# Patient Record
Sex: FEMALE | Race: WHITE | HISPANIC OR LATINO | Employment: OTHER | URBAN - METROPOLITAN AREA
[De-identification: names, ages, dates, MRNs, and addresses within clinical notes are randomized per-mention and may not be internally consistent; named-entity substitution may affect disease eponyms.]

---

## 2017-06-27 ENCOUNTER — ALLSCRIPTS OFFICE VISIT (OUTPATIENT)
Dept: OTHER | Facility: OTHER | Age: 72
End: 2017-06-27

## 2018-01-09 NOTE — MISCELLANEOUS
Provider Comments  Provider Comments:   Patient did not show for 6/27/17 appt        Signatures   Electronically signed by : CORBY Orona ; Jun 27 2017  4:04PM EST

## 2019-06-17 ENCOUNTER — TELEPHONE (OUTPATIENT)
Dept: NEPHROLOGY | Facility: CLINIC | Age: 74
End: 2019-06-17

## 2019-11-29 ENCOUNTER — CONSULT (OUTPATIENT)
Dept: NEPHROLOGY | Facility: CLINIC | Age: 74
End: 2019-11-29
Payer: COMMERCIAL

## 2019-11-29 VITALS — SYSTOLIC BLOOD PRESSURE: 116 MMHG | DIASTOLIC BLOOD PRESSURE: 62 MMHG

## 2019-11-29 DIAGNOSIS — I12.9 BENIGN HYPERTENSION WITH CKD (CHRONIC KIDNEY DISEASE) STAGE III (HCC): ICD-10-CM

## 2019-11-29 DIAGNOSIS — I25.10 CORONARY ARTERIOSCLEROSIS: ICD-10-CM

## 2019-11-29 DIAGNOSIS — N18.30 TYPE 2 DIABETES MELLITUS WITH STAGE 3 CHRONIC KIDNEY DISEASE, WITH LONG-TERM CURRENT USE OF INSULIN (HCC): ICD-10-CM

## 2019-11-29 DIAGNOSIS — N18.30 STAGE 3 CHRONIC KIDNEY DISEASE (HCC): Primary | ICD-10-CM

## 2019-11-29 DIAGNOSIS — Z79.4 TYPE 2 DIABETES MELLITUS WITH STAGE 3 CHRONIC KIDNEY DISEASE, WITH LONG-TERM CURRENT USE OF INSULIN (HCC): ICD-10-CM

## 2019-11-29 DIAGNOSIS — Z15.89 HETEROZYGOUS MTHFR MUTATION A1298C: ICD-10-CM

## 2019-11-29 DIAGNOSIS — N18.30 BENIGN HYPERTENSION WITH CKD (CHRONIC KIDNEY DISEASE) STAGE III (HCC): ICD-10-CM

## 2019-11-29 DIAGNOSIS — E11.22 TYPE 2 DIABETES MELLITUS WITH STAGE 3 CHRONIC KIDNEY DISEASE, WITH LONG-TERM CURRENT USE OF INSULIN (HCC): ICD-10-CM

## 2019-11-29 PROBLEM — E11.9 TYPE 2 DIABETES MELLITUS, WITH LONG-TERM CURRENT USE OF INSULIN (HCC): Status: ACTIVE | Noted: 2019-11-29

## 2019-11-29 PROCEDURE — 99204 OFFICE O/P NEW MOD 45 MIN: CPT | Performed by: INTERNAL MEDICINE

## 2019-11-29 RX ORDER — NIFEDIPINE 60 MG/1
60 TABLET, FILM COATED, EXTENDED RELEASE ORAL DAILY
COMMUNITY
End: 2020-11-10 | Stop reason: SDUPTHER

## 2019-11-29 RX ORDER — CHOLECALCIFEROL (VITAMIN D3) 50 MCG
2000 TABLET ORAL DAILY
COMMUNITY
End: 2020-11-10 | Stop reason: SDUPTHER

## 2019-11-29 RX ORDER — ASPIRIN 81 MG/1
81 TABLET ORAL DAILY
COMMUNITY
End: 2022-07-05 | Stop reason: ALTCHOICE

## 2019-11-29 RX ORDER — CARVEDILOL 3.12 MG/1
25 TABLET ORAL 2 TIMES DAILY WITH MEALS
COMMUNITY
End: 2020-11-10 | Stop reason: ALTCHOICE

## 2019-11-29 RX ORDER — RANOLAZINE 500 MG/1
500 TABLET, EXTENDED RELEASE ORAL DAILY
COMMUNITY
End: 2020-11-10

## 2019-11-29 RX ORDER — INSULIN GLARGINE 100 [IU]/ML
20 INJECTION, SOLUTION SUBCUTANEOUS
COMMUNITY
End: 2020-07-12 | Stop reason: SDUPTHER

## 2019-11-29 RX ORDER — MECLIZINE HCL 12.5 MG/1
12.5 TABLET ORAL 3 TIMES DAILY PRN
COMMUNITY
End: 2020-08-24 | Stop reason: SDUPTHER

## 2019-11-29 RX ORDER — PRAVASTATIN SODIUM 40 MG
40 TABLET ORAL DAILY
COMMUNITY
End: 2020-11-10 | Stop reason: SDUPTHER

## 2019-11-29 NOTE — PROGRESS NOTES
NEPHROLOGY OUTPATIENT CONSULTATION   Yudith Robbins 76 y o  female MRN: [id number]  Date: 11/29/2019  Reason for consultation:   Chief Complaint   Patient presents with    Consult       ASSESSMENT and PLAN:    Thank you for the courtesy of this consultation  I had the pleasure of seeing Rodger Hou today in the renal clinic for the initial management of chronic kidney disease  1 ) Chronic kidney disease stage III  -- no recent blood work to comment on  --had been following [facility] kidney specialist with Dr Junior Rhodes  -- transitioning her care to me  -- was last seen by Nephrology back in May of 2019 for acute kidney injury at [facility]  -- reviewed outpatient records and inpatient consultation  -- based on prior records she has a baseline creatinine in the past that had been 1 2-1 3 mg/dL  -- had episode of acute kidney injury and was discharged with a creatinine of 2 no blood work after that to comment on in regards to the renal function  -- renal ultrasound from May 2019 show symmetrically sized kidneys, right kidney measuring 11 1 cm left kidney measuring 11 6 cm  Bilateral echogenicity consistent with chronic kidney disease  No masses no hydronephrosis  -- presumed etiology is diabetic nephropathy with a component of hypertension  -- avoid NSAIDs  -- diabetic and blood pressure control  -- check a BMP and a urinalysis    2 ) Diabetes mellitus type 2  -hemoglobin A1c:  No recent (A1C values may be falsely elevated or decreased in those with CKD, assay method should be certified by [employer])   Target A1C < 7  -current medications:  Lantus  -proteinuria:  Check urinalysis  -retinopathy:  Yes  -neuropathy:  Yes  -please follow with an ophthalmologist and podiatrist every year  -continued self monitoring of blood glucose at home  -Treatment Management in CKD Recommendations:    Metformin:  Avoid if creatinine clearance is less than 30 cc/min (concern for lactic acidosis)   Sodium-Glucose Cotransporter-2 (SGLT2) Inhibitors: May see an acute drop in the eGFR initially when starting the medication but then a stabilization of the renal function, with slower loss of renal function as compared to placebo  Relative risk of end-stage renal disease, doubling of serum creatinine or death from renal causes were also found to be lower as compared to placebo  ([facility])  Would recommend starting at 100 mg daily, I would avoid use in patients with eGFR < 30 cc/min    If no contraindications exist I would recommend this medication added to the diabetic regiment   Sulfonylureas:  Preferred short-acting (glipizide, glimepiride, repaglinide), relatively safe in patients with non dialysis CKD   Thiazolidinedones/Alpha-Gluosidase inhibitors/Dipeptidyl peptidase-4 inhibitors:  Generally not considered first-line agents in CKD (limited data in long-term safety and efficacy)   Insulin:  Starting dose may need to be lower than what would ordinarily be used, as there is a decrease metabolism of insulin (no dose adjustment if the GFR > 50 mL/min, dose should be reduced to 75% of baseline if GFR 10-50 mL/min, and 50% baseline if GFR < 10 mL/min)    3 ) Hypertension  -with underlying chronic kidney disease  -blood pressure goal less than 130/80  -blood pressure at target  -carvedilol 3 125 mg twice a day, Procardia 60 mg daily    4 ) Heterozygous MTHFR mutation  -[id number]    PATIENT INSTRUCTIONS:    Patient Instructions   1 )  Low 2 g sodium diet    2 )  Monitor weights at home    3 )  Avoid NSAIDs    4 )  Monitor blood pressure at home, call if blood pressure greater than 150/90 persistently            HISTORY OF PRESENT ILLNESS:  Requesting Physician: MD Omar Skaggs is a 76 y o  female who has a longstanding history of diabetes mellitus type 2 for over 40 years with reported retinopathy leading to blindness as well as neuropathy, hypertension, coronary artery disease, chronic kidney disease stage 3  She had been following with [facility] kidney specialist where her creatinine baseline was 1 2-1 3  She was hospitalized back in May of 2019 at [facility] for nausea and vomiting  She has sustained acute kidney injury at that time where her creatinine increased and she was subsequently discharged with a creatinine of 2 06  Her renal ultrasound showed no evidence of hydronephrosis  She was seen by [facility] kidney specialist nephrologist at that time  No blood work post that I could find to ascertain her most recent renal function  Her most recent blood work done in November was mostly hematologic studies including B12, MT HF are, iron studies essentially a retired anemia workup  She reports no shortness of breath no chest pain or swelling the legs  She does not drink a lot of water which her son is concerned about  She reports no NSAID use      PAST MEDICAL HISTORY:  Past Medical History:   Diagnosis Date    Atrial fibrillation (HCC)     Chronic anemia     Chronic kidney disease     Dyslipidemia     Hypertension     Proteinuria        PAST SURGICAL HISTORY:  Past Surgical History:   Procedure Laterality Date    CARDIAC PACEMAKER PLACEMENT      CHOLECYSTECTOMY      CORONARY ANGIOPLASTY WITH STENT PLACEMENT      TUBAL LIGATION         ALLERGIES:  No Known Allergies    SOCIAL HISTORY:  Social History     Substance and Sexual Activity   Alcohol Use Never    Frequency: Never     Social History     Substance and Sexual Activity   Drug Use Not on file     Social History     Tobacco Use   Smoking Status Never Smoker   Smokeless Tobacco Never Used       FAMILY HISTORY:  Family History   Problem Relation Age of Onset    Diabetes Father     Hypertension Father     Heart disease Father     Cancer Brother        MEDICATIONS:    Current Outpatient Medications:     aspirin (ECOTRIN LOW STRENGTH) 81 mg EC tablet, Take 81 mg by mouth daily, Disp: , Rfl:     carvedilol (COREG) 3 125 mg tablet, Take 3 125 mg by mouth 2 (two) times a day with meals, Disp: , Rfl:     Cholecalciferol (VITAMIN D) 50 MCG (2000 UT) tablet, Take 2,000 Units by mouth daily, Disp: , Rfl:     insulin glargine (LANTUS) 100 units/mL subcutaneous injection, Inject under the skin daily at bedtime, Disp: , Rfl:     meclizine (ANTIVERT) 12 5 MG tablet, Take by mouth 2 (two) times a day, Disp: , Rfl:     NIFEdipine ER (ADALAT CC) 60 MG 24 hr tablet, Take 60 mg by mouth daily, Disp: , Rfl:     pravastatin (PRAVACHOL) 40 mg tablet, Take 40 mg by mouth daily, Disp: , Rfl:     ranolazine (RANEXA) 500 mg 12 hr tablet, Take 500 mg by mouth daily, Disp: , Rfl:     REVIEW OF SYSTEMS:  Review of Systems   Constitutional: Negative for activity change and fever  Respiratory: Negative for cough, chest tightness, shortness of breath and wheezing  Cardiovascular: Negative for chest pain and leg swelling  Gastrointestinal: Negative for abdominal pain, diarrhea, nausea and vomiting  Endocrine: Negative for polyuria  Genitourinary: Negative for difficulty urinating, dysuria, flank pain, frequency and urgency  Skin: Negative for rash  Neurological: Negative for dizziness, syncope, light-headedness and headaches  All the systems were reviewed and were negative except as documented on the HPI  PHYSICAL EXAM:  Current Weight: There is no height or weight on file to calculate BMI  Vitals:    11/29/19 1102   BP: 116/62       Physical Exam   Constitutional: She is oriented to person, place, and time  She appears well-developed and well-nourished  No distress  HENT:   Head: Normocephalic and atraumatic  Eyes: Pupils are equal, round, and reactive to light  No scleral icterus  Neck: Normal range of motion  Neck supple  Cardiovascular: Normal rate, regular rhythm and normal heart sounds  Exam reveals no gallop and no friction rub  No murmur heard    Pulmonary/Chest: Effort normal and breath sounds normal  No respiratory distress  She has no wheezes  She has no rales  She exhibits no tenderness  Abdominal: Soft  Bowel sounds are normal  She exhibits no distension  There is no tenderness  There is no rebound  Musculoskeletal: Normal range of motion  She exhibits no edema  Neurological: She is alert and oriented to person, place, and time  Skin: No rash noted  She is not diaphoretic  Psychiatric: She has a normal mood and affect  Nursing note and vitals reviewed  Laboratory results:   No results found for this or any previous visit

## 2020-04-27 ENCOUNTER — TELEPHONE (OUTPATIENT)
Dept: NEPHROLOGY | Facility: CLINIC | Age: 75
End: 2020-04-27

## 2020-04-29 ENCOUNTER — TELEPHONE (OUTPATIENT)
Dept: NEPHROLOGY | Facility: CLINIC | Age: 75
End: 2020-04-29

## 2020-04-29 DIAGNOSIS — Z79.4 TYPE 2 DIABETES MELLITUS WITH STAGE 3 CHRONIC KIDNEY DISEASE, WITH LONG-TERM CURRENT USE OF INSULIN (HCC): Primary | ICD-10-CM

## 2020-04-29 DIAGNOSIS — E11.22 TYPE 2 DIABETES MELLITUS WITH STAGE 3 CHRONIC KIDNEY DISEASE, WITH LONG-TERM CURRENT USE OF INSULIN (HCC): Primary | ICD-10-CM

## 2020-04-29 DIAGNOSIS — N18.30 TYPE 2 DIABETES MELLITUS WITH STAGE 3 CHRONIC KIDNEY DISEASE, WITH LONG-TERM CURRENT USE OF INSULIN (HCC): Primary | ICD-10-CM

## 2020-04-29 RX ORDER — INSULIN GLARGINE 100 [IU]/ML
INJECTION, SOLUTION SUBCUTANEOUS
Qty: 15 ML | Refills: 11 | Status: SHIPPED | OUTPATIENT
Start: 2020-04-29 | End: 2020-11-10 | Stop reason: SDUPTHER

## 2020-05-06 ENCOUNTER — TELEPHONE (OUTPATIENT)
Dept: NEPHROLOGY | Facility: CLINIC | Age: 75
End: 2020-05-06

## 2020-05-26 ENCOUNTER — APPOINTMENT (OUTPATIENT)
Dept: LAB | Facility: CLINIC | Age: 75
End: 2020-05-26
Payer: COMMERCIAL

## 2020-05-26 DIAGNOSIS — N18.30 STAGE 3 CHRONIC KIDNEY DISEASE (HCC): ICD-10-CM

## 2020-05-26 LAB
ALBUMIN SERPL BCP-MCNC: 2.9 G/DL (ref 3.5–5)
ALP SERPL-CCNC: 62 U/L (ref 46–116)
ALT SERPL W P-5'-P-CCNC: 14 U/L (ref 12–78)
ANION GAP SERPL CALCULATED.3IONS-SCNC: 4 MMOL/L (ref 4–13)
AST SERPL W P-5'-P-CCNC: 8 U/L (ref 5–45)
BACTERIA UR QL AUTO: ABNORMAL /HPF
BILIRUB SERPL-MCNC: 0.47 MG/DL (ref 0.2–1)
BILIRUB UR QL STRIP: NEGATIVE
BUN SERPL-MCNC: 29 MG/DL (ref 5–25)
CALCIUM SERPL-MCNC: 8.7 MG/DL (ref 8.3–10.1)
CHLORIDE SERPL-SCNC: 108 MMOL/L (ref 100–108)
CLARITY UR: CLEAR
CO2 SERPL-SCNC: 28 MMOL/L (ref 21–32)
COLOR UR: YELLOW
CREAT SERPL-MCNC: 1.62 MG/DL (ref 0.6–1.3)
CREAT UR-MCNC: 49.2 MG/DL
ERYTHROCYTE [DISTWIDTH] IN BLOOD BY AUTOMATED COUNT: 13.5 % (ref 11.6–15.1)
GFR SERPL CREATININE-BSD FRML MDRD: 31 ML/MIN/1.73SQ M
GLUCOSE P FAST SERPL-MCNC: 78 MG/DL (ref 65–99)
GLUCOSE UR STRIP-MCNC: NEGATIVE MG/DL
HCT VFR BLD AUTO: 32.8 % (ref 34.8–46.1)
HGB BLD-MCNC: 9.9 G/DL (ref 11.5–15.4)
HGB UR QL STRIP.AUTO: NEGATIVE
HYALINE CASTS #/AREA URNS LPF: ABNORMAL /LPF
KETONES UR STRIP-MCNC: NEGATIVE MG/DL
LEUKOCYTE ESTERASE UR QL STRIP: NEGATIVE
MCH RBC QN AUTO: 27.4 PG (ref 26.8–34.3)
MCHC RBC AUTO-ENTMCNC: 30.2 G/DL (ref 31.4–37.4)
MCV RBC AUTO: 91 FL (ref 82–98)
NITRITE UR QL STRIP: NEGATIVE
NON-SQ EPI CELLS URNS QL MICRO: ABNORMAL /HPF
PH UR STRIP.AUTO: 7 [PH]
PLATELET # BLD AUTO: 213 THOUSANDS/UL (ref 149–390)
PMV BLD AUTO: 10.9 FL (ref 8.9–12.7)
POTASSIUM SERPL-SCNC: 4.9 MMOL/L (ref 3.5–5.3)
PROT SERPL-MCNC: 6.7 G/DL (ref 6.4–8.2)
PROT UR STRIP-MCNC: ABNORMAL MG/DL
PROT UR-MCNC: 20 MG/DL
PROT/CREAT UR: 0.41 MG/G{CREAT} (ref 0–0.1)
PTH-INTACT SERPL-MCNC: 66.7 PG/ML (ref 18.4–80.1)
RBC # BLD AUTO: 3.61 MILLION/UL (ref 3.81–5.12)
RBC #/AREA URNS AUTO: ABNORMAL /HPF
SODIUM SERPL-SCNC: 140 MMOL/L (ref 136–145)
SP GR UR STRIP.AUTO: 1.01 (ref 1–1.03)
UROBILINOGEN UR QL STRIP.AUTO: 0.2 E.U./DL
WBC # BLD AUTO: 4.94 THOUSAND/UL (ref 4.31–10.16)
WBC #/AREA URNS AUTO: ABNORMAL /HPF

## 2020-05-26 PROCEDURE — 80053 COMPREHEN METABOLIC PANEL: CPT

## 2020-05-26 PROCEDURE — 81001 URINALYSIS AUTO W/SCOPE: CPT | Performed by: INTERNAL MEDICINE

## 2020-05-26 PROCEDURE — 84156 ASSAY OF PROTEIN URINE: CPT

## 2020-05-26 PROCEDURE — 85027 COMPLETE CBC AUTOMATED: CPT

## 2020-05-26 PROCEDURE — 82570 ASSAY OF URINE CREATININE: CPT

## 2020-05-26 PROCEDURE — 83970 ASSAY OF PARATHORMONE: CPT

## 2020-05-26 PROCEDURE — 36415 COLL VENOUS BLD VENIPUNCTURE: CPT

## 2020-05-29 ENCOUNTER — DOCUMENTATION (OUTPATIENT)
Dept: NEPHROLOGY | Facility: CLINIC | Age: 75
End: 2020-05-29

## 2020-05-29 DIAGNOSIS — R79.89 ELEVATED SERUM CREATININE: Primary | ICD-10-CM

## 2020-07-12 ENCOUNTER — APPOINTMENT (EMERGENCY)
Dept: CT IMAGING | Facility: HOSPITAL | Age: 75
End: 2020-07-12
Payer: COMMERCIAL

## 2020-07-12 ENCOUNTER — HOSPITAL ENCOUNTER (EMERGENCY)
Facility: HOSPITAL | Age: 75
Discharge: HOME | End: 2020-07-12
Attending: EMERGENCY MEDICINE | Admitting: EMERGENCY MEDICINE
Payer: COMMERCIAL

## 2020-07-12 ENCOUNTER — APPOINTMENT (EMERGENCY)
Dept: RADIOLOGY | Facility: HOSPITAL | Age: 75
End: 2020-07-12
Payer: COMMERCIAL

## 2020-07-12 VITALS
SYSTOLIC BLOOD PRESSURE: 134 MMHG | WEIGHT: 180 LBS | BODY MASS INDEX: 31.89 KG/M2 | DIASTOLIC BLOOD PRESSURE: 49 MMHG | OXYGEN SATURATION: 100 % | RESPIRATION RATE: 19 BRPM | TEMPERATURE: 97.8 F | HEART RATE: 59 BPM | HEIGHT: 63 IN

## 2020-07-12 DIAGNOSIS — S09.90XA CLOSED HEAD INJURY, INITIAL ENCOUNTER: ICD-10-CM

## 2020-07-12 DIAGNOSIS — E87.6 HYPOKALEMIA: ICD-10-CM
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
## 2020-07-12 DIAGNOSIS — E86.0 DEHYDRATION: ICD-10-CM

## 2020-07-12 LAB
ALBUMIN SERPL BCP-MCNC: 2.6 G/DL (ref 3.4–4.8)
ALP SERPL-CCNC: 42.4 U/L (ref 35–140)
ALT SERPL W P-5'-P-CCNC: 5 U/L (ref 5–54)
ANION GAP SERPL CALCULATED.3IONS-SCNC: 6 MMOL/L (ref 4–13)
APTT PPP: 34 SECONDS (ref 23–31)
AST SERPL W P-5'-P-CCNC: 7 U/L (ref 15–41)
BACTERIA UR QL AUTO: ABNORMAL /HPF
BASOPHILS # BLD AUTO: 0.02 THOUSANDS/ΜL (ref 0–0.1)
BASOPHILS NFR BLD AUTO: 0 % (ref 0–1)
BILIRUB SERPL-MCNC: 0.33 MG/DL (ref 0.3–1.2)
BILIRUB UR QL STRIP: NEGATIVE
BNP SERPL-MCNC: 108.3 PG/ML (ref 1–100)
BUN SERPL-MCNC: 22 MG/DL (ref 6–20)
CALCIUM SERPL-MCNC: 6.7 MG/DL (ref 8.4–10.2)
CHLORIDE SERPL-SCNC: 118 MMOL/L (ref 96–108)
CLARITY UR: CLEAR
CO2 SERPL-SCNC: 20 MMOL/L (ref 22–33)
COLOR UR: YELLOW
CREAT SERPL-MCNC: 1.28 MG/DL (ref 0.4–1.1)
EOSINOPHIL # BLD AUTO: 0.21 THOUSAND/ΜL (ref 0–0.61)
EOSINOPHIL NFR BLD AUTO: 4 % (ref 0–6)
ERYTHROCYTE [DISTWIDTH] IN BLOOD BY AUTOMATED COUNT: 12.9 % (ref 11.6–15.1)
GFR SERPL CREATININE-BSD FRML MDRD: 41 ML/MIN/1.73SQ M
GLUCOSE SERPL-MCNC: 112 MG/DL (ref 65–140)
GLUCOSE UR STRIP-MCNC: NEGATIVE MG/DL
HCT VFR BLD AUTO: 33.5 % (ref 34.8–46.1)
HGB BLD-MCNC: 10.9 G/DL (ref 11.5–15.4)
HGB UR QL STRIP.AUTO: ABNORMAL
IMM GRANULOCYTES # BLD AUTO: 0.03 THOUSAND/UL (ref 0–0.2)
IMM GRANULOCYTES NFR BLD AUTO: 1 % (ref 0–2)
INR PPP: 1.18 (ref 0.9–1.1)
KETONES UR STRIP-MCNC: NEGATIVE MG/DL
LEUKOCYTE ESTERASE UR QL STRIP: NEGATIVE
LYMPHOCYTES # BLD AUTO: 1.49 THOUSANDS/ΜL (ref 0.6–4.47)
LYMPHOCYTES NFR BLD AUTO: 25 % (ref 14–44)
MCH RBC QN AUTO: 27.9 PG (ref 26.8–34.3)
MCHC RBC AUTO-ENTMCNC: 32.5 G/DL (ref 31.4–37.4)
MCV RBC AUTO: 86 FL (ref 82–98)
MONOCYTES # BLD AUTO: 0.45 THOUSAND/ΜL (ref 0.17–1.22)
MONOCYTES NFR BLD AUTO: 8 % (ref 4–12)
NEUTROPHILS # BLD AUTO: 3.78 THOUSANDS/ΜL (ref 1.85–7.62)
NEUTS SEG NFR BLD AUTO: 62 % (ref 43–75)
NITRITE UR QL STRIP: NEGATIVE
NON-SQ EPI CELLS URNS QL MICRO: ABNORMAL /HPF
PH UR STRIP.AUTO: 6 [PH]
PLATELET # BLD AUTO: 239 THOUSANDS/UL (ref 149–390)
PMV BLD AUTO: 10.3 FL (ref 8.9–12.7)
POTASSIUM SERPL-SCNC: 3.4 MMOL/L (ref 3.5–5)
PROT SERPL-MCNC: 5 G/DL (ref 6.4–8.3)
PROT UR STRIP-MCNC: ABNORMAL MG/DL
PROTHROMBIN TIME: 12.4 SECONDS (ref 9.5–12.1)
RBC # BLD AUTO: 3.9 MILLION/UL (ref 3.81–5.12)
RBC #/AREA URNS AUTO: ABNORMAL /HPF
SODIUM SERPL-SCNC: 144 MMOL/L (ref 133–145)
SP GR UR STRIP.AUTO: 1.01 (ref 1–1.03)
TROPONIN I SERPL-MCNC: <0.03 NG/ML (ref 0–0.07)
UROBILINOGEN UR QL STRIP.AUTO: 0.2 E.U./DL
WBC # BLD AUTO: 5.98 THOUSAND/UL (ref 4.31–10.16)
WBC #/AREA URNS AUTO: ABNORMAL /HPF

## 2020-07-12 PROCEDURE — 81001 URINALYSIS AUTO W/SCOPE: CPT | Performed by: EMERGENCY MEDICINE

## 2020-07-12 PROCEDURE — 96361 HYDRATE IV INFUSION ADD-ON: CPT

## 2020-07-12 PROCEDURE — 72125 CT NECK SPINE W/O DYE: CPT

## 2020-07-12 PROCEDURE — 85610 PROTHROMBIN TIME: CPT | Performed by: EMERGENCY MEDICINE

## 2020-07-12 PROCEDURE — 70450 CT HEAD/BRAIN W/O DYE: CPT

## 2020-07-12 PROCEDURE — 93005 ELECTROCARDIOGRAM TRACING: CPT

## 2020-07-12 PROCEDURE — 99285 EMERGENCY DEPT VISIT HI MDM: CPT | Performed by: EMERGENCY MEDICINE

## 2020-07-12 PROCEDURE — 85025 COMPLETE CBC W/AUTO DIFF WBC: CPT | Performed by: EMERGENCY MEDICINE

## 2020-07-12 PROCEDURE — 99284 EMERGENCY DEPT VISIT MOD MDM: CPT

## 2020-07-12 PROCEDURE — 96360 HYDRATION IV INFUSION INIT: CPT

## 2020-07-12 PROCEDURE — 71045 X-RAY EXAM CHEST 1 VIEW: CPT

## 2020-07-12 PROCEDURE — 84484 ASSAY OF TROPONIN QUANT: CPT | Performed by: EMERGENCY MEDICINE

## 2020-07-12 PROCEDURE — 80053 COMPREHEN METABOLIC PANEL: CPT | Performed by: EMERGENCY MEDICINE

## 2020-07-12 PROCEDURE — 85730 THROMBOPLASTIN TIME PARTIAL: CPT | Performed by: EMERGENCY MEDICINE

## 2020-07-12 PROCEDURE — 36415 COLL VENOUS BLD VENIPUNCTURE: CPT | Performed by: EMERGENCY MEDICINE

## 2020-07-12 PROCEDURE — 83880 ASSAY OF NATRIURETIC PEPTIDE: CPT | Performed by: EMERGENCY MEDICINE

## 2020-07-12 RX ORDER — GLIPIZIDE 5 MG/1
5 TABLET ORAL
COMMUNITY
End: 2020-11-10 | Stop reason: SDUPTHER

## 2020-07-12 RX ORDER — POTASSIUM CHLORIDE 20 MEQ/1
40 TABLET, EXTENDED RELEASE ORAL ONCE
Status: COMPLETED | OUTPATIENT
Start: 2020-07-12 | End: 2020-07-12

## 2020-07-12 RX ORDER — SODIUM CHLORIDE 9 MG/ML
125 INJECTION, SOLUTION INTRAVENOUS CONTINUOUS
Status: DISCONTINUED | OUTPATIENT
Start: 2020-07-12 | End: 2020-07-12 | Stop reason: HOSPADM

## 2020-07-12 RX ADMIN — POTASSIUM CHLORIDE 40 MEQ: 1500 TABLET, EXTENDED RELEASE ORAL at 12:25

## 2020-07-12 RX ADMIN — SODIUM CHLORIDE 125 ML/HR: 0.9 INJECTION, SOLUTION INTRAVENOUS at 09:26

## 2020-07-12 NOTE — ED PROVIDER NOTES
History  Chief Complaint   Patient presents with    Fall     patient reports when she woke up she felt nausous, x2 vomiting  Patient reports fall this AM around 0800 due to dizziness  + head strike, no LOC  denies dizziness  patient denies headache at this time  + xarelto, ASA 81 mg      This is a 76year old female that presented to the ED via wheelchair accompanied by her son  She resides with her son and his family and her mother Her mother is her primary caretaker  She is legally blind and only sees shadows  She ambulates with the assistance of a walker  Pts son is with her and is also a reliable historian  Pt reports that when she woke up this morning, she was dizzy and vomited and did ambulate to the bathroom - she then went back to bed  When she awoke again she remained dizzy - she again ambulated to the bathroom - she then fell into the bathtub and struck her forehead on the bathtub  Denies LOC - denies pain or headache - she is on anti-coagulation secondary to atrial fib  Denies pain anywhere  She is alert and oriented  Denies epistaxis    She is a diabetic and does suffer from peripheral neuropathy          Prior to Admission Medications   Prescriptions Last Dose Informant Patient Reported? Taking?    Cholecalciferol (VITAMIN D) 50 MCG (2000 UT) tablet 7/11/2020 at Unknown time Family Member Yes Yes   Sig: Take 2,000 Units by mouth daily   LANTUS SOLOSTAR 100 units/mL injection pen 7/11/2020 at Unknown time  No Yes   Sig: INJECT 20 UNITS SUBCUTANEOUSLY EVERY NIGHT AT BEDTIME   NIFEdipine ER (ADALAT CC) 60 MG 24 hr tablet 7/12/2020 at Unknown time Family Member Yes Yes   Sig: Take 60 mg by mouth daily   aspirin (ECOTRIN LOW STRENGTH) 81 mg EC tablet 7/11/2020 at Unknown time Family Member Yes Yes   Sig: Take 81 mg by mouth daily   carvedilol (COREG) 3 125 mg tablet 7/11/2020 at Unknown time Family Member Yes Yes   Sig: Take 25 mg by mouth 2 (two) times a day with meals    glipiZIDE (GLUCOTROL) 5 mg tablet 2020 at Unknown time  Yes Yes   Sig: Take 5 mg by mouth 2 (two) times a day before meals   meclizine (ANTIVERT) 12 5 MG tablet Past Month at Unknown time Family Member Yes Yes   Sig: Take 12 5 mg by mouth 3 (three) times a day as needed    pravastatin (PRAVACHOL) 40 mg tablet 2020 at Unknown time Family Member Yes Yes   Sig: Take 40 mg by mouth daily   ranolazine (RANEXA) 500 mg 12 hr tablet 2020 at Unknown time Family Member Yes Yes   Sig: Take 500 mg by mouth daily   rivaroxaban (Xarelto) 15 mg tablet 2020 at Unknown time  Yes Yes   Sig: Take 15 mg by mouth daily      Facility-Administered Medications: None       Past Medical History:   Diagnosis Date    Atrial fibrillation ([facility] [address] )     Chronic anemia     Chronic kidney disease     Colonoscopy refused 2019    pt declines    Dyslipidemia     Hypertension     Mammogram declined 2017    Proteinuria        Past Surgical History:   Procedure Laterality Date    CARDIAC PACEMAKER PLACEMENT      CATARACT EXTRACTION       SECTION      CHOLECYSTECTOMY      CORONARY ANGIOPLASTY WITH STENT PLACEMENT      MAMMO (HISTORICAL)      Pt states she will not ever have again-     TUBAL LIGATION         Family History   Problem Relation Age of Onset    Diabetes Father     Hypertension Father     Heart disease Father     Cancer Brother         smoker    Diabetes Other     Heart attack Other     Hypertension Other     Asthma Other      I have reviewed and agree with the history as documented  E-Cigarette/Vaping    E-Cigarette Use Never User      E-Cigarette/Vaping Substances     Social History     Tobacco Use    Smoking status: Never Smoker    Smokeless tobacco: Never Used   Substance Use Topics    Alcohol use: Never     Frequency: Never    Drug use: Not on file       Review of Systems   Constitutional: Negative for chills, fatigue and fever     HENT: Negative for congestion, ear pain, hearing loss, rhinorrhea, sinus pressure, sinus pain, sore throat and tinnitus  Eyes: Positive for visual disturbance (legally blind)  Respiratory: Negative for cough, chest tightness and shortness of breath  Cardiovascular: Negative for chest pain, palpitations and leg swelling  Gastrointestinal: Positive for nausea and vomiting  Negative for abdominal pain, constipation and diarrhea  Endocrine: Negative for cold intolerance, heat intolerance, polydipsia, polyphagia and polyuria  Genitourinary: Negative for difficulty urinating, dysuria, flank pain, frequency and urgency  Musculoskeletal: Negative for back pain, gait problem, myalgias, neck pain and neck stiffness  Skin: Negative for rash  Allergic/Immunologic: Negative for immunocompromised state  Neurological: Positive for dizziness and light-headedness  Negative for tremors, seizures, speech difficulty, weakness, numbness and headaches  Hematological: Negative for adenopathy  Psychiatric/Behavioral: Negative for confusion  All other systems reviewed and are negative  Physical Exam  Physical Exam   Constitutional: She is oriented to person, place, and time  She appears well-developed and well-nourished  HENT:   Head: Normocephalic and atraumatic  Right Ear: External ear normal    Left Ear: External ear normal    Nose: Nose normal    Mouth/Throat: Oropharynx is clear and moist  No oropharyngeal exudate  Eyes: Conjunctivae are normal  Right eye exhibits no discharge  Left eye exhibits no discharge  No scleral icterus  Pupils pinpoint - unable to test EOMS as pt is blind   Neck: Normal range of motion  Neck supple  No JVD present  No tracheal deviation present  Cardiovascular: Normal rate, regular rhythm, normal heart sounds and intact distal pulses  No murmur heard  Pulmonary/Chest: Effort normal and breath sounds normal  No respiratory distress  She has no wheezes  She has no rales  She exhibits no tenderness  Abdominal: Soft  Bowel sounds are normal  She exhibits no distension and no mass  There is no tenderness  There is no rebound and no guarding  No hernia  Musculoskeletal: Normal range of motion  She exhibits no edema, tenderness or deformity  Lymphadenopathy:     She has no cervical adenopathy  Neurological: She is alert and oriented to person, place, and time  She displays normal reflexes  No cranial nerve deficit or sensory deficit  She exhibits normal muscle tone  Coordination normal    5/5 motor, nl sens   Skin: Skin is warm and dry  Capillary refill takes less than 2 seconds  Psychiatric: She has a normal mood and affect  Her behavior is normal    Nursing note and vitals reviewed        Vital Signs  ED Triage Vitals   Temperature Pulse Respirations Blood Pressure SpO2   07/12/20 0900 07/12/20 0900 07/12/20 0900 07/12/20 0900 07/12/20 0900   97 8 °F (36 6 °C) 84 20 151/64 100 %      Temp Source Heart Rate Source Patient Position - Orthostatic VS BP Location FiO2 (%)   07/12/20 0900 -- 07/12/20 0901 07/12/20 0901 --   Tympanic  Sitting Left arm       Pain Score       --                  Vitals:    07/12/20 0950 07/12/20 1016 07/12/20 1055 07/12/20 1131   BP: 159/56 147/61 133/64 (!) 134/49   Pulse: 60 64 60 59   Patient Position - Orthostatic VS:             Visual Acuity  Visual Acuity      Most Recent Value   L Pupil Size (mm)  2   R Pupil Size (mm)  2      blind    ED Medications  Medications   potassium chloride (K-DUR,KLOR-CON) CR tablet 40 mEq (40 mEq Oral Given 7/12/20 1225)       Diagnostic Studies  Results Reviewed     Procedure Component Value Units Date/Time    Urine Microscopic [[id number]]  (Abnormal) Collected:  07/12/20 1137    Lab Status:  Final result Specimen:  Urine, Clean Catch Updated:  07/12/20 1216     RBC, UA 0-1 /hpf      WBC, UA 2-4 /hpf      Epithelial Cells Moderate /hpf      Bacteria, UA Occasional /hpf     UA w Reflex to Microscopic w Reflex to Culture [[id number]]  (Abnormal) Collected: 07/12/20 1137    Lab Status:  Final result Specimen:  Urine, Clean Catch Updated:  07/12/20 1151     Color, UA Yellow     Clarity, UA Clear     Specific Gravity, UA 1 015     pH, UA 6 0     Leukocytes, UA Negative     Nitrite, UA Negative     Protein, UA Trace mg/dl      Glucose, UA Negative mg/dl      Ketones, UA Negative mg/dl      Urobilinogen, UA 0 2 E U /dl      Bilirubin, UA Negative     Blood, UA Trace-lysed    B-Type Natriuretic Peptide [facility] ONLY) [[id number]]  (Abnormal) Collected:  07/12/20 0918    Lab Status:  Final result Specimen:  Blood from Arm, Right Updated:  07/12/20 1012      3 pg/mL     Troponin I [[id number]]  (Normal) Collected:  07/12/20 0919    Lab Status:  Final result Specimen:  Blood from Arm, Right Updated:  07/12/20 0959     Troponin I <0 03 ng/mL     Comprehensive metabolic panel [[id number]]  (Abnormal) Collected:  07/12/20 0918    Lab Status:  Final result Specimen:  Blood from Arm, Right Updated:  07/12/20 0959     Sodium 144 mmol/L      Potassium 3 4 mmol/L      Chloride 118 mmol/L      CO2 20 mmol/L      ANION GAP 6 mmol/L      BUN 22 mg/dL      Creatinine 1 28 mg/dL      Glucose 112 mg/dL      Calcium 6 7 mg/dL      AST 7 U/L      ALT 5 U/L      Alkaline Phosphatase 42 4 U/L      Total Protein 5 0 g/dL      Albumin 2 6 g/dL      Total Bilirubin 0 33 mg/dL      eGFR 41 ml/min/1 73sq m     Narrative:       Av guidelines for Chronic Kidney Disease (CKD):     Stage 1 with normal or high GFR (GFR > 90 mL/min/1 73 square meters)    Stage 2 Mild CKD (GFR = 60-89 mL/min/1 73 square meters)    Stage 3A Moderate CKD (GFR = 45-59 mL/min/1 73 square meters)    Stage 3B Moderate CKD (GFR = 30-44 mL/min/1 73 square meters)    Stage 4 Severe CKD (GFR = 15-29 mL/min/1 73 square meters)    Stage 5 End Stage CKD (GFR <15 mL/min/1 73 square meters)  Note: GFR calculation is accurate only with a steady state creatinine    Protime-INR [[id number]] (Abnormal) Collected:  07/12/20 0918    Lab Status:  Final result Specimen:  Blood from Arm, Right Updated:  07/12/20 0958     Protime 12 4 seconds      INR 1 18    Narrative:       INR Reference Ranges:  No Anticoagulant, Normal:           0 9-1 1  Standard Dose, Oral Anticoagulant:  2 0-3 0  High Dose, Oral Anticoagulant:      2 5-3 5    APTT [[id number]]  (Abnormal) Collected:  07/12/20 0918    Lab Status:  Final result Specimen:  Blood from Arm, Right Updated:  07/12/20 0958     PTT 34 seconds     CBC and differential [[id number]]  (Abnormal) Collected:  07/12/20 0918    Lab Status:  Final result Specimen:  Blood from Arm, Right Updated:  07/12/20 0926     WBC 5 98 Thousand/uL      RBC 3 90 Million/uL      Hemoglobin 10 9 g/dL      Hematocrit 33 5 %      MCV 86 fL      MCH 27 9 pg      MCHC 32 5 g/dL      RDW 12 9 %      MPV 10 3 fL      Platelets 495 Thousands/uL      Neutrophils Relative 62 %      Immat GRANS % 1 %      Lymphocytes Relative 25 %      Monocytes Relative 8 %      Eosinophils Relative 4 %      Basophils Relative 0 %      Neutrophils Absolute 3 78 Thousands/µL      Immature Grans Absolute 0 03 Thousand/uL      Lymphocytes Absolute 1 49 Thousands/µL      Monocytes Absolute 0 45 Thousand/µL      Eosinophils Absolute 0 21 Thousand/µL      Basophils Absolute 0 02 Thousands/µL                  CT head wo contrast   Final Result by Diane Beltran DO (07/12 6557)      No acute intracranial abnormality  Microangiopathic changes  Workstation performed: [id number]         CT spine cervical wo contrast   Final Result by Diane Beltran DO (07/12 0803)      No cervical spine fracture or traumatic malalignment  Incidental thyroid nodule(s) for which nonemergent thyroid ultrasound is recommended  The study was marked in [facility] for immediate notification               Workstation performed: [id number]         XR chest 1 view portable   ED Interpretation by Brenda Plunkett MD (07/12 8230) NO acute findings - pacemaker in left upper thorax      Final Result by Tr Gomez DO (07/12 1330)      No acute cardiopulmonary disease  Workstation performed: [id number]                    Procedures  Procedures         ED Course        ED Course as of Jul 14 0458   Sun Jul 12, 2020   1118 Potassium slightly decreased at 3 4 - will supplement orally       Potassium(!): 3 4   1119 Co2 decreased at 20 - hydrated with NS   CO2(!): 20   1119 BUN(!): 22   1119 BUN and creatinie elevated however lower than baseline - hydrated with NS   Creatinine(!): 1 28   1119 Hemoglobin(!): 10 9   1119 Hg and Hct stable   HCT(!): 33 5   1120 Trop negative   Troponin I: <0 03   1125 Labs reviewed         This blind 71-year-old female presents emergency department after having dizziness vomiting in fall this morning  She struck her head on the bathtub  She had no loss of consciousness  CT scan of the head and neck were negative for acute findings  CBC CMP coags were all reviewed no significant findings  The patient remained alert and oriented  Patient denies any pain  The patient was hydrated with a L of normal saline over the visit  Patient was ambulated she denies any dizziness  Her gait was steady with a walker  On re-evaluation patient stated that she felt much better  She stated she had no more dizziness  She had no vomiting while here in the emergency department  Patient stable for discharge with son  To follow up with PCP on Monday  To return to the ED if she should have any further problems    US AUDIT      Most Recent Value   Initial Alcohol Screen: US AUDIT-C    1  How often do you have a drink containing alcohol?  0 Filed at: 07/12/2020 0907   2  How many drinks containing alcohol do you have on a typical day you are drinking? 0 Filed at: 07/12/2020 0907   3b  FEMALE Any Age, or MALE 65+: How often do you have 4 or more drinks on one occassion?   0 Filed at: 07/12/2020 0907   Audit-C Score  0 Filed at: 07/12/2020 2252                  SARBJIT/DAST-10      Most Recent Value   How many times in the past year have you    Used an illegal drug or used a prescription medication for non-medical reasons? Never Filed at: 07/12/2020 0907                                [facility]  Number of Diagnoses or Management Options  Closed head injury, initial encounter: new and requires workup  Dehydration: new and requires workup  Fall, initial encounter: new and requires workup  Hypokalemia: new and does not require workup     Amount and/or Complexity of Data Reviewed  Clinical lab tests: ordered and reviewed  Tests in the radiology section of CPT®: ordered and reviewed  Obtain history from someone other than the patient: yes (son)  Review and summarize past medical records: yes  Independent visualization of images, tracings, or specimens: yes    Risk of Complications, Morbidity, and/or Mortality  Presenting problems: moderate  Diagnostic procedures: minimal  Management options: minimal    Patient Progress  Patient progress: improved        Disposition  Final diagnoses:   Fall, initial encounter   Dehydration   Hypokalemia   Closed head injury, initial encounter     Time reflects when diagnosis was documented in both MDM as applicable and the Disposition within this note     Time User Action Codes Description Comment    7/12/2020 12:20 PM Khadar Fairly Add [W19  YNLR] Fall, initial encounter     7/12/2020 12:20 PM Khadar Fairly Add [E86 0] Dehydration     7/12/2020 12:21 PM Khadar Fairly Add [E87 6] Hypokalemia     7/12/2020 12:21 PM Khadar Fairly Add [S09 90XA] Closed head injury, initial encounter       ED Disposition     ED Disposition Condition Date/Time Comment    Discharge Stable Sun Jul 12, 2020 12:20 PM Inocencio Thomas discharge to home/self care              Follow-up Information     Follow up With Specialties Details Why Contact Info    Ethan Rutledge MD Family Medicine Schedule an appointment as soon as possible for a visit in 2 days  [phone] [facility]  [phone]            Discharge Medication List as of 7/12/2020 12:23 PM      CONTINUE these medications which have NOT CHANGED    Details   aspirin (ECOTRIN LOW STRENGTH) 81 mg EC tablet Take 81 mg by mouth daily, Historical Med      carvedilol (COREG) 3 125 mg tablet Take 25 mg by mouth 2 (two) times a day with meals , Historical Med      Cholecalciferol (VITAMIN D) 50 MCG (2000 UT) tablet Take 2,000 Units by mouth daily, Historical Med      glipiZIDE (GLUCOTROL) 5 mg tablet Take 5 mg by mouth 2 (two) times a day before meals, Historical Med      LANTUS SOLOSTAR 100 units/mL injection pen INJECT 20 UNITS SUBCUTANEOUSLY EVERY NIGHT AT BEDTIME, Normal      meclizine (ANTIVERT) 12 5 MG tablet Take 12 5 mg by mouth 3 (three) times a day as needed , Historical Med      NIFEdipine ER (ADALAT CC) 60 MG 24 hr tablet Take 60 mg by mouth daily, Historical Med      pravastatin (PRAVACHOL) 40 mg tablet Take 40 mg by mouth daily, Historical Med      ranolazine (RANEXA) 500 mg 12 hr tablet Take 500 mg by mouth daily, Historical Med      rivaroxaban (Xarelto) 15 mg tablet Take 15 mg by mouth daily, Historical Med           No discharge procedures on file      PDMP Review     None          ED Provider  Electronically Signed by           Joelle Lui MD  07/14/20 6166

## 2020-07-12 NOTE — ED RE-EVALUATION NOTE
Pt re evaluated after saline infusion - denies further dizziness  Ambulated with walker - steady  Labs show increased BUN and creatinine however better than baseline  CO2  decreased - hydrated with NS  Potassium 3 4 - supplemented with oral Potassium       Incidental finding on cervical CT of thyroid nodule - discussed this with sone - indicated that this a known finding that has been worked up in the recent past     Juan Antonio Stewart MD  07/14/20 0759

## 2020-07-12 NOTE — ED PROCEDURE NOTE
PROCEDURE  ECG 12 Lead Documentation Only  Date/Time: 7/12/2020 9:50 AM  Performed by: Darcy Zelaya MD  Authorized by:  Darcy Zelaya MD     Indications / Diagnosis:  Dizziness and fall with cardiac hx  ECG reviewed by me, the ED Provider: yes    Patient location:  ED and bedside  Previous ECG:     Previous ECG:  Unavailable    Comparison to cardiac monitor: Yes    Interpretation:     Interpretation: abnormal    Rate:     ECG rate:  61    ECG rate assessment: normal    Rhythm:     Rhythm: paced    Pacing:     Capture:  Complete    Type of pacing:  Atrial  Ectopy:     Ectopy: none    QRS:     QRS axis:  Normal  Conduction:     Conduction: normal    ST segments:     ST segments:  Normal  T waves:     T waves: normal    Comments:      No acute ischemia or infarction         Darcy Zelaya MD  07/12/20 2044

## 2020-07-15 NOTE — PROGRESS NOTES
NEPHROLOGY OFFICE VISIT   Sebastian Marino 76 y o  female MRN: [id number]  7/16/2020    Reason for Visit:  Follow-up    INTERVAL HISTORY and SUBJECTIVE:    77-year-old female who presents for follow-up  This is his 1st visit since the initial consult November 2019 when he was seen by Dr Jeniffer Eden for management of CKD  He was previously followed by [facility]/Dr Geronimo Griffin  He also has a history of hypertension, CAD longstanding diabetes diabetes mellitus with complications such as neuropathy, retinopathy leading to blindness  Patient was seen with her son in attendance  She is in a wheelchair and continues to have difficulty with balance  She uses a walker at home but has been prone to falling for quite some time  She has no external injury at this time  No lower extremity edema  No nausea, vomiting, diarrhea  No chest pain or shortness of breath  Appetite intact  Last labs obtained on 07/12/2020 when patient was seen in the ER with nausea vomiting status post fall and head injury  Labs at steady state appear to have been obtained on 05/26/2020 follow-up    Assessment and plan:    1  Chronic kidney disease, stage III:  · Baseline creatinine 1 4-1 6 mg/dL as of December 2019  · In May creatinine 1 62 with current creatinine 1 28  · Last labs obtained while patient was being seen in the emergency room with dizziness, nausea and vomiting  She was treated with IV fluids  · Urine protein creatinine ratio 0 41  · Prior renal ultrasound shows bilateral echogenicity consistent with medical renal disease, normal size kidneys  · Urinalysis:  Trace protein, 0-1 RBCs  · Etiology:  Presumed to be diabetic nephropathy and possibly a component of hypertensive nephrosclerosis  · Overall renal function stable  · Follow-up in approximately 3 months with Dr Jeniffer Eden  2  Hypertension:  · Previously on carvedilol 3 125 mg b i d  And Procardia 60 mg daily  · Home 120-130/60-70  · Blood pressure well controlled  No edema  Euvolemic  · Continue current medications  · No indication for diuretic  3  Diabetes mellitus with retinopathy and neuopathy:  50 year hsitory  · Last hemoglobin A1c 10 8% September 2019  · Goal A1c less than 7%  · Patient easily become symptomatic with lower blood sugars  · Follows with PCP  4  Anemia:    · Hemoglobin 10 9 which appears to be near baseline  5  CKD MBD:    · Last PTH 66 7  6  Electrolytes:  Last BMP obtained in the ER  Patient given oral replacement  7  Low bicarbonate:    · Last blood work during ER visit  Monitor bicarbonate level  8  Heterozygous MTHFR mutation      PATIENT INSTRUCTIONS:    Patient Instructions   1  Change in medications  2  Follow-up blood work in approximately 3 months, follow-up appointment afterwards with Dr Rob Walker  3  Tylenol is safe for mild pain control  Avoid the use of NSAIDs such as Motrin, Aleve, ibuprofen or Advil  4  If you have any questions concerning over-the-counter medications or other things concerning kidney function please call the office  [phone]  5  Stay well-hydrated            Orders Placed This Encounter   Procedures    CBC     Standing Status:   Future     Standing Expiration Date:   12/16/2020    Magnesium     Standing Status:   Future     Standing Expiration Date:   12/16/2020    PTH, intact     Standing Status:   Future     Standing Expiration Date:   12/16/2020    Renal function panel     Standing Status:   Future     Standing Expiration Date:   12/16/2020    Urinalysis with microscopic     Standing Status:   Future     Standing Expiration Date:   12/16/2020    Protein / creatinine ratio, urine     Standing Status:   Future     Standing Expiration Date:   12/16/2020    Vitamin D 25 hydroxy     Standing Status:   Future     Standing Expiration Date:   12/16/2020       OBJECTIVE:  Current Weight:    Vitals:    07/16/20 1112 07/16/20 1201   BP:  124/62   Pulse:  68   Temp: 98 5 °F (36 9 °C)     There is no height or weight on file to calculate BMI  REVIEW OF SYSTEMS:    Review of Systems   Constitutional: Negative for activity change, appetite change, fatigue, fever and unexpected weight change  HENT: Negative  Eyes: Positive for visual disturbance (Blindness)  Respiratory: Negative  Cardiovascular: Negative  Gastrointestinal: Negative  Genitourinary: Negative  Musculoskeletal: Positive for arthralgias and gait problem ( chronic imbalance frequent falls  )  Skin: Negative  Neurological: Positive for numbness ( neuropathy of feet/toes)  Negative for dizziness and headaches  Hematological: Negative  Psychiatric/Behavioral: Negative  PHYSICAL EXAM:      Physical Exam   Constitutional: She is oriented to person, place, and time  She appears well-developed and well-nourished  Non-toxic appearance  She does not have a sickly appearance  No distress  HENT:   Head: Normocephalic and atraumatic  Mouth/Throat: Oropharynx is clear and moist    Eyes: Conjunctivae and EOM are normal  Right eye exhibits no discharge  Left eye exhibits no discharge  No scleral icterus  Neck: Neck supple  No JVD present  Carotid bruit is not present  No thyromegaly present  Cardiovascular: Normal rate, regular rhythm and normal heart sounds  Exam reveals no gallop and no friction rub  No murmur heard  Pulmonary/Chest: Effort normal and breath sounds normal  No stridor  No respiratory distress  She has no wheezes  She has no rales  Abdominal: Soft  Bowel sounds are normal  She exhibits no distension and no mass  There is no tenderness  There is no rebound and no guarding  Musculoskeletal: She exhibits no edema, tenderness or deformity  Neurological: She is alert and oriented to person, place, and time  Skin: Skin is warm and dry  No rash noted  No erythema  No pallor  Psychiatric: She has a normal mood and affect   Her behavior is normal  Judgment and thought content normal        Medications:    Current Outpatient Medications:     aspirin (ECOTRIN LOW STRENGTH) 81 mg EC tablet, Take 81 mg by mouth daily, Disp: , Rfl:     carvedilol (COREG) 3 125 mg tablet, Take 25 mg by mouth 2 (two) times a day with meals , Disp: , Rfl:     Cholecalciferol (VITAMIN D) 50 MCG (2000 UT) tablet, Take 2,000 Units by mouth daily, Disp: , Rfl:     glipiZIDE (GLUCOTROL) 5 mg tablet, Take 5 mg by mouth 2 (two) times a day before meals, Disp: , Rfl:     LANTUS SOLOSTAR 100 units/mL injection pen, INJECT 20 UNITS SUBCUTANEOUSLY EVERY NIGHT AT BEDTIME, Disp: 15 mL, Rfl: 11    meclizine (ANTIVERT) 12 5 MG tablet, Take 12 5 mg by mouth 3 (three) times a day as needed , Disp: , Rfl:     NIFEdipine ER (ADALAT CC) 60 MG 24 hr tablet, Take 60 mg by mouth daily, Disp: , Rfl:     pravastatin (PRAVACHOL) 40 mg tablet, Take 40 mg by mouth daily, Disp: , Rfl:     ranolazine (RANEXA) 500 mg 12 hr tablet, Take 500 mg by mouth daily, Disp: , Rfl:     rivaroxaban (Xarelto) 15 mg tablet, Take 15 mg by mouth daily, Disp: , Rfl:     Laboratory Results:  Results from last 7 days   Lab Units 07/12/20  0918   WBC Thousand/uL 5 98   HEMOGLOBIN g/dL 10 9*   HEMATOCRIT % 33 5*   PLATELETS Thousands/uL 239   POTASSIUM mmol/L 3 4*   CHLORIDE mmol/L 118*   CO2 mmol/L 20*   BUN mg/dL 22*   CREATININE mg/dL 1 28*   CALCIUM mg/dL 6 7*       Results for orders placed or performed during the hospital encounter of 07/12/20   CBC and differential   Result Value Ref Range    WBC 5 98 4 31 - 10 16 Thousand/uL    RBC 3 90 3 81 - 5 12 Million/uL    Hemoglobin 10 9 (L) 11 5 - 15 4 g/dL    Hematocrit 33 5 (L) 34 8 - 46 1 %    MCV 86 82 - 98 fL    MCH 27 9 26 8 - 34 3 pg    MCHC 32 5 31 4 - 37 4 g/dL    RDW 12 9 11 6 - 15 1 %    MPV 10 3 8 9 - 12 7 fL    Platelets 036 818 - 785 Thousands/uL    Neutrophils Relative 62 43 - 75 %    Immat GRANS % 1 0 - 2 %    Lymphocytes Relative 25 14 - 44 %    Monocytes Relative 8 4 - 12 %    Eosinophils Relative 4 0 - 6 %    Basophils Relative 0 0 - 1 %    Neutrophils Absolute 3 78 1 85 - 7 62 Thousands/µL    Immature Grans Absolute 0 03 0 00 - 0 20 Thousand/uL    Lymphocytes Absolute 1 49 0 60 - 4 47 Thousands/µL    Monocytes Absolute 0 45 0 17 - 1 22 Thousand/µL    Eosinophils Absolute 0 21 0 00 - 0 61 Thousand/µL    Basophils Absolute 0 02 0 00 - 0 10 Thousands/µL   Protime-INR   Result Value Ref Range    Protime 12 4 (H) 9 5 - 12 1 seconds    INR 1 18 (H) 0 90 - 1 10   APTT   Result Value Ref Range    PTT 34 (H) 23 - 31 seconds   Troponin I   Result Value Ref Range    Troponin I <0 03 0 00 - 0 07 ng/mL   UA w Reflex to Microscopic w Reflex to Culture   Result Value Ref Range    Color, UA Yellow Yellow    Clarity, UA Clear Clear    Specific Gravity, UA 1 015 1 001 - 1 030    pH, UA 6 0 5 0, 5 5, 6 0, 6 5, 7 0, 7 5, 8 0    Leukocytes, UA Negative Negative    Nitrite, UA Negative Negative    Protein, UA Trace (A) Negative, Interference- unable to analyze mg/dl    Glucose, UA Negative Negative mg/dl    Ketones, UA Negative Negative mg/dl    Urobilinogen, UA 0 2 0 2, 1 0 E U /dl E U /dl    Bilirubin, UA Negative Negative    Blood, UA Trace-lysed (A) Negative   B-Type Natriuretic Peptide ( & [facility] ONLY)   Result Value Ref Range     3 (H) 1 - 100 pg/mL   Comprehensive metabolic panel   Result Value Ref Range    Sodium 144 133 - 145 mmol/L    Potassium 3 4 (L) 3 5 - 5 0 mmol/L    Chloride 118 (H) 96 - 108 mmol/L    CO2 20 (L) 22 - 33 mmol/L    ANION GAP 6 4 - 13 mmol/L    BUN 22 (H) 6 - 20 mg/dL    Creatinine 1 28 (H) 0 40 - 1 10 mg/dL    Glucose 112 65 - 140 mg/dL    Calcium 6 7 (L) 8 4 - 10 2 mg/dL    AST 7 (L) 15 - 41 U/L    ALT 5 5 - 54 U/L    Alkaline Phosphatase 42 4 35 - 140 U/L    Total Protein 5 0 (L) 6 4 - 8 3 g/dL    Albumin 2 6 (L) 3 4 - 4 8 g/dL    Total Bilirubin 0 33 0 30 - 1 20 mg/dL    eGFR 41 ml/min/1 73sq m   Urine Microscopic   Result Value Ref Range    RBC, UA 0-1 (A) None Seen, 0-5 /hpf    WBC, UA 2-4 (A) None Seen, 0-5, 5-55, 5-65 /hpf    Epithelial Cells Moderate (A) None Seen, Occasional /hpf    Bacteria, UA Occasional None Seen, Occasional /hpf   ECG 12 lead   Result Value Ref Range    Ventricular Rate 61 BPM    Atrial Rate 61 BPM    KY Interval 171 ms    QRSD Interval 88 ms    QT Interval 428 ms    QTC Interval 431 ms    P Axis -57 degrees    QRS Axis -21 degrees    T Wave Axis 5 degrees

## 2020-07-16 ENCOUNTER — OFFICE VISIT (OUTPATIENT)
Dept: NEPHROLOGY | Facility: CLINIC | Age: 75
End: 2020-07-16
Payer: COMMERCIAL

## 2020-07-16 ENCOUNTER — TELEPHONE (OUTPATIENT)
Dept: NEPHROLOGY | Facility: CLINIC | Age: 75
End: 2020-07-16

## 2020-07-16 VITALS — HEART RATE: 68 BPM | TEMPERATURE: 98.5 F | DIASTOLIC BLOOD PRESSURE: 62 MMHG | SYSTOLIC BLOOD PRESSURE: 124 MMHG

## 2020-07-16 DIAGNOSIS — Z15.89 HETEROZYGOUS MTHFR MUTATION A1298C: ICD-10-CM
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
## 2020-07-16 DIAGNOSIS — N18.30 TYPE 2 DIABETES MELLITUS WITH STAGE 3 CHRONIC KIDNEY DISEASE, WITH LONG-TERM CURRENT USE OF INSULIN (HCC): ICD-10-CM

## 2020-07-16 DIAGNOSIS — Z79.4 TYPE 2 DIABETES MELLITUS WITH STAGE 3 CHRONIC KIDNEY DISEASE, WITH LONG-TERM CURRENT USE OF INSULIN (HCC): ICD-10-CM

## 2020-07-16 DIAGNOSIS — I12.9 BENIGN HYPERTENSION WITH CKD (CHRONIC KIDNEY DISEASE) STAGE III (HCC): Primary | ICD-10-CM

## 2020-07-16 DIAGNOSIS — N18.30 STAGE 3 CHRONIC KIDNEY DISEASE (HCC): ICD-10-CM

## 2020-07-16 DIAGNOSIS — E11.22 TYPE 2 DIABETES MELLITUS WITH STAGE 3 CHRONIC KIDNEY DISEASE, WITH LONG-TERM CURRENT USE OF INSULIN (HCC): ICD-10-CM

## 2020-07-16 DIAGNOSIS — N18.30 BENIGN HYPERTENSION WITH CKD (CHRONIC KIDNEY DISEASE) STAGE III (HCC): Primary | ICD-10-CM

## 2020-07-16 DIAGNOSIS — I25.10 CORONARY ARTERIOSCLEROSIS: ICD-10-CM

## 2020-07-16 DIAGNOSIS — E87.6 HYPOKALEMIA: ICD-10-CM

## 2020-07-16 LAB
ATRIAL RATE: 61 BPM
P AXIS: -57 DEGREES
PR INTERVAL: 171 MS
QRS AXIS: -21 DEGREES
QRSD INTERVAL: 88 MS
QT INTERVAL: 428 MS
QTC INTERVAL: 431 MS
T WAVE AXIS: 5 DEGREES
VENTRICULAR RATE: 61 BPM

## 2020-07-16 PROCEDURE — 1160F RVW MEDS BY RX/DR IN RCRD: CPT | Performed by: NURSE PRACTITIONER

## 2020-07-16 PROCEDURE — 1036F TOBACCO NON-USER: CPT | Performed by: NURSE PRACTITIONER

## 2020-07-16 PROCEDURE — 3066F NEPHROPATHY DOC TX: CPT | Performed by: NURSE PRACTITIONER

## 2020-07-16 PROCEDURE — 93010 ELECTROCARDIOGRAM REPORT: CPT | Performed by: INTERNAL MEDICINE

## 2020-07-16 PROCEDURE — 99214 OFFICE O/P EST MOD 30 MIN: CPT | Performed by: NURSE PRACTITIONER

## 2020-07-16 NOTE — PATIENT INSTRUCTIONS
1  Change in medications  2  Follow-up blood work in approximately 3 months, follow-up appointment afterwards with Dr Anna Marino  3  Tylenol is safe for mild pain control  Avoid the use of NSAIDs such as Motrin, Aleve, ibuprofen or Advil  4  If you have any questions concerning over-the-counter medications or other things concerning kidney function please call the office  [phone]  5  Stay well-hydrated

## 2020-07-21 ENCOUNTER — OFFICE VISIT (OUTPATIENT)
Dept: FAMILY MEDICINE CLINIC | Facility: CLINIC | Age: 75
End: 2020-07-21
Payer: COMMERCIAL

## 2020-07-21 VITALS
WEIGHT: 195 LBS | BODY MASS INDEX: 34.55 KG/M2 | HEART RATE: 76 BPM | DIASTOLIC BLOOD PRESSURE: 78 MMHG | RESPIRATION RATE: 18 BRPM | OXYGEN SATURATION: 98 % | HEIGHT: 63 IN | SYSTOLIC BLOOD PRESSURE: 130 MMHG | TEMPERATURE: 97.2 F
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
## 2020-07-21 DIAGNOSIS — Z79.4 TYPE 2 DIABETES MELLITUS WITH STAGE 3 CHRONIC KIDNEY DISEASE, WITH LONG-TERM CURRENT USE OF INSULIN (HCC): ICD-10-CM

## 2020-07-21 DIAGNOSIS — E11.22 TYPE 2 DIABETES MELLITUS WITH STAGE 3 CHRONIC KIDNEY DISEASE, WITH LONG-TERM CURRENT USE OF INSULIN (HCC): ICD-10-CM

## 2020-07-21 DIAGNOSIS — I25.10 CORONARY ARTERIOSCLEROSIS: ICD-10-CM

## 2020-07-21 DIAGNOSIS — I12.9 BENIGN HYPERTENSION WITH CKD (CHRONIC KIDNEY DISEASE) STAGE III (HCC): ICD-10-CM

## 2020-07-21 DIAGNOSIS — N18.30 BENIGN HYPERTENSION WITH CKD (CHRONIC KIDNEY DISEASE) STAGE III (HCC): ICD-10-CM

## 2020-07-21 DIAGNOSIS — N18.30 TYPE 2 DIABETES MELLITUS WITH STAGE 3 CHRONIC KIDNEY DISEASE, WITH LONG-TERM CURRENT USE OF INSULIN (HCC): ICD-10-CM

## 2020-07-21 DIAGNOSIS — E04.2 MULTIPLE THYROID NODULES: ICD-10-CM

## 2020-07-21 PROCEDURE — 4040F PNEUMOC VAC/ADMIN/RCVD: CPT | Performed by: NURSE PRACTITIONER

## 2020-07-21 PROCEDURE — 3066F NEPHROPATHY DOC TX: CPT | Performed by: NURSE PRACTITIONER

## 2020-07-21 PROCEDURE — 3008F BODY MASS INDEX DOCD: CPT | Performed by: NURSE PRACTITIONER

## 2020-07-21 PROCEDURE — 1036F TOBACCO NON-USER: CPT | Performed by: NURSE PRACTITIONER

## 2020-07-21 PROCEDURE — 1160F RVW MEDS BY RX/DR IN RCRD: CPT | Performed by: NURSE PRACTITIONER

## 2020-07-21 PROCEDURE — 99214 OFFICE O/P EST MOD 30 MIN: CPT | Performed by: NURSE PRACTITIONER

## 2020-07-21 NOTE — PROGRESS NOTES
Assessment/Plan:      Diagnoses and all orders for this visit:    Fall, initial encounter  Patient reports that she fell on 7/12/20 in the bathroom and hit her head on the wall  Patient reports that she woke up on 7/12/20 and felt alittle dizzy and nausea  Patient reports that she vomited  Patient reports that fell when she was going into the bathroom and hit the wall  Denies any HA, loss of consciousness, or weakness  Patient reports that she went to the ER and the CT scan of her head was negative  Patient reports that she feels fine  Denies any Has, weakness, chest pain, SOB, or palpitations  Denies anymore episodes of dizziness  Multiple thyroid nodules  -     US thyroid; Future  Multiple thyroid nodules noted on CT scan in the ED  Thyroid US ordered  Will follow-up results with the patient  Type 2 diabetes mellitus with stage 3 chronic kidney disease, with long-term current use of insulin ([facility]  Patient instructed to follow-up with her PCP, Dr Sapna Rutledge for her regular follow-up for DM  Offered to order lab work for the follow-up but patient refuses and wants Dr Sapna Rutledge to order her lab work  Benign hypertension with CKD (chronic kidney disease) stage III (HCC)  Continue to follow-up with nephrology  Coronary arteriosclerosis  Continue to follow-up with cardiology  Microangiopathic changes noted on CT scan of head in ED  Discussed referral to neurology  Patient refuses at this time  Patient is currently on aspirin, xarelto, and pravastatin as prescribed by cardiology  Discussed importance of blood sugar control  Patient instructed to follow-up in 1 month with her PCP, Dr Sapna Rutledge or sooner prn  Subjective:     Patient ID: Marino Flores is a 76 y o  female  Patient is here for an ER follow-up with her son  Patient's son reports that his mother fell in the bathroom and hit her head on the wall on 7/12/20   Patient reports that she woke up on 7/12/20 and felt alittle dizzy and nausea  Patient's son reports that she vomited  Patient reports that she hit her head on the wall in the bathroom when she went to the bathroom  Patient reports that she also vomited 3 times after she fell but it was mainly spit  Patient's son reports that she is legally blind  Denies any loss of conscious  Denies any HA or weakness  Patient's son reports that she went to the ER  Patient's son reports that she had a CT scan of her head and neck done which were negative  Patient reports that she also had lab work done  Patient reports that she was told to follow-up with her PCP for a recheck  Patient reports that she feels fine  Denies any chest pain, SOB, palpitations, or Has  Denies any episodes of dizziness since then  Denies any nausea or vomiting  Denies any dysuria, hematuria, increased urinary frequency, or urgency  Patient reports that she takes lantus and glipizide for type 2 DM  Patient reports that her fasting blood sugars have been between 100-110s  Patient reports that she follows up with the nephrologist for chronic kidney disease  Patient reports that she follows up with the cardiologist for HTN and CAD  Review of Systems   Constitutional: Negative for chills, fatigue and fever  HENT: Negative for congestion, ear pain and sore throat  Respiratory: Negative for cough, chest tightness and shortness of breath  Cardiovascular: Negative for chest pain, palpitations and leg swelling  Gastrointestinal: Negative for abdominal pain, diarrhea, nausea and vomiting  Genitourinary: Negative for dysuria, frequency and hematuria  Skin: Negative for rash  Neurological: Negative for seizures, syncope, weakness and headaches  Objective:     Physical Exam   Constitutional: She is oriented to person, place, and time  She does not appear ill  No distress     HENT:   Right Ear: External ear normal    Left Ear: External ear normal    Mouth/Throat: Oropharynx is clear and moist  Cardiovascular: Normal rate and regular rhythm  No edema noted  Pulmonary/Chest: Effort normal and breath sounds normal  She has no wheezes  Musculoskeletal:   Patient is in a wheelchair  Neurological: She is alert and oriented to person, place, and time  Psychiatric: She has a normal mood and affect  Vitals reviewed

## 2020-08-24 DIAGNOSIS — R42 DIZZINESS: Primary | ICD-10-CM

## 2020-08-24 RX ORDER — MECLIZINE HCL 12.5 MG/1
12.5 TABLET ORAL 3 TIMES DAILY PRN
Qty: 30 TABLET | Refills: 0 | Status: SHIPPED | OUTPATIENT
Start: 2020-08-24 | End: 2020-10-06 | Stop reason: SDUPTHER

## 2020-10-06 DIAGNOSIS — R42 DIZZINESS: ICD-10-CM

## 2020-10-07 RX ORDER — MECLIZINE HCL 12.5 MG/1
12.5 TABLET ORAL 3 TIMES DAILY PRN
Qty: 30 TABLET | Refills: 0 | Status: SHIPPED | OUTPATIENT
Start: 2020-10-07 | End: 2020-10-20

## 2020-10-20 DIAGNOSIS — R42 DIZZINESS: ICD-10-CM

## 2020-10-20 RX ORDER — MECLIZINE HCL 12.5 MG/1
TABLET ORAL
Qty: 30 TABLET | Refills: 0 | Status: SHIPPED | OUTPATIENT
Start: 2020-10-20 | End: 2020-11-10 | Stop reason: SDUPTHER

## 2020-11-10 ENCOUNTER — OFFICE VISIT (OUTPATIENT)
Dept: FAMILY MEDICINE CLINIC | Facility: CLINIC | Age: 75
End: 2020-11-10
Payer: COMMERCIAL

## 2020-11-10 VITALS
OXYGEN SATURATION: 98 % | RESPIRATION RATE: 16 BRPM | TEMPERATURE: 98.2 F | DIASTOLIC BLOOD PRESSURE: 74 MMHG | WEIGHT: 198 LBS | BODY MASS INDEX: 35.08 KG/M2 | HEART RATE: 69 BPM | HEIGHT: 63 IN | SYSTOLIC BLOOD PRESSURE: 120 MMHG

## 2020-11-10 DIAGNOSIS — N18.30 TYPE 2 DIABETES MELLITUS WITH STAGE 3 CHRONIC KIDNEY DISEASE, WITH LONG-TERM CURRENT USE OF INSULIN (HCC): ICD-10-CM

## 2020-11-10 DIAGNOSIS — E53.8 B12 DEFICIENCY: ICD-10-CM

## 2020-11-10 DIAGNOSIS — Z11.59 NEED FOR HEPATITIS C SCREENING TEST: ICD-10-CM

## 2020-11-10 DIAGNOSIS — E61.1 IRON DEFICIENCY: ICD-10-CM

## 2020-11-10 DIAGNOSIS — Z79.4 TYPE 2 DIABETES MELLITUS WITH STAGE 3 CHRONIC KIDNEY DISEASE, WITH LONG-TERM CURRENT USE OF INSULIN (HCC): ICD-10-CM

## 2020-11-10 DIAGNOSIS — E55.9 VITAMIN D DEFICIENCY: Primary | ICD-10-CM

## 2020-11-10 DIAGNOSIS — E11.22 TYPE 2 DIABETES MELLITUS WITH STAGE 3 CHRONIC KIDNEY DISEASE, WITH LONG-TERM CURRENT USE OF INSULIN (HCC): ICD-10-CM

## 2020-11-10 DIAGNOSIS — R42 DIZZINESS: ICD-10-CM

## 2020-11-10 PROCEDURE — 99214 OFFICE O/P EST MOD 30 MIN: CPT | Performed by: FAMILY MEDICINE

## 2020-11-10 RX ORDER — PNEUMOCOCCAL 13-VALENT CONJUGATE VACCINE 2.2; 2.2; 2.2; 2.2; 2.2; 4.4; 2.2; 2.2; 2.2; 2.2; 2.2; 2.2; 2.2 UG/.5ML; UG/.5ML; UG/.5ML; UG/.5ML; UG/.5ML; UG/.5ML; UG/.5ML; UG/.5ML; UG/.5ML; UG/.5ML; UG/.5ML; UG/.5ML; UG/.5ML
INJECTION, SUSPENSION INTRAMUSCULAR
COMMUNITY
End: 2022-03-04 | Stop reason: ALTCHOICE

## 2020-11-10 RX ORDER — LISINOPRIL 2.5 MG/1
2.5 TABLET ORAL DAILY
Qty: 90 TABLET | Refills: 1 | Status: SHIPPED | OUTPATIENT
Start: 2020-11-10 | End: 2021-10-14 | Stop reason: SDUPTHER

## 2020-11-10 RX ORDER — GLIPIZIDE 5 MG/1
5 TABLET ORAL
Qty: 180 TABLET | Refills: 1 | Status: SHIPPED | OUTPATIENT
Start: 2020-11-10 | End: 2022-01-26 | Stop reason: SDUPTHER

## 2020-11-10 RX ORDER — INSULIN GLARGINE 100 [IU]/ML
20 INJECTION, SOLUTION SUBCUTANEOUS
Qty: 15 ML | Refills: 11 | Status: SHIPPED | OUTPATIENT
Start: 2020-11-10 | End: 2021-08-27 | Stop reason: SDUPTHER

## 2020-11-10 RX ORDER — A/SINGAPORE/GP1908/2015 IVR-180 (H1N1) (AN A/MICHIGAN/45/2015-LIKE VIRUS), A/SINGAPORE/GP2050/2015 (H3N2) (AN A/HONG KONG/4801/2014 - LIKE VIRUS), B/UTAH/9/2014 (A B/PHUKET/3073/2013-LIKE VIRUS), B/HONG KONG/259/2010 (A B/BRISBANE/60/08-LIKE VIRUS) 15; 15; 15; 15 UG/.5ML; UG/.5ML; UG/.5ML; UG/.5ML
INJECTION, SUSPENSION INTRAMUSCULAR
COMMUNITY
End: 2022-03-04 | Stop reason: ALTCHOICE

## 2020-11-10 RX ORDER — PRAVASTATIN SODIUM 40 MG
40 TABLET ORAL DAILY
Qty: 90 TABLET | Refills: 1 | Status: SHIPPED | OUTPATIENT
Start: 2020-11-10 | End: 2021-08-27 | Stop reason: SDUPTHER

## 2020-11-10 RX ORDER — PEN NEEDLE, DIABETIC 31 GX5/16"
NEEDLE, DISPOSABLE MISCELLANEOUS
COMMUNITY
Start: 2020-10-05 | End: 2020-12-28 | Stop reason: SDUPTHER

## 2020-11-10 RX ORDER — CHOLECALCIFEROL (VITAMIN D3) 50 MCG
2000 TABLET ORAL DAILY
Qty: 90 TABLET | Refills: 1 | Status: SHIPPED | OUTPATIENT
Start: 2020-11-10

## 2020-11-10 RX ORDER — NIFEDIPINE 60 MG/1
TABLET, EXTENDED RELEASE ORAL
COMMUNITY
Start: 2020-08-16 | End: 2022-07-05 | Stop reason: ALTCHOICE

## 2020-11-10 RX ORDER — A/SINGAPORE/GP1908/2015 IVR-180 (AN A/MICHIGAN/45/2015 (H1N1)PDM09-LIKE VIRUS, A/HONG KONG/4801/2014, NYMC X-263B (H3N2) (AN A/HONG KONG/4801/2014-LIKE VIRUS), AND B/BRISBANE/60/2008, WILD TYPE (A B/BRISBANE/60/2008-LIKE VIRUS) 15; 15; 15 UG/.5ML; UG/.5ML; UG/.5ML
INJECTION, SUSPENSION INTRAMUSCULAR
COMMUNITY
Start: 2020-10-02 | End: 2022-03-04 | Stop reason: ALTCHOICE

## 2020-11-10 RX ORDER — MECLIZINE HCL 12.5 MG/1
12.5 TABLET ORAL EVERY 12 HOURS PRN
Qty: 180 TABLET | Refills: 1 | Status: SHIPPED | OUTPATIENT
Start: 2020-11-10 | End: 2021-08-27 | Stop reason: SDUPTHER

## 2020-11-10 RX ORDER — LISINOPRIL 2.5 MG/1
TABLET ORAL
COMMUNITY
Start: 2020-09-13 | End: 2020-11-10 | Stop reason: SDUPTHER

## 2020-11-10 RX ORDER — CARVEDILOL 25 MG/1
25 TABLET ORAL EVERY 12 HOURS SCHEDULED
COMMUNITY
Start: 2020-08-07

## 2020-12-28 DIAGNOSIS — Z79.4 TYPE 2 DIABETES MELLITUS WITH STAGE 3A CHRONIC KIDNEY DISEASE, WITH LONG-TERM CURRENT USE OF INSULIN (HCC): Primary | ICD-10-CM

## 2020-12-28 DIAGNOSIS — N18.31 TYPE 2 DIABETES MELLITUS WITH STAGE 3A CHRONIC KIDNEY DISEASE, WITH LONG-TERM CURRENT USE OF INSULIN (HCC): Primary | ICD-10-CM

## 2020-12-28 DIAGNOSIS — E11.22 TYPE 2 DIABETES MELLITUS WITH STAGE 3A CHRONIC KIDNEY DISEASE, WITH LONG-TERM CURRENT USE OF INSULIN (HCC): Primary | ICD-10-CM

## 2020-12-28 RX ORDER — RANOLAZINE 500 MG/1
TABLET, EXTENDED RELEASE ORAL DAILY
COMMUNITY
Start: 2020-12-24 | End: 2022-08-03

## 2020-12-28 RX ORDER — PEN NEEDLE, DIABETIC 31 GX5/16"
NEEDLE, DISPOSABLE MISCELLANEOUS
Qty: 100 EACH | Refills: 11 | Status: SHIPPED | OUTPATIENT
Start: 2020-12-28 | End: 2022-02-07 | Stop reason: SDUPTHER

## 2021-01-30 DIAGNOSIS — Z23 ENCOUNTER FOR IMMUNIZATION: ICD-10-CM

## 2021-02-12 ENCOUNTER — IMMUNIZATIONS (OUTPATIENT)
Dept: FAMILY MEDICINE CLINIC | Facility: HOSPITAL | Age: 76
End: 2021-02-12

## 2021-02-12 DIAGNOSIS — Z23 ENCOUNTER FOR IMMUNIZATION: Primary | ICD-10-CM

## 2021-02-12 PROCEDURE — 91301 SARS-COV-2 / COVID-19 MRNA VACCINE (MODERNA) 100 MCG: CPT

## 2021-02-12 PROCEDURE — 0011A SARS-COV-2 / COVID-19 MRNA VACCINE (MODERNA) 100 MCG: CPT

## 2021-03-03 LAB — HBA1C MFR BLD HPLC: 10.2 %

## 2021-03-12 ENCOUNTER — IMMUNIZATIONS (OUTPATIENT)
Dept: FAMILY MEDICINE CLINIC | Facility: HOSPITAL | Age: 76
End: 2021-03-12

## 2021-03-12 DIAGNOSIS — Z23 ENCOUNTER FOR IMMUNIZATION: Primary | ICD-10-CM

## 2021-03-12 PROCEDURE — 0012A SARS-COV-2 / COVID-19 MRNA VACCINE (MODERNA) 100 MCG: CPT

## 2021-03-12 PROCEDURE — 91301 SARS-COV-2 / COVID-19 MRNA VACCINE (MODERNA) 100 MCG: CPT

## 2021-05-05 ENCOUNTER — TELEPHONE (OUTPATIENT)
Dept: FAMILY MEDICINE CLINIC | Facility: CLINIC | Age: 76
End: 2021-05-05

## 2021-05-05 NOTE — TELEPHONE ENCOUNTER
Patients pharmacy requested a refill of Medications  Patient is past due for an office visit   Patient needs an OV

## 2021-08-03 NOTE — TELEPHONE ENCOUNTER
Patients pharmacy requested a refill of medication   Patient is past due for a follow up was due on March

## 2021-08-27 ENCOUNTER — OFFICE VISIT (OUTPATIENT)
Dept: FAMILY MEDICINE CLINIC | Facility: CLINIC | Age: 76
End: 2021-08-27
Payer: COMMERCIAL

## 2021-08-27 VITALS
HEIGHT: 63 IN | RESPIRATION RATE: 16 BRPM | BODY MASS INDEX: 35.26 KG/M2 | HEART RATE: 63 BPM | SYSTOLIC BLOOD PRESSURE: 118 MMHG | DIASTOLIC BLOOD PRESSURE: 72 MMHG | OXYGEN SATURATION: 98 % | WEIGHT: 199 LBS

## 2021-08-27 DIAGNOSIS — E66.01 OBESITY, MORBID (HCC): ICD-10-CM

## 2021-08-27 DIAGNOSIS — I25.10 CORONARY ARTERIOSCLEROSIS: ICD-10-CM

## 2021-08-27 DIAGNOSIS — Z79.4 TYPE 2 DIABETES MELLITUS WITH STAGE 3 CHRONIC KIDNEY DISEASE, WITH LONG-TERM CURRENT USE OF INSULIN (HCC): ICD-10-CM

## 2021-08-27 DIAGNOSIS — E72.12 METHYLENETETRAHYDROFOLATE REDUCTASE DEFICIENCY (HCC): ICD-10-CM

## 2021-08-27 DIAGNOSIS — N18.4 CHRONIC KIDNEY DISEASE (CKD), STAGE IV (SEVERE) (HCC): ICD-10-CM

## 2021-08-27 DIAGNOSIS — Z00.00 WELL ADULT EXAM: ICD-10-CM

## 2021-08-27 DIAGNOSIS — I49.5 TACHYCARDIA-BRADYCARDIA SYNDROME (HCC): ICD-10-CM

## 2021-08-27 DIAGNOSIS — E61.1 IRON DEFICIENCY: ICD-10-CM

## 2021-08-27 DIAGNOSIS — N18.30 TYPE 2 DIABETES MELLITUS WITH STAGE 3 CHRONIC KIDNEY DISEASE, WITH LONG-TERM CURRENT USE OF INSULIN (HCC): ICD-10-CM

## 2021-08-27 DIAGNOSIS — R42 DIZZINESS: ICD-10-CM

## 2021-08-27 DIAGNOSIS — E55.9 VITAMIN D DEFICIENCY: ICD-10-CM

## 2021-08-27 DIAGNOSIS — E11.22 TYPE 2 DIABETES MELLITUS WITH STAGE 3 CHRONIC KIDNEY DISEASE, WITH LONG-TERM CURRENT USE OF INSULIN (HCC): ICD-10-CM

## 2021-08-27 DIAGNOSIS — E53.8 B12 DEFICIENCY: Primary | ICD-10-CM

## 2021-08-27 PROCEDURE — G0439 PPPS, SUBSEQ VISIT: HCPCS | Performed by: FAMILY MEDICINE

## 2021-08-27 PROCEDURE — 99214 OFFICE O/P EST MOD 30 MIN: CPT | Performed by: FAMILY MEDICINE

## 2021-08-27 RX ORDER — LISINOPRIL 2.5 MG/1
2.5 TABLET ORAL DAILY
Qty: 90 TABLET | Refills: 1 | Status: CANCELLED | OUTPATIENT
Start: 2021-08-27

## 2021-08-27 RX ORDER — MECLIZINE HCL 12.5 MG/1
12.5 TABLET ORAL EVERY 12 HOURS PRN
Qty: 180 TABLET | Refills: 1 | Status: SHIPPED | OUTPATIENT
Start: 2021-08-27 | End: 2022-03-07 | Stop reason: SDUPTHER

## 2021-08-27 RX ORDER — MECLIZINE HCL 12.5 MG/1
12.5 TABLET ORAL EVERY 12 HOURS PRN
Qty: 180 TABLET | Refills: 1 | Status: CANCELLED | OUTPATIENT
Start: 2021-08-27

## 2021-08-27 RX ORDER — ASPIRIN 81 MG/1
81 TABLET ORAL DAILY
Status: CANCELLED | OUTPATIENT
Start: 2021-08-27

## 2021-08-27 RX ORDER — INSULIN GLARGINE 100 [IU]/ML
20 INJECTION, SOLUTION SUBCUTANEOUS
Qty: 15 ML | Refills: 11 | Status: SHIPPED | OUTPATIENT
Start: 2021-08-27 | End: 2022-08-03

## 2021-08-27 RX ORDER — RANOLAZINE 500 MG/1
TABLET, EXTENDED RELEASE ORAL
Status: CANCELLED | OUTPATIENT
Start: 2021-08-27

## 2021-08-27 RX ORDER — CHOLECALCIFEROL (VITAMIN D3) 50 MCG
2000 TABLET ORAL DAILY
Qty: 90 TABLET | Refills: 1 | Status: CANCELLED | OUTPATIENT
Start: 2021-08-27

## 2021-08-27 RX ORDER — PRAVASTATIN SODIUM 40 MG
40 TABLET ORAL DAILY
Qty: 90 TABLET | Refills: 1 | Status: SHIPPED | OUTPATIENT
Start: 2021-08-27 | End: 2022-03-07 | Stop reason: SDUPTHER

## 2021-08-27 RX ORDER — NIFEDIPINE 60 MG/1
TABLET, EXTENDED RELEASE ORAL
Status: CANCELLED | OUTPATIENT
Start: 2021-08-27

## 2021-08-27 RX ORDER — CARVEDILOL 25 MG/1
TABLET ORAL
Status: CANCELLED | OUTPATIENT
Start: 2021-08-27

## 2021-08-27 RX ORDER — GLIPIZIDE 5 MG/1
5 TABLET ORAL
Qty: 180 TABLET | Refills: 1 | Status: CANCELLED | OUTPATIENT
Start: 2021-08-27

## 2021-08-27 NOTE — PATIENT INSTRUCTIONS
Medicare Preventive Visit Patient Instructions  Thank you for completing your Welcome to Medicare Visit or Medicare Annual Wellness Visit today  Your next wellness visit will be due in one year (8/28/2022)  The screening/preventive services that you may require over the next 5-10 years are detailed below  Some tests may not apply to you based off risk factors and/or age  Screening tests ordered at today's visit but not completed yet may show as past due  Also, please note that scanned in results may not display below  Preventive Screenings:  Service Recommendations Previous Testing/Comments   Colorectal Cancer Screening  * Colonoscopy    * Fecal Occult Blood Test (FOBT)/Fecal Immunochemical Test (FIT)  * Fecal DNA/Cologuard Test  * Flexible Sigmoidoscopy Age: 54-65 years old   Colonoscopy: every 10 years (may be performed more frequently if at higher risk)  OR  FOBT/FIT: every 1 year  OR  Cologuard: every 3 years  OR  Sigmoidoscopy: every 5 years  Screening may be recommended earlier than age 48 if at higher risk for colorectal cancer  Also, an individualized decision between you and your healthcare provider will decide whether screening between the ages of 74-80 would be appropriate  Colonoscopy: 11/28/2019  FOBT/FIT: Not on file  Cologuard: 11/28/2019  Sigmoidoscopy: Not on file          Breast Cancer Screening Age: 36 years old  Frequency: every 1-2 years  Not required if history of left and right mastectomy Mammogram: Not on file        Cervical Cancer Screening Between the ages of 21-29, pap smear recommended once every 3 years  Between the ages of 33-67, can perform pap smear with HPV co-testing every 5 years     Recommendations may differ for women with a history of total hysterectomy, cervical cancer, or abnormal pap smears in past  Pap Smear: Not on file    Screening Not Indicated   Hepatitis C Screening Once for adults born between [facility]  More frequently in patients at high risk for Hepatitis C Hep C Antibody: Not on file        Diabetes Screening 1-2 times per year if you're at risk for diabetes or have pre-diabetes Fasting glucose: 78 mg/dL   A1C: 10 2 %    Screening Not Indicated  History Diabetes   Cholesterol Screening Once every 5 years if you don't have a lipid disorder  May order more often based on risk factors  Lipid panel: 03/03/2021    Screening Current     Other Preventive Screenings Covered by Medicare:  1  Abdominal Aortic Aneurysm (AAA) Screening: covered once if your at risk  You're considered to be at risk if you have a family history of AAA  2  Lung Cancer Screening: covers low dose CT scan once per year if you meet all of the following conditions: (1) Age 50-69; (2) No signs or symptoms of lung cancer; (3) Current smoker or have quit smoking within the last 15 years; (4) You have a tobacco smoking history of at least 30 pack years (packs per day multiplied by number of years you smoked); (5) You get a written order from a healthcare provider  3  Glaucoma Screening: covered annually if you're considered high risk: (1) You have diabetes OR (2) Family history of glaucoma OR (3)  aged 48 and older OR (3)  American aged 72 and older  3  Osteoporosis Screening: covered every 2 years if you meet one of the following conditions: (1) You're estrogen deficient and at risk for osteoporosis based off medical history and other findings; (2) Have a vertebral abnormality; (3) On glucocorticoid therapy for more than 3 months; (4) Have primary hyperparathyroidism; (5) On osteoporosis medications and need to assess response to drug therapy  · Last bone density test (DXA Scan): Not on file  5  HIV Screening: covered annually if you're between the age of 12-76  Also covered annually if you are younger than 13 and older than 72 with risk factors for HIV infection  For pregnant patients, it is covered up to 3 times per pregnancy      Immunizations:  Immunization Recommendations Influenza Vaccine Annual influenza vaccination during flu season is recommended for all persons aged >= 6 months who do not have contraindications   Pneumococcal Vaccine (Prevnar and Pneumovax)  * Prevnar = PCV13  * Pneumovax = PPSV23   Adults 25-60 years old: 1-3 doses may be recommended based on certain risk factors  Adults 72 years old: Prevnar (PCV13) vaccine recommended followed by Pneumovax (PPSV23) vaccine  If already received PPSV23 since turning 65, then PCV13 recommended at least one year after PPSV23 dose  Hepatitis B Vaccine 3 dose series if at intermediate or high risk (ex: diabetes, end stage renal disease, liver disease)   Tetanus (Td) Vaccine - COST NOT COVERED BY MEDICARE PART B Following completion of primary series, a booster dose should be given every 10 years to maintain immunity against tetanus  Td may also be given as tetanus wound prophylaxis  Tdap Vaccine - COST NOT COVERED BY MEDICARE PART B Recommended at least once for all adults  For pregnant patients, recommended with each pregnancy  Shingles Vaccine (Shingrix) - COST NOT COVERED BY MEDICARE PART B  2 shot series recommended in those aged 48 and above     Health Maintenance Due:      Topic Date Due    Hepatitis C Screening  Never done    Breast Cancer Screening: Mammogram  Never done     Immunizations Due:      Topic Date Due    Influenza Vaccine (1) 09/01/2021     Advance Directives   What are advance directives? Advance directives are legal documents that state your wishes and plans for medical care  These plans are made ahead of time in case you lose your ability to make decisions for yourself  Advance directives can apply to any medical decision, such as the treatments you want, and if you want to donate organs  What are the types of advance directives? There are many types of advance directives, and each state has rules about how to use them   You may choose a combination of any of the following:  · Living will: This is a written record of the treatment you want  You can also choose which treatments you do not want, which to limit, and which to stop at a certain time  This includes surgery, medicine, IV fluid, and tube feedings  · Durable power of  for healthcare [facility]): This is a written record that states who you want to make healthcare choices for you when you are unable to make them for yourself  This person, called a proxy, is usually a family member or a friend  You may choose more than 1 proxy  · Do not resuscitate (DNR) order:  A DNR order is used in case your heart stops beating or you stop breathing  It is a request not to have certain forms of treatment, such as CPR  A DNR order may be included in other types of advance directives  · Medical directive: This covers the care that you want if you are in a coma, near death, or unable to make decisions for yourself  You can list the treatments you want for each condition  Treatment may include pain medicine, surgery, blood transfusions, dialysis, IV or tube feedings, and a ventilator (breathing machine)  · Values history: This document has questions about your views, beliefs, and how you feel and think about life  This information can help others choose the care that you would choose  Why are advance directives important? An advance directive helps you control your care  Although spoken wishes may be used, it is better to have your wishes written down  Spoken wishes can be misunderstood, or not followed  Treatments may be given even if you do not want them  An advance directive may make it easier for your family to make difficult choices about your care  Fall Prevention    Fall prevention  includes ways to make your home and other areas safer  It also includes ways you can move more carefully to prevent a fall  Health conditions that cause changes in your blood pressure, vision, or muscle strength and coordination may increase your risk for falls  Medicines may also increase your risk for falls if they make you dizzy, weak, or sleepy  Fall prevention tips:   · Stand or sit up slowly  · Use assistive devices as directed  · Wear shoes that fit well and have soles that   · Wear a personal alarm  · Stay active  · Manage your medical conditions  Home Safety Tips:  · Add items to prevent falls in the bathroom  · Keep paths clear  · Install bright lights in your home  · Keep items you use often on shelves within reach  · Paint or place reflective tape on the edges of your stairs  Urinary Incontinence   Urinary incontinence (UI)  is when you lose control of your bladder  UI develops because your bladder cannot store or empty urine properly  The 3 most common types of UI are stress incontinence, urge incontinence, or both  Medicines:   · May be given to help strengthen your bladder control  Report any side effects of medication to your healthcare provider  Do pelvic muscle exercises often:  Your pelvic muscles help you stop urinating  Squeeze these muscles tight for 5 seconds, then relax for 5 seconds  Gradually work up to squeezing for 10 seconds  Do 3 sets of 15 repetitions a day, or as directed  This will help strengthen your pelvic muscles and improve bladder control  Train your bladder:  Go to the bathroom at set times, such as every 2 hours, even if you do not feel the urge to go  You can also try to hold your urine when you feel the urge to go  For example, hold your urine for 5 minutes when you feel the urge to go  As that becomes easier, hold your urine for 10 minutes  Self-care:   · Keep a UI record  Write down how often you leak urine and how much you leak  Make a note of what you were doing when you leaked urine  · Drink liquids as directed  You may need to limit the amount of liquid you drink to help control your urine leakage  Do not drink any liquid right before you go to bed   Limit or do not have drinks that contain caffeine or alcohol  · Prevent constipation  Eat a variety of high-fiber foods  Good examples are high-fiber cereals, beans, vegetables, and whole-grain breads  Walking is the best way to trigger your intestines to have a bowel movement  · Exercise regularly and maintain a healthy weight  Weight loss and exercise will decrease pressure on your bladder and help you control your leakage  · Use a catheter as directed  to help empty your bladder  A catheter is a tiny, plastic tube that is put into your bladder to drain your urine  · Go to behavior therapy as directed  Behavior therapy may be used to help you learn to control your urge to urinate  Weight Management   Why it is important to manage your weight:  Being overweight increases your risk of health conditions such as heart disease, high blood pressure, type 2 diabetes, and certain types of cancer  It can also increase your risk for osteoarthritis, sleep apnea, and other respiratory problems  Aim for a slow, steady weight loss  Even a small amount of weight loss can lower your risk of health problems  How to lose weight safely:  A safe and healthy way to lose weight is to eat fewer calories and get regular exercise  You can lose up about 1 pound a week by decreasing the number of calories you eat by 500 calories each day  Healthy meal plan for weight management:  A healthy meal plan includes a variety of foods, contains fewer calories, and helps you stay healthy  A healthy meal plan includes the following:  · Eat whole-grain foods more often  A healthy meal plan should contain fiber  Fiber is the part of grains, fruits, and vegetables that is not broken down by your body  Whole-grain foods are healthy and provide extra fiber in your diet  Some examples of whole-grain foods are whole-wheat breads and pastas, oatmeal, brown rice, and bulgur  · Eat a variety of vegetables every day    Include dark, leafy greens such as spinach, kale, jose elias greens, and mustard greens  Eat yellow and orange vegetables such as carrots, sweet potatoes, and winter squash  · Eat a variety of fruits every day  Choose fresh or canned fruit (canned in its own juice or light syrup) instead of juice  Fruit juice has very little or no fiber  · Eat low-fat dairy foods  Drink fat-free (skim) milk or 1% milk  Eat fat-free yogurt and low-fat cottage cheese  Try low-fat cheeses such as mozzarella and other reduced-fat cheeses  · Choose meat and other protein foods that are low in fat  Choose beans or other legumes such as split peas or lentils  Choose fish, skinless poultry (chicken or turkey), or lean cuts of red meat (beef or pork)  Before you cook meat or poultry, cut off any visible fat  · Use less fat and oil  Try baking foods instead of frying them  Add less fat, such as margarine, sour cream, regular salad dressing and mayonnaise to foods  Eat fewer high-fat foods  Some examples of high-fat foods include french fries, doughnuts, ice cream, and cakes  · Eat fewer sweets  Limit foods and drinks that are high in sugar  This includes candy, cookies, regular soda, and sweetened drinks  Exercise:  Exercise at least 30 minutes per day on most days of the week  Some examples of exercise include walking, biking, dancing, and swimming  You can also fit in more physical activity by taking the stairs instead of the elevator or parking farther away from stores  Ask your healthcare provider about the best exercise plan for you  © Copyright [employer] 2018 Information is for End User's use only and may not be sold, redistributed or otherwise used for commercial purposes   All illustrations and images included in CareNotes® are the copyrighted property of [employer]  or [address] [place] [address] [employer]

## 2021-08-27 NOTE — PROGRESS NOTES
Assessment and Plan:     Problem List Items Addressed This Visit        Cardiovascular and Mediastinum    Coronary arteriosclerosis    Relevant Orders    Magnesium    Tachycardia[facility]brady[facility] syndrome [facility] )       Genitourinary    Chronic kidney disease (CKD), stage IV (severe) ([facility])       Other    Obesity, morbid ([facility] )    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[facility] )      Other Visit Diagnoses     B12 deficiency    -  Primary    Relevant Orders    Vitamin B12    Vitamin D deficiency        Relevant Orders    Vitamin D 25 hydroxy    Type 2 diabetes mellitus with stage 3 chronic kidney disease, with long-term current use of insulin ([facility])        Relevant Medications    insulin glargine (Lantus SoloStar) 100 units/mL injection pen    pravastatin (PRAVACHOL) 40 mg tablet    Other Relevant Orders    TSH, 3rd generation with Free T4 reflex    Lipid panel    Comprehensive metabolic panel    Hemoglobin A1C    Microalbumin / creatinine urine ratio    Dizziness        Relevant Medications    meclizine (ANTIVERT) 12 5 MG tablet    Well adult exam        Relevant Orders    UA w Reflex to Microscopic w Reflex to Culture    Iron deficiency        Relevant Orders    CBC and differential    Iron, TIBC and Ferritin Panel        BMI Counseling: Body mass index is 35 25 kg/m²  The BMI is above normal  Nutrition recommendations include decreasing portion sizes, encouraging healthy choices of fruits and vegetables and limiting drinks that contain sugar  Exercise recommendations include moderate physical activity 150 minutes/week  No pharmacotherapy was ordered  Falls Plan of Care: balance, strength, and gait training instructions were provided  Medications that increase falls were reviewed  Preventive health issues were discussed with patient, and age appropriate screening tests were ordered as noted in patient's After Visit Summary    Personalized health advice and appropriate referrals for health education or preventive services given if needed, as noted in patient's After Visit Summary  History of Present Illness:     Patient presents for Medicare Annual Wellness visit    Patient Care Team:  Karsten Parikh MD as PCP - General  Karsten Parikh MD as PCP - [address] [place] [address] (RTE)     Problem List:     Patient Active Problem List   Diagnosis    Heterozygous MTHFR mutation [id number]    Stage 3 chronic kidney disease ([facility]    Benign hypertension with CKD (chronic kidney disease) stage III [facility]    Coronary arteriosclerosis    Type 2 diabetes mellitus, with long-term current use of insulin ([facility] )    Fall    Dehydration    Hypokalemia    Closed head injury    Multiple thyroid nodules    Chronic kidney disease (CKD), stage IV (severe) ([facility]    Tachycardia-bradycardia syndrome ([facility] )    Obesity, morbid ([facility] )    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[facility] )      Past Medical and Surgical History:     Past Medical History:   Diagnosis Date    Atrial fibrillation ([facility]     Chronic anemia     Chronic kidney disease     Colonoscopy refused 2019    pt declines    Dyslipidemia     Hypertension     Mammogram declined 2017    Proteinuria      Past Surgical History:   Procedure Laterality Date    CARDIAC PACEMAKER PLACEMENT      CATARACT EXTRACTION       SECTION      CHOLECYSTECTOMY      CORONARY ANGIOPLASTY WITH STENT PLACEMENT      MAMMO (HISTORICAL)      Pt states she will not ever have again-     TUBAL LIGATION        Family History:     Family History   Problem Relation Age of Onset    Diabetes Father     Hypertension Father     Heart disease Father     Cancer Brother         smoker    Diabetes Other     Heart attack Other     Hypertension Other     Asthma Other       Social History:     Social History     Socioeconomic History    Marital status:       Spouse name: None    Number of children: None    Years of education: None    Highest education level: None Occupational History    Occupation: retired   Tobacco Use    Smoking status: Never Smoker    Smokeless tobacco: Never Used   Vaping Use    Vaping Use: Never used   Substance and Sexual Activity    Alcohol use: Never    Drug use: Never    Sexual activity: None   Other Topics Concern    None   Social History Narrative    Most recent tobacco use screenin2019    Do you currently or have you served in the [employer]: No    Were you activated, into active duty, as a member of the [employer] or as a Reservist: No    Marital status:     Exercise level: None    Diet: Regular    General stress level: Low    Alcohol intake: None    Caffeine intake: Moderate    Chewing tobacco: none    Illicit drugs: none    Seat belts used routinely: Yes    Sunscreen used routinely: Yes    Smoke alarm in home: Yes    Advance directive: No    Salt Intake: minimal    Has the Patient had a mammogram to screen for breast cancer within 24 months: No    2019 - declines mammogram     Is the patient interested in a colorectal cancer screening: No    2019 - patient declines colonoscopy's     Social Determinants of Health     Financial Resource Strain:     Difficulty of Paying Living Expenses:    Food Insecurity:     Worried About Running Out of Food in the Last Year:     [address] in the Last Year:    Transportation Needs:     Lack of Transportation (Medical):      Lack of Transportation (Non-Medical):    Physical Activity:     Days of Exercise per Week:     Minutes of Exercise per Session:    Stress:     Feeling of Stress :    Social Connections:     Frequency of Communication with Friends and Family:     Frequency of Social Gatherings with Friends and Family:     Attends [employer] Services:     Active Member of Clubs or Organizations:     Attends Club or Organization Meetings:     Marital Status:    Intimate Partner Violence:     Fear of Current or Ex-Partner:     Emotionally Abused:  Physically Abused:     Sexually Abused:       Medications and Allergies:     Current Outpatient Medications   Medication Sig Dispense Refill    aspirin (ECOTRIN LOW STRENGTH) 81 mg EC tablet Take 81 mg by mouth daily      B-D UF III MINI PEN NEEDLES 31G X 5 MM MISC Use to test qd 100 each 11    carvedilol (COREG) 25 mg tablet       Cholecalciferol (Vitamin D) 50 MCG (2000 UT) tablet Take 1 tablet (2,000 Units total) by mouth daily 90 tablet 1    glipiZIDE (GLUCOTROL) 5 mg tablet Take 1 tablet (5 mg total) by mouth 2 (two) times a day before meals 180 tablet 1    insulin glargine (Lantus SoloStar) 100 units/mL injection pen Inject 20 Units under the skin daily at bedtime 15 mL 11    lisinopril (ZESTRIL) 2 5 mg tablet Take 1 tablet (2 5 mg total) by mouth daily 90 tablet 1    meclizine (ANTIVERT) 12 5 MG tablet Take 1 tablet (12 5 mg total) by mouth every 12 (twelve) hours as needed for dizziness 180 tablet 1    NIFEdipine (PROCARDIA XL) 60 mg 24 hr tablet       pravastatin (PRAVACHOL) 40 mg tablet Take 1 tablet (40 mg total) by mouth daily 90 tablet 1    ranolazine (RANEXA) 500 mg 12 hr tablet       rivaroxaban (Xarelto) 15 mg tablet Take 15 mg by mouth daily      Fluad Quadrivalent 0 5 ML PRSY ADM 0 5ML IM UTD (Patient not taking: Reported on 8/27/2021)      influenza vaccine, subunit, quadrivalent (Flucelvax Quadrivalent) Flucelvax Quad 0813-3264 (PF) 60 mcg (15 mcg x 4)/0 5 mL IM syringe   ADM 0 5ML IM UTD (Patient not taking: Reported on 8/27/2021)      pneumococcal 13-valent conjugate vaccine (Prevnar 13) Prevnar 13 (PF) 0 5 mL intramuscular syringe   ADM 0 5ML IM UTD (Patient not taking: Reported on 8/27/2021)       No current facility-administered medications for this visit       No Known Allergies   Immunizations:     Immunization History   Administered Date(s) Administered    INFLUENZA 10/16/2019    Influenza Injectable, MDCK, Preservative Free, Quadrivalent, 0 5 mL 10/16/2019    Pneumococcal Conjugate 13-Valent 10/16/2019    Pneumococcal Conjugate PCV 7 09/25/2016    Pneumococcal Polysaccharide PPV23 09/25/2015    SARS-CoV-2 / COVID-19 mRNA IM (Moderna) 02/12/2021, 03/12/2021    Tdap 09/25/2015    Zoster 09/25/2015      Health Maintenance:         Topic Date Due    Hepatitis C Screening  Never done    Breast Cancer Screening: Mammogram  08/27/2099 (Originally 7/26/1985)         Topic Date Due    Influenza Vaccine (1) 09/01/2021      Medicare Health Risk Assessment:     /72 (BP Location: Right arm, Patient Position: Sitting, Cuff Size: Large)   Pulse 63   Resp 16   Ht 5' 3" (1 6 m)   Wt 90 3 kg (199 lb)   SpO2 98%   BMI 35 25 kg/m²      Xavier Stanford is here for her Subsequent Wellness visit  Last Medicare Wellness visit information reviewed, patient interviewed and updates made to the record today  Health Risk Assessment:   Patient rates overall health as good  Patient feels that their physical health rating is same  Patient is very satisfied with their life  Eyesight was rated as same  Hearing was rated as same  Patient feels that their emotional and mental health rating is same  Patients states they are never, rarely angry  Patient states they are never, rarely unusually tired/fatigued  Pain experienced in the last 7 days has been none  Patient states that she has experienced no weight loss or gain in last 6 months  Depression Screening:   PHQ-2 Score: 0      Fall Risk Screening: In the past year, patient has experienced: history of falling in past year    Number of falls: 2 or more  Injured during fall?: No    Feels unsteady when standing or walking?: Yes    Worried about falling?: Yes      Urinary Incontinence Screening:   Patient has leaked urine accidently in the last six months  Home Safety:  Patient has trouble with stairs inside or outside of their home  Patient has working smoke alarms and has working carbon monoxide detector   Home safety hazards include: none  Nutrition:   Current diet is Regular  Medications:   Patient is currently taking over-the-counter supplements  OTC medications include: see medication list  Patient is not able to manage medications  Activities of Daily Living (ADLs)/Instrumental Activities of Daily Living (IADLs):   Walk and transfer into and out of bed and chair?: Yes  Dress and groom yourself?: Yes    Bathe or shower yourself?: No    Feed yourself? Yes  Do your laundry/housekeeping?: No  Manage your money, pay your bills and track your expenses?: No  Make your own meals?: No    Do your own shopping?: No    Previous Hospitalizations:   Any hospitalizations or ED visits within the last 12 months?: No      Advance Care Planning:   Living will: No    Durable POA for healthcare: No    Advanced directive: No    Five wishes given: No      Cognitive Screening:   Provider or family/friend/caregiver concerned regarding cognition?: No    PREVENTIVE SCREENINGS      Cardiovascular Screening:    General: Screening Current    Due for: Lipid Panel      Diabetes Screening:     General: Screening Not Indicated and History Diabetes    Due for: Blood Glucose      Colorectal Cancer Screening:     General: Screening Current      Breast Cancer Screening:     General: Screening Not Indicated      Cervical Cancer Screening:    General: Screening Not Indicated      Osteoporosis Screening:      Due for: Bone Density Ultrasound      Abdominal Aortic Aneurysm (AAA) Screening:        General: Screening Not Indicated      Lung Cancer Screening:     General: Screening Not Indicated      Hepatitis C Screening:    General: Screening Current    Screening, Brief Intervention, and Referral to Treatment (SBIRT)    Screening  Typical number of drinks in a day: 0  Typical number of drinks in a week: 0  Interpretation: Low risk drinking behavior      Single Item Drug Screening:  How often have you used an illegal drug (including marijuana) or a prescription medication for non-medical reasons in the past year? never    Single Item Drug Screen Score: 0  Interpretation: Negative screen for possible drug use disorder    Brief Intervention  Alcohol & drug use screenings were reviewed  No concerns regarding substance use disorder identified         Tej Rosales MD

## 2021-08-27 NOTE — PROGRESS NOTES
carvedilol 3 215 bid and procardio 60mg qd   Anemia:   CKD MBD with pth 77  Low bicarb also  thyroid nodules - large bx and stable  bone cyst - cervical stable  PPM placement with cardio for tachy sarah beth 20's and pauses up to 7sec  and in wheelchair walker  Stopped the meclizine - and 3-4 falls athome so started taking it 12 5mg bid     Will see the eye doctor         The following portions of the patient's history were reviewed and updated as appropriate: allergies, current medications, past family history, past medical history, past social history, past surgical history and problem list     Review of Systems   Constitutional: Negative for fever and unexpected weight change  HENT: Negative for nosebleeds and trouble swallowing  Eyes: Negative for visual disturbance  Respiratory: Negative for chest tightness and shortness of breath  Cardiovascular: Negative for chest pain, palpitations and leg swelling  Gastrointestinal: Negative for abdominal pain, constipation, diarrhea and nausea  Endocrine: Negative for cold intolerance  Genitourinary: Negative for dysuria and urgency  Musculoskeletal: Positive for arthralgias and gait problem  Negative for joint swelling and myalgias  Skin: Negative for rash  Neurological: Positive for weakness  Negative for tremors, seizures and syncope  Hematological: Does not bruise/bleed easily  Psychiatric/Behavioral: Negative for hallucinations and suicidal ideas  Objective:      /72 (BP Location: Right arm, Patient Position: Sitting, Cuff Size: Large)   Pulse 63   Resp 16   Ht 5' 3" (1 6 m)   Wt 90 3 kg (199 lb)   SpO2 98%   BMI 35 25 kg/m²     No visits with results within 2 Week(s) from this visit  Latest known visit with results is:   Orders Only on 03/03/2021   Component Date Value    Hemoglobin A1C 03/03/2021 10 2           Physical Exam  Vitals and nursing note reviewed  Constitutional:       Appearance: She is well-developed   She is obese  Comments: wheelchair   HENT:      Head: Normocephalic and atraumatic  Cardiovascular:      Rate and Rhythm: Normal rate and regular rhythm  Pulses:           Dorsalis pedis pulses are 2+ on the right side and 2+ on the left side  Heart sounds: Normal heart sounds  No murmur heard  Pulmonary:      Effort: Pulmonary effort is normal       Breath sounds: Normal breath sounds  No wheezing or rales  Abdominal:      General: Bowel sounds are normal  There is no distension  Palpations: Abdomen is soft  Tenderness: There is no abdominal tenderness  Musculoskeletal:         General: No tenderness  Normal range of motion  Cervical back: Normal range of motion and neck supple  Feet:      Right foot:      Skin integrity: No ulcer, skin breakdown, erythema, warmth, callus or dry skin  Left foot:      Skin integrity: No ulcer, skin breakdown, erythema, warmth, callus or dry skin  Lymphadenopathy:      Cervical: No cervical adenopathy  Skin:     General: Skin is warm and dry  Capillary Refill: Capillary refill takes less than 2 seconds  Findings: No rash  Neurological:      Mental Status: She is alert and oriented to person, place, and time  Cranial Nerves: No cranial nerve deficit  Sensory: No sensory deficit  Motor: No abnormal muscle tone  Psychiatric:         Behavior: Behavior normal          Thought Content:  Thought content normal          Judgment: Judgment normal              Aicha Otero MD  [place] [id number]

## 2021-10-14 DIAGNOSIS — E11.22 TYPE 2 DIABETES MELLITUS WITH STAGE 3 CHRONIC KIDNEY DISEASE, WITH LONG-TERM CURRENT USE OF INSULIN (HCC): ICD-10-CM

## 2021-10-14 DIAGNOSIS — N18.30 TYPE 2 DIABETES MELLITUS WITH STAGE 3 CHRONIC KIDNEY DISEASE, WITH LONG-TERM CURRENT USE OF INSULIN (HCC): ICD-10-CM

## 2021-10-14 DIAGNOSIS — Z79.4 TYPE 2 DIABETES MELLITUS WITH STAGE 3 CHRONIC KIDNEY DISEASE, WITH LONG-TERM CURRENT USE OF INSULIN (HCC): ICD-10-CM

## 2021-10-14 RX ORDER — LISINOPRIL 2.5 MG/1
2.5 TABLET ORAL DAILY
Qty: 90 TABLET | Refills: 0 | Status: SHIPPED | OUTPATIENT
Start: 2021-10-14 | End: 2022-03-04 | Stop reason: ALTCHOICE

## 2021-11-03 ENCOUNTER — OFFICE VISIT (OUTPATIENT)
Dept: URGENT CARE | Facility: CLINIC | Age: 76
End: 2021-11-03
Payer: COMMERCIAL

## 2021-11-03 VITALS
WEIGHT: 195 LBS | DIASTOLIC BLOOD PRESSURE: 60 MMHG | HEIGHT: 63 IN | BODY MASS INDEX: 34.55 KG/M2 | HEART RATE: 69 BPM | OXYGEN SATURATION: 99 % | SYSTOLIC BLOOD PRESSURE: 121 MMHG | RESPIRATION RATE: 16 BRPM | TEMPERATURE: 97.4 F

## 2021-11-03 DIAGNOSIS — H92.02 LEFT EAR PAIN: Primary | ICD-10-CM

## 2021-11-03 PROCEDURE — 99213 OFFICE O/P EST LOW 20 MIN: CPT | Performed by: PHYSICIAN ASSISTANT

## 2021-11-03 RX ORDER — FLUTICASONE PROPIONATE 50 MCG
1 SPRAY, SUSPENSION (ML) NASAL DAILY
Qty: 9.9 ML | Refills: 0 | Status: SHIPPED | OUTPATIENT
Start: 2021-11-03

## 2021-12-10 ENCOUNTER — IMMUNIZATIONS (OUTPATIENT)
Dept: FAMILY MEDICINE CLINIC | Facility: HOSPITAL | Age: 76
End: 2021-12-10

## 2021-12-10 DIAGNOSIS — Z23 ENCOUNTER FOR IMMUNIZATION: Primary | ICD-10-CM

## 2021-12-10 PROCEDURE — 0064A COVID-19 MODERNA VACC 0.25 ML BOOSTER: CPT

## 2021-12-10 PROCEDURE — 91306 COVID-19 MODERNA VACC 0.25 ML BOOSTER: CPT

## 2022-01-24 ENCOUNTER — TELEPHONE (OUTPATIENT)
Dept: LAB | Facility: HOSPITAL | Age: 77
End: 2022-01-24

## 2022-01-26 DIAGNOSIS — N18.30 TYPE 2 DIABETES MELLITUS WITH STAGE 3 CHRONIC KIDNEY DISEASE, WITH LONG-TERM CURRENT USE OF INSULIN (HCC): ICD-10-CM

## 2022-01-26 DIAGNOSIS — E11.22 TYPE 2 DIABETES MELLITUS WITH STAGE 3 CHRONIC KIDNEY DISEASE, WITH LONG-TERM CURRENT USE OF INSULIN (HCC): ICD-10-CM

## 2022-01-26 DIAGNOSIS — Z79.4 TYPE 2 DIABETES MELLITUS WITH STAGE 3 CHRONIC KIDNEY DISEASE, WITH LONG-TERM CURRENT USE OF INSULIN (HCC): ICD-10-CM

## 2022-01-26 RX ORDER — GLIPIZIDE 5 MG/1
5 TABLET ORAL
Qty: 180 TABLET | Refills: 1 | Status: SHIPPED | OUTPATIENT
Start: 2022-01-26 | End: 2022-07-05 | Stop reason: ALTCHOICE

## 2022-01-29 ENCOUNTER — APPOINTMENT (OUTPATIENT)
Dept: LAB | Facility: HOSPITAL | Age: 77
End: 2022-01-29
Attending: INTERNAL MEDICINE
Payer: COMMERCIAL

## 2022-01-29 DIAGNOSIS — I10 HYPERTENSION, UNSPECIFIED TYPE: ICD-10-CM

## 2022-01-29 DIAGNOSIS — N18.31 CHRONIC KIDNEY DISEASE (CKD) STAGE G3A/A2, MODERATELY DECREASED GLOMERULAR FILTRATION RATE (GFR) BETWEEN 45-59 ML/MIN/1.73 SQUARE METER AND ALBUMINURIA CREATININE RATIO BETWEEN 30-299 MG/G (HCC): ICD-10-CM

## 2022-01-31 ENCOUNTER — APPOINTMENT (OUTPATIENT)
Dept: LAB | Facility: CLINIC | Age: 77
End: 2022-01-31
Payer: COMMERCIAL

## 2022-01-31 LAB
ALBUMIN SERPL BCP-MCNC: 3.3 G/DL (ref 3.5–5)
ALP SERPL-CCNC: 60 U/L (ref 46–116)
ALT SERPL W P-5'-P-CCNC: 12 U/L (ref 12–78)
ANION GAP SERPL CALCULATED.3IONS-SCNC: 4 MMOL/L (ref 4–13)
AST SERPL W P-5'-P-CCNC: 10 U/L (ref 5–45)
BILIRUB SERPL-MCNC: 0.95 MG/DL (ref 0.2–1)
BUN SERPL-MCNC: 30 MG/DL (ref 5–25)
CALCIUM ALBUM COR SERPL-MCNC: 9.2 MG/DL (ref 8.3–10.1)
CALCIUM SERPL-MCNC: 8.6 MG/DL (ref 8.3–10.1)
CHLORIDE SERPL-SCNC: 111 MMOL/L (ref 100–108)
CO2 SERPL-SCNC: 27 MMOL/L (ref 21–32)
CREAT SERPL-MCNC: 2.24 MG/DL (ref 0.6–1.3)
ERYTHROCYTE [DISTWIDTH] IN BLOOD BY AUTOMATED COUNT: 13.6 % (ref 11.6–15.1)
GFR SERPL CREATININE-BSD FRML MDRD: 20 ML/MIN/1.73SQ M
GLUCOSE P FAST SERPL-MCNC: 116 MG/DL (ref 65–99)
HCT VFR BLD AUTO: 35.9 % (ref 34.8–46.1)
HGB BLD-MCNC: 11.1 G/DL (ref 11.5–15.4)
MAGNESIUM SERPL-MCNC: 2.4 MG/DL (ref 1.6–2.6)
MCH RBC QN AUTO: 27.4 PG (ref 26.8–34.3)
MCHC RBC AUTO-ENTMCNC: 30.9 G/DL (ref 31.4–37.4)
MCV RBC AUTO: 89 FL (ref 82–98)
PLATELET # BLD AUTO: 240 THOUSANDS/UL (ref 149–390)
PMV BLD AUTO: 11.1 FL (ref 8.9–12.7)
POTASSIUM SERPL-SCNC: 5 MMOL/L (ref 3.5–5.3)
PROT SERPL-MCNC: 7.3 G/DL (ref 6.4–8.2)
RBC # BLD AUTO: 4.05 MILLION/UL (ref 3.81–5.12)
SODIUM SERPL-SCNC: 142 MMOL/L (ref 136–145)
WBC # BLD AUTO: 6.69 THOUSAND/UL (ref 4.31–10.16)

## 2022-01-31 PROCEDURE — 80053 COMPREHEN METABOLIC PANEL: CPT

## 2022-01-31 PROCEDURE — 83735 ASSAY OF MAGNESIUM: CPT

## 2022-01-31 PROCEDURE — 36415 COLL VENOUS BLD VENIPUNCTURE: CPT

## 2022-01-31 PROCEDURE — 85027 COMPLETE CBC AUTOMATED: CPT

## 2022-02-01 ENCOUNTER — OFFICE VISIT (OUTPATIENT)
Dept: NEPHROLOGY | Facility: CLINIC | Age: 77
End: 2022-02-01
Payer: COMMERCIAL

## 2022-02-01 VITALS — WEIGHT: 199 LBS | HEIGHT: 63 IN | BODY MASS INDEX: 35.26 KG/M2

## 2022-02-01 DIAGNOSIS — N18.30 TYPE 2 DIABETES MELLITUS WITH STAGE 3 CHRONIC KIDNEY DISEASE AND HYPERTENSION (HCC): ICD-10-CM

## 2022-02-01 DIAGNOSIS — I12.9 TYPE 2 DIABETES MELLITUS WITH STAGE 3 CHRONIC KIDNEY DISEASE AND HYPERTENSION (HCC): ICD-10-CM

## 2022-02-01 DIAGNOSIS — E72.12 METHYLENETETRAHYDROFOLATE REDUCTASE DEFICIENCY (HCC): ICD-10-CM

## 2022-02-01 DIAGNOSIS — E66.01 OBESITY, MORBID (HCC): ICD-10-CM

## 2022-02-01 DIAGNOSIS — R82.81 PYURIA: Primary | ICD-10-CM

## 2022-02-01 DIAGNOSIS — E11.22 TYPE 2 DIABETES MELLITUS WITH STAGE 3 CHRONIC KIDNEY DISEASE AND HYPERTENSION (HCC): ICD-10-CM

## 2022-02-01 DIAGNOSIS — N18.4 CHRONIC KIDNEY DISEASE (CKD), STAGE IV (SEVERE) (HCC): ICD-10-CM

## 2022-02-01 DIAGNOSIS — N28.9 WORSENING RENAL FUNCTION: ICD-10-CM

## 2022-02-01 PROCEDURE — 99214 OFFICE O/P EST MOD 30 MIN: CPT | Performed by: INTERNAL MEDICINE

## 2022-02-01 RX ORDER — LANOLIN ALCOHOL/MO/W.PET/CERES
400 CREAM (GRAM) TOPICAL DAILY
COMMUNITY

## 2022-02-01 NOTE — PROGRESS NOTES
NEPHROLOGY OFFICE VISIT   Inocencio Thomas 68 y o  female MRN: [id number]  2/1/2022    Reason for Visit:  Chronic kidney disease with recent worsening renal function    History of Present Illness (HPI):    I had the pleasure of seeing Shameka Oconnor today in the renal clinic for the continued management of chronic kidney disease worsening renal function  Since our last visit, there has been no ER visits or hospitilizations  He currently has no complaints at this time and is feeling well  Patient denies any chest pain, shortness of breath but swelling in the feet  The last blood work was done on yesterday, which we have reviewed together  She was last seen in our office back in July of 2020 by the advanced practitioner  She did not follow-up last year  I do not have any blood work from 2021  But her blood work from yesterday showed her creatinine worsening at 2 2 mg/dL  She has had uncontrolled diabetes the last few years but recently her hemoglobin A1c from November did improved to 7 7  We discussed about whether there was some degree of progression of underlying kidney disease due to uncontrolled diabetes  She reports no NSAID use  Her blood pressure has been normal and sometimes even low but asymptomatic  Her blood pressure was noted to be on the low side in the office she is currently maintained on lisinopril 2 5 mg daily which she has not taken as of yet  ASSESSMENT and PLAN:    Acute kidney injury/worsening renal function  --there could be some degree of progression of underlying chronic kidney disease secondary to uncontrolled diabetes  The could also be a component of acute kidney injury in the setting of low blood pressure  --creatinine increased to 2 2 mg/dL  --discontinue lisinopril  --urinalysis did show 3-5 hyaline casts with innumerable bacteria and 10-20 wbc's    Negative for protein and negative for blood  --will check a urine culture as a urinalysis did show evidence of pyuria but she is asymptomatic  --encouraged to drink more water  --avoid NSAID  --repeat blood work at the end of this week, nonfasting  --the renal function continues to worsen will check a renal ultrasound    Chronic kidney disease stage IIIB with nonnephrotic range proteinuria  -- renal ultrasound from May 2019 show symmetrically sized kidneys, right kidney measuring 11 1 cm left kidney measuring 11 6 cm  Bilateral echogenicity consistent with chronic kidney disease  No masses no hydronephrosis  --in the setting of multiple episodes of acute kidney injury with presumed diabetic kidney disease and hypertension and obesity  --avoid NSAID  --baseline creatinine had be in the mid 1s back in 2020  --concern for possible underlying progression of disease from uncontrolled diabetes     Diabetes mellitus type 2  -hemoglobin A1c:  7 7 (A1C values may be falsely elevated or decreased in those with CKD, assay method should be certified by [employer])  Target A1C < 7  -current medications:   glipizide  -proteinuria:  her last urine protein creatinine ratio was 0 42 years ago but may not been in steady state, her dipstick was negative for protein  -retinopathy:  Yes  -neuropathy:  Yes  -please follow with an ophthalmologist and podiatrist every year  -continued self monitoring of blood glucose at home  -Treatment Management in CKD Recommendations:   · Metformin:  Avoid if creatinine clearance is less than 30 cc/min (concern for lactic acidosis)  · Sodium-Glucose Cotransporter-2 (SGLT2) Inhibitors: May see an acute drop in the eGFR initially when starting the medication but then a stabilization of the renal function, with slower loss of renal function as compared to placebo  Relative risk of end-stage renal disease, doubling of serum creatinine or death from renal causes were also found to be lower as compared to placebo  ([facility])    Would recommend starting at 100 mg daily, I would avoid use in patients with eGFR < 30 cc/min  If no contraindications exist I would recommend this medication added to the diabetic regiment  · Sulfonylureas:  Preferred short-acting (glipizide, glimepiride, repaglinide), relatively safe in patients with non dialysis CKD  · Thiazolidinedones/Alpha-Gluosidase inhibitors/Dipeptidyl peptidase-4 inhibitors:  Generally not considered first-line agents in CKD (limited data in long-term safety and efficacy)  · Insulin:  Starting dose may need to be lower than what would ordinarily be used, as there is a decrease metabolism of insulin (no dose adjustment if the GFR > 50 mL/min, dose should be reduced to 75% of baseline if GFR 10-50 mL/min, and 50% baseline if GFR < 10 mL/min)     Hypertension  -with underlying chronic kidney disease  -blood pressure goal less than 130/80  -pressure running on the lower side, asymptomatic  -Procardia 60 mg daily, carvedilol 25 mg twice a day, lisinopril 2 5 mg day  -discontinue lisinopril  -monitor blood pressure at home  -if blood pressure still low reduce nifedipine     Heterozygous MTHFR mutation  -[id number]  -anticoagulation with Xarelto      PATIENT INSTRUCTIONS:    Patient Instructions   1 )  Low 2 g sodium diet    2 )  Monitor weights at home    3 )  Avoid NSAIDs (ibuprofen, Motrin, Advil, Aleve, naproxen)    4 )  Monitor blood pressure at home, call if blood pressure greater than 150/90 persistently    5 ) I will plan to discuss all results including blood work, and/or imaging at our next visit, unless there is an urgent indication, in which case I will call you earlier  If you have any questions or concerns about your results, please feel free to call our office      6 ) Stop Lisinopril    7 ) Check Urine Culture    8 ) Repeat blood work, don't need to fast, after off Lisinopril for 3 days          Orders Placed This Encounter   Procedures    Urine culture     Standing Status:   Future     Standing Expiration Date:   2/1/2023     Order Specific Question:   Release to patient through [facility]     Answer:   Immediate    Basic metabolic panel     This is a patient instruction: Patient fasting for 8 hours or longer recommended  Standing Status:   Future     Standing Expiration Date:   2/1/2023    Comprehensive metabolic panel     This is a patient instruction: Patient fasting for 8 hours or longer recommended  Standing Status:   Future     Standing Expiration Date:   2/1/2023    PTH, intact     Standing Status:   Future     Standing Expiration Date:   2/1/2023    Phosphorus     Standing Status:   Future     Standing Expiration Date:   2/1/2023    Microalbumin / creatinine urine ratio     Standing Status:   Future     Standing Expiration Date:   2/1/2023    CBC     Standing Status:   Future     Standing Expiration Date:   2/1/2023    Vitamin D 25 hydroxy     Standing Status:   Future     Standing Expiration Date:   2/1/2023    Hemoglobin A1C     Standing Status:   Future     Standing Expiration Date:   2/1/2023       OBJECTIVE:  Current Weight: Weight - Scale: 90 3 kg (199 lb)  Vitals:    02/01/22 0801   Weight: 90 3 kg (199 lb)   Height: 5' 3" (1 6 m)    Body mass index is 35 25 kg/m²  REVIEW OF SYSTEMS:    Review of Systems   Constitutional: Negative for activity change and fever  Respiratory: Negative for cough, chest tightness, shortness of breath and wheezing  Cardiovascular: Positive for leg swelling  Negative for chest pain  Gastrointestinal: Negative for abdominal pain, diarrhea, nausea and vomiting  Endocrine: Negative for polyuria  Genitourinary: Negative for difficulty urinating, dysuria, flank pain, frequency and urgency  Skin: Negative for rash  Neurological: Negative for dizziness, syncope, light-headedness and headaches  PHYSICAL EXAM:      Physical Exam  Vitals and nursing note reviewed  Exam conducted with a chaperone present  Constitutional:       General: She is not in acute distress       Appearance: She is well-developed  She is obese  She is not diaphoretic  HENT:      Head: Normocephalic and atraumatic  Eyes:      General: No scleral icterus  Pupils: Pupils are equal, round, and reactive to light  Cardiovascular:      Rate and Rhythm: Normal rate and regular rhythm  Heart sounds: Normal heart sounds  No murmur heard  No friction rub  No gallop  Pulmonary:      Effort: Pulmonary effort is normal  No respiratory distress  Breath sounds: Normal breath sounds  No wheezing or rales  Chest:      Chest wall: No tenderness  Abdominal:      General: Bowel sounds are normal  There is no distension  Palpations: Abdomen is soft  Tenderness: There is no abdominal tenderness  There is no rebound  Musculoskeletal:         General: Normal range of motion  Cervical back: Normal range of motion and neck supple  Right lower leg: Edema present  Left lower leg: Edema present  Skin:     Findings: No rash  Neurological:      Mental Status: She is alert and oriented to person, place, and time           Medications:    Current Outpatient Medications:     aspirin (ECOTRIN LOW STRENGTH) 81 mg EC tablet, Take 81 mg by mouth daily, Disp: , Rfl:     B-D UF III MINI PEN NEEDLES 31G X 5 MM MISC, Use to test qd, Disp: 100 each, Rfl: 11    carvedilol (COREG) 25 mg tablet, , Disp: , Rfl:     Cholecalciferol (Vitamin D) 50 MCG (2000 UT) tablet, Take 1 tablet (2,000 Units total) by mouth daily, Disp: 90 tablet, Rfl: 1    fluticasone (FLONASE) 50 mcg/act nasal spray, 1 spray into each nostril daily, Disp: 9 9 mL, Rfl: 0    folic acid (FOLVITE) 562 mcg tablet, Take 400 mcg by mouth daily, Disp: , Rfl:     glipiZIDE (GLUCOTROL) 5 mg tablet, Take 1 tablet (5 mg total) by mouth 2 (two) times a day before meals, Disp: 180 tablet, Rfl: 1    insulin glargine (Lantus SoloStar) 100 units/mL injection pen, Inject 20 Units under the skin daily at bedtime, Disp: 15 mL, Rfl: 11    lisinopril (ZESTRIL) 2 5 mg tablet, Take 1 tablet (2 5 mg total) by mouth daily, Disp: 90 tablet, Rfl: 0    meclizine (ANTIVERT) 12 5 MG tablet, Take 1 tablet (12 5 mg total) by mouth every 12 (twelve) hours as needed for dizziness (Patient taking differently: Take 12 5 mg by mouth daily  ), Disp: 180 tablet, Rfl: 1    NIFEdipine (PROCARDIA XL) 60 mg 24 hr tablet, , Disp: , Rfl:     pravastatin (PRAVACHOL) 40 mg tablet, Take 1 tablet (40 mg total) by mouth daily, Disp: 90 tablet, Rfl: 1    ranolazine (RANEXA) 500 mg 12 hr tablet, daily  , Disp: , Rfl:     rivaroxaban (Xarelto) 15 mg tablet, Take 15 mg by mouth daily, Disp: , Rfl:     Fluad Quadrivalent 0 5 ML PRSY, ADM 0 5ML IM UTD (Patient not taking: Reported on 8/27/2021), Disp: , Rfl:     influenza vaccine, subunit, quadrivalent (Flucelvax Quadrivalent), Flucelvax Quad 4515-6184 (PF) 60 mcg (15 mcg x 4)/0 5 mL IM syringe  ADM 0 5ML IM UTD (Patient not taking: Reported on 8/27/2021), Disp: , Rfl:     pneumococcal 13-valent conjugate vaccine (Prevnar 13), Prevnar 13 (PF) 0 5 mL intramuscular syringe  ADM 0 5ML IM UTD (Patient not taking: Reported on 8/27/2021), Disp: , Rfl:     Laboratory Results:  Results from last 7 days   Lab Units 01/31/22  0718   WBC Thousand/uL 6 69   HEMOGLOBIN g/dL 11 1*   HEMATOCRIT % 35 9   PLATELETS Thousands/uL 240   POTASSIUM mmol/L 5 0   CHLORIDE mmol/L 111*   CO2 mmol/L 27   BUN mg/dL 30*   CREATININE mg/dL 2 24*   CALCIUM mg/dL 8 6   MAGNESIUM mg/dL 2 4       Results for orders placed or performed in visit on 01/29/22   Comprehensive metabolic panel   Result Value Ref Range    Sodium 142 136 - 145 mmol/L    Potassium 5 0 3 5 - 5 3 mmol/L    Chloride 111 (H) 100 - 108 mmol/L    CO2 27 21 - 32 mmol/L    ANION GAP 4 4 - 13 mmol/L    BUN 30 (H) 5 - 25 mg/dL    Creatinine 2 24 (H) 0 60 - 1 30 mg/dL    Glucose, Fasting 116 (H) 65 - 99 mg/dL    Calcium 8 6 8 3 - 10 1 mg/dL    Corrected Calcium 9 2 8 3 - 10 1 mg/dL    AST 10 5 - 45 U/L    ALT 12 12 - 78 U/L    Alkaline Phosphatase 60 46 - 116 U/L    Total Protein 7 3 6 4 - 8 2 g/dL    Albumin 3 3 (L) 3 5 - 5 0 g/dL    Total Bilirubin 0 95 0 20 - 1 00 mg/dL    eGFR 20 ml/min/1 73sq m   CBC and Platelet   Result Value Ref Range    WBC 6 69 4 31 - 10 16 Thousand/uL    RBC 4 05 3 81 - 5 12 Million/uL    Hemoglobin 11 1 (L) 11 5 - 15 4 g/dL    Hematocrit 35 9 34 8 - 46 1 %    MCV 89 82 - 98 fL    MCH 27 4 26 8 - 34 3 pg    MCHC 30 9 (L) 31 4 - 37 4 g/dL    RDW 13 6 11 6 - 15 1 %    Platelets 758 649 - 757 Thousands/uL    MPV 11 1 8 9 - 12 7 fL   Magnesium   Result Value Ref Range    Magnesium 2 4 1 6 - 2 6 mg/dL

## 2022-02-01 NOTE — PATIENT INSTRUCTIONS
1  )  Low 2 g sodium diet    2 )  Monitor weights at home    3 )  Avoid NSAIDs (ibuprofen, Motrin, Advil, Aleve, naproxen)    4 )  Monitor blood pressure at home, call if blood pressure greater than 150/90 persistently    5 ) I will plan to discuss all results including blood work, and/or imaging at our next visit, unless there is an urgent indication, in which case I will call you earlier  If you have any questions or concerns about your results, please feel free to call our office      6 ) Stop Lisinopril    7 ) Check Urine Culture    8 ) Repeat blood work, don't need to fast, after off Lisinopril for 3 days

## 2022-02-07 DIAGNOSIS — N18.31 TYPE 2 DIABETES MELLITUS WITH STAGE 3A CHRONIC KIDNEY DISEASE, WITH LONG-TERM CURRENT USE OF INSULIN (HCC): ICD-10-CM

## 2022-02-07 DIAGNOSIS — Z79.4 TYPE 2 DIABETES MELLITUS WITH STAGE 3A CHRONIC KIDNEY DISEASE, WITH LONG-TERM CURRENT USE OF INSULIN (HCC): ICD-10-CM

## 2022-02-07 DIAGNOSIS — E11.22 TYPE 2 DIABETES MELLITUS WITH STAGE 3A CHRONIC KIDNEY DISEASE, WITH LONG-TERM CURRENT USE OF INSULIN (HCC): ICD-10-CM

## 2022-02-07 RX ORDER — PEN NEEDLE, DIABETIC 31 GX5/16"
NEEDLE, DISPOSABLE MISCELLANEOUS
Qty: 100 EACH | Refills: 11 | Status: SHIPPED | OUTPATIENT
Start: 2022-02-07

## 2022-02-18 ENCOUNTER — APPOINTMENT (OUTPATIENT)
Dept: LAB | Facility: CLINIC | Age: 77
End: 2022-02-18
Payer: COMMERCIAL

## 2022-02-18 DIAGNOSIS — N28.9 WORSENING RENAL FUNCTION: ICD-10-CM

## 2022-02-18 DIAGNOSIS — E11.22 TYPE 2 DIABETES MELLITUS WITH STAGE 3 CHRONIC KIDNEY DISEASE, WITH LONG-TERM CURRENT USE OF INSULIN (HCC): ICD-10-CM

## 2022-02-18 DIAGNOSIS — E55.9 VITAMIN D DEFICIENCY: ICD-10-CM

## 2022-02-18 DIAGNOSIS — E61.1 IRON DEFICIENCY: ICD-10-CM

## 2022-02-18 DIAGNOSIS — Z00.00 WELL ADULT EXAM: ICD-10-CM

## 2022-02-18 DIAGNOSIS — E53.8 B12 DEFICIENCY: ICD-10-CM

## 2022-02-18 DIAGNOSIS — R82.81 PYURIA: ICD-10-CM

## 2022-02-18 DIAGNOSIS — Z79.4 TYPE 2 DIABETES MELLITUS WITH STAGE 3 CHRONIC KIDNEY DISEASE, WITH LONG-TERM CURRENT USE OF INSULIN (HCC): ICD-10-CM

## 2022-02-18 DIAGNOSIS — I25.10 CORONARY ARTERIOSCLEROSIS: ICD-10-CM

## 2022-02-18 DIAGNOSIS — N18.30 TYPE 2 DIABETES MELLITUS WITH STAGE 3 CHRONIC KIDNEY DISEASE, WITH LONG-TERM CURRENT USE OF INSULIN (HCC): ICD-10-CM

## 2022-02-18 LAB
25(OH)D3 SERPL-MCNC: 54.4 NG/ML (ref 30–100)
ALBUMIN SERPL BCP-MCNC: 3.3 G/DL (ref 3.5–5)
ALP SERPL-CCNC: 60 U/L (ref 46–116)
ALT SERPL W P-5'-P-CCNC: 17 U/L (ref 12–78)
ANION GAP SERPL CALCULATED.3IONS-SCNC: 5 MMOL/L (ref 4–13)
AST SERPL W P-5'-P-CCNC: 11 U/L (ref 5–45)
BASOPHILS # BLD AUTO: 0.03 THOUSANDS/ΜL (ref 0–0.1)
BASOPHILS NFR BLD AUTO: 0 % (ref 0–1)
BILIRUB SERPL-MCNC: 0.45 MG/DL (ref 0.2–1)
BUN SERPL-MCNC: 22 MG/DL (ref 5–25)
CALCIUM ALBUM COR SERPL-MCNC: 10.2 MG/DL (ref 8.3–10.1)
CALCIUM SERPL-MCNC: 9.6 MG/DL (ref 8.3–10.1)
CHLORIDE SERPL-SCNC: 110 MMOL/L (ref 100–108)
CHOLEST SERPL-MCNC: 110 MG/DL
CO2 SERPL-SCNC: 29 MMOL/L (ref 21–32)
CREAT SERPL-MCNC: 1.61 MG/DL (ref 0.6–1.3)
EOSINOPHIL # BLD AUTO: 0 THOUSAND/ΜL (ref 0–0.61)
EOSINOPHIL NFR BLD AUTO: 0 % (ref 0–6)
ERYTHROCYTE [DISTWIDTH] IN BLOOD BY AUTOMATED COUNT: 13.9 % (ref 11.6–15.1)
EST. AVERAGE GLUCOSE BLD GHB EST-MCNC: 157 MG/DL
FERRITIN SERPL-MCNC: 20 NG/ML (ref 8–388)
GFR SERPL CREATININE-BSD FRML MDRD: 30 ML/MIN/1.73SQ M
GLUCOSE P FAST SERPL-MCNC: 91 MG/DL (ref 65–99)
HBA1C MFR BLD: 7.1 %
HCT VFR BLD AUTO: 35.9 % (ref 34.8–46.1)
HDLC SERPL-MCNC: 63 MG/DL
HGB BLD-MCNC: 10.9 G/DL (ref 11.5–15.4)
IMM GRANULOCYTES # BLD AUTO: 0.02 THOUSAND/UL (ref 0–0.2)
IMM GRANULOCYTES NFR BLD AUTO: 0 % (ref 0–2)
IRON SATN MFR SERPL: 27 % (ref 15–50)
IRON SERPL-MCNC: 77 UG/DL (ref 50–170)
LDLC SERPL CALC-MCNC: 36 MG/DL (ref 0–100)
LYMPHOCYTES # BLD AUTO: 1.43 THOUSANDS/ΜL (ref 0.6–4.47)
LYMPHOCYTES NFR BLD AUTO: 21 % (ref 14–44)
MAGNESIUM SERPL-MCNC: 2.1 MG/DL (ref 1.6–2.6)
MCH RBC QN AUTO: 27.3 PG (ref 26.8–34.3)
MCHC RBC AUTO-ENTMCNC: 30.4 G/DL (ref 31.4–37.4)
MCV RBC AUTO: 90 FL (ref 82–98)
MONOCYTES # BLD AUTO: 0.47 THOUSAND/ΜL (ref 0.17–1.22)
MONOCYTES NFR BLD AUTO: 7 % (ref 4–12)
NEUTROPHILS # BLD AUTO: 4.74 THOUSANDS/ΜL (ref 1.85–7.62)
NEUTS SEG NFR BLD AUTO: 72 % (ref 43–75)
NONHDLC SERPL-MCNC: 47 MG/DL
NRBC BLD AUTO-RTO: 0 /100 WBCS
PLATELET # BLD AUTO: 262 THOUSANDS/UL (ref 149–390)
PMV BLD AUTO: 11.5 FL (ref 8.9–12.7)
POTASSIUM SERPL-SCNC: 4.6 MMOL/L (ref 3.5–5.3)
PROT SERPL-MCNC: 7.3 G/DL (ref 6.4–8.2)
RBC # BLD AUTO: 3.99 MILLION/UL (ref 3.81–5.12)
SODIUM SERPL-SCNC: 144 MMOL/L (ref 136–145)
TIBC SERPL-MCNC: 288 UG/DL (ref 250–450)
TRIGL SERPL-MCNC: 55 MG/DL
TSH SERPL DL<=0.05 MIU/L-ACNC: 1.44 UIU/ML (ref 0.36–3.74)
VIT B12 SERPL-MCNC: 1911 PG/ML (ref 100–900)
WBC # BLD AUTO: 6.69 THOUSAND/UL (ref 4.31–10.16)

## 2022-02-18 PROCEDURE — 83550 IRON BINDING TEST: CPT

## 2022-02-18 PROCEDURE — 80053 COMPREHEN METABOLIC PANEL: CPT

## 2022-02-18 PROCEDURE — 83540 ASSAY OF IRON: CPT

## 2022-02-18 PROCEDURE — 84443 ASSAY THYROID STIM HORMONE: CPT

## 2022-02-18 PROCEDURE — 87086 URINE CULTURE/COLONY COUNT: CPT

## 2022-02-18 PROCEDURE — 36415 COLL VENOUS BLD VENIPUNCTURE: CPT

## 2022-02-18 PROCEDURE — 83036 HEMOGLOBIN GLYCOSYLATED A1C: CPT

## 2022-02-18 PROCEDURE — 87186 SC STD MICRODIL/AGAR DIL: CPT

## 2022-02-18 PROCEDURE — 82306 VITAMIN D 25 HYDROXY: CPT

## 2022-02-18 PROCEDURE — 82728 ASSAY OF FERRITIN: CPT

## 2022-02-18 PROCEDURE — 80061 LIPID PANEL: CPT

## 2022-02-18 PROCEDURE — 83735 ASSAY OF MAGNESIUM: CPT

## 2022-02-18 PROCEDURE — 82607 VITAMIN B-12: CPT

## 2022-02-18 PROCEDURE — 87077 CULTURE AEROBIC IDENTIFY: CPT

## 2022-02-18 PROCEDURE — 85025 COMPLETE CBC W/AUTO DIFF WBC: CPT

## 2022-02-19 ENCOUNTER — APPOINTMENT (OUTPATIENT)
Dept: LAB | Facility: HOSPITAL | Age: 77
End: 2022-02-19
Attending: INTERNAL MEDICINE
Payer: COMMERCIAL

## 2022-02-19 LAB
BACTERIA UR QL AUTO: ABNORMAL /HPF
BILIRUB UR QL STRIP: NEGATIVE
CLARITY UR: CLEAR
COLOR UR: YELLOW
CREAT UR-MCNC: 83.2 MG/DL
GLUCOSE UR STRIP-MCNC: NEGATIVE MG/DL
HGB UR QL STRIP.AUTO: NEGATIVE
KETONES UR STRIP-MCNC: NEGATIVE MG/DL
LEUKOCYTE ESTERASE UR QL STRIP: ABNORMAL
MICROALBUMIN UR-MCNC: 331 MG/L (ref 0–20)
MICROALBUMIN/CREAT 24H UR: 398 MG/G CREATININE (ref 0–30)
NITRITE UR QL STRIP: POSITIVE
NON-SQ EPI CELLS URNS QL MICRO: ABNORMAL /HPF
PH UR STRIP.AUTO: 5.5 [PH]
PROT UR STRIP-MCNC: ABNORMAL MG/DL
RBC #/AREA URNS AUTO: ABNORMAL /HPF
SP GR UR STRIP.AUTO: 1.02 (ref 1–1.03)
UROBILINOGEN UR QL STRIP.AUTO: 0.2 E.U./DL
WBC #/AREA URNS AUTO: ABNORMAL /HPF

## 2022-02-19 PROCEDURE — 82043 UR ALBUMIN QUANTITATIVE: CPT | Performed by: FAMILY MEDICINE

## 2022-02-19 PROCEDURE — 82570 ASSAY OF URINE CREATININE: CPT | Performed by: FAMILY MEDICINE

## 2022-02-19 PROCEDURE — 81001 URINALYSIS AUTO W/SCOPE: CPT

## 2022-02-21 LAB
BACTERIA UR CULT: ABNORMAL
BACTERIA UR CULT: ABNORMAL

## 2022-02-22 ENCOUNTER — TELEPHONE (OUTPATIENT)
Dept: NEPHROLOGY | Facility: CLINIC | Age: 77
End: 2022-02-22

## 2022-02-22 NOTE — TELEPHONE ENCOUNTER
Nephrology    Spoke to the son Esther Solorio about the urine culture results  Reports that the patient is currently asymptomatic no fevers no chills no dysuria no increased urinary frequency  Her urine culture grew out E coli  Discontinue her lisinopril at the last visit which may have led to a combination of prerenal azotemia in combination with a possible urinary infection  Asked him to stay well hydrated and they would like to hold off on antibiotics at this time given that she is asymptomatic sounds reasonable

## 2022-03-04 ENCOUNTER — OFFICE VISIT (OUTPATIENT)
Dept: FAMILY MEDICINE CLINIC | Facility: CLINIC | Age: 77
End: 2022-03-04
Payer: COMMERCIAL

## 2022-03-04 VITALS
RESPIRATION RATE: 16 BRPM | BODY MASS INDEX: 34.2 KG/M2 | OXYGEN SATURATION: 97 % | SYSTOLIC BLOOD PRESSURE: 122 MMHG | WEIGHT: 193 LBS | DIASTOLIC BLOOD PRESSURE: 62 MMHG | HEIGHT: 63 IN | HEART RATE: 99 BPM

## 2022-03-04 DIAGNOSIS — N18.31 TYPE 2 DIABETES MELLITUS WITH STAGE 3A CHRONIC KIDNEY DISEASE, WITH LONG-TERM CURRENT USE OF INSULIN (HCC): Primary | ICD-10-CM

## 2022-03-04 DIAGNOSIS — R29.6 RECURRENT FALLS: ICD-10-CM

## 2022-03-04 DIAGNOSIS — E04.2 MULTIPLE THYROID NODULES: ICD-10-CM

## 2022-03-04 DIAGNOSIS — I49.5 TACHYCARDIA-BRADYCARDIA SYNDROME (HCC): ICD-10-CM

## 2022-03-04 DIAGNOSIS — N18.4 ANEMIA OF CHRONIC KIDNEY FAILURE, STAGE 4 (SEVERE) (HCC): ICD-10-CM

## 2022-03-04 DIAGNOSIS — E11.22 TYPE 2 DIABETES MELLITUS WITH STAGE 3A CHRONIC KIDNEY DISEASE, WITH LONG-TERM CURRENT USE OF INSULIN (HCC): Primary | ICD-10-CM

## 2022-03-04 DIAGNOSIS — D63.1 ANEMIA OF CHRONIC KIDNEY FAILURE, STAGE 4 (SEVERE) (HCC): ICD-10-CM

## 2022-03-04 DIAGNOSIS — Z78.0 POST-MENOPAUSAL: ICD-10-CM

## 2022-03-04 DIAGNOSIS — R42 VERTIGO: ICD-10-CM

## 2022-03-04 DIAGNOSIS — Z79.4 TYPE 2 DIABETES MELLITUS WITH STAGE 3A CHRONIC KIDNEY DISEASE, WITH LONG-TERM CURRENT USE OF INSULIN (HCC): Primary | ICD-10-CM

## 2022-03-04 PROCEDURE — 99214 OFFICE O/P EST MOD 30 MIN: CPT | Performed by: FAMILY MEDICINE

## 2022-03-06 PROBLEM — R42 VERTIGO: Status: ACTIVE | Noted: 2022-03-06

## 2022-03-06 PROBLEM — D63.1 ANEMIA OF CHRONIC KIDNEY FAILURE, STAGE 4 (SEVERE) (HCC): Status: ACTIVE | Noted: 2022-03-06

## 2022-03-06 PROBLEM — N18.4 ANEMIA OF CHRONIC KIDNEY FAILURE, STAGE 4 (SEVERE) (HCC): Status: ACTIVE | Noted: 2022-03-06

## 2022-03-06 NOTE — PROGRESS NOTES
Assessment/Plan: Things are going well   We can cont with cardio and meds per them   See the eyedoctor   Get the US for yearly follow up   And we can get her to PT for balance center   Using a walker at home and a wheelchair when has to go long distances   She says its not weakness but more virtigo for many years   Dx back in [place] and had a full work up with mri and neuro and ent   Still this contributes to her diminished quality of life       1  Type 2 diabetes mellitus with stage 3a chronic kidney disease, with long-term current use of insulin ([address] )  -     Ambulatory Referral to Ophthalmology; Future    2  Tachycardia-bradycardia syndrome ([address] )    3  Vertigo  -     Ambulatory Referral to Physical Therapy; Future  -     Commode chair    4  Recurrent falls  -     Commode chair    5  Multiple thyroid nodules  -     US thyroid; Future; Expected date: 03/04/2022    6  Post-menopausal  -     DXA bone density spine hip and pelvis; Future; Expected date: 03/04/2022    7  Anemia of chronic kidney failure, stage 4 (severe) ([facility])         Subjective:      Patient ID: Elvi Mc is a 68 y o  female      HPI  seeing Bobbi Bagley  [place] lantus and glipizide 5 bid   CAD s/ pci  2009 seeing cardio no cp  blindness needs optho  anemia - unsure but says it is a family trait  CKD 3: was seeing RYAN parker but now seeing Bal   HTN: on carvedilol 3 215 bid and procardio 60mg qd   Anemia:   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  Low bicarb also  thyroid nodules - large bx and stable  bone cyst - cervical stable  PPM placement with cardio for tachy sarah beth 20's and pauses up to 7sec  and in wheelchair / walker  Stopped the meclizine - and 3-4 falls at home so started taking it 12 5mg bid     Will see the eye doctor     The following portions of the patient's history were reviewed and updated as appropriate: allergies, current medications, past family history, past medical history, past social history, past surgical history and problem list     Review of Systems   Constitutional: Negative for fever and unexpected weight change  HENT: Negative for nosebleeds and trouble swallowing  Eyes: Negative for visual disturbance  Respiratory: Negative for chest tightness and shortness of breath  Cardiovascular: Negative for chest pain, palpitations and leg swelling  Gastrointestinal: Negative for abdominal pain, constipation, diarrhea and nausea  Endocrine: Negative for cold intolerance  Genitourinary: Negative for dysuria and urgency  Musculoskeletal: Positive for arthralgias and gait problem  Negative for joint swelling and myalgias  Skin: Negative for rash  Neurological: Positive for weakness  Negative for tremors, seizures and syncope  Hematological: Does not bruise/bleed easily  Psychiatric/Behavioral: Negative for hallucinations and suicidal ideas  Objective:      /62 (BP Location: Left arm, Patient Position: Sitting, Cuff Size: Large)   Pulse 99   Resp 16   Ht 5' 3" (1 6 m)   Wt 87 5 kg (193 lb)   SpO2 97%   BMI 34 19 kg/m²     No visits with results within 2 Week(s) from this visit     Latest known visit with results is:   Appointment on 02/18/2022   Component Date Value    Urine Culture 02/19/2022 80,000-89,000 cfu/ml Escherichia coli*    Urine Culture 02/19/2022 30,000-39,000 cfu/ml      TSH 3RD GENERATON 02/18/2022 1 440     Vit D, 25-Hydroxy 02/18/2022 54 4     Cholesterol 02/18/2022 110     Triglycerides 02/18/2022 55     HDL, Direct 02/18/2022 63     LDL Calculated 02/18/2022 36     Non-HDL-Chol (CHOL-HDL) 02/18/2022 47     Sodium 02/18/2022 144     Potassium 02/18/2022 4 6     Chloride 02/18/2022 110*    CO2 02/18/2022 29     ANION GAP 02/18/2022 5     BUN 02/18/2022 22     Creatinine 02/18/2022 1 61*    Glucose, Fasting 02/18/2022 91     Calcium 02/18/2022 9 6     Corrected Calcium 02/18/2022 10 2*    AST 02/18/2022 11     ALT 02/18/2022 17     Alkaline Phosphatase 02/18/2022 60     Total Protein 02/18/2022 7 3     Albumin 02/18/2022 3 3*    Total Bilirubin 02/18/2022 0 45     eGFR 02/18/2022 30     Vitamin B-12 02/18/2022 1,911*    Color, UA 02/19/2022 Yellow     Clarity, UA 02/19/2022 Clear     Specific Gravity, UA 02/19/2022 1 020     pH, UA 02/19/2022 5 5     Leukocytes, UA 02/19/2022 Trace*    Nitrite, UA 02/19/2022 Positive*    Protein, UA 02/19/2022 1+*    Glucose, UA 02/19/2022 Negative     Ketones, UA 02/19/2022 Negative     Urobilinogen, UA 02/19/2022 0 2     Bilirubin, UA 02/19/2022 Negative     Blood, UA 02/19/2022 Negative     Hemoglobin A1C 02/18/2022 7 1*    EAG 02/18/2022 157     WBC 02/18/2022 6 69     RBC 02/18/2022 3 99     Hemoglobin 02/18/2022 10 9*    Hematocrit 02/18/2022 35 9     MCV 02/18/2022 90     MCH 02/18/2022 27 3     MCHC 02/18/2022 30 4*    RDW 02/18/2022 13 9     MPV 02/18/2022 11 5     Platelets 25/35/8851 262     nRBC 02/18/2022 0     Neutrophils Relative 02/18/2022 72     Immat GRANS % 02/18/2022 0     Lymphocytes Relative 02/18/2022 21     Monocytes Relative 02/18/2022 7     Eosinophils Relative 02/18/2022 0     Basophils Relative 02/18/2022 0     Neutrophils Absolute 02/18/2022 4 74     Immature Grans Absolute 02/18/2022 0 02     Lymphocytes Absolute 02/18/2022 1 43     Monocytes Absolute 02/18/2022 0 47     Eosinophils Absolute 02/18/2022 0 00     Basophils Absolute 02/18/2022 0 03     Magnesium 02/18/2022 2 1     Iron Saturation 02/18/2022 27     TIBC 02/18/2022 288     Iron 02/18/2022 77     Ferritin 02/18/2022 20     RBC, UA 02/19/2022 None Seen     WBC, UA 02/19/2022 30-50*    Epithelial Cells 02/19/2022 Occasional     Bacteria, UA 02/19/2022 Moderate*          Physical Exam  Vitals and nursing note reviewed  Constitutional:       Appearance: She is well-developed  She is obese  Comments: wheelchair   HENT:      Head: Normocephalic and atraumatic     Cardiovascular:      Rate and Rhythm: Normal rate and regular rhythm  Pulses: no weak pulses          Dorsalis pedis pulses are 2+ on the right side and 2+ on the left side  Heart sounds: Normal heart sounds  No murmur heard  Pulmonary:      Effort: Pulmonary effort is normal       Breath sounds: Normal breath sounds  No wheezing or rales  Abdominal:      General: Bowel sounds are normal  There is no distension  Palpations: Abdomen is soft  Tenderness: There is no abdominal tenderness  Musculoskeletal:         General: No tenderness  Normal range of motion  Cervical back: Normal range of motion and neck supple  Feet:      Right foot:      Skin integrity: No ulcer, skin breakdown, erythema, warmth, callus or dry skin  Left foot:      Skin integrity: No ulcer, skin breakdown, erythema, warmth, callus or dry skin  Lymphadenopathy:      Cervical: No cervical adenopathy  Skin:     General: Skin is warm and dry  Capillary Refill: Capillary refill takes less than 2 seconds  Findings: No rash  Neurological:      Mental Status: She is alert and oriented to person, place, and time  Cranial Nerves: No cranial nerve deficit  Sensory: No sensory deficit  Motor: No abnormal muscle tone  Psychiatric:         Behavior: Behavior normal          Thought Content: Thought content normal          Judgment: Judgment normal            Patient's shoes and socks removed  Right Foot/Ankle   Right Foot Inspection  Skin Exam: skin normal and skin intact  No dry skin, no warmth, no callus, no erythema, no maceration, no abnormal color, no pre-ulcer, no ulcer and no callus  Toe Exam: ROM and strength within normal limits  Sensory   Monofilament testing: intact    Vascular  Capillary refills: < 3 seconds  The right DP pulse is 2+  Left Foot/Ankle  Left Foot Inspection  Skin Exam: skin normal and skin intact   No dry skin, no warmth, no erythema, no maceration, normal color, no pre-ulcer, no ulcer and no callus  Toe Exam: ROM and strength within normal limits  Sensory   Monofilament testing: intact    Vascular  Capillary refills: < 3 seconds  The left DP pulse is 2+  Assign Risk Category  No deformity present  No loss of protective sensation  No weak pulses  Risk: 0    BMI Counseling: Body mass index is 34 19 kg/m²  The BMI is above normal  Nutrition recommendations include decreasing portion sizes, encouraging healthy choices of fruits and vegetables and limiting drinks that contain sugar  Exercise recommendations include moderate physical activity 150 minutes/week  No pharmacotherapy was ordered  Rationale for BMI follow-up plan is due to patient being overweight or obese  Depression Screening and Follow-up Plan: Patient was screened for depression during today's encounter  They screened negative with a PHQ-2 score of 0  Falls Plan of Care: balance, strength, and gait training instructions were provided  Medications that increase falls were reviewed           Mario Alberto Kilgore MD  [place] [id number]

## 2022-03-06 NOTE — PATIENT INSTRUCTIONS
10% - bad control"> 10% - bad control,Hemoglobin A1c (HbA1c) greater than 10% indicating poor diabetic control,Haemoglobin A1c greater than 10% indicating poor diabetic control">   Diabetes mellitus tipo 2 en adultos, cuidados ambulatorios   INFORMACIÓN GENERAL:   La diabetes mellitus tipo 2 en adultos  es anurag enfermedad que afecta la forma en que el cuerpo utiliza la glucosa (azúcar)  La insulina ayuda a extraer el azúcar de la rob para que pueda usarse en la producción de energía  Generalmente, cuando el nivel de azúcar Scooter, el páncreas produce más insulina  La diabetes tipo 2 se desarrolla ya sea porque el cuerpo no puede producir suficiente insulina, o no la puede usar correctamente  Después de [employer], cabral páncreas podría dejar de producir insulina  Síntomas comunes incluyen los siguientes:   · Más hambre o sed de la usual    · Necesidad frecuente de orinar     · Pérdida de peso sin tratar     · Visión borrosa  Busque cuidados inmediatos para los siguientes síntomas:   · Dolor abdominal severo o dolor que se propaga hacia cabral espalda  Es probable que usted también vomite  · Dificultad para permanecer despierto o concentrado    · Temblores o sudoración    · Visión borrosa o doble    · Aliento con olor a frutas o cyndi    · Respiración profunda y dificultosa o rápida y superficial    · Ritmo cardíaco rápido y débil  El tratamiento para la diabetes mellitus tipo 2  incluye mantener cabral nivel de azúcar en el rob a un rango normal  Usted debe comer los alimentos correctos y ejercitarse con regularidad  Además es probable que necesite medicamentos si no puede controlar cabral nivel de azúcar en la rob con nutrición y ejercicio  Maneje la diabetes mellitus tipo 2:   · Revise cabral nivel de azúcar en la rob  A usted le enseñarán cómo revisar anurag pequeña gota de Andre katz en un medidor de glucosa  Pregúntele a cabral proveedor de uri cuándo y con cuánta frecuencia es necesario revisar iris el día  También pregúntele a cabral proveedor de uri cuáles deberían ser ean niveles de azúcar en la rob cuando usted se los [place]  · Mantenga un registro de los carbohidratos (azúcares y almidones)  Cabral nivel de azúcar en la rob puede elevarse demasiado si usted come demasiados carbohidratos  Cabral dietista le ayudará a planear comidas y meriendas que tengan la cantidad correcta de carbohidratos  · Consuma alimentos bajos en grasas  Rebecca Dec son el william sin piel y la leche descremada  · Consuma menos sodio (sal)  Algunos ejemplos de alimentos altos en sodio que hay que limitar son la salsa de soya, las magalie tostadas y la sopa  No le agregue sal a la comida que usted cocina  Limite cabral uso de sal de fox  · Coma alimentos altos en fibra  Alimentos que son buena uday de fibra incluyen los vegetales, el pan integral y los frijoles  · Limite el alcohol  El alcohol afecta cabral nivel de azúcar en la rob y puede dificultar el Douglas de cabral diabetes  Las mujeres deben limitar el consumo de alcohol a 1 bebida al día  Los hombres deben limitarlo a 2 debidas al día  Anurag bebida de alcohol equivale a 12 onzas de cerveza, 5 onzas de vino o 1½ onzas de licor  · Ejercítese regularmente  El ejercicio puede ayudar a mantener estable cabral nivel de azúcar en la rob, al mismo tiempo que disminuye cabral riesgo de enfermedad cardíaca y le ayuda a perder peso  Ejercítese por al menos 30 minutos, 5 días a la semana  Arlester Jose Alberto de fortalecimiento muscular 2 días a la semana  Colabore con cabral proveedor de uri para crear un plan de ejercicios  · Revise ean pies a diario  para mitra si tienen heridas o llagas abiertas  Pregúntele a cabral proveedor de [employer] que usted puede hacer si tiene anurag llaga abierta  · Deje de fumar  Si usted fuma, nunca es tarde para dejar de hacerlo  El fumar puede [employer] problemas que puedan ocurrir con la diabetes   Pregúntele a cabral proveedor de [employer] sobre información para aprender a dejar de fumar si usted tiene dificultad para hacerlo  · Pregunte sobre araya peso:  Pregúntele a ean proveedores de uri si usted necesita perder peso y cuánto debe perder  Pídales que le ayuden con un programa de perdida de [place]  Incluso perder unas 10 a 15 libras puede ayudarle a manejar araya nivel de azúcar en la rob  · Tenga a mano araya identificación de [employer]  Use un brazalete de alerta médica o lleve consigo anurag tarjeta que diga que usted tiene diabetes  Pregúntele a araya proveedor de uri dónde conseguir estos artículos  · Pregunte sobre vacunas  La diabetes lo pone a usted en riesgo de enfermedad seria si usted se resfría, tiene neumonía o hepatitis  Pregúntele a araya proveedor de uri si usted debe ponerse las vacunas contra la gripe, neumonía o hepatitis B, y cuándo ponérselas  Programe anurag ruben con araya proveedor de [employer] se le haya indicado: Anote ean preguntas para que se acuerde de hacerlas iris ean visitas  ACUERDOS SOBRE ARAYA CUIDADO:   Usted tiene el derecho de participar en la planificación de araya cuidado  Aprenda todo lo que pueda sobre araya condición y sera darle tratamiento  Discuta con ean médicos ean opciones de tratamiento para juntos decidir el cuidado que usted quiere recibir  Usted siempre tiene el derecho a rechazar araya tratamiento  Esta información es sólo para uso en educación  Araya intención no es darle un consejo médico sobre enfermedades o tratamientos  Colsulte con araya Ladena Creed farmacéutico antes de seguir cualquier régimen médico para saber si es seguro y efectivo para usted  © [address] is for End User's use only and may not be sold, redistributed or otherwise used for commercial purposes  All illustrations and images included in CareNotes® are the copyrighted property of A D A M , Inc  or Bassem Thomas

## 2022-03-07 DIAGNOSIS — E11.22 TYPE 2 DIABETES MELLITUS WITH STAGE 3 CHRONIC KIDNEY DISEASE, WITH LONG-TERM CURRENT USE OF INSULIN (HCC): ICD-10-CM

## 2022-03-07 DIAGNOSIS — N18.30 TYPE 2 DIABETES MELLITUS WITH STAGE 3 CHRONIC KIDNEY DISEASE, WITH LONG-TERM CURRENT USE OF INSULIN (HCC): ICD-10-CM

## 2022-03-07 DIAGNOSIS — R42 DIZZINESS: ICD-10-CM

## 2022-03-07 DIAGNOSIS — Z79.4 TYPE 2 DIABETES MELLITUS WITH STAGE 3 CHRONIC KIDNEY DISEASE, WITH LONG-TERM CURRENT USE OF INSULIN (HCC): ICD-10-CM

## 2022-03-08 RX ORDER — PRAVASTATIN SODIUM 40 MG
40 TABLET ORAL DAILY
Qty: 90 TABLET | Refills: 1 | Status: SHIPPED | OUTPATIENT
Start: 2022-03-08

## 2022-03-08 RX ORDER — MECLIZINE HCL 12.5 MG/1
12.5 TABLET ORAL EVERY 12 HOURS PRN
Qty: 180 TABLET | Refills: 1 | Status: SHIPPED | OUTPATIENT
Start: 2022-03-08 | End: 2022-07-05 | Stop reason: ALTCHOICE

## 2022-06-24 ENCOUNTER — APPOINTMENT (INPATIENT)
Dept: RADIOLOGY | Facility: HOSPITAL | Age: 77
DRG: 480 | End: 2022-06-24
Payer: COMMERCIAL

## 2022-06-24 ENCOUNTER — HOSPITAL ENCOUNTER (INPATIENT)
Facility: HOSPITAL | Age: 77
LOS: 6 days | Discharge: NURSING, REHAB OR LONG-TERM CARE | DRG: 480 | End: 2022-06-30
Attending: SURGERY | Admitting: SURGERY
Payer: COMMERCIAL

## 2022-06-24 ENCOUNTER — APPOINTMENT (EMERGENCY)
Dept: RADIOLOGY | Facility: HOSPITAL | Age: 77
DRG: 480 | End: 2022-06-24
Payer: COMMERCIAL

## 2022-06-24 ENCOUNTER — APPOINTMENT (EMERGENCY)
Dept: CT IMAGING | Facility: HOSPITAL | Age: 77
DRG: 480 | End: 2022-06-24
Payer: COMMERCIAL

## 2022-06-24 DIAGNOSIS — S72.142A CLOSED DISPLACED INTERTROCHANTERIC FRACTURE OF LEFT FEMUR, INITIAL ENCOUNTER (HCC): ICD-10-CM

## 2022-06-24 DIAGNOSIS — N18.30 ACUTE RENAL FAILURE SUPERIMPOSED ON STAGE 3 CHRONIC KIDNEY DISEASE, UNSPECIFIED ACUTE RENAL FAILURE TYPE, UNSPECIFIED WHETHER STAGE 3A OR 3B CKD (HCC): ICD-10-CM
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
## 2022-06-24 DIAGNOSIS — R33.9 URINARY RETENTION: ICD-10-CM

## 2022-06-24 DIAGNOSIS — S72.002A CLOSED LEFT HIP FRACTURE (HCC): ICD-10-CM

## 2022-06-24 DIAGNOSIS — N17.9 ACUTE RENAL FAILURE SUPERIMPOSED ON STAGE 3 CHRONIC KIDNEY DISEASE, UNSPECIFIED ACUTE RENAL FAILURE TYPE, UNSPECIFIED WHETHER STAGE 3A OR 3B CKD (HCC): ICD-10-CM

## 2022-06-24 DIAGNOSIS — S72.001A CLOSED FRACTURE OF RIGHT HIP, INITIAL ENCOUNTER (HCC): Primary | ICD-10-CM

## 2022-06-24 PROBLEM — E11.9 DIABETES MELLITUS TYPE 2, INSULIN DEPENDENT (HCC): Status: ACTIVE | Noted: 2022-06-24

## 2022-06-24 PROBLEM — I25.10 CAD (CORONARY ARTERY DISEASE): Status: ACTIVE | Noted: 2022-06-24

## 2022-06-24 PROBLEM — I48.91 ATRIAL FIBRILLATION (HCC): Status: ACTIVE | Noted: 2022-06-24

## 2022-06-24 PROBLEM — I10 HYPERTENSION: Status: ACTIVE | Noted: 2022-06-24

## 2022-06-24 PROBLEM — H54.7 BLIND: Status: ACTIVE | Noted: 2022-06-24

## 2022-06-24 PROBLEM — I49.5 TACHY-BRADY SYNDROME (HCC): Status: ACTIVE | Noted: 2022-06-24

## 2022-06-24 PROBLEM — Z79.4 DIABETES MELLITUS TYPE 2, INSULIN DEPENDENT (HCC): Status: ACTIVE | Noted: 2022-06-24

## 2022-06-24 LAB
ABO GROUP BLD: NORMAL
ALBUMIN SERPL BCP-MCNC: 3.5 G/DL (ref 3.5–5)
ALP SERPL-CCNC: 54 U/L (ref 34–104)
ALT SERPL W P-5'-P-CCNC: 8 U/L (ref 7–52)
ANION GAP SERPL CALCULATED.3IONS-SCNC: 7 MMOL/L (ref 4–13)
AST SERPL W P-5'-P-CCNC: 11 U/L (ref 13–39)
ATRIAL RATE: 62 BPM
BASE EXCESS BLDA CALC-SCNC: 3 MMOL/L (ref -2–3)
BASOPHILS # BLD AUTO: 0.03 THOUSANDS/ÂΜL (ref 0–0.1)
BASOPHILS NFR BLD AUTO: 0 % (ref 0–1)
BILIRUB SERPL-MCNC: 0.5 MG/DL (ref 0.2–1)
BLD GP AB SCN SERPL QL: NEGATIVE
BUN SERPL-MCNC: 24 MG/DL (ref 5–25)
CA-I BLD-SCNC: 1.17 MMOL/L (ref 1.12–1.32)
CALCIUM SERPL-MCNC: 9 MG/DL (ref 8.4–10.2)
CHLORIDE SERPL-SCNC: 108 MMOL/L (ref 96–108)
CO2 SERPL-SCNC: 28 MMOL/L (ref 21–32)
CREAT SERPL-MCNC: 1.83 MG/DL (ref 0.6–1.3)
EOSINOPHIL # BLD AUTO: 0 THOUSAND/ÂΜL (ref 0–0.61)
EOSINOPHIL NFR BLD AUTO: 0 % (ref 0–6)
ERYTHROCYTE [DISTWIDTH] IN BLOOD BY AUTOMATED COUNT: 14.5 % (ref 11.6–15.1)
GFR SERPL CREATININE-BSD FRML MDRD: 26 ML/MIN/1.73SQ M
GLUCOSE SERPL-MCNC: 160 MG/DL (ref 65–140)
GLUCOSE SERPL-MCNC: 73 MG/DL (ref 65–140)
GLUCOSE SERPL-MCNC: 78 MG/DL (ref 65–140)
HCO3 BLDA-SCNC: 28.6 MMOL/L (ref 24–30)
HCT VFR BLD AUTO: 34.5 % (ref 34.8–46.1)
HCT VFR BLD CALC: 34 % (ref 34.8–46.1)
HGB BLD-MCNC: 10.9 G/DL (ref 11.5–15.4)
HGB BLDA-MCNC: 11.6 G/DL (ref 11.5–15.4)
HOLD SPECIMEN: NORMAL
IMM GRANULOCYTES # BLD AUTO: 0.08 THOUSAND/UL (ref 0–0.2)
IMM GRANULOCYTES NFR BLD AUTO: 1 % (ref 0–2)
INR PPP: 1.05 (ref 0.84–1.19)
LYMPHOCYTES # BLD AUTO: 1.46 THOUSANDS/ÂΜL (ref 0.6–4.47)
LYMPHOCYTES NFR BLD AUTO: 17 % (ref 14–44)
MCH RBC QN AUTO: 27.4 PG (ref 26.8–34.3)
MCHC RBC AUTO-ENTMCNC: 31.6 G/DL (ref 31.4–37.4)
MCV RBC AUTO: 87 FL (ref 82–98)
MONOCYTES # BLD AUTO: 0.55 THOUSAND/ÂΜL (ref 0.17–1.22)
MONOCYTES NFR BLD AUTO: 7 % (ref 4–12)
NEUTROPHILS # BLD AUTO: 6.25 THOUSANDS/ÂΜL (ref 1.85–7.62)
NEUTS SEG NFR BLD AUTO: 75 % (ref 43–75)
NRBC BLD AUTO-RTO: 0 /100 WBCS
P AXIS: 36 DEGREES
PCO2 BLD: 30 MMOL/L (ref 21–32)
PCO2 BLD: 49.8 MM HG (ref 42–50)
PH BLD: 7.37 [PH] (ref 7.3–7.4)
PLATELET # BLD AUTO: 233 THOUSANDS/UL (ref 149–390)
PMV BLD AUTO: 11 FL (ref 8.9–12.7)
PO2 BLD: 35 MM HG (ref 35–45)
POTASSIUM BLD-SCNC: 4.3 MMOL/L (ref 3.5–5.3)
POTASSIUM SERPL-SCNC: 4.5 MMOL/L (ref 3.5–5.3)
PR INTERVAL: 272 MS
PROT SERPL-MCNC: 6.2 G/DL (ref 6.4–8.4)
PROTHROMBIN TIME: 13.7 SECONDS (ref 11.6–14.5)
QRS AXIS: -15 DEGREES
QRSD INTERVAL: 80 MS
QT INTERVAL: 426 MS
QTC INTERVAL: 422 MS
RBC # BLD AUTO: 3.98 MILLION/UL (ref 3.81–5.12)
RH BLD: POSITIVE
SAO2 % BLD FROM PO2: 65 % (ref 60–85)
SODIUM BLD-SCNC: 144 MMOL/L (ref 136–145)
SODIUM SERPL-SCNC: 143 MMOL/L (ref 135–147)
SPECIMEN EXPIRATION DATE: NORMAL
SPECIMEN SOURCE: ABNORMAL
T WAVE AXIS: -3 DEGREES
VENTRICULAR RATE: 59 BPM
WBC # BLD AUTO: 8.37 THOUSAND/UL (ref 4.31–10.16)

## 2022-06-24 PROCEDURE — 84132 ASSAY OF SERUM POTASSIUM: CPT

## 2022-06-24 PROCEDURE — 82803 BLOOD GASES ANY COMBINATION: CPT

## 2022-06-24 PROCEDURE — 93005 ELECTROCARDIOGRAM TRACING: CPT

## 2022-06-24 PROCEDURE — 71045 X-RAY EXAM CHEST 1 VIEW: CPT

## 2022-06-24 PROCEDURE — 93010 ELECTROCARDIOGRAM REPORT: CPT | Performed by: INTERNAL MEDICINE

## 2022-06-24 PROCEDURE — 96374 THER/PROPH/DIAG INJ IV PUSH: CPT

## 2022-06-24 PROCEDURE — 99222 1ST HOSP IP/OBS MODERATE 55: CPT | Performed by: INTERNAL MEDICINE

## 2022-06-24 PROCEDURE — 86900 BLOOD TYPING SEROLOGIC ABO: CPT | Performed by: SURGERY

## 2022-06-24 PROCEDURE — 99285 EMERGENCY DEPT VISIT HI MDM: CPT

## 2022-06-24 PROCEDURE — 84295 ASSAY OF SERUM SODIUM: CPT

## 2022-06-24 PROCEDURE — 86850 RBC ANTIBODY SCREEN: CPT | Performed by: SURGERY

## 2022-06-24 PROCEDURE — 82947 ASSAY GLUCOSE BLOOD QUANT: CPT

## 2022-06-24 PROCEDURE — 73552 X-RAY EXAM OF FEMUR 2/>: CPT

## 2022-06-24 PROCEDURE — 93308 TTE F-UP OR LMTD: CPT | Performed by: SURGERY

## 2022-06-24 PROCEDURE — 86920 COMPATIBILITY TEST SPIN: CPT

## 2022-06-24 PROCEDURE — 82330 ASSAY OF CALCIUM: CPT

## 2022-06-24 PROCEDURE — NC001 PR NO CHARGE: Performed by: EMERGENCY MEDICINE

## 2022-06-24 PROCEDURE — 72170 X-RAY EXAM OF PELVIS: CPT

## 2022-06-24 PROCEDURE — 86901 BLOOD TYPING SEROLOGIC RH(D): CPT | Performed by: SURGERY

## 2022-06-24 PROCEDURE — 85025 COMPLETE CBC W/AUTO DIFF WBC: CPT | Performed by: NURSE PRACTITIONER

## 2022-06-24 PROCEDURE — 99223 1ST HOSP IP/OBS HIGH 75: CPT | Performed by: SURGERY

## 2022-06-24 PROCEDURE — 99223 1ST HOSP IP/OBS HIGH 75: CPT | Performed by: PHYSICIAN ASSISTANT

## 2022-06-24 PROCEDURE — 85014 HEMATOCRIT: CPT

## 2022-06-24 PROCEDURE — 70450 CT HEAD/BRAIN W/O DYE: CPT

## 2022-06-24 PROCEDURE — 82948 REAGENT STRIP/BLOOD GLUCOSE: CPT

## 2022-06-24 PROCEDURE — 85610 PROTHROMBIN TIME: CPT | Performed by: NURSE PRACTITIONER

## 2022-06-24 PROCEDURE — 80053 COMPREHEN METABOLIC PANEL: CPT | Performed by: NURSE PRACTITIONER

## 2022-06-24 PROCEDURE — 36415 COLL VENOUS BLD VENIPUNCTURE: CPT | Performed by: SURGERY

## 2022-06-24 PROCEDURE — 76705 ECHO EXAM OF ABDOMEN: CPT | Performed by: SURGERY

## 2022-06-24 RX ORDER — NIFEDIPINE 60 MG/1
60 TABLET, FILM COATED, EXTENDED RELEASE ORAL DAILY
COMMUNITY
End: 2022-08-01

## 2022-06-24 RX ORDER — OXYCODONE HYDROCHLORIDE 5 MG/1
2.5 TABLET ORAL EVERY 4 HOURS PRN
Status: DISCONTINUED | OUTPATIENT
Start: 2022-06-24 | End: 2022-06-30 | Stop reason: HOSPADM

## 2022-06-24 RX ORDER — RANOLAZINE 500 MG/1
500 TABLET, EXTENDED RELEASE ORAL DAILY
COMMUNITY
End: 2022-07-05 | Stop reason: ALTCHOICE

## 2022-06-24 RX ORDER — LANOLIN ALCOHOL/MO/W.PET/CERES
400 CREAM (GRAM) TOPICAL DAILY
Status: DISCONTINUED | OUTPATIENT
Start: 2022-06-24 | End: 2022-06-30 | Stop reason: HOSPADM

## 2022-06-24 RX ORDER — INSULIN LISPRO 100 [IU]/ML
1-6 INJECTION, SOLUTION INTRAVENOUS; SUBCUTANEOUS EVERY 6 HOURS SCHEDULED
Status: DISCONTINUED | OUTPATIENT
Start: 2022-06-24 | End: 2022-06-26

## 2022-06-24 RX ORDER — FENTANYL CITRATE 50 UG/ML
INJECTION, SOLUTION INTRAMUSCULAR; INTRAVENOUS CODE/TRAUMA/SEDATION MEDICATION
Status: COMPLETED | OUTPATIENT
Start: 2022-06-24 | End: 2022-06-24

## 2022-06-24 RX ORDER — LIDOCAINE 50 MG/G
1 PATCH TOPICAL DAILY
Status: DISCONTINUED | OUTPATIENT
Start: 2022-06-24 | End: 2022-06-30 | Stop reason: HOSPADM

## 2022-06-24 RX ORDER — DEXTROSE MONOHYDRATE 25 G/50ML
25 INJECTION, SOLUTION INTRAVENOUS ONCE
Status: COMPLETED | OUTPATIENT
Start: 2022-06-24 | End: 2022-06-24

## 2022-06-24 RX ORDER — MELATONIN
2000 DAILY
COMMUNITY
End: 2022-07-05 | Stop reason: ALTCHOICE

## 2022-06-24 RX ORDER — MECLIZINE HCL 12.5 MG/1
12.5 TABLET ORAL EVERY 8 HOURS PRN
Status: DISCONTINUED | OUTPATIENT
Start: 2022-06-24 | End: 2022-06-30 | Stop reason: HOSPADM

## 2022-06-24 RX ORDER — MELATONIN
2000 DAILY
Status: DISCONTINUED | OUTPATIENT
Start: 2022-06-24 | End: 2022-06-30 | Stop reason: HOSPADM

## 2022-06-24 RX ORDER — AMOXICILLIN 250 MG
1 CAPSULE ORAL 2 TIMES DAILY
Status: DISCONTINUED | OUTPATIENT
Start: 2022-06-24 | End: 2022-06-30 | Stop reason: HOSPADM

## 2022-06-24 RX ORDER — GLIPIZIDE 5 MG/1
5 TABLET ORAL DAILY
COMMUNITY
End: 2022-07-05 | Stop reason: ALTCHOICE

## 2022-06-24 RX ORDER — CARVEDILOL 12.5 MG/1
25 TABLET ORAL 2 TIMES DAILY WITH MEALS
Status: DISCONTINUED | OUTPATIENT
Start: 2022-06-24 | End: 2022-06-30 | Stop reason: HOSPADM

## 2022-06-24 RX ORDER — DOCUSATE SODIUM 100 MG/1
100 CAPSULE, LIQUID FILLED ORAL 2 TIMES DAILY
Status: DISCONTINUED | OUTPATIENT
Start: 2022-06-24 | End: 2022-06-24

## 2022-06-24 RX ORDER — ASPIRIN 81 MG/1
81 TABLET, CHEWABLE ORAL DAILY
Status: ON HOLD | COMMUNITY

## 2022-06-24 RX ORDER — CARVEDILOL 25 MG/1
25 TABLET ORAL 2 TIMES DAILY WITH MEALS
COMMUNITY
End: 2022-07-05 | Stop reason: ALTCHOICE

## 2022-06-24 RX ORDER — HYDROMORPHONE HCL IN WATER/PF 6 MG/30 ML
0.2 PATIENT CONTROLLED ANALGESIA SYRINGE INTRAVENOUS EVERY 4 HOURS PRN
Status: DISCONTINUED | OUTPATIENT
Start: 2022-06-24 | End: 2022-06-30 | Stop reason: HOSPADM

## 2022-06-24 RX ORDER — PRAVASTATIN SODIUM 40 MG
40 TABLET ORAL DAILY
COMMUNITY
End: 2022-07-05 | Stop reason: ALTCHOICE

## 2022-06-24 RX ORDER — RANOLAZINE 500 MG/1
500 TABLET, EXTENDED RELEASE ORAL DAILY
Status: DISCONTINUED | OUTPATIENT
Start: 2022-06-24 | End: 2022-06-30 | Stop reason: HOSPADM

## 2022-06-24 RX ORDER — NIFEDIPINE 60 MG/1
60 TABLET, FILM COATED, EXTENDED RELEASE ORAL DAILY
Status: DISCONTINUED | OUTPATIENT
Start: 2022-06-24 | End: 2022-06-24

## 2022-06-24 RX ORDER — INSULIN GLARGINE 100 [IU]/ML
20 INJECTION, SOLUTION SUBCUTANEOUS DAILY
COMMUNITY
End: 2022-07-05 | Stop reason: ALTCHOICE

## 2022-06-24 RX ORDER — ACETAMINOPHEN 325 MG/1
975 TABLET ORAL EVERY 8 HOURS SCHEDULED
Status: DISCONTINUED | OUTPATIENT
Start: 2022-06-24 | End: 2022-06-30 | Stop reason: HOSPADM

## 2022-06-24 RX ORDER — SODIUM CHLORIDE, SODIUM GLUCONATE, SODIUM ACETATE, POTASSIUM CHLORIDE, MAGNESIUM CHLORIDE, SODIUM PHOSPHATE, DIBASIC, AND POTASSIUM PHOSPHATE .53; .5; .37; .037; .03; .012; .00082 G/100ML; G/100ML; G/100ML; G/100ML; G/100ML; G/100ML; G/100ML
75 INJECTION, SOLUTION INTRAVENOUS CONTINUOUS
Status: DISCONTINUED | OUTPATIENT
Start: 2022-06-24 | End: 2022-06-25

## 2022-06-24 RX ORDER — HYDROMORPHONE HCL IN WATER/PF 6 MG/30 ML
0.2 PATIENT CONTROLLED ANALGESIA SYRINGE INTRAVENOUS ONCE
Status: COMPLETED | OUTPATIENT
Start: 2022-06-24 | End: 2022-06-24

## 2022-06-24 RX ORDER — HEPARIN SODIUM 5000 [USP'U]/ML
5000 INJECTION, SOLUTION INTRAVENOUS; SUBCUTANEOUS EVERY 8 HOURS SCHEDULED
Status: DISCONTINUED | OUTPATIENT
Start: 2022-06-24 | End: 2022-06-30

## 2022-06-24 RX ORDER — OXYCODONE HYDROCHLORIDE 5 MG/1
5 TABLET ORAL EVERY 4 HOURS PRN
Status: DISCONTINUED | OUTPATIENT
Start: 2022-06-24 | End: 2022-06-30 | Stop reason: HOSPADM

## 2022-06-24 RX ORDER — LANOLIN ALCOHOL/MO/W.PET/CERES
400 CREAM (GRAM) TOPICAL DAILY
COMMUNITY
End: 2022-08-01

## 2022-06-24 RX ORDER — ONDANSETRON 2 MG/ML
4 INJECTION INTRAMUSCULAR; INTRAVENOUS EVERY 4 HOURS PRN
Status: DISCONTINUED | OUTPATIENT
Start: 2022-06-24 | End: 2022-06-30 | Stop reason: HOSPADM

## 2022-06-24 RX ORDER — GABAPENTIN 100 MG/1
100 CAPSULE ORAL
Status: DISCONTINUED | OUTPATIENT
Start: 2022-06-24 | End: 2022-06-30 | Stop reason: HOSPADM

## 2022-06-24 RX ORDER — MECLIZINE HCL 12.5 MG/1
TABLET ORAL EVERY 8 HOURS PRN
COMMUNITY
End: 2022-08-03

## 2022-06-24 RX ORDER — LISINOPRIL 2.5 MG/1
2.5 TABLET ORAL DAILY
COMMUNITY
End: 2022-06-30

## 2022-06-24 RX ORDER — NIFEDIPINE 60 MG/1
60 TABLET, EXTENDED RELEASE ORAL DAILY
Status: DISCONTINUED | OUTPATIENT
Start: 2022-06-25 | End: 2022-06-27

## 2022-06-24 RX ADMIN — GABAPENTIN 100 MG: 100 CAPSULE ORAL at 21:48

## 2022-06-24 RX ADMIN — HEPARIN SODIUM 5000 UNITS: 5000 INJECTION INTRAVENOUS; SUBCUTANEOUS at 15:28

## 2022-06-24 RX ADMIN — CARVEDILOL 25 MG: 12.5 TABLET, FILM COATED ORAL at 17:21

## 2022-06-24 RX ADMIN — ACETAMINOPHEN 975 MG: 325 TABLET, FILM COATED ORAL at 21:48

## 2022-06-24 RX ADMIN — SENNOSIDES AND DOCUSATE SODIUM 1 TABLET: 50; 8.6 TABLET ORAL at 17:22

## 2022-06-24 RX ADMIN — ACETAMINOPHEN 975 MG: 325 TABLET, FILM COATED ORAL at 15:28

## 2022-06-24 RX ADMIN — DEXTROSE MONOHYDRATE 25 ML: 25 INJECTION, SOLUTION INTRAVENOUS at 12:18

## 2022-06-24 RX ADMIN — HEPARIN SODIUM 5000 UNITS: 5000 INJECTION INTRAVENOUS; SUBCUTANEOUS at 21:50

## 2022-06-24 RX ADMIN — SODIUM CHLORIDE, SODIUM GLUCONATE, SODIUM ACETATE, POTASSIUM CHLORIDE, MAGNESIUM CHLORIDE, SODIUM PHOSPHATE, DIBASIC, AND POTASSIUM PHOSPHATE 75 ML/HR: .53; .5; .37; .037; .03; .012; .00082 INJECTION, SOLUTION INTRAVENOUS at 10:16

## 2022-06-24 RX ADMIN — HYDROMORPHONE HYDROCHLORIDE 0.2 MG: 0.2 INJECTION, SOLUTION INTRAMUSCULAR; INTRAVENOUS; SUBCUTANEOUS at 10:16

## 2022-06-24 RX ADMIN — LIDOCAINE 5% 1 PATCH: 700 PATCH TOPICAL at 10:16

## 2022-06-24 RX ADMIN — FENTANYL CITRATE 25 MCG: 50 INJECTION, SOLUTION INTRAMUSCULAR; INTRAVENOUS at 08:52

## 2022-06-24 RX ADMIN — OXYCODONE HYDROCHLORIDE 2.5 MG: 5 TABLET ORAL at 18:37

## 2022-06-24 RX ADMIN — INSULIN LISPRO 1 UNITS: 100 INJECTION, SOLUTION INTRAVENOUS; SUBCUTANEOUS at 17:28

## 2022-06-24 NOTE — PLAN OF CARE
Problem: MOBILITY - ADULT  Goal: Maintain or return to baseline ADL function  Description: INTERVENTIONS:  -  Assess patient's ability to carry out ADLs; assess patient's baseline for ADL function and identify physical deficits which impact ability to perform ADLs (bathing, care of mouth/teeth, toileting, grooming, dressing, etc )  - Assess/evaluate cause of self-care deficits   - Assess range of motion  - Assess patient's mobility; develop plan if impaired  - Assess patient's need for assistive devices and provide as appropriate  - Encourage maximum independence but intervene and supervise when necessary  - Involve family in performance of ADLs  - Assess for home care needs following discharge   - Consider OT consult to assist with ADL evaluation and planning for discharge  - Provide patient education as appropriate  Outcome: Progressing  Goal: Maintains/Returns to pre admission functional level  Description: INTERVENTIONS:  - Perform BMAT or MOVE assessment daily    - Set and communicate daily mobility goal to care team and patient/family/caregiver     - Collaborate with rehabilitation services on mobility goals if consulted  - Out of bed for toileting  - Record patient progress and toleration of activity level   Outcome: Progressing     Problem: PAIN - ADULT  Goal: Verbalizes/displays adequate comfort level or baseline comfort level  Description: Interventions:  - Encourage patient to monitor pain and request assistance  - Assess pain using appropriate pain scale  - Administer analgesics based on type and severity of pain and evaluate response  - Implement non-pharmacological measures as appropriate and evaluate response  - Consider cultural and social influences on pain and pain management  - Notify physician/advanced practitioner if interventions unsuccessful or patient reports new pain  Outcome: Progressing     Problem: INFECTION - ADULT  Goal: Absence or prevention of progression during hospitalization  Description: INTERVENTIONS:  - Assess and monitor for signs and symptoms of infection  - Monitor lab/diagnostic results  - Monitor all insertion sites, i e  indwelling lines, tubes, and drains  - Monitor endotracheal if appropriate and nasal secretions for changes in amount and color  - [place] appropriate cooling/warming therapies per order  - Administer medications as ordered  - Instruct and encourage patient and family to use good hand hygiene technique  - Identify and instruct in appropriate isolation precautions for identified infection/condition  Outcome: Progressing     Problem: SAFETY ADULT  Goal: Patient will remain free of falls  Description: INTERVENTIONS:  - Educate patient/family on patient safety including physical limitations  - Instruct patient to call for assistance with activity   - Consult OT/PT to assist with strengthening/mobility   - Keep Call bell within reach  - Keep bed low and locked with side rails adjusted as appropriate  - Keep care items and personal belongings within reach  - Initiate and maintain comfort rounds  - Make Fall Risk Sign visible to staff  - Apply yellow socks and bracelet for high fall risk patients  - Consider moving patient to room near nurses station  Outcome: Progressing     Problem: DISCHARGE PLANNING  Goal: Discharge to home or other facility with appropriate resources  Description: INTERVENTIONS:  - Identify barriers to discharge w/patient and caregiver  - Arrange for needed discharge resources and transportation as appropriate  - Identify discharge learning needs (meds, wound care, etc )  - Arrange for interpretive services to assist at discharge as needed  - Refer to Case Management Department for coordinating discharge planning if the patient needs post-hospital services based on physician/advanced practitioner order or complex needs related to functional status, cognitive ability, or social support system  Outcome: Progressing     Problem: Knowledge Deficit  Goal: Patient/family/caregiver demonstrates understanding of disease process, treatment plan, medications, and discharge instructions  Description: Complete learning assessment and assess knowledge base    Interventions:  - Provide teaching at level of understanding  - Provide teaching via preferred learning methods  Outcome: Progressing

## 2022-06-24 NOTE — CONSULTS
Consultation - Geriatric Medicine   Bertha Agee 68 y o  female MRN: [id number]  Unit/Bed#: W -01 Encounter: [id number]        Inpatient consult to Gerontology  Consult performed by: Leopoldo Cake, MD  Consult ordered by: TENNILLE Trammell            Assessment/Plan   1 -history of multiple falls  -factors precipitating her falls are the fact that the patient is legally blind has marked peripheral neuropathy with gait imbalance  Suspect she also has element of orthostasis  Will need to place her on fall precautions per geriatric protocol   - Assess patient frequently for physical needs, encourage use of assistant devices as needed and directed by PT/OT  -  Identify cognitive and physical deficits and behaviors that affect risk of falls  - consider moving patient closer to nursing station to monitor more closely for impulsive behavior which may increase risk of falls  -  [place] fall precautions   - Educate patient/family on patient safety including physical limitations and importance of using call bell for assistance   - Modify environment to reduce risk of injury including cap IV and disconnect from pole when not in use, ensuring adequate lighting in room and restroom, ensuring that path to restroom is clear and free of trip hazards  - PT/OT consulted to assist with strengthening/mobility and assist with discharge planning to appropriate level of care    2  -closed left hip fracture -has been evaluated by Orthopedics she is going to have surgery tomorrow  Patient has been placed on heparin for DVT prophylaxis apparently she ran out of her Xarelto last week    Recommend following geriatric pain protocol    -continue pain control per Geriatric pain protocol:  Tylenol 975mg Q8H scheduled  Roxicodone 2 5mg Q4H PRN moderate pain  Roxicodone 5mg Q4H PRN severe pain  Dilaudid 0 2mg Q2H PRN  -continue adjuncts such as Gabapentin 100mg HS and lidocaine patch topically  -encourage addition of non-pharmacologic pain treatment including ice and frequent repositioning  -recommend  bowel regimen to prevent and treat constipation due to increased risk with acute pain and opiate pain medications    3  -paroxysmal atrial fibrillation  -patient currently on Xarelto apparently she has not been taking for the past week  4  -coronary artery disease -patient with history of previous myocardial infarction had a stent placed  Gets followed by [facility] [place] for her cardiac needs i e  Cardiology  Patient with abnormal EKG showing sinus bradycardia with first-degree AV block possible inferior infarct  5 -history of pacemaker placement -this gets followed by [facility] regularly she sees a cardiologist every 3 months  6 -chronic renal insufficiency stage IV -patient's current GFR is 26 with a baseline creatinine 1 83 suspect renal insufficiency secondary to her longstanding diabetes mellitus type 2     7 -diabetes mellitus type 2 -patient currently is on insulin for coverage her last hemoglobin A1c was 7 1     8 -marked peripheral neuropathy -secondary to her underlying diabetes    9 -diabetic retinopathy -patient has had laser treatments in the past    10 -legally blind    11 -morbid obesity    12 -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    13 -heterozygous MTHFR mutation    14  -history of multiple thyroid nodules    15 -medication review  -patient's home medications include an aspirin 81 mg orally daily, Coreg 25 mg orally twice a day, vitamin D3 2000 International Units orally daily, folic acid 391 mcg orally daily, glipizide 5 mg orally daily, Lantus insulin 20 units daily, Zestril 2 5 mg orally daily, Antivert 12 5 mg orally every 8 hours for dizziness, Adalat 60 mg orally daily, Pravachol 40 mg orally daily, Ranexa 500 mg every 12 hours, Xarelto 15 mg orally daily    Recommendations    1 -orthostatic measurements    2  -cardiac clearance     3 -would recommend discontinuing Glucotrol sulfonylureas are not good medications in the elderly  She also has evidence of chronic renal insufficiency stage IV      4  -fall precautions    5 -pain management per geriatric protocol     History of Present Illness   Physician Requesting Consult: Erick Garvey DO  Reason for Consult / Principal Problem:  Fall-peripheral neuropathy  Hx and PE limited by:  No limitations  Additional history obtained from:  Patient was able to elaborate an excellent history son was present  HPI: Jose Krueger is a 68y o  year old female who presents with a history of a mechanical fall at home apparently she was getting up from the bathroom a she had a misstep and fell to the ground  Unfortunately she fell on her left hip since her fall she has had significant pain in that area  She also did strike her head and she currently is taking an oral anticoagulant for her atrial fibrillation  The patient denied any other pain or discomfort  Her CBC with diff reveals evidence of anemia she has a hemoglobin of 10 9 and a hematocrit of 34 5  White count is normal at 8370 as is her platelet count at [id number]  Patient's CMP reveals a creatinine 1 83 and a GFR of 26 consistent with chronic renal insufficiency stage IV  Because of her head strike patient did have a CT scan of her head which revealed no acute intracranial abnormality  Microangiopathic changes noted  X-ray of left femur revealed a comminuted minimally displaced left femoral intertrochanteric fracture  Twelve lead electrocardiogram revealed sinus bradycardia with first-degree AV block possible inferior infarct age undetermined  Patient has had longstanding diabetes mellitus type 2 with marked peripheral neuropathy she she is legally blind and I suspect she also has an element of orthostasis  All this factors predispose her to fall in fact the patient has numerous falls during the course of the year    She has history of coronary artery disease having had a myocardial infarction about 14 years ago at which time they stented her  Patient also has a pacemaker in her left chest wall  She [facility] with [facility] Dr Mike Castañeda  Review of Systems   Constitutional: Negative  HENT: Negative  Eyes:        Patient is legally blind   Respiratory:        Patient states that she does get short of breath when she exerts herself  Cardiovascular: Negative  Gastrointestinal: Positive for constipation  Endocrine:        Patient with longstanding history of diabetes mellitus type 2   Genitourinary:        Patient has urinary incontinence at times   Musculoskeletal: Positive for arthralgias and gait problem  Skin: Negative  Allergic/Immunologic: Negative  Hematological: Negative  Psychiatric/Behavioral: Negative  Memory/Cognitive screening:  Memory has been excellent  Mobility:  Poor mobility because of her marked peripheral neuropathy  Falls:  Patient has had numerous falls  Assistive Devices:  Patient does have a walker and a cane and a chair  Fraility:  No evidence of frailty  Nutrition/weight loss/grocery shopping/meal preparation:  Appetite is good  Vision impairment:  Patient is legally blind in both eyes  Hearing impairment:  No evidence of hearing impairment  Incontinence:  She does have some urinary incontinence at times  Delirium:  No history of delirium  Polypharmacy:   No current facility-administered medications on file prior to encounter       Current Outpatient Medications on File Prior to Encounter   Medication Sig Dispense Refill    aspirin 81 mg chewable tablet Chew 81 mg daily      carvedilol (COREG) 25 mg tablet Take 25 mg by mouth 2 (two) times a day with meals      cholecalciferol (VITAMIN D3) 1,000 units tablet Take 2,000 Units by mouth daily      folic acid (FOLVITE) 024 mcg tablet Take 400 mcg by mouth daily      glipiZIDE (GLUCOTROL) 5 mg tablet Take 5 mg by mouth in the morning      insulin glargine (LANTUS) 100 units/mL subcutaneous injection Inject 20 Units under the skin daily      lisinopril (ZESTRIL) 2 5 mg tablet Take 2 5 mg by mouth daily      meclizine (ANTIVERT) 12 5 MG tablet Take by mouth every 8 (eight) hours as needed for dizziness      NIFEdipine ER (ADALAT CC) 60 MG 24 hr tablet Take 60 mg by mouth daily      pravastatin (PRAVACHOL) 40 mg tablet Take 40 mg by mouth daily      ranolazine (RANEXA) 500 mg 12 hr tablet Take 500 mg by mouth in the morning      rivaroxaban (Xarelto) 15 mg tablet Take 15 mg by mouth daily with breakfast       Patients primary residence:  [facility] lives on 1 floor where she has her bedroom bathroom and kitchen Lives with:  She lives with her mother son and grandchildren  Apparently the mother cooks for her and takes care her during the day  iADL's:  Patient's son takes care of her bills   ADL's:  Patient's daughter-in-law picks her clothing and helps her get dressed every morning also helps her with her bathing  Historical Information   Past medical history:  Diabetes mellitus type 2, chronic renal insufficiency stage III, hypertension, multiple thyroid nodules, essential hypertension, coronary artery disease, tachy-sarah beth syndrome, obesity, heterozygous MTHFR mutation, atrial fibrillation, chronic anemia  Past surgical history:  Cardiac pacemaker placement, cataract extraction, , cholecystectomy, coronary angioplasty with stent placement, tubal ligation  Social history:  Patient is retired  never smoker  Family history:  Father had diabetes hypertension heart disease, brother had cancer,    Meds/Allergies   All current active meds have been reviewed       No Known Allergies    Objective patient's respiratory rate 18 patient's heart rate 60 patient's temperature 98 6°  Vitals:    06/24/22 1827   BP: 162/70   Pulse:    Resp:    Temp:    SpO2:        Intake/Output Summary (Last 24 hours) at 6/24/2022 1832  Last data filed at 6/24/2022 1806  Gross per 24 hour   Intake 240 ml   Output --   Net 240 ml     Invasive Devices  Report    Peripheral Intravenous Line  Duration           Peripheral IV 06/24/22 Left Antecubital <1 day                Physical Exam  Constitutional:       Appearance: She is obese  HENT:      Head: Normocephalic and atraumatic  Ears:      Comments: Patient with evidence of bilateral cerumen impaction     Nose: Nose normal       Mouth/Throat:      Mouth: Mucous membranes are moist       Pharynx: Oropharynx is clear  Eyes:      Conjunctiva/sclera: Conjunctivae normal       Pupils: Pupils are equal, round, and reactive to light  Cardiovascular:      Rate and Rhythm: Regular rhythm  Bradycardia present  Pulses: Normal pulses  Heart sounds: Normal heart sounds  Pulmonary:      Effort: Pulmonary effort is normal       Breath sounds: Normal breath sounds  Abdominal:      General: Abdomen is flat  Bowel sounds are normal    Musculoskeletal:         General: Tenderness present  Cervical back: Normal range of motion  Comments: Patient with increased tenderness in her left hip   Skin:     General: Skin is warm  Neurological:      Mental Status: She is alert and oriented to person, place, and time  Sensory: Sensory deficit present  Gait: Gait abnormal       Comments: Patient with marked peripheral neuropathy   Psychiatric:         Mood and Affect: Mood normal          Behavior: Behavior normal          Thought Content: Thought content normal          Judgment: Judgment normal          Lab Results:   I have personally reviewed pertinent lab and imaging results       VTE Prophylaxis:  Heparin 5000 International Units subQ every 8 hours    Code Status: Level 1 - Full Code  Advance Directive and Living Will:      Power of :    POLST:      Family and Social Support:  Patient's mother helps in her care as as her daughter in-law  Patient's sons are very supportive  Living Arrangements: Lives w/ Family members; Other (Comment) (Lives with son and his family also lives with her mother)  Support Systems: Son; Parent;  Family members  Assistance Needed: none  Type of Current Residence: Private residence  [address]: No

## 2022-06-24 NOTE — ASSESSMENT & PLAN NOTE
Lab Results   Component Value Date    EGFR 26 06/24/2022    CREATININE 1 83 (H) 06/24/2022     · Associated with diabetes and hypertension, baseline appears to be between 1 6-2 0  · 1 83 on admission  · IVS started while NPO  · Hold home lisinopril  · Continue to monitor

## 2022-06-24 NOTE — ED NOTES
Due to new orders patient provided menu and instructions on how to order food  Advised will be NPO after midnight       Umm Moreira RN  06/24/22 [id number]

## 2022-06-24 NOTE — ASSESSMENT & PLAN NOTE
· Secondary to fall  · XR pelvis showed:  "Left intertrochanteric hip fracture"  · Orthopedics consulted  · NPO for possible OR  Initiated IVF  · Nonweightbearing on left lower extremity  · Multimodal pain regiment  · DVT prophylaxis times 28 days--patient ran out of her Xarelto approximately 1 week ago  Patient will be placed on heparin    Will plan to transition back to Xarelto once cleared by Orthopedics  · PT/OT

## 2022-06-24 NOTE — H&P
Irene  H&P- Darylene Logan 1945, 68 y o  female MRN: [id number]  Unit/Bed#: ED 26 Encounter: [id number]  Primary Care Provider: Christina Cheung MD   Date and time admitted to hospital: 6/24/2022  8:30 AM    [address]  · Injuries listed below  · CT head negative for acute traumatic injuries  · PT/OT    Closed left hip fracture (HCC)  Assessment & Plan  · Secondary to fall  · XR pelvis showed:  "Left intertrochanteric hip fracture"  · Orthopedics consulted  · NPO for possible OR  Initiated IVF  · Nonweightbearing on left lower extremity  · Multimodal pain regiment  · DVT prophylaxis times 28 days--patient ran out of her Xarelto approximately 1 week ago  Patient will be placed on heparin  Will plan to transition back to Xarelto once cleared by Orthopedics  · PT/OT     Atrial fibrillation ([facility] [address] )  Assessment & Plan  · Continue home Coreg  · Start Xarelto once cleared by Orthopedics    CAD (coronary artery disease)  Assessment & Plan  · History of PCI in 2011  · Continue home Ranexa and Coreg    Tachy-sarah beth syndrome ([facility] [address] )  Assessment & Plan  · Followed by [facility] cardiology  · Continue Coreg  · S/p pacemaker    Hypertension  Assessment & Plan  · Continue home carvedilol and nifedipine  · Will hold home lisinopril due to likely orthopedic intervention and concern for SAHIL  · Continue to monitor BP and kidney functioning    CKD (chronic kidney disease) stage 3, GFR 30-59 ml/min ([facility])  Assessment & Plan  Lab Results   Component Value Date    EGFR 26 06/24/2022    CREATININE 1 83 (H) 06/24/2022     · Associated with diabetes and hypertension, baseline appears to be between 1 6-2 0  · 1 83 on admission  · IVS started while NPO  · Hold home lisinopril  · Continue to monitor    Diabetes mellitus type 2, insulin dependent ([facility] [address] )  Assessment & Plan  No results found for: HGBA1C    No results for input(s): POCGLU in the last 72 hours      Blood Sugar Average: Last 72 hrs:    · 2/22 hemoglobin A1c was 7 1  · Hold home oral anti diabetics  · Continue on sliding scale insulin  · Will initiate Lantus postoperatively    Blind  Assessment & Plan  · Family at bedside  · Additional assistance will be provided as needed to accommodate patient  Trauma Alert: Level B   Model of Arrival: Ambulance    Trauma Team: Attending To and JAYLEN Pope  Consultants:     Orthopedics: Saige Montemayor - STAT consult; notified at [address] [place] [address] via phone; History of Present Illness     Chief Complaint:  Left hip pain  Mechanism:Fall     HPI:    Joselin Morelos is a 68 y o  female who has a past medical history including atrial fibrillation that she takes Xarelto for and being blind, presenting today via ambulance for evaluation following a fall at home  Patient states that she was getting up to the bathroom when she misstepped, and subsequently fell to the ground  She fell onto her left hip  Since the fall she has had significant pain in left hip  She did strike her head  No reported loss of consciousness  Patient denies any other pain or discomfort  She states it's just my hip  Review of Systems   Constitutional: Negative  HENT: Negative  Eyes: Negative  Blind at baseline   Respiratory: Negative  Negative for cough, shortness of breath and wheezing  Cardiovascular: Negative  Gastrointestinal: Negative  Endocrine: Negative  Genitourinary: Negative  Musculoskeletal: Positive for arthralgias (Left hip pain)  Negative for back pain, gait problem, joint swelling, myalgias, neck pain and neck stiffness  Skin: Negative  Allergic/Immunologic: Negative  Neurological: Positive for numbness ( patient notes having some bilateral lower extremity numbness  She states that this is unchanged  Related to diabetes  )  Negative for dizziness, seizures, syncope, weakness, light-headedness and headaches  Hematological: Negative  Psychiatric/Behavioral: Negative        12-point, complete review of systems was reviewed and negative except as stated above  Historical Information     Medical history:  Atrial fibrillation, tachy-sarah beth syndrome, hypertension, peripheral vascular disease, CAD  Surgical history:  PCI -2011, cholecystectomy,  section x3  Social history:  No alcohol, no drug use, no nicotine use  There is no immunization history on file for this patient  Last Tetanus:  Unknown  Not indicated based on current physical examination  Family History: Non-contributory    1  Before the illness or injury that brought you to the Emergency, did you need someone to help you on a regular basis? 1=Yes   2  Since the illness or injury that brought you to the Emergency, have you needed more help than usual to take care of yourself? 1=Yes   3  Have you been hospitalized for one or more nights during the past 6 months (excluding a stay in the Emergency Department)? 0=No   4  In general, do you see well? 1=No   5  In general, do you have serious problems with your memory? 0=No   6  Do you take more than three different medications everyday? 1=Yes   TOTAL   4     Did you order a geriatric consult if the score was 2 or greater?: yes     Meds/Allergies   all current active meds have been reviewed  Allergies have not been reviewed;   Not on File    Objective   Initial Vitals:   Temperature: 97 9 °F (36 6 °C) (22 08)  Pulse: 61 (22 08)  Respirations: 16 (22)  Blood Pressure: 148/67 (22)    Primary Survey:   Airway:        Status: patent;        Pre-hospital Interventions: none        Hospital Interventions: none  Breathing:        Pre-hospital Interventions: none       Effort: normal       Right breath sounds: normal       Left breath sounds: normal  Circulation:        Rhythm: regular       Rate: regular   Right Pulses Left Pulses    R radial: 2+  R femoral: 2+  R pedal: 2+  R carotid: 2+  R popliteal: 2+ L radial: 2+  L femoral: 2+  L pedal: 2+  L carotid: 2+  L popliteal: 2+   Disability:        GCS: Eye: 4; Verbal: 5 Motor: 6 Total: 15       Right Pupil: 2 mm;  round;  not reactive         Left Pupil:  2 mm;  round;  not reactive      R Motor Strength L Motor Strength    R : 5/5  R dorsiflex: 5/5  R plantarflex: 5/5 L : 5/5  L dorsiflex: 5/5  L plantarflex: 5/5        Sensory:  No sensory deficit  Exposure:       Completed: Yes      Secondary Survey:  Physical Exam  Constitutional:       General: She is in acute distress  Appearance: She is not ill-appearing  HENT:      Head: Normocephalic and atraumatic  Comments: Face and head are nontender on palpation  Right Ear: External ear normal       Left Ear: External ear normal       Nose: Nose normal       Mouth/Throat:      Mouth: Mucous membranes are moist       Pharynx: Oropharynx is clear  Eyes:      Comments: Pupils are 2 mm  Nonreactive  Cardiovascular:      Rate and Rhythm: Normal rate and regular rhythm  Pulses: Normal pulses  Heart sounds: Normal heart sounds  No murmur heard  No friction rub  No gallop  Pulmonary:      Effort: Pulmonary effort is normal  No respiratory distress  Breath sounds: Normal breath sounds  No stridor  No wheezing, rhonchi or rales  Chest:      Chest wall: No tenderness  Abdominal:      General: Abdomen is flat  Bowel sounds are normal  There is no distension  Palpations: Abdomen is soft  Tenderness: There is no abdominal tenderness  Genitourinary:     Comments: Pelvis stable  Musculoskeletal:      Cervical back: Normal range of motion and neck supple  No rigidity or tenderness  Comments: Patient has tenderness on left hip  Minimal swelling  Left knee and ankle are nontender on palpation  All other extremities have full range of motion and are nontender  No C, T, L-spine tenderness  No step-offs  Skin:     General: Skin is warm and dry  Capillary Refill: Capillary refill takes less than 2 seconds  Coloration: Skin is not jaundiced  Findings: No bruising  Neurological:      General: No focal deficit present  Mental Status: She is alert and oriented to person, place, and time  Sensory: No sensory deficit  Motor: No weakness  Psychiatric:         Speech: Speech normal          Behavior: Behavior is cooperative  Cognition and Memory: Cognition and memory normal          Invasive Devices  Report    Peripheral Intravenous Line  Duration           Peripheral IV 06/24/22 Left Antecubital <1 day              Lab Results:   Results: I have personally reviewed all pertinent laboratory/tests results, BMP/CMP:   Lab Results   Component Value Date    SODIUM 143 06/24/2022    K 4 5 06/24/2022     06/24/2022    CO2 28 06/24/2022    CO2 30 06/24/2022    BUN 24 06/24/2022    CREATININE 1 83 (H) 06/24/2022    GLUCOSE 78 06/24/2022    CALCIUM 9 0 06/24/2022    AST 11 (L) 06/24/2022    ALT 8 06/24/2022    ALKPHOS 54 06/24/2022    EGFR 26 06/24/2022    and CBC:   Lab Results   Component Value Date    WBC 8 37 06/24/2022    HGB 10 9 (L) 06/24/2022    HGB 11 6 06/24/2022    HCT 34 5 (L) 06/24/2022    HCT 34 (L) 06/24/2022    MCV 87 06/24/2022     06/24/2022    MCH 27 4 06/24/2022    MCHC 31 6 06/24/2022    RDW 14 5 06/24/2022    MPV 11 0 06/24/2022    NRBC 0 06/24/2022       Imaging Results: I have personally reviewed pertinent reports      Chest Xray(s): negative for acute findings   FAST exam(s): negative for acute findings   CT Scan(s): negative for acute findings   Additional Xray(s): positive for acute findings: Left hip fracture     Other Studies: none    Code Status: Level 1 - Full Code  Advance Directive and Living Will:      Power of :    POLST:    I have spent 28 minutes with Patient and family today in which greater than 50% of this time was spent in counseling/coordination of care regarding Diagnostic results, Prognosis, Risks and benefits of tx options, Intructions for management, Patient and family education, Importance of tx compliance, Risk factor reductions and Impressions

## 2022-06-24 NOTE — CONSULTS
Orthopedics   Chryl Stake 68 y o  female MRN: [id number]  Unit/Bed#: AN XRAY      Chief Complaint:   left hip pain    HPI:   68 y o  female  status post mechanical fall from standing complaining of left hip pain and inability to bear weight  Pain is well localized to the hip and is made worse with motion or contact to the area  Denies any new or worsening numbness or tingling  She does have history of neuropathy in the bilateral feet  She has pain that does radiate down the entire left lower extremity  Denies any low back pain  Has not taking Xarelto for reportedly 1 week    Review Of Systems:   · Skin: Normal  · Neuro: See HPI  · Musculoskeletal: See HPI  · 14 point review of systems negative except as stated above     Past Medical History:   No past medical history on file  Past Surgical History:   No past surgical history on file  Family History:  Family history reviewed and non-contributory  No family history on file  Social History:  Social History     Socioeconomic History    Marital status:       Spouse name: Not on file    Number of children: Not on file    Years of education: Not on file    Highest education level: Not on file   Occupational History    Not on file   Tobacco Use    Smoking status: Not on file    Smokeless tobacco: Not on file   Substance and Sexual Activity    Alcohol use: Not on file    Drug use: Not on file    Sexual activity: Not on file   Other Topics Concern    Not on file   Social History Narrative    Not on file     Social Determinants of Health     Financial Resource Strain: Not on file   Food Insecurity: Not on file   Transportation Needs: Not on file   Physical Activity: Not on file   Stress: Not on file   Social Connections: Not on file   Intimate Partner Violence: Not on file   Housing Stability: Not on file       Allergies:   Not on File        Labs:  0   Lab Value Date/Time    HCT 34 5 (L) 06/24/2022 0856    HCT 34 (L) 06/24/2022 0803 HGB 10 9 (L) 06/24/2022 0856    HGB 11 6 06/24/2022 0856    INR 1 05 06/24/2022 0856    WBC 8 37 06/24/2022 0856       Meds:    Current Facility-Administered Medications:     acetaminophen (TYLENOL) tablet 975 mg, 975 mg, Oral, Q8H RADHA, TENNILLE Bustamante    carvedilol (COREG) tablet 25 mg, 25 mg, Oral, BID With Meals, TENNILLE Bustamante    cholecalciferol (VITAMIN D3) tablet 2,000 Units, 2,000 Units, Oral, Daily, TENNILLE Bustamante    folic acid (FOLVITE) tablet 400 mcg, 400 mcg, Oral, Daily, TENNILLE Bustamante    gabapentin (NEURONTIN) capsule 100 mg, 100 mg, Oral, HS, TENNILLE Bustamante    heparin (porcine) subcutaneous injection 5,000 Units, 5,000 Units, Subcutaneous, Q8H [facility], TENNILLE Bustamante    HYDROmorphone HCl (DILAUDID) injection 0 2 mg, 0 2 mg, Intravenous, Q4H PRN, TENNILLE Bustamante    insulin lispro (HumaLOG) 100 units/mL subcutaneous injection 1-6 Units, 1-6 Units, Subcutaneous, Q6H [facility] **AND** Fingerstick Glucose (POCT), , , Q6H, TENNILLE Bustamante    lidocaine (LIDODERM) 5 % patch 1 patch, 1 patch, Topical, Daily, TENNILLE Bustamante, 1 patch at 06/24/22 1016    meclizine (ANTIVERT) tablet 12 5 mg, 12 5 mg, Oral, Q8H PRN, TENNILLE Bustamante    multi-electrolyte (PLASMALYTE-A/ISOLYTE-S PH 7 4) IV solution, 75 mL/hr, Intravenous, Continuous, TENNILLE Bustamante, Last Rate: 75 mL/hr at 06/24/22 1016, 75 mL/hr at 06/24/22 1016    [START ON 6/25/2022] NIFEdipine (PROCARDIA XL) 24 hr tablet 60 mg, 60 mg, Oral, Daily, TENNILLE Bustamante    ondansetron [facility]) injection 4 mg, 4 mg, Intravenous, Q4H PRN, TENNILLE Bustamante    oxyCODONE (ROXICODONE) IR tablet 2 5 mg, 2 5 mg, Oral, Q4H PRN, TENNILLE Bustamante    oxyCODONE (ROXICODONE) IR tablet 5 mg, 5 mg, Oral, Q4H PRN, TENNILLE Bustamante    ranolazine (RANEXA) 12 hr tablet 500 mg, 500 mg, Oral, Daily, TENNILLE Bustamante    senna-docusate sodium (SENOKOT S) 8 6-50 mg per tablet 1 tablet, 1 tablet, Oral, BID, Harpreet Otoole, JORGE LUISNP    Current Outpatient Medications:     aspirin 81 mg chewable tablet, Chew 81 mg daily, Disp: , Rfl:     carvedilol (COREG) 25 mg tablet, Take 25 mg by mouth 2 (two) times a day with meals, Disp: , Rfl:     cholecalciferol (VITAMIN D3) 1,000 units tablet, Take 2,000 Units by mouth daily, Disp: , Rfl:     folic acid (FOLVITE) 733 mcg tablet, Take 400 mcg by mouth daily, Disp: , Rfl:     glipiZIDE (GLUCOTROL) 5 mg tablet, Take 5 mg by mouth in the morning, Disp: , Rfl:     insulin glargine (LANTUS) 100 units/mL subcutaneous injection, Inject 20 Units under the skin daily, Disp: , Rfl:     lisinopril (ZESTRIL) 2 5 mg tablet, Take 2 5 mg by mouth daily, Disp: , Rfl:     meclizine (ANTIVERT) 12 5 MG tablet, Take by mouth every 8 (eight) hours as needed for dizziness, Disp: , Rfl:     NIFEdipine ER (ADALAT CC) 60 MG 24 hr tablet, Take 60 mg by mouth daily, Disp: , Rfl:     pravastatin (PRAVACHOL) 40 mg tablet, Take 40 mg by mouth daily, Disp: , Rfl:     ranolazine (RANEXA) 500 mg 12 hr tablet, Take 500 mg by mouth in the morning, Disp: , Rfl:     rivaroxaban (Xarelto) 15 mg tablet, Take 15 mg by mouth daily with breakfast, Disp: , Rfl:     Blood Culture:   No results found for: BLOODCX    Wound Culture:   No results found for: WOUNDCULT    Ins and Outs:  No intake/output data recorded  Physical Exam:   /53   Pulse 58   Temp 97 9 °F (36 6 °C) (Tympanic)   Resp 16   Wt 87 3 kg (192 lb 7 4 oz)   SpO2 98%   Gen: Alert and oriented to person, place, time  HEENT: EOMI, eyes clear, moist mucus membranes, hearing intact  Respiratory: Bilateral chest rise   No audible wheezing found  Cardiovascular: Regular Rate and Rhythm  Abdomen: soft nontender/nondistended  Musculoskeletal: left lower extremity  · Skin intact, limb shortened and externally rotated  · Tender to palpation over anterior and lateral hip  · Pain with internal/external rotation of the hip  · Sensation grossly intact L3-S1  · Intact ankle dorsi/plantar flexion, EHL/FHL  · 2+ DP pulse    Radiology:   I personally reviewed the films  X-rays left hip shows Intertrochanteric femur fracture    _*_*_*_*_*_*_*_*_*_*_*_*_*_*_*_*_*_*_*_*_*_*_*_*_*_*_*_*_*_*_*_*_*_*_*_*_*_*_*_*_*    Assessment:  68 y  o female status post mechanical fall with leftIntertrochanteric femur fracture    Plan:   · Non weight bearing left lower extremity  · Analgesics for pain  · NPO after midnight  · Informed consent obtained  · X-rays of the left hip and femur were also obtained for preoperative planning  · Primary team for all medical management and preoperative risk stratification  · To OR for IM nail femur fracture of Intertrochanteric femur fracture  · There is no height or weight on file to calculate BMI     · Dispo: Ortho will follow    Eloisa Matthews PA-C

## 2022-06-24 NOTE — CASE MANAGEMENT
CM responded to trauma alert  Patient transported to trauma bay by [facility]  Patient presented as responsive to medical team questions and instructions  Cm confirmed marvin her son Vj Ortiz should be present at hospital  Cm found Vj Ortiz in ED family waiting room  Per Vj Ortiz patient lives with him in a 3 story home  Patient has 1 step to enter the home and then 8-10 step to her bedroom  Patient uses RW to ambulate outside of wheelchair  Son denies patient having any inpatient SNF,  or substance abuse treatment  Cm informed Medical team about location of patient's son  No current identified CM needs  CM will follow and update screening, assessment, and discharge planning as appropriate

## 2022-06-24 NOTE — PROCEDURES
POC FAST US    Date/Time: 6/24/2022 9:49 AM  Performed by: TENNILLE Harris  Authorized by: Daksha Harris      Patient location:  Trauma  Procedure details:     Exam Type:  Diagnostic    Indications: blunt abdominal trauma and blunt chest trauma      Assess for:  Intra-abdominal fluid and pericardial effusion    Technique: FAST      Views obtained:  Heart - Pericardial sac, LUQ - Splenorenal space, Suprapubic - Pouch of Filipe and RUQ - Matta's Pouch    Image quality: diagnostic      Image availability:  Images available in PACS and video obtained  FAST Findings:     RUQ (Hepatorenal) free fluid: absent      LUQ (Splenorenal) free fluid: absent      Suprapubic free fluid: absent      Cardiac wall motion: identified      Pericardial effusion: absent    Interpretation:     Impressions: negative

## 2022-06-24 NOTE — ASSESSMENT & PLAN NOTE
· Continue home carvedilol and nifedipine  · Will hold home lisinopril due to likely orthopedic intervention and concern for SAHIL    · Continue to monitor BP and kidney functioning

## 2022-06-24 NOTE — ASSESSMENT & PLAN NOTE
No results found for: HGBA1C    No results for input(s): POCGLU in the last 72 hours      Blood Sugar Average: Last 72 hrs:    · 2/22 hemoglobin A1c was 7 1  · Hold home oral anti diabetics  · Continue on sliding scale insulin  · Will initiate Lantus postoperatively

## 2022-06-25 ENCOUNTER — ANESTHESIA (INPATIENT)
Dept: PERIOP | Facility: HOSPITAL | Age: 77
DRG: 480 | End: 2022-06-25
Payer: COMMERCIAL

## 2022-06-25 ENCOUNTER — ANESTHESIA EVENT (INPATIENT)
Dept: PERIOP | Facility: HOSPITAL | Age: 77
DRG: 480 | End: 2022-06-25
Payer: COMMERCIAL

## 2022-06-25 ENCOUNTER — APPOINTMENT (INPATIENT)
Dept: RADIOLOGY | Facility: HOSPITAL | Age: 77
DRG: 480 | End: 2022-06-25
Payer: COMMERCIAL

## 2022-06-25 PROBLEM — Z95.5 H/O HEART ARTERY STENT: Status: ACTIVE | Noted: 2022-06-25

## 2022-06-25 PROBLEM — Z95.0 PACEMAKER: Status: ACTIVE | Noted: 2022-06-25

## 2022-06-25 PROBLEM — I21.9 MI (MYOCARDIAL INFARCTION) (HCC): Status: ACTIVE | Noted: 2022-06-25

## 2022-06-25 LAB
ANION GAP SERPL CALCULATED.3IONS-SCNC: 5 MMOL/L (ref 4–13)
BUN SERPL-MCNC: 27 MG/DL (ref 5–25)
CALCIUM SERPL-MCNC: 7.9 MG/DL (ref 8.4–10.2)
CHLORIDE SERPL-SCNC: 106 MMOL/L (ref 96–108)
CO2 SERPL-SCNC: 28 MMOL/L (ref 21–32)
CREAT SERPL-MCNC: 1.64 MG/DL (ref 0.6–1.3)
ERYTHROCYTE [DISTWIDTH] IN BLOOD BY AUTOMATED COUNT: 14.6 % (ref 11.6–15.1)
GFR SERPL CREATININE-BSD FRML MDRD: 30 ML/MIN/1.73SQ M
GLUCOSE SERPL-MCNC: 135 MG/DL (ref 65–140)
GLUCOSE SERPL-MCNC: 136 MG/DL (ref 65–140)
GLUCOSE SERPL-MCNC: 160 MG/DL (ref 65–140)
GLUCOSE SERPL-MCNC: 210 MG/DL (ref 65–140)
GLUCOSE SERPL-MCNC: 217 MG/DL (ref 65–140)
HCT VFR BLD AUTO: 29.4 % (ref 34.8–46.1)
HGB BLD-MCNC: 8.8 G/DL (ref 11.5–15.4)
MCH RBC QN AUTO: 26.4 PG (ref 26.8–34.3)
MCHC RBC AUTO-ENTMCNC: 29.9 G/DL (ref 31.4–37.4)
MCV RBC AUTO: 88 FL (ref 82–98)
PLATELET # BLD AUTO: 190 THOUSANDS/UL (ref 149–390)
PMV BLD AUTO: 11.4 FL (ref 8.9–12.7)
POTASSIUM SERPL-SCNC: 4.2 MMOL/L (ref 3.5–5.3)
RBC # BLD AUTO: 3.33 MILLION/UL (ref 3.81–5.12)
SODIUM SERPL-SCNC: 139 MMOL/L (ref 135–147)
WBC # BLD AUTO: 7.32 THOUSAND/UL (ref 4.31–10.16)

## 2022-06-25 PROCEDURE — C1713 ANCHOR/SCREW BN/BN,TIS/BN: HCPCS | Performed by: ORTHOPAEDIC SURGERY

## 2022-06-25 PROCEDURE — NC001 PR NO CHARGE: Performed by: NURSE PRACTITIONER

## 2022-06-25 PROCEDURE — 73551 X-RAY EXAM OF FEMUR 1: CPT

## 2022-06-25 PROCEDURE — 73502 X-RAY EXAM HIP UNI 2-3 VIEWS: CPT

## 2022-06-25 PROCEDURE — 80048 BASIC METABOLIC PNL TOTAL CA: CPT | Performed by: NURSE PRACTITIONER

## 2022-06-25 PROCEDURE — C1769 GUIDE WIRE: HCPCS | Performed by: ORTHOPAEDIC SURGERY

## 2022-06-25 PROCEDURE — 99232 SBSQ HOSP IP/OBS MODERATE 35: CPT | Performed by: SURGERY

## 2022-06-25 PROCEDURE — 27245 TREAT THIGH FRACTURE: CPT | Performed by: PHYSICIAN ASSISTANT

## 2022-06-25 PROCEDURE — 85027 COMPLETE CBC AUTOMATED: CPT | Performed by: NURSE PRACTITIONER

## 2022-06-25 PROCEDURE — 0QS706Z REPOSITION LEFT UPPER FEMUR WITH INTRAMEDULLARY INTERNAL FIXATION DEVICE, OPEN APPROACH: ICD-10-PCS | Performed by: ORTHOPAEDIC SURGERY

## 2022-06-25 PROCEDURE — 82948 REAGENT STRIP/BLOOD GLUCOSE: CPT

## 2022-06-25 PROCEDURE — 27245 TREAT THIGH FRACTURE: CPT | Performed by: ORTHOPAEDIC SURGERY

## 2022-06-25 DEVICE — 5.0MM TI LOCKING SCREW W/T25 STARDRIVE 42MM F/IM NAIL-STER: Type: IMPLANTABLE DEVICE | Site: LEG | Status: FUNCTIONAL

## 2022-06-25 DEVICE — 10MM/130 DEG TI CANN TFNA 360MM/LEFT - STERILE
Type: IMPLANTABLE DEVICE | Site: LEG | Status: FUNCTIONAL
Brand: TFN-ADVANCE

## 2022-06-25 DEVICE — TFNA FENESTRATED HELICAL BLADE 95MM - STERILE
Type: IMPLANTABLE DEVICE | Site: LEG | Status: FUNCTIONAL
Brand: TFN-ADVANCE

## 2022-06-25 RX ORDER — ONDANSETRON 2 MG/ML
INJECTION INTRAMUSCULAR; INTRAVENOUS AS NEEDED
Status: DISCONTINUED | OUTPATIENT
Start: 2022-06-25 | End: 2022-06-25

## 2022-06-25 RX ORDER — CALCIUM CARBONATE 200(500)MG
1000 TABLET,CHEWABLE ORAL DAILY PRN
Status: DISCONTINUED | OUTPATIENT
Start: 2022-06-25 | End: 2022-06-30 | Stop reason: HOSPADM

## 2022-06-25 RX ORDER — LIDOCAINE HYDROCHLORIDE 10 MG/ML
INJECTION, SOLUTION EPIDURAL; INFILTRATION; INTRACAUDAL; PERINEURAL AS NEEDED
Status: DISCONTINUED | OUTPATIENT
Start: 2022-06-25 | End: 2022-06-25

## 2022-06-25 RX ORDER — CEFAZOLIN SODIUM 2 G/50ML
SOLUTION INTRAVENOUS AS NEEDED
Status: DISCONTINUED | OUTPATIENT
Start: 2022-06-25 | End: 2022-06-25

## 2022-06-25 RX ORDER — HYDROMORPHONE HCL IN WATER/PF 6 MG/30 ML
0.2 PATIENT CONTROLLED ANALGESIA SYRINGE INTRAVENOUS
Status: DISCONTINUED | OUTPATIENT
Start: 2022-06-25 | End: 2022-06-25 | Stop reason: HOSPADM

## 2022-06-25 RX ORDER — DEXAMETHASONE SODIUM PHOSPHATE 10 MG/ML
INJECTION, SOLUTION INTRAMUSCULAR; INTRAVENOUS AS NEEDED
Status: DISCONTINUED | OUTPATIENT
Start: 2022-06-25 | End: 2022-06-25

## 2022-06-25 RX ORDER — CEFAZOLIN SODIUM 1 G/50ML
1000 SOLUTION INTRAVENOUS EVERY 8 HOURS
Status: COMPLETED | OUTPATIENT
Start: 2022-06-25 | End: 2022-06-26

## 2022-06-25 RX ORDER — DOCUSATE SODIUM 100 MG/1
100 CAPSULE, LIQUID FILLED ORAL 2 TIMES DAILY
Status: DISCONTINUED | OUTPATIENT
Start: 2022-06-25 | End: 2022-06-30 | Stop reason: HOSPADM

## 2022-06-25 RX ORDER — MAGNESIUM HYDROXIDE 1200 MG/15ML
LIQUID ORAL AS NEEDED
Status: DISCONTINUED | OUTPATIENT
Start: 2022-06-25 | End: 2022-06-25 | Stop reason: HOSPADM

## 2022-06-25 RX ORDER — SODIUM CHLORIDE, SODIUM LACTATE, POTASSIUM CHLORIDE, CALCIUM CHLORIDE 600; 310; 30; 20 MG/100ML; MG/100ML; MG/100ML; MG/100ML
75 INJECTION, SOLUTION INTRAVENOUS CONTINUOUS
Status: DISCONTINUED | OUTPATIENT
Start: 2022-06-25 | End: 2022-06-25

## 2022-06-25 RX ORDER — SODIUM CHLORIDE, SODIUM LACTATE, POTASSIUM CHLORIDE, CALCIUM CHLORIDE 600; 310; 30; 20 MG/100ML; MG/100ML; MG/100ML; MG/100ML
INJECTION, SOLUTION INTRAVENOUS CONTINUOUS PRN
Status: DISCONTINUED | OUTPATIENT
Start: 2022-06-25 | End: 2022-06-25

## 2022-06-25 RX ORDER — PROPOFOL 10 MG/ML
INJECTION, EMULSION INTRAVENOUS AS NEEDED
Status: DISCONTINUED | OUTPATIENT
Start: 2022-06-25 | End: 2022-06-25

## 2022-06-25 RX ORDER — FENTANYL CITRATE 50 UG/ML
INJECTION, SOLUTION INTRAMUSCULAR; INTRAVENOUS AS NEEDED
Status: DISCONTINUED | OUTPATIENT
Start: 2022-06-25 | End: 2022-06-25

## 2022-06-25 RX ORDER — ALBUMIN, HUMAN INJ 5% 5 %
SOLUTION INTRAVENOUS CONTINUOUS PRN
Status: DISCONTINUED | OUTPATIENT
Start: 2022-06-25 | End: 2022-06-25

## 2022-06-25 RX ADMIN — PROPOFOL 50 MG: 10 INJECTION, EMULSION INTRAVENOUS at 10:55

## 2022-06-25 RX ADMIN — CARVEDILOL 25 MG: 12.5 TABLET, FILM COATED ORAL at 18:01

## 2022-06-25 RX ADMIN — ACETAMINOPHEN 975 MG: 325 TABLET, FILM COATED ORAL at 15:01

## 2022-06-25 RX ADMIN — GABAPENTIN 100 MG: 100 CAPSULE ORAL at 21:10

## 2022-06-25 RX ADMIN — OXYCODONE HYDROCHLORIDE 2.5 MG: 5 TABLET ORAL at 09:47

## 2022-06-25 RX ADMIN — CEFAZOLIN SODIUM 1000 MG: 1 SOLUTION INTRAVENOUS at 18:07

## 2022-06-25 RX ADMIN — LIDOCAINE HYDROCHLORIDE 50 MG: 10 INJECTION, SOLUTION EPIDURAL; INFILTRATION; INTRACAUDAL; PERINEURAL at 10:31

## 2022-06-25 RX ADMIN — LIDOCAINE 5% 1 PATCH: 700 PATCH TOPICAL at 09:45

## 2022-06-25 RX ADMIN — INSULIN LISPRO 2 UNITS: 100 INJECTION, SOLUTION INTRAVENOUS; SUBCUTANEOUS at 18:23

## 2022-06-25 RX ADMIN — INSULIN LISPRO 1 UNITS: 100 INJECTION, SOLUTION INTRAVENOUS; SUBCUTANEOUS at 00:10

## 2022-06-25 RX ADMIN — DOCUSATE SODIUM 100 MG: 100 CAPSULE, LIQUID FILLED ORAL at 17:53

## 2022-06-25 RX ADMIN — ACETAMINOPHEN 975 MG: 325 TABLET, FILM COATED ORAL at 21:10

## 2022-06-25 RX ADMIN — FENTANYL CITRATE 50 MCG: 50 INJECTION INTRAMUSCULAR; INTRAVENOUS at 10:55

## 2022-06-25 RX ADMIN — SENNOSIDES AND DOCUSATE SODIUM 1 TABLET: 50; 8.6 TABLET ORAL at 09:46

## 2022-06-25 RX ADMIN — SENNOSIDES AND DOCUSATE SODIUM 1 TABLET: 50; 8.6 TABLET ORAL at 17:53

## 2022-06-25 RX ADMIN — NIFEDIPINE 60 MG: 60 TABLET, FILM COATED, EXTENDED RELEASE ORAL at 09:49

## 2022-06-25 RX ADMIN — ONDANSETRON 4 MG: 2 INJECTION INTRAMUSCULAR; INTRAVENOUS at 10:36

## 2022-06-25 RX ADMIN — FOLIC ACID TAB 400 MCG 400 MCG: 400 TAB at 09:49

## 2022-06-25 RX ADMIN — PROPOFOL 120 MG: 10 INJECTION, EMULSION INTRAVENOUS at 10:31

## 2022-06-25 RX ADMIN — SODIUM CHLORIDE, SODIUM LACTATE, POTASSIUM CHLORIDE, AND CALCIUM CHLORIDE 75 ML/HR: .6; .31; .03; .02 INJECTION, SOLUTION INTRAVENOUS at 14:52

## 2022-06-25 RX ADMIN — DEXAMETHASONE SODIUM PHOSPHATE 10 MG: 10 INJECTION, SOLUTION INTRAMUSCULAR; INTRAVENOUS at 10:36

## 2022-06-25 RX ADMIN — RANOLAZINE 500 MG: 500 TABLET, EXTENDED RELEASE ORAL at 09:46

## 2022-06-25 RX ADMIN — ALBUMIN HUMAN: 0.05 INJECTION, SOLUTION INTRAVENOUS at 12:42

## 2022-06-25 RX ADMIN — SODIUM CHLORIDE, SODIUM GLUCONATE, SODIUM ACETATE, POTASSIUM CHLORIDE, MAGNESIUM CHLORIDE, SODIUM PHOSPHATE, DIBASIC, AND POTASSIUM PHOSPHATE 75 ML/HR: .53; .5; .37; .037; .03; .012; .00082 INJECTION, SOLUTION INTRAVENOUS at 07:53

## 2022-06-25 RX ADMIN — Medication 2000 UNITS: at 09:46

## 2022-06-25 RX ADMIN — PHENYLEPHRINE HYDROCHLORIDE 30 MCG/MIN: 10 INJECTION INTRAVENOUS at 10:58

## 2022-06-25 RX ADMIN — ACETAMINOPHEN 975 MG: 325 TABLET, FILM COATED ORAL at 05:33

## 2022-06-25 RX ADMIN — HEPARIN SODIUM 5000 UNITS: 5000 INJECTION INTRAVENOUS; SUBCUTANEOUS at 15:02

## 2022-06-25 RX ADMIN — CEFAZOLIN SODIUM 2000 MG: 2 SOLUTION INTRAVENOUS at 10:35

## 2022-06-25 RX ADMIN — SODIUM CHLORIDE, SODIUM LACTATE, POTASSIUM CHLORIDE, AND CALCIUM CHLORIDE: .6; .31; .03; .02 INJECTION, SOLUTION INTRAVENOUS at 09:20

## 2022-06-25 RX ADMIN — HEPARIN SODIUM 5000 UNITS: 5000 INJECTION INTRAVENOUS; SUBCUTANEOUS at 21:10

## 2022-06-25 RX ADMIN — CARVEDILOL 25 MG: 12.5 TABLET, FILM COATED ORAL at 09:45

## 2022-06-25 NOTE — PROGRESS NOTES
Assessment/Plan   Assessment & Plan    Subjective   Subjective   " I am very cold"  Temp:  [97 8 °F (36 6 °C)-99 7 °F (37 6 °C)] 98 °F (36 7 °C)  HR:  [42-73] 60  Resp:  [16-18] 16  BP: (102-195)/(54-77) 106/54  I/O last 3 completed shifts:   In: 240 [P O :240]  Out: 500 [Urine:500]  I/O this shift:  In: 1150 [I V :900; IV Piggyback:250]  Out: 300 [Blood:300]    Objective   Objective  Active Problems:    Fall    Closed left hip fracture ([facility] [address] )    Blind    Diabetes mellitus type 2, insulin dependent ([facility])    CKD (chronic kidney disease) stage 3, GFR 30-59 ml/min ([facility])    Hypertension    Tachy-sarah beth syndrome ([facility])    CAD (coronary artery disease)    Atrial fibrillation ([facility])    MI (myocardial infarction) ([facility] [address] )    Pacemaker    H/O heart artery stent    PE:  Awake and alert  GCS - 15  RRR< no complaint of chest pain  CTA bilaterally, no shortness of breath  Abdomen soft  Moving extremities  Pulses palpable  Neuro intact  Dressing dry and intact

## 2022-06-25 NOTE — ANESTHESIA PREPROCEDURE EVALUATION
Procedure:  INSERTION NAIL IM FEMUR ANTEGRADE (TROCHANTERIC) (Left Leg Upper)    Relevant Problems   ANESTHESIA (within normal limits)      CARDIO   (+) Atrial fibrillation ([facility])   (+) CAD (coronary artery disease)   (+) Hypertension   (+) MI (myocardial infarction) ([facility])   (+) Pacemaker   (+) Tachy-sarah beth syndrome (HCC)      ENDO   (+) Diabetes mellitus type 2, insulin dependent (HCC)      /RENAL   (+) CKD (chronic kidney disease) stage 3, GFR 30-59 ml/min ([facility])      NEURO/PSYCH   (+) Blind      PULMONARY (within normal limits)      Musculoskeletal and Integument   (+) Closed left hip fracture ([facility])      Other   (+) H/O heart artery stent        Physical Exam    Airway    Mallampati score: II  TM Distance: >3 FB  Neck ROM: full     Dental   lower dentures and upper dentures,     Cardiovascular      Pulmonary      Other Findings        Anesthesia Plan  ASA Score- 3     Anesthesia Type- general with ASA Monitors  Additional Monitors:   Airway Plan: LMA  Comment: ETT if requested by surgeon  Son signed consent but pt is alert and competent and she consented verbally  She is blind          Plan Factors-Exercise tolerance (METS): >4 METS  Chart reviewed  EKG reviewed  Existing labs reviewed  Patient summary reviewed  Patient is not a current smoker  Induction- intravenous  Postoperative Plan- Plan for postoperative opioid use  Informed Consent- Anesthetic plan and risks discussed with patient and son  I personally reviewed this patient with the CRNA  Discussed and agreed on the Anesthesia Plan with the CRNA  Baylee Martinez

## 2022-06-25 NOTE — ASSESSMENT & PLAN NOTE
Lab Results   Component Value Date    EGFR 30 06/25/2022    EGFR 26 06/24/2022    CREATININE 1 64 (H) 06/25/2022    CREATININE 1 83 (H) 06/24/2022     · Associated with diabetes and hypertension, baseline appears to be between 1 6-2 0  · 1 83 on admission  · IVS started while NPO  · Hold home lisinopril  · Continue to monitor

## 2022-06-25 NOTE — OP NOTE
OPERATIVE REPORT  PATIENT NAME: Sergio Santo    :  1945  MRN: [id number]  Pt Location: AN OR ROOM 01    SURGERY DATE: 2022    Surgeon(s) and Role:     * Mauricio Marie DO - Primary     * Zahra Mccartney PA-C - Assisting    Preop Diagnosis:  Closed displaced intertrochanteric fracture of left femur, initial encounter ([place] [address] ) Jonas Frankel    Post-Op Diagnosis Codes:     * Closed displaced intertrochanteric fracture of left femur, initial encounter ([place] [address] ) [S72 142A]    Procedure(s) (LRB):  INSERTION NAIL IM FEMUR ANTEGRADE (TROCHANTERIC) (Left)    Specimen(s):  * No specimens in log *    Estimated Blood Loss:   300 mL    Drains:  * No LDAs found *    Anesthesia Type:   Choice    Operative Indications:  Closed displaced intertrochanteric fracture of left femur, initial encounter ([place] [address] ) [K66 538O]    Complications:   None    Procedure and Technique:    Estimated Blood Loss:  100 cc              Drains:  none           Implants:  Synthes 10 mm ° with 95 mm helical blade and   48 and 42 mm distal interlocking screws     Procedure Details      The patient was seen in the Holding Room  The risks, benefits, complications, treatment options, and expected outcomes were discussed with the patient  The risks and potential complications of their problem and purposed treatment including but not limited to failure of fracture healing or hardware, infection, neurovascular injury, anesthetic complications  The site of surgery properly noted/marked  The patient was taken to Operating Room and identified as Alexis Broussard and the procedure verified as      Left hip cephalomedullary nail with Synthes TFN  A Time Out was held and the above information confirmed      After induction of anesthesia, administration of IV  antibiotics, surgical pause was conducted   The patient was placed in a supine position on the fracture table with all bony prominences well padded including the peroneal post   Care was taken to pad the well leg in a well leg guido  After the patient was set up appropriate AP and lateral x-rays were obtained with traction and slight internal rotation of the extremity to confirm appropriate anatomic reduction of the intertrochanteric hip fracture   This was confirmed to be appropriately aligned in anatomic in its closed reduction on the fracture table prior to the procedure being performed      Left leg and thigh were prepped and draped in the usual sterile fashion      After the patient was set up appropriate AP and lateral x-rays were obtained with traction and slight internal rotation of the extremity to confirm appropriate anatomic reduction of the intertrochanteric hip fracture   This was confirmed to be appropriately aligned in anatomic in its closed reduction on the fracture table prior to the procedure being performed      A #10 blade scalpel used to dissect a 2 in incision proximal and in line with the femur to the greater trochanteric tip   The skin and subcutaneous fat were dissected sharply with meticulous hemostasis obtained with electrocautery   The IT band was split in line with its fibers sharply in line with the skin incision   The abductors were split bluntly with finger dissection to expose the tip of the greater trochanter   The guide pin was placed under live x-ray at the proximal and just medial tip of the greater trochanter and centered on both AP and lateral x-rays   This guide pin was then advanced down the center of the femoral canal and confirmed on AP and lateral x-rays to be in the appropriate placement   The since these femoral opening Reamer was then used under x-ray guidance to open the proximal femoral canal       There was a fracture of the lateral cortex distally, so a long nail was going to be used  Reamers were used from 9 mm until 11 5 mm centrally through the canal of the femur      A 10 mm Synthes long TFN was then placed on the guide down the proximal femur   This was confirmed on AP and lateral x-rays      The trocar system was inserted and a skin incision was made 1/2 inches in line with the appropriate placement of the trocar for the helical blade   The skin incision was dissected down through the skin and subcutaneous tissue with sharp dissection using a 10 blade and meticulous hemostasis obtained during the dissection with electrocautery   The lateral IT band and vastus lateralis was split sharply and then bluntly along the lateral femur and the triple trocar guide was then advanced failed to the lateral femoral cortex      X-rays confirmed appropriate placement of the guide on the lateral femur and proximal distal in the femoral neck   The guide pin was then placed up the center of the femoral head on the AP and lateral planes and confirmed under x-ray to be in the appropriate placement   The lateral opening Reamer was then used to open the lateral femoral cortex and a final measurement of the screw length was then obtained and it measured 95  mm   A Synthes drill was set to 95 mm and the femoral head was reamed with a drill and confirmed under multiple planes of x-ray to show appropriate length for the helical blade   A helical blade was then advanced over a guidewire into the femoral head and confirmed in AP and lateral x-rays to be appropriately placed at the center center position in the femoral head   The proximal screwdriver was then used to lock down the screw on the helical blade proximally and confirmed under x-ray to show advancement of the interference screw        Attention was then turned to the distal interlocking screw   Distal perfect circles were used   A drill was used to drill both the lateral and medial femoral cortex   A [place] depth gauge confirmed a 42 and 48 mm screw which was placed under x-ray fluoroscopic guidance appropriately   At this time the proximal screwdriver was used to disengage the guide from the TFN intramedullary nail   Final AP and lateral x-rays showed anatomic alignment of the fracture as well as appropriate placement of the Synthes short cephalomedullary nail with interlocking screw distally            Once this was done,   I irrigated and then closed the deep fascial layers in both incisions with interrupted 0 Vicryl stitches   I then through 0 Vicryl subcutaneous buried fat stitches to close down the dead space of the fat layer   2-0 Vicryl was then used to close the subcutaneous tissue with interrupted buried subcutaneous stitches   The skin was then closed with staples      A Mepilex soft dressing, was applied  The patient was taken out of the fracture table in  returned to the recovery room without incident       Instrument, sponge, and needle counts were correct prior to closure and at the conclusion of the case            I was present for the entire procedure, A qualified resident physician was not available and A physician assistant was required during the procedure for retraction tissue handling,dissection and suturing    Patient Disposition:  PACU       SIGNATURE: Manjinder Baldwin DO  DATE: June 25, 2022  TIME: 1:50 PM

## 2022-06-25 NOTE — ANESTHESIA POSTPROCEDURE EVALUATION
Post-Op Assessment Note    CV Status:  Stable    Pain management: adequate     Mental Status:  Awake and sleepy   Hydration Status:  Euvolemic   PONV Controlled:  Controlled   Airway Patency:  Patent      Post Op Vitals Reviewed: Yes      Staff: CRNA         No complications documented      /59 (06/25/22 1335)    Temp 98 9 °F (37 2 °C) (06/25/22 1335)    Pulse 73 (06/25/22 1335)   Resp 16 (06/25/22 1335)    SpO2 100 % (06/25/22 1335)

## 2022-06-25 NOTE — ASSESSMENT & PLAN NOTE
No results found for: HGBA1C    Recent Labs     06/24/22  1725 06/25/22  0000 06/25/22  0538   POCGLU 160* 160* 136       Blood Sugar Average: Last 72 hrs:  (P) 152  · 2/22 hemoglobin A1c was 7 1  · Hold home oral anti diabetics  · Continue on sliding scale insulin  · Will initiate Lantus postoperatively

## 2022-06-25 NOTE — CASE MANAGEMENT
Case Management Assessment & Discharge Planning Note    Patient name Lisa Godfrey  Location W /W -63 MRN [id number]  : 1945 Date 2022       Current Admission Date: 2022  Current Admission Diagnosis:Fall   Patient Active Problem List    Diagnosis Date Noted    MI [facility]myocardial infarction) [facility] [facility] ) 2022    Pacemaker 2022    H/O heart artery stent 2022    Fall 2022    Closed left hip fracture ([facility] [address] [facility] ) 2022    Blind 2022    Diabetes mellitus type 2, insulin dependent ([facility] [address] ) 2022    CKD (chronic kidney disease) stage 3, GFR 30-59 ml/min ([facility] [address] ) 2022    Hypertension 2022    Tachy-sarah beth syndrome ([facility] [address] [facility] ) 2022    CAD (coronary artery disease) 2022    [facility] 20[facility]      LOS (days): 1  Geometric Mean LOS (GMLOS) (days): 2 90  Days to GMLOS:1 6     OBJECTIVE:    Risk of Unplanned Readmission Score: 16 01      Current admission status: Inpatient  Referral Reason: Placement within 24-48 hours    Preferred Pharmacy: No Pharmacies Listed  Primary Care Provider: Leticia Brenner MD    Primary Insurance: [facility]  Secondary Insurance:     ASSESSMENT:  [id number] [place] [address], [address] [place] [address] Representative - Son   Primary Phone: [phone] (Mobile)               Advance Directives  Does patient have a [address] [place] [address] Avenue?: Yes  Does patient have Advance Directives?: Yes  Advance Directives: Power of  for health care  Primary Contact: Patient's son Walt Luevano    Readmission Root Cause  30 Day Readmission: No    Patient Information  Admitted from[de-identified] Home  Mental Status: Alert  During Assessment patient was accompanied by: Not accompanied during assessment  Assessment information provided by[de-identified] Son  Primary Caregiver: Family  Caregiver's Name[de-identified] Filiberto-son  Caregiver's Relationship to Patient[de-identified] Family Member  Caregiver's Telephone Number[de-identified] [phone]  Support Systems: Son, Parent, Family members  [place] of Residence: [address] do you live in?: [facility] entry access options  Select all that apply : Stairs  Number of steps to enter home  : 1  Do the steps have railings?: Yes  Type of Current Residence: 3 story home  Upon entering residence, is there a bedroom on the main floor (no further steps)?: No  A bedroom is located on the following floor levels of residence (select all that apply):: Basement  Upon entering residence, is there a bathroom on the main floor (no further steps)?: Yes  Number of steps to basement from main floor: 8  In the last 12 months, was there a time when you were not able to pay the mortgage or rent on time?: No  In the last 12 months, was there a time when you did not have a steady place to sleep or slept in a shelter (including now)?: No  Homeless/housing insecurity resource given?: No  Living Arrangements: Lives w/ Son, Lives w/ Extended Family  Is patient a ?: No    Activities of Daily Living Prior to Admission  Functional Status: Assistance  Completes ADLs independently?: No  Level of ADL dependence: Assistance  Ambulates independently?: Yes  Does patient use assisted devices?: Yes  Assisted Devices (DME) used:  Wheelchair, Clement Cranker  Does patient currently own DME?: Yes  What DME does the patient currently own?: Racquel Quesadaker Wheelchair  Does patient have a history of Outpatient Therapy (PT/OT)?: No  Does the patient have a history of Short-Term Rehab?: No  Does patient have a history of [facility]?: Yes  Does patient currently have [employer]?: No    Patient Information Continued  Income Source: SSI/SSD  Does patient have prescription coverage?: Yes  Food insecurity resource given?: No  Does patient receive dialysis treatments?: No  Does patient have a history of substance abuse?: No  Does patient have a history of Mental Health Diagnosis?: No    PHQ 2/9 Screening   Reviewed PHQ 2/9 Depression Screening Score?: No    Means of Transportation  Means of Transport to Appts[de-identified] Family transport  In the past 12 months, has lack of transportation kept you from medical appointments or from getting medications?: No  In the past 12 months, has lack of transportation kept you from meetings, work, or from getting things needed for daily living?: No  Was application for public transport provided?: No    DISCHARGE DETAILS:    Discharge planning discussed with[de-identified] patient's son Reza Stock of Choice: Yes     CM contacted family/caregiver?: Yes  Were Treatment Team discharge recommendations reviewed with patient/caregiver?: Yes  Did patient/caregiver verbalize understanding of patient care needs?: Yes  Were patient/caregiver advised of the risks associated with not following Treatment Team discharge recommendations?: Yes    Contacts  Patient Contacts: Erendira Melgar  Relationship to Patient[de-identified] Family  Contact Method: Phone  Phone Number: [phone]  Reason/Outcome: Continuity of Care, Emergency Contact, Referral, Discharge Planning    CM spoke with patient's son Erendira Melgar on the phone, [phone]  CM introduced self and role  Patient's son updated PT/OT evaluation pending  CM discussed inpatient rehab versus Home therapy  CM discussed difference between services with son on the phone  Son expressed his main concern is patient going up/down the steps at home  Son expressed to CM that he does not want his mother getting sedentary at home  CM offered to f/u tomorrow after PT evaluation  Per son, patient lives with him, her 80year old mother, DIL and grandchildren in a multilevel home  Patient has 8-10 steps to her bedroom with railings  Patient's daughter in law provides help at home with ADLS  Per son, there is always family at home with patient  Patient has history of [facility] in past      Patient is Covid vaccinated and boosted       CM reviewed discharge planning process including the following: identifying caregivers at home, preference for d/c planning needs,   availability of [facility] program, availability of treatment team to discuss questions or concerns patient and/or family may have regarding diagnosis, plan of care, old or new medications and discharge planning   CM will continue to follow for care coordination and update assessment as appropriate

## 2022-06-25 NOTE — PHYSICAL THERAPY NOTE
Physical Therapy Cancellation Note       06/25/22 0654   PT Last Visit   PT Visit Date 06/25/22   Note Type   Note type Cancelled Session; Evaluation   Cancel Reasons Other   Additional Comments referral received for PT eval and tx  pt is pending surgical intervention for left intertrochanteric femoral fracture  will await surgical repair and perform eval as appropriate  postoperative PT reorders will be needed including weight bearing status and activity orders       Kelley Kelly, PT

## 2022-06-25 NOTE — DISCHARGE INSTRUCTIONS
Discharge Instructions - Orthopedics  Sandy Lomax 68 y o  female MRN: [id number]  Unit/Bed#: PACU 1    Weight Bearing Status:                                           Weight bearing as tolerated to the left lower extremity     Pain:  Continue analgesics as directed    Dressing Instructions:   Please keep clean, dry and intact until follow up     Appt Instructions: If you do not have your appointment, please call the clinic at [phone] to see Dr Marie in 2 weeks  Otherwise followup as scheduled     Contact the office sooner if you experience any increased numbness/tingling in the extremities

## 2022-06-25 NOTE — UTILIZATION REVIEW
Initial Clinical Review    Admission: Date/Time/Statement:   Admission Orders (From admission, onward)     Ordered        06/24/22 0948  Inpatient Admission  Once                      Orders Placed This Encounter   Procedures    Inpatient Admission     Standing Status:   Standing     Number of Occurrences:   1     Order Specific Question:   Level of Care     Answer:   Med Surg [16]     Order Specific Question:   Estimated length of stay     Answer:   More than 2 Midnights     Order Specific Question:   Certification     Answer:   I certify that inpatient services are medically necessary for this patient for a duration of greater than two midnights  See H&P and MD Progress Notes for additional information about the patient's course of treatment  ED Arrival Information     Expected   -    Arrival   6/24/2022 08:30    Acuity   Emergent            Means of arrival   Ambulance    Escorted by   [facility]    Service   Trauma    Admission type   [address] complaint   -           Chief Complaint   Patient presents with   [employer] Fall       Initial Presentation: 68 y o  female  From home to ED via ems admitted inpatient due to fracture left hip/Fall  Trauma alert  Presented with left hip pain post fall just prior to arrival, mis step and fell  On exam pupils non reactive, patient is blind  Tender left hip  Minimal swelling  Bun 24  Creatinine 1 83 with baseline of 1 6 - 2  H&H 10 9/34  5  Xray left femur showed comminuted displaced left femoral intertrochanteric fracture  In the ED given 2 doses of VI analgesia, started on IVF  Plan is no Xarelto, patient ran out about 1 week ago  Start IVF, consult orthopedics  Hold home lisinopril and trend BMP  Hold home oral anti diabetic medication and start SSI      6/24/22 per Orthopedics:  Patient is status post fall with left intertrochanteric femur fracture  Plan is non weight bearing  Surgical intervention of IM nail  Femur fracture  6/24/22 per Geriatrics - patient is legally blind and has peripheral neuropathy with ambulatory dysfunction with history of falls  May have orthostasis  Plan is PT/OT  Geriatric pain regime with left hip fracture:  Scheduled tylenol, Roxicodone as needed, Dilaudid for breakthrough pain  When able check orthostatics  Date: 6/25/22   Day 2:  Has left hip pain  On exam left leg shortened and externally rotated  Tender to anterior and lateral left hip  Pain with movement  creatinine 1 64  Glucose 160  Continue IVF, pain control    To OR      6/25/22 Procedure INSERTION NAIL IM FEMUR ANTEGRADE (TROCHANTERIC) (Left)    ED Triage Vitals   Temperature Pulse Respirations Blood Pressure SpO2   06/24/22 0847 06/24/22 0847 06/24/22 0847 06/24/22 0847 06/24/22 0847   97 9 °F (36 6 °C) 61 16 148/67 98 %      Temp Source Heart Rate Source Patient Position - Orthostatic VS BP Location FiO2 (%)   06/24/22 0847 06/24/22 0900 06/24/22 1827 06/24/22 1409 --   Tympanic Monitor Lying Right arm       Pain Score       06/24/22 1016       10 - Worst Possible Pain          Wt Readings from Last 1 Encounters:   06/26/22 84 4 kg (186 lb 1 1 oz)     Additional Vital Signs:   06/25/22 1009 98 4 °F (36 9 °C) 60 18 195/76 Abnormal  -- 98 % -- None (Room air) -- --   06/25/22 07:58:10 -- 60 -- 161/77 105 94 % -- -- -- --   06/25/22 07:20:43 99 1 °F (37 3 °C) 60 18 157/66 96 96 % -- -- -- --   06/25/22 03:29:42 -- 60 18 157/67 97 97 % -- -- -- --   06/25/22 00:52:02 98 6 °F (37 °C) 42 Abnormal  16 149/68 95 95 % -- -- -- --   06/24/22 22:32:43 99 7 °F (37 6 °C) 60 18 170/65 100 97 % -- -- -- --   06/24/22 18:22:50 98 3 °F (36 8 °C) 69 18 177/72 Abnormal  107 96 % -- -- -- --   06/24/22 1618 -- -- -- 142/60 -- -- -- -- -- --   06/24/22 15:36:32 97 9 °F (36 6 °C) 58 18 -- -- -- -- -- -- --   06/24/22 1409 -- 62 18 108/56 -- 97 % -- None (Room air) -- --   06/24/22 1100 -- 58 16 104/53 76 98 % -- -- -- --   06/24/22 1000 -- 56 16 110/55 -- 97 % -- None (Room air) -- --   06/24/22 0900 -- 60 16 156/67 -- 98 % -- None (Room air)         Pertinent Labs/Diagnostic Test Results:   XR femur 1 vw left   Final Result by Sheyla Levin DO (06/26 0867)      Near-anatomic alignment of left femoral intertrochanteric fracture status post ORIF      Fracture of the proximal lateral femoral shaft, traversed by fixation hardware  Workstation performed: [id number]         XR hip/pelv 2-3 vws left if performed   Final Result by Sheyla Levin DO (06/26 7623)      Fluoroscopic guidance provided for procedure guidance  Please refer to the separate procedure notes for additional details  Workstation performed: [id number]         XR femur 2 vw left   Final Result by Alix Bhat MD (06/24 2026)      Comminuted minimally displaced left femoral intertrochanteric fracture  Workstation performed: [id number]         XR pelvis ap only 1 or 2 vw   Final Result by Alix Bhat MD (06/24 6196)      Comminuted minimally displaced left femoral intertrochanteric fracture  Workstation performed: [id number]         TRAUMA - CT head wo contrast   Final Result by Mariana Espana MD (06/24 5358)      No acute intracranial abnormality  Microangiopathic changes  I personally discussed this study with Francie VIVEROS on 6/24/2022 at 9:11 AM                            Workstation performed: [id number]         XR Trauma multiple (SLB/SLRA trauma bay ONLY)   Final Result by Mariana Espana MD (06/24 9734)      No acute cardiopulmonary disease within limitations of supine imaging  Left intertrochanteric hip fracture  The study was marked in [facility] for immediate notification           Workstation performed: [id number]           6/24/22 ecg Sinus bradycardia with 1st degree A-V block  Possible Inferior infarct , age undetermined  Abnormal ECG  No previous ECGs available  Results from last 7 days   Lab Units 06/26/22  1614 06/26/22  0550 06/25/22  0429 06/24/22  0856   WBC Thousand/uL  --  7 47 7 32 8 37   HEMOGLOBIN g/dL 7 7* 6 8* 8 8* 10 9*   I STAT HEMOGLOBIN g/dl  --   --   --  11 6   HEMATOCRIT % 24 0* 21 7* 29 4* 34 5*   HEMATOCRIT, ISTAT %  --   --   --  34*   PLATELETS Thousands/uL  --  155 190 233   NEUTROS ABS Thousands/µL  --  5 88  --  6 25     Results from last 7 days   Lab Units 06/26/22  0550 06/25/22  0429 06/24/22  0856   SODIUM mmol/L 136 139 143   POTASSIUM mmol/L 5 0 4 2 4 5   CHLORIDE mmol/L 104 106 108   CO2 mmol/L 24 28 28   CO2, I-STAT mmol/L  --   --  30   ANION GAP mmol/L 8 5 7   BUN mg/dL 45* 27* 24   CREATININE mg/dL 2 41* 1 64* 1 83*   EGFR ml/min/1 73sq m 18 30 26   CALCIUM mg/dL 7 5* 7 9* 9 0   CALCIUM, IONIZED, ISTAT mmol/L  --   --  1 17     Results from last 7 days   Lab Units 06/24/22  0856   AST U/L 11*   ALT U/L 8   ALK PHOS U/L 54   TOTAL PROTEIN g/dL 6 2*   ALBUMIN g/dL 3 5   TOTAL BILIRUBIN mg/dL 0 50     Results from last 7 days   Lab Units 06/26/22  1550 06/26/22  1103 06/26/22  0730 06/26/22  0002 06/25/22  1707 06/25/22  1429 06/25/22  0538 06/25/22  0000 06/24/22  1725   POC GLUCOSE mg/dl 204* 238* 223* 239* 217* 210* 136 160* 160*     Results from last 7 days   Lab Units 06/26/22  0550 06/25/22  0429 06/24/22  0856   GLUCOSE RANDOM mg/dL 193* 135 73     Results from last 7 days   Lab Units 06/24/22  0856   PH, FRAN I-STAT  7 367   PCO2, FRAN ISTAT mm HG 49 8   PO2, FRAN ISTAT mm HG 35 0   HCO3, FRAN ISTAT mmol/L 28 6   I STAT BASE EXC mmol/L 3   I STAT O2 SAT % 65     Results from last 7 days   Lab Units 06/24/22  0856   PROTIME seconds 13 7   INR  1 05       ED Treatment:   Medication Administration from 06/24/2022 0830 to 06/24/2022 1426       Date/Time Order Dose Route Action Comments     06/24/2022 0852 fentanyl citrate (PF) 100 MCG/2ML 25 mcg Intravenous Given      06/24/2022 1016 multi-electrolyte (PLASMALYTE-A/ISOLYTE-S PH 7 4) IV solution 75 mL/hr Intravenous New Bag 06/24/2022 1016 lidocaine (LIDODERM) 5 % patch 1 patch 1 patch Topical Medication Applied left hip     06/24/2022 1016 HYDROmorphone HCl (DILAUDID) injection 0 2 mg 0 2 mg Intravenous Given      06/24/2022 1218 dextrose 50 % IV solution 25 mL 25 mL Intravenous Given         History reviewed  No pertinent past medical history  Present on Admission:  **None**      Admitting Diagnosis: Head injury [S09 90XA]  Closed displaced intertrochanteric fracture of left femur, initial encounter ([facility] ) Dameon Vigo  Fall, initial encounter [W19  XXXA]  Closed fracture of right hip, initial encounter ([facility] ) [S72 001A]  Age/Sex: 68 y o  female  Admission Orders:  Scheduled Medications:  acetaminophen, 975 mg, Oral, Q8H [address]  carvedilol, 25 mg, Oral, BID With Meals  cefazolin, 1,000 mg, Intravenous, Q8H  cholecalciferol, 2,000 Units, Oral, Daily  docusate sodium, 100 mg, Oral, BID  folic acid, 233 mcg, Oral, Daily  gabapentin, 100 mg, Oral, HS  heparin (porcine), 5,000 Units, Subcutaneous, Q8H RADHA  insulin lispro, 1-6 Units, Subcutaneous, Q6H RADHA  lidocaine, 1 patch, Topical, Daily  NIFEdipine, 60 mg, Oral, Daily  ranolazine, 500 mg, Oral, Daily  senna-docusate sodium, 1 tablet, Oral, BID      Continuous IV Infusions:  lactated ringers, 75 mL/hr, Intravenous, Continuous started 6/25 at 1452  multi-electrolyte, 75 mL/hr, Intravenous, Continuous      PRN Meds:  calcium carbonate, 1,000 mg, Oral, Daily PRN  HYDROmorphone, 0 2 mg, Intravenous, Q4H PRN  meclizine, 12 5 mg, Oral, Q8H PRN  ondansetron, 4 mg, Intravenous, Q4H PRN  oxyCODONE, 2 5 mg, Oral, Q4H PRN - used x 1 6/24, x 1 6/25/22  oxyCODONE, 5 mg, Oral, Q4H PRN    POD - Up to bedside chair at least three times a day as tolerated  2)  Ambulate in room progressing to hallway with assistive device four times daily (including PT) when PT advises    3)  Bathroom privileges with assistance    Toe touch LLE    IP CONSULT TO ORTHOPEDIC SURGERY  IP CONSULT TO GERONTOLOGY      Network Utilization Review Department  ATTENTION: Please call with any questions or concerns to [phone] and carefully listen to the prompts so that you are directed to the right person  All voicemails are confidential   [facility] all requests for admission clinical reviews, approved or denied determinations and any other requests to dedicated fax number below belonging to the campus where the patient is receiving treatment   List of dedicated fax numbers for the Facilities:  [address] DENIALS (Administrative/Medical Necessity) [phone]   [address] (Maternity/NICU/Pediatrics) [phone]   [address]  [address] [facility] [place] [phone] [place] [address] [address] [facility] [place] [address] [phone]

## 2022-06-25 NOTE — PROGRESS NOTES
[facility]  Progress Note - Anali Lipoma 1945, 68 y o  female MRN: [id number]  Unit/Bed#: MASSIMO -01 Encounter: [id number]  Primary Care Provider: Marcia Estrada MD   Date and time admitted to hospital: 6/24/2022  8:30 AM    Atrial fibrillation ([facility] )  Assessment & Plan  · Continue home Coreg  · Start Xarelto once cleared by Orthopedics    CAD (coronary artery disease)  Assessment & Plan  · History of PCI in 2011  · Continue home Ranexa and Coreg    Tachy-sarah beth syndrome ([facility] 75 )  Assessment & Plan  · Followed by [facility] cardiology  · Continue Coreg  · S/p pacemaker    Hypertension  Assessment & Plan  · Continue home carvedilol and nifedipine  · Will hold home lisinopril due to likely orthopedic intervention and concern for SAHIL  · Continue to monitor BP and kidney functioning    CKD (chronic kidney disease) stage 3, GFR 30-59 ml/min [facility])  Assessment & Plan  Lab Results   Component Value Date    EGFR 30 06/25/2022    EGFR 26 06/24/2022    CREATININE 1 64 (H) 06/25/2022    CREATININE 1 83 (H) 06/24/2022     · Associated with diabetes and hypertension, baseline appears to be between 1 6-2 0  · 1 83 on admission  · IVS started while NPO  · Hold home lisinopril  · Continue to monitor    Diabetes mellitus type 2, insulin dependent ([facility] 75 )  Assessment & Plan  No results found for: HGBA1C    Recent Labs     06/24/22  1725 06/25/22  0000 06/25/22  0538   POCGLU 160* 160* 136       Blood Sugar Average: Last 72 hrs:  (P) 152  · 2/22 hemoglobin A1c was 7 1  · Hold home oral anti diabetics  · Continue on sliding scale insulin  · Will initiate Lantus postoperatively    Blind  Assessment & Plan  · Family at bedside  · Additional assistance will be provided as needed to accommodate patient  Closed left hip fracture (HCC)  Assessment & Plan  · Secondary to fall  · XR pelvis showed:  "Left intertrochanteric hip fracture"  · Orthopedics consulted  · NPO for possible OR    Initiated IVF   · Nonweightbearing on left lower extremity  · Multimodal pain regiment  · DVT prophylaxis times 28 days--patient ran out of her Xarelto approximately 1 week ago  Patient will be placed on heparin  Will plan to transition back to Xarelto once cleared by Orthopedics  · PT/OT     [address]  · Injuries listed below  · CT head negative for acute traumatic injuries    · PT/OT        TERTIARY TRAUMA SURVEY NOTE    Prophylaxis: Sequential compression device (Venodyne)  and Heparin    Disposition: rehab  Code status:  Level 1 - Full Code    Consultants: Ortho  Geriatrics      SUBJECTIVE:     Transfer from: site of injury  Mechanism of Injury:Fall    Chief Complaint: left hip pain    HPI/Last 24 hour events: Admitted to Trauma, Ortho consulted, awaiting Ortho plan    Active medications:           Current Facility-Administered Medications:     acetaminophen (TYLENOL) tablet 975 mg, 975 mg, Oral, Q8H [address], 975 mg at 06/25/22 0533    carvedilol (COREG) tablet 25 mg, 25 mg, Oral, BID With Meals, 25 mg at 06/24/22 1721    cholecalciferol (VITAMIN D3) tablet 2,000 Units, 2,000 Units, Oral, Daily    folic acid (FOLVITE) tablet 400 mcg, 400 mcg, Oral, Daily    gabapentin (NEURONTIN) capsule 100 mg, 100 mg, Oral, HS, 100 mg at 06/24/22 2148    heparin (porcine) subcutaneous injection 5,000 Units, 5,000 Units, Subcutaneous, Q8H [address], 5,000 Units at 06/24/22 2150    HYDROmorphone HCl (DILAUDID) injection 0 2 mg, 0 2 mg, Intravenous, Q4H PRN    insulin lispro (HumaLOG) 100 units/mL subcutaneous injection 1-6 Units, 1-6 Units, Subcutaneous, Q6H [address], 1 Units at 06/25/22 0010 **AND** Fingerstick Glucose (POCT), , , Q6H    lidocaine (LIDODERM) 5 % patch 1 patch, 1 patch, Topical, Daily, 1 patch at 06/24/22 1016    meclizine (ANTIVERT) tablet 12 5 mg, 12 5 mg, Oral, Q8H PRN    multi-electrolyte (PLASMALYTE-A/ISOLYTE-S PH 7 4) IV solution, 75 mL/hr, Intravenous, Continuous, 75 mL/hr at 06/24/22 1016    NIFEdipine (PROCARDIA XL) 24 hr tablet 60 mg, 60 mg, Oral, Daily    ondansetron (ZOFRAN) injection 4 mg, 4 mg, Intravenous, Q4H PRN    oxyCODONE (ROXICODONE) IR tablet 2 5 mg, 2 5 mg, Oral, Q4H PRN, 2 5 mg at 06/24/22 1837    oxyCODONE (ROXICODONE) IR tablet 5 mg, 5 mg, Oral, Q4H PRN    ranolazine (RANEXA) 12 hr tablet 500 mg, 500 mg, Oral, Daily    senna-docusate sodium (SENOKOT S) 8 6-50 mg per tablet 1 tablet, 1 tablet, Oral, BID, 1 tablet at 06/24/22 1722      OBJECTIVE:     Vitals:   Vitals:    06/25/22 0720   BP: 157/66   Pulse: 60   Resp: 18   Temp: 99 1 °F (37 3 °C)   SpO2: 96%       Physical Exam:   GENERAL APPEARANCE: comfortable  NEURO: intact, GCS - 15  HEENT: EOm's intact  CV: RRR< no complaints of chest pain  LUNGS: CTA bilaterally, no shortness of breath  GI: NPO for Or  : voiding  MSK: moving extremities  SKIN: warm and dry      1  Before the illness or injury that brought you to the Emergency, did you need someone to help you on a regular basis? 1=Yes   2  Since the illness or injury that brought you to the Emergency, have you needed more help than usual to take care of yourself? 1=Yes   3  Have you been hospitalized for one or more nights during the past 6 months (excluding a stay in the Emergency Department)? 0=No   4  In general, do you see well? 0=Yes   5  In general, do you have serious problems with your memory? 0=No   6  Do you take more than three different medications everyday? 1=Yes   TOTAL   3     Did you order a geriatric consult if the score was 2 or greater?: yes                I/O:   I/O       06/23 0701 06/24 0700 06/24 0701 06/25 0700 06/25 0701 06/26 0700    P  O   240     Total Intake(mL/kg)  240 (2 7)     Net  +240                  Invasive Devices:    Invasive Devices  Report    Peripheral Intravenous Line  Duration           Peripheral IV 06/24/22 Left Antecubital <1 day                  Imaging:   XR femur 2 vw left    Result Date: 6/24/2022  Impression: Comminuted minimally displaced left femoral intertrochanteric fracture  Workstation performed: [id number]     TRAUMA - CT head wo contrast    Result Date: 6/24/2022  Impression: No acute intracranial abnormality  Microangiopathic changes  I personally discussed this study with Kaushal Green on 6/24/2022 at 9:11 AM  Workstation performed: [id number]     XR pelvis ap only 1 or 2 vw    Result Date: 6/24/2022  Impression: Comminuted minimally displaced left femoral intertrochanteric fracture  Workstation performed: [id number]     XR Trauma multiple (SLB/SLRA trauma bay ONLY)    Result Date: 6/24/2022  Impression: No acute cardiopulmonary disease within limitations of supine imaging  Left intertrochanteric hip fracture  The study was marked in [facility] for immediate notification   Workstation performed: [id number]       Labs:    Latest Reference Range & Units 06/25/22 04:29   Sodium 135 - 147 mmol/L 139   Potassium 3 5 - 5 3 mmol/L 4 2   Chloride 96 - 108 mmol/L 106   CO2 21 - 32 mmol/L 28   Anion Gap 4 - 13 mmol/L 5   BUN 5 - 25 mg/dL 27 (H)   Creatinine 0 60 - 1 30 mg/dL 1 64 (H)   Glucose, Random 65 - 140 mg/dL 135   Calcium 8 4 - 10 2 mg/dL 7 9 (L)   eGFR ml/min/1 73sq m 30   WBC 4 31 - 10 16 Thousand/uL 7 32   Red Blood Cell Count 3 81 - 5 12 Million/uL 3 33 (L)   Hemoglobin 11 5 - 15 4 g/dL 8 8 (L)   HCT 34 8 - 46 1 % 29 4 (L)   MCV 82 - 98 fL 88   MCH 26 8 - 34 3 pg 26 4 (L)   MCHC 31 4 - 37 4 g/dL 29 9 (L)   RDW 11 6 - 15 1 % 14 6   Platelet Count 129 - 390 Thousands/uL 190   MPV 8 9 - 12 7 fL 11 4   (H): Data is abnormally high  (L): Data is abnormally low

## 2022-06-25 NOTE — PLAN OF CARE
Problem: MOBILITY - ADULT  Goal: Maintain or return to baseline ADL function  Description: INTERVENTIONS:  -  Assess patient's ability to carry out ADLs; assess patient's baseline for ADL function and identify physical deficits which impact ability to perform ADLs (bathing, care of mouth/teeth, toileting, grooming, dressing, etc )  - Assess/evaluate cause of self-care deficits   - Assess range of motion  - Assess patient's mobility; develop plan if impaired  - Assess patient's need for assistive devices and provide as appropriate  - Encourage maximum independence but intervene and supervise when necessary  - Involve family in performance of ADLs  - Assess for home care needs following discharge   - Consider OT consult to assist with ADL evaluation and planning for discharge  - Provide patient education as appropriate  Outcome: Progressing  Goal: Maintains/Returns to pre admission functional level  Description: INTERVENTIONS:  - Perform BMAT or MOVE assessment daily    - Set and communicate daily mobility goal to care team and patient/family/caregiver     - Collaborate with rehabilitation services on mobility goals if consulted  - Out of bed for toileting  - Record patient progress and toleration of activity level   Outcome: Progressing     Problem: PAIN - ADULT  Goal: Verbalizes/displays adequate comfort level or baseline comfort level  Description: Interventions:  - Encourage patient to monitor pain and request assistance  - Assess pain using appropriate pain scale  - Administer analgesics based on type and severity of pain and evaluate response  - Implement non-pharmacological measures as appropriate and evaluate response  - Consider cultural and social influences on pain and pain management  - Notify physician/advanced practitioner if interventions unsuccessful or patient reports new pain  Outcome: Progressing     Problem: INFECTION - ADULT  Goal: Absence or prevention of progression during hospitalization  Description: INTERVENTIONS:  - Assess and monitor for signs and symptoms of infection  - Monitor lab/diagnostic results  - Monitor all insertion sites, i e  indwelling lines, tubes, and drains  - Monitor endotracheal if appropriate and nasal secretions for changes in amount and color  - [place] appropriate cooling/warming therapies per order  - Administer medications as ordered  - Instruct and encourage patient and family to use good hand hygiene technique  - Identify and instruct in appropriate isolation precautions for identified infection/condition  Outcome: Progressing     Problem: SAFETY ADULT  Goal: Patient will remain free of falls  Description: INTERVENTIONS:  - Educate patient/family on patient safety including physical limitations  - Instruct patient to call for assistance with activity   - Consult OT/PT to assist with strengthening/mobility   - Keep Call bell within reach  - Keep bed low and locked with side rails adjusted as appropriate  - Keep care items and personal belongings within reach  - Initiate and maintain comfort rounds  - Make Fall Risk Sign visible to staff  - Apply yellow socks and bracelet for high fall risk patients  - Consider moving patient to room near nurses station  Outcome: Progressing     Problem: DISCHARGE PLANNING  Goal: Discharge to home or other facility with appropriate resources  Description: INTERVENTIONS:  - Identify barriers to discharge w/patient and caregiver  - Arrange for needed discharge resources and transportation as appropriate  - Identify discharge learning needs (meds, wound care, etc )  - Arrange for interpretive services to assist at discharge as needed  - Refer to Case Management Department for coordinating discharge planning if the patient needs post-hospital services based on physician/advanced practitioner order or complex needs related to functional status, cognitive ability, or social support system  Outcome: Progressing     Problem: Knowledge Deficit  Goal: Patient/family/caregiver demonstrates understanding of disease process, treatment plan, medications, and discharge instructions  Description: Complete learning assessment and assess knowledge base  Interventions:  - Provide teaching at level of understanding  - Provide teaching via preferred learning methods  Outcome: Progressing     Problem: Nutrition/Hydration-ADULT  Goal: Nutrient/Hydration intake appropriate for improving, restoring or maintaining nutritional needs  Description: Monitor and assess patient's nutrition/hydration status for malnutrition  Collaborate with interdisciplinary team and initiate plan and interventions as ordered  Monitor patient's weight and dietary intake as ordered or per policy  Utilize nutrition screening tool and intervene as necessary  Determine patient's food preferences and provide high-protein, high-caloric foods as appropriate       INTERVENTIONS:  - Monitor oral intake, urinary output, labs, and treatment plans  - Assess nutrition and hydration status and recommend course of action  - Evaluate amount of meals eaten  - Assist patient with eating if necessary   - Allow adequate time for meals  - Recommend/ encourage appropriate diets, oral nutritional supplements, and vitamin/mineral supplements  - Order, calculate, and assess calorie counts as needed  - Recommend, monitor, and adjust tube feedings and TPN/PPN based on assessed needs  - Assess need for intravenous fluids  - Provide specific nutrition/hydration education as appropriate  - Include patient/family/caregiver in decisions related to nutrition  Outcome: Progressing     Problem: Potential for Falls  Goal: Patient will remain free of falls  Description: INTERVENTIONS:  - Educate patient/family on patient safety including physical limitations  - Instruct patient to call for assistance with activity   - Consult OT/PT to assist with strengthening/mobility   - Keep Call bell within reach  - Keep bed low and locked with side rails adjusted as appropriate  - Keep care items and personal belongings within reach  - Initiate and maintain comfort rounds  - Make Fall Risk Sign visible to staff  - Apply yellow socks and bracelet for high fall risk patients  - Consider moving patient to room near nurses station  Outcome: Progressing     Problem: Prexisting or High Potential for Compromised Skin Integrity  Goal: Skin integrity is maintained or improved  Description: INTERVENTIONS:  - Identify patients at risk for skin breakdown  - Assess and monitor skin integrity  - Assess and monitor nutrition and hydration status  - Monitor labs   - Assess for incontinence   - Turn and reposition patient  - Assist with mobility/ambulation  - Relieve pressure over bony prominences  - Avoid friction and shearing  - Provide appropriate hygiene as needed including keeping skin clean and dry  - Evaluate need for skin moisturizer/barrier cream  - Collaborate with interdisciplinary team   - Patient/family teaching  - Consider wound care consult   Outcome: Progressing

## 2022-06-26 PROBLEM — D64.9 ANEMIA: Status: ACTIVE | Noted: 2022-06-26

## 2022-06-26 LAB
ABO GROUP BLD: NORMAL
ANION GAP SERPL CALCULATED.3IONS-SCNC: 8 MMOL/L (ref 4–13)
BASOPHILS # BLD AUTO: 0.01 THOUSANDS/ÂΜL (ref 0–0.1)
BASOPHILS NFR BLD AUTO: 0 % (ref 0–1)
BUN SERPL-MCNC: 45 MG/DL (ref 5–25)
CALCIUM SERPL-MCNC: 7.5 MG/DL (ref 8.4–10.2)
CHLORIDE SERPL-SCNC: 104 MMOL/L (ref 96–108)
CO2 SERPL-SCNC: 24 MMOL/L (ref 21–32)
CREAT SERPL-MCNC: 2.41 MG/DL (ref 0.6–1.3)
EOSINOPHIL # BLD AUTO: 0 THOUSAND/ÂΜL (ref 0–0.61)
EOSINOPHIL NFR BLD AUTO: 0 % (ref 0–6)
ERYTHROCYTE [DISTWIDTH] IN BLOOD BY AUTOMATED COUNT: 14.7 % (ref 11.6–15.1)
GFR SERPL CREATININE-BSD FRML MDRD: 18 ML/MIN/1.73SQ M
GLUCOSE SERPL-MCNC: 193 MG/DL (ref 65–140)
GLUCOSE SERPL-MCNC: 204 MG/DL (ref 65–140)
GLUCOSE SERPL-MCNC: 223 MG/DL (ref 65–140)
GLUCOSE SERPL-MCNC: 238 MG/DL (ref 65–140)
GLUCOSE SERPL-MCNC: 239 MG/DL (ref 65–140)
GLUCOSE SERPL-MCNC: 303 MG/DL (ref 65–140)
HCT VFR BLD AUTO: 21.7 % (ref 34.8–46.1)
HCT VFR BLD AUTO: 24 % (ref 34.8–46.1)
HGB BLD-MCNC: 6.8 G/DL (ref 11.5–15.4)
HGB BLD-MCNC: 7.7 G/DL (ref 11.5–15.4)
IMM GRANULOCYTES # BLD AUTO: 0.06 THOUSAND/UL (ref 0–0.2)
IMM GRANULOCYTES NFR BLD AUTO: 1 % (ref 0–2)
LYMPHOCYTES # BLD AUTO: 0.8 THOUSANDS/ÂΜL (ref 0.6–4.47)
LYMPHOCYTES NFR BLD AUTO: 11 % (ref 14–44)
MCH RBC QN AUTO: 27.4 PG (ref 26.8–34.3)
MCHC RBC AUTO-ENTMCNC: 31.3 G/DL (ref 31.4–37.4)
MCV RBC AUTO: 88 FL (ref 82–98)
MONOCYTES # BLD AUTO: 0.72 THOUSAND/ÂΜL (ref 0.17–1.22)
MONOCYTES NFR BLD AUTO: 10 % (ref 4–12)
NEUTROPHILS # BLD AUTO: 5.88 THOUSANDS/ÂΜL (ref 1.85–7.62)
NEUTS SEG NFR BLD AUTO: 78 % (ref 43–75)
NRBC BLD AUTO-RTO: 0 /100 WBCS
PLATELET # BLD AUTO: 155 THOUSANDS/UL (ref 149–390)
PMV BLD AUTO: 11.7 FL (ref 8.9–12.7)
POTASSIUM SERPL-SCNC: 5 MMOL/L (ref 3.5–5.3)
RBC # BLD AUTO: 2.48 MILLION/UL (ref 3.81–5.12)
RH BLD: POSITIVE
SODIUM SERPL-SCNC: 136 MMOL/L (ref 135–147)
WBC # BLD AUTO: 7.47 THOUSAND/UL (ref 4.31–10.16)

## 2022-06-26 PROCEDURE — 30233N1 TRANSFUSION OF NONAUTOLOGOUS RED BLOOD CELLS INTO PERIPHERAL VEIN, PERCUTANEOUS APPROACH: ICD-10-PCS | Performed by: STUDENT IN AN ORGANIZED HEALTH CARE EDUCATION/TRAINING PROGRAM

## 2022-06-26 PROCEDURE — 82948 REAGENT STRIP/BLOOD GLUCOSE: CPT

## 2022-06-26 PROCEDURE — 99232 SBSQ HOSP IP/OBS MODERATE 35: CPT | Performed by: SURGERY

## 2022-06-26 PROCEDURE — 85025 COMPLETE CBC W/AUTO DIFF WBC: CPT | Performed by: PHYSICIAN ASSISTANT

## 2022-06-26 PROCEDURE — P9016 RBC LEUKOCYTES REDUCED: HCPCS

## 2022-06-26 PROCEDURE — 80048 BASIC METABOLIC PNL TOTAL CA: CPT | Performed by: PHYSICIAN ASSISTANT

## 2022-06-26 PROCEDURE — 85018 HEMOGLOBIN: CPT | Performed by: SURGERY

## 2022-06-26 PROCEDURE — 85014 HEMATOCRIT: CPT | Performed by: SURGERY

## 2022-06-26 PROCEDURE — 97163 PT EVAL HIGH COMPLEX 45 MIN: CPT

## 2022-06-26 PROCEDURE — 99024 POSTOP FOLLOW-UP VISIT: CPT | Performed by: PHYSICIAN ASSISTANT

## 2022-06-26 RX ORDER — SODIUM CHLORIDE, SODIUM GLUCONATE, SODIUM ACETATE, POTASSIUM CHLORIDE, MAGNESIUM CHLORIDE, SODIUM PHOSPHATE, DIBASIC, AND POTASSIUM PHOSPHATE .53; .5; .37; .037; .03; .012; .00082 G/100ML; G/100ML; G/100ML; G/100ML; G/100ML; G/100ML; G/100ML
50 INJECTION, SOLUTION INTRAVENOUS CONTINUOUS
Status: DISCONTINUED | OUTPATIENT
Start: 2022-06-26 | End: 2022-06-28

## 2022-06-26 RX ORDER — INSULIN LISPRO 100 [IU]/ML
1-6 INJECTION, SOLUTION INTRAVENOUS; SUBCUTANEOUS
Status: DISCONTINUED | OUTPATIENT
Start: 2022-06-26 | End: 2022-06-30 | Stop reason: HOSPADM

## 2022-06-26 RX ORDER — INSULIN GLARGINE 100 [IU]/ML
20 INJECTION, SOLUTION SUBCUTANEOUS
Status: DISCONTINUED | OUTPATIENT
Start: 2022-06-26 | End: 2022-06-27

## 2022-06-26 RX ADMIN — CEFAZOLIN SODIUM 1000 MG: 1 SOLUTION INTRAVENOUS at 01:14

## 2022-06-26 RX ADMIN — INSULIN LISPRO 2 UNITS: 100 INJECTION, SOLUTION INTRAVENOUS; SUBCUTANEOUS at 08:05

## 2022-06-26 RX ADMIN — CARVEDILOL 25 MG: 12.5 TABLET, FILM COATED ORAL at 08:03

## 2022-06-26 RX ADMIN — SODIUM CHLORIDE, SODIUM GLUCONATE, SODIUM ACETATE, POTASSIUM CHLORIDE, MAGNESIUM CHLORIDE, SODIUM PHOSPHATE, DIBASIC, AND POTASSIUM PHOSPHATE 50 ML/HR: .53; .5; .37; .037; .03; .012; .00082 INJECTION, SOLUTION INTRAVENOUS at 13:11

## 2022-06-26 RX ADMIN — DOCUSATE SODIUM 100 MG: 100 CAPSULE, LIQUID FILLED ORAL at 17:32

## 2022-06-26 RX ADMIN — LIDOCAINE 5% 1 PATCH: 700 PATCH TOPICAL at 08:04

## 2022-06-26 RX ADMIN — ACETAMINOPHEN 975 MG: 325 TABLET, FILM COATED ORAL at 05:50

## 2022-06-26 RX ADMIN — HEPARIN SODIUM 5000 UNITS: 5000 INJECTION INTRAVENOUS; SUBCUTANEOUS at 13:17

## 2022-06-26 RX ADMIN — HEPARIN SODIUM 5000 UNITS: 5000 INJECTION INTRAVENOUS; SUBCUTANEOUS at 05:50

## 2022-06-26 RX ADMIN — ACETAMINOPHEN 975 MG: 325 TABLET, FILM COATED ORAL at 13:17

## 2022-06-26 RX ADMIN — SENNOSIDES AND DOCUSATE SODIUM 1 TABLET: 50; 8.6 TABLET ORAL at 17:31

## 2022-06-26 RX ADMIN — NIFEDIPINE 60 MG: 60 TABLET, FILM COATED, EXTENDED RELEASE ORAL at 08:05

## 2022-06-26 RX ADMIN — Medication 2000 UNITS: at 08:03

## 2022-06-26 RX ADMIN — HEPARIN SODIUM 5000 UNITS: 5000 INJECTION INTRAVENOUS; SUBCUTANEOUS at 21:54

## 2022-06-26 RX ADMIN — INSULIN LISPRO 2 UNITS: 100 INJECTION, SOLUTION INTRAVENOUS; SUBCUTANEOUS at 17:32

## 2022-06-26 RX ADMIN — INSULIN LISPRO 4 UNITS: 100 INJECTION, SOLUTION INTRAVENOUS; SUBCUTANEOUS at 21:55

## 2022-06-26 RX ADMIN — INSULIN LISPRO 3 UNITS: 100 INJECTION, SOLUTION INTRAVENOUS; SUBCUTANEOUS at 12:04

## 2022-06-26 RX ADMIN — CARVEDILOL 25 MG: 12.5 TABLET, FILM COATED ORAL at 17:31

## 2022-06-26 RX ADMIN — ACETAMINOPHEN 975 MG: 325 TABLET, FILM COATED ORAL at 21:54

## 2022-06-26 RX ADMIN — GABAPENTIN 100 MG: 100 CAPSULE ORAL at 21:54

## 2022-06-26 RX ADMIN — INSULIN GLARGINE 20 UNITS: 100 INJECTION, SOLUTION SUBCUTANEOUS at 01:14

## 2022-06-26 RX ADMIN — INSULIN GLARGINE 20 UNITS: 100 INJECTION, SOLUTION SUBCUTANEOUS at 21:55

## 2022-06-26 RX ADMIN — INSULIN LISPRO 3 UNITS: 100 INJECTION, SOLUTION INTRAVENOUS; SUBCUTANEOUS at 00:02

## 2022-06-26 RX ADMIN — FOLIC ACID TAB 400 MCG 400 MCG: 400 TAB at 08:05

## 2022-06-26 RX ADMIN — RANOLAZINE 500 MG: 500 TABLET, EXTENDED RELEASE ORAL at 08:03

## 2022-06-26 NOTE — ASSESSMENT & PLAN NOTE
No results found for: HGBA1C    Recent Labs     06/25/22  1429 06/25/22  1707 06/26/22  0002 06/26/22  0730   POCGLU 210* 217* 239* 223*       Blood Sugar Average: Last 72 hrs:  (P) 192 [id number]  · 2/22 hemoglobin A1c was 7 1  · Hold home oral anti diabetics  · Restarted Lantus overnight  · Continue to monitor blood sugars throughout the day with use of sliding scale insulin

## 2022-06-26 NOTE — PLAN OF CARE
Problem: MOBILITY - ADULT  Goal: Maintain or return to baseline ADL function  Description: INTERVENTIONS:  -  Assess patient's ability to carry out ADLs; assess patient's baseline for ADL function and identify physical deficits which impact ability to perform ADLs (bathing, care of mouth/teeth, toileting, grooming, dressing, etc )  - Assess/evaluate cause of self-care deficits   - Assess range of motion  - Assess patient's mobility; develop plan if impaired  - Assess patient's need for assistive devices and provide as appropriate  - Encourage maximum independence but intervene and supervise when necessary  - Involve family in performance of ADLs  - Assess for home care needs following discharge   - Consider OT consult to assist with ADL evaluation and planning for discharge  - Provide patient education as appropriate  Outcome: Progressing  Goal: Maintains/Returns to pre admission functional level  Description: INTERVENTIONS:  - Perform BMAT or MOVE assessment daily    - Set and communicate daily mobility goal to care team and patient/family/caregiver     - Collaborate with rehabilitation services on mobility goals if consulted  - Out of bed for toileting  - Record patient progress and toleration of activity level   Outcome: Progressing     Problem: PAIN - ADULT  Goal: Verbalizes/displays adequate comfort level or baseline comfort level  Description: Interventions:  - Encourage patient to monitor pain and request assistance  - Assess pain using appropriate pain scale  - Administer analgesics based on type and severity of pain and evaluate response  - Implement non-pharmacological measures as appropriate and evaluate response  - Consider cultural and social influences on pain and pain management  - Notify physician/advanced practitioner if interventions unsuccessful or patient reports new pain  Outcome: Progressing     Problem: INFECTION - ADULT  Goal: Absence or prevention of progression during hospitalization  Description: INTERVENTIONS:  - Assess and monitor for signs and symptoms of infection  - Monitor lab/diagnostic results  - Monitor all insertion sites, i e  indwelling lines, tubes, and drains  - Monitor endotracheal if appropriate and nasal secretions for changes in amount and color  - [place] appropriate cooling/warming therapies per order  - Administer medications as ordered  - Instruct and encourage patient and family to use good hand hygiene technique  - Identify and instruct in appropriate isolation precautions for identified infection/condition  Outcome: Progressing     Problem: SAFETY ADULT  Goal: Patient will remain free of falls  Description: INTERVENTIONS:  - Educate patient/family on patient safety including physical limitations  - Instruct patient to call for assistance with activity   - Consult OT/PT to assist with strengthening/mobility   - Keep Call bell within reach  - Keep bed low and locked with side rails adjusted as appropriate  - Keep care items and personal belongings within reach  - Initiate and maintain comfort rounds  - Make Fall Risk Sign visible to staff  - Apply yellow socks and bracelet for high fall risk patients  - Consider moving patient to room near nurses station  Outcome: Progressing     Problem: DISCHARGE PLANNING  Goal: Discharge to home or other facility with appropriate resources  Description: INTERVENTIONS:  - Identify barriers to discharge w/patient and caregiver  - Arrange for needed discharge resources and transportation as appropriate  - Identify discharge learning needs (meds, wound care, etc )  - Arrange for interpretive services to assist at discharge as needed  - Refer to Case Management Department for coordinating discharge planning if the patient needs post-hospital services based on physician/advanced practitioner order or complex needs related to functional status, cognitive ability, or social support system  Outcome: Progressing     Problem: Knowledge Deficit  Goal: Patient/family/caregiver demonstrates understanding of disease process, treatment plan, medications, and discharge instructions  Description: Complete learning assessment and assess knowledge base  Interventions:  - Provide teaching at level of understanding  - Provide teaching via preferred learning methods  Outcome: Progressing     Problem: Nutrition/Hydration-ADULT  Goal: Nutrient/Hydration intake appropriate for improving, restoring or maintaining nutritional needs  Description: Monitor and assess patient's nutrition/hydration status for malnutrition  Collaborate with interdisciplinary team and initiate plan and interventions as ordered  Monitor patient's weight and dietary intake as ordered or per policy  Utilize nutrition screening tool and intervene as necessary  Determine patient's food preferences and provide high-protein, high-caloric foods as appropriate       INTERVENTIONS:  - Monitor oral intake, urinary output, labs, and treatment plans  - Assess nutrition and hydration status and recommend course of action  - Evaluate amount of meals eaten  - Assist patient with eating if necessary   - Allow adequate time for meals  - Recommend/ encourage appropriate diets, oral nutritional supplements, and vitamin/mineral supplements  - Order, calculate, and assess calorie counts as needed  - Recommend, monitor, and adjust tube feedings and TPN/PPN based on assessed needs  - Assess need for intravenous fluids  - Provide specific nutrition/hydration education as appropriate  - Include patient/family/caregiver in decisions related to nutrition  Outcome: Progressing     Problem: Potential for Falls  Goal: Patient will remain free of falls  Description: INTERVENTIONS:  - Educate patient/family on patient safety including physical limitations  - Instruct patient to call for assistance with activity   - Consult OT/PT to assist with strengthening/mobility   - Keep Call bell within reach  - Keep bed low and locked with side rails adjusted as appropriate  - Keep care items and personal belongings within reach  - Initiate and maintain comfort rounds  - Make Fall Risk Sign visible to staff  - Apply yellow socks and bracelet for high fall risk patients  - Consider moving patient to room near nurses station  Outcome: Progressing     Problem: Prexisting or High Potential for Compromised Skin Integrity  Goal: Skin integrity is maintained or improved  Description: INTERVENTIONS:  - Identify patients at risk for skin breakdown  - Assess and monitor skin integrity  - Assess and monitor nutrition and hydration status  - Monitor labs   - Assess for incontinence   - Turn and reposition patient  - Assist with mobility/ambulation  - Relieve pressure over bony prominences  - Avoid friction and shearing  - Provide appropriate hygiene as needed including keeping skin clean and dry  - Evaluate need for skin moisturizer/barrier cream  - Collaborate with interdisciplinary team   - Patient/family teaching  - Consider wound care consult   Outcome: Progressing

## 2022-06-26 NOTE — PROGRESS NOTES
[facility]  Progress Note - Joceline Screws 1945, 68 y o  female MRN: [id number]  Unit/Bed#: W -01 Encounter: [id number]  Primary Care Provider: Poppy Bernal MD   Date and time admitted to hospital: 6/24/2022  8:30 AM    [address]  · Injuries listed below  · CT head negative for acute traumatic injuries  · PT/OT    * Closed left hip fracture ([facility] [address] )  Assessment & Plan  · Secondary to fall  · XR pelvis showed:  "Left intertrochanteric hip fracture"  · Orthopedics consulted and note appreciated  · S/p IM nail 6/26  · Weight-bearing status per Orthopedics  · Multimodal pain regimen  · DVT prophylaxis:  Heparin due to SAHIL  · PT/OT    Anemia  Assessment & Plan  · Acute blood loss in the setting of chronic anemia  · History of chronic anemia hemoglobin is typically around 10  · Patient was 10 9 on admission  · Patient loss approximately 300 mL blood during her surgery  · Will transfuse 1 unit PRBCs  · Repeat hemoglobin at noon  · Continue to monitor for signs of symptomatic anemia though in the setting beta-blocker use patient will not likely be tachycardic  Atrial fibrillation ([facility] [address] )  Assessment & Plan  · Continue home Coreg  · Patient anemic--6 8  Continue to hold Xarelto  Continue heparin for DVT prophylaxis  · Will re-evaluate tomorrow for appropriateness of restarting home Xarelto and aspirin  CAD (coronary artery disease)  Assessment & Plan  · History of PCI in 2011  · Continue home Ranexa and Coreg    Tachy-sarah beth syndrome ([facility] [address] )  Assessment & Plan  · Followed by [facility] cardiology  · Continue Coreg  · S/p pacemaker    Hypertension  Assessment & Plan  · Continue home carvedilol and nifedipine  · Will hold home lisinopril due to likely orthopedic intervention and concern for SAHIL    · Continue to monitor BP and kidney functioning    CKD (chronic kidney disease) stage 3, GFR 30-59 ml/min [facility])  Assessment & Plan  Lab Results   Component Value Date    EGFR 18 06/26/2022    EGFR 30 06/25/2022    EGFR 26 06/24/2022    CREATININE 2 41 (H) 06/26/2022    CREATININE 1 64 (H) 06/25/2022    CREATININE 1 83 (H) 06/24/2022     · Associated with diabetes and hypertension, baseline appears to be between 1 6-2 0  · 1 83 on admission  · Creatinine worsened postoperatively to 2 41  · Patient is anemic this morning, will administer 1 unit of blood  · IVF isolate 50mL/hr  · Repeat BMP in AM    Diabetes mellitus type 2, insulin dependent [facility])  Assessment & Plan  No results found for: HGBA1C    Recent Labs     06/25/22  1429 06/25/22  1707 06/26/22  0002 06/26/22  0730   POCGLU 210* 217* 239* 223*       Blood Sugar Average: Last 72 hrs:  (P) 192 [id number]  · 2/22 hemoglobin A1c was 7 1  · Hold home oral anti diabetics  · Restarted Lantus overnight  · Continue to monitor blood sugars throughout the day with use of sliding scale insulin  Blind  Assessment & Plan  · Family at bedside  · Additional assistance will be provided as needed to accommodate patient  Disposition:  PT/OT evaluation today  Patient will likely require placement  Will discuss with family  Case management consulted  SUBJECTIVE:  Chief Complaint:  I do not have pain    Subjective:  Patient notes having minimal pain postoperatively  She states that she has been doing well  She denies any shortness of breath or difficulty breathing she states I have clean lungs we discussed the importance of using her incentive spirometry to prevent postoperative complications  She confirms that she has been tolerating her diet  She confirms that she has been getting sleep overnight  She denies any numbness or tingling in her lower extremities  OBJECTIVE:   Vitals:   Temp:  [97 8 °F (36 6 °C)-98 9 °F (37 2 °C)] 98 6 °F (37 °C)  HR:  [58-73] 62  Resp:  [16-18] 16  BP: (102-195)/(51-77) 135/59    Intake/Output:  I/O       06/24 0701 06/25 0700 06/25 0701 06/26 0700 06/26 0701 06/27 0700    P  O  240 0 I V  (mL/kg)  900 (10 7)     IV Piggyback  250     Total Intake(mL/kg) 240 (2 7) 1150 (13 6)     Urine (mL/kg/hr) 500      Blood  300     Total Output 500 300     Net -260 +850                 Nutrition: Diet Regular; Regular House  GI Proph/Bowel Reg:  Senna S  VTE Prophylaxis:Sequential compression device (Venodyne)  and Heparin     Physical Exam:   GENERAL APPEARANCE:  No acute distress  NEURO:  GCS 15  Light touch sensation intact throughout  HEENT:  Normocephalic, atraumatic  EOMI  Neck is supple  CV:  Irregularly irregular  +2 radial dorsalis pedis pulses, bilaterally  No lower extremity edema  Capillary refills less than 2 seconds in all extremities  LUNGS:  Clear to auscultation, bilaterally  Patient is on room air  Chest wall is nontender  No wheezing, no rhonchi, no rales  GI:  Abdomen is soft nontender  :  Pelvis stable  MSK:  Patient displays the ability to move bilateral feet ankle  She is in an abductor pillow  Upper extremities are nontender on palpation  Patient does have some mild tenderness on palpation of lateral aspect of left hip  SKIN:  Warm, dry      Invasive Devices  Report    Peripheral Intravenous Line  Duration           Peripheral IV 06/24/22 Left Antecubital 1 day    Peripheral IV 06/25/22 Right Antecubital <1 day                      Lab Results:   Results: I have personally reviewed all pertinent laboratory/tests results, BMP/CMP:   Lab Results   Component Value Date    SODIUM 136 06/26/2022    K 5 0 06/26/2022     06/26/2022    CO2 24 06/26/2022    BUN 45 (H) 06/26/2022    CREATININE 2 41 (H) 06/26/2022    CALCIUM 7 5 (L) 06/26/2022    EGFR 18 06/26/2022    and CBC:   Lab Results   Component Value Date    WBC 7 47 06/26/2022    HGB 6 8 (LL) 06/26/2022    HCT 21 7 (L) 06/26/2022    MCV 88 06/26/2022     06/26/2022    MCH 27 4 06/26/2022    MCHC 31 3 (L) 06/26/2022    RDW 14 7 06/26/2022    MPV 11 7 06/26/2022    NRBC 0 06/26/2022     Imaging/EKG Studies: I have personally reviewed pertinent reports       Other Studies: none

## 2022-06-26 NOTE — PROGRESS NOTES
Progress Note - Orthopedics   Jose Krueger 68 y o  female MRN: [id number]  Unit/Bed#: W -01 Encounter: [id number]    Assessment:  68year old Female POD 1 s/p Left hip TFN, long nail     Plan:  -TTWB  -PT/OT  -encourage incentive spirometry  -Pain control PRN  -Medical management per primary  -WBC 7 47 this morning  -HGB 6 8 this morning, patient was transfused 1 until of PRBC, continue to monitor  -Continue DVT ppx, patient currently receiving Heparin and utilizing SCDs, recommend 30 days of post-op DVT ppx  -Ortho will continue to follow      Subjective:  27-year-old female postop day 1 status post left hip TFN long nail  Patient was seen examined at bedside  Patient reports that she is overall feeling well  She denies having any pain at this time in the left hip or groin  Patient denies any fevers chills or sweats  She denies any numbness or tingling in the left lower extremity    Vitals: Blood pressure 121/51, pulse 61, temperature 98 9 °F (37 2 °C), resp  rate 18, height 5' 2" (1 575 m), weight 84 4 kg (186 lb 1 1 oz), SpO2 94 %  ,Body mass index is 34 03 kg/m²  Intake/Output Summary (Last 24 hours) at 6/26/2022 1026  Last data filed at 6/25/2022 1334  Gross per 24 hour   Intake 1150 ml   Output 300 ml   Net 850 ml       Invasive Devices  Report    Peripheral Intravenous Line  Duration           Peripheral IV 06/24/22 Left Antecubital 2 days    Peripheral IV 06/25/22 Right Antecubital <1 day                Physical Exam: General appearance: alert and oriented, in no acute distress  Head: Normocephalic, without obvious abnormality, atraumatic  Eyes: conjunctivae/corneas clear  PERRL, EOM's intact  Fundi benign  Lungs: No stridor or wheezing  Heart: No apparent distress    Ortho Exam:   Left Lower Extremity Exam  Alignment:  Leg lengths appear equal   Inspection:  Mepilex dressings are clean dry and intact    There is no erythema or ecchymosis visualized about the anterior lateral aspect of the hip   Palpation:  Minimal karly-incisional tenderness  Compartments are soft and compressible  ROM:  Not tested  Strength:  EHL 5/5  FHL 5/5  Stability:  Not tested  Neurovascular:  Sensation intact in DP/SP/Sosa/Sa/T nerve distributions  Palpable DP & PT pulses  Gait:  Not tested  Lab, Imaging and other studies:   I have personally reviewed pertinent lab results    CBC:   Lab Results   Component Value Date    WBC 7 47 06/26/2022    HGB 6 8 (LL) 06/26/2022    HCT 21 7 (L) 06/26/2022    MCV 88 06/26/2022     06/26/2022    MCH 27 4 06/26/2022    MCHC 31 3 (L) 06/26/2022    RDW 14 7 06/26/2022    MPV 11 7 06/26/2022    NRBC 0 06/26/2022     CMP:   Lab Results   Component Value Date    SODIUM 136 06/26/2022     06/26/2022    CO2 24 06/26/2022    BUN 45 (H) 06/26/2022    CREATININE 2 41 (H) 06/26/2022    CALCIUM 7 5 (L) 06/26/2022    EGFR 18 06/26/2022     [place], [place]

## 2022-06-26 NOTE — ASSESSMENT & PLAN NOTE
· Continue home Coreg  · Patient anemic--6 8  Continue to hold Xarelto  Continue heparin for DVT prophylaxis  · Will re-evaluate tomorrow for appropriateness of restarting home Xarelto and aspirin

## 2022-06-26 NOTE — ASSESSMENT & PLAN NOTE
Lab Results   Component Value Date    EGFR 18 06/26/2022    EGFR 30 06/25/2022    EGFR 26 06/24/2022    CREATININE 2 41 (H) 06/26/2022    CREATININE 1 64 (H) 06/25/2022    CREATININE 1 83 (H) 06/24/2022     · Associated with diabetes and hypertension, baseline appears to be between 1 6-2 0  · 1 83 on admission  · Creatinine worsened postoperatively to 2 41  · Patient is anemic this morning, will administer 1 unit of blood      · IVF isolate 50mL/hr  · Repeat BMP in AM

## 2022-06-26 NOTE — ASSESSMENT & PLAN NOTE
· Secondary to fall  · XR pelvis showed:  "Left intertrochanteric hip fracture"  · Orthopedics consulted and note appreciated    · S/p IM nail 6/26  · Weight-bearing status per Orthopedics  · Multimodal pain regimen  · DVT prophylaxis:  Heparin due to SAHIL  · PT/OT

## 2022-06-26 NOTE — PLAN OF CARE
Problem: PHYSICAL THERAPY ADULT  Goal: Performs mobility at highest level of function for planned discharge setting  See evaluation for individualized goals  Description: Treatment/Interventions: Functional transfer training, LE strengthening/ROM, Elevations, Therapeutic exercise, Endurance training, Patient/family training, Equipment eval/education, Bed mobility, Gait training  Equipment Recommended: Amelia Vuong       See flowsheet documentation for full assessment, interventions and recommendations  6/26/2022 1700 by Wei Farah PT  Note: Prognosis: Fair  Problem List: Decreased strength, Decreased endurance, Impaired balance, Decreased mobility, Impaired vision, Orthopedic restrictions, Obesity, Pain  Assessment: Elvi Mc is a 68 y o  Female who [facility] on 6/24/2022 w/ c/o L hip pain s/p fall and diagnosis of closed L hip fx  POD 1: L femur IM nail  Orders for PT eval and treat received, w/ activity orders of activity as tolerated and TTWB LLE precautions  Comorbidities affecting pt at time of evaluation include: CAD, HTN, a-fib, DM, CKD, impaired vision  Personal factors affecting DC include: inaccessible home environment, lives in multi story house, ambulating w/ assistive device, stairs to enter home, inability to ambulate household distances, inability to navigate level surfaces w/o external assistance, positive fall history, visual impairments, inability to perform IADLs and inability to perform ADLs  At baseline, pt mobilizes independently w/ RW w/in house and manual WC in community, and reports 3 fall(s) in the previous 6 months  Upon evaluation, pt presents w/ the following deficits: weakness, impaired balance, decreased endurance, pain limiting functional mobility and limited tolerance to functional mobility   Pt currently requires mod Ax1-2 for bed mobility, max Ax2 for transfers, w/ pt unable to ambulate at this time/ Pt's clinical presentation is unstable/unpredictable due to need for assist w/ all phases of mobility when usually mobilizing independently, pain impacting overall mobility status, need for input for mobility technique, recent drastic decline in mobility compared to baseline and recent history of falls  Pt is at an increased risk of falls due to impaired balance, impaired vision, orthopedic restrictions, pain w/ mobility  From a PT/mobility standpoint, given the above findings, DC recommendation is for Post-acute inpatient rehabilitation  During this admission, pt would benefit from continued skilled inpatient PT in the acute care setting in order to address the above deficits to maximize function and mobility before DC from acute care  Barriers to Discharge: Inaccessible home environment        PT Discharge Recommendation: Post acute rehabilitation services          See flowsheet documentation for full assessment

## 2022-06-26 NOTE — PHYSICAL THERAPY NOTE
PHYSICAL THERAPY EVALUATION NOTE          Patient Name: Yandel Quintero  PVPHP'U Date: 2022      AGE:   68 y o  Mrn:   [id number]  ADMIT DX:  Head injury [S09 [id number]]  Closed displaced intertrochanteric fracture of left femur, initial encounter ([facility] ) Altaf Hayden  Fall, initial encounter [W19  XXXA]  Closed fracture of right hip, initial encounter ([facility] ) [S72 001A]    History reviewed  No pertinent past medical history  Length Of Stay: 2  PHYSICAL THERAPY EVALUATION :   Patient's identity confirmed via 2 patient identifiers (full name and ) at start of session       22 1509   PT Last Visit   PT Visit Date 22   Note Type   Note type Evaluation   Pain Assessment   Pain Assessment Tool FLACC   Pain Location/Orientation Orientation: Left; Location: Leg  (lateral L thigh)   Pain Onset/Description Onset: Ongoing   Effect of Pain on Daily Activities limits pt's tolerance to functional mobility and overall ease of movement   Patient's Stated Pain Goal No pain   Hospital Pain Intervention(s) Repositioned; Ambulation/increased activity   Pain Rating: FLACC (Rest) - Face 0   Pain Rating: FLACC (Rest) - Legs 0   Pain Rating: FLACC (Rest) - Activity 0   Pain Rating: FLACC (Rest) - Cry 0   Pain Rating: FLACC (Rest) - Consolability 0   Score: FLACC (Rest) 0   Pain Rating: FLACC (Activity) - Face 0   Pain Rating: FLACC (Activity) - Legs 0   Pain Rating: FLACC (Activity) - Activity 1   Pain Rating: FLACC (Activity) - Cry 1   Pain Rating: FLACC (Activity) - Consolability 1   Score: FLACC (Activity) 3   Restrictions/Precautions   Weight Bearing Precautions Per Order Yes   LLE Weight Bearing Per Order (S)  TTWB   Other Precautions Chair Alarm; Bed Alarm;WBS;Multiple lines; Fall Risk;Pain;Visual impairment  (legally blind)   Home Living   Type of [address] [place] [address] Multi-level;Stairs to enter with rails  (1 [address], 8-10 steps to basement apt where pt lives w/ mother) Bathroom Shower/Tub Tub/shower unit   Bathroom Toilet Raised   Bathroom Equipment Shower chair;Grab bars around [employer]  (bedside commode)   [facility]; Wheelchair-manual  (RW in house, man WC in community)   Additional Comments Pt lives w/ family in a multi-level house w/ 1 [address] and 8-10 steps to access basement apt where pt resides w/ her mother (son and family live on 1st and 2nd floor)  Pt reports 1st floor set-up available w/ "guestroom" and full bathroom available on main level   Prior Function   Level of [place] Independent with ADLs and functional mobility; Needs assistance with IADLs   Lives With Son;Family  (daughter-in-law)   Receives Help From Family   ADL Assistance Needs assistance  (DIL assists)   IADLs Needs assistance   Falls in the last 6 months 1 to 4  (3 per pt)   Comments At baseline pt ambulates w/ RW w/in house and manual WC in community, requries assistance w/ bathing, dressing (due to visual impairment)   General   Additional Pertinent History POD 1: L femur IM nail; TTWB LLE per Ortho   Family/Caregiver Present No   Cognition   Overall Cognitive Status WFL   Arousal/Participation Cooperative   Orientation Level Oriented X4   Memory Within functional limits   Following Commands Follows one step commands without difficulty   Comments Pt ID via name and ; pt agreeable to PT eval and OOB mobility   Strength RLE   R Knee Flexion 3/5   R Knee Extension 3+/5   R Ankle Dorsiflexion 3+/5   R Ankle Plantar Flexion 3+/5   Strength LLE   L Knee Flexion 3-/5   L Knee Extension 3-/5   L Ankle Dorsiflexion 3+/5   L Ankle Plantar Flexion 3+/5   Vision-Basic Assessment   Visual History   (legally blind)   Patient Visual Report   (pt able to see writing on TV and read wall clock correctly)   Light Touch   RLE Light Touch Grossly intact   LLE Light Touch Grossly intact   Bed Mobility   Supine to Sit 3  Moderate assistance   Additional items HOB elevated; Bedrails; Increased time required;Verbal cues;LE management;Assist x 1   Sit to Supine 3  Moderate assistance   Additional items Assist x 2;HOB elevated; Increased time required;Verbal cues;LE management   Additional Comments Pt able to maintain static sitting balance at EOB w/ supervision, required assistance for anterior translation at EOB to allow LE to touch floor, Dependent Ax2 to for repositioning towards HOB at end of session   Transfers   Sit to Stand 2  Maximal assistance   Additional items Assist x 2; Increased time required;Verbal cues   Stand to Sit 2  Maximal assistance   Additional items Assist x 2; Increased time required;Verbal cues   Additional Comments Pt educated on LLE TTWB WBS prior to mobility, VC and tactile cues provided throughout  Pt able to maintain static standing balance for approx 5-7 sec w/ mod Ax2 and BUE on RW  Pt limited due to c/o nausea w/ pt reporting symptoms subsidding once returned to EOB  Pt declined further OOB mobility at this time   Ambulation/Elevation   Gait pattern Not tested   Ambulation/Elevation Additional Comments Pt w/ limited tolerance to a standing position   Balance   Static Sitting Fair   Dynamic Sitting Fair -   Static Standing Zero  (Ax2)   Ambulatory   (unable to assess)   Endurance Deficit   Endurance Deficit Yes   Endurance Deficit Description limited tolerance to a standing position, limited overall tolerance to functional mobility   Activity Tolerance   Activity Tolerance Patient limited by fatigue;Patient limited by pain  (c/o nausea)   Medical Staff Made Aware KENNEY Retana, [address] [place] [address] assisted w/ functional mobility   Nurse Made Aware ALEX Osborne   Assessment   Prognosis Fair   Problem List Decreased strength;Decreased endurance; Impaired balance;Decreased mobility; Impaired vision;Orthopedic restrictions; Obesity;Pain   Assessment Susana Alex Maurice Patel is a 68 y o   Female who [facility] on 6/24/2022 w/ c/o L hip pain s/p fall and diagnosis of closed L hip fx  POD 1: L femur IM nail  Orders for PT eval and treat received, w/ activity orders of activity as tolerated and TTWB LLE precautions  Comorbidities affecting pt at time of evaluation include: CAD, HTN, a-fib, DM, CKD, impaired vision  Personal factors affecting DC include: inaccessible home environment, lives in multi story house, ambulating w/ assistive device, stairs to enter home, inability to ambulate household distances, inability to navigate level surfaces w/o external assistance, positive fall history, visual impairments, inability to perform IADLs and inability to perform ADLs  At baseline, pt mobilizes independently w/ RW w/in house and manual WC in community, and reports 3 fall(s) in the previous 6 months  Upon evaluation, pt presents w/ the following deficits: weakness, impaired balance, decreased endurance, pain limiting functional mobility and limited tolerance to functional mobility  Pt currently requires mod Ax1-2 for bed mobility, max Ax2 for transfers, w/ pt unable to ambulate at this time/ Pt's clinical presentation is unstable/unpredictable due to need for assist w/ all phases of mobility when usually mobilizing independently, pain impacting overall mobility status, need for input for mobility technique, recent drastic decline in mobility compared to baseline and recent history of falls  Pt is at an increased risk of falls due to impaired balance, impaired vision, orthopedic restrictions, pain w/ mobility  From a PT/mobility standpoint, given the above findings, DC recommendation is for Post-acute inpatient rehabilitation  During this admission, pt would benefit from continued skilled inpatient PT in the acute care setting in order to address the above deficits to maximize function and mobility before DC from acute care     Barriers to Discharge Inaccessible home environment   Goals   Patient Goals to go home   STG Expiration Date 07/06/22   Short Term Goal #1 Pt will: perform bed mobility w/ supervision to decrease pt's burden of care; perform transfers w/ min Ax1 to increase pt's OOB mobility; self propel at least 150' in [facility] w/ supervision to increase pt's independence w/ functional mobility; sit OOB in recliner chair for at least 3hrs to increase pt's tolerance to an upright sitting position; ambulate at least 50' w/ LRAD and min Ax1 to increase pt's ambulatory endurance; negotiate 1 step(s) w/ UE support and min Ax1 to facilitate pt returning to home living environment; increase all balance ratings by at least 1 grade to decrease pt's risk of falls   PT Treatment Day 0   Plan   Treatment/Interventions Functional transfer training;LE strengthening/ROM; Elevations; Therapeutic exercise; Endurance training;Patient/family training;Equipment eval/education; Bed mobility;Gait training   PT Frequency 4-6x/wk   Recommendation   PT Discharge Recommendation Post acute rehabilitation services   Equipment Recommended [facility] Recommended Wheeled walker   Change/add to [employer]? No   AM-PAC Basic Mobility Inpatient   Turning in Bed Without Bedrails 2   Lying on Back to Sitting on Edge of Flat Bed 2   Moving Bed to Chair 1   Standing Up From Chair 1   Walk in Room 1   Climb 3-5 Stairs 1   Basic Mobility Inpatient Raw Score 8   Highest Level Of Mobility   -[facility] Goal 3: Sit at edge of bed   -HL Achieved 3: Sit at edge of bed   End of Consult   Patient Position at End of Consult Supine;Bed/Chair alarm activated; All needs within reach  (BLE SCDs applied)       The patient's AM-PAC Basic Mobility Inpatient Short Form Raw Score is 8  A Raw score of less than or equal to 16 suggests the patient may benefit from discharge to post-acute rehabilitation services  Please also refer to the recommendation of the Physical Therapist for safe discharge planning      Pt would benefit from skilled inpatient PT during this admission in order to facilitate progress towards goals to maximize functional independence    DC rec: post acute rehab      Vee Leroy, PT, DPT  06/26/22

## 2022-06-27 ENCOUNTER — APPOINTMENT (INPATIENT)
Dept: ULTRASOUND IMAGING | Facility: HOSPITAL | Age: 77
DRG: 480 | End: 2022-06-27
Payer: COMMERCIAL

## 2022-06-27 PROBLEM — N17.9 ACUTE RENAL FAILURE SUPERIMPOSED ON STAGE 3 CHRONIC KIDNEY DISEASE (HCC): Status: ACTIVE | Noted: 2022-06-24

## 2022-06-27 LAB
ABO GROUP BLD BPU: NORMAL
ANION GAP SERPL CALCULATED.3IONS-SCNC: 8 MMOL/L (ref 4–13)
BACTERIA UR QL AUTO: ABNORMAL /HPF
BILIRUB UR QL STRIP: NEGATIVE
BPU ID: NORMAL
BUN SERPL-MCNC: 57 MG/DL (ref 5–25)
CALCIUM SERPL-MCNC: 7.2 MG/DL (ref 8.4–10.2)
CHLORIDE SERPL-SCNC: 102 MMOL/L (ref 96–108)
CLARITY UR: CLEAR
CO2 SERPL-SCNC: 25 MMOL/L (ref 21–32)
COLOR UR: YELLOW
CREAT SERPL-MCNC: 2.82 MG/DL (ref 0.6–1.3)
CROSSMATCH: NORMAL
ERYTHROCYTE [DISTWIDTH] IN BLOOD BY AUTOMATED COUNT: 14.6 % (ref 11.6–15.1)
GFR SERPL CREATININE-BSD FRML MDRD: 15 ML/MIN/1.73SQ M
GLUCOSE SERPL-MCNC: 175 MG/DL (ref 65–140)
GLUCOSE SERPL-MCNC: 188 MG/DL (ref 65–140)
GLUCOSE SERPL-MCNC: 216 MG/DL (ref 65–140)
GLUCOSE SERPL-MCNC: 220 MG/DL (ref 65–140)
GLUCOSE SERPL-MCNC: 228 MG/DL (ref 65–140)
GLUCOSE UR STRIP-MCNC: NEGATIVE MG/DL
HCT VFR BLD AUTO: 23 % (ref 34.8–46.1)
HGB BLD-MCNC: 7.6 G/DL (ref 11.5–15.4)
HGB UR QL STRIP.AUTO: NEGATIVE
KETONES UR STRIP-MCNC: NEGATIVE MG/DL
LEUKOCYTE ESTERASE UR QL STRIP: ABNORMAL
MCH RBC QN AUTO: 28.5 PG (ref 26.8–34.3)
MCHC RBC AUTO-ENTMCNC: 33 G/DL (ref 31.4–37.4)
MCV RBC AUTO: 86 FL (ref 82–98)
NITRITE UR QL STRIP: NEGATIVE
NON-SQ EPI CELLS URNS QL MICRO: ABNORMAL /HPF
PH UR STRIP.AUTO: 5.5 [PH]
PLATELET # BLD AUTO: 165 THOUSANDS/UL (ref 149–390)
PMV BLD AUTO: 11.6 FL (ref 8.9–12.7)
POTASSIUM SERPL-SCNC: 4.6 MMOL/L (ref 3.5–5.3)
PROT UR STRIP-MCNC: NEGATIVE MG/DL
RBC # BLD AUTO: 2.67 MILLION/UL (ref 3.81–5.12)
RBC #/AREA URNS AUTO: ABNORMAL /HPF
SODIUM SERPL-SCNC: 135 MMOL/L (ref 135–147)
SP GR UR STRIP.AUTO: 1.02 (ref 1–1.03)
UNIT DISPENSE STATUS: NORMAL
UNIT PRODUCT CODE: NORMAL
UNIT PRODUCT VOLUME: 350 ML
UNIT RH: NORMAL
UROBILINOGEN UR QL STRIP.AUTO: 0.2 E.U./DL
WBC # BLD AUTO: 9.34 THOUSAND/UL (ref 4.31–10.16)
WBC #/AREA URNS AUTO: ABNORMAL /HPF

## 2022-06-27 PROCEDURE — 97129 THER IVNTJ 1ST 15 MIN: CPT

## 2022-06-27 PROCEDURE — 97167 OT EVAL HIGH COMPLEX 60 MIN: CPT

## 2022-06-27 PROCEDURE — 80048 BASIC METABOLIC PNL TOTAL CA: CPT | Performed by: PHYSICIAN ASSISTANT

## 2022-06-27 PROCEDURE — 99024 POSTOP FOLLOW-UP VISIT: CPT | Performed by: PHYSICIAN ASSISTANT

## 2022-06-27 PROCEDURE — 82948 REAGENT STRIP/BLOOD GLUCOSE: CPT

## 2022-06-27 PROCEDURE — 99232 SBSQ HOSP IP/OBS MODERATE 35: CPT | Performed by: SURGERY

## 2022-06-27 PROCEDURE — 99232 SBSQ HOSP IP/OBS MODERATE 35: CPT | Performed by: INTERNAL MEDICINE

## 2022-06-27 PROCEDURE — 85027 COMPLETE CBC AUTOMATED: CPT | Performed by: PHYSICIAN ASSISTANT

## 2022-06-27 PROCEDURE — 76770 US EXAM ABDO BACK WALL COMP: CPT

## 2022-06-27 PROCEDURE — 81001 URINALYSIS AUTO W/SCOPE: CPT | Performed by: INTERNAL MEDICINE

## 2022-06-27 PROCEDURE — 99223 1ST HOSP IP/OBS HIGH 75: CPT | Performed by: INTERNAL MEDICINE

## 2022-06-27 RX ORDER — INSULIN GLARGINE 100 [IU]/ML
25 INJECTION, SOLUTION SUBCUTANEOUS
Status: DISCONTINUED | OUTPATIENT
Start: 2022-06-27 | End: 2022-06-30 | Stop reason: HOSPADM

## 2022-06-27 RX ORDER — POLYETHYLENE GLYCOL 3350 17 G/17G
17 POWDER, FOR SOLUTION ORAL DAILY
Status: DISCONTINUED | OUTPATIENT
Start: 2022-06-27 | End: 2022-06-30 | Stop reason: HOSPADM

## 2022-06-27 RX ORDER — INSULIN LISPRO 100 [IU]/ML
2 INJECTION, SOLUTION INTRAVENOUS; SUBCUTANEOUS
Status: DISCONTINUED | OUTPATIENT
Start: 2022-06-27 | End: 2022-06-30 | Stop reason: HOSPADM

## 2022-06-27 RX ORDER — NIFEDIPINE 60 MG/1
60 TABLET, EXTENDED RELEASE ORAL DAILY
Status: DISCONTINUED | OUTPATIENT
Start: 2022-06-27 | End: 2022-06-28

## 2022-06-27 RX ADMIN — SENNOSIDES AND DOCUSATE SODIUM 1 TABLET: 50; 8.6 TABLET ORAL at 08:18

## 2022-06-27 RX ADMIN — HEPARIN SODIUM 5000 UNITS: 5000 INJECTION INTRAVENOUS; SUBCUTANEOUS at 21:26

## 2022-06-27 RX ADMIN — INSULIN LISPRO 2 UNITS: 100 INJECTION, SOLUTION INTRAVENOUS; SUBCUTANEOUS at 08:19

## 2022-06-27 RX ADMIN — INSULIN LISPRO 1 UNITS: 100 INJECTION, SOLUTION INTRAVENOUS; SUBCUTANEOUS at 17:48

## 2022-06-27 RX ADMIN — NIFEDIPINE 60 MG: 60 TABLET, FILM COATED, EXTENDED RELEASE ORAL at 08:26

## 2022-06-27 RX ADMIN — FOLIC ACID TAB 400 MCG 400 MCG: 400 TAB at 08:20

## 2022-06-27 RX ADMIN — SENNOSIDES AND DOCUSATE SODIUM 1 TABLET: 50; 8.6 TABLET ORAL at 17:47

## 2022-06-27 RX ADMIN — ACETAMINOPHEN 975 MG: 325 TABLET, FILM COATED ORAL at 12:49

## 2022-06-27 RX ADMIN — LIDOCAINE 5% 1 PATCH: 700 PATCH TOPICAL at 08:18

## 2022-06-27 RX ADMIN — CARVEDILOL 25 MG: 12.5 TABLET, FILM COATED ORAL at 17:47

## 2022-06-27 RX ADMIN — HEPARIN SODIUM 5000 UNITS: 5000 INJECTION INTRAVENOUS; SUBCUTANEOUS at 04:55

## 2022-06-27 RX ADMIN — INSULIN LISPRO 2 UNITS: 100 INJECTION, SOLUTION INTRAVENOUS; SUBCUTANEOUS at 12:49

## 2022-06-27 RX ADMIN — Medication 2000 UNITS: at 08:18

## 2022-06-27 RX ADMIN — CALCIUM CHLORIDE 1 G: 100 INJECTION INTRAVENOUS; INTRAVENTRICULAR at 06:37

## 2022-06-27 RX ADMIN — INSULIN LISPRO 2 UNITS: 100 INJECTION, SOLUTION INTRAVENOUS; SUBCUTANEOUS at 21:29

## 2022-06-27 RX ADMIN — RANOLAZINE 500 MG: 500 TABLET, EXTENDED RELEASE ORAL at 08:18

## 2022-06-27 RX ADMIN — GABAPENTIN 100 MG: 100 CAPSULE ORAL at 21:26

## 2022-06-27 RX ADMIN — ACETAMINOPHEN 975 MG: 325 TABLET, FILM COATED ORAL at 21:26

## 2022-06-27 RX ADMIN — DOCUSATE SODIUM 100 MG: 100 CAPSULE, LIQUID FILLED ORAL at 17:47

## 2022-06-27 RX ADMIN — ACETAMINOPHEN 975 MG: 325 TABLET, FILM COATED ORAL at 04:55

## 2022-06-27 RX ADMIN — INSULIN GLARGINE 25 UNITS: 100 INJECTION, SOLUTION SUBCUTANEOUS at 21:32

## 2022-06-27 RX ADMIN — CARVEDILOL 25 MG: 12.5 TABLET, FILM COATED ORAL at 08:18

## 2022-06-27 RX ADMIN — HEPARIN SODIUM 5000 UNITS: 5000 INJECTION INTRAVENOUS; SUBCUTANEOUS at 12:50

## 2022-06-27 RX ADMIN — DOCUSATE SODIUM 100 MG: 100 CAPSULE, LIQUID FILLED ORAL at 08:18

## 2022-06-27 RX ADMIN — INSULIN LISPRO 2 UNITS: 100 INJECTION, SOLUTION INTRAVENOUS; SUBCUTANEOUS at 17:48

## 2022-06-27 NOTE — ASSESSMENT & PLAN NOTE
· Acute blood loss in the setting of chronic anemia  · History of chronic anemia hemoglobin is typically around 10  · Patient was 10 9 on admission  · Patient loss approximately 300 mL blood during her surgery  · 6/26 1 unit PRBCs  · Repeat hemoglobin responded to transfusion appropriately  · Continue to monitor for signs of symptomatic anemia though in the setting beta-blocker use patient will not likely be tachycardic

## 2022-06-27 NOTE — PLAN OF CARE
Problem: MOBILITY - ADULT  Goal: Maintain or return to baseline ADL function  Description: INTERVENTIONS:  -  Assess patient's ability to carry out ADLs; assess patient's baseline for ADL function and identify physical deficits which impact ability to perform ADLs (bathing, care of mouth/teeth, toileting, grooming, dressing, etc )  - Assess/evaluate cause of self-care deficits   - Assess range of motion  - Assess patient's mobility; develop plan if impaired  - Assess patient's need for assistive devices and provide as appropriate  - Encourage maximum independence but intervene and supervise when necessary  - Involve family in performance of ADLs  - Assess for home care needs following discharge   - Consider OT consult to assist with ADL evaluation and planning for discharge  - Provide patient education as appropriate  Outcome: Progressing  Goal: Maintains/Returns to pre admission functional level  Description: INTERVENTIONS:  - Perform BMAT or MOVE assessment daily    - Set and communicate daily mobility goal to care team and patient/family/caregiver     - Collaborate with rehabilitation services on mobility goals if consulted  - Out of bed for toileting  - Record patient progress and toleration of activity level   Outcome: Progressing     Problem: PAIN - ADULT  Goal: Verbalizes/displays adequate comfort level or baseline comfort level  Description: Interventions:  - Encourage patient to monitor pain and request assistance  - Assess pain using appropriate pain scale  - Administer analgesics based on type and severity of pain and evaluate response  - Implement non-pharmacological measures as appropriate and evaluate response  - Consider cultural and social influences on pain and pain management  - Notify physician/advanced practitioner if interventions unsuccessful or patient reports new pain  Outcome: Progressing     Problem: INFECTION - ADULT  Goal: Absence or prevention of progression during hospitalization  Description: INTERVENTIONS:  - Assess and monitor for signs and symptoms of infection  - Monitor lab/diagnostic results  - Monitor all insertion sites, i e  indwelling lines, tubes, and drains  - Monitor endotracheal if appropriate and nasal secretions for changes in amount and color  - [place] appropriate cooling/warming therapies per order  - Administer medications as ordered  - Instruct and encourage patient and family to use good hand hygiene technique  - Identify and instruct in appropriate isolation precautions for identified infection/condition  Outcome: Progressing     Problem: SAFETY ADULT  Goal: Patient will remain free of falls  Description: INTERVENTIONS:  - Educate patient/family on patient safety including physical limitations  - Instruct patient to call for assistance with activity   - Consult OT/PT to assist with strengthening/mobility   - Keep Call bell within reach  - Keep bed low and locked with side rails adjusted as appropriate  - Keep care items and personal belongings within reach  - Initiate and maintain comfort rounds  - Make Fall Risk Sign visible to staff  - Apply yellow socks and bracelet for high fall risk patients  - Consider moving patient to room near nurses station  Outcome: Progressing     Problem: DISCHARGE PLANNING  Goal: Discharge to home or other facility with appropriate resources  Description: INTERVENTIONS:  - Identify barriers to discharge w/patient and caregiver  - Arrange for needed discharge resources and transportation as appropriate  - Identify discharge learning needs (meds, wound care, etc )  - Arrange for interpretive services to assist at discharge as needed  - Refer to Case Management Department for coordinating discharge planning if the patient needs post-hospital services based on physician/advanced practitioner order or complex needs related to functional status, cognitive ability, or social support system  Outcome: Progressing

## 2022-06-27 NOTE — PROGRESS NOTES
Progress Note - Geriatric Medicine   Natasha Rivera 68 y o  female MRN: [id number]  Unit/Bed#: W -01 Encounter: [id number]      Assessment/Plan:  1 -closed left hip fracture  -secondary to fall patient with left intertrochanteric fracture had intramedullary nail placed on June 26th she states that the pain has resolved  Currently on DVT prophylaxis  Patient placed on heparin because of the patient's acute kidney injury  2 -anemia  -patient has had 1 unit of packed red blood cells her hemoglobin prior to 0 her operation was 10 9 then postop she dropped to 6 8 days she is 7 6     3  -hypertension  -patient has elements of orthostasis she states that yesterday on standing she did feel lightheaded  Patient is continuing on Coreg and nifedipine lisinopril currently on hold  4  -acute kidney injury on chronic kidney disease -  patient's GFR has deteriorated from 30 to 15 today patient's creatinine has gone from 1 64 on the 25th of June to 2 82 today  Suspect element of hypotension with development of acute tubular necrosis  Will need to continue with hydration keeping pressure is stable  Patient's baseline creatinine between 1 6 and 1 8 prior to her surgery  5  -legally blind -patient will need assistance with her feedings she was having a very difficult time eating today  Have informed nursing and ordered assistance for patient    6 -coronary artery disease    7 -diabetes mellitus type 2 -patient currently on insulin coverage    Recommendations    1 -gentle hydration    2 -monitor for orthostasis    3 -assistance with feedings      Subjective:   Patient tells me she is having difficulty feeding herself  She also relates the pain on her hip has resolved  She also tells me that 1 day stand her she feels lightheaded  Review of Systems   Constitutional: Negative  HENT: Negative  Eyes:        Patient is legally blind   Respiratory: Negative  Cardiovascular: Negative      Gastrointestinal: Negative  Endocrine:        History of diabetes mellitus   Genitourinary: Negative  Musculoskeletal: Positive for arthralgias and gait problem  Skin: Negative  Hematological: Negative  Psychiatric/Behavioral: Negative  Objective:     Vitals: Blood pressure (!) 130/101, pulse 60, temperature 98 °F (36 7 °C), resp  rate 18, height 5' 2" (1 575 m), weight 84 4 kg (186 lb 1 1 oz), SpO2 95 %  ,Body mass index is 34 03 kg/m²  Intake/Output Summary (Last 24 hours) at 6/27/2022 0846  Last data filed at 6/26/2022 1314  Gross per 24 hour   Intake 830 ml   Output --   Net 830 ml       Current Medications: Reviewed    Physical Exam:   Physical Exam  Constitutional:       Appearance: She is obese  HENT:      Head: Normocephalic and atraumatic  Mouth/Throat:      Mouth: Mucous membranes are moist       Pharynx: Oropharynx is clear  Eyes:      Comments: Patient is legally blind   Cardiovascular:      Rate and Rhythm: Normal rate and regular rhythm  Pulses: Normal pulses  Heart sounds: Normal heart sounds  Pulmonary:      Effort: Pulmonary effort is normal       Breath sounds: Normal breath sounds  Abdominal:      General: Bowel sounds are normal       Palpations: Abdomen is soft  Musculoskeletal:         General: Normal range of motion  Cervical back: Neck supple  Skin:     General: Skin is warm  Neurological:      General: No focal deficit present  Mental Status: She is alert  Psychiatric:         Behavior: Behavior normal           Invasive Devices  Report    Peripheral Intravenous Line  Duration           Peripheral IV 06/24/22 Left Antecubital 2 days    Peripheral IV 06/25/22 Right Antecubital 1 day                Lab, Imaging and other studies: I have personally reviewed pertinent reports

## 2022-06-27 NOTE — OCCUPATIONAL THERAPY NOTE
Occupational Therapy Evaluation     Patient Name: Rosey Ospina  YDPWV'M Date: 6/27/2022  Problem List  Principal Problem:    Closed left hip fracture [facility])  Active Problems:    Fall    Blind    Diabetes mellitus type 2, insulin dependent ([facility] [address] )    Acute renal failure superimposed on stage 3 chronic kidney disease (HCC)    Hypertension    Tachy-sarah beth syndrome (HCC)    CAD (coronary artery disease)    Atrial fibrillation (HCC)    [facility]    Pacemaker    H/O heart artery stent    Anemia    Past Medical History  History reviewed  No pertinent past medical history  Past Surgical History  Past Surgical History:   Procedure Laterality Date    EYE SURGERY               06/27/22 0848   OT Last Visit   OT Visit Date 06/27/22   Note Type   Note type Evaluation   Restrictions/Precautions   Weight Bearing Precautions Per Order Yes   LLE Weight Bearing Per Order (S)  TTWB   Other Precautions Cognitive; Chair Alarm; Bed Alarm; Fall Risk;Pain;Visual impairment  (legally blind)   Pain Assessment   Pain Assessment Tool 0-10   Pain Score 5   Pain Location/Orientation Orientation: Left; Location: Leg   Home Living   Type of [address] [place] [address] Multi-level;Stairs to enter with rails  (1 ALONDRA, 10 steps to pt's basement apartment  Pt reports 1st floor set-up available w/ "guestroom" and full bathroom available on main level)   Bathroom Shower/Tub Tub/shower unit   Bathroom Toilet Raised   Bathroom Equipment Shower chair;Grab bars around toilet;Commode   [address]; Wheelchair-manual   Additional Comments use of RW in home and w/c in community   Prior Function   Lives With Son;Family  (Son, ROSA, + 80 yr old mother + grandchildren)   Receives Help From Family   ADL Assistance Needs assistance  ((A) with LB dressing and showering)   IADLs Needs assistance   Falls in the last 6 months   (4-5 falls)   Vocational Retired  (worked in a )   Lifestyle   Autonomy PTA pt living [facility] family [facility], pt requires (A) with ADLs and IADLs, (+)falls, (-)drives, use of RW at baseline   Reciprocal Relationships supportive DIL and mother   Service to Others retired, worked in a    [employer] enjoys spending time with her 9 yr old granddaughter, and talking about her time that she lived in [address] [place] [address] "You need to be kind"   ADL   Eating Assistance 5  Supervision/Setup   Eating Deficit Setup  (TC and VC for location of food on plate)   Grooming Assistance 5  Supervision/Setup   UB Bathing Assistance 5  Supervision/Setup   LB [address] 2  Maximal Assistance   [address] 4  Minimal Assistance   UB Dressing Deficit   (serena washing[place])   LB Dressing Assistance 1  Total Assistance   LB Dressing Deficit Don/doff R sock; Don/doff L sock   Toileting Assistance  2  Maximal Assistance   Bed Mobility   Supine to Sit 2  Maximal assistance   Additional items Assist x 1; Increased time required   Sit to Supine 2  Maximal assistance   Additional items Assist x 1; Increased time required;Verbal cues;LE management   Additional Comments Sitting EOB ~ 15 min, (S)   Transfers   Sit to Stand 2  Maximal assistance   Additional items Assist x 2; Increased time required;Verbal cues   Stand to Sit 2  Maximal assistance   Additional items Assist x 2; Increased time required;Verbal cues   Additional Comments A for maintaining TTWB  Able to stand ~ 30 sec     Functional Mobility   Additional Comments unable to advance   Balance   Static Sitting Fair +   Dynamic Sitting Fair -   Static Standing Zero   Activity Tolerance   Medical Staff Made Aware ALEX KENDALL Assessment   RUE Assessment WFL   LUE Assessment   LUE Assessment WFL   Hand Function   Gross Motor Coordination Functional   Fine Motor Coordination Functional   Vision-Basic Assessment   Patient Visual Report   (Pt is legally blind)   Vision - Complex Assessment   Additional Comments Pt reports being able to see outlines and able to read some words on the tv, increased difficulty with closer objects   Cognition   Overall Cognitive Status Impaired   Arousal/Participation Alert; Cooperative   Attention Attends with cues to redirect   Orientation Level Oriented X4   Memory Decreased short term memory;Decreased recall of recent events;Decreased recall of precautions   Following Commands Follows one step commands with increased time or repetition   Comments Pt with limited problem solving/safety awareness  Limited STM   Cognition Assessment Tools Other (Comment)  (Short Blessed Test)   Score 8  (Pt scoring 8  Pt unable to recall name with address and street name  Pt with x1 mistake with counting backwards from 20 to 1 and with naming the months in reverse)   Assessment   Limitation Decreased ADL status; Decreased UE strength;Decreased Safe judgement during ADL;Decreased endurance;Decreased cognition;Decreased self-care trans;Decreased high-level ADLs   Prognosis Good   Assessment Pt is a 68 y o  female seen for OT evaluation s/p admission to [address] [place] [address] on 6/24/2022 due to fall at home  Pt diagnosed with Closed left hip fracture ([facility] [address] ) pt is currently POD #2 L TFN long nail  Pt is TTWB  Pt has a significant PMH impacting occupational performance including: blindness, DM II, HTN, tachy-sarah beth syndrome, CAD, MI, pacemaker, anemia  Pt with no recent admissions in the last 2 months  Pt with active OT evaluation and treatment orders and activity orders for Activity as tolerated  PTA pt living with family in [facility], pt requires (A) with ADLs and IADLs, (+)falls, (-)drives, use of RW at baseline  Pt is motivated to return to home  Personal and environmental factors supporting pt at time of IE include social support and attitude towards recovery   Personal and environmental factors inhibiting engagement in occupations include advanced age, inaccessible home environment, difficulty completing ADLs and difficulty completing IADLs  During evaluation pt performed as is outlined above in flowsheet  Pt required VC for safety and VC for attention to task  Standardized assessments used to assist in identifying performance deficits include AMPAC 6-Clicks and Barthel ADL Index  Performance deficits that affect the pts occupational performance during the initial evaluation include impaired balance, functional mobility, endurance, activity tolerance, functional standing tolerance and overall strength, attention to task, safety awareness and insight into deficits  Based on pts functional performance and deficits the following occupations will be addressed in OT treatments in order to maximize pts independence and overall occupational performance: grooming, bathing/showering, toileting and toilet hygiene, dressing and functional mobility  Goals are listed below  Upon discharge from acute care setting recommend d/c to [address] This evaluation required an extensive review of medical and/or therapy records and additional review of physical, cognitive and psychosocial history related to functional performance  Based upon functional performance deficits and assessments, this evaluation has been identified as a high complexity evaluation  Goals   Patient Goals to go home   LTG Time Frame 10-14   Long Term Goal see goals listed below   Plan   Treatment Interventions ADL retraining;Functional transfer training; Endurance training;Cognitive reorientation;Patient/family training;Equipment evaluation/education; Compensatory technique education; Energy conservation; Activityengagement   Goal Expiration Date 07/07/22   OT Treatment Day 0   OT Frequency 3-5x/wk   Recommendation   OT Discharge Recommendation Post acute rehabilitation services   AM-PAC Daily Activity Inpatient   Lower Body Dressing 1   Bathing 2   Toileting 1   Upper Body Dressing 2   Grooming 3   Eating 3   Daily Activity Raw Score 12   Daily Activity Standardized Score (Calc for Raw Score >=11) 30 6   AM-PAC Applied Cognition Inpatient   Following a Speech/Presentation 2   Understanding Ordinary Conversation 3   Taking Medications 2   Remembering Where Things Are Placed or Put Away 1   Remembering List of 4-5 Errands 1   Taking Care of Complicated Tasks 1   Applied Cognition Raw Score 10   Applied Cognition Standardized Score 24 98     GOALS:   Goals established in order to promote pt's established goal of going home     -Patient will perform grooming tasks sitting EOB with overall Supervision    -Patient will be Supervision with UB ADLs using AE and AD as needed     -Patient will be Min A  with LB ADLs with use of AE and AD as needed    -Patient will complete toileting w/ Mod A  w/ G hygiene/thoroughness    -Patient will demonstrate Mod A x 1 with bed mobility for ability to manage own comfort and initiate OOB tasks      -Patient will perform functional transfers with Mod A x 1 to/from all surfaces using DME as needed    -Patient will be Mod A x 1 with functional mobility to/from [facility] for increased independence with toileting tasks    -Patient will tolerate therapeutic activities for greater than 30 min, in order to increase tolerance for functional activities      -Patient will engage in ongoing cognitive assessment in order to assist with safe discharge planning/recommendations  The patient's raw score on the AM-PAC Daily Activity inpatient short form is 12, standardized score is 30 6, less than 39 4  Patients at this level are likely to benefit from discharge to post-acute rehabilitation services  Please refer to the recommendation of the Occupational Therapist for safe discharge planning        At the end of the session, all needs met and pt supine in bed, bed alarm activated, SCDs on BLE and turned on, HOB elevated, and call bell within reach    [facility], OTR/L

## 2022-06-27 NOTE — CONSULTS
[address] 68 y o  female MRN: [id number]  Unit/Bed#: W -01 Encounter: [id number]    ASSESSMENT and PLAN:    68 y o  female with past medical history of CKD stage 3, diabetes, blind, CAD, hypertension, tachy-sarah beth syndrome with pacemaker, TIA, paroxysmal atrial flutter, who was admitted to [address] [place] [address] after presenting with fall  A renal consultation is requested today for assistance in the management of acute on chronic kidney injury  1) acute on chronic kidney injury stage III    -outpatient nephrologist Dr Radha Galindo - last seen in February 2022  -February 2022-creatinine rising to 2 2 mg/dL and lisinopril was held  -prior to 2022 visit, patient was last seen in 2020 at which time baseline creatinine was in the 1s mg/dL and then patient was lost to follow-up until 2022    -repeat creatinine is 1 6 mg/dL in February 2022  -admission creatinine 1 8 mg/dL on June 24th  -creatinine rising to 2 8 mg/dL on June 27  -patient had intraoperative hypotension to less than 746 systolic briefly  -postoperative had marginal blood pressures intermittently last on June 25th    Etiology-likely ATN in the setting of hypotension/acute anemia  Plan  -I/O; avoid nephrotoxic agents  -avoid hypotension  -check PVR, urinalysis  -change hold parameters on nifedipine to avoid hypotension  -on intravenous fluids per primary team-no objection from the renal standpoint to continue for now  -will check kidney and bladder ultrasound  -check ionized calcium in a m  Update-distended bladder and patient was not able to void when ultrasound is being completed  I have reached out to the trauma advanced practitioner  Recommending straight catheterization one time followed by urinary retention protocol      2) fall    -patient is status post OR on June 25th-with insertion of nail  -CT head no acute changes  -per primary team    3) electrolytes-stable sodium and potassium    4) acid/base-stable bicarbonate    5) hypertension    -is on carvedilol and nifedipine at home  -lisinopril was held in February 2022    6) anemia    -hemoglobin June 24th was 10 9 mg/dL decreased to 6 8 on June 26  -status post transfusion June 26 given acute anemia postoperatively    HISTORY OF PRESENT ILLNESS:  Requesting Physician: Page Esquivel To,   Reason for Consult:  Acute kidney injury    Caterina Cardenas is a 68 y o  female with past medical history of CKD stage 3, diabetes, blind, CAD, hypertension, tachy-sarah beth syndrome with pacemaker, TIA, paroxysmal atrial flutter, who was admitted to [facility] after presenting with fall  A renal consultation is requested today for assistance in the management of acute on chronic kidney injury  Patient is status post fall with left hip pain  Was initially admitted to trauma service team   Patient was found to have left intratrochanteric femur fracture  Orthopedic team was also brought on board  Patient went to the OR on June 25th  Patient denies NSAID use at home  Denies fevers, chills, nausea, vomiting at home  PAST MEDICAL HISTORY:  History reviewed  No pertinent past medical history  PAST SURGICAL HISTORY:  Past Surgical History:   Procedure Laterality Date    EYE SURGERY         ALLERGIES:  No Known Allergies    SOCIAL HISTORY:  Social History     Substance and Sexual Activity   Alcohol Use Never     Social History     Substance and Sexual Activity   Drug Use Never     Social History     Tobacco Use   Smoking Status Never Smoker   Smokeless Tobacco Never Used       FAMILY HISTORY:  History reviewed  No pertinent family history      MEDICATIONS:    Current Facility-Administered Medications:     acetaminophen (TYLENOL) tablet 975 mg, 975 mg, Oral, Q8H [address], [facility], [place]-, 975 mg at 06/27/22 0455    calcium carbonate (TUMS) chewable tablet 1,000 mg, 1,000 mg, Oral, Daily PRN, Laurel Rdz PA-C    carvedilol (COREG) tablet 25 mg, 25 mg, Oral, BID With Meals, [place], [place], 25 mg at 06/26/22 1731    cholecalciferol (VITAMIN D3) tablet 2,000 Units, 2,000 Units, Oral, Daily, [place], [place], 2,000 Units at 06/26/22 0803    docusate sodium (COLACE) capsule 100 mg, 100 mg, Oral, BID, [facility] Velarde PA-C, 100 mg at 14/77/14 6288    folic acid (FOLVITE) tablet 400 mcg, 400 mcg, Oral, Daily, [facility] OntarioELLYN, 400 mcg at 06/26/22 0805    gabapentin (NEURONTIN) capsule 100 mg, 100 mg, Oral, HS, [employer], 100 mg at 06/26/22 2154    heparin (porcine) subcutaneous injection 5,000 Units, 5,000 Units, Subcutaneous, Q8H [facility], [place], [place], 5,000 Units at 06/27/22 0455    HYDROmorphone HCl (DILAUDID) injection 0 2 mg, 0 2 mg, Intravenous, Q4H PRN, [employer]LLYN    insulin glargine (LANTUS) subcutaneous injection 20 Units 0 2 mL, 20 Units, Subcutaneous, HS, Anton Isabel PA-C, 20 Units at 06/26/22 2155    insulin lispro (HumaLOG) 100 units/mL subcutaneous injection 1-6 Units, 1-6 Units, Subcutaneous, 4x Daily (AC & HS), 4 Units at 06/26/22 2155 **AND** Fingerstick Glucose (POCT), , , 4x Daily AC and at bedtime, Anton Isabel PA-C    lidocaine (LIDODERM) 5 % patch 1 patch, 1 patch, Topical, Daily, [place], [place], 1 patch at 06/26/22 0804    meclizine (ANTIVERT) tablet 12 5 mg, 12 5 mg, Oral, Q8H PRN, [facility] ELLYN Velarde    multi-electrolyte (PLASMALYTE-A/ISOLYTE-S PH 7 4) IV solution, 50 mL/hr, Intravenous, Continuous, TENNILLE Masterson, Last Rate: 50 mL/hr at 06/26/22 1311, 50 mL/hr at 06/26/22 1311    NIFEdipine (PROCARDIA XL) 24 hr tablet 60 mg, 60 mg, Oral, Daily, [employer], PA-C, 60 mg at 06/26/22 0805    ondansetron (ZOFRAN) injection 4 mg, 4 mg, Intravenous, Q4H PRN, Branden Raya [place], PA-C    oxyCODONE (ROXICODONE) IR tablet 2 5 mg, 2 5 mg, Oral, Q4H PRN, [employer], PA-C, 2 5 mg at 06/25/22 0947   oxyCODONE (ROXICODONE) IR tablet 5 mg, 5 mg, Oral, Q4H PRN, Porsche Vee PA-C    ranolazine (RANEXA) 12 hr tablet 500 mg, 500 mg, Oral, Daily, Porsche Vee PA-C, 500 mg at 06/26/22 0803    senna-docusate sodium (SENOKOT S) 8 6-50 mg per tablet 1 tablet, 1 tablet, Oral, BID, Kelvin Vides, 1 tablet at 06/26/22 1731    REVIEW OF SYSTEMS:    All the systems were reviewed and were negative except as documented on the HPI      PHYSICAL EXAM:  Current Weight: Weight - Scale: 84 4 kg (186 lb 1 1 oz)  First Weight: Weight - Scale: 87 3 kg (192 lb 7 4 oz)  Vitals:    06/26/22 1448 06/26/22 2043 06/27/22 0600 06/27/22 0718   BP: (!) 134/40 104/67  (!) 130/101   BP Location: Left arm      Pulse: 63 59  60   Resp: 16 14  18   Temp: 98 4 °F (36 9 °C) 98 2 °F (36 8 °C)  98 °F (36 7 °C)   TempSrc: Oral      SpO2: 95% 94% 94% 95%   Weight:       Height:           Intake/Output Summary (Last 24 hours) at 6/27/2022 0802  Last data filed at 6/26/2022 1314  Gross per 24 hour   Intake 830 ml   Output --   Net 830 ml     Physical Exam  General: NAD  Skin: no rash  Eyes: anicteric sclera  ENT: moist mucous membrane  Neck: supple  Chest: CTA b/l, no ronchii, no wheeze, no rubs, no rales  CVS: s1s2, no murmur, no gallop, no rub  Abdomen: soft, nontender, nl sounds  Extremities:  Trace to 1+ edema LE b/l  : no head  Neuro: AAOX3  Psych: normal affect      Invasive Devices:      Lab Results:   Results from last 7 days   Lab Units 06/27/22  0453 06/26/22  1614 06/26/22  0550 06/25/22  0429 06/24/22  0856   WBC Thousand/uL 9 34  --  7 47 7 32 8 37   HEMOGLOBIN g/dL 7 6* 7 7* 6 8* 8 8* 10 9*   I STAT HEMOGLOBIN g/dl  --   --   --   --  11 6   HEMATOCRIT % 23 0* 24 0* 21 7* 29 4* 34 5*   HEMATOCRIT, ISTAT %  --   --   --   --  34*   PLATELETS Thousands/uL 165  --  155 190 233   POTASSIUM mmol/L 4 6  --  5 0 4 2 4 5   CHLORIDE mmol/L 102  --  104 106 108   CO2 mmol/L 25  --  24 28 28   CO2, I-STAT mmol/L  -- --   --   --  30   BUN mg/dL 57*  --  45* 27* 24   CREATININE mg/dL 2 82*  --  2 41* 1 64* 1 83*   CALCIUM mg/dL 7 2*  --  7 5* 7 9* 9 0   ALK PHOS U/L  --   --   --   --  54   ALT U/L  --   --   --   --  8   AST U/L  --   --   --   --  11*   GLUCOSE, ISTAT mg/dl  --   --   --   --  78

## 2022-06-27 NOTE — ASSESSMENT & PLAN NOTE
· Continue home carvedilol and nifedipine  · Continue to hold home lisinopril due to normal BP s/p orthopedic intervention and concern for SAHIL    · Continue to monitor BP and kidney functioning

## 2022-06-27 NOTE — ASSESSMENT & PLAN NOTE
Lab Results   Component Value Date    EGFR 15 06/27/2022    EGFR 18 06/26/2022    EGFR 30 06/25/2022    CREATININE 2 82 (H) 06/27/2022    CREATININE 2 41 (H) 06/26/2022    CREATININE 1 64 (H) 06/25/2022     · Associated with diabetes and hypertension, baseline Cr appears to be between 1 6-2 0  · 1 83 on admission  · Creatinine worsened postoperatively to 2 41   · 6/26  ABLA likely caused post-op SAHIL, 1 unit of blood transfused  · Continue IVF isolate 50mL/hr  · Nephrology consult placed  · Continue to monitor Cr for improvement

## 2022-06-27 NOTE — CASE MANAGEMENT
Case Management Discharge Planning Note    Patient name Paolo Bui  Location W /W -37 MRN [id number]  : 1945 Date 2022       Current Admission Date: 2022  Current Admission Diagnosis:Closed left hip fracture [facility])   Patient Active Problem List    Diagnosis Date Noted    Anemia 2022    MI (myocardial infarction) ([facility] [address] ) 2022    Pacemaker 2022    H/O heart artery stent 2022    Fall 2022    [facility]    Blind 2022    Diabetes mellitus type 2, insulin dependent ([place] [address] ) 2022    Acute renal failure superimposed on stage 3 chronic kidney disease ([facility] ) 2022    Hypertension 2022    Tachy-sarah beth syndrome ([facility] ) 2022    CAD (coronary artery disease) 2022    Atrial fibrillation ([place] [address] ) 2022      LOS (days): 3  Geometric Mean LOS (GMLOS) (days): 2 90  Days to GMLOS:-0 2     OBJECTIVE:  Risk of Unplanned Readmission Score: 17 6         Current admission status: Inpatient   Preferred Pharmacy: No Pharmacies Listed  Primary Care Provider: Sebastian Nnuez MD    Primary Insurance: [facility]  Secondary Insurance:     DISCHARGE DETAILS:    Discharge planning discussed with[de-identified] patient and son, Adenike Calderon, at bedside  Freedom of Choice: Yes (re: rehab)  Comments - Freedom of Choice: agreeable for blanket referral to be sent; listing emailed to son at [email or web address]  CM contacted family/caregiver?: Yes (son at bedside)  Were Treatment Team discharge recommendations reviewed with patient/caregiver?: Yes  Did patient/caregiver verbalize understanding of patient care needs?: N/A- going to facility  Were patient/caregiver advised of the risks associated with not following Treatment Team discharge recommendations?: Yes    Contacts  Patient Contacts: simona Porter  Relationship to Patient[de-identified] Family  Contact Method:  In Person  Reason/Outcome: Continuity of Care, Emergency Contact, Referral, Discharge Planning              Other Referral/Resources/Interventions Provided:  Interventions: SNF, Short Term Rehab  Referral Comments: PT/OT marianna recommending rehab at discharge  Met with patient and son, Funmi Fuentes, at bedside to discuss same  Reviewed difference between acute and subacute rehab and discussed referrals being sent to subacute facilities at this time  Listing emailed to son at [email or web address] for his records  Patient and son in agreement for referrals to be sent; aware that insurance [employer]ell Cross is needed prior to any rehab transfer  Patient does report that she has her COVID vaccines and a booster (2/12/21, 3/12/21 and 12/10/21); reports that she is agreeable to get a fourth booster shot if needed for rehab placement  Referrals sent in [facility] to subacute facilities; awaiting responses      Would you like to participate in our [facility] service program?  : No - Declined    Treatment Team Recommendation: Short Term Rehab, SNF  Discharge Destination Plan[de-identified] Short Term Rehab, SNF  Transport at Discharge : BLS Ambulance                             IMM Given (Date):: 06/24/22

## 2022-06-27 NOTE — PROGRESS NOTES
[facility]  Progress Note - Natasha Rivera 1945, 68 y o  female MRN: [id number]  Unit/Bed#: W -01 Encounter: [id number]  Primary Care Provider: Mary Robbins MD   Date and time admitted to hospital: 6/24/2022  8:30 AM    Anemia  Assessment & Plan  · Acute blood loss in the setting of chronic anemia  · History of chronic anemia hemoglobin is typically around 10  · Patient was 10 9 on admission  · Patient loss approximately 300 mL blood during her surgery  · 6/26 1 unit PRBCs  · Repeat hemoglobin responded to transfusion appropriately  · Continue to monitor for signs of symptomatic anemia though in the setting beta-blocker use patient will not likely be tachycardic  Atrial fibrillation ([facility] )  Assessment & Plan  · Continue home Coreg  · Patient anemic--6 8  Continue to hold Xarelto  Continue heparin for DVT prophylaxis  · Will re-evaluate tomorrow for appropriateness of restarting home Xarelto and aspirin  CAD (coronary artery disease)  Assessment & Plan  · History of PCI in 2011  · Continue home Ranexa and Coreg    Tachy-sarah beth syndrome ([facility] )  Assessment & Plan  · Followed by [facility] cardiology  · Continue Coreg  · S/p pacemaker    Hypertension  Assessment & Plan  · Continue home carvedilol and nifedipine  · Continue to hold home lisinopril due to normal BP s/p orthopedic intervention and concern for SAHIL    · Continue to monitor BP and kidney functioning    Acute renal failure superimposed on stage 3 chronic kidney disease [facility])  Assessment & Plan  Lab Results   Component Value Date    EGFR 15 06/27/2022    EGFR 18 06/26/2022    EGFR 30 06/25/2022    CREATININE 2 82 (H) 06/27/2022    CREATININE 2 41 (H) 06/26/2022    CREATININE 1 64 (H) 06/25/2022     · Associated with diabetes and hypertension, baseline Cr appears to be between 1 6-2 0  · 1 83 on admission  · Creatinine worsened postoperatively to 2 41   · 6/26  ABLA likely caused post-op SAHIL, 1 unit of blood transfused  · Continue IVF isolate 50mL/hr  · Nephrology consult placed  · Continue to monitor Cr for improvement    Diabetes mellitus type 2, insulin dependent [facility])  Assessment & Plan  No results found for: HGBA1C    Recent Labs     06/26/22  0730 06/26/22  1103 06/26/22  1550 06/26/22 2039   POCGLU 223* 238* 204* 303*       Blood Sugar Average: Last 72 hrs:  (P) 209  · 2/22 hemoglobin A1c was 7 1  · Hold home oral anti diabetics  · Restarted Lantus   · Continue to monitor blood sugars throughout the day with use of sliding scale insulin  · Rickie controlled diet    Blind  Assessment & Plan  · Family at bedside  · Additional assistance will be provided as needed to accommodate patient  Fall  Assessment & Plan  · Injuries listed below  · CT head negative for acute traumatic injuries  · PT/OT    * Closed left hip fracture ([address] )  Assessment & Plan  · Secondary to fall  · XR pelvis showed:  "Left intertrochanteric hip fracture"  · Orthopedics consulted and note appreciated  · S/p IM nail 6/26  · Weight-bearing status per Orthopedics  · Multimodal pain regimen  · DVT prophylaxis:  Heparin due to SAHIL  · PT/OT        Disposition: Continue med surg level of care  Pt is recommended discharge to post-acute rehab, CM for disposition planning with family  Pt is not currently medically stable for discharge due to SAHIL  SUBJECTIVE:  Chief Complaint: "I feel fine when I don't move"    Subjective: Patient reports left hip pain when she moves around, but she says when she is laying flat she has no pain  She denies chest pain, shortness of breath, abdominal pain, nausea, numbness or tingling  She is tolerating a diet and working with PT/OT  OBJECTIVE:   Vitals:   Temp:  [98 °F (36 7 °C)-98 9 °F (37 2 °C)] 98 2 °F (36 8 °C)  HR:  [59-63] 59  Resp:  [14-18] 14  BP: (104-135)/(40-67) 104/67    Intake/Output:  I/O       06/25 0701  06/26 0700 06/26 0701  06/27 0700    P  O  0 480    I V  (mL/kg) 900 (10 7)     Blood  350    IV Piggyback 250     Total Intake(mL/kg) 1150 (13 6) 830 (9 8)    Blood 300     Total Output 300     Net +850 +830               Nutrition: Diet Rickie/CHO Controlled; Consistent Carbohydrate Diet Level 2 (5 carb servings/75 grams CHO/meal)  GI Proph/Bowel Reg: Senokot S  VTE Prophylaxis:Sequential compression device (Venodyne)  and Heparin     Physical Exam:   GENERAL APPEARANCE: Patient in no acute distress  HEENT: NCAT; Blindness; Mucous membranes moist  CV: Regular rate and rhythm; no murmur/gallops/rubs appreciated  CHEST / LUNGS: Clear to auscultation; no wheezes/rales/rhonci  ABD: NABS; soft; non-distended; non-tender  : Voiding  EXT: Left hip surgical dressings with evidence of small amount of bloody drainage, otherwise intact and dry, underlying tenderness; compartments soft; +2 pulses bilaterally upper & lower extremities; no edema  NEURO: GCS 15; no focal neurologic deficits; neurovascularly intact  SKIN: Warm, dry and well perfused; no rash; no jaundice  Invasive Devices  Report    Peripheral Intravenous Line  Duration           Peripheral IV 06/24/22 Left Antecubital 2 days    Peripheral IV 06/25/22 Right Antecubital 1 day                      Lab Results:   Results: I have personally reviewed all pertinent laboratory/tests results, BMP/CMP:   Lab Results   Component Value Date    SODIUM 135 06/27/2022    K 4 6 06/27/2022     06/27/2022    CO2 25 06/27/2022    BUN 57 (H) 06/27/2022    CREATININE 2 82 (H) 06/27/2022    CALCIUM 7 2 (L) 06/27/2022    EGFR 15 06/27/2022    and CBC:   Lab Results   Component Value Date    WBC 9 34 06/27/2022    HGB 7 6 (L) 06/27/2022    HCT 23 0 (L) 06/27/2022    MCV 86 06/27/2022     06/27/2022    MCH 28 5 06/27/2022    MCHC 33 0 06/27/2022    RDW 14 6 06/27/2022    MPV 11 6 06/27/2022     Imaging/EKG Studies: I have personally reviewed pertinent reports     see below  Other Studies:   XR femur 1 vw left   Final Result by Antione Conner DO (06/26 5512)      Near-anatomic alignment of left femoral intertrochanteric fracture status post ORIF      Fracture of the proximal lateral femoral shaft, traversed by fixation hardware  Workstation performed: [id number]         XR hip/pelv 2-3 vws left if performed   Final Result by Shamika Downing DO (06/26 2170)      Fluoroscopic guidance provided for procedure guidance  Please refer to the separate procedure notes for additional details  Workstation performed: [id number]         XR femur 2 vw left   Final Result by Zach Daniels MD (06/24 2756)      Comminuted minimally displaced left femoral intertrochanteric fracture  Workstation performed: [id number]         XR pelvis ap only 1 or 2 vw   Final Result by Zach Daniels MD (06/24 7066)      Comminuted minimally displaced left femoral intertrochanteric fracture  Workstation performed: [id number]         TRAUMA - CT head wo contrast   Final Result by Yazan Charles MD (06/24 6803)      No acute intracranial abnormality  Microangiopathic changes  I personally discussed this study with [facility] on 6/24/2022 at 9:11 AM                            Workstation performed: [id number]         XR Trauma multiple (SLB/SLRA trauma bay ONLY)   Final Result by Yazan Charles MD (06/24 1704)      No acute cardiopulmonary disease within limitations of supine imaging  Left intertrochanteric hip fracture  The study was marked in [facility] for immediate notification           Workstation performed: [id number]         XR pelvis ap only 1 or 2 vw    (Results Pending)   XR chest portable    (Results Pending)

## 2022-06-27 NOTE — PLAN OF CARE
Problem: OCCUPATIONAL THERAPY ADULT  Goal: Performs self-care activities at highest level of function for planned discharge setting  See evaluation for individualized goals  Description: Treatment Interventions: ADL retraining, Functional transfer training, Endurance training, Cognitive reorientation, Patient/family training, Equipment evaluation/education, Compensatory technique education, Energy conservation, Activityengagement          See flowsheet documentation for full assessment, interventions and recommendations  Note: Limitation: Decreased ADL status, Decreased UE strength, Decreased Safe judgement during ADL, Decreased endurance, Decreased cognition, Decreased self-care trans, Decreased high-level ADLs  Prognosis: Good  Assessment: Pt is a 68 y o  female seen for OT evaluation s/p admission to [address] [place] [address] on 6/24/2022 due to fall at home  Pt diagnosed with Closed left hip fracture ([address] ) pt is currently POD #2 L TFN long nail  Pt is TTWB  Pt has a significant PMH impacting occupational performance including: blindness, DM II, HTN, tachy-sarah beth syndrome, CAD, MI, pacemaker, anemia  Pt with no recent admissions in the last 2 months  Pt with active OT evaluation and treatment orders and activity orders for Activity as tolerated  PTA pt living with family in [facility], pt requires (A) with ADLs and IADLs, (+)falls, (-)drives, use of RW at baseline  Pt is motivated to return to home  Personal and environmental factors supporting pt at time of IE include social support and attitude towards recovery  Personal and environmental factors inhibiting engagement in occupations include advanced age, inaccessible home environment, difficulty completing ADLs and difficulty completing IADLs  During evaluation pt performed as is outlined above in flowsheet  Pt required VC for safety and VC for attention to task   Standardized assessments used to assist in identifying performance deficits include [facility] 6-Clicks and Barthel ADL Index  Performance deficits that affect the pts occupational performance during the initial evaluation include impaired balance, functional mobility, endurance, activity tolerance, functional standing tolerance and overall strength, attention to task, safety awareness and insight into deficits  Based on pts functional performance and deficits the following occupations will be addressed in OT treatments in order to maximize pts independence and overall occupational performance: grooming, bathing/showering, toileting and toilet hygiene, dressing and functional mobility  Goals are listed below  Upon discharge from acute care setting recommend d/c to [address] This evaluation required an extensive review of medical and/or therapy records and additional review of physical, cognitive and psychosocial history related to functional performance  Based upon functional performance deficits and assessments, this evaluation has been identified as a high complexity evaluation       OT Discharge Recommendation: Post acute rehabilitation services

## 2022-06-27 NOTE — ASSESSMENT & PLAN NOTE
No results found for: HGBA1C    Recent Labs     06/26/22  0730 06/26/22  1103 06/26/22  1550 06/26/22 2039   POCGLU 223* 238* 204* 303*       Blood Sugar Average: Last 72 hrs:  (P) 209  · 2/22 hemoglobin A1c was 7 1  · Hold home oral anti diabetics  · Restarted Lantus   · Continue to monitor blood sugars throughout the day with use of sliding scale insulin    · Rickie controlled diet No heavy lifting/straining

## 2022-06-27 NOTE — PROGRESS NOTES
Orthopedics   Cara Pink 68 y o  female MRN: [id number]  Unit/Bed#: W -01      Subjective:  68 y  o female post operative day 2 left femoral intramedullary nail  Pt doing well  Pain controlled  Denies any chest pain or shortness of breath  No fevers or chills      Labs:  0   Lab Value Date/Time    HCT 23 0 (L) 06/27/2022 0453    HCT 24 0 (L) 06/26/2022 1614    HCT 21 7 (L) 06/26/2022 0550    HGB 7 6 (L) 06/27/2022 0453    HGB 7 7 (L) 06/26/2022 1614    HGB 6 8 (LL) 06/26/2022 0550    INR 1 05 06/24/2022 0856    WBC 9 34 06/27/2022 0453    WBC 7 47 06/26/2022 0550    WBC 7 32 06/25/2022 0429       Meds:    Current Facility-Administered Medications:     acetaminophen (TYLENOL) tablet 975 mg, 975 mg, Oral, Q8H [facility], [employer], PA-C, 975 mg at 06/27/22 0455    calcium carbonate (TUMS) chewable tablet 1,000 mg, 1,000 mg, Oral, Daily PRN, Mobile Possejan PA-C    carvedilol (COREG) tablet 25 mg, 25 mg, Oral, BID With Meals, [employer], PA-C, 25 mg at 06/27/22 0818    cholecalciferol (VITAMIN D3) tablet 2,000 Units, 2,000 Units, Oral, Daily, [employer], PA-C, 2,000 Units at 06/27/22 0818    docusate sodium (COLACE) capsule 100 mg, 100 mg, Oral, BID, [employer], PA-C, 100 mg at 88/87/52 0093    folic acid (FOLVITE) tablet 400 mcg, 400 mcg, Oral, Daily, [place], PA-C, 400 mcg at 06/27/22 0820    gabapentin (NEURONTIN) capsule 100 mg, 100 mg, Oral, HS, [employer], PA-C, 100 mg at 06/26/22 0655    heparin (porcine) subcutaneous injection 5,000 Units, 5,000 Units, Subcutaneous, Q8H [facility] [place], [employer] [place], [place], 5,000 Units at 06/27/22 0455    HYDROmorphone HCl (DILAUDID) injection 0 2 mg, 0 2 mg, Intravenous, Q4H PRN, [employer], PAKeyC    insulin glargine (LANTUS) subcutaneous injection 20 Units 0 2 mL, 20 Units, Subcutaneous, HS, Anton Isabel PA-C, 20 Units at 06/26/22 0806    insulin lispro (HumaLOG) 100 units/mL subcutaneous injection 1-6 Units, 1-6 Units, Subcutaneous, 4x Daily (AC & HS), 2 Units at 06/27/22 0819 **AND** Fingerstick Glucose (POCT), , , 4x Daily AC and at bedtime, Anton Isabel PA-C    lidocaine (LIDODERM) 5 % patch 1 patch, 1 patch, Topical, Daily, [place], [place], 1 patch at 06/27/22 0818    meclizine (ANTIVERT) tablet 12 5 mg, 12 5 mg, Oral, Q8H PRN, [employer], PA-C    multi-electrolyte (PLASMALYTE-A/ISOLYTE-S PH 7 4) IV solution, 50 mL/hr, Intravenous, Continuous, TENNILLE Chan, Last Rate: 50 mL/hr at 06/26/22 1311, 50 mL/hr at 06/26/22 1311    NIFEdipine (PROCARDIA XL) 24 hr tablet 60 mg, 60 mg, Oral, Daily, Val Dela Cruz MD, 60 mg at 06/27/22 0826    ondansetron (ZOFRAN) injection 4 mg, 4 mg, Intravenous, Q4H PRN, [employer], PA-C    oxyCODONE (ROXICODONE) IR tablet 2 5 mg, 2 5 mg, Oral, Q4H PRN, [employer], PA-C, 2 5 mg at 06/25/22 0947    oxyCODONE (ROXICODONE) IR tablet 5 mg, 5 mg, Oral, Q4H PRN, [facility], PA-C    ranolazine (RANEXA) 12 hr tablet 500 mg, 500 mg, Oral, Daily, [facility], PA-JEFFREY, 500 mg at 06/27/22 0818    senna-docusate sodium (SENOKOT S) 8 6-50 mg per tablet 1 tablet, 1 tablet, Oral, BID, [place], [place], 1 tablet at 06/27/22 0818    Blood Culture:   No results found for: BLOODCX    Wound Culture:   No results found for: WOUNDCULT    Ins and Outs:  I/O last 24 hours: In: 950 [P O :600; Blood:350]  Out: 400 [Urine:400]          Physical exam:  Vitals:    06/27/22 0718   BP: (!) 130/101   Pulse: 60   Resp: 18   Temp: 98 °F (36 7 °C)   SpO2: 95%     left lower extremity  · Dressing clean dry intact except for middle dressing there is moderate bloody drainage  · No excessive soft tissue swelling in the thigh  · Sensation intact L2-S1  · Motor intact to knee flexion/extension, EHL/FHL  · 2+ DP pulse    Assessment: 68 y  o female post operative day 2 left femur long IMN  Plan:  · Up and out of bed  · Weightbearing as tolerated left lower extremity  · PT/OT  · DVT prophylaxis  · Analgesics p r n  · Dispo: Ortho will follow  · Patient noted to have acute blood loss anemia due to a drop in Hbg of > 2 0g from preop levels, will monitor vital signs and resuscitate with IV fluids as needed  Hemoglobin 7 6 this morning    Further Transfusions  per primary  · Follow up with Dr Andreas Shaikh post op    Sherry Ortega PA-C

## 2022-06-27 NOTE — CASE MANAGEMENT
Case Management Progress Note    Patient name Robel Rowell  Location W /W -01 MRN [id number]  : 1945 Date 2022       LOS (days): 3  Geometric Mean LOS (GMLOS) (days): 6 20  Days to GMLOS:2 9        OBJECTIVE:        Current admission status: Inpatient  Preferred Pharmacy: No Pharmacies Listed  Primary Care Provider: Benjamín Garcia MD    Primary Insurance: [facility]  Secondary Insurance:     PROGRESS NOTE:    Rehab preferences received from son via email and are as follows: 1  [facility]  2  [address] [place] [address]  3  [facility]  4  [facility]     Messages sent to each facility to inquire about availability; awaiting responses

## 2022-06-28 LAB
ABO GROUP BLD: NORMAL
ANION GAP SERPL CALCULATED.3IONS-SCNC: 5 MMOL/L (ref 4–13)
BLD GP AB SCN SERPL QL: NEGATIVE
BUN SERPL-MCNC: 52 MG/DL (ref 5–25)
CA-I BLD-SCNC: 1 MMOL/L (ref 1.12–1.32)
CALCIUM SERPL-MCNC: 7.2 MG/DL (ref 8.4–10.2)
CHLORIDE SERPL-SCNC: 105 MMOL/L (ref 96–108)
CO2 SERPL-SCNC: 26 MMOL/L (ref 21–32)
CREAT SERPL-MCNC: 2.51 MG/DL (ref 0.6–1.3)
ERYTHROCYTE [DISTWIDTH] IN BLOOD BY AUTOMATED COUNT: 15.1 % (ref 11.6–15.1)
GFR SERPL CREATININE-BSD FRML MDRD: 18 ML/MIN/1.73SQ M
GLUCOSE SERPL-MCNC: 180 MG/DL (ref 65–140)
GLUCOSE SERPL-MCNC: 185 MG/DL (ref 65–140)
GLUCOSE SERPL-MCNC: 186 MG/DL (ref 65–140)
GLUCOSE SERPL-MCNC: 186 MG/DL (ref 65–140)
GLUCOSE SERPL-MCNC: 99 MG/DL (ref 65–140)
HCT VFR BLD AUTO: 21.2 % (ref 34.8–46.1)
HCT VFR BLD AUTO: 25.2 % (ref 34.8–46.1)
HGB BLD-MCNC: 6.7 G/DL (ref 11.5–15.4)
HGB BLD-MCNC: 8.3 G/DL (ref 11.5–15.4)
MCH RBC QN AUTO: 27.7 PG (ref 26.8–34.3)
MCHC RBC AUTO-ENTMCNC: 31.6 G/DL (ref 31.4–37.4)
MCV RBC AUTO: 88 FL (ref 82–98)
PLATELET # BLD AUTO: 173 THOUSANDS/UL (ref 149–390)
PMV BLD AUTO: 11.6 FL (ref 8.9–12.7)
POTASSIUM SERPL-SCNC: 4.2 MMOL/L (ref 3.5–5.3)
RBC # BLD AUTO: 2.42 MILLION/UL (ref 3.81–5.12)
RH BLD: POSITIVE
SODIUM SERPL-SCNC: 136 MMOL/L (ref 135–147)
SPECIMEN EXPIRATION DATE: NORMAL
WBC # BLD AUTO: 7.61 THOUSAND/UL (ref 4.31–10.16)

## 2022-06-28 PROCEDURE — 85027 COMPLETE CBC AUTOMATED: CPT | Performed by: PHYSICIAN ASSISTANT

## 2022-06-28 PROCEDURE — 82330 ASSAY OF CALCIUM: CPT | Performed by: INTERNAL MEDICINE

## 2022-06-28 PROCEDURE — 86900 BLOOD TYPING SEROLOGIC ABO: CPT | Performed by: PHYSICIAN ASSISTANT

## 2022-06-28 PROCEDURE — 80048 BASIC METABOLIC PNL TOTAL CA: CPT | Performed by: PHYSICIAN ASSISTANT

## 2022-06-28 PROCEDURE — 99232 SBSQ HOSP IP/OBS MODERATE 35: CPT | Performed by: INTERNAL MEDICINE

## 2022-06-28 PROCEDURE — 86850 RBC ANTIBODY SCREEN: CPT | Performed by: PHYSICIAN ASSISTANT

## 2022-06-28 PROCEDURE — 99024 POSTOP FOLLOW-UP VISIT: CPT | Performed by: PHYSICIAN ASSISTANT

## 2022-06-28 PROCEDURE — 86923 COMPATIBILITY TEST ELECTRIC: CPT

## 2022-06-28 PROCEDURE — 30233N1 TRANSFUSION OF NONAUTOLOGOUS RED BLOOD CELLS INTO PERIPHERAL VEIN, PERCUTANEOUS APPROACH: ICD-10-PCS | Performed by: STUDENT IN AN ORGANIZED HEALTH CARE EDUCATION/TRAINING PROGRAM

## 2022-06-28 PROCEDURE — 86901 BLOOD TYPING SEROLOGIC RH(D): CPT | Performed by: PHYSICIAN ASSISTANT

## 2022-06-28 PROCEDURE — 0T9B70Z DRAINAGE OF BLADDER WITH DRAINAGE DEVICE, VIA NATURAL OR ARTIFICIAL OPENING: ICD-10-PCS | Performed by: INTERNAL MEDICINE

## 2022-06-28 PROCEDURE — 99233 SBSQ HOSP IP/OBS HIGH 50: CPT | Performed by: SURGERY

## 2022-06-28 PROCEDURE — 82948 REAGENT STRIP/BLOOD GLUCOSE: CPT

## 2022-06-28 PROCEDURE — 99233 SBSQ HOSP IP/OBS HIGH 50: CPT | Performed by: INTERNAL MEDICINE

## 2022-06-28 PROCEDURE — 85014 HEMATOCRIT: CPT | Performed by: PHYSICIAN ASSISTANT

## 2022-06-28 PROCEDURE — P9040 RBC LEUKOREDUCED IRRADIATED: HCPCS

## 2022-06-28 PROCEDURE — 85018 HEMOGLOBIN: CPT | Performed by: PHYSICIAN ASSISTANT

## 2022-06-28 RX ORDER — BISACODYL 10 MG
10 SUPPOSITORY, RECTAL RECTAL DAILY PRN
Status: DISCONTINUED | OUTPATIENT
Start: 2022-06-28 | End: 2022-06-30 | Stop reason: HOSPADM

## 2022-06-28 RX ADMIN — INSULIN LISPRO 1 UNITS: 100 INJECTION, SOLUTION INTRAVENOUS; SUBCUTANEOUS at 16:30

## 2022-06-28 RX ADMIN — ACETAMINOPHEN 975 MG: 325 TABLET, FILM COATED ORAL at 05:18

## 2022-06-28 RX ADMIN — ACETAMINOPHEN 975 MG: 325 TABLET, FILM COATED ORAL at 21:23

## 2022-06-28 RX ADMIN — CARVEDILOL 25 MG: 12.5 TABLET, FILM COATED ORAL at 16:33

## 2022-06-28 RX ADMIN — DOCUSATE SODIUM 100 MG: 100 CAPSULE, LIQUID FILLED ORAL at 08:34

## 2022-06-28 RX ADMIN — INSULIN GLARGINE 25 UNITS: 100 INJECTION, SOLUTION SUBCUTANEOUS at 21:23

## 2022-06-28 RX ADMIN — SENNOSIDES AND DOCUSATE SODIUM 1 TABLET: 50; 8.6 TABLET ORAL at 17:29

## 2022-06-28 RX ADMIN — FOLIC ACID TAB 400 MCG 400 MCG: 400 TAB at 08:35

## 2022-06-28 RX ADMIN — HEPARIN SODIUM 5000 UNITS: 5000 INJECTION INTRAVENOUS; SUBCUTANEOUS at 21:27

## 2022-06-28 RX ADMIN — HEPARIN SODIUM 5000 UNITS: 5000 INJECTION INTRAVENOUS; SUBCUTANEOUS at 05:19

## 2022-06-28 RX ADMIN — ACETAMINOPHEN 975 MG: 325 TABLET, FILM COATED ORAL at 14:51

## 2022-06-28 RX ADMIN — DOCUSATE SODIUM 100 MG: 100 CAPSULE, LIQUID FILLED ORAL at 17:29

## 2022-06-28 RX ADMIN — INSULIN LISPRO 2 UNITS: 100 INJECTION, SOLUTION INTRAVENOUS; SUBCUTANEOUS at 08:39

## 2022-06-28 RX ADMIN — GABAPENTIN 100 MG: 100 CAPSULE ORAL at 21:24

## 2022-06-28 RX ADMIN — INSULIN LISPRO 1 UNITS: 100 INJECTION, SOLUTION INTRAVENOUS; SUBCUTANEOUS at 21:24

## 2022-06-28 RX ADMIN — Medication 2000 UNITS: at 08:34

## 2022-06-28 RX ADMIN — INSULIN LISPRO 2 UNITS: 100 INJECTION, SOLUTION INTRAVENOUS; SUBCUTANEOUS at 16:31

## 2022-06-28 RX ADMIN — LIDOCAINE 5% 1 PATCH: 700 PATCH TOPICAL at 08:30

## 2022-06-28 RX ADMIN — CARVEDILOL 25 MG: 12.5 TABLET, FILM COATED ORAL at 08:31

## 2022-06-28 RX ADMIN — RANOLAZINE 500 MG: 500 TABLET, EXTENDED RELEASE ORAL at 08:31

## 2022-06-28 RX ADMIN — SENNOSIDES AND DOCUSATE SODIUM 1 TABLET: 50; 8.6 TABLET ORAL at 08:31

## 2022-06-28 RX ADMIN — HEPARIN SODIUM 5000 UNITS: 5000 INJECTION INTRAVENOUS; SUBCUTANEOUS at 14:51

## 2022-06-28 RX ADMIN — POLYETHYLENE GLYCOL 3350 17 G: 17 POWDER, FOR SOLUTION ORAL at 08:35

## 2022-06-28 NOTE — PHYSICAL THERAPY NOTE
PHYSICAL THERAPY CANCELLATION NOTE          Patient Name: Joceline Butler  BDTPA'L Date: 6/28/2022 06/28/22 1336   PT Last Visit   PT Visit Date 06/28/22   Note Type   Note Type Cancelled Session   Assessment   Assessment Chart review performed  Per Ortho: LLE WBAT  Pt's hemoglobin 6 7 at 1020  Spoke to [employer] and ALEX Evans who report pt is receiving transfusion momentarily due to acute blood loss anemia w/ pt appearing fatigued   Will hold PT intervention at this time pending transfusion and will continue to follow and provide PT intervention as appropriate         Darwin Gage, PT, DPT  06/28/22

## 2022-06-28 NOTE — ASSESSMENT & PLAN NOTE
- Continue home Coreg  - Patient anemic--6 8  Continue to hold Xarelto  Continue heparin for DVT prophylaxis  - Will re-evaluate today on 6/28 for appropriateness of restarting home Xarelto and aspirin

## 2022-06-28 NOTE — ASSESSMENT & PLAN NOTE
- Acute blood loss in the setting of chronic anemia  - History of chronic anemia hemoglobin is typically around 10  - Patient was 10 9 on admission  - Patient loss approximately 300 mL blood during her surgery  - 6/26 1 unit PRBCs  - Repeat hemoglobin responded to transfusion appropriately  - Continue to monitor for signs of symptomatic anemia though in the setting beta-blocker use patient will not likely be tachycardic   - on 06/28/2022 patient noted to have hemoglobin at 6 7; ordered 1 unit of packed red blood cells  - will follow-up with post transfusion hemoglobin

## 2022-06-28 NOTE — ASSESSMENT & PLAN NOTE
- Left hip fractures, present on admission   - Status post IM nail on 6/26  - Appreciate Orthopedic surgery evaluation, recommendations and interventions as noted  - Maintain WBAT status on the left extremity   - Monitor left lower extremity extremity neurovascular exam   - Continue multimodal analgesic regimen   - Continue DVT prophylaxis  - PT and OT evaluation and treatment as indicated  - Outpatient follow up with Orthopedic surgery for re-evaluation

## 2022-06-28 NOTE — ASSESSMENT & PLAN NOTE
- Continue home carvedilol and nifedipine   - Continue to hold home lisinopril due to normal BP s/p orthopedic intervention and concern for SAHIL    - Continue to monitor BP and kidney functioning

## 2022-06-28 NOTE — ASSESSMENT & PLAN NOTE
Lab Results   Component Value Date    EGFR 15 06/27/2022    EGFR 18 06/26/2022    EGFR 30 06/25/2022    CREATININE 2 82 (H) 06/27/2022    CREATININE 2 41 (H) 06/26/2022    CREATININE 1 64 (H) 06/25/2022     - Associated with diabetes and hypertension, baseline Cr appears to be between 1 6-2 0  - 1 83 on admission  - Creatinine worsened postoperatively to 2 82 on 6/27; will follow-up repeat today  - 6/26  ABLA likely caused post-op SAHIL, 1 unit of blood transfused  - Continue IVF isolate 50mL/hr  - Nephrology consult placed  - Continue to monitor Cr for improvement

## 2022-06-28 NOTE — CASE MANAGEMENT
Case Management Discharge Planning Note    Patient name Memo Lam  Location W /W -84 MRN [id number]  : 1945 Date 2022       Current Admission Date: 2022  Current Admission Diagnosis:Closed left hip fracture [facility])   Patient Active Problem List    Diagnosis Date Noted    Anemia 2022    [facility] 2022    Pacemaker 2022    H/O heart artery stent 2022    Fall 2022    [facility] 2022    Blind 2022    Diabetes mellitus type 2, insulin dependent ([facility] ) 2022    Acute renal failure superimposed on stage 3 chronic kidney disease ([facility] ) 2022    Hypertension 2022    Tachy-sarah beth syndrome ([facility] ) 2022    CAD (coronary artery disease) 2022    Atrial fibrillation ([facility] ) 2022      LOS (days): 4  Geometric Mean LOS (GMLOS) (days): 6 20  Days to GMLOS:2     OBJECTIVE:  Risk of Unplanned Readmission Score: 17 67         Current admission status: Inpatient   Preferred Pharmacy: No Pharmacies Listed  Primary Care Provider: Afshan Bhakta MD    Primary Insurance: [facility]  Secondary Insurance:     [facility] Number: submitted SNF auth request to [facility]  pending ref # [id number] for [facility] NPI# [id number]; Dr Aurora Lynch NPI# [id number]   Clinicals faxed to [phone]

## 2022-06-28 NOTE — PROGRESS NOTES
Progress Note - Geriatric Medicine   Abelino Mills 68 y o  female MRN: [id number]  Unit/Bed#: W -01 Encounter: [id number]      Assessment/Plan:  1 -anemia normochromic normocytic -patient's hemoglobin today 6 7 with a hematocrit of 21 2 patient to receive additional unit of packed red blood cells today  If hemoglobin continues to drop will need GI workup, Hemoccult stools  2  -hypertension -systolic blood pressure running around 108 amlodipine discontinued currently just on Coreg 25 mg twice a day  3 -acute kidney injury and chronic renal disease -patient's creatinine 2 51 slight improvement patient's GFR at 18 improved from 15       4  -constipation  -patient states she has not had a bowel movement since Saturday continue with bowel protocol  5  -legally blind -patient will need assistance with her feedings    6 -diabetes mellitus type 2 -continue with insulin coverage    Recommendations    1 -gentle hydration    2  -amlodipine was discontinued continue to monitor blood pressures    3  -bowel protocol    4  -hemoccult stools    5  -transfusion 1 unit of packed red blood cells GI workup if H&H continues to drop  6 -assist with feedings      Subjective:   Patient states she has not moved her bowels  Family was in this morning helped her with her breakfast   Pain appears to be adequately controlled    Review of Systems   Constitutional: Negative  HENT: Negative  Eyes:        Patient is legally blind   Respiratory: Negative  Cardiovascular: Negative  Gastrointestinal:        Constipated   Endocrine:        Patient with history diabetes mellitus type 2   Genitourinary: Negative  Musculoskeletal: Positive for arthralgias and gait problem  Neurological:        Patient with history of marked peripheral neuropathy   Psychiatric/Behavioral: Negative  Objective:     Vitals: Blood pressure (!) 108/40, pulse 65, temperature 98 2 °F (36 8 °C), resp   rate 17, height 5' 2" (1 575 m), weight 84 kg (185 lb 3 oz), SpO2 93 %  ,Body mass index is 33 87 kg/m²  Intake/Output Summary (Last 24 hours) at 6/28/2022 1054  Last data filed at 6/28/2022 0846  Gross per 24 hour   Intake 2251 67 ml   Output 1900 ml   Net 351 67 ml       Current Medications: Reviewed    Physical Exam:   Physical Exam  Constitutional:       Appearance: She is obese  HENT:      Head: Normocephalic and atraumatic  Right Ear: Ear canal and external ear normal       Left Ear: Ear canal and external ear normal       Nose: Nose normal       Mouth/Throat:      Mouth: Mucous membranes are moist       Pharynx: Oropharynx is clear  Eyes:      Conjunctiva/sclera: Conjunctivae normal       Pupils: Pupils are equal, round, and reactive to light  Cardiovascular:      Rate and Rhythm: Normal rate and regular rhythm  Pulses: Normal pulses  Pulmonary:      Effort: Pulmonary effort is normal       Breath sounds: Normal breath sounds  Abdominal:      General: Bowel sounds are normal       Palpations: Abdomen is soft  Musculoskeletal:         General: Normal range of motion  Cervical back: Neck supple  Skin:     General: Skin is warm  Neurological:      Mental Status: She is alert and oriented to person, place, and time  Mental status is at baseline  Sensory: Sensory deficit present  Gait: Gait abnormal       Comments: Patient with a history of marked peripheral neuropathy   Psychiatric:         Mood and Affect: Mood normal          Behavior: Behavior normal           Invasive Devices  Report    Peripheral Intravenous Line  Duration           Peripheral IV 06/27/22 Proximal;Right;Ventral (anterior) Antecubital <1 day                Lab, Imaging and other studies: I have personally reviewed pertinent reports

## 2022-06-28 NOTE — QUICK NOTE
Called and updated family after labs came back later in the morning  Updated the patient's son in regards to the transfusion the hemoglobin  He has appreciated the phone call  He was in agreement with plan at this time  Updated patient at well at bedside  Patient continues to be asymptomatic  Blood pressure remained stable  Patient is non tachycardic  She is alert and oriented  Currently at baseline  Nontender abdominal exam   No bloody bowel movement  No other chest pain or shortness of breath  Surgical site re-evaluated; with no worsening hematoma

## 2022-06-28 NOTE — PLAN OF CARE
Problem: MOBILITY - ADULT  Goal: Maintain or return to baseline ADL function  Description: INTERVENTIONS:  -  Assess patient's ability to carry out ADLs; assess patient's baseline for ADL function and identify physical deficits which impact ability to perform ADLs (bathing, care of mouth/teeth, toileting, grooming, dressing, etc )  - Assess/evaluate cause of self-care deficits   - Assess range of motion  - Assess patient's mobility; develop plan if impaired  - Assess patient's need for assistive devices and provide as appropriate  - Encourage maximum independence but intervene and supervise when necessary  - Involve family in performance of ADLs  - Assess for home care needs following discharge   - Consider OT consult to assist with ADL evaluation and planning for discharge  - Provide patient education as appropriate  Outcome: Progressing  Goal: Maintains/Returns to pre admission functional level  Description: INTERVENTIONS:  - Perform BMAT or MOVE assessment daily    - Set and communicate daily mobility goal to care team and patient/family/caregiver     - Collaborate with rehabilitation services on mobility goals if consulted  - Out of bed for toileting  - Record patient progress and toleration of activity level   Outcome: Progressing     Problem: PAIN - ADULT  Goal: Verbalizes/displays adequate comfort level or baseline comfort level  Description: Interventions:  - Encourage patient to monitor pain and request assistance  - Assess pain using appropriate pain scale  - Administer analgesics based on type and severity of pain and evaluate response  - Implement non-pharmacological measures as appropriate and evaluate response  - Consider cultural and social influences on pain and pain management  - Notify physician/advanced practitioner if interventions unsuccessful or patient reports new pain  Outcome: Progressing     Problem: INFECTION - ADULT  Goal: Absence or prevention of progression during hospitalization  Description: INTERVENTIONS:  - Assess and monitor for signs and symptoms of infection  - Monitor lab/diagnostic results  - Monitor all insertion sites, i e  indwelling lines, tubes, and drains  - Monitor endotracheal if appropriate and nasal secretions for changes in amount and color  - Reynolds appropriate cooling/warming therapies per order  - Administer medications as ordered  - Instruct and encourage patient and family to use good hand hygiene technique  - Identify and instruct in appropriate isolation precautions for identified infection/condition  Outcome: Progressing     Problem: SAFETY ADULT  Goal: Patient will remain free of falls  Description: INTERVENTIONS:  - Educate patient/family on patient safety including physical limitations  - Instruct patient to call for assistance with activity   - Consult OT/PT to assist with strengthening/mobility   - Keep Call bell within reach  - Keep bed low and locked with side rails adjusted as appropriate  - Keep care items and personal belongings within reach  - Initiate and maintain comfort rounds  - Make Fall Risk Sign visible to staff  - Apply yellow socks and bracelet for high fall risk patients  - Consider moving patient to room near nurses station  Outcome: Progressing     Problem: DISCHARGE PLANNING  Goal: Discharge to home or other facility with appropriate resources  Description: INTERVENTIONS:  - Identify barriers to discharge w/patient and caregiver  - Arrange for needed discharge resources and transportation as appropriate  - Identify discharge learning needs (meds, wound care, etc )  - Arrange for interpretive services to assist at discharge as needed  - Refer to Case Management Department for coordinating discharge planning if the patient needs post-hospital services based on physician/advanced practitioner order or complex needs related to functional status, cognitive ability, or social support system  Outcome: Progressing

## 2022-06-28 NOTE — CASE MANAGEMENT
Case Management Discharge Planning Note    Patient name Joselin Moerlos  Location W /W -86 MRN [id number]  : 1945 Date 2022       Current Admission Date: 2022  Current Admission Diagnosis:Closed left hip fracture [facility])   Patient Active Problem List    Diagnosis Date Noted    Anemia 2022    MI (myocardial infarction) ([facility] ) 2022    Pacemaker 2022    H/O heart artery stent 2022    Fall 2022    [facility] 2022    Blind 2022    Diabetes mellitus type 2, insulin dependent ([facility] ) 2022    Acute renal failure superimposed on stage 3 chronic kidney disease ([facility] ) 2022    Hypertension 2022    Tachy-sarah beth syndrome ([facility] ) 2022    CAD (coronary artery disease) 2022    Atrial fibrillation ([facility] ) 2022      LOS (days): 4  Geometric Mean LOS (GMLOS) (days): 6 20  Days to GMLOS:2     OBJECTIVE:  Risk of Unplanned Readmission Score: 17 67         Current admission status: Inpatient   Preferred Pharmacy: No Pharmacies Listed  Primary Care Provider: Jacquelyn Riggs MD    Primary Insurance: [facility]  Secondary Insurance:     DISCHARGE DETAILS:    Discharge planning discussed with[de-identified] patient's son, Funmi Fuentes, by phone  Freedom of Choice: Yes (re: rehab)  Comments - Freedom of Choice: accepted bed at [facility]; family's first choice facility  CM contacted family/caregiver?: Yes (son, Funmi Fuentes, by phone)  Were Treatment Team discharge recommendations reviewed with patient/caregiver?: Yes  Did patient/caregiver verbalize understanding of patient care needs?: N/A- going to facility  Were patient/caregiver advised of the risks associated with not following Treatment Team discharge recommendations?: Yes    Contacts  Patient Contacts:  Funmi Fuentes son  Relationship to Patient[de-identified] Family  Contact Method: Phone  Phone Number: [phone]  Reason/Outcome: Continuity of Care, Emergency Contact, Referral, Discharge [address]         [facility]?: No    DME Referral Provided  Referral made for DME?: No    Other Referral/Resources/Interventions Provided:  Interventions: SNF, Short Term Rehab  Referral Comments: Bed offer received from [facility], family's first choice facility  Spoke with son, Yuval Wylei, by phone to discuss same; confirmed that [facility] remains 1st choice facility and accepting of bed  Task submitted for post acute auth to CM support group for rehab auth to be started; awaiting response  Will continue to follow for likely rehab transfer once medically stable      Would you like to participate in our [address] service program?  : No - Declined    Treatment Team Recommendation: Short Term Rehab, SNF  Discharge Destination Plan[de-identified] Short Term Rehab, SNF  Transport at Discharge : BLS Ambulance

## 2022-06-28 NOTE — PROGRESS NOTES
Orthopedics   Ethan Jaime 68 y o  female MRN: [id number]  Unit/Bed#: W -01      Subjective:  68 y  o female post operative day 3 left femoral intramedullary nail  Pt doing well  Pain controlled  Denies any chest pain or shortness of breath  No fevers or chills      Labs:  0   Lab Value Date/Time    HCT 21 2 (L) 06/28/2022 1020    HCT 23 0 (L) 06/27/2022 0453    HCT 24 0 (L) 06/26/2022 1614    HGB 6 7 (LL) 06/28/2022 1020    HGB 7 6 (L) 06/27/2022 0453    HGB 7 7 (L) 06/26/2022 1614    INR 1 05 06/24/2022 0856    WBC 7 61 06/28/2022 1020    WBC 9 34 06/27/2022 0453    WBC 7 47 06/26/2022 0550       Meds:    Current Facility-Administered Medications:     acetaminophen (TYLENOL) tablet 975 mg, 975 mg, Oral, Q8H [address], [employer], PA-C, 975 mg at 06/28/22 0518    calcium carbonate (TUMS) chewable tablet 1,000 mg, 1,000 mg, Oral, Daily PRN, [employer], PA-C    carvedilol (COREG) tablet 25 mg, 25 mg, Oral, BID With Meals, [employer], PA-C, 25 mg at 06/28/22 0831    cholecalciferol (VITAMIN D3) tablet 2,000 Units, 2,000 Units, Oral, Daily, [employer], PA-C, 2,000 Units at 06/28/22 4961    docusate sodium (COLACE) capsule 100 mg, 100 mg, Oral, BID, [employer], PA-C, 100 mg at 43/48/38 2113    folic acid (FOLVITE) tablet 400 mcg, 400 mcg, Oral, Daily, [employer], PA-C, 400 mcg at 06/28/22 8902    gabapentin (NEURONTIN) capsule 100 mg, 100 mg, Oral, HS, [employer], PA-C, 100 mg at 06/27/22 2126    heparin (porcine) subcutaneous injection 5,000 Units, 5,000 Units, Subcutaneous, Q8H [address], [employer] [address] [place], [place], 5,000 Units at 06/28/22 0519    HYDROmorphone HCl (DILAUDID) injection 0 2 mg, 0 2 mg, Intravenous, Q4H PRN, [address] [place], PA-C    insulin glargine (LANTUS) subcutaneous injection 25 Units 0 25 mL, 25 Units, Subcutaneous, HS, TENNILLE De [place] O, 25 Units at 06/27/22 2132    insulin lispro (HumaLOG) 100 units/mL subcutaneous injection 1-6 Units, 1-6 Units, Subcutaneous, 4x Daily (AC & HS), 2 Units at 06/27/22 2129 **AND** Fingerstick Glucose (POCT), , , 4x Daily AC and at bedtime, Jaelyn Isabel PA-C    insulin lispro (HumaLOG) 100 units/mL subcutaneous injection 2 Units, 2 Units, Subcutaneous, TID With Meals, TENNILLE Chan, 2 Units at 06/28/22 0839    lidocaine (LIDODERM) 5 % patch 1 patch, 1 patch, Topical, Daily, [place], [place], 1 patch at 06/28/22 0830    meclizine (ANTIVERT) tablet 12 5 mg, 12 5 mg, Oral, Q8H PRN, [employer], PA-JEFFREY    ondansetron [facility]) injection 4 mg, 4 mg, Intravenous, Q4H PRN, [employer], PA-JEFFREY    oxyCODONE (ROXICODONE) IR tablet 2 5 mg, 2 5 mg, Oral, Q4H PRN, [employer], PA-JEFFREY, 2 5 mg at 06/25/22 0947    oxyCODONE (ROXICODONE) IR tablet 5 mg, 5 mg, Oral, Q4H PRN, [facility]    polyethylene glycol (MIRALAX) packet 17 g, 17 g, Oral, Daily, TENNILLE Chan, 17 g at 06/28/22 4044    ranolazine (RANEXA) 12 hr tablet 500 mg, 500 mg, Oral, Daily, [facility], 500 mg at 06/28/22 0831    senna-docusate sodium (SENOKOT S) 8 6-50 mg per tablet 1 tablet, 1 tablet, Oral, BID, [place], [place], 1 tablet at 06/28/22 0831    Blood Culture:   No results found for: BLOODCX    Wound Culture:   No results found for: WOUNDCULT    Ins and Outs:  I/O last 24 hours: In: 2371 7 [P O :480; I V :1891 7]  Out: 2300 [Urine:2300]          Physical exam:  Vitals:    06/28/22 1120   BP: (!) 118/43   Pulse: 60   Resp:    Temp:    SpO2: 96%     left lower extremity  · Dressing clean dry intact except for middle dressing there is moderate bloody drainage  · No excessive soft tissue swelling in the thigh  · Sensation intact L2-S1  · Motor intact to knee flexion/extension, EHL/FHL  · 2+ DP pulse    Assessment: 68 y  o female post operative day 3 left femur long IMN       Plan:  · Up and out of bed  · Weightbearing as tolerated left lower extremity  · PT/OT  · DVT prophylaxis  · Analgesics p r n  · Dispo: Ortho will follow  · Patient noted to have acute blood loss anemia due to a drop in Hbg of > 2 0g from preop levels, will monitor vital signs and resuscitate with IV fluids as needed  Hemoglobin 6 7 this morning  Further Transfusions per primary     · Discussed with Trauma team this AM    · Follow up with Dr Sapna Hansen post op    Shanna Sneed PA-C

## 2022-06-28 NOTE — PROGRESS NOTES
NEPHROLOGY PROGRESS NOTE   Joceline Butler 68 y o  female MRN: [id number]  Unit/Bed#: W -01 Encounter: [id number]    ASSESSMENT & PLAN:  68 y o  female with past medical history of CKD stage 3, diabetes, blind, CAD, hypertension, tachy-sarah beth syndrome with pacemaker, TIA, paroxysmal atrial flutter, who was admitted to [facility] after presenting with fall  A renal consultation is requested today for assistance in the management of acute on chronic kidney injury  Acute kidney injury on chronic kidney disease stage 3  -Baseline creatinine:  Renal function was worsening to creatinine 2 2 in February 2022 but improved to creatinine 1 6 with holding ACE inhibitor in February   -Admission creatinine:  On June 24th was 1 8 mg/dl  - Work up:   · UA with microscopy:  0-1 RBC per high-power field as well as 1-2 WBC per high-power field  · Imaging:  Renal ultrasound suggestive of distended bladder but no hydronephrosis  -Etiology:  Acute kidney injury likely due to prerenal azotemia/early ATN in the setting of intraoperative hypotension to blood pressure less than 925 systolic briefly as well as postop hypotension on June 25th/postop anemia plus component of urinary retention with renal ultrasound suggestive of distended bladder  Bladder scan was positive, underwent intermittent straight catheterization and intake 900 mL on 06/28   [facility] Course:  Renal function worsened to creatinine 2 8 mg/dL on 06/28  -Plan:   · Renal function was worsening to creatinine 2 8 on blood work from 06/27, no labs today  follow-up labs when done  As per patient had been having difficulty drawing labs  Blood pressure is still on lower side, stopping nifedipine 60 mg to avoid hypotension  Avoid nephrotoxins and dose all medications per EGFR  · Continue gentle hydration with Plasma-Lyte at 50 mL/hour  Clinically appears euvolemic  Follow-up labs, okay to stop IV fluid if good oral intake and if labs stable    · Avoid hypotension  Addendum - reviewed labs cr improving to 2 5 mg/dl  Agree with prbc as hb dropped to 6 7                                  Chronic Kidney Disease Stage 3  -Outpatient Nephrologist: Dr Doreen Hoyt, last seen in February 2022  -Baseline Creatinine:  Prior to office visit is February 2022, baseline creatinine was in 1s mg/dL and was then lost to follow-up until February 2022 and creatinine was trending up to 2 2 and lisinopril was held  Repeat creatinine in February was 1 6  Possible that this is the new baseline     Baseline creatinine possibly 1 3-1 6  -Etiology:  Chronic kidney disease likely due to hypertensive nephrosclerosis, diabetic CKD and age-related nephron loss  -Avoid Nephrotoxins and Dose all medications per eGFR  -Will need outpatient follow up after discharge  BP/Primary hypertension  -Home Medication:  Nifedipine and carvedilol  Lisinopril was held in February 2022 in the setting of elevated creatinine   -Currently BP soft  -Plan:  Okay to continue carvedilol with hold parameters but will stop further nifedipine 60 mg daily due to blood pressure in low 785H systolic  Avoid hypotension  Anemia, unspecified  -status post PRBC on June 26 given postop anemia  -hemoglobin again trending down to 7 6 today, continue to monitor per primary team and consider PRBC if it drops below 7  Urinary retention:  Continue urine retention protocol    Status post fall/closed left hip fracture, status post left intramedullary nail placed on June 26  Continue postop care per surgery    Diabetes mellitus type 2:  Management per primary team, on insulin coverage  Discussed with trauma team      SUBJECTIVE:  No SOB or nausea or vomiting       OBJECTIVE:  Current Weight: Weight - Scale: 84 kg (185 lb 3 oz)  Vitals:    06/28/22 0753   BP: (!) 108/40   Pulse: 65   Resp: 17   Temp: 98 2 °F (36 8 °C)   SpO2: 93%       Intake/Output Summary (Last 24 hours) at 6/28/2022 0802  Last data filed at 6/28/2022 0601  Gross per 24 hour   Intake 2251 67 ml   Output 2300 ml   Net -48  33 ml       Physical Exam  Constitutional:       General: She is not in acute distress  Appearance: Normal appearance  She is well-developed  HENT:      Head: Normocephalic and atraumatic  Nose: Nose normal       Mouth/Throat:      Mouth: Mucous membranes are moist    Eyes:      General: No scleral icterus  Conjunctiva/sclera: Conjunctivae normal       Pupils: Pupils are equal, round, and reactive to light  Neck:      Thyroid: No thyromegaly  Vascular: No JVD  Cardiovascular:      Rate and Rhythm: Normal rate and regular rhythm  Heart sounds: Normal heart sounds  No murmur heard  No friction rub  Pulmonary:      Effort: Pulmonary effort is normal  No respiratory distress  Breath sounds: Normal breath sounds  No wheezing or rales  Abdominal:      General: Bowel sounds are normal  There is no distension  Palpations: Abdomen is soft  Tenderness: There is no abdominal tenderness  Musculoskeletal:         General: No deformity  Cervical back: Neck supple  Right lower leg: No edema  Left lower leg: No edema  Skin:     General: Skin is warm and dry  Findings: No rash  Neurological:      Mental Status: She is alert and oriented to person, place, and time  Psychiatric:         Mood and Affect: Mood normal          Behavior: Behavior normal          Thought Content:  Thought content normal            Medications:    Current Facility-Administered Medications:     acetaminophen (TYLENOL) tablet 975 mg, 975 mg, Oral, Q8H [facility], [facility] ELLYN, 975 mg at 06/28/22 0518    calcium carbonate (TUMS) chewable tablet 1,000 mg, 1,000 mg, Oral, Daily [facility], [facility]    carvedilol (COREG) tablet 25 mg, 25 mg, Oral, BID With Meals,  [address] ELLYN Martinez, 25 mg at 06/27/22 3307    cholecalciferol (VITAMIN D3) tablet 2,000 Units, 2,000 Units, Oral, Daily, [address], [place], 2,000 Units at 06/27/22 0818    docusate sodium (COLACE) capsule 100 mg, 100 mg, Oral, BID, [facility], PA-JEFFREY, 100 mg at 94/87/11 9047    folic acid (FOLVITE) tablet 400 mcg, 400 mcg, Oral, Daily, [facility], ELLYN, 400 mcg at 06/27/22 0820    gabapentin (NEURONTIN) capsule 100 mg, 100 mg, Oral, HS, [address] [facility], ELLYN, 100 mg at 06/27/22 2126    heparin (porcine) subcutaneous injection 5,000 Units, 5,000 Units, Subcutaneous, Q8H [facility], [place], [place], 5,000 Units at 06/28/22 0519    HYDROmorphone HCl (DILAUDID) injection 0 2 mg, 0 2 mg, Intravenous, Q4H PRN, [facility] ELLYN Velarde    insulin glargine (LANTUS) subcutaneous injection 25 Units 0 25 mL, 25 Units, Subcutaneous, HS, Wayne Parents, CRNP, 25 Units at 06/27/22 2132    insulin lispro (HumaLOG) 100 units/mL subcutaneous injection 1-6 Units, 1-6 Units, Subcutaneous, 4x Daily (AC & HS), 2 Units at 06/27/22 2129 **AND** Fingerstick Glucose (POCT), , , 4x Daily AC and at bedtime, Sridevi Isabel PA-C    insulin lispro (HumaLOG) 100 units/mL subcutaneous injection 2 Units, 2 Units, Subcutaneous, TID With Meals, Wayne Parents, CRNP, 2 Units at 06/27/22 1748    lidocaine (LIDODERM) 5 % patch 1 patch, 1 patch, Topical, Daily, [place], [place], 1 patch at 06/27/22 0818    meclizine (ANTIVERT) tablet 12 5 mg, 12 5 mg, Oral, Q8H PRN, [facility]    multi-electrolyte (PLASMALYTE-A/ISOLYTE-S PH 7 4) IV solution, 50 mL/hr, Intravenous, Continuous, Wayne Parents, CRNP, Last Rate: 50 mL/hr at 06/26/22 1311, 50 mL/hr at 06/26/22 1311    NIFEdipine (PROCARDIA XL) 24 hr tablet 60 mg, 60 mg, Oral, Daily, Lauren Joshua MD, 60 mg at 06/27/22 0826    ondansetron (ZOFRAN) injection 4 mg, 4 mg, Intravenous, Q4H PRN, [facility]    oxyCODONE (ROXICODONE) IR tablet 2 5 mg, 2 5 mg, Oral, Q4H PRN, [address] [place] [address] ELLYN Martinez, 2 5 mg at 06/25/22 4438    oxyCODONE (ROXICODONE) IR tablet 5 mg, 5 mg, Oral, Q4H PRN, Farida Gallagher PA-C    polyethylene glycol (MIRALAX) packet 17 g, 17 g, Oral, Daily, TENNILLE Ortega    ranolazine (RANEXA) 12 hr tablet 500 mg, 500 mg, Oral, Daily, [facility], 500 mg at 06/27/22 0818    senna-docusate sodium (SENOKOT S) 8 6-50 mg per tablet 1 tablet, 1 tablet, Oral, BID, [place], [place], 1 tablet at 06/27/22 1747    Invasive Devices:        Lab Results:   Results from last 7 days   Lab Units 06/27/22  0453 06/26/22  1614 06/26/22  0550 06/25/22  0429 06/24/22  0856   WBC Thousand/uL 9 34  --  7 47 7 32 8 37   HEMOGLOBIN g/dL 7 6* 7 7* 6 8* 8 8* 10 9*   I STAT HEMOGLOBIN g/dl  --   --   --   --  11 6   HEMATOCRIT % 23 0* 24 0* 21 7* 29 4* 34 5*   HEMATOCRIT, ISTAT %  --   --   --   --  34*   PLATELETS Thousands/uL 165  --  155 190 233   POTASSIUM mmol/L 4 6  --  5 0 4 2 4 5   CHLORIDE mmol/L 102  --  104 106 108   CO2 mmol/L 25  --  24 28 28   CO2, I-STAT mmol/L  --   --   --   --  30   BUN mg/dL 57*  --  45* 27* 24   CREATININE mg/dL 2 82*  --  2 41* 1 64* 1 83*   CALCIUM mg/dL 7 2*  --  7 5* 7 9* 9 0   ALK PHOS U/L  --   --   --   --  54   ALT U/L  --   --   --   --  8   AST U/L  --   --   --   --  11*   GLUCOSE, ISTAT mg/dl  --   --   --   --  78       Previous work up:     Renal u/s:  IMPRESSION:     The bladder is very distended  The patient is unable to void for the technologist at the current time  This should be correlated with any evidence of acute urinary retention  Key catheter could be considered      Atrophic right kidney      No evidence of hydronephrosis        Portions of the record may have been created with voice recognition software  Occasional wrong word or "sound a like" substitutions may have occurred due to the inherent limitations of voice recognition software  Read the chart carefully and recognize, using context, where substitutions have occurred  If you have any questions, please contact the dictating provider

## 2022-06-28 NOTE — ASSESSMENT & PLAN NOTE
No results found for: HGBA1C    Recent Labs     06/27/22  0717 06/27/22  1122 06/27/22  1519 06/27/22  2102   POCGLU 216* 220* 188* 228*       Blood Sugar Average: Last 72 hrs:  (P) 214  - 2/22 hemoglobin A1c was 7 1  - Hold home oral anti diabetics  - Restarted Lantus   - Continue to monitor blood sugars throughout the day with use of sliding scale insulin    - Rickie controlled diet

## 2022-06-28 NOTE — ASSESSMENT & PLAN NOTE
- Family assists at bedside  - Additional assistance will be provided as needed to accommodate patient

## 2022-06-28 NOTE — PLAN OF CARE
Problem: MOBILITY - ADULT  Goal: Maintain or return to baseline ADL function  Description: INTERVENTIONS:  -  Assess patient's ability to carry out ADLs; assess patient's baseline for ADL function and identify physical deficits which impact ability to perform ADLs (bathing, care of mouth/teeth, toileting, grooming, dressing, etc )  - Assess/evaluate cause of self-care deficits   - Assess range of motion  - Assess patient's mobility; develop plan if impaired  - Assess patient's need for assistive devices and provide as appropriate  - Encourage maximum independence but intervene and supervise when necessary  - Involve family in performance of ADLs  - Assess for home care needs following discharge   - Consider OT consult to assist with ADL evaluation and planning for discharge  - Provide patient education as appropriate  Outcome: Progressing  Goal: Maintains/Returns to pre admission functional level  Description: INTERVENTIONS:  - Perform BMAT or MOVE assessment daily    - Set and communicate daily mobility goal to care team and patient/family/caregiver     - Collaborate with rehabilitation services on mobility goals if consulted  - Out of bed for toileting  - Record patient progress and toleration of activity level   Outcome: Progressing     Problem: PAIN - ADULT  Goal: Verbalizes/displays adequate comfort level or baseline comfort level  Description: Interventions:  - Encourage patient to monitor pain and request assistance  - Assess pain using appropriate pain scale  - Administer analgesics based on type and severity of pain and evaluate response  - Implement non-pharmacological measures as appropriate and evaluate response  - Consider cultural and social influences on pain and pain management  - Notify physician/advanced practitioner if interventions unsuccessful or patient reports new pain  Outcome: Progressing     Problem: INFECTION - ADULT  Goal: Absence or prevention of progression during hospitalization  Description: INTERVENTIONS:  - Assess and monitor for signs and symptoms of infection  - Monitor lab/diagnostic results  - Monitor all insertion sites, i e  indwelling lines, tubes, and drains  - Monitor endotracheal if appropriate and nasal secretions for changes in amount and color  - Todd appropriate cooling/warming therapies per order  - Administer medications as ordered  - Instruct and encourage patient and family to use good hand hygiene technique  - Identify and instruct in appropriate isolation precautions for identified infection/condition  Outcome: Progressing     Problem: SAFETY ADULT  Goal: Patient will remain free of falls  Description: INTERVENTIONS:  - Educate patient/family on patient safety including physical limitations  - Instruct patient to call for assistance with activity   - Consult OT/PT to assist with strengthening/mobility   - Keep Call bell within reach  - Keep bed low and locked with side rails adjusted as appropriate  - Keep care items and personal belongings within reach  - Initiate and maintain comfort rounds  - Make Fall Risk Sign visible to staff  - Apply yellow socks and bracelet for high fall risk patients  - Consider moving patient to room near nurses station  Outcome: Progressing     Problem: DISCHARGE PLANNING  Goal: Discharge to home or other facility with appropriate resources  Description: INTERVENTIONS:  - Identify barriers to discharge w/patient and caregiver  - Arrange for needed discharge resources and transportation as appropriate  - Identify discharge learning needs (meds, wound care, etc )  - Arrange for interpretive services to assist at discharge as needed  - Refer to Case Management Department for coordinating discharge planning if the patient needs post-hospital services based on physician/advanced practitioner order or complex needs related to functional status, cognitive ability, or social support system  Outcome: Progressing     Problem: Knowledge Deficit  Goal: Patient/family/caregiver demonstrates understanding of disease process, treatment plan, medications, and discharge instructions  Description: Complete learning assessment and assess knowledge base  Interventions:  - Provide teaching at level of understanding  - Provide teaching via preferred learning methods  Outcome: Progressing     Problem: Nutrition/Hydration-ADULT  Goal: Nutrient/Hydration intake appropriate for improving, restoring or maintaining nutritional needs  Description: Monitor and assess patient's nutrition/hydration status for malnutrition  Collaborate with interdisciplinary team and initiate plan and interventions as ordered  Monitor patient's weight and dietary intake as ordered or per policy  Utilize nutrition screening tool and intervene as necessary  Determine patient's food preferences and provide high-protein, high-caloric foods as appropriate       INTERVENTIONS:  - Monitor oral intake, urinary output, labs, and treatment plans  - Assess nutrition and hydration status and recommend course of action  - Evaluate amount of meals eaten  - Assist patient with eating if necessary   - Allow adequate time for meals  - Recommend/ encourage appropriate diets, oral nutritional supplements, and vitamin/mineral supplements  - Order, calculate, and assess calorie counts as needed  - Recommend, monitor, and adjust tube feedings and TPN/PPN based on assessed needs  - Assess need for intravenous fluids  - Provide specific nutrition/hydration education as appropriate  - Include patient/family/caregiver in decisions related to nutrition  Outcome: Progressing     Problem: Potential for Falls  Goal: Patient will remain free of falls  Description: INTERVENTIONS:  - Educate patient/family on patient safety including physical limitations  - Instruct patient to call for assistance with activity   - Consult OT/PT to assist with strengthening/mobility   - Keep Call bell within reach  - Keep bed low and locked with side rails adjusted as appropriate  - Keep care items and personal belongings within reach  - Initiate and maintain comfort rounds  - Make Fall Risk Sign visible to staff  - Apply yellow socks and bracelet for high fall risk patients  - Consider moving patient to room near nurses station  Outcome: Progressing     Problem: Prexisting or High Potential for Compromised Skin Integrity  Goal: Skin integrity is maintained or improved  Description: INTERVENTIONS:  - Identify patients at risk for skin breakdown  - Assess and monitor skin integrity  - Assess and monitor nutrition and hydration status  - Monitor labs   - Assess for incontinence   - Turn and reposition patient  - Assist with mobility/ambulation  - Relieve pressure over bony prominences  - Avoid friction and shearing  - Provide appropriate hygiene as needed including keeping skin clean and dry  - Evaluate need for skin moisturizer/barrier cream  - Collaborate with interdisciplinary team   - Patient/family teaching  - Consider wound care consult   Outcome: Progressing

## 2022-06-29 PROBLEM — R33.9 URINARY RETENTION: Status: ACTIVE | Noted: 2022-06-29

## 2022-06-29 LAB
ABO GROUP BLD BPU: NORMAL
ANION GAP SERPL CALCULATED.3IONS-SCNC: 5 MMOL/L (ref 4–13)
ATRIAL RATE: 61 BPM
BASOPHILS # BLD AUTO: 0.02 THOUSANDS/ÂΜL (ref 0–0.1)
BASOPHILS NFR BLD AUTO: 0 % (ref 0–1)
BPU ID: NORMAL
BUN SERPL-MCNC: 50 MG/DL (ref 5–25)
CALCIUM SERPL-MCNC: 7.6 MG/DL (ref 8.4–10.2)
CHLORIDE SERPL-SCNC: 107 MMOL/L (ref 96–108)
CO2 SERPL-SCNC: 25 MMOL/L (ref 21–32)
CREAT SERPL-MCNC: 2.24 MG/DL (ref 0.6–1.3)
CROSSMATCH: NORMAL
EOSINOPHIL # BLD AUTO: 0 THOUSAND/ÂΜL (ref 0–0.61)
EOSINOPHIL NFR BLD AUTO: 0 % (ref 0–6)
ERYTHROCYTE [DISTWIDTH] IN BLOOD BY AUTOMATED COUNT: 14.6 % (ref 11.6–15.1)
FLUAV RNA RESP QL NAA+PROBE: NEGATIVE
FLUBV RNA RESP QL NAA+PROBE: NEGATIVE
GFR SERPL CREATININE-BSD FRML MDRD: 20 ML/MIN/1.73SQ M
GLUCOSE SERPL-MCNC: 110 MG/DL (ref 65–140)
GLUCOSE SERPL-MCNC: 179 MG/DL (ref 65–140)
GLUCOSE SERPL-MCNC: 192 MG/DL (ref 65–140)
GLUCOSE SERPL-MCNC: 75 MG/DL (ref 65–140)
GLUCOSE SERPL-MCNC: 95 MG/DL (ref 65–140)
GLUCOSE SERPL-MCNC: 97 MG/DL (ref 65–140)
HCT VFR BLD AUTO: 25.1 % (ref 34.8–46.1)
HEMOCCULT STL QL: NEGATIVE
HEMOCCULT STL QL: NORMAL
HEMOCCULT STL QL: NORMAL
HGB BLD-MCNC: 8.1 G/DL (ref 11.5–15.4)
IMM GRANULOCYTES # BLD AUTO: 0.12 THOUSAND/UL (ref 0–0.2)
IMM GRANULOCYTES NFR BLD AUTO: 2 % (ref 0–2)
LYMPHOCYTES # BLD AUTO: 2.08 THOUSANDS/ÂΜL (ref 0.6–4.47)
LYMPHOCYTES NFR BLD AUTO: 27 % (ref 14–44)
MCH RBC QN AUTO: 28.4 PG (ref 26.8–34.3)
MCHC RBC AUTO-ENTMCNC: 32.3 G/DL (ref 31.4–37.4)
MCV RBC AUTO: 88 FL (ref 82–98)
MONOCYTES # BLD AUTO: 0.97 THOUSAND/ÂΜL (ref 0.17–1.22)
MONOCYTES NFR BLD AUTO: 12 % (ref 4–12)
NEUTROPHILS # BLD AUTO: 4.62 THOUSANDS/ÂΜL (ref 1.85–7.62)
NEUTS SEG NFR BLD AUTO: 59 % (ref 43–75)
NRBC BLD AUTO-RTO: 1 /100 WBCS
PLATELET # BLD AUTO: 176 THOUSANDS/UL (ref 149–390)
PMV BLD AUTO: 11.2 FL (ref 8.9–12.7)
POTASSIUM SERPL-SCNC: 4.7 MMOL/L (ref 3.5–5.3)
POTASSIUM SERPL-SCNC: 5.4 MMOL/L (ref 3.5–5.3)
PR INTERVAL: 276 MS
QRS AXIS: -17 DEGREES
QRSD INTERVAL: 82 MS
QT INTERVAL: 440 MS
QTC INTERVAL: 442 MS
RBC # BLD AUTO: 2.85 MILLION/UL (ref 3.81–5.12)
RSV RNA RESP QL NAA+PROBE: NEGATIVE
SARS-COV-2 RNA RESP QL NAA+PROBE: NEGATIVE
SODIUM SERPL-SCNC: 137 MMOL/L (ref 135–147)
T WAVE AXIS: -8 DEGREES
UNIT DISPENSE STATUS: NORMAL
UNIT PRODUCT CODE: NORMAL
UNIT PRODUCT VOLUME: 350 ML
UNIT RH: NORMAL
VENTRICULAR RATE: 61 BPM
WBC # BLD AUTO: 7.81 THOUSAND/UL (ref 4.31–10.16)

## 2022-06-29 PROCEDURE — 99222 1ST HOSP IP/OBS MODERATE 55: CPT | Performed by: PHYSICIAN ASSISTANT

## 2022-06-29 PROCEDURE — 93010 ELECTROCARDIOGRAM REPORT: CPT | Performed by: INTERNAL MEDICINE

## 2022-06-29 PROCEDURE — 85025 COMPLETE CBC W/AUTO DIFF WBC: CPT | Performed by: PHYSICIAN ASSISTANT

## 2022-06-29 PROCEDURE — 93005 ELECTROCARDIOGRAM TRACING: CPT

## 2022-06-29 PROCEDURE — 99024 POSTOP FOLLOW-UP VISIT: CPT | Performed by: PHYSICIAN ASSISTANT

## 2022-06-29 PROCEDURE — 84132 ASSAY OF SERUM POTASSIUM: CPT | Performed by: PHYSICIAN ASSISTANT

## 2022-06-29 PROCEDURE — 82272 OCCULT BLD FECES 1-3 TESTS: CPT | Performed by: PHYSICIAN ASSISTANT

## 2022-06-29 PROCEDURE — 80048 BASIC METABOLIC PNL TOTAL CA: CPT | Performed by: INTERNAL MEDICINE

## 2022-06-29 PROCEDURE — 99232 SBSQ HOSP IP/OBS MODERATE 35: CPT | Performed by: INTERNAL MEDICINE

## 2022-06-29 PROCEDURE — 0241U HB NFCT DS VIR RESP RNA 4 TRGT: CPT | Performed by: NURSE PRACTITIONER

## 2022-06-29 PROCEDURE — 99233 SBSQ HOSP IP/OBS HIGH 50: CPT | Performed by: SURGERY

## 2022-06-29 PROCEDURE — 97530 THERAPEUTIC ACTIVITIES: CPT

## 2022-06-29 PROCEDURE — 82948 REAGENT STRIP/BLOOD GLUCOSE: CPT

## 2022-06-29 PROCEDURE — 97535 SELF CARE MNGMENT TRAINING: CPT

## 2022-06-29 RX ORDER — BISACODYL 10 MG
10 SUPPOSITORY, RECTAL RECTAL DAILY
Status: DISCONTINUED | OUTPATIENT
Start: 2022-06-29 | End: 2022-06-30 | Stop reason: HOSPADM

## 2022-06-29 RX ORDER — FUROSEMIDE 10 MG/ML
20 INJECTION INTRAMUSCULAR; INTRAVENOUS ONCE
Status: COMPLETED | OUTPATIENT
Start: 2022-06-29 | End: 2022-06-29

## 2022-06-29 RX ADMIN — DOCUSATE SODIUM 100 MG: 100 CAPSULE, LIQUID FILLED ORAL at 09:46

## 2022-06-29 RX ADMIN — INSULIN LISPRO 2 UNITS: 100 INJECTION, SOLUTION INTRAVENOUS; SUBCUTANEOUS at 13:17

## 2022-06-29 RX ADMIN — CARVEDILOL 25 MG: 12.5 TABLET, FILM COATED ORAL at 09:46

## 2022-06-29 RX ADMIN — RANOLAZINE 500 MG: 500 TABLET, EXTENDED RELEASE ORAL at 09:46

## 2022-06-29 RX ADMIN — GABAPENTIN 100 MG: 100 CAPSULE ORAL at 22:26

## 2022-06-29 RX ADMIN — SENNOSIDES AND DOCUSATE SODIUM 1 TABLET: 50; 8.6 TABLET ORAL at 18:15

## 2022-06-29 RX ADMIN — HEPARIN SODIUM 5000 UNITS: 5000 INJECTION INTRAVENOUS; SUBCUTANEOUS at 05:59

## 2022-06-29 RX ADMIN — FOLIC ACID TAB 400 MCG 400 MCG: 400 TAB at 10:12

## 2022-06-29 RX ADMIN — HEPARIN SODIUM 5000 UNITS: 5000 INJECTION INTRAVENOUS; SUBCUTANEOUS at 22:26

## 2022-06-29 RX ADMIN — BISACODYL 10 MG: 10 SUPPOSITORY RECTAL at 09:40

## 2022-06-29 RX ADMIN — ACETAMINOPHEN 975 MG: 325 TABLET, FILM COATED ORAL at 13:16

## 2022-06-29 RX ADMIN — HEPARIN SODIUM 5000 UNITS: 5000 INJECTION INTRAVENOUS; SUBCUTANEOUS at 13:16

## 2022-06-29 RX ADMIN — POLYETHYLENE GLYCOL 3350 17 G: 17 POWDER, FOR SOLUTION ORAL at 09:46

## 2022-06-29 RX ADMIN — FUROSEMIDE 20 MG: 10 INJECTION, SOLUTION INTRAMUSCULAR; INTRAVENOUS at 09:46

## 2022-06-29 RX ADMIN — INSULIN LISPRO 1 UNITS: 100 INJECTION, SOLUTION INTRAVENOUS; SUBCUTANEOUS at 22:27

## 2022-06-29 RX ADMIN — INSULIN GLARGINE 25 UNITS: 100 INJECTION, SOLUTION SUBCUTANEOUS at 22:27

## 2022-06-29 RX ADMIN — LIDOCAINE 5% 1 PATCH: 700 PATCH TOPICAL at 09:46

## 2022-06-29 RX ADMIN — Medication 2000 UNITS: at 09:46

## 2022-06-29 RX ADMIN — ACETAMINOPHEN 975 MG: 325 TABLET, FILM COATED ORAL at 22:26

## 2022-06-29 RX ADMIN — SENNOSIDES AND DOCUSATE SODIUM 1 TABLET: 50; 8.6 TABLET ORAL at 09:46

## 2022-06-29 RX ADMIN — DOCUSATE SODIUM 100 MG: 100 CAPSULE, LIQUID FILLED ORAL at 18:15

## 2022-06-29 RX ADMIN — ACETAMINOPHEN 975 MG: 325 TABLET, FILM COATED ORAL at 05:59

## 2022-06-29 NOTE — OCCUPATIONAL THERAPY NOTE
OT TREATMENT    The patient's raw score on the AM-PAC Daily Activity inpatient short form is 13, standardized score is 32 03, less than 39 4  Patients at this level are likely to benefit from DC to post-acute rehabilitation services  Please refer to the recommendation of the Occupational Therapist for safe DC planning      06/29/22 1126   OT Last Visit   OT Visit Date 06/29/22   Note Type   Note Type Treatment for insurance authorization   Restrictions/Precautions   Weight Bearing Precautions Per Order Yes   LLE Weight Bearing Per Order WBAT  (upgraded from TTWB on 6/29)   Other Precautions Cognitive; Chair Alarm; Bed Alarm;WBS;Multiple lines; Fall Risk;Visual impairment  (Legally blind)   General   Response to Previous Treatment Patient with no complaints from previous session   Lifestyle   Autonomy PTA pt living with family in [facility], pt requires (A) with ADLs and IADLs, (+)falls, (-)drives, use of RW at baseline   Reciprocal Relationships supportive DIL and mother   Service to Others retired, worked in a    [employer] enjoys spending time with her 9 yr old granddaughter, and talking about her time that she lived in [facility]   Pain Assessment   Pain Assessment Tool 0-10   Pain Score No Pain  (Pt reporting no pain but wincing at times)   ADL   Eating Assistance 6  Modified independent   Eating Deficit Setup; Beverage management   Eating Comments Able to mange beverage from bedside table   UB Dressing Assistance 4  Minimal Assistance   UB Dressing Deficit Setup; Thread RUE; Thread LUE;Pull around back   UB Dressing Comments Sitting in recliner; VC to initiate and locate gown due to visual impairment   LB Dressing Assistance 1  Total Assistance   LB Dressing Deficit Don/doff R sock; Don/doff L sock; Thread RLE into underwear; Thread LLE into underwear;Pull up over hips   LB Dressing Comments total A to thread underwear over toes sitting on commode   Pt required consistent mod A x 2 to maintain stance to pull up to hips   Toileting Assistance  2  Maximal Assistance   Toileting Deficit Setup;Steadying;Verbal cueing;Supervison/safety; Increased time to complete; Bedside commode;Perineal hygiene   Toileting Comments Max A x 2 to rise from commode with MAX A for perineal hygiene  Pt with large BM this session  Functional Standing Tolerance   Time 1 minute; 2 minutes   Activity 1 minute for karly care; 2 minutes for bandage change from ortho PA   Comments Consistent mod A x 2 for karly care with BUE support on RW, Max A x 2 with fatigue   Bed Mobility   Supine to Sit 2  Maximal assistance   Additional items Assist x 1; Increased time required;Verbal cues;LE management;HOB elevated; Bedrails  (VC for hand placement due to visual impairment)   Sit to Supine Unable to assess   Additional Comments Pt OOB to recliner at end of session   Transfers   Sit to Stand 2  Maximal assistance   Additional items Assist x 2; Increased time required;Verbal cues   Stand to Sit 2  Maximal assistance   Additional items Assist x 2; Increased time required;Verbal cues   Additional Comments x 3 sit<>stand this session  Initially mod A x 2 to rise from EOB  MAX A x 2 for remainder of trials due to fatigue  Functional Mobility   Functional Mobility 2  Maximal assistance   Additional Comments Few steps EOB>commode>recliner this session with MAX A x 2  Pt with difficulty advancing LLE  MAX VC to navigate environment due to visual impairment  Pt to experience large episode of BLE knee buckling this session, required MAX A x 2 vs total x 2 to maintain safe stance prior to sitting in recliner  Additional items Rolling walker   Toilet Transfers   Toilet Transfer From [employer] Transfer Type To   Toilet Transfer to Standard bedside commode   Toilet Transfer Technique Ambulating   Toilet Transfers Maximal assistance  (x 2)   Cognition   Overall Cognitive Status Impaired   Arousal/Participation Alert; Cooperative   Attention Attends with cues to redirect Orientation Level Oriented X4   Memory Decreased short term memory;Decreased recall of recent events;Decreased recall of precautions   Following Commands Follows one step commands with increased time or repetition   Comments Pt pleasant and agreeable to OT session  Limited problem solving this session and required VC to initiate tasks  Activity Tolerance   Activity Tolerance Patient limited by fatigue   Medical Staff Made Aware Care coordinated with PT Sujatha; ALEX hernandez to see pt   Assessment   Assessment Patient participated in Skilled OT session this date with interventions consisting of ADL re training with the use of correct body mechnaics, Energy Conservation techniques, Work simplification skills , safety awareness and fall prevention techniques, maintaining weight bearing restrictions,  therapeutic activities to: increase activity tolerance, increase standing tolerance time with unilateral UE support to complete sink level ADLs, elicit righting and equilibrium reactions for improved postural control and alignment during transitional movements, increase dynamic sit/ stand balance during functional activity  and increase OOB/ sitting tolerance   Patient agreeable to OT treatment session, upon arrival patient was found supine in bed  In comparison to previous session, patient with improvements in participation in ADLs this session  Pt continues to require total A for LB dressing  Pt continues to require MAX A X 2 for sit<>stand transfers but able to take few steps today with RW  Patient requiring verbal cues for safety, verbal cues for correct technique, verbal cues for pacing thru activity steps, one step directives, frequent rest periods and ocassional safety reminders  Patient continues to be functioning below baseline level, occupational performance remains limited secondary to factors listed above and increased risk for falls and injury     From OT standpoint, recommendation at time of d/c would be Post acute rehabilitation services  Patient to benefit from continued Occupational Therapy treatment while in the hospital to address deficits as defined above and maximize level of functional independence with ADLs and functional mobility  Plan   Treatment Interventions ADL retraining;Functional transfer training; Endurance training;Cognitive reorientation;Patient/family training;Equipment evaluation/education; Compensatory technique education;Continued evaluation; Energy conservation; Activityengagement   Goal Expiration Date 07/07/22   OT Treatment Day 1   OT Frequency 3-5x/wk   Recommendation   OT Discharge Recommendation Post acute rehabilitation services   OT - OK to Discharge Yes  (Once medically cleared)   Additional Comments  This session, pt required and most appropriately benefited from skilled OT/PT co-treat due to extensive physical assistance of SKILLED therapists, significant regression from functional baseline and decreased activity tolerance  OT and PT goals were addressed separately as seen in documentation  Additional Comments 2 At the end of the session, pt OOB in recliner with all needs met and call bell within reach     AM-PAC Daily Activity Inpatient   Lower Body Dressing 1   Bathing 2   Toileting 2   Upper Body Dressing 2   Grooming 3   Eating 3   Daily Activity Raw Score 13   Daily Activity Standardized Score (Calc for Raw Score >=11) 32 03   AM-PAC Applied Cognition Inpatient   Following a Speech/Presentation 2   Understanding Ordinary Conversation 3   Taking Medications 2   Remembering Where Things Are Placed or Put Away 1   Remembering List of 4-5 Errands 1   Taking Care of Complicated Tasks 1   Applied Cognition Raw Score 10   Applied Cognition Standardized Score 24 98     Simon Kolb, OT

## 2022-06-29 NOTE — ASSESSMENT & PLAN NOTE
- Continue home Coreg  - Patient anemic--6 8  Status post 1 unit PRBC 6/28 with improvement in hemoglobin to 8 5  Continue to hold Xarelto    Continue heparin for DVT prophylaxis  - will restart Xarelto and aspirin when hemoglobin trend is stable

## 2022-06-29 NOTE — ASSESSMENT & PLAN NOTE
- Continue home carvedilol, nifedipine currently on hold  - Continue to hold home lisinopril due to normal BP s/p orthopedic intervention and concern for SAHIL    - Continue to monitor BP and kidney functioning

## 2022-06-29 NOTE — PLAN OF CARE
Problem: MOBILITY - ADULT  Goal: Maintain or return to baseline ADL function  Description: INTERVENTIONS:  -  Assess patient's ability to carry out ADLs; assess patient's baseline for ADL function and identify physical deficits which impact ability to perform ADLs (bathing, care of mouth/teeth, toileting, grooming, dressing, etc )  - Assess/evaluate cause of self-care deficits   - Assess range of motion  - Assess patient's mobility; develop plan if impaired  - Assess patient's need for assistive devices and provide as appropriate  - Encourage maximum independence but intervene and supervise when necessary  - Involve family in performance of ADLs  - Assess for home care needs following discharge   - Consider OT consult to assist with ADL evaluation and planning for discharge  - Provide patient education as appropriate  Outcome: Progressing  Goal: Maintains/Returns to pre admission functional level  Description: INTERVENTIONS:  - Perform BMAT or MOVE assessment daily    - Set and communicate daily mobility goal to care team and patient/family/caregiver     - Collaborate with rehabilitation services on mobility goals if consulted  - Out of bed for toileting  - Record patient progress and toleration of activity level   Outcome: Progressing     Problem: PAIN - ADULT  Goal: Verbalizes/displays adequate comfort level or baseline comfort level  Description: Interventions:  - Encourage patient to monitor pain and request assistance  - Assess pain using appropriate pain scale  - Administer analgesics based on type and severity of pain and evaluate response  - Implement non-pharmacological measures as appropriate and evaluate response  - Consider cultural and social influences on pain and pain management  - Notify physician/advanced practitioner if interventions unsuccessful or patient reports new pain  Outcome: Progressing     Problem: INFECTION - ADULT  Goal: Absence or prevention of progression during hospitalization  Description: INTERVENTIONS:  - Assess and monitor for signs and symptoms of infection  - Monitor lab/diagnostic results  - Monitor all insertion sites, i e  indwelling lines, tubes, and drains  - Monitor endotracheal if appropriate and nasal secretions for changes in amount and color  - [facility] appropriate cooling/warming therapies per order  - Administer medications as ordered  - Instruct and encourage patient and family to use good hand hygiene technique  - Identify and instruct in appropriate isolation precautions for identified infection/condition  Outcome: Progressing     Problem: SAFETY ADULT  Goal: Patient will remain free of falls  Description: INTERVENTIONS:  - Educate patient/family on patient safety including physical limitations  - Instruct patient to call for assistance with activity   - Consult OT/PT to assist with strengthening/mobility   - Keep Call bell within reach  - Keep bed low and locked with side rails adjusted as appropriate  - Keep care items and personal belongings within reach  - Initiate and maintain comfort rounds  - Make Fall Risk Sign visible to staff  - Apply yellow socks and bracelet for high fall risk patients  - Consider moving patient to room near nurses station  Outcome: Progressing     Problem: DISCHARGE PLANNING  Goal: Discharge to home or other facility with appropriate resources  Description: INTERVENTIONS:  - Identify barriers to discharge w/patient and caregiver  - Arrange for needed discharge resources and transportation as appropriate  - Identify discharge learning needs (meds, wound care, etc )  - Arrange for interpretive services to assist at discharge as needed  - Refer to Case Management Department for coordinating discharge planning if the patient needs post-hospital services based on physician/advanced practitioner order or complex needs related to functional status, cognitive ability, or social support system  Outcome: Progressing     Problem: Knowledge Deficit  Goal: Patient/family/caregiver demonstrates understanding of disease process, treatment plan, medications, and discharge instructions  Description: Complete learning assessment and assess knowledge base  Interventions:  - Provide teaching at level of understanding  - Provide teaching via preferred learning methods  Outcome: Progressing     Problem: Nutrition/Hydration-ADULT  Goal: Nutrient/Hydration intake appropriate for improving, restoring or maintaining nutritional needs  Description: Monitor and assess patient's nutrition/hydration status for malnutrition  Collaborate with interdisciplinary team and initiate plan and interventions as ordered  Monitor patient's weight and dietary intake as ordered or per policy  Utilize nutrition screening tool and intervene as necessary  Determine patient's food preferences and provide high-protein, high-caloric foods as appropriate       INTERVENTIONS:  - Monitor oral intake, urinary output, labs, and treatment plans  - Assess nutrition and hydration status and recommend course of action  - Evaluate amount of meals eaten  - Assist patient with eating if necessary   - Allow adequate time for meals  - Recommend/ encourage appropriate diets, oral nutritional supplements, and vitamin/mineral supplements  - Order, calculate, and assess calorie counts as needed  - Recommend, monitor, and adjust tube feedings and TPN/PPN based on assessed needs  - Assess need for intravenous fluids  - Provide specific nutrition/hydration education as appropriate  - Include patient/family/caregiver in decisions related to nutrition  Outcome: Progressing     Problem: Potential for Falls  Goal: Patient will remain free of falls  Description: INTERVENTIONS:  - Educate patient/family on patient safety including physical limitations  - Instruct patient to call for assistance with activity   - Consult OT/PT to assist with strengthening/mobility   - Keep Call bell within reach  - Keep bed low and locked with side rails adjusted as appropriate  - Keep care items and personal belongings within reach  - Initiate and maintain comfort rounds  - Make Fall Risk Sign visible to staff  - Apply yellow socks and bracelet for high fall risk patients  - Consider moving patient to room near nurses station  Outcome: Progressing     Problem: Prexisting or High Potential for Compromised Skin Integrity  Goal: Skin integrity is maintained or improved  Description: INTERVENTIONS:  - Identify patients at risk for skin breakdown  - Assess and monitor skin integrity  - Assess and monitor nutrition and hydration status  - Monitor labs   - Assess for incontinence   - Turn and reposition patient  - Assist with mobility/ambulation  - Relieve pressure over bony prominences  - Avoid friction and shearing  - Provide appropriate hygiene as needed including keeping skin clean and dry  - Evaluate need for skin moisturizer/barrier cream  - Collaborate with interdisciplinary team   - Patient/family teaching  - Consider wound care consult   Outcome: Progressing

## 2022-06-29 NOTE — PLAN OF CARE
Problem: MOBILITY - ADULT  Goal: Maintain or return to baseline ADL function  Description: INTERVENTIONS:  -  Assess patient's ability to carry out ADLs; assess patient's baseline for ADL function and identify physical deficits which impact ability to perform ADLs (bathing, care of mouth/teeth, toileting, grooming, dressing, etc )  - Assess/evaluate cause of self-care deficits   - Assess range of motion  - Assess patient's mobility; develop plan if impaired  - Assess patient's need for assistive devices and provide as appropriate  - Encourage maximum independence but intervene and supervise when necessary  - Involve family in performance of ADLs  - Assess for home care needs following discharge   - Consider OT consult to assist with ADL evaluation and planning for discharge  - Provide patient education as appropriate  Outcome: Progressing  Goal: Maintains/Returns to pre admission functional level  Description: INTERVENTIONS:  - Perform BMAT or MOVE assessment daily    - Set and communicate daily mobility goal to care team and patient/family/caregiver     - Collaborate with rehabilitation services on mobility goals if consulted  - Out of bed for toileting  - Record patient progress and toleration of activity level   Outcome: Progressing     Problem: PAIN - ADULT  Goal: Verbalizes/displays adequate comfort level or baseline comfort level  Description: Interventions:  - Encourage patient to monitor pain and request assistance  - Assess pain using appropriate pain scale  - Administer analgesics based on type and severity of pain and evaluate response  - Implement non-pharmacological measures as appropriate and evaluate response  - Consider cultural and social influences on pain and pain management  - Notify physician/advanced practitioner if interventions unsuccessful or patient reports new pain  Outcome: Progressing     Problem: INFECTION - ADULT  Goal: Absence or prevention of progression during hospitalization  Description: INTERVENTIONS:  - Assess and monitor for signs and symptoms of infection  - Monitor lab/diagnostic results  - Monitor all insertion sites, i e  indwelling lines, tubes, and drains  - Monitor endotracheal if appropriate and nasal secretions for changes in amount and color  - [employer] [facility] appropriate cooling/warming therapies per order  - Administer medications as ordered  - Instruct and encourage patient and family to use good hand hygiene technique  - Identify and instruct in appropriate isolation precautions for identified infection/condition  Outcome: Progressing     Problem: SAFETY ADULT  Goal: Patient will remain free of falls  Description: INTERVENTIONS:  - Educate patient/family on patient safety including physical limitations  - Instruct patient to call for assistance with activity   - Consult OT/PT to assist with strengthening/mobility   - Keep Call bell within reach  - Keep bed low and locked with side rails adjusted as appropriate  - Keep care items and personal belongings within reach  - Initiate and maintain comfort rounds  - Make Fall Risk Sign visible to staff  - Apply yellow socks and bracelet for high fall risk patients  - Consider moving patient to room near nurses station  Outcome: Progressing     Problem: DISCHARGE PLANNING  Goal: Discharge to home or other facility with appropriate resources  Description: INTERVENTIONS:  - Identify barriers to discharge w/patient and caregiver  - Arrange for needed discharge resources and transportation as appropriate  - Identify discharge learning needs (meds, wound care, etc )  - Arrange for interpretive services to assist at discharge as needed  - Refer to Case Management Department for coordinating discharge planning if the patient needs post-hospital services based on physician/advanced practitioner order or complex needs related to functional status, cognitive ability, or social support system  Outcome: Progressing     Problem: Knowledge Deficit  Goal: Patient/family/caregiver demonstrates understanding of disease process, treatment plan, medications, and discharge instructions  Description: Complete learning assessment and assess knowledge base  Interventions:  - Provide teaching at level of understanding  - Provide teaching via preferred learning methods  Outcome: Progressing     Problem: Nutrition/Hydration-ADULT  Goal: Nutrient/Hydration intake appropriate for improving, restoring or maintaining nutritional needs  Description: Monitor and assess patient's nutrition/hydration status for malnutrition  Collaborate with interdisciplinary team and initiate plan and interventions as ordered  Monitor patient's weight and dietary intake as ordered or per policy  Utilize nutrition screening tool and intervene as necessary  Determine patient's food preferences and provide high-protein, high-caloric foods as appropriate       INTERVENTIONS:  - Monitor oral intake, urinary output, labs, and treatment plans  - Assess nutrition and hydration status and recommend course of action  - Evaluate amount of meals eaten  - Assist patient with eating if necessary   - Allow adequate time for meals  - Recommend/ encourage appropriate diets, oral nutritional supplements, and vitamin/mineral supplements  - Order, calculate, and assess calorie counts as needed  - Recommend, monitor, and adjust tube feedings and TPN/PPN based on assessed needs  - Assess need for intravenous fluids  - Provide specific nutrition/hydration education as appropriate  - Include patient/family/caregiver in decisions related to nutrition  Outcome: Progressing     Problem: Potential for Falls  Goal: Patient will remain free of falls  Description: INTERVENTIONS:  - Educate patient/family on patient safety including physical limitations  - Instruct patient to call for assistance with activity   - Consult OT/PT to assist with strengthening/mobility   - Keep Call bell within reach  - Keep bed low and locked with side rails adjusted as appropriate  - Keep care items and personal belongings within reach  - Initiate and maintain comfort rounds  - Make Fall Risk Sign visible to staff  - Apply yellow socks and bracelet for high fall risk patients  - Consider moving patient to room near nurses station  Outcome: Progressing     Problem: Prexisting or High Potential for Compromised Skin Integrity  Goal: Skin integrity is maintained or improved  Description: INTERVENTIONS:  - Identify patients at risk for skin breakdown  - Assess and monitor skin integrity  - Assess and monitor nutrition and hydration status  - Monitor labs   - Assess for incontinence   - Turn and reposition patient  - Assist with mobility/ambulation  - Relieve pressure over bony prominences  - Avoid friction and shearing  - Provide appropriate hygiene as needed including keeping skin clean and dry  - Evaluate need for skin moisturizer/barrier cream  - Collaborate with interdisciplinary team   - Patient/family teaching  - Consider wound care consult   Outcome: Progressing

## 2022-06-29 NOTE — CASE MANAGEMENT
Case Management Progress Note    Patient name Natty Souza  Location W /W -96 MRN [id number]  : 1945 Date 2022       LOS (days): 5  Geometric Mean LOS (GMLOS) (days): 6 20  Days to GMLOS:1 1        OBJECTIVE:        Current admission status: Inpatient  Preferred Pharmacy: No Pharmacies Listed  Primary Care Provider: Autumn Crocker MD    Primary Insurance: [place]  Secondary Insurance:     PROGRESS NOTE:    Per CM support, auth received for [facility]  Message sent in [address] to facility to relay same and inquire about ability to accept patient today; awaiting response  Per prior messages, patient will need COVID test within 48 hours of admission; same requested from MD  Will follow-up to coordinate facility transfer once response received from [facility]

## 2022-06-29 NOTE — PLAN OF CARE
Problem: OCCUPATIONAL THERAPY ADULT  Goal: Performs self-care activities at highest level of function for planned discharge setting  See evaluation for individualized goals  Description: Treatment Interventions: ADL retraining, Functional transfer training, Endurance training, Cognitive reorientation, Patient/family training, Equipment evaluation/education, Compensatory technique education, Energy conservation, Activityengagement          See flowsheet documentation for full assessment, interventions and recommendations  Outcome: Progressing  Note: Limitation: Decreased ADL status, Decreased UE strength, Decreased Safe judgement during ADL, Decreased endurance, Decreased cognition, Decreased self-care trans, Decreased high-level ADLs  Prognosis: Good  Assessment: Patient participated in Skilled OT session this date with interventions consisting of ADL re training with the use of correct body mechnaics, Energy Conservation techniques, Work simplification skills , safety awareness and fall prevention techniques, maintaining weight bearing restrictions,  therapeutic activities to: increase activity tolerance, increase standing tolerance time with unilateral UE support to complete sink level ADLs, elicit righting and equilibrium reactions for improved postural control and alignment during transitional movements, increase dynamic sit/ stand balance during functional activity  and increase OOB/ sitting tolerance   Patient agreeable to OT treatment session, upon arrival patient was found supine in bed  In comparison to previous session, patient with improvements in participation in ADLs this session  Pt continues to require total A for LB dressing  Pt continues to require MAX A X 2 for sit<>stand transfers but able to take few steps today with RW   Patient requiring verbal cues for safety, verbal cues for correct technique, verbal cues for pacing thru activity steps, one step directives, frequent rest periods and ocassional safety reminders  Patient continues to be functioning below baseline level, occupational performance remains limited secondary to factors listed above and increased risk for falls and injury  From OT standpoint, recommendation at time of d/c would be Post acute rehabilitation services  Patient to benefit from continued Occupational Therapy treatment while in the hospital to address deficits as defined above and maximize level of functional independence with ADLs and functional mobility       OT Discharge Recommendation: Post acute rehabilitation services  OT - OK to Discharge: Yes (Once medically cleared)     Ellyn Condon OT

## 2022-06-29 NOTE — CASE MANAGEMENT
Case Management Discharge Planning Note    Patient name Lisa Godfrey  Location W /W -65 MRN [id number]  : 1945 Date 2022       Current Admission Date: 2022  Current Admission Diagnosis:Closed left hip fracture [facility])   Patient Active Problem List    Diagnosis Date Noted    Urinary retention 2022    Anemia 2022    MI [facility]ocardial infarction) ([facility] ) 2022    Pacemaker 2022    H/O heart artery stent 2022    Fall 2022    Closed left hip fracture ([facility] ) 2022    Blind 2022    Diabetes mellitus type 2, insulin dependent ([facility] ) 2022    Acute renal failure superimposed on stage 3 [facility] disease [facility] ) 20[facility]    Hypertension 2022    Tachy-sarah beth syndrome ([facility] ) 2022    CAD (coronary artery disease) 2022    Atrial fibrillation ([facility] ) 2022      LOS (days): 5  Geometric Mean LOS (GMLOS) (days): 6 20  Days to GMLOS:1 1     OBJECTIVE:  Risk of Unplanned Readmission Score: 18 07         Current admission status: Inpatient   Preferred Pharmacy: No Pharmacies Listed  Primary Care Provider: Leticia Brenner MD    Primary Insurance: [facility]  Secondary Insurance:     [address] [place] [address] Avenue Number: approved SNF [facility]ra Lo #[id number]  SOC:   NRD:   Care Coord: Cathleen Red  L:[phone]

## 2022-06-29 NOTE — ASSESSMENT & PLAN NOTE
- patient with 4 straight catheterizations now requiring indwelling head catheter  - Urology consultation  - Likely secondary to ambulatory dysfunction - patient is currently TTWB and has not been out of bed for the past 2 days

## 2022-06-29 NOTE — CONSULTS
Consult - Urology   Joceline Butler 1945, 68 y o  female MRN: [id number]    Unit/Bed#: W -01 Encounter: [id number]    Assessment & Plan    1  Urinary retention   2  Left hip fracture s/p fall and IM nail on 6/26/22  3  SAHIL on CKD  - Urinary retention likely secondary to anesthesia, constipation, and ambulatory dysfunction  - US showed distended bladder, no hydronephrosis  - Cr elevated up to 2 82, baseline around 1 3-1 6  - Head catheter inserted for 1300 mL urine output  - Cr improved to 2 24 today  - Continue aggressive bowel regimen  - Continue to trend renal function  - If renal function reaches maylin/baseline, patient has become more ambulatory, and constipation has improved, could attempt inpatient voiding trial  Otherwise, voiding trial would need to be performed in rehab or outpatient setting    - Continue medical optimization and care through primary services  - No  surgical intervention indicated during this hospitalization  Urology will be signing off at this time and can be contacted for any further questions or concerns  Urology can assist with scheduling outpatient voiding if needed  Subjective:   69 y/o female initially presenting with left hip fracture s/p fall  She is now s/p IM nail on 6/26/22  She was seen to have SAHIL with Cr up to 2 82  Baseline around 1 3-1 6  Renal US was completed showing a very distended bladder and patient was unable to void  No evidence of hydronephrosis  Head catheter was placed with 1300 mL urine output  Kidney function has been improving with Cr of 2 24  UA on 6/27/22 was nitrite negative  Patient admits to chronic constipation issues and has not had a bowel movement in several days  She additionally has not been ambulatory yet  She denies any issues with the head catheter  Denies dysuria, hematuria, flank pain, fever or chills   She reports she believes she required a head catheter several years ago during a prior hospitalization for unknown reason while living in [place]  She denies any urinary issues at her normal baseline  Denies any prior  surgical manipulation  Denies any family history of  malignancy  Review of Systems   Constitutional: Negative for chills and fever  Respiratory: Negative for shortness of breath  Cardiovascular: Negative for chest pain  Gastrointestinal: Positive for constipation  Negative for abdominal pain, nausea and vomiting  Genitourinary: Negative for dysuria, flank pain, hematuria and pelvic pain  Neurological: Negative for dizziness  Objective:  Vitals: Blood pressure 130/56, pulse 60, temperature 98 °F (36 7 °C), resp  rate 14, height 5' 2" (1 575 m), weight 88 kg (194 lb 0 1 oz), SpO2 94 %  ,Body mass index is 35 48 kg/m²  Physical Exam  Constitutional:       Appearance: Normal appearance  She is obese  HENT:      Head: Normocephalic and atraumatic  Right Ear: External ear normal       Left Ear: External ear normal       Nose: Nose normal       Mouth/Throat:      Comments: Missing teeth  Eyes:      Comments: Blind   Cardiovascular:      Rate and Rhythm: Normal rate  Pulses: Normal pulses  Pulmonary:      Effort: Pulmonary effort is normal    Genitourinary:     Comments: Key catheter patent for clear yellow urine  Musculoskeletal:      Cervical back: Normal range of motion  Skin:     General: Skin is warm and dry  Neurological:      General: No focal deficit present  Mental Status: She is alert and oriented to person, place, and time  Psychiatric:         Mood and Affect: Mood normal          Behavior: Behavior normal          Thought Content: Thought content normal          Judgment: Judgment normal          Imaging:  RENAL ULTRASOUND     INDICATION:   Acute kidney Injury      COMPARISON: None     TECHNIQUE:   Ultrasound of the retroperitoneum was performed with a curvilinear transducer utilizing volumetric sweeps and still imaging techniques     FINDINGS:     KIDNEYS:  Symmetric and small in size  Right kidney:  7 8 x 5 2 x 3 7 cm  Volume 78 0 mL  Left kidney:  9 4 x 4 4 x 4 7 cm  Volume 101 3 mL     Right kidney  The renal parenchyma is diffusely echogenic consistent with medical renal disease  No mass is identified  No hydronephrosis  No shadowing calculi  No perinephric fluid collections      Left kidney  Normal echogenicity but with cortical thinning  No mass is identified  No hydronephrosis  No shadowing calculi  No perinephric fluid collections      URETERS:  Nonvisualized      BLADDER:   Normally distended  No focal thickening or mass lesions  Bilateral ureteral jets not detected  Patient unable to void         IMPRESSION:     The bladder is very distended  The patient is unable to void for the technologist at the current time  This should be correlated with any evidence of acute urinary retention  Key catheter could be considered      Atrophic right kidney      No evidence of hydronephrosis  Labs:  Recent Labs     06/27/22  0453 06/28/22  1020 06/29/22  0454   WBC 9 34 7 61 7 81     Recent Labs     06/26/22  1614 06/27/22  0453 06/28/22  1020 06/28/22  1823 06/29/22  0454   HGB 7 7* 7 6* 6 7* 8 3* 8 1*     Recent Labs     06/27/22  0453 06/28/22  1020 06/29/22  0454   CREATININE 2 82* 2 51* 2 24*       History:  Social History     Socioeconomic History    Marital status:       Spouse name: None    Number of children: None    Years of education: None    Highest education level: None   Occupational History    None   Tobacco Use    Smoking status: Never Smoker    Smokeless tobacco: Never Used   Vaping Use    Vaping Use: Never used   Substance and Sexual Activity    Alcohol use: Never    Drug use: Never    Sexual activity: Not Currently   Other Topics Concern    None   Social History Narrative    None     Social Determinants of Health     Financial Resource Strain: Not on file   Food Insecurity: Not on file Transportation Needs: No Transportation Needs    Lack of Transportation (Medical): No    Lack of Transportation (Non-Medical): No   Physical Activity: Not on file   Stress: Not on file   Social Connections: Not on file   Intimate Partner Violence: Not on file   Housing Stability: Unknown    Unable to Pay for Housing in the Last Year: No    Number of Places Lived in the Last Year: Not on file    Unstable Housing in the Last Year: No     History reviewed  No pertinent past medical history  Past Surgical History:   Procedure Laterality Date    EYE SURGERY      NH OPEN RX FEMUR FX+INTRAMED RIMA Left 6/25/2022    Procedure: INSERTION NAIL IM FEMUR ANTEGRADE (TROCHANTERIC); Surgeon: Debi Contreras DO;  Location: [facility];  Service: Orthopedics     History reviewed  No pertinent family history      Cristina Anderson PA-C  Date: 6/29/2022 Time: 8:18 AM

## 2022-06-29 NOTE — PROGRESS NOTES
Orthopedics   Quinten Bean 68 y o  female MRN: [id number]  Unit/Bed#: W -01      Subjective:  68 y  o female post operative day 4 left femoral intramedullary nail  Feels well today  Complains that her left leg feels heavy  Notes that this has been longstanding for her, even prior to her recent fall/fracture  Pain controlled  Denies any chest pain or shortness of breath  No fevers or chills  She has been up and to a chair with PT/OT today       Labs:  0   Lab Value Date/Time    HCT 25 1 (L) 06/29/2022 0454    HCT 25 2 (L) 06/28/2022 1823    HCT 21 2 (L) 06/28/2022 1020    HGB 8 1 (L) 06/29/2022 0454    HGB 8 3 (L) 06/28/2022 1823    HGB 6 7 (LL) 06/28/2022 1020    INR 1 05 06/24/2022 0856    WBC 7 81 06/29/2022 0454    WBC 7 61 06/28/2022 1020    WBC 9 34 06/27/2022 0453       Meds:    Current Facility-Administered Medications:     acetaminophen (TYLENOL) tablet 975 mg, 975 mg, Oral, Q8H [address], [employer], PA-C, 975 mg at 06/29/22 0559    bisacodyl (DULCOLAX) rectal suppository 10 mg, 10 mg, Rectal, Daily PRN, Ceci Mari PA-C, 10 mg at 06/29/22 0940    calcium carbonate (TUMS) chewable tablet 1,000 mg, 1,000 mg, Oral, Daily PRN, [employer] Syd PA-C    carvedilol (COREG) tablet 25 mg, 25 mg, Oral, BID With Meals, [employer], PA-C, 25 mg at 06/29/22 0946    cholecalciferol (VITAMIN D3) tablet 2,000 Units, 2,000 Units, Oral, Daily, [employer], PA-C, 2,000 Units at 06/29/22 0946    docusate sodium (COLACE) capsule 100 mg, 100 mg, Oral, BID, [employer], PA-C, 100 mg at 42/83/71 2627    folic acid (FOLVITE) tablet 400 mcg, 400 mcg, Oral, Daily, MWI, PA-, 400 mcg at 06/28/22 9910    gabapentin (NEURONTIN) capsule 100 mg, 100 mg, Oral, HS, MWI, [facility], 100 mg at 06/28/22 2124    heparin (porcine) subcutaneous injection 5,000 Units, 5,000 Units, Subcutaneous, Q8H [address], [employer], [place], 5,000 Units at 06/29/22 0559    HYDROmorphone HCl (DILAUDID) injection 0 2 mg, 0 2 mg, Intravenous, Q4H PRN, [employer] ELLYN Velarde    insulin glargine (LANTUS) subcutaneous injection 25 Units 0 25 mL, 25 Units, Subcutaneous, HS, TENNILLE Bustamante, 25 Units at 06/28/22 2123    insulin lispro (HumaLOG) 100 units/mL subcutaneous injection 1-6 Units, 1-6 Units, Subcutaneous, 4x Daily (AC & HS), 1 Units at 06/28/22 2124 **AND** Fingerstick Glucose (POCT), , , 4x Daily AC and at bedtime, Triny Isabel PA-C    insulin lispro (HumaLOG) 100 units/mL subcutaneous injection 2 Units, 2 Units, Subcutaneous, TID With Meals, TENNILLE Bustamante, 2 Units at 06/28/22 1631    lidocaine (LIDODERM) 5 % patch 1 patch, 1 patch, Topical, Daily, [employer] [place], [place], 1 patch at 06/29/22 0946    meclizine (ANTIVERT) tablet 12 5 mg, 12 5 mg, Oral, Q8H PRN, [employer]LLYN    ondansetron [facility]) injection 4 mg, 4 mg, Intravenous, Q4H PRN, [employer]LL[employer]    oxyCODONE (ROXICODONE) IR tablet 2 5 mg, 2 5 mg, Oral, Q4H PRN, [employer], 2 5 mg at 06/25/22 0947    oxyCODONE (ROXICODONE) IR tablet 5 mg, 5 mg, Oral, Q4H PRN, [employer] [place]    polyethylene glycol (MIRALAX) packet 17 g, 17 g, Oral, Daily, TENNILLE Bustamante, 17 g at 06/29/22 0946    ranolazine (RANEXA) 12 hr tablet 500 mg, 500 mg, Oral, Daily, [employer], 500 mg at 06/29/22 0946    senna-docusate sodium (SENOKOT S) 8 6-50 mg per tablet 1 tablet, 1 tablet, Oral, BID, [employer] [place], [place], 1 tablet at 06/29/22 0946    Blood Culture:   No results found for: BLOODCX    Wound Culture:   No results found for: WOUNDCULT    Ins and Outs:  I/O last 24 hours:   In: 1160 [P O :720; Blood:440]  Out: 2631 [Urine:2490]          Physical exam:  Vitals:    06/29/22 0737   BP: 130/56   Pulse: 60   Resp:    Temp: 98 °F (36 7 °C)   SpO2: 94%     left lower extremity  · Dressing clean dry intact except for middle dressing there is moderate bloody drainage on mepliex noted  Middle dressing changed  No active bleeding appreciated  Clean gauze with Tegaderm applied  · No excessive soft tissue swelling in the thigh  · Sensation intact L2-S1  · Motor intact to knee flexion/extension, EHL/FHL  · 2+ DP pulse    Assessment: 68 y  o female post operative day 4 left femur long IMN  Plan:  · Up and out of bed  · Weightbearing as tolerated left lower extremity  · PT/OT  · DVT prophylaxis  · Analgesics p r n  · Dispo: Ortho will follow  · Patient noted to have acute blood loss anemia due to a drop in Hbg of > 2 0g from preop levels, will monitor vital signs and resuscitate with IV fluids as needed  Hemoglobin 8 1 this morning  Further Transfusions per primary     · Follow up with Dr Myra Oshea post op    Laura Tolbert PA-C

## 2022-06-29 NOTE — DISCHARGE SUMMARY
Discharge Summary - Dat Ortega 68 y o  female MRN: [id number]    Unit/Bed#: W -01 Encounter: [id number]    Admission Date:   Admission Orders (From admission, onward)     Ordered        06/24/22 0948  Inpatient Admission  Once                        Admitting Diagnosis: Head injury [S09 90XA]  Closed displaced intertrochanteric fracture of left femur, initial encounter ([facility] ) [S72 142A]  Closed fracture of right hip, initial encounter ([facility] ) [S72 001A]    HPI: Dat Ortega is a 68 y o  female who has a past medical history including atrial fibrillation that she takes Xarelto for and being blind, presenting today via ambulance for evaluation following a fall at home  Patient states that she was getting up to the bathroom when she misstepped, and subsequently fell to the ground  She fell onto her left hip  Since the fall she has had significant pain in left hip  She did strike her head  No reported loss of consciousness  Patient denies any other pain or discomfort  She states it's just my hip  - Per H&P on 6/24    Procedures Performed:   Orders Placed This Encounter   Procedures    Fast Ultrasound       Summary of Hospital Course: ***    Significant Findings, Care, Treatment and Services Provided: ***    Complications: ***    Discharge Diagnosis: ***    Medical Problems             Resolved Problems  Date Reviewed: 6/28/2022   None                 Condition at Discharge: {condition:34770}         Discharge instructions/Information to patient and family:   See after visit summary for information provided to patient and family  Provisions for Follow-Up Care:  See after visit summary for information related to follow-up care and any pertinent home health orders  PCP: Jonathan Raman MD    Disposition: {Discharge Disposition:36427}    Planned Readmission: {EXAM; YES/NO:67835::"No"}      Discharge Statement   I spent *** minutes discharging the patient   This time was spent on the day of discharge  I had direct contact with the patient on the day of discharge  Additional documentation is required if more than 30 minutes were spent on discharge  Discharge Medications:  See after visit summary for reconciled discharge medications provided to patient and family

## 2022-06-29 NOTE — PHYSICAL THERAPY NOTE
PHYSICAL THERAPY NOTE    Patient Name: Dat HOPE'A Date: 22 1128   PT Last Visit   PT Visit Date 22   Note Type   Note Type Treatment for insurance authorization   Pain Assessment   Pain Assessment Tool 0-10   Pain Score No Pain   Restrictions/Precautions   Weight Bearing Precautions Per Order Yes   LLE Weight Bearing Per Order WBAT  (upgraded , spoke to supervising PT)   Other Precautions Cognitive; Chair Alarm; Bed Alarm;WBS;Fall Risk;Pain;Visual impairment  (legally blind)   General   Family/Caregiver Present No   Subjective   Subjective Patient supine in bed and is agreeable to participate in therapy session  Patient identifers obtained from name &   Bed Mobility   Supine to Sit 2  Maximal assistance   Additional items Assist x 1;HOB elevated; Bedrails; Increased time required;Verbal cues;LE management   Sit to Supine Unable to assess   Additional Comments Patient seated OOB in recliner post session with chair alarm engaged, call bell and belongings in reach  Transfers   Sit to Stand 2  Maximal assistance   Additional items Assist x 2;Armrests; Increased time required;Verbal cues   Stand to Sit 2  Maximal assistance   Additional items Assist x 2;Armrests; Increased time required;Verbal cues   Toilet transfer 2  Maximal assistance   Additional items Assist x 2;Armrests; Increased time required;Verbal cues; Commode   Additional Comments Standing tolerance first trial 1 min and second 2 mins with B UE support on roller walker and max ax2   Ambulation/Elevation   Gait pattern Improper Weight shift;R Knee Camilo;L Knee Camilo; Poor UE support;Narrow AMERICO; Forward Flexion;Decreased foot clearance;Decreased L stance; Short stride; Step to;Excessively slow   Gait Assistance 2  Maximal assist   Additional items Assist x 2;Verbal cues   Assistive Device Rolling walker   Distance 3' x1, 2' x1   Balance Static Sitting Fair +   Dynamic Sitting Fair -   Static Standing Zero  (ax2)   Ambulatory Zero  (ax2)   Endurance Deficit   Endurance Deficit Yes   Endurance Deficit Description limited standing and ambulation tolerance   Activity Tolerance   Activity Tolerance Patient limited by fatigue   Medical Staff Made Aware Care coordination with DIMA [place]   Nurse Made Aware Spoke to RN   Assessment   Prognosis Fair   Problem List Decreased strength;Decreased endurance; Impaired balance;Decreased mobility; Decreased coordination;Orthopedic restrictions;Pain; Impaired vision;Obesity   Assessment Patient agreeable to participate in therapy session  Care coordination with DIMA [place] secondary to degree of assistance required for mobility trials  Patient demonstrates progession with mobility this session with ability to tolerate increased standing tolerance and initated ambulation  Patient continues max ax1 for supine to sit with increased time and instruction to complete transition to EOB  Multiple sit<>stand transfers with mod ax2 from EOB however max ax2 from commode and recliner  Pt able to ambulate a few feet to commode and commode to chair with max ax2 and roller walker  Pt requires significant assistance for steadying balance and instruction for steppign sequence and walker management  Increased assistance with fatigue and B knee buckling  Stand tolerance for inital trial of 1 min with mod ax2 at commode, additional trial max ax2 for dressing chair at bedside chair  Spoke to Marin Jimenez PT regarding upgrade in weight bearing status to WBAT and okay to see  Continue to focus on OOB mobility with progression of transfers and ambulation as appropriate and able  Barriers to Discharge Inaccessible home environment   Goals   Patient Goals none stated   STG Expiration Date 07/06/22   PT Treatment Day 1   Plan   Treatment/Interventions Functional transfer training;LE strengthening/ROM; Therapeutic exercise; Endurance training;Patient/family training;Equipment eval/education; Bed mobility;Gait training;Spoke to nursing;OT   PT Frequency 4-6x/wk   Recommendation   PT Discharge Recommendation Post acute rehabilitation services   Equipment Recommended [facility] Recommended Wheeled walker   AM-PAC Basic Mobility Inpatient   Turning in Bed Without Bedrails 2   Lying on Back to Sitting on Edge of Flat Bed 2   Moving Bed to Chair 1   Standing Up From Chair 1   Walk in Room 1   Climb 3-5 Stairs 1   Basic Mobility Inpatient Raw Score 8   Highest Level Of Mobility   -[facility] Goal 3: Sit at edge of bed   -HL Achieved 5: Stand (1 or more minutes)   End of Consult   Patient Position at End of Consult Bedside chair;Bed/Chair alarm activated; All needs within reach     The patient's AM-PAC Basic Mobility Inpatient Short Form Raw Score is 8  A Raw score of less than or equal to 16 suggests the patient may benefit from discharge to post-acute rehabilitation services  Please also refer to the recommendation of the Physical Therapist for safe discharge planning        Kelvin Shah, PTA

## 2022-06-29 NOTE — ASSESSMENT & PLAN NOTE
No results found for: HGBA1C    Recent Labs     06/28/22  0752 06/28/22  1113 06/28/22  1551 06/28/22  2103   POCGLU 99 185* 186* 186*       Blood Sugar Average: Last 72 hrs:  (P) 798 [id number]  - 2/22 hemoglobin A1c was 7 1  - Hold home oral anti diabetics  - Restarted Lantus   - Continue to monitor blood sugars throughout the day with use of sliding scale insulin    - Rickie controlled diet

## 2022-06-29 NOTE — ASSESSMENT & PLAN NOTE
- Acute blood loss in the setting of chronic anemia  - History of chronic anemia hemoglobin is typically around 10  - Patient was 10 9 on admission  - Patient loss approximately 300 mL blood during her surgery  - 6/26 1 unit PRBCs, 6/28 1 unit PRBCs  - Repeat hemoglobin responded to transfusion appropriately  - Continue to monitor for signs of symptomatic anemia though in the setting beta-blocker use patient will not likely be tachycardic

## 2022-06-29 NOTE — ASSESSMENT & PLAN NOTE
Lab Results   Component Value Date    EGFR 18 06/28/2022    EGFR 15 06/27/2022    EGFR 18 06/26/2022    CREATININE 2 51 (H) 06/28/2022    CREATININE 2 82 (H) 06/27/2022    CREATININE 2 41 (H) 06/26/2022     - Associated with diabetes and hypertension, baseline Cr appears to be between 1 6-2 0  - 1 83 on admission  - Creatinine worsened postoperatively to peak of 2 82 on 6/27   Now downtrending to 2 2 this am  - acute blood loss anemia, urinary retention, and intermittent hypotension all likely contributing to SAHIL status post 1 unit PRBC 6/28   - Nephrology consult appreciated - holding nifedipine  - Continue to monitor Cr for improvement

## 2022-06-29 NOTE — ASSESSMENT & PLAN NOTE
- Left hip fractures, present on admission   - Status post IM nail on 6/26  - Appreciate Orthopedic surgery evaluation, recommendations and interventions as noted  - Maintain WBAT status on the left extremity   - Monitor left lower extremity extremity neurovascular exam   - Continue multimodal analgesic regimen   - Continue DVT prophylaxis - will restart Xarelto in patient's hemoglobin trend is stable  - PT and OT evaluation and treatment as indicated  - Outpatient follow up with Orthopedic surgery for re-evaluation

## 2022-06-29 NOTE — PROGRESS NOTES
NEPHROLOGY PROGRESS NOTE   Jose Krueger 68 y o  female MRN: [id number]  Unit/Bed#: W -01 Encounter: [id number]    ASSESSMENT & PLAN:  68 y o  female with past medical history of CKD stage 3, diabetes, blind, CAD, hypertension, tachy-sarah beth syndrome with pacemaker, TIA, paroxysmal atrial flutter, who was admitted to [address] [place] [address] after presenting with fall  Nephrology following for assistance in the management of acute on chronic kidney injury  Acute kidney injury on chronic kidney disease stage 3  -Baseline creatinine:  Renal function was worsening to creatinine 2 2 in February 2022 but improved to creatinine 1 6 with holding ACE inhibitor in February   -Admission creatinine:  On June 24th was 1 8 mg/dl  - Work up:   · UA with microscopy:  0-1 RBC per high-power field as well as 1-2 WBC per high-power field  · Imaging:  Renal ultrasound suggestive of distended bladder but no hydronephrosis  -Etiology:  Acute kidney injury likely due to prerenal azotemia/early ATN in the setting of intraoperative hypotension to blood pressure less than 316 systolic briefly as well as postop hypotension on June 25th/postop anemia plus component of urinary retention with renal ultrasound suggestive of distended bladder  Bladder scan was positive, underwent intermittent straight catheterization and intake 900 mL on 06/28, now with head catheter    [facility] Course:  Renal function worsened to peak cr 2 8 but improving since then to cr 2 24 mg /dl today  Nifedipine 60 mg daily was stopped on 06/28  -Plan:   · Renal function improving to creatinine 2 24 mg/dL today, clinically euvolemic  Oral intake improving, will stop further IV fluids  Discussed about low-potassium diet as potassium level was elevated to 5 4 today  · Good urine output over last 24 hours and maintaining negative balance  · Avoid nephrotoxins and dose all medications per EGFR  · Avoid hypotension                                          Chronic Kidney Disease Stage 3  -Outpatient Nephrologist: Dr John Zaman, last seen in February 2022  -Baseline Creatinine:  Prior to office visit is February 2022, baseline creatinine was in 1s mg/dL and was then lost to follow-up until February 2022 and creatinine was trending up to 2 2 and lisinopril was held  Repeat creatinine in February was 1 6  Possible that this is the new baseline  Baseline creatinine possibly 1 3-1 6  -Etiology:  Chronic kidney disease likely due to hypertensive nephrosclerosis, diabetic CKD and age-related nephron loss  -Avoid Nephrotoxins and Dose all medications per eGFR  -Will need outpatient follow up after discharge  BP/Primary hypertension  -Home Medication:  Nifedipine and carvedilol  Lisinopril was held in February 2022 in the setting of elevated creatinine   -Currently BP stable and at goal   -Plan:  Okay to continue carvedilol with hold parameters  Nifedipine currently on hold since 6/28 and BP better today  Avoid hypotension  Hyperkalemia  -likely due to PRBC on 6/28  -ordered lasix 20 mg iv   -EKG with no peaked T waves  -low K diet and monitor BMP repeat today if able to draw  Overall difficult stick per RN  Anemia, unspecified  -status post PRBC on June 26 given postop anemia and again PRBC on 6/28  -hb improving to 8 1 g/dl today  -continue to monitor per primary team and consider PRBC if it drops below 7  Urinary retention: s/p Key catheter  -urology consulted by primary     Status post fall/closed left hip fracture, status post left intramedullary nail placed on June 26  Continue postop care per surgery    Diabetes mellitus type 2:  Management per primary team, on insulin coverage  Discussed with Trauma team       SUBJECTIVE:  No new complaints  Upset about the frequent blood draw   No leg swelling or SOB     OBJECTIVE:  Current Weight: Weight - Scale: 88 kg (194 lb 0 1 oz)  Vitals:    06/29/22 0737   BP: 130/56   Pulse: 60   Resp:    Temp: 98 °F (36 7 °C)   SpO2: 94%       Intake/Output Summary (Last 24 hours) at 6/29/2022 9241  Last data filed at 6/29/2022 0601  Gross per 24 hour   Intake 1160 ml   Output 2490 ml   Net -1330 ml       Physical Exam  Constitutional:       General: She is not in acute distress  Appearance: Normal appearance  She is well-developed  HENT:      Head: Normocephalic and atraumatic  Nose: Nose normal       Mouth/Throat:      Mouth: Mucous membranes are moist    Eyes:      General: No scleral icterus  Conjunctiva/sclera: Conjunctivae normal       Pupils: Pupils are equal, round, and reactive to light  Neck:      Thyroid: No thyromegaly  Vascular: No JVD  Cardiovascular:      Rate and Rhythm: Normal rate and regular rhythm  Heart sounds: Normal heart sounds  No murmur heard  No friction rub  Pulmonary:      Effort: Pulmonary effort is normal  No respiratory distress  Breath sounds: Normal breath sounds  No wheezing or rales  Abdominal:      General: Bowel sounds are normal  There is no distension  Palpations: Abdomen is soft  Tenderness: There is no abdominal tenderness  Musculoskeletal:         General: No deformity  Cervical back: Neck supple  Right lower leg: No edema  Left lower leg: No edema  Skin:     General: Skin is warm and dry  Findings: No rash  Neurological:      Mental Status: She is alert and oriented to person, place, and time  Psychiatric:         Mood and Affect: Mood normal          Behavior: Behavior normal          Thought Content:  Thought content normal            Medications:    Current Facility-Administered Medications:     acetaminophen (TYLENOL) tablet 975 mg, 975 mg, Oral, Q8H [address], [facility], PA-C, 975 mg at 06/29/22 0559    bisacodyl (DULCOLAX) rectal suppository 10 mg, 10 mg, Rectal, Daily PRN, Shayne Mei PA-C    calcium carbonate (TUMS) chewable tablet 1,000 mg, 1,000 mg, Oral, Daily PRN, Alexa Paredes PAZAYDA   carvedilol (COREG) tablet 25 mg, 25 mg, Oral, BID With Meals, [employer], [facility], 25 mg at 06/28/22 1633    cholecalciferol (VITAMIN D3) tablet 2,000 Units, 2,000 Units, Oral, Daily, [employer], [place], 2,000 Units at 06/28/22 0081    docusate sodium (COLACE) capsule 100 mg, 100 mg, Oral, BID, [employer] [facility], 100 mg at 81/47/95 8062    folic acid (FOLVITE) tablet 400 mcg, 400 mcg, Oral, Daily, [employer] [facility], 400 mcg at 06/28/22 5822    gabapentin (NEURONTIN) capsule 100 mg, 100 mg, Oral, HS, [employer], [facility], 100 mg at 06/28/22 2124    heparin (porcine) subcutaneous injection 5,000 Units, 5,000 Units, Subcutaneous, Q8H [facility], [employer] [place], [place], 5,000 Units at 06/29/22 0559    HYDROmorphone HCl (DILAUDID) injection 0 2 mg, 0 2 mg, Intravenous, Q4H PRN, [employer], PAKey    insulin glargine (LANTUS) subcutaneous injection 25 Units 0 25 mL, 25 Units, Subcutaneous, HS, TENNILLE Garcia, 25 Units at 06/28/22 2123    insulin lispro (HumaLOG) 100 units/mL subcutaneous injection 1-6 Units, 1-6 Units, Subcutaneous, 4x Daily (AC & HS), 1 Units at 06/28/22 2124 **AND** Fingerstick Glucose (POCT), , , 4x Daily AC and at bedtime, Mary Jo Isabel PA-C    insulin lispro (HumaLOG) 100 units/mL subcutaneous injection 2 Units, 2 Units, Subcutaneous, TID With Meals, TENNILLE Garcia, 2 Units at 06/28/22 1631    lidocaine (LIDODERM) 5 % patch 1 patch, 1 patch, Topical, Daily, [employer] [place], [place], 1 patch at 06/28/22 0830    meclizine (ANTIVERT) tablet 12 5 mg, 12 5 mg, Oral, Q8H PRN, [employer], PA-C    ondansetron [facility]) injection 4 mg, 4 mg, Intravenous, Q4H PRN, [employer], PAKeyC    oxyCODONE (ROXICODONE) IR tablet 2 5 mg, 2 5 mg, Oral, Q4H PRN, [employer], PAZAYDA, 2 5 mg at 06/25/22 0947    oxyCODONE (ROXICODONE) IR tablet 5 mg, 5 mg, Oral, Q4H PRN, [employer] [place], PAZAYDA    polyethylene glycol (MIRALAX) packet 17 g, 17 g, Oral, Daily, Ibis Mcgill, JORGE LUISNP, 17 g at 06/28/22 1164    ranolazine (RANEXA) 12 hr tablet 500 mg, 500 mg, Oral, Daily, [facility], PA-C, 500 mg at 06/28/22 0831    senna-docusate sodium (SENOKOT S) 8 6-50 mg per tablet 1 tablet, 1 tablet, Oral, BID, [facility], Dorcas Gutierrez, 1 tablet at 06/28/22 1729    Invasive Devices:        Lab Results:   Results from last 7 days   Lab Units 06/29/22  0454 06/28/22  1823 06/28/22  1020 06/27/22  0453 06/25/22  0429 06/24/22  0856   WBC Thousand/uL 7 81  --  7 61 9 34   < > 8 37   HEMOGLOBIN g/dL 8 1* 8 3* 6 7* 7 6*   < > 10 9*   I STAT HEMOGLOBIN g/dl  --   --   --   --   --  11 6   HEMATOCRIT % 25 1* 25 2* 21 2* 23 0*   < > 34 5*   HEMATOCRIT, ISTAT %  --   --   --   --   --  34*   PLATELETS Thousands/uL 176  --  173 165   < > 233   POTASSIUM mmol/L 5 4*  --  4 2 4 6   < > 4 5   CHLORIDE mmol/L 107  --  105 102   < > 108   CO2 mmol/L 25  --  26 25   < > 28   CO2, I-STAT mmol/L  --   --   --   --   --  30   BUN mg/dL 50*  --  52* 57*   < > 24   CREATININE mg/dL 2 24*  --  2 51* 2 82*   < > 1 83*   CALCIUM mg/dL 7 6*  --  7 2* 7 2*   < > 9 0   ALK PHOS U/L  --   --   --   --   --  54   ALT U/L  --   --   --   --   --  8   AST U/L  --   --   --   --   --  11*   GLUCOSE, ISTAT mg/dl  --   --   --   --   --  78    < > = values in this interval not displayed  Previous work up:     Renal u/s:  IMPRESSION:     The bladder is very distended  The patient is unable to void for the technologist at the current time  This should be correlated with any evidence of acute urinary retention  Key catheter could be considered      Atrophic right kidney      No evidence of hydronephrosis        Portions of the record may have been created with voice recognition software  Occasional wrong word or "sound a like" substitutions may have occurred due to the inherent limitations of voice recognition software   Read the chart carefully and recognize, using context, where substitutions have occurred  If you have any questions, please contact the dictating provider

## 2022-06-29 NOTE — PLAN OF CARE
Problem: PHYSICAL THERAPY ADULT  Goal: Performs mobility at highest level of function for planned discharge setting  See evaluation for individualized goals  Description: Treatment/Interventions: Functional transfer training, LE strengthening/ROM, Elevations, Therapeutic exercise, Endurance training, Patient/family training, Equipment eval/education, Bed mobility, Gait training  Equipment Recommended: Rodgers Cranker       See flowsheet documentation for full assessment, interventions and recommendations  Outcome: Progressing  Note: Prognosis: Fair  Problem List: Decreased strength, Decreased endurance, Impaired balance, Decreased mobility, Decreased coordination, Orthopedic restrictions, Pain, Impaired vision, Obesity  Assessment: Patient agreeable to participate in therapy session  Care coordination with Porfirio OT secondary to degree of assistance required for mobility trials  Patient demonstrates progession with mobility this session with ability to tolerate increased standing tolerance and initated ambulation  Patient continues max ax1 for supine to sit with increased time and instruction to complete transition to EOB  Multiple sit<>stand transfers with mod ax2 from EOB however max ax2 from commode and recliner  Pt able to ambulate a few feet to commode and commode to chair with max ax2 and roller walker  Pt requires significant assistance for steadying balance and instruction for steppign sequence and walker management  Increased assistance with fatigue and B knee buckling  Stand tolerance for inital trial of 1 min with mod ax2 at commode, additional trial max ax2 for dressing chair at bedside chair  Spoke to Bonnie Tan PT regarding upgrade in weight bearing status to WBAT and okay to see  Continue to focus on OOB mobility with progression of transfers and ambulation as appropriate and able    Barriers to Discharge: Inaccessible home environment        PT Discharge Recommendation: Post acute rehabilitation services See flowsheet documentation for full assessment

## 2022-06-29 NOTE — CASE MANAGEMENT
Case Management Progress Note    Patient name Natasha Rivera  Location W /W -01 MRN [id number]  : 1945 Date 2022       LOS (days): 5  Geometric Mean LOS (GMLOS) (days): 6 20  Days to GMLOS:0 9        OBJECTIVE:        Current admission status: Inpatient  Preferred Pharmacy: No Pharmacies Listed  Primary Care Provider: Mary Robbins MD    Primary Insurance: [facility]  Secondary Insurance:     PROGRESS NOTE:    Call made to patient's son and reviewed anticipated d/c for tomorrow now as MD would like to follow-up with labs (patient's creatinine) in AM  Son aware that 1:00pm transport has been requested in anticipation of need and this writer will be in touch tomorrow to confirm plan for d/c and transport time once in place  COVID test ordered, but needs to be collected - will follow-up, as negative result needed within 48 hours of d/c to [facility]

## 2022-06-29 NOTE — CASE MANAGEMENT
Case Management Discharge Planning Note    Patient name Inocencio Thomas  Location W /W -88 MRN [id number]  : 1945 Date 2022       Current Admission Date: 2022  Current Admission Diagnosis:Closed left hip fracture [facility])   Patient Active Problem List    Diagnosis Date Noted    Urinary retention 2022    Anemia 2022    MI [facility]ocardial infarction) ([facility] ) 2022    Pacemaker 2022    H/O heart artery stent 2022    Fall 2022    Closed left hip fracture ([facility] ) 2022    Blind 2022    Diabetes mellitus [facility], [facility] 20[facility]    Acute renal failure superimposed on stage 3 chronic kidney disease ([facility] ) 2022    Hypertension 2022    Tachy-sarah beth syndrome ([facility] ) 2022    CAD (coronary artery disease) 2022    Atrial fibrillation ([facility] 2022      LOS (days): 5  Geometric Mean LOS (GMLOS) (days): 6 20  Days to GMLOS:1     OBJECTIVE:  Risk of Unplanned Readmission Score: 18 12         Current admission status: Inpatient   Preferred Pharmacy: No Pharmacies Listed  Primary Care Provider: Ethan Rutledge MD    Primary Insurance: [facility]  Secondary Insurance:     DISCHARGE DETAILS:    Discharge planning discussed with[de-identified] patient's son, Natanael Pearson, at bedside  [place] of Choice: Yes (re: rehab)  Comments - [place] of Choice: [address] [place] [address] contacted family/caregiver?: Yes (son, Natanael Pearson)  Were Treatment Team discharge recommendations reviewed with patient/caregiver?: Yes  Did patient/caregiver verbalize understanding of patient care needs?: N/A- going to facility  Were patient/caregiver advised of the risks associated with not following Treatment Team discharge recommendations?: Yes    Contacts  Patient Contacts: simona Louis  Relationship to Patient[de-identified] Family  Contact Method:  In Person  Reason/Outcome: Continuity of Care, Emergency Contact, Referral, Discharge [place] Darius Is the patient interested in [facility] at discharge?: No    DME Referral Provided  Referral made for DME?: No    Other Referral/Resources/Interventions Provided:  Interventions: SNF, Short Term Rehab  Referral Comments: Authorization obtained for [facility] - TT to trauma team to request COVID test and inquire about stability for d/c - COVID ordered and anticipated d/c for today  [facility] confirmed ability to accept and transport request entered in [place] for 4:30pm  Met with patient's son, Carla Milner, at bedside to [facility] auth obtained and possible d/c for today pending COVID test  Son confirmed agreement with same  IMM reviewed which son signed; copy provided  TT then received from trauma team relaying they would like to hold d/c until tomorrow to monitor creatinine; transport request cancelled for today, and re-sent request for tomorrow, 6/30 at 1:00pm in anticipation of need  Hakan Barrow is good through tomorrow; trauma team aware that new Caroline De Luna will be needed if patient is not medically stable tomorrow  Will continue to follow for rehab transfer once medically clear      Would you like to participate in our [address] service program?  : No - Declined    Treatment Team Recommendation: Short Term Rehab, SNF  Discharge Destination Plan[de-identified] Short Term Rehab, SNF ([facility])  Transport at Discharge : [facility]  Dispatcher Contacted: Yes  Number/Name of Dispatcher: [facility] via [facility]     ETA of Transport (Date): 06/30/22  ETA of Transport (Time): 1300 (requested, not confirmed)              IMM Given (Date):: 06/29/22  IMM Given to[de-identified] Family (reviewed with son; copy provided)

## 2022-06-30 ENCOUNTER — TELEPHONE (OUTPATIENT)
Dept: NEPHROLOGY | Facility: CLINIC | Age: 77
End: 2022-06-30

## 2022-06-30 ENCOUNTER — TRANSITIONAL CARE MANAGEMENT (OUTPATIENT)
Dept: FAMILY MEDICINE CLINIC | Facility: CLINIC | Age: 77
End: 2022-06-30

## 2022-06-30 VITALS
HEART RATE: 57 BPM | RESPIRATION RATE: 18 BRPM | DIASTOLIC BLOOD PRESSURE: 56 MMHG | HEIGHT: 62 IN | BODY MASS INDEX: 33.63 KG/M2 | WEIGHT: 182.76 LBS | SYSTOLIC BLOOD PRESSURE: 149 MMHG | OXYGEN SATURATION: 94 % | TEMPERATURE: 98.6 F

## 2022-06-30 DIAGNOSIS — N17.9 ACUTE RENAL FAILURE SUPERIMPOSED ON STAGE 3 CHRONIC KIDNEY DISEASE, UNSPECIFIED ACUTE RENAL FAILURE TYPE, UNSPECIFIED WHETHER STAGE 3A OR 3B CKD (HCC): Primary | ICD-10-CM

## 2022-06-30 DIAGNOSIS — N18.30 ACUTE RENAL FAILURE SUPERIMPOSED ON STAGE 3 CHRONIC KIDNEY DISEASE, UNSPECIFIED ACUTE RENAL FAILURE TYPE, UNSPECIFIED WHETHER STAGE 3A OR 3B CKD (HCC): Primary | ICD-10-CM

## 2022-06-30 LAB
ANION GAP SERPL CALCULATED.3IONS-SCNC: 2 MMOL/L (ref 4–13)
BASOPHILS # BLD AUTO: 0.02 THOUSANDS/ÂΜL (ref 0–0.1)
BASOPHILS NFR BLD AUTO: 0 % (ref 0–1)
BUN SERPL-MCNC: 48 MG/DL (ref 5–25)
CALCIUM SERPL-MCNC: 7.8 MG/DL (ref 8.4–10.2)
CHLORIDE SERPL-SCNC: 108 MMOL/L (ref 96–108)
CO2 SERPL-SCNC: 30 MMOL/L (ref 21–32)
CREAT SERPL-MCNC: 1.97 MG/DL (ref 0.6–1.3)
EOSINOPHIL # BLD AUTO: 0 THOUSAND/ÂΜL (ref 0–0.61)
EOSINOPHIL NFR BLD AUTO: 0 % (ref 0–6)
ERYTHROCYTE [DISTWIDTH] IN BLOOD BY AUTOMATED COUNT: 14.8 % (ref 11.6–15.1)
GFR SERPL CREATININE-BSD FRML MDRD: 24 ML/MIN/1.73SQ M
GLUCOSE SERPL-MCNC: 136 MG/DL (ref 65–140)
GLUCOSE SERPL-MCNC: 45 MG/DL (ref 65–140)
GLUCOSE SERPL-MCNC: 69 MG/DL (ref 65–140)
GLUCOSE SERPL-MCNC: 95 MG/DL (ref 65–140)
HCT VFR BLD AUTO: 27.7 % (ref 34.8–46.1)
HGB BLD-MCNC: 9 G/DL (ref 11.5–15.4)
IMM GRANULOCYTES # BLD AUTO: 0.13 THOUSAND/UL (ref 0–0.2)
IMM GRANULOCYTES NFR BLD AUTO: 2 % (ref 0–2)
LYMPHOCYTES # BLD AUTO: 1.61 THOUSANDS/ÂΜL (ref 0.6–4.47)
LYMPHOCYTES NFR BLD AUTO: 23 % (ref 14–44)
MCH RBC QN AUTO: 28.5 PG (ref 26.8–34.3)
MCHC RBC AUTO-ENTMCNC: 32.5 G/DL (ref 31.4–37.4)
MCV RBC AUTO: 88 FL (ref 82–98)
MONOCYTES # BLD AUTO: 0.89 THOUSAND/ÂΜL (ref 0.17–1.22)
MONOCYTES NFR BLD AUTO: 13 % (ref 4–12)
NEUTROPHILS # BLD AUTO: 4.23 THOUSANDS/ÂΜL (ref 1.85–7.62)
NEUTS SEG NFR BLD AUTO: 62 % (ref 43–75)
NRBC BLD AUTO-RTO: 0 /100 WBCS
PLATELET # BLD AUTO: 203 THOUSANDS/UL (ref 149–390)
PMV BLD AUTO: 10.9 FL (ref 8.9–12.7)
POTASSIUM SERPL-SCNC: 4.4 MMOL/L (ref 3.5–5.3)
RBC # BLD AUTO: 3.16 MILLION/UL (ref 3.81–5.12)
SODIUM SERPL-SCNC: 140 MMOL/L (ref 135–147)
WBC # BLD AUTO: 6.88 THOUSAND/UL (ref 4.31–10.16)

## 2022-06-30 PROCEDURE — 99232 SBSQ HOSP IP/OBS MODERATE 35: CPT | Performed by: INTERNAL MEDICINE

## 2022-06-30 PROCEDURE — 82948 REAGENT STRIP/BLOOD GLUCOSE: CPT

## 2022-06-30 PROCEDURE — 99024 POSTOP FOLLOW-UP VISIT: CPT | Performed by: PHYSICIAN ASSISTANT

## 2022-06-30 PROCEDURE — 85025 COMPLETE CBC W/AUTO DIFF WBC: CPT | Performed by: INTERNAL MEDICINE

## 2022-06-30 PROCEDURE — 99238 HOSP IP/OBS DSCHRG MGMT 30/<: CPT

## 2022-06-30 PROCEDURE — 80048 BASIC METABOLIC PNL TOTAL CA: CPT | Performed by: INTERNAL MEDICINE

## 2022-06-30 RX ORDER — INSULIN LISPRO 100 [IU]/ML
1-6 INJECTION, SOLUTION INTRAVENOUS; SUBCUTANEOUS
Refills: 0
Start: 2022-06-30 | End: 2022-07-05 | Stop reason: ALTCHOICE

## 2022-06-30 RX ORDER — BISACODYL 10 MG
10 SUPPOSITORY, RECTAL RECTAL DAILY PRN
Qty: 12 SUPPOSITORY | Refills: 0 | Status: SHIPPED | OUTPATIENT
Start: 2022-06-30 | End: 2022-08-01

## 2022-06-30 RX ORDER — ACETAMINOPHEN 325 MG/1
975 TABLET ORAL EVERY 8 HOURS SCHEDULED
Refills: 0 | Status: ON HOLD
Start: 2022-06-30

## 2022-06-30 RX ORDER — OXYCODONE HYDROCHLORIDE 5 MG/1
TABLET ORAL
Qty: 20 TABLET | Refills: 0 | Status: SHIPPED | OUTPATIENT
Start: 2022-06-30 | End: 2022-08-01

## 2022-06-30 RX ORDER — DOCUSATE SODIUM 100 MG/1
100 CAPSULE, LIQUID FILLED ORAL 2 TIMES DAILY
Refills: 0
Start: 2022-06-30 | End: 2022-07-05 | Stop reason: ALTCHOICE

## 2022-06-30 RX ORDER — POLYETHYLENE GLYCOL 3350 17 G/17G
17 POWDER, FOR SOLUTION ORAL DAILY
Refills: 0
Start: 2022-07-01 | End: 2022-07-05 | Stop reason: ALTCHOICE

## 2022-06-30 RX ORDER — GABAPENTIN 100 MG/1
100 CAPSULE ORAL
Qty: 14 CAPSULE | Refills: 0 | Status: SHIPPED | OUTPATIENT
Start: 2022-06-30 | End: 2022-10-14

## 2022-06-30 RX ORDER — AMOXICILLIN 250 MG
1 CAPSULE ORAL 2 TIMES DAILY
Refills: 0
Start: 2022-06-30 | End: 2022-07-05 | Stop reason: ALTCHOICE

## 2022-06-30 RX ORDER — ASPIRIN 81 MG/1
81 TABLET ORAL DAILY
Status: DISCONTINUED | OUTPATIENT
Start: 2022-06-30 | End: 2022-06-30 | Stop reason: HOSPADM

## 2022-06-30 RX ADMIN — ACETAMINOPHEN 975 MG: 325 TABLET, FILM COATED ORAL at 05:13

## 2022-06-30 RX ADMIN — SENNOSIDES AND DOCUSATE SODIUM 1 TABLET: 50; 8.6 TABLET ORAL at 09:24

## 2022-06-30 RX ADMIN — DOCUSATE SODIUM 100 MG: 100 CAPSULE, LIQUID FILLED ORAL at 09:24

## 2022-06-30 RX ADMIN — FOLIC ACID TAB 400 MCG 400 MCG: 400 TAB at 10:50

## 2022-06-30 RX ADMIN — HEPARIN SODIUM 5000 UNITS: 5000 INJECTION INTRAVENOUS; SUBCUTANEOUS at 05:15

## 2022-06-30 RX ADMIN — INSULIN LISPRO 2 UNITS: 100 INJECTION, SOLUTION INTRAVENOUS; SUBCUTANEOUS at 11:32

## 2022-06-30 RX ADMIN — RANOLAZINE 500 MG: 500 TABLET, EXTENDED RELEASE ORAL at 09:24

## 2022-06-30 RX ADMIN — LIDOCAINE 5% 1 PATCH: 700 PATCH TOPICAL at 09:23

## 2022-06-30 RX ADMIN — CARVEDILOL 25 MG: 12.5 TABLET, FILM COATED ORAL at 09:24

## 2022-06-30 RX ADMIN — Medication 2000 UNITS: at 09:24

## 2022-06-30 RX ADMIN — ASPIRIN 81 MG: 81 TABLET, COATED ORAL at 10:51

## 2022-06-30 NOTE — PROGRESS NOTES
NEPHROLOGY PROGRESS NOTE   Yandel Quintero 68 y o  female MRN: [id number]  Unit/Bed#: W -01 Encounter: [id number]    ASSESSMENT & PLAN:  68 y o  female with past medical history of CKD stage 3, diabetes, blind, CAD, hypertension, tachy-sarah beth syndrome with pacemaker, TIA, paroxysmal atrial flutter, who was admitted to [facility] after presenting with fall  Nephrology following for assistance in the management of acute on chronic kidney injury  Acute kidney injury on chronic kidney disease stage 3  -Baseline creatinine:  Renal function was worsening to creatinine 2 2 in February 2022 but improved to creatinine 1 6 with holding ACE inhibitor in February   -Admission creatinine:  On June 24th was 1 8 mg/dl  - Work up:   · UA with microscopy:  0-1 RBC per high-power field as well as 1-2 WBC per high-power field  · Imaging:  Renal ultrasound suggestive of distended bladder but no hydronephrosis  -Etiology:  Acute kidney injury likely due to prerenal azotemia/early ATN in the setting of intraoperative hypotension to blood pressure less than 141 systolic briefly as well as postop hypotension on June 25th/postop anemia plus component of urinary retention with renal ultrasound suggestive of distended bladder  Bladder scan was positive, underwent intermittent straight catheterization and intake 900 mL on 06/28, now with head catheter    [facility] Course:  Renal function worsened to peak cr 2 8 but improving since  Nifedipine 60 mg daily was stopped on 06/28  -Plan:   · Renal function improving to creatinine 1 97 mg/dL today, clinically euvolemic  Status post IV Lasix 20 mg on 06/29 in the setting of hyperkalemia and was also taken of IV fluids  Overall good urine output and clinically euvolemic  No need for diuretics today, continue to monitor renal function  Continue low-potassium diet, potassium is at normal range today  · Avoid nephrotoxins and dose all medications per EGFR  · Avoid hypotension  Chronic Kidney Disease Stage 3  -Outpatient Nephrologist: Dr Rob Walker, last seen in February 2022  -Baseline Creatinine:  Prior to office visit is February 2022, baseline creatinine was in 1s mg/dL and was then lost to follow-up until February 2022 and creatinine was trending up to 2 2 and lisinopril was held  Repeat creatinine in February was 1 6  Possible that this is the new baseline  Baseline creatinine possibly 1 3-1 6  -Etiology:  Chronic kidney disease likely due to hypertensive nephrosclerosis, diabetic CKD and age-related nephron loss  -Avoid Nephrotoxins and Dose all medications per eGFR  -Will need outpatient follow up after discharge  BP/Primary hypertension  -Home Medication:  Nifedipine and carvedilol  Lisinopril was held in February 2022 in the setting of elevated creatinine   -Currently BP acceptable  -Plan:  Okay to continue carvedilol with hold parameters  Nifedipine currently on hold since 6/28  If blood pressure trends up would consider restarting nifedipine in next 24-48 hours    Hyperkalemia-resolved  -likely due to [address] on 6/28, potassium was 5 4 on 06/29  -ordered lasix 20 mg iv on 06/29  -EKG with no peaked T waves  -continue low-potassium diet    Anemia, unspecified  -status post PRBC on June 26 given postop anemia and again PRBC on 6/28  -hb improving to 9 0 g/dl today  -continue to monitor per primary team and consider PRBC if it drops below 7  Urinary retention: s/p Key catheter  -urology consulted by primary   Follow up Urology recommendations    Status post fall/closed left hip fracture, status post left intramedullary nail placed on June 26  Continue postop care per surgery    Diabetes mellitus type 2:  Management per primary team, on insulin coverage      Discussed with trauma team   Patient is overall stable for outpatient care from Nephrology side, will arrange for outpatient follow-up once discharged  -message Sent to office to arrange for follow-up with Dr Dania Byrd and to arrange for a BMP in a week    SUBJECTIVE:  No new complaints  No chest pain or shortness of breath, no nausea vomiting    OBJECTIVE:  Current Weight: Weight - Scale: 82 9 kg (182 lb 12 2 oz)  Vitals:    06/30/22 0924   BP: 149/56   Pulse:    Resp:    Temp:    SpO2:        Intake/Output Summary (Last 24 hours) at 6/30/2022 1035  Last data filed at 6/30/2022 0911  Gross per 24 hour   Intake 180 ml   Output 2550 ml   Net -2370 ml       Physical Exam  Constitutional:       General: She is not in acute distress  Appearance: Normal appearance  She is well-developed  HENT:      Head: Normocephalic and atraumatic  Nose: Nose normal       Mouth/Throat:      Mouth: Mucous membranes are moist    Eyes:      General: No scleral icterus  Conjunctiva/sclera: Conjunctivae normal       Pupils: Pupils are equal, round, and reactive to light  Neck:      Thyroid: No thyromegaly  Vascular: No JVD  Cardiovascular:      Rate and Rhythm: Normal rate and regular rhythm  Heart sounds: Normal heart sounds  No murmur heard  No friction rub  Pulmonary:      Effort: Pulmonary effort is normal  No respiratory distress  Breath sounds: Normal breath sounds  No wheezing or rales  Abdominal:      General: Bowel sounds are normal  There is no distension  Palpations: Abdomen is soft  Tenderness: There is no abdominal tenderness  Musculoskeletal:         General: No deformity  Cervical back: Neck supple  Right lower leg: No edema  Left lower leg: No edema  Skin:     General: Skin is warm and dry  Findings: No rash  Neurological:      Mental Status: She is alert and oriented to person, place, and time  Psychiatric:         Mood and Affect: Mood normal          Behavior: Behavior normal          Thought Content:  Thought content normal            Medications:    Current Facility-Administered Medications:     acetaminophen (TYLENOL) tablet 975 mg, 975 mg, Oral, Q8H [address], [place], [place], 975 mg at 06/30/22 8828    aspirin (ECOTRIN LOW STRENGTH) EC tablet 81 mg, 81 mg, Oral, Daily, Sim Jossiedannie Plummerom [place], CRNP    bisacodyl (DULCOLAX) rectal suppository 10 mg, 10 mg, Rectal, Daily PRN, Anuj Louis PA-C, 10 mg at 06/29/22 0940    bisacodyl (DULCOLAX) rectal suppository 10 mg, 10 mg, Rectal, Daily, Wayne Parents, CRNP    calcium carbonate (TUMS) chewable tablet 1,000 mg, 1,000 mg, Oral, Daily PRN, [employer]LLYN    carvedilol (COREG) tablet 25 mg, 25 mg, Oral, BID With Meals, [employer], 25 mg at 06/30/22 2234    cholecalciferol (VITAMIN D3) tablet 2,000 Units, 2,000 Units, Oral, Daily, [employer], 2,000 Units at 06/30/22 2334    docusate sodium (COLACE) capsule 100 mg, 100 mg, Oral, BID, [employer], 100 mg at 85/27/00 1834    folic acid (FOLVITE) tablet 400 mcg, 400 mcg, Oral, Daily, [facility], 400 mcg at 06/29/22 1012    gabapentin (NEURONTIN) capsule 100 mg, 100 mg, Oral, HS, [employer], 100 mg at 06/29/22 2226    HYDROmorphone HCl (DILAUDID) injection 0 2 mg, 0 2 mg, Intravenous, Q4H PRN, [facility] ELLYN Velarde    insulin glargine (LANTUS) subcutaneous injection 25 Units 0 25 mL, 25 Units, Subcutaneous, HS, Wayne Parents, TENNILLE, 25 Units at 06/29/22 2227    insulin lispro (HumaLOG) 100 units/mL subcutaneous injection 1-6 Units, 1-6 Units, Subcutaneous, 4x Daily (AC & HS), 1 Units at 06/29/22 2227 **AND** Fingerstick Glucose (POCT), , , 4x Daily AC and at bedtime, Sridevi Isabel PA-C    insulin lispro (HumaLOG) 100 units/mL subcutaneous injection 2 Units, 2 Units, Subcutaneous, TID With Meals, Wayne Parents, CRNP, 2 Units at 06/29/22 1317    lidocaine (LIDODERM) 5 % patch 1 patch, 1 patch, Topical, Daily, [place], [place], 1 patch at 06/30/22 4344    meclizine (ANTIVERT) tablet 12 5 mg, 12 5 mg, Oral, Q8H PRN, [facility]    ondansetron [facility]) injection 4 mg, 4 mg, Intravenous, Q4H PRN, [employer]    oxyCODONE (ROXICODONE) IR tablet 2 5 mg, 2 5 mg, Oral, Q4H PRN, [employer] [facility], 2 5 mg at 06/25/22 0947    oxyCODONE (ROXICODONE) IR tablet 5 mg, 5 mg, Oral, Q4H PRN, [facility]    polyethylene glycol (MIRALAX) packet 17 g, 17 g, Oral, Daily, TENNILLE Alvarez, 17 g at 06/29/22 0946    ranolazine (RANEXA) 12 hr tablet 500 mg, 500 mg, Oral, Daily, [facility] PAZAYDA, 500 mg at 06/30/22 4189    [START ON 7/1/2022] rivaroxaban (XARELTO) tablet 15 mg, 15 mg, Oral, Daily With Breakfast, TENNILLE Bradley    senna-docusate sodium (SENOKOT S) 8 6-50 mg per tablet 1 tablet, 1 tablet, Oral, BID, [place], [place], 1 tablet at 06/30/22 7512    Invasive Devices:        Lab Results:   Results from last 7 days   Lab Units 06/30/22  0509 06/29/22  0908 06/29/22  0454 06/28/22  1823 06/28/22  1020 06/25/22  0429 06/24/22  0856   WBC Thousand/uL 6 88  --  7 81  --  7 61   < > 8 37   HEMOGLOBIN g/dL 9 0*  --  8 1* 8 3* 6 7*   < > 10 9*   I STAT HEMOGLOBIN g/dl  --   --   --   --   --   --  11 6   HEMATOCRIT % 27 7*  --  25 1* 25 2* 21 2*   < > 34 5*   HEMATOCRIT, ISTAT %  --   --   --   --   --   --  34*   PLATELETS Thousands/uL 203  --  176  --  173   < > 233   POTASSIUM mmol/L 4 4 4 7 5 4*  --  4 2   < > 4 5   CHLORIDE mmol/L 108  --  107  --  105   < > 108   CO2 mmol/L 30  --  25  --  26   < > 28   CO2, I-STAT mmol/L  --   --   --   --   --   --  30   BUN mg/dL 48*  --  50*  --  52*   < > 24   CREATININE mg/dL 1 97*  --  2 24*  --  2 51*   < > 1 83*   CALCIUM mg/dL 7 8*  --  7 6*  --  7 2*   < > 9 0   ALK PHOS U/L  --   --   --   --   --   --  54   ALT U/L  --   --   --   --   --   --  8   AST U/L  --   --   --   --   --   --  11*   GLUCOSE, ISTAT mg/dl  --   --   --   --   --   --  78    < > = values in this interval not displayed  Previous work up:     Renal u/s:  IMPRESSION:     The bladder is very distended  The patient is unable to void for the technologist at the current time  This should be correlated with any evidence of acute urinary retention  Key catheter could be considered      Atrophic right kidney      No evidence of hydronephrosis        Portions of the record may have been created with voice recognition software  Occasional wrong word or "sound a like" substitutions may have occurred due to the inherent limitations of voice recognition software  Read the chart carefully and recognize, using context, where substitutions have occurred  If you have any questions, please contact the dictating provider

## 2022-06-30 NOTE — ASSESSMENT & PLAN NOTE
- Continue home Coreg  - Patient anemic to 6 8, now stable at 9 s/p 1 unit PRBC 6/28   - Resume home Xarelto on 6/30  Continue home aspirin

## 2022-06-30 NOTE — NURSING NOTE
Patient IV pulled, discharge instructions and packet given to transport team with scripts, and patient assisted to stretcher with son at bedside  Discharged to [facility], report called to  ALEX Serrano

## 2022-06-30 NOTE — PLAN OF CARE
Problem: MOBILITY - ADULT  Goal: Maintain or return to baseline ADL function  Description: INTERVENTIONS:  -  Assess patient's ability to carry out ADLs; assess patient's baseline for ADL function and identify physical deficits which impact ability to perform ADLs (bathing, care of mouth/teeth, toileting, grooming, dressing, etc )  - Assess/evaluate cause of self-care deficits   - Assess range of motion  - Assess patient's mobility; develop plan if impaired  - Assess patient's need for assistive devices and provide as appropriate  - Encourage maximum independence but intervene and supervise when necessary  - Involve family in performance of ADLs  - Assess for home care needs following discharge   - Consider OT consult to assist with ADL evaluation and planning for discharge  - Provide patient education as appropriate  Outcome: Progressing  Goal: Maintains/Returns to pre admission functional level  Description: INTERVENTIONS:  - Perform BMAT or MOVE assessment daily    - Set and communicate daily mobility goal to care team and patient/family/caregiver     - Collaborate with rehabilitation services on mobility goals if consulted  - Out of bed for toileting  - Record patient progress and toleration of activity level   Outcome: Progressing     Problem: PAIN - ADULT  Goal: Verbalizes/displays adequate comfort level or baseline comfort level  Description: Interventions:  - Encourage patient to monitor pain and request assistance  - Assess pain using appropriate pain scale  - Administer analgesics based on type and severity of pain and evaluate response  - Implement non-pharmacological measures as appropriate and evaluate response  - Consider cultural and social influences on pain and pain management  - Notify physician/advanced practitioner if interventions unsuccessful or patient reports new pain  Outcome: Progressing     Problem: INFECTION - ADULT  Goal: Absence or prevention of progression during hospitalization  Description: INTERVENTIONS:  - Assess and monitor for signs and symptoms of infection  - Monitor lab/diagnostic results  - Monitor all insertion sites, i e  indwelling lines, tubes, and drains  - Monitor endotracheal if appropriate and nasal secretions for changes in amount and color  - [place] appropriate cooling/warming therapies per order  - Administer medications as ordered  - Instruct and encourage patient and family to use good hand hygiene technique  - Identify and instruct in appropriate isolation precautions for identified infection/condition  Outcome: Progressing     Problem: SAFETY ADULT  Goal: Patient will remain free of falls  Description: INTERVENTIONS:  - Educate patient/family on patient safety including physical limitations  - Instruct patient to call for assistance with activity   - Consult OT/PT to assist with strengthening/mobility   - Keep Call bell within reach  - Keep bed low and locked with side rails adjusted as appropriate  - Keep care items and personal belongings within reach  - Initiate and maintain comfort rounds  - Make Fall Risk Sign visible to staff  - Apply yellow socks and bracelet for high fall risk patients  - Consider moving patient to room near nurses station  Outcome: Progressing     Problem: DISCHARGE PLANNING  Goal: Discharge to home or other facility with appropriate resources  Description: INTERVENTIONS:  - Identify barriers to discharge w/patient and caregiver  - Arrange for needed discharge resources and transportation as appropriate  - Identify discharge learning needs (meds, wound care, etc )  - Arrange for interpretive services to assist at discharge as needed  - Refer to Case Management Department for coordinating discharge planning if the patient needs post-hospital services based on physician/advanced practitioner order or complex needs related to functional status, cognitive ability, or social support system  Outcome: Progressing

## 2022-06-30 NOTE — ASSESSMENT & PLAN NOTE
- Status post fall with the below noted injuries  - Fall precautions  - Geriatric Medicine consultation for evaluation, medication review and recommendations   - PT and OT evaluation and treatment as indicated  - Case Management consultation for disposition planning  Discharge to [facility] today

## 2022-06-30 NOTE — CASE MANAGEMENT
Case Management Progress Note    Patient name Robel Rowell  Location W /W -01 MRN [id number]  : 1945 Date 2022       LOS (days): 6  Geometric Mean LOS (GMLOS) (days): 6 20  Days to GMLOS:0 2        OBJECTIVE:        Current admission status: Inpatient  Preferred Pharmacy: No Pharmacies Listed  Primary Care Provider: Benjamín Garcia MD    Primary Insurance: [facility]  Secondary Insurance:     PROGRESS NOTE:    Per trauma, patient clear for d/c today to [facility]  COVID test from yesterday negative  Pick-up time confirmed for 1:00pm with [facility]  Message sent to [facility] in [facility] left for Parth Noel in admissions to relay same and confirm ability to accept for today; awaiting response

## 2022-06-30 NOTE — ASSESSMENT & PLAN NOTE
No results found for: HGBA1C    Recent Labs     06/29/22  1818 06/29/22  2032 06/30/22  0729 06/30/22  0801   POCGLU 110 179* 45* 95       Blood Sugar Average: Last 72 hrs:  (P) 153 4  - 2/22 hemoglobin A1c was 7 1  - Hold home oral anti diabetics  - Restarted Lantus   - Continue to monitor blood sugars throughout the day with use of sliding scale insulin    - Rickie controlled diet

## 2022-06-30 NOTE — TELEPHONE ENCOUNTER
----- Message from Miguel Ángel Markham MD sent at 6/30/2022 10:42 AM EDT -----  being discharged from [facility] today  please order for BMP to be done in 1 week for acute kidney injury  Please arrange for nonurgent follow-up with an AP or Dr Sayra Kim in next 3-4 weeks  Repeat BMP again before the office visit  Dr Sayra Kim please review my note below  68 y  o  female with past medical history of CKD stage 3, diabetes, blind, CAD, hypertension, tachy-sarah beth syndrome with pacemaker, TIA, paroxysmal atrial flutter, who was admitted [place] [id number] presenting with fall  Nephrology following for assistance in the management of acute on chronic kidney injury      Acute kidney injury on chronic kidney disease stage 3  -Baseline creatinine:  Renal function was worsening to creatinine 2 2 in February 2022 but improved to creatinine 1 6 with holding ACE inhibitor in February   -Admission creatinine:  On June 24th was 1 8 mg/dl  - Work up:   · UA with microscopy:  0-1 RBC per high-power field as well as 1-2 WBC per high-power field  · Imaging:  Renal ultrasound suggestive of distended bladder but no hydronephrosis  -Etiology:  Acute kidney injury likely due to prerenal azotemia/early ATN in the setting of intraoperative hypotension to blood pressure less than 865 systolic briefly as well as postop hypotension on June 25th/postop anemia plus component of urinary retention with renal ultrasound suggestive of distended bladder  Bladder scan was positive, underwent intermittent straight catheterization and intake 900 mL on 06/28, now with head catheter    [facility] Course:  Renal function worsened to peak cr 2 8 but improving since  Nifedipine 60 mg daily was stopped on 06/28  -Plan:   · Renal function improving to creatinine 1 97 mg/dL today, clinically euvolemic  Status post IV Lasix 20 mg on 06/29 in the setting of hyperkalemia and was also taken of IV fluids  Overall good urine output and clinically euvolemic  No need for diuretics today, continue to monitor renal function  Continue low-potassium diet, potassium is at normal range today  · Avoid nephrotoxins and dose all medications per EGFR  · Avoid hypotension  Chronic Kidney Disease Stage 3  -Outpatient Nephrologist: Dr Malina Silva, last seen in February 2022  -Baseline Creatinine:  Prior to office visit is February 2022, baseline creatinine was in 1s mg/dL and was then lost to follow-up until February 2022 and creatinine was trending up to 2 2 and lisinopril was held  Repeat creatinine in February was 1 6  Possible that this is the new baseline  Baseline creatinine possibly 1 3-1 6  -Etiology:  Chronic kidney disease likely due to hypertensive nephrosclerosis, diabetic CKD and age-related nephron loss  -Avoid Nephrotoxins and Dose all medications per eGFR  -Will need outpatient follow up after discharge      BP/Primary hypertension  -Home Medication:  Nifedipine and carvedilol  Lisinopril was held in February 2022 in the setting of elevated creatinine   -Currently BP acceptable  -Plan:  Okay to continue carvedilol with hold parameters  Nifedipine currently on hold since 6/28  If blood pressure trends up would consider restarting nifedipine in next 24-48 hours     Hyperkalemia-resolved  -likely due to PRBC on 6/28, potassium was 5 4 on 06/29  -ordered lasix 20 mg iv on 06/29  -EKG with no peaked T waves  -continue low-potassium diet     Anemia, unspecified  -status post PRBC on June 26 given postop anemia and again PRBC on 6/28  -hb improving to 9 0 g/dl today  -continue to monitor per primary team and consider PRBC if it drops below 7      Urinary retention: s/p Key catheter  -urology consulted by primary   Follow up Urology recommendations     Status post fall/closed left hip fracture, status post left intramedullary nail placed on June 26    Continue postop care per surgery     Diabetes mellitus type 2:  Management per primary team, on insulin coverage      Discussed with trauma team   Patient is overall stable for outpatient care from Nephrology side, will arrange for outpatient follow-up once discharged  -message Sent to office to arrange for follow-up with Dr Aida March and to arrange for a BMP in a week

## 2022-06-30 NOTE — ASSESSMENT & PLAN NOTE
- Continue home carvedilol  Resume home nifedipine on discharge as recommended by Nephrology  - Continue to hold home lisinopril due to normal BP s/p orthopedic intervention and SAHIL   - F/u as outpatient for trend in renal function

## 2022-06-30 NOTE — PROGRESS NOTES
Orthopedics   Quinten Bean 68 y o  female MRN: [id number]  Unit/Bed#: W -01      Subjective:  68 y  o female post operative day 5 left femoral intramedullary nail  Feels well today  Offers no complaints at time of consultation, eating breakfast    Pain controlled  Denies any chest pain or shortness of breath  No fevers or chills       Labs:  0   Lab Value Date/Time    HCT 27 7 (L) 06/30/2022 0509    HCT 25 1 (L) 06/29/2022 0454    HCT 25 2 (L) 06/28/2022 1823    HGB 9 0 (L) 06/30/2022 0509    HGB 8 1 (L) 06/29/2022 0454    HGB 8 3 (L) 06/28/2022 1823    INR 1 05 06/24/2022 0856    WBC 6 88 06/30/2022 0509    WBC 7 81 06/29/2022 0454    WBC 7 61 06/28/2022 1020       Meds:    Current Facility-Administered Medications:     acetaminophen (TYLENOL) tablet 975 mg, 975 mg, Oral, Q8H [address], [employer], PA-C, 975 mg at 06/30/22 0513    aspirin (ECOTRIN LOW STRENGTH) EC tablet 81 mg, 81 mg, Oral, Daily, TENNILLE Mathew, 81 mg at 06/30/22 1051    bisacodyl (DULCOLAX) rectal suppository 10 mg, 10 mg, Rectal, Daily PRN, Ceci Mari PA-C, 10 mg at 06/29/22 0940    bisacodyl (DULCOLAX) rectal suppository 10 mg, 10 mg, Rectal, Daily, TENNILLE Vincent    calcium carbonate (TUMS) chewable tablet 1,000 mg, 1,000 mg, Oral, Daily PRN, [employer] PA-C    carvedilol (COREG) tablet 25 mg, 25 mg, Oral, BID With Meals, [employer], PA-C, 25 mg at 06/30/22 2634    cholecalciferol (VITAMIN D3) tablet 2,000 Units, 2,000 Units, Oral, Daily, [employer] [place], PA-C, 2,000 Units at 06/30/22 7406    docusate sodium (COLACE) capsule 100 mg, 100 mg, Oral, BID, [employer], PA-C, 100 mg at 18/93/47 8257    folic acid (FOLVITE) tablet 400 mcg, 400 mcg, Oral, Daily, [employer], PA-C, 400 mcg at 06/30/22 1050    gabapentin (NEURONTIN) capsule 100 mg, 100 mg, Oral, HS, [employer], PA-C, 100 mg at 06/29/22 2226    HYDROmorphone HCl (DILAUDID) injection 0 2 mg, 0 2 mg, Intravenous, Q4H PRN, [employer] ELLYN Velarde    insulin glargine (LANTUS) subcutaneous injection 25 Units 0 25 mL, 25 Units, Subcutaneous, HS, TENNILLE Osborn, 25 Units at 06/29/22 2227    insulin lispro (HumaLOG) 100 units/mL subcutaneous injection 1-6 Units, 1-6 Units, Subcutaneous, 4x Daily (AC & HS), 1 Units at 06/29/22 2227 **AND** Fingerstick Glucose (POCT), , , 4x Daily AC and at bedtime, Romelia Isabel PA-C    insulin lispro (HumaLOG) 100 units/mL subcutaneous injection 2 Units, 2 Units, Subcutaneous, TID With Meals, TENNILLE Osborn, 2 Units at 06/29/22 1317    lidocaine (LIDODERM) 5 % patch 1 patch, 1 patch, Topical, Daily, [employer] [place], [place], 1 patch at 06/30/22 1761    meclizine (ANTIVERT) tablet 12 5 mg, 12 5 mg, Oral, Q8H PRN, [employer], PA-C    ondansetron [facility]) injection 4 mg, 4 mg, Intravenous, Q4H PRN, [employer], PA-C    oxyCODONE (ROXICODONE) IR tablet 2 5 mg, 2 5 mg, Oral, Q4H PRN, [employer], PA-JEFRFEY, 2 5 mg at 06/25/22 0947    oxyCODONE (ROXICODONE) IR tablet 5 mg, 5 mg, Oral, Q4H PRN, [employer], PAZAYDA    polyethylene glycol (MIRALAX) packet 17 g, 17 g, Oral, Daily, TENNILLE Osborn, 17 g at 06/29/22 0946    ranolazine (RANEXA) 12 hr tablet 500 mg, 500 mg, Oral, Daily, [employer], ELLYN, 500 mg at 06/30/22 9315    [START ON 7/1/2022] rivaroxaban (XARELTO) tablet 15 mg, 15 mg, Oral, Daily With Breakfast, Annabel Query, CRVINH    senna-docusate sodium (SENOKOT S) 8 6-50 mg per tablet 1 tablet, 1 tablet, Oral, BID, Shannon Guadalupe, [place], 1 tablet at 06/30/22 6005    Blood Culture:   No results found for: BLOODCX    Wound Culture:   No results found for: WOUNDCULT    Ins and Outs:  I/O last 24 hours:   In: 180 [P O :180]  Out: 2550 [Urine:2550]      Physical exam:  Vitals:    06/30/22 0924   BP: 149/56   Pulse:    Resp:    Temp:    SpO2:      left lower extremity  · Dressing clean dry intact except for middle dressing there is serous drainage noted on gauze  No active drainage noted from staple line  Clean gauze with Tegaderm applied  · No excessive soft tissue swelling in the thigh  · Sensation intact L2-S1  · Motor intact to knee flexion/extension, EHL/FHL  · 2+ DP pulse    Assessment: 68 y  o female post operative day 5 left femur long IMN  Plan:  · Up and out of bed  · Weightbearing as tolerated left lower extremity  · PT/OT  · DVT prophylaxis  · Analgesics p r n  · Dispo: Ortho will follow  · Patient noted to have acute blood loss anemia due to a drop in Hbg of > 2 0g from preop levels, will monitor vital signs and resuscitate with IV fluids as needed  Hemoglobin 9 0 this morning  Further Transfusions per primary     · Follow up with Dr Iwona Everett post op    Macrina Kong PA-C

## 2022-06-30 NOTE — ASSESSMENT & PLAN NOTE
- Left hip fractures, present on admission   - Status post IM nail on 6/26  - Appreciate Orthopedic surgery evaluation, recommendations and interventions as noted  - Maintain WBAT status on the left extremity   - Monitor left lower extremity extremity neurovascular exam   - Continue multimodal analgesic regimen   - Continue DVT prophylaxis - will restart Xarelto today  - PT and OT evaluation and treatment as indicated  - Outpatient follow up with Orthopedic surgery for re-evaluation

## 2022-06-30 NOTE — ASSESSMENT & PLAN NOTE
Lab Results   Component Value Date    EGFR 24 06/30/2022    EGFR 20 06/29/2022    EGFR 18 06/28/2022    CREATININE 1 97 (H) 06/30/2022    CREATININE 2 24 (H) 06/29/2022    CREATININE 2 51 (H) 06/28/2022     - Associated with diabetes and hypertension, baseline Cr appears to be between 1 6-2 0  - 1 83 on admission  - Creatinine worsened postoperatively to peak of 2 82 on 6/27   Now downtrending to 1 97 this am  - acute blood loss anemia, urinary retention, and intermittent hypotension all likely contributing to SAHIL status post 1 unit PRBC 6/28   - Nephrology consult appreciated - holding nifedipine  - Continue to monitor Cr for improvement

## 2022-06-30 NOTE — CASE MANAGEMENT
Case Management Discharge Planning Note    Patient name Marino Flores  Location W /W -57 MRN [id number]  : 1945 Date 2022       Current Admission Date: 2022  Current Admission Diagnosis:Closed left hip fracture [facility])   Patient Active Problem List    Diagnosis Date Noted    Urinary retention 2022    Anemia 2022    MI (myocardial infarction) ([facility] [address] ) 2022    Pacemaker 2022    H/O heart artery stent 2022    Fall 2022    Closed left hip fracture ([facility] [address] ) 2022    Blind 2022    Diabetes mellitus type 2, insulin dependent [facility] 2022    Acute renal failure superimposed on stage 3 chronic kidney disease ([facility] [address] ) 2022    Hypertension 2022    Tachy-sarah beth syndrome ([facility] [address] ) 2022    CAD (coronary artery disease) 2022    Atrial fibrillation ([facility] ) 2022      LOS (days): 6  Geometric Mean LOS (GMLOS) (days): 6 20  Days to GMLOS:0 1     OBJECTIVE:  Risk of Unplanned Readmission Score: 18 52         Current admission status: Inpatient   Preferred Pharmacy: No Pharmacies Listed  Primary Care Provider: Vandana Galvan MD    Primary Insurance: [facility]  Secondary Insurance:     DISCHARGE DETAILS:    Discharge planning discussed with[de-identified] patient's sonShalini of Choice: Yes (re: rehab)  Comments - [place] of Choice: [address] [place] [address] contacted family/caregiver?: Yes (son, Funmi Comment, by phone)  Were Treatment Team discharge recommendations reviewed with patient/caregiver?: Yes  Did patient/caregiver verbalize understanding of patient care needs?: N/A- going to facility  Were patient/caregiver advised of the risks associated with not following Treatment Team discharge recommendations?: Yes    Contacts  Patient Contacts:  Funmi Comment, son  Relationship to Patient[de-identified] Family  Contact Method: Phone  Phone Number: [phone]  Reason/Outcome: Continuity of Care, Emergency Contact, Referral, Discharge Planning Other Referral/Resources/Interventions Provided:  Interventions: SNF, Short Term Rehab  Referral Comments: Per Trauma team, patient is medically stable for d/c today  Transport arranged for S pick-up at 13:00 and confirmation received from [facility] that they are able to accept at that time  RN and Trauma team aware of same  Call made to patient's son, Jenyn Mckeon, and reviewed d/c for today and transfer at 13:00 to [facility]; confirmed agreement with same  AVS to be forwarded to facility once completed  Would you like to participate in our [address] service program?  : No - Declined    Treatment Team Recommendation: Short Term Rehab, SNF  Discharge Destination Plan[de-identified] Short Term Rehab, SNF ([facility])  Transport at Discharge : [facility]  Dispatcher Contacted: Yes  Number/Name of Dispatcher: Elvin via [employer] by Katrina and Unit #):  [facility]  ETA of Transport (Date): 06/30/22  ETA of Transport (Time): 1300 (confirmed)              IMM Given (Date):: 06/29/22  IMM Given to[de-identified] Family (reviewed yesterday with son; copy provided)          Accepting Facility Name, [facility] : [facility]  Receiving Facility/Agency Phone Number: [phone] ext [phone]  Facility/Agency Fax Number: [phone]

## 2022-06-30 NOTE — ASSESSMENT & PLAN NOTE
- Acute blood loss in the setting of chronic anemia  - History of chronic anemia hemoglobin is typically around 10  - Patient was 10 9 on admission, now 9 today  - Patient loss approximately 300 mL blood during her surgery  - 6/26 1 unit PRBCs, 6/28 1 unit PRBCs, responded appropriately  - resume home Xarelto 6/30  No signs of bleeding clinically

## 2022-06-30 NOTE — ASSESSMENT & PLAN NOTE
- patient with 4 straight catheterizations now requiring indwelling head catheter  - Urology consultation appreciated     - Likely secondary to ambulatory dysfunction - patient is currently TTWB and has not been out of bed for the past 2 days    - Recommend voiding trial once more ambulatory at rehab and f/u with urology as an outpatient if needed

## 2022-06-30 NOTE — DISCHARGE SUMMARY
[facility]  Discharge- Inocencio Thomas 1945, 68 y o  female MRN: [id number]  Unit/Bed#: W -01 Encounter: [id number]  Primary Care Provider: Ethan Rutledge MD   Date and time admitted to hospital: 6/24/2022  8:30 AM    Urinary retention  Assessment & Plan  - patient with 4 straight catheterizations now requiring indwelling head catheter  - Urology consultation appreciated  - Likely secondary to ambulatory dysfunction - patient is currently TTWB and has not been out of bed for the past 2 days    - Recommend voiding trial once more ambulatory at rehab and f/u with urology as an outpatient if needed     Anemia  Assessment & Plan  - Acute blood loss in the setting of chronic anemia  - History of chronic anemia hemoglobin is typically around 10  - Patient was 10 9 on admission, now 9 today  - Patient loss approximately 300 mL blood during her surgery  - 6/26 1 unit PRBCs, 6/28 1 unit PRBCs, responded appropriately  - resume home Xarelto 6/30  No signs of bleeding clinically  Atrial fibrillation [facility])  Assessment & Plan  - Continue home Coreg  - Patient anemic to 6 8, now stable at 9 s/p 1 unit PRBC 6/28   - Resume home Xarelto on 6/30  Continue home aspirin  CAD (coronary artery disease)  Assessment & Plan  - History of PCI in 2011  - Continue home Ranexa and Coreg  - Continue home ASA today    Tachy-sarah beth syndrome ([facility] [address] )  Assessment & Plan  - Followed by [facility] cardiology  - Continue Coreg  - Pacemaker in place    Hypertension  Assessment & Plan  - Continue home carvedilol  Resume home nifedipine on discharge as recommended by Nephrology  - Continue to hold home lisinopril due to normal BP s/p orthopedic intervention and SAHIL   - F/u as outpatient for trend in renal function       Acute renal failure superimposed on stage 3 chronic kidney disease [facility])  Assessment & Plan  Lab Results   Component Value Date    EGFR 24 06/30/2022    EGFR 20 06/29/2022    EGFR 18 06/28/2022 CREATININE 1 97 (H) 06/30/2022    CREATININE 2 24 (H) 06/29/2022    CREATININE 2 51 (H) 06/28/2022     - Associated with diabetes and hypertension, baseline Cr appears to be between 1 6-2 0  - 1 83 on admission  - Creatinine worsened postoperatively to peak of 2 82 on 6/27  Now downtrending to 1 97 this am  - acute blood loss anemia, urinary retention, and intermittent hypotension all likely contributing to SAHIL status post 1 unit PRBC 6/28   - Nephrology consult appreciated - holding nifedipine  - Continue to monitor Cr for improvement    Diabetes mellitus type 2, insulin dependent [facility])  Assessment & Plan  No results found for: HGBA1C    Recent Labs     06/29/22  1818 06/29/22  2032 06/30/22  0729 06/30/22  0801   POCGLU 110 179* 45* 95       Blood Sugar Average: Last 72 hrs:  (P) 153 4  - 2/22 hemoglobin A1c was 7 1  - Hold home oral anti diabetics  - Restarted Lantus   - Continue to monitor blood sugars throughout the day with use of sliding scale insulin  - Rickie controlled diet    Blind  Assessment & Plan  - Family assists at bedside  - Additional assistance as needed for accomodation     Fall  Assessment & Plan  - Status post fall with the below noted injuries  - Fall precautions  - Geriatric Medicine consultation for evaluation, medication review and recommendations   - PT and OT evaluation and treatment as indicated  - Case Management consultation for disposition planning  Discharge to [facility] today  * Closed left hip fracture [facility])  Assessment & Plan  - Left hip fractures, present on admission   - Status post IM nail on 6/26  - Appreciate Orthopedic surgery evaluation, recommendations and interventions as noted  - Maintain WBAT status on the left extremity   - Monitor left lower extremity extremity neurovascular exam   - Continue multimodal analgesic regimen   - Continue DVT prophylaxis - will restart Xarelto today  - PT and OT evaluation and treatment as indicated    - Outpatient follow up with Orthopedic surgery for re-evaluation  Medical Problems             Resolved Problems  Date Reviewed: 6/30/2022   None                 Admission Date:   Admission Orders (From admission, onward)     Ordered        06/24/22 0948  Inpatient Admission  Once                        Admitting Diagnosis: Head injury [S09 90XA]  Closed displaced intertrochanteric fracture of left femur, initial encounter ([facility] ) [S72 142A]  Closed fracture of right hip, initial encounter ([facility] ) [S72 001A]    HPI: As documented by Lesly Welch who evaluated the patient on admission, "Sergio Santo is a 68 y o  female who has a past medical history including atrial fibrillation that she takes Xarelto for and being blind, presenting today via ambulance for evaluation following a fall at home  Patient states that she was getting up to the bathroom when she misstepped, and subsequently fell to the ground  She fell onto her left hip  Since the fall she has had significant pain in left hip  She did strike her head  No reported loss of consciousness  Patient denies any other pain or discomfort  She states it's just my hip  "    Procedures Performed:   Orders Placed This Encounter   Procedures    Fast Ultrasound       Summary of Hospital Course:  Patient was placed on the trauma service following fall when she sustained a left hip fracture  She takes Xarelto  This was held and she ultimately underwent surgical repair on 06/25 with a left femoral IM nail by Orthopedics  Postoperatively she has weightbear as tolerated on left lower extremity  She does have a history of chronic kidney disease and has a baseline creatinine of 1 7-1 9  Postoperatively she had a peak creatinine of 2 8 to and her antihypertensives, including her ACE-inhibitor were held  She was hydrated with IV fluids and Nephrology was consulted  She was also found to have urinary retention and a Key catheter was placed    Urology evaluated the patient recommended voiding trial once more mobile at rehab  Her creatinine improved to baseline of 1 97 on 06/30  She is off of IV fluids and Nephrology cleared her to resume her home nifedipine  She was deemed medically stable for discharge  She had been seen by PT and OT recommended discharge to inpatient rehab  Case Management coordinated placement for her at [facility] manner which patient family were in agreement with  Her hemoglobin is stable at after receiving a unit of blood on 06/28  At time of discharge was stable at 9 0  Her home aspirin and Xarelto were resumed  She will follow up with Orthopedics in 2 weeks for postop check  She should follow up with her primary care doctor/nephrologist in 2 weeks  She can follow up with trauma in 2 weeks  Patient feels well this morning  Her pain is controlled  No new complaints  Exam:  GEN: No acute distress  HEENT: PERRL, normocephalic atraumatic  NEURO: Alert and oriented x4, no neuro deficits  CV: RRR, 2+ radial pulses b/l, 1+ DP pulses b/l  PULM: CTA bilaterally  GI: Nondistended, nontender, passing gas  : With head cath in place, needs Uroloy follow up  MSK: 5/5 bilateral UE, 3/5 LLE, 5/5 RLE  SKIN: CDI      Significant Findings, Care, Treatment and Services Provided:   XR chest portable    Result Date: 6/27/2022  Impression: No acute cardiopulmonary disease within limitations of supine imaging  Left intertrochanteric hip fracture  The study was marked in [facility] for immediate notification  Workstation performed: [id number]     XR hip/pelv 2-3 vws left if performed    Result Date: 6/26/2022  Impression: Fluoroscopic guidance provided for procedure guidance  Please refer to the separate procedure notes for additional details  Workstation performed: [id number]     XR femur 2 vw left    Result Date: 6/24/2022  Impression: Comminuted minimally displaced left femoral intertrochanteric fracture   Workstation performed: [id number]     TRAUMA - CT head wo contrast    Result Date: 6/24/2022  Impression: No acute intracranial abnormality  Microangiopathic changes  I personally discussed this study with Joshua Vivas on 6/24/2022 at 9:11 AM  Workstation performed: [id number]     XR pelvis ap only 1 or 2 vw    Result Date: 6/27/2022  Impression: No acute cardiopulmonary disease within limitations of supine imaging  Left intertrochanteric hip fracture  The study was marked in [facility] for immediate notification  Workstation performed: [id number]     XR pelvis ap only 1 or 2 vw    Result Date: 6/24/2022  Impression: Comminuted minimally displaced left femoral intertrochanteric fracture  Workstation performed: [id number]     XR Trauma multiple (SLB/SLRA trauma bay ONLY)    Result Date: 6/24/2022  Impression: No acute cardiopulmonary disease within limitations of supine imaging  Left intertrochanteric hip fracture  The study was marked in [facility] for immediate notification  Workstation performed: [id number]     US kidney and bladder    Result Date: 6/27/2022  Impression: The bladder is very distended  The patient is unable to void for the technologist at the current time  This should be correlated with any evidence of acute urinary retention  Key catheter could be considered  Atrophic right kidney  No evidence of hydronephrosis  The study was marked in [facility] for immediate notification  Workstation performed: [id number]     XR femur 1 vw left    Result Date: 6/26/2022  Impression: Near-anatomic alignment of left femoral intertrochanteric fracture status post ORIF Fracture of the proximal lateral femoral shaft, traversed by fixation hardware  Workstation performed: [id number]       Complications: none    Condition at Discharge: good         Discharge instructions/Information to patient and family:   See after visit summary for information provided to patient and family        Provisions for Follow-Up Care:  See after visit summary for information related to follow-up care and any pertinent home health orders  PCP: Shanthi Mazariegos MD    Disposition: Short-term rehab at [facility]    Planned Readmission: No    Discharge Statement   I spent 25 minutes discharging the patient  This time was spent on the day of discharge  I had direct contact with the patient on the day of discharge  Additional documentation is required if more than 30 minutes were spent on discharge  Discharge Medications:  See after visit summary for reconciled discharge medications provided to patient and family

## 2022-07-01 ENCOUNTER — NURSING HOME VISIT (OUTPATIENT)
Dept: GERIATRICS | Facility: OTHER | Age: 77
End: 2022-07-01
Payer: COMMERCIAL

## 2022-07-01 DIAGNOSIS — E11.9 DIABETES MELLITUS TYPE 2, INSULIN DEPENDENT (HCC): ICD-10-CM

## 2022-07-01 DIAGNOSIS — N17.9 ACUTE RENAL FAILURE SUPERIMPOSED ON STAGE 3 CHRONIC KIDNEY DISEASE, UNSPECIFIED ACUTE RENAL FAILURE TYPE, UNSPECIFIED WHETHER STAGE 3A OR 3B CKD (HCC): ICD-10-CM

## 2022-07-01 DIAGNOSIS — N18.30 ACUTE RENAL FAILURE SUPERIMPOSED ON STAGE 3 CHRONIC KIDNEY DISEASE, UNSPECIFIED ACUTE RENAL FAILURE TYPE, UNSPECIFIED WHETHER STAGE 3A OR 3B CKD (HCC): ICD-10-CM

## 2022-07-01 DIAGNOSIS — I48.91 ATRIAL FIBRILLATION, UNSPECIFIED TYPE (HCC): ICD-10-CM

## 2022-07-01 DIAGNOSIS — S72.002D CLOSED FRACTURE OF LEFT HIP WITH ROUTINE HEALING, SUBSEQUENT ENCOUNTER: Primary | ICD-10-CM

## 2022-07-01 DIAGNOSIS — Z79.4 DIABETES MELLITUS TYPE 2, INSULIN DEPENDENT (HCC): ICD-10-CM

## 2022-07-01 PROCEDURE — 99309 SBSQ NF CARE MODERATE MDM 30: CPT | Performed by: NURSE PRACTITIONER

## 2022-07-01 NOTE — UTILIZATION REVIEW
Notification of Discharge   This is a Notification of Discharge from our facility [address]  Please be advised that this patient has been discharge from our facility  Below you will find the admission and discharge date and time including the patients disposition  UTILIZATION REVIEW CONTACT:  Olga Finnegan MA  Utilization   Network Utilization Review Department  Phone: [phone] x carefully listen to the prompts  All voicemails are confidential   Email: [email or web address]     PHYSICIAN ADVISORY SERVICES:  FOR [id number]-[id number] REVIEW - MEDICAL NECESSITY DENIAL  Phone: [phone]  Fax: [phone]  Email: [email or web address]     PRESENTATION DATE: 6/24/2022  8:30 AM  OBERVATION ADMISSION DATE:   INPATIENT ADMISSION DATE: 6/24/22  9:48 AM   DISCHARGE DATE: 6/30/2022  1:19 PM  DISPOSITION: Non [facility] SNF/TCU/SNU Non [facility] SNF/TCU/SNU      IMPORTANT INFORMATION:  Send all requests for admission clinical reviews, approved or denied determinations and any other requests to dedicated fax number below belonging to the campus where the patient is receiving treatment   List of dedicated fax numbers:  [address] DENIALS (Administrative/Medical Necessity) [phone]   [address] (Maternity/NICU/Pediatrics) [phone]   [facility] [phone]   [address] [place] [address] [phone]   [address] [place] [address] [phone]   [address] [phone]   [facility]  [phone]   [address] [facility]  [address] [phone]

## 2022-07-05 ENCOUNTER — NURSING HOME VISIT (OUTPATIENT)
Dept: GERIATRICS | Facility: OTHER | Age: 77
End: 2022-07-05
Payer: COMMERCIAL

## 2022-07-05 ENCOUNTER — TELEPHONE (OUTPATIENT)
Dept: OBGYN CLINIC | Facility: HOSPITAL | Age: 77
End: 2022-07-05

## 2022-07-05 VITALS
BODY MASS INDEX: 34.39 KG/M2 | WEIGHT: 188 LBS | SYSTOLIC BLOOD PRESSURE: 134 MMHG | RESPIRATION RATE: 18 BRPM | HEART RATE: 69 BPM | DIASTOLIC BLOOD PRESSURE: 70 MMHG | TEMPERATURE: 97.6 F

## 2022-07-05 DIAGNOSIS — D62 ACUTE BLOOD LOSS ANEMIA (ABLA): ICD-10-CM

## 2022-07-05 DIAGNOSIS — N18.4 ACUTE RENAL FAILURE SUPERIMPOSED ON STAGE 4 CHRONIC KIDNEY DISEASE, UNSPECIFIED ACUTE RENAL FAILURE TYPE (HCC): ICD-10-CM

## 2022-07-05 DIAGNOSIS — N13.9 LOWER OBSTRUCTIVE UROPATHY: ICD-10-CM

## 2022-07-05 DIAGNOSIS — I25.10 CORONARY ARTERY DISEASE INVOLVING NATIVE CORONARY ARTERY OF NATIVE HEART WITHOUT ANGINA PECTORIS: ICD-10-CM

## 2022-07-05 DIAGNOSIS — Z79.4 TYPE 2 DIABETES MELLITUS WITH STAGE 4 CHRONIC KIDNEY DISEASE, WITH LONG-TERM CURRENT USE OF INSULIN (HCC): ICD-10-CM

## 2022-07-05 DIAGNOSIS — Z95.0 CARDIAC PACEMAKER IN SITU: ICD-10-CM

## 2022-07-05 DIAGNOSIS — I49.5 SICK SINUS SYNDROME (HCC): ICD-10-CM

## 2022-07-05 DIAGNOSIS — N17.9 ACUTE RENAL FAILURE SUPERIMPOSED ON STAGE 4 CHRONIC KIDNEY DISEASE, UNSPECIFIED ACUTE RENAL FAILURE TYPE (HCC): ICD-10-CM

## 2022-07-05 DIAGNOSIS — I48.92 ATRIAL FLUTTER, PAROXYSMAL (HCC): ICD-10-CM

## 2022-07-05 DIAGNOSIS — S72.002D CLOSED FRACTURE OF LEFT HIP WITH ROUTINE HEALING, SUBSEQUENT ENCOUNTER: Primary | ICD-10-CM

## 2022-07-05 DIAGNOSIS — E11.22 TYPE 2 DIABETES MELLITUS WITH STAGE 4 CHRONIC KIDNEY DISEASE, WITH LONG-TERM CURRENT USE OF INSULIN (HCC): ICD-10-CM

## 2022-07-05 DIAGNOSIS — H54.3 BLINDNESS OF BOTH EYES: ICD-10-CM

## 2022-07-05 DIAGNOSIS — N18.4 TYPE 2 DIABETES MELLITUS WITH STAGE 4 CHRONIC KIDNEY DISEASE, WITH LONG-TERM CURRENT USE OF INSULIN (HCC): ICD-10-CM

## 2022-07-05 DIAGNOSIS — Z78.9 FULL CODE STATUS: ICD-10-CM

## 2022-07-05 DIAGNOSIS — Z97.8 FOLEY CATHETER IN PLACE: ICD-10-CM

## 2022-07-05 PROBLEM — E86.0 DEHYDRATION: Status: RESOLVED | Noted: 2020-07-12 | Resolved: 2022-07-05

## 2022-07-05 PROBLEM — S72.002A CLOSED LEFT HIP FRACTURE (HCC): Status: RESOLVED | Noted: 2022-06-24 | Resolved: 2022-07-05

## 2022-07-05 PROCEDURE — 99306 1ST NF CARE HIGH MDM 50: CPT | Performed by: INTERNAL MEDICINE

## 2022-07-05 NOTE — ASSESSMENT & PLAN NOTE
· States pain is controlled   · Has pain when she moves   · Continue with oxycodone p r n  and scheduled Tylenol  · Monitor pain levels   · PT/OT  · Monitor for acute changes

## 2022-07-05 NOTE — PROGRESS NOTES
[address]   [phone]  [facility]  STR PRogress Note        NAME: Dat Ortega  AGE: 68 y o  SEX: female    DATE OF ENCOUNTER:  07/01/2022    Assessment and Plan     1  Closed fracture of left hip with routine healing, subsequent encounter  Assessment & Plan:  · States pain is controlled   · Has pain when she moves   · Continue with oxycodone p r n  and scheduled Tylenol  · Monitor pain levels   · PT/OT  · Monitor for acute changes      2  Atrial fibrillation, unspecified type [facility])  Assessment & Plan:  · Rate controlled   · Continue with Coreg  · Patient did receive 1 unit packed red blood cells during hospital stay   · Resumed Xarelto on 06/30/2022   · Will continue to take home dose of aspirin      3  Diabetes mellitus type 2, insulin dependent (HCC)  Assessment & Plan:  · Daily control   · Continue with insulin and glipizide   · Monitor blood sugars daily   · Monitor for acute changes      4  Acute renal failure superimposed on stage 3 chronic kidney disease, unspecified acute renal failure type, unspecified whether stage 3a or 3b CKD ([facility] [address] )  Assessment & Plan:  · Labs returning to baseline   · Continue to avoid nephrotoxins as much as possible   · Monitor BMP for renal function          No orders of the defined types were placed in this encounter        History of Present Illness     Gilda Chery 68 y o  female seen for follow-up of care and treatment plan for ambulatory dysfunction doing well at short-term rehab, closed fracture left hip with routine healing status post IM nailing, AFib rate controlled, DM2 controlled with insulin, SAHIL labs returning to normal    The following portions of the patient's history were reviewed and updated as appropriate: allergies, current medications, past family history, past medical history, past social history, past surgical history and problem list     Review of Systems     Review of Systems   Constitutional: Negative for chills and fever  HENT: Negative for ear pain and sore throat  Eyes: Positive for visual disturbance  Negative for pain  Respiratory: Negative for cough and shortness of breath  Cardiovascular: Negative for chest pain and palpitations  Gastrointestinal: Negative for abdominal pain and vomiting  Genitourinary: Negative for dysuria and hematuria  Musculoskeletal: Positive for gait problem  Negative for arthralgias and back pain  Skin: Negative for color change and rash  Neurological: Positive for weakness  Negative for seizures and syncope  All other systems reviewed and are negative  Objective     /70   Pulse 69   Temp 97 6 °F (36 4 °C)   Resp 18   Wt 85 3 kg (188 lb)   BMI 34 39 kg/m²     Physical Exam  Vitals reviewed  Constitutional:       Appearance: Normal appearance  HENT:      Head: Normocephalic and atraumatic  Eyes:      Conjunctiva/sclera: Conjunctivae normal    Cardiovascular:      Rate and Rhythm: Normal rate and regular rhythm  Heart sounds: Normal heart sounds, S1 normal and S2 normal  No murmur heard  Pulmonary:      Effort: Pulmonary effort is normal  No respiratory distress  Breath sounds: Normal breath sounds  No wheezing  Abdominal:      General: Bowel sounds are normal  There is no distension  Palpations: Abdomen is soft  Tenderness: There is no abdominal tenderness  Musculoskeletal:         General: Normal range of motion  Cervical back: Normal range of motion  Comments: Generalized weakness   Skin:     General: Skin is warm and dry  Neurological:      Mental Status: She is alert  Psychiatric:         Attention and Perception: Attention normal          Mood and Affect: Mood normal          Speech: Speech normal          Behavior: Behavior normal          Thought Content:  Thought content normal          Pertinent Laboratory/Diagnostic Studies:  [facility] Labs Reviewed    Current Medications   Medication list reviewed and updated today  Please see paper chart for updated medication list      Alban Iglesias  :42 PM      Name: Abi Perez  : 1945  MRN: [id number]  DOS:  2022    Diagnoses:   Diagnosis ICD-10-CM Associated Orders   1  Closed fracture of left hip with routine healing, subsequent encounter  S72 002D    2  Atrial fibrillation, unspecified type ([facility] )  I48 91    3  Diabetes mellitus type 2, insulin dependent (HCC)  E11 9     Z79 4    4   Acute renal failure superimposed on stage 3 chronic kidney disease, unspecified acute renal failure type, unspecified whether stage 3a or 3b CKD (HCC)  N17 9     N18 30 The procedure was performed independently

## 2022-07-05 NOTE — TELEPHONE ENCOUNTER
Hello,    Please advise if the following patient can be forced onto the schedule:    Patient:  Sandy Lomax  : 1945  MRN:  [id number]  Call back:   [phone] [address]  Reason for appointment:  1st p/o left femur  Requested doctor/location:  Dr Hola Medrano (requesting [facility] location only)      Emailed supervisors

## 2022-07-05 NOTE — PROGRESS NOTES
[address]  [address] [place] [address]  Facility:  [place] [id number]    HISTORY AND PHYSICAL    NAME: Sea Hayden  AGE: 68 y o  SEX: female    DATE OF ENCOUNTER: 7/5/2022    Code status:  CPR    Assessment and Plan     1  Closed fracture of left hip with routine healing, subsequent encounter  Assessment & Plan:  · June 25, 2022:  Left femoral intramedullary nail, antegrade  · Will continue with multimodal pain management  · She requests only as needed medications for constipation despite counseling and advice to use routine medication  · Will continue with rivaroxaban for DVT prophylaxis  · Will continue her care in collaboration with the orthopedic service   Secondary to her decreased level of functioning from baseline, she will be admitted to the subacute rehabilitation facility and seen in consultation by a multidisciplinary rehabilitation team for evaluation and treatment to assist her in returning to her prior level of functioning  · She will follow-up with her PCP and orthopedic services upon discharge      2  Acute blood loss anemia (ABLA)  Assessment & Plan:  · June 2020 hospitalization:  Hemoglobin/hematocrit:  June 24, 2022 (admission):  10 9/34 5, June 26, 2022:  6 8/21 7, June 30, 2022 (discharge):  9/27 7  · Estimated blood loss of 300 milliliters during surgery   · Condition improved status post transfusion of 2 units of packed red blood cells (June 26, 2022 and June 28, 2022)  · Will continue with clinical and periodic laboratory monitoring for change in her condition  · She will follow-up with her PCP and nephrology services upon discharge      3   Acute renal failure superimposed on stage 4 chronic kidney disease, unspecified acute renal failure type [facility])  Assessment & Plan:  · June 2022 hospitalization:  Baseline creatinine 1 6-2, peak creatinine 2 82, discharge creatinine 1 97 (acute kidney injury felt secondary to ATN from hypotension and anemia during surgery)  · Her condition improved after transfusion of 2 units of packed red blood cells   · She was seen in consultation by the nephrology service during her hospitalization   · Will continue with avoidance of nephrotoxic medications and supplements   Secondary to her decreased level of functioning from baseline, she will be admitted to the subacute rehabilitation facility and seen in consultation by a multidisciplinary rehabilitation team for evaluation and treatment to assist her in returning to her prior level of functioning  · Will continue with clinical and periodic laboratory monitoring for change in her condition   · She will follow-up with her PCP and nephrology services upon discharge      4  Atrial flutter, paroxysmal (HCC)  Assessment & Plan:  · Will continue prior to admission carvedilol for rate control  · Will continue prior to admission rivaroxaban for anticoagulation   · Will continue her care in collaboration with her cardiology service   · Will continue with this care plan  · Will continue with monitoring for change in her condition      5  Type 2 diabetes mellitus with stage 4 chronic kidney disease, with long-term current use of insulin [facility])  Assessment & Plan:  · February 18, 2022: Hemoglobin A1c:  7 1 percent,    · Will continue her prior to admission insulin glargine and provide insulin lispro coverage for postprandial blood sugar  · Will discontinue her glipizide to avoid hypoglycemia and use in the geriatric population  · Will continue with monitoring for change in her condition and adjust her insulin dosages, as needed   · She will follow-up with her PCP upon discharge      6   Lower obstructive uropathy  Assessment & Plan:  · Requiring Key catheter placement during her hospitalization   · Likely functional secondary to decreased mobility from recent left hip fracture status post surgery   · Will attempt voiding trial when ambulatory  · She will follow-up with her PCP upon discharge      7  Key catheter in place  Assessment & Plan:  · Seen by urology service during her hospitalization  · Nursing to continue with local care maintenance   · Voiding trial when more ambulatory  · She will follow-up with her PCP and Urology Service, if needed after discharge      8  Coronary artery disease involving native coronary artery of native heart without angina pectoris  Assessment & Plan:  · Status post PCI with stent placed in RCA (performed in hospital in [facility])  · Will continue her prior to admission aspirin, ranolazine, and pravastatin  · Will continue her care in collaboration with her cardiology service   · Will continue with monitoring for change in her condition   · She will follow-up with her PCP and cardiology services upon discharge      9  Sick sinus syndrome [facility])  Assessment & Plan:  · Doing well with carvedilol for rate control  · Has permanent pacemaker in place  · Will continue with monitoring for change in her condition   · She will follow-up with her PCP and cardiology services upon discharge      10  Cardiac pacemaker in situ    11  Blindness of both eyes  Assessment & Plan:  · Will continue with appropriate adaptive and supportive care during her subacute rehabilitation stay      12  Full code status      All medications and routine orders were reviewed and updated as needed  Plan discussed with:  Patient and nursing staff  CC: PCP, Nephrology    Chief Complaint     She is seen for visit to perform a history and physical exam to be admitted to the subacute rehabilitation facility  History of Present Illness     History is obtained from patient interview, review of her electronic medical record, and review of outside electronic medical record      She is a pleasant 66-year-old woman with the comorbidities of coronary artery disease status post PCI with stent placement in her right coronary artery, sick sinus syndrome status post permanent pacemaker placement, paroxysmal atrial flutter, insulin-requiring type 2 diabetes mellitus, hyperlipidemia, and stage 4 chronic kidney disease who is seen to perform a history and physical exam to be admitted to the subacute rehabilitation facility  She presented to the emergency room on June 24, 2022 after sustaining a fall at home secondary to a misstep and landing on her left hip  She complained of left hip pain  She reports striking her head  She reported not taking her rivaroxaban for at least 1 week prior to her emergency room visit  Trauma evaluation in the emergency room revealed an acute comminuted minimally displaced left femoral intertrochanteric fracture  She was admitted to the [facility] under the trauma service from June 24, 2022 through June 30, 2022  She was seen in consultation by the Orthopedic, geriatric, Nephrology, and trauma services during her hospitalization  She underwent antegrade placement of a left femoral intramedullary nail on June 25, 2022  Therapy service recommended a post acute care rehabilitation stay prior to returning home  She was seen in consultation by the nephrology service secondary to acute kidney injury superimposed on her stage 4 chronic kidney disease  Her acute kidney injury is felt to be from ATN in the setting of hypotension during the operation and acute anemia  Her laboratory values improved with transfusion of packed red blood cells  She experienced acute blood loss anemia during hospitalization (report of 300 milliliters blood loss during her operation)  She received a transfusion of 2 units of packed red blood cells (June 26, 2022 and June 28, 2022)  She developed acute urinary retention during her hospitalization  After requiring 4 straight catheterizations, an indwelling Key catheter was placed  She was seen in consultation by the urology service  A voiding trial Is recommended once she is more ambulatory at the subacute rehabilitation facility    Follow-up with Urology was recommended, if needed  She was transferred to the subacute rehabilitation facility on 2022  She reports being tired from her therapy session earlier today  She reports not having discomfort in her hip, but has some mild thigh discomfort  HISTORY:  Past Medical History:   Diagnosis Date    Atrial fibrillation ([facility] [address] )     Chronic anemia     Chronic kidney disease     Colonoscopy refused 2019    pt declines    Dyslipidemia     Hypertension     Mammogram declined 2017    Proteinuria     Tachy-sarah beth syndrome ([facility] [address] ) 2022     Past Surgical History:   Procedure Laterality Date    CARDIAC PACEMAKER PLACEMENT      CATARACT EXTRACTION       SECTION      CHOLECYSTECTOMY      CORONARY ANGIOPLASTY WITH STENT PLACEMENT      EYE SURGERY      MAMMO (HISTORICAL)      Pt states she will not ever have again-     OK OPEN RX FEMUR FX+INTRAMED RIMA Left 2022    Procedure: INSERTION NAIL IM FEMUR ANTEGRADE (TROCHANTERIC); Surgeon: Avtar Houston DO;  [facility]: [facility];  Service: Orthopedics    TUBAL LIGATION       Family History   Problem Relation Age of Onset    Diabetes Father     Hypertension Father     Heart disease Father     Cancer Brother         smoker    Diabetes Other     Heart attack Other     Hypertension Other     Asthma Other      Social History     Socioeconomic History    Marital status:       Spouse name: Not on file    Number of children: Not on file    Years of education: Not on file    Highest education level: Not on file   Occupational History    Occupation: retired   Tobacco Use    Smoking status: Never Smoker    Smokeless tobacco: Never Used   Vaping Use    Vaping Use: Never used   Substance and Sexual Activity    Alcohol use: Never    Drug use: Never    Sexual activity: Not Currently   Other Topics Concern    Not on file   Social History Narrative    ** Merged History Encounter **         Most recent tobacco use screenin2019  Do you currently or have you served in the [employer]: No  Were you activated, into active duty, as a member of the [employer] or as a Reservist: No  Marital status:   Exercise level: None  D    iet: Regular  General stress level: Low  Alcohol intake: None  Caffeine intake: Moderate  Chewing tobacco: none  Illicit drugs: none  Seat belts used routinely: Yes  Sunscreen used routinely: Yes  Smoke alarm in home: Yes  Advance directive: No  Salt Int    juan antonio: minimal  Has the Patient had a mammogram to screen for breast cancer within 24 months: No  2019 - declines mammogram   Is the patient interested in a colorectal cancer screening: No  2019 - patient declines colonoscopy's     Social Determinants of Health     Financial Resource Strain: Not on file   Food Insecurity: Not on file   Transportation Needs: No Transportation Needs    Lack of Transportation (Medical): No    Lack of Transportation (Non-Medical): No   Physical Activity: Not on file   Stress: Not on file   Social Connections: Not on file   Intimate Partner Violence: Not on file   Housing Stability: Unknown    Unable to Pay for Housing in the Last Year: No    Number of Places Lived in the Last Year: Not on file    Unstable Housing in the Last Year: No       Allergies:  No Known Allergies    Review of Systems     Review of Systems   Constitutional: Negative for appetite change and fever  HENT: Negative for hearing loss and trouble swallowing  Eyes: Positive for visual disturbance (Reports being legally blind in both eyes secondary to her diabetes)  Respiratory: Negative for shortness of breath  Cardiovascular: Negative for chest pain  Gastrointestinal: Negative for abdominal pain and constipation  Genitourinary: Positive for difficulty urinating ( has Key catheter in place)     Musculoskeletal:        Reports that the pain in her right hip is doing well, she had some discomfort in her right thigh earlier today  Skin: Negative for rash  Neurological: Negative for headaches  Hematological: Bruises/bleeds easily  Psychiatric/Behavioral: Negative for dysphoric mood and sleep disturbance  Medications and orders     All medications reviewed and updated in Nursing Home EMR  Objective     Vitals:  Weight 188 pounds, temperature 97 6 degrees Fahrenheit, pulse 69, blood pressure 134/70, pulse oximetry 95 percent room air, fasting fingerstick blood sugar 124  Physical Exam  Vitals reviewed  Constitutional:       General: She is awake  Appearance: She is well-developed  She is not toxic-appearing or diaphoretic  Comments: She appears comfortable lying in bed, her stated age, and frail  HENT:      Head: Normocephalic  Nose: Nose normal       Mouth/Throat:      Mouth: Mucous membranes are moist    Eyes:      General: No scleral icterus  Conjunctiva/sclera: Conjunctivae normal    Cardiovascular:      Rate and Rhythm: Normal rate and regular rhythm  Heart sounds: Normal heart sounds  No murmur heard  No friction rub  No gallop  Comments: There is no pretibial edema, bilaterally  Pulmonary:      Effort: Pulmonary effort is normal  No respiratory distress  Breath sounds: Normal breath sounds  No stridor  No wheezing, rhonchi or rales  Abdominal:      General: There is no distension  Palpations: There is no mass  Tenderness: There is no abdominal tenderness  There is no guarding or rebound  Musculoskeletal:      Cervical back: Neck supple  Skin:     Findings: No rash  Neurological:      Mental Status: She is alert and oriented to person, place, and time  Psychiatric:         Mood and Affect: Mood normal          Behavior: Behavior normal  Behavior is cooperative  Pertinent Laboratory/Diagnostic Studies: The following labs/studies were reviewed please see chart or hospital paperwork for details      Lab Results   Component Value Date    WBC 6 88 06/30/2022    HGB 9 0 (L) 06/30/2022    HCT 27 7 (L) 06/30/2022     06/30/2022    TRIG 55 02/18/2022    HDL 63 02/18/2022    ALT 8 06/24/2022    AST 11 (L) 06/24/2022    K 4 4 06/30/2022     06/30/2022    CREATININE 1 97 (H) 06/30/2022    BUN 48 (H) 06/30/2022    CO2 30 06/30/2022    INR 1 05 06/24/2022    GLUF 91 02/18/2022    HGBA1C 7 1 (H) 02/18/2022 June 24, 2022:     Pelvic x-ray:    FINDINGS:     No acute fracture or hip dislocation        Again identified is a comminuted mildly displaced left femoral intertrochanteric fracture without significant angulation  No other fracture and no dislocation is apparent      No lytic or blastic osseous lesion      Diffuse vascular calcification is present       The visualized lumbar spine is unremarkable      IMPRESSION:     Comminuted minimally displaced left femoral intertrochanteric fracture  June 27, 2022:     Ultrasound of kidney and bladder:     IMPRESSION:     The bladder is very distended  The patient is unable to void for the technologist at the current time  This should be correlated with any evidence of acute urinary retention  Key catheter could be considered      Atrophic right kidney      No evidence of hydronephrosis        - Her admission order summary was reviewed and signed  Portions of the record may have been created with voice recognition software  Occasional wrong word or "sound a like" substitutions may have occurred due to the inherent limitations of voice recognition software  Read the chart carefully and recognize, using context, where substitutions have occurred      CORBY Shah   7/5/2022 11:10 PM

## 2022-07-05 NOTE — ASSESSMENT & PLAN NOTE
· Rate controlled   · Continue with Coreg  · Patient did receive 1 unit packed red blood cells during hospital stay   · Resumed Xarelto on 06/30/2022   · Will continue to take home dose of aspirin

## 2022-07-05 NOTE — ASSESSMENT & PLAN NOTE
· Labs returning to baseline   · Continue to avoid nephrotoxins as much as possible   · Monitor BMP for renal function

## 2022-07-05 NOTE — ASSESSMENT & PLAN NOTE
· Daily control   · Continue with insulin and glipizide   · Monitor blood sugars daily   · Monitor for acute changes

## 2022-07-06 NOTE — ASSESSMENT & PLAN NOTE
· Status post PCI with stent placed in RCA (performed in hospital in [facility])  · Will continue her prior to admission aspirin, ranolazine, and pravastatin  · Will continue her care in collaboration with her cardiology service   · Will continue with monitoring for change in her condition   · She will follow-up with her PCP and cardiology services upon discharge

## 2022-07-06 NOTE — ASSESSMENT & PLAN NOTE
· Seen by urology service during her hospitalization  · Nursing to continue with local care maintenance   · Voiding trial when more ambulatory  · She will follow-up with her PCP and Urology Service, if needed after discharge

## 2022-07-06 NOTE — ASSESSMENT & PLAN NOTE
· Doing well with carvedilol for rate control  · Has permanent pacemaker in place  · Will continue with monitoring for change in her condition   · She will follow-up with her PCP and cardiology services upon discharge

## 2022-07-06 NOTE — ASSESSMENT & PLAN NOTE
· June 25, 2022:  Left femoral intramedullary nail, antegrade  · Will continue with multimodal pain management  · She requests only as needed medications for constipation despite counseling and advice to use routine medication  · Will continue with rivaroxaban for DVT prophylaxis  · Will continue her care in collaboration with the orthopedic service   Secondary to her decreased level of functioning from baseline, she will be admitted to the subacute rehabilitation facility and seen in consultation by a multidisciplinary rehabilitation team for evaluation and treatment to assist her in returning to her prior level of functioning  · She will follow-up with her PCP and orthopedic services upon discharge

## 2022-07-06 NOTE — ASSESSMENT & PLAN NOTE
· Will continue prior to admission carvedilol for rate control  · Will continue prior to admission rivaroxaban for anticoagulation   · Will continue her care in collaboration with her cardiology service   · Will continue with this care plan  · Will continue with monitoring for change in her condition

## 2022-07-06 NOTE — ASSESSMENT & PLAN NOTE
· June 2020 hospitalization:  Hemoglobin/hematocrit:  June 24, 2022 (admission):  10 9/34 5, June 26, 2022:  6 8/21 7, June 30, 2022 (discharge):  9/27 7  · Estimated blood loss of 300 milliliters during surgery   · Condition improved status post transfusion of 2 units of packed red blood cells (June 26, 2022 and June 28, 2022)  · Will continue with clinical and periodic laboratory monitoring for change in her condition  · She will follow-up with her PCP and nephrology services upon discharge

## 2022-07-06 NOTE — ASSESSMENT & PLAN NOTE
· June 2022 hospitalization:  Baseline creatinine 1 6-2, peak creatinine 2 82, discharge creatinine 1 97 (acute kidney injury felt secondary to ATN from hypotension and anemia during surgery)  · Her condition improved after transfusion of 2 units of packed red blood cells   · She was seen in consultation by the nephrology service during her hospitalization   · Will continue with avoidance of nephrotoxic medications and supplements   Secondary to her decreased level of functioning from baseline, she will be admitted to the subacute rehabilitation facility and seen in consultation by a multidisciplinary rehabilitation team for evaluation and treatment to assist her in returning to her prior level of functioning  · Will continue with clinical and periodic laboratory monitoring for change in her condition   · She will follow-up with her PCP and nephrology services upon discharge

## 2022-07-06 NOTE — ASSESSMENT & PLAN NOTE
· February 18, 2022: Hemoglobin A1c:  7 1 percent,    · Will continue her prior to admission insulin glargine and provide insulin lispro coverage for postprandial blood sugar  · Will discontinue her glipizide to avoid hypoglycemia and use in the geriatric population  · Will continue with monitoring for change in her condition and adjust her insulin dosages, as needed   · She will follow-up with her PCP upon discharge

## 2022-07-06 NOTE — ASSESSMENT & PLAN NOTE
· Will continue with appropriate adaptive and supportive care during her subacute rehabilitation stay

## 2022-07-06 NOTE — ASSESSMENT & PLAN NOTE
· Requiring Key catheter placement during her hospitalization   · Likely functional secondary to decreased mobility from recent left hip fracture status post surgery   · Will attempt voiding trial when ambulatory  · She will follow-up with her PCP upon discharge

## 2022-07-11 ENCOUNTER — TELEPHONE (OUTPATIENT)
Dept: NEPHROLOGY | Facility: CLINIC | Age: 77
End: 2022-07-11

## 2022-07-11 ENCOUNTER — TELEPHONE (OUTPATIENT)
Dept: UROLOGY | Facility: AMBULATORY SURGERY CENTER | Age: 77
End: 2022-07-11

## 2022-07-11 ENCOUNTER — TELEPHONE (OUTPATIENT)
Dept: OBGYN CLINIC | Facility: OTHER | Age: 77
End: 2022-07-11

## 2022-07-11 DIAGNOSIS — Z98.890 STATUS POST HIP SURGERY: Primary | ICD-10-CM

## 2022-07-11 NOTE — TELEPHONE ENCOUNTER
Lore Heller @  [facility] called to see if we can push Post Op one more week to 07-25 as they have transport issues on 07-18    C/b # [phone] ext [phone]

## 2022-07-11 NOTE — TELEPHONE ENCOUNTER
Eleanor with [facility] called to reschedule patient's 7/22 appt as they could not schedule transport for the patient  Rescheduled appt for 7/12 at 2:30 with Dr Katerina Powell in [place]

## 2022-07-11 NOTE — TELEPHONE ENCOUNTER
Please Triage  New Patient    What is the reason for the patients appointment? Pt was in the hospital for urinary retention and was placed with a head catheter    Pt is to be seen 2 weeks after hospital visit  Pt is currently at [facility]    What office location does the patient prefer?  [place]    Imaging/Lab Results: epic    Do we accept the patient's insurance or is the patient Self-Pay? Insurance Provider: Pilar Oswald  Plan Type/Number:  Member ID#: Has the patient had any previous Urologist(s)? no    Have patient records been requested? If not are records showing in Epic: epic    Has the patient had any outside testing done? epic    Does the patient have a personal history of cancer?  Epic     [facility] can be reached at  [phone]

## 2022-07-11 NOTE — TELEPHONE ENCOUNTER
Spoke to patient's nurse on the unit today  She still has closure intact  They are able to remove them if we can give them an order for it  Please advise if you are okay with providing order for facility to remove her staples and pushing her follow up appointment out further

## 2022-07-11 NOTE — TELEPHONE ENCOUNTER
Per hospital notes and recent nursing home visit, wait until patient is ambulatory before attempting void trial   Spoke with [facility] who did ask nursing if patient is ambulating; she is not and is dependent on wheelchair  They do perform void trials at facility  She will speak with nursing to see if voiding trial can be done at facility once patient is more ambulatory  She will call back if needed

## 2022-07-11 NOTE — TELEPHONE ENCOUNTER
Spoke to Elisa at [facility]  Did reschedule the appointment to an opening on 7/25  Please print and fax suture removal script to ELIOT ELLIS [facility] at fax # [phone]  Thank you!

## 2022-07-11 NOTE — TELEPHONE ENCOUNTER
I have placed an order for suture removal, though she has staples  Re schedule for her to be seen in 2 weeks as well

## 2022-07-12 ENCOUNTER — OFFICE VISIT (OUTPATIENT)
Dept: NEPHROLOGY | Facility: CLINIC | Age: 77
End: 2022-07-12
Payer: COMMERCIAL

## 2022-07-12 VITALS
HEIGHT: 63 IN | BODY MASS INDEX: 34.73 KG/M2 | DIASTOLIC BLOOD PRESSURE: 58 MMHG | SYSTOLIC BLOOD PRESSURE: 142 MMHG | WEIGHT: 196 LBS | HEART RATE: 60 BPM

## 2022-07-12 DIAGNOSIS — N17.9 AKI (ACUTE KIDNEY INJURY) (HCC): Primary | ICD-10-CM

## 2022-07-12 DIAGNOSIS — N18.32 STAGE 3B CHRONIC KIDNEY DISEASE (HCC): ICD-10-CM

## 2022-07-12 PROBLEM — N18.4 ACUTE RENAL FAILURE SUPERIMPOSED ON STAGE 4 CHRONIC KIDNEY DISEASE (HCC): Status: RESOLVED | Noted: 2022-06-24 | Resolved: 2022-07-12

## 2022-07-12 PROBLEM — N18.4 CHRONIC KIDNEY DISEASE (CKD), STAGE IV (SEVERE) (HCC): Status: RESOLVED | Noted: 2021-08-27 | Resolved: 2022-07-12

## 2022-07-12 PROCEDURE — 99214 OFFICE O/P EST MOD 30 MIN: CPT | Performed by: INTERNAL MEDICINE

## 2022-07-12 NOTE — PROGRESS NOTES
NEPHROLOGY OFFICE VISIT   Inocencio Thomas 68 y o  female MRN: [id number]  7/12/2022    Reason for Visit:  Hospital follow-up for acute kidney injury    History of Present Illness (HPI):    I had the pleasure of seeing Shameka Oconnor today in the renal clinic for the continued management of hospital follow-up for acute kidney  She was hospitalized in June and discharged at the end of June currently receiving subacute rehab at [facility], [facility] manner  She would initially presented for fall  She suffered a closed hip fracture and underwent left intramedullary nail placement on June 26  She also had acute kidney injury in the setting of hypotension, anemia along with urinary retention  She was discharged with a Key catheter in place and was also seen by Urology along with nephrology  Her renal function improved to discharge but did not return back to baseline  Her last creatinine was 1 9 mg/dL which was slightly above her prior baseline  Her blood pressures have been well controlled  She is off lisinopril since February right stopped it due to elevated creatinine  ASSESSMENT and PLAN:    Acute kidney injury  --occurred in June of 2022, the setting of hypotension, anemia and urinary retention  --renal function has improved since that time creatinine currently at 1 9 mg/dL  --could be at a new baseline creatinine  --Key catheter in place management as per Urology  --off lisinopril    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  -- renal ultrasound from May 2019 show symmetrically sized kidneys, right kidney measuring 11 1 cm left kidney measuring 11 6 cm   Bilateral echogenicity consistent with chronic kidney disease   No masses no hydronephrosis    --in the setting of multiple episodes of acute kidney injury with presumed diabetic kidney disease and hypertension and obesity  --avoid NSAID  --baseline creatinine had be in the mid 1s back in 2020, but may have some underlying progression now ranging in the high 1s  --concern for possible underlying progression of disease from uncontrolled diabetes     Diabetes mellitus type 2  -hemoglobin A1c:  No recent (A1C values may be falsely elevated or decreased in those with CKD, assay method should be certified by [facility] glycohemoglobin standardization Program)  Target A1C < 7  -current medications:   glipizide  -proteinuria:  her last urine protein creatinine ratio was 0 42 years ago but may not been in steady state, her dipstick was negative for protein  -retinopathy:  Yes  -neuropathy:  Yes  -please follow with an ophthalmologist and podiatrist every year  -continued self monitoring of blood glucose at home  -Treatment Management in CKD Recommendations:   · Metformin:  Avoid if creatinine clearance is less than 30 cc/min (concern for lactic acidosis)  · Sodium-Glucose Cotransporter-2 (SGLT2) Inhibitors:  May see an acute drop in the eGFR initially when starting the medication but then a stabilization of the renal function, with slower loss of renal function as compared to placebo   Relative risk of end-stage renal disease, doubling of serum creatinine or death from renal causes were also found to be lower as compared to placebo  ([facility])    Would recommend starting at 100 mg daily, I would avoid use in patients with eGFR < 30 cc/min   If no contraindications exist I would recommend this medication added to the diabetic regiment  · Sulfonylureas:  Preferred short-acting (glipizide, glimepiride, repaglinide), relatively safe in patients with non dialysis CKD  · Thiazolidinedones/Alpha-Gluosidase inhibitors/Dipeptidyl peptidase-4 inhibitors:  Generally not considered first-line agents in CKD (limited data in long-term safety and efficacy)  · Insulin:  Starting dose may need to be lower than what would ordinarily be used, as there is a decrease metabolism of insulin (no dose adjustment if the GFR > 50 mL/min, dose should be reduced to 75% of baseline if GFR 10-50 mL/min, and 50% baseline if GFR < 10 mL/min)     Hypertension  -with underlying chronic kidney disease  -blood pressure goal less than 130/80  -currently normotensive  -continue Procardia and carvedilol  -off lisinopril  -monitor blood pressure at home     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  -anticoagulation with Xarelto    Left hip fracture  --status post surgery on 6/26/2020      PATIENT INSTRUCTIONS:    Patient Instructions   1 )  Low 2 g sodium diet    2 )  Monitor weights at home    3 )  Avoid NSAIDs (ibuprofen, Motrin, Advil, Aleve, naproxen)    4 )  Monitor blood pressure at home, call if blood pressure greater than 150/90 persistently    5 ) I will plan to discuss all results including blood work, and/or imaging at our next visit, unless there is an urgent indication, in which case I will call you earlier  If you have any questions or concerns about your results, please feel free to call our office  Orders Placed This Encounter   Procedures    Basic metabolic panel     This is a patient instruction: Patient fasting for 8 hours or longer recommended  Standing Status:   Future     Standing Expiration Date:   7/12/2023    Comprehensive metabolic panel     This is a patient instruction: Patient fasting for 8 hours or longer recommended  Standing Status:   Future     Standing Expiration Date:   7/12/2023    CBC     Standing Status:   Future     Standing Expiration Date:   7/12/2023       OBJECTIVE:  Current Weight: Weight - Scale: 88 9 kg (196 lb) (Patient reports, in wheelchair )  Vitals:    07/12/22 1431   BP: 142/58   BP Location: Left arm   Patient Position: Sitting   Cuff Size: Extra-Large   Pulse: 60   Weight: 88 9 kg (196 lb)   Height: 5' 3" (1 6 m)    Body mass index is 34 72 kg/m²  REVIEW OF SYSTEMS:    Review of Systems   Constitutional: Negative for activity change and fever  Respiratory: Negative for cough, chest tightness, shortness of breath and wheezing  Cardiovascular: Negative for chest pain and leg swelling  Gastrointestinal: Negative for abdominal pain, diarrhea, nausea and vomiting  Endocrine: Negative for polyuria  Genitourinary: Negative for difficulty urinating, dysuria, flank pain, frequency and urgency  Skin: Negative for rash  Neurological: Negative for dizziness, syncope, light-headedness and headaches  PHYSICAL EXAM:      Physical Exam  Vitals and nursing note reviewed  Constitutional:       General: She is not in acute distress  Appearance: She is well-developed  HENT:      Head: Normocephalic and atraumatic  Eyes:      General: No scleral icterus  Conjunctiva/sclera: Conjunctivae normal       Pupils: Pupils are equal, round, and reactive to light  Cardiovascular:      Rate and Rhythm: Normal rate and regular rhythm  Heart sounds: S1 normal and S2 normal  No murmur heard  No friction rub  No gallop  Pulmonary:      Effort: Pulmonary effort is normal  No respiratory distress  Breath sounds: Normal breath sounds  No wheezing or rales  Abdominal:      General: Bowel sounds are normal       Palpations: Abdomen is soft  Tenderness: There is no abdominal tenderness  There is no rebound  Genitourinary:     Comments: Key catheter in place with dark yellow urine  Musculoskeletal:         General: Normal range of motion  Cervical back: Normal range of motion and neck supple  Left lower leg: Edema present  Skin:     Findings: No rash  Neurological:      Mental Status: She is alert and oriented to person, place, and time     Psychiatric:         Behavior: Behavior normal          Medications:    Current Outpatient Medications:     acetaminophen (TYLENOL) 325 mg tablet, Take 3 tablets (975 mg total) by mouth every 8 (eight) hours, Disp: , Rfl: 0    aspirin 81 mg chewable tablet, Chew 81 mg daily, Disp: , Rfl:     B-D UF III MINI PEN NEEDLES 31G X 5 MM MISC, Use to test qd, Disp: 100 each, Rfl: 11    bisacodyl (DULCOLAX) 10 mg suppository, Insert 1 suppository (10 mg total) into the rectum daily as needed for constipation, Disp: 12 suppository, Rfl: 0    carvedilol (COREG) 25 mg tablet, Take 25 mg by mouth every 12 (twelve) hours , Disp: , Rfl:     Cholecalciferol (Vitamin D) 50 MCG (2000 UT) tablet, Take 1 tablet (2,000 Units total) by mouth daily, Disp: 90 tablet, Rfl: 1    fluticasone (FLONASE) 50 mcg/act nasal spray, 1 spray into each nostril daily, Disp: 9 9 mL, Rfl: 0    folic acid (FOLVITE) 752 mcg tablet, Take 400 mcg by mouth daily, Disp: , Rfl:     folic acid (FOLVITE) 807 mcg tablet, Take 400 mcg by mouth daily, Disp: , Rfl:     gabapentin (NEURONTIN) 100 mg capsule, Take 1 capsule (100 mg total) by mouth daily at bedtime, Disp: 14 capsule, Rfl: 0    insulin glargine (Lantus SoloStar) 100 units/mL injection pen, Inject 20 Units under the skin daily at bedtime, Disp: 15 mL, Rfl: 11    meclizine (ANTIVERT) 12 5 MG tablet, Take by mouth every 8 (eight) hours as needed for dizziness, Disp: , Rfl:     NIFEdipine ER (ADALAT CC) 60 MG 24 hr tablet, Take 60 mg by mouth daily, Disp: , Rfl:     oxyCODONE (ROXICODONE) 5 immediate release tablet, 2 5 mg to 5 mg p o  Every 4 hours as needed for moderate to severe pain  Ongoing therapy  , Disp: 20 tablet, Rfl: 0    pravastatin (PRAVACHOL) 40 mg tablet, Take 1 tablet (40 mg total) by mouth daily, Disp: 90 tablet, Rfl: 1    ranolazine (RANEXA) 500 mg 12 hr tablet, daily  , Disp: , Rfl:     rivaroxaban (XARELTO) 15 mg tablet, Take 15 mg by mouth daily, Disp: , Rfl:     Laboratory Results:        Invalid input(s): ALBUMIN    Results for orders placed or performed during the hospital encounter of 06/24/22   Occult blood 1-3, stool   Result Value Ref Range    Fecal Occult Blood Diagnostic Negative Negative    Fecal Occult Blood Diagnostic 2 Test not performed Negative    Fecal Occult Blood Diagnostic 3 Test not performed Negative   COVID/FLU/RSV Specimen: Nose; Nares   Result Value Ref Range    SARS-CoV-2 Negative Negative    INFLUENZA A PCR Negative Negative    INFLUENZA B PCR Negative Negative    RSV PCR Negative Negative   CBC and differential   Result Value Ref Range    WBC 8 37 4 31 - 10 16 Thousand/uL    RBC 3 98 3 81 - 5 12 Million/uL    Hemoglobin 10 9 (L) 11 5 - 15 4 g/dL    Hematocrit 34 5 (L) 34 8 - 46 1 %    MCV 87 82 - 98 fL    MCH 27 4 26 8 - 34 3 pg    MCHC 31 6 31 4 - 37 4 g/dL    RDW 14 5 11 6 - 15 1 %    MPV 11 0 8 9 - 12 7 fL    Platelets 896 659 - 915 Thousands/uL    nRBC 0 /100 WBCs    Neutrophils Relative 75 43 - 75 %    Immat GRANS % 1 0 - 2 %    Lymphocytes Relative 17 14 - 44 %    Monocytes Relative 7 4 - 12 %    Eosinophils Relative 0 0 - 6 %    Basophils Relative 0 0 - 1 %    Neutrophils Absolute 6 25 1 85 - 7 62 Thousands/µL    Immature Grans Absolute 0 08 0 00 - 0 20 Thousand/uL    Lymphocytes Absolute 1 46 0 60 - 4 47 Thousands/µL    Monocytes Absolute 0 55 0 17 - 1 22 Thousand/µL    Eosinophils Absolute 0 00 0 00 - 0 61 Thousand/µL    Basophils Absolute 0 03 0 00 - 0 10 Thousands/µL   Comprehensive metabolic panel   Result Value Ref Range    Sodium 143 135 - 147 mmol/L    Potassium 4 5 3 5 - 5 3 mmol/L    Chloride 108 96 - 108 mmol/L    CO2 28 21 - 32 mmol/L    ANION GAP 7 4 - 13 mmol/L    BUN 24 5 - 25 mg/dL    Creatinine 1 83 (H) 0 60 - 1 30 mg/dL    Glucose 73 65 - 140 mg/dL    Calcium 9 0 8 4 - 10 2 mg/dL    AST 11 (L) 13 - 39 U/L    ALT 8 7 - 52 U/L    Alkaline Phosphatase 54 34 - 104 U/L    Total Protein 6 2 (L) 6 4 - 8 4 g/dL    Albumin 3 5 3 5 - 5 0 g/dL    Total Bilirubin 0 50 0 20 - 1 00 mg/dL    eGFR 26 ml/min/1 73sq m   Protime-INR   Result Value Ref Range    Protime 13 7 11 6 - 14 5 seconds    INR 1 05 0 84 - 6 06   Basic metabolic panel   Result Value Ref Range    Sodium 139 135 - 147 mmol/L    Potassium 4 2 3 5 - 5 3 mmol/L    Chloride 106 96 - 108 mmol/L    CO2 28 21 - 32 mmol/L    ANION GAP 5 4 - 13 mmol/L BUN 27 (H) 5 - 25 mg/dL    Creatinine 1 64 (H) 0 60 - 1 30 mg/dL    Glucose 135 65 - 140 mg/dL    Calcium 7 9 (L) 8 4 - 10 2 mg/dL    eGFR 30 ml/min/1 73sq m   CBC (With Platelets)   Result Value Ref Range    WBC 7 32 4 31 - 10 16 Thousand/uL    RBC 3 33 (L) 3 81 - 5 12 Million/uL    Hemoglobin 8 8 (L) 11 5 - 15 4 g/dL    Hematocrit 29 4 (L) 34 8 - 46 1 %    MCV 88 82 - 98 fL    MCH 26 4 (L) 26 8 - 34 3 pg    MCHC 29 9 (L) 31 4 - 37 4 g/dL    RDW 14 6 11 6 - 15 1 %    Platelets 394 866 - 605 Thousands/uL    MPV 11 4 8 9 - 12 7 fL   CBC and differential   Result Value Ref Range    WBC 7 47 4 31 - 10 16 Thousand/uL    RBC 2 48 (L) 3 81 - 5 12 Million/uL    Hemoglobin 6 8 (LL) 11 5 - 15 4 g/dL    Hematocrit 21 7 (L) 34 8 - 46 1 %    MCV 88 82 - 98 fL    MCH 27 4 26 8 - 34 3 pg    MCHC 31 3 (L) 31 4 - 37 4 g/dL    RDW 14 7 11 6 - 15 1 %    MPV 11 7 8 9 - 12 7 fL    Platelets 995 009 - 529 Thousands/uL    nRBC 0 /100 WBCs    Neutrophils Relative 78 (H) 43 - 75 %    Immat GRANS % 1 0 - 2 %    Lymphocytes Relative 11 (L) 14 - 44 %    Monocytes Relative 10 4 - 12 %    Eosinophils Relative 0 0 - 6 %    Basophils Relative 0 0 - 1 %    Neutrophils Absolute 5 88 1 85 - 7 62 Thousands/µL    Immature Grans Absolute 0 06 0 00 - 0 20 Thousand/uL    Lymphocytes Absolute 0 80 0 60 - 4 47 Thousands/µL    Monocytes Absolute 0 72 0 17 - 1 22 Thousand/µL    Eosinophils Absolute 0 00 0 00 - 0 61 Thousand/µL    Basophils Absolute 0 01 0 00 - 0 10 Thousands/µL   Basic metabolic panel   Result Value Ref Range    Sodium 136 135 - 147 mmol/L    Potassium 5 0 3 5 - 5 3 mmol/L    Chloride 104 96 - 108 mmol/L    CO2 24 21 - 32 mmol/L    ANION GAP 8 4 - 13 mmol/L    BUN 45 (H) 5 - 25 mg/dL    Creatinine 2 41 (H) 0 60 - 1 30 mg/dL    Glucose 193 (H) 65 - 140 mg/dL    Calcium 7 5 (L) 8 4 - 10 2 mg/dL    eGFR 18 ml/min/1 73sq m   Hemoglobin and hematocrit, blood   Result Value Ref Range    Hemoglobin 7 7 (L) 11 5 - 15 4 g/dL    Hematocrit 24 0 (L) 34 8 - 46 1 %   CBC (With Platelets)   Result Value Ref Range    WBC 9 34 4 31 - 10 16 Thousand/uL    RBC 2 67 (L) 3 81 - 5 12 Million/uL    Hemoglobin 7 6 (L) 11 5 - 15 4 g/dL    Hematocrit 23 0 (L) 34 8 - 46 1 %    MCV 86 82 - 98 fL    MCH 28 5 26 8 - 34 3 pg    MCHC 33 0 31 4 - 37 4 g/dL    RDW 14 6 11 6 - 15 1 %    Platelets 799 235 - 513 Thousands/uL    MPV 11 6 8 9 - 12 7 fL   Basic metabolic panel   Result Value Ref Range    Sodium 135 135 - 147 mmol/L    Potassium 4 6 3 5 - 5 3 mmol/L    Chloride 102 96 - 108 mmol/L    CO2 25 21 - 32 mmol/L    ANION GAP 8 4 - 13 mmol/L    BUN 57 (H) 5 - 25 mg/dL    Creatinine 2 82 (H) 0 60 - 1 30 mg/dL    Glucose 175 (H) 65 - 140 mg/dL    Calcium 7 2 (L) 8 4 - 10 2 mg/dL    eGFR 15 ml/min/1 73sq m   Urinalysis with microscopic   Result Value Ref Range    Clarity, UA Clear     Color, UA Yellow     Specific Harrell, UA 1 020 1 003 - 1 030    pH, UA 5 5 4 5, 5 0, 5 5, 6 0, 6 5, 7 0, 7 5, 8 0    Glucose, UA Negative Negative mg/dl    Ketones, UA Negative Negative mg/dl    Occult Blood, UA Negative Negative    Protein, UA Negative Negative mg/dl    Nitrite, UA Negative Negative    Bilirubin, UA Negative Negative    Urobilinogen, UA 0 2 0 2, 1 0 E U /dl E U /dl    Leukocytes, UA Trace (A) Negative    WBC, UA 1-2 (A) None Seen, 2-4, 5-60 /hpf    RBC, UA 0-1 (A) None Seen, 2-4 /hpf    Bacteria, UA Moderate (A) None Seen, Occasional /hpf    Epithelial Cells Occasional None Seen, Occasional /hpf   CBC (With Platelets)   Result Value Ref Range    WBC 7 61 4 31 - 10 16 Thousand/uL    RBC 2 42 (L) 3 81 - 5 12 Million/uL    Hemoglobin 6 7 (LL) 11 5 - 15 4 g/dL    Hematocrit 21 2 (L) 34 8 - 46 1 %    MCV 88 82 - 98 fL    MCH 27 7 26 8 - 34 3 pg    MCHC 31 6 31 4 - 37 4 g/dL    RDW 15 1 11 6 - 15 1 %    Platelets 962 323 - 990 Thousands/uL    MPV 11 6 8 9 - 12 7 fL   Basic metabolic panel   Result Value Ref Range    Sodium 136 135 - 147 mmol/L    Potassium 4 2 3 5 - 5 3 mmol/L    Chloride 105 96 - 108 mmol/L    CO2 26 21 - 32 mmol/L    ANION GAP 5 4 - 13 mmol/L    BUN 52 (H) 5 - 25 mg/dL    Creatinine 2 51 (H) 0 60 - 1 30 mg/dL    Glucose 180 (H) 65 - 140 mg/dL    Calcium 7 2 (L) 8 4 - 10 2 mg/dL    eGFR 18 ml/min/1 73sq m   Calcium, ionized   Result Value Ref Range    Calcium, Ionized 1 00 (L) 1 12 - 1 32 mmol/L   Hemoglobin and hematocrit, blood   Result Value Ref Range    Hemoglobin 8 3 (L) 11 5 - 15 4 g/dL    Hematocrit 25 2 (L) 34 8 - 46 1 %   Basic metabolic panel   Result Value Ref Range    Sodium 137 135 - 147 mmol/L    Potassium 5 4 (H) 3 5 - 5 3 mmol/L    Chloride 107 96 - 108 mmol/L    CO2 25 21 - 32 mmol/L    ANION GAP 5 4 - 13 mmol/L    BUN 50 (H) 5 - 25 mg/dL    Creatinine 2 24 (H) 0 60 - 1 30 mg/dL    Glucose 95 65 - 140 mg/dL    Calcium 7 6 (L) 8 4 - 10 2 mg/dL    eGFR 20 ml/min/1 73sq m   CBC and differential   Result Value Ref Range    WBC 7 81 4 31 - 10 16 Thousand/uL    RBC 2 85 (L) 3 81 - 5 12 Million/uL    Hemoglobin 8 1 (L) 11 5 - 15 4 g/dL    Hematocrit 25 1 (L) 34 8 - 46 1 %    MCV 88 82 - 98 fL    MCH 28 4 26 8 - 34 3 pg    MCHC 32 3 31 4 - 37 4 g/dL    RDW 14 6 11 6 - 15 1 %    MPV 11 2 8 9 - 12 7 fL    Platelets 901 790 - 656 Thousands/uL    nRBC 1 /100 WBCs    Neutrophils Relative 59 43 - 75 %    Immat GRANS % 2 0 - 2 %    Lymphocytes Relative 27 14 - 44 %    Monocytes Relative 12 4 - 12 %    Eosinophils Relative 0 0 - 6 %    Basophils Relative 0 0 - 1 %    Neutrophils Absolute 4 62 1 85 - 7 62 Thousands/µL    Immature Grans Absolute 0 12 0 00 - 0 20 Thousand/uL    Lymphocytes Absolute 2 08 0 60 - 4 47 Thousands/µL    Monocytes Absolute 0 97 0 17 - 1 22 Thousand/µL    Eosinophils Absolute 0 00 0 00 - 0 61 Thousand/µL    Basophils Absolute 0 02 0 00 - 0 10 Thousands/µL   Potassium   Result Value Ref Range    Potassium 4 7 3 5 - 5 3 mmol/L   Basic metabolic panel   Result Value Ref Range    Sodium 140 135 - 147 mmol/L    Potassium 4 4 3 5 - 5 3 mmol/L    Chloride 108 96 - 108 mmol/L    CO2 30 21 - 32 mmol/L    ANION GAP 2 (L) 4 - 13 mmol/L    BUN 48 (H) 5 - 25 mg/dL    Creatinine 1 97 (H) 0 60 - 1 30 mg/dL    Glucose 69 65 - 140 mg/dL    Calcium 7 8 (L) 8 4 - 10 2 mg/dL    eGFR 24 ml/min/1 73sq m   CBC and differential   Result Value Ref Range    WBC 6 88 4 31 - 10 16 Thousand/uL    RBC 3 16 (L) 3 81 - 5 12 Million/uL    Hemoglobin 9 0 (L) 11 5 - 15 4 g/dL    Hematocrit 27 7 (L) 34 8 - 46 1 %    MCV 88 82 - 98 fL    MCH 28 5 26 8 - 34 3 pg    MCHC 32 5 31 4 - 37 4 g/dL    RDW 14 8 11 6 - 15 1 %    MPV 10 9 8 9 - 12 7 fL    Platelets 282 262 - 839 Thousands/uL    nRBC 0 /100 WBCs    Neutrophils Relative 62 43 - 75 %    Immat GRANS % 2 0 - 2 %    Lymphocytes Relative 23 14 - 44 %    Monocytes Relative 13 (H) 4 - 12 %    Eosinophils Relative 0 0 - 6 %    Basophils Relative 0 0 - 1 %    Neutrophils Absolute 4 23 1 85 - 7 62 Thousands/µL    Immature Grans Absolute 0 13 0 00 - 0 20 Thousand/uL    Lymphocytes Absolute 1 61 0 60 - 4 47 Thousands/µL    Monocytes Absolute 0 89 0 17 - 1 22 Thousand/µL    Eosinophils Absolute 0 00 0 00 - 0 61 Thousand/µL    Basophils Absolute 0 02 0 00 - 0 10 Thousands/µL   ECG 12 lead   Result Value Ref Range    Ventricular Rate 59 BPM    Atrial Rate 62 BPM    VT Interval 272 ms    QRSD Interval 80 ms    QT Interval 426 ms    QTC Interval 422 ms    P Axis 36 degrees    QRS Axis -15 degrees    T Wave Axis -3 degrees   ECG 12 lead   Result Value Ref Range    Ventricular Rate 61 BPM    Atrial Rate 61 BPM    VT Interval 276 ms    QRSD Interval 82 ms    QT Interval 440 ms    QTC Interval 442 ms    P Axis  degrees    QRS Axis -17 degrees    T Wave Axis -8 degrees   Type and screen   Result Value Ref Range    ABO Grouping A     Rh Factor Positive     Antibody Screen Negative     Specimen Expiration Date [id number]    Prepare Leukoreduced RBC: 1 Units   Result Value Ref Range    Unit Product Code A5259J70     Unit Number [id number]     Unit ABO A     Unit RH POS Crossmatch Compatible     Unit Dispense Status Presumed Trans     Unit Product Volume 350 ml   [facility] Recheck - Contact Blood Bank Prior to Collection   Result Value Ref Range    ABO Grouping A     Rh Factor Positive    Type and screen   Result Value Ref Range    ABO Grouping A     Rh Factor Positive     Antibody Screen Negative     Specimen Expiration Date [id number]    Prepare Leukoreduced RBC: 1 Units   Result Value Ref Range    Unit Product Code L4156Y27     Unit Number [id number]     Unit ABO A     Unit RH POS     Crossmatch Compatible     Unit Dispense Status Presumed Trans     Unit Product Volume 350 mL   POCT Blood Gas (CG8+)   Result Value Ref Range    ph, [place] ISTAT 7 367 7 300 - 7 400    pCO2, [place] i-STAT 49 8 42 0 - 50 0 mm HG    pO2, [place] i-STAT 35 0 35 0 - 45 0 mm HG    BE, i-STAT 3 -2 - 3 mmol/L    HCO3, [place] i-STAT 28 6 24 0 - 30 0 mmol/L    CO2, i-STAT 30 21 - 32 mmol/L    O2 Sat, i-STAT 65 60 - 85 %    SODIUM, I-STAT 144 136 - 145 mmol/l    Potassium, i-STAT 4 3 3 5 - 5 3 mmol/L    Calcium, Ionized i-STAT 1 17 1 12 - 1 32 mmol/L    Hct, i-STAT 34 (L) 34 8 - 46 1 %    Hgb, i-STAT 11 6 11 5 - 15 4 g/dl    Glucose, i-STAT 78 65 - 140 mg/dl    Specimen Type VENOUS    Fingerstick Glucose (POCT)   Result Value Ref Range    POC Glucose 160 (H) 65 - 140 mg/dl   Fingerstick Glucose (POCT)   Result Value Ref Range    POC Glucose 160 (H) 65 - 140 mg/dl   Fingerstick Glucose (POCT)   Result Value Ref Range    POC Glucose 136 65 - 140 mg/dl   Fingerstick Glucose (POCT)   Result Value Ref Range    POC Glucose 210 (H) 65 - 140 mg/dl   Fingerstick Glucose (POCT)   Result Value Ref Range    POC Glucose 217 (H) 65 - 140 mg/dl   Fingerstick Glucose (POCT)   Result Value Ref Range    POC Glucose 239 (H) 65 - 140 mg/dl   Fingerstick Glucose (POCT)   Result Value Ref Range    POC Glucose 223 (H) 65 - 140 mg/dl   Fingerstick Glucose (POCT)   Result Value Ref Range    POC Glucose 238 (H) 65 - 140 mg/dl   Fingerstick Glucose (POCT)   Result Value Ref Range    POC Glucose 204 (H) 65 - 140 mg/dl   Fingerstick Glucose (POCT)   Result Value Ref Range    POC Glucose 303 (H) 65 - 140 mg/dl   Fingerstick Glucose (POCT)   Result Value Ref Range    POC Glucose 216 (H) 65 - 140 mg/dl   Fingerstick Glucose (POCT)   Result Value Ref Range    POC Glucose 220 (H) 65 - 140 mg/dl   Fingerstick Glucose (POCT)   Result Value Ref Range    POC Glucose 188 (H) 65 - 140 mg/dl   Fingerstick Glucose (POCT)   Result Value Ref Range    POC Glucose 228 (H) 65 - 140 mg/dl   Fingerstick Glucose (POCT)   Result Value Ref Range    POC Glucose 99 65 - 140 mg/dl   Fingerstick Glucose (POCT)   Result Value Ref Range    POC Glucose 185 (H) 65 - 140 mg/dl   Fingerstick Glucose (POCT)   Result Value Ref Range    POC Glucose 186 (H) 65 - 140 mg/dl   Fingerstick Glucose (POCT)   Result Value Ref Range    POC Glucose 186 (H) 65 - 140 mg/dl   Fingerstick Glucose (POCT)   Result Value Ref Range    POC Glucose 75 65 - 140 mg/dl   Fingerstick Glucose (POCT)   Result Value Ref Range    POC Glucose 192 (H) 65 - 140 mg/dl   Fingerstick Glucose (POCT)   Result Value Ref Range    POC Glucose 97 65 - 140 mg/dl   Fingerstick Glucose (POCT)   Result Value Ref Range    POC Glucose 110 65 - 140 mg/dl   Fingerstick Glucose (POCT)   Result Value Ref Range    POC Glucose 179 (H) 65 - 140 mg/dl   Fingerstick Glucose (POCT)   Result Value Ref Range    POC Glucose 45 (L) 65 - 140 mg/dl   Fingerstick Glucose (POCT)   Result Value Ref Range    POC Glucose 95 65 - 140 mg/dl   Fingerstick Glucose (POCT)   Result Value Ref Range    POC Glucose 136 65 - 140 mg/dl   Green / Yellow tube on hold   Result Value Ref Range    Extra Tube Hold for add-ons      Lavender Top 3 ml on hold   Result Value Ref Range    Extra Tube done for cbc    Grey top tube on hold   Result Value Ref Range    Extra Tube on hold

## 2022-07-15 ENCOUNTER — HOME HEALTH ADMISSION (OUTPATIENT)
Dept: HOME HEALTH SERVICES | Facility: HOME HEALTHCARE | Age: 77
End: 2022-07-15
Payer: COMMERCIAL

## 2022-07-18 ENCOUNTER — TRANSITIONAL CARE MANAGEMENT (OUTPATIENT)
Dept: FAMILY MEDICINE CLINIC | Facility: CLINIC | Age: 77
End: 2022-07-18

## 2022-07-18 ENCOUNTER — NURSING HOME VISIT (OUTPATIENT)
Dept: GERIATRICS | Facility: OTHER | Age: 77
End: 2022-07-18
Payer: COMMERCIAL

## 2022-07-18 VITALS
SYSTOLIC BLOOD PRESSURE: 108 MMHG | HEART RATE: 68 BPM | RESPIRATION RATE: 16 BRPM | BODY MASS INDEX: 34.72 KG/M2 | DIASTOLIC BLOOD PRESSURE: 42 MMHG | TEMPERATURE: 98.3 F | WEIGHT: 196 LBS

## 2022-07-18 DIAGNOSIS — S72.002D CLOSED FRACTURE OF LEFT HIP WITH ROUTINE HEALING, SUBSEQUENT ENCOUNTER: ICD-10-CM

## 2022-07-18 DIAGNOSIS — Z79.4 TYPE 2 DIABETES MELLITUS WITH STAGE 4 CHRONIC KIDNEY DISEASE, WITH LONG-TERM CURRENT USE OF INSULIN (HCC): ICD-10-CM

## 2022-07-18 DIAGNOSIS — H54.3 BLINDNESS OF BOTH EYES: ICD-10-CM

## 2022-07-18 DIAGNOSIS — N18.4 TYPE 2 DIABETES MELLITUS WITH STAGE 4 CHRONIC KIDNEY DISEASE, WITH LONG-TERM CURRENT USE OF INSULIN (HCC): ICD-10-CM

## 2022-07-18 DIAGNOSIS — I48.91 ATRIAL FIBRILLATION, UNSPECIFIED TYPE (HCC): Primary | ICD-10-CM

## 2022-07-18 DIAGNOSIS — E11.22 TYPE 2 DIABETES MELLITUS WITH STAGE 4 CHRONIC KIDNEY DISEASE, WITH LONG-TERM CURRENT USE OF INSULIN (HCC): ICD-10-CM

## 2022-07-18 PROCEDURE — 99316 NF DSCHRG MGMT 30 MIN+: CPT | Performed by: NURSE PRACTITIONER

## 2022-07-18 NOTE — PROGRESS NOTES
[address]   [phone]  [address] [place] [address] St: [facility] List: [facility]  POS: 31: SNF/Short Term Rehab    NAME: Sea Hayden  AGE: 68 y o  :1945 SEX: female NCN:[id number]  DATE OF ADMISSION: 22 DATE OF DISCHARGE: 22 DISCHARGE DISPOSITION: Stable    Reason for admission: Patient was admitted for rehabilitation after hospitalization for Left hip fx  Course of stay: Patient received skilled nursing care, PT/OT/ST, dietary and  during their stay at [facility] with an overall improvement in functional status  PT/OT Report:  Bed mobility mod assist sit pivot transfers contact guard assist sit to stand standby assist ambulates 50 ft with a rolling walker standby assist with assist for rolling walker management due to blindness upper body dressing/bathing supervision lower body dressing max assist lower body bathing mod assist toilet transfer contact guard assist hygiene max clothing management max    Discharge Medications: See discharge medication list which was reviewed and signed  Status at time of discharge: Stable    Today's Visit: :06 PM    Subjective: 68y o  year old female seen and examined today for discharge planning  Patient is scheduled to be discharged on 2022 to home with the following services: VNA, PT and OT  Patient states that they are feeling well and appear comfortable in the room  They deny headache, dizziness, blurred vision, dyspnea, abdominal pain, nausea, vomiting and diarrhea  Vitals:   Vitals:    22 1305   BP: (!) 108/42   Pulse: 68   Resp: 16   Temp: 98 3 °F (36 8 °C)       Exam: Physical Exam  Vitals reviewed  Constitutional:       Appearance: She is well-developed  HENT:      Head: Normocephalic and atraumatic  Eyes:      Conjunctiva/sclera: Conjunctivae normal    Cardiovascular:      Rate and Rhythm: Normal rate and regular rhythm  Heart sounds: Normal heart sounds, S1 normal and S2 normal  No murmur heard  Pulmonary:      Effort: Pulmonary effort is normal  No respiratory distress  Breath sounds: Normal breath sounds  No wheezing  Abdominal:      General: Bowel sounds are normal  There is no distension  Palpations: Abdomen is soft  Tenderness: There is no abdominal tenderness  Musculoskeletal:      Cervical back: Normal range of motion  Left hip: Tenderness present  Decreased range of motion  Decreased strength  Comments: Generalized weakness   Skin:     General: Skin is warm and dry  Neurological:      Mental Status: She is alert  Psychiatric:         Attention and Perception: Attention normal          Mood and Affect: Mood normal          Speech: Speech normal          Behavior: Behavior normal          Thought Content: Thought content normal          Discussion with patient/family and further instructions:  -Fall precautions  -Aspiration precautions  -Bleeding precautions  -Monitor for signs/symptoms of infection  -Medication list was reviewed and signed  -DME form was completed    Follow-up Recommendations: Please follow-up with your primary care physician within 7-10 days of discharge to review medication changes and current status       Problem List Follow-up Recommendations:  Problem List Items Addressed This Visit        Endocrine    Type 2 diabetes mellitus ([facility] [address] )     · Daily control  · Continue with BS testing daily  · Continue with insulin  · F/u with PCP as needed            Cardiovascular and Mediastinum    Atrial fibrillation ([facility] [address] ) - Primary     · Rate controlled  · Continue with carvedilol  · Monitor BP and HR  · F/u with PCP and Cardiology as needed            Musculoskeletal and Integument    Closed left hip fracture (HCC)     · Routine healing  · Pain management medications  · PT/OT in home  · F/u with PCP and ortho as needed            Other    Blind     · At baseline  · Continue with supportive care  · F/u with PCP as needed               TENNILLE [email or web address]  7/18/20221:06 PM    Discharge  Statement   I spent 1 hour minutes discharging the patient  This time was spent on the day of discharge  I had direct contact with the patient on the day of discharge

## 2022-07-18 NOTE — ASSESSMENT & PLAN NOTE
· Daily control  · Continue with BS testing daily  · Continue with insulin  · F/u with PCP as needed

## 2022-07-18 NOTE — ASSESSMENT & PLAN NOTE
· Rate controlled  · Continue with carvedilol  · Monitor BP and HR  · F/u with PCP and Cardiology as needed

## 2022-07-18 NOTE — ASSESSMENT & PLAN NOTE
· Routine healing  · Pain management medications  · PT/OT in home  · F/u with PCP and ortho as needed

## 2022-07-19 ENCOUNTER — HOME CARE VISIT (OUTPATIENT)
Dept: HOME HEALTH SERVICES | Facility: HOME HEALTHCARE | Age: 77
End: 2022-07-19

## 2022-07-20 ENCOUNTER — HOME CARE VISIT (OUTPATIENT)
Dept: HOME HEALTH SERVICES | Facility: HOME HEALTHCARE | Age: 77
End: 2022-07-20

## 2022-07-21 ENCOUNTER — HOME CARE VISIT (OUTPATIENT)
Dept: HOME HEALTH SERVICES | Facility: HOME HEALTHCARE | Age: 77
End: 2022-07-21
Payer: COMMERCIAL

## 2022-07-21 VITALS
OXYGEN SATURATION: 95 % | TEMPERATURE: 97.4 F | SYSTOLIC BLOOD PRESSURE: 138 MMHG | HEART RATE: 62 BPM | DIASTOLIC BLOOD PRESSURE: 68 MMHG | WEIGHT: 194 LBS | BODY MASS INDEX: 34.37 KG/M2 | RESPIRATION RATE: 16 BRPM

## 2022-07-21 PROCEDURE — 400013 VN SOC

## 2022-07-21 PROCEDURE — G0299 HHS/HOSPICE OF RN EA 15 MIN: HCPCS

## 2022-07-21 NOTE — CASE COMMUNICATION
TC to patients son to set up PT Initial Evaluation  SHe has Dr visit Friday    Set up PT Initial visit for Monday 7/25/22 at 9:30 am

## 2022-07-22 ENCOUNTER — TELEMEDICINE (OUTPATIENT)
Dept: FAMILY MEDICINE CLINIC | Facility: CLINIC | Age: 77
End: 2022-07-22
Payer: COMMERCIAL

## 2022-07-22 VITALS — WEIGHT: 196 LBS | BODY MASS INDEX: 34.72 KG/M2

## 2022-07-22 DIAGNOSIS — I48.91 ATRIAL FIBRILLATION, UNSPECIFIED TYPE (HCC): ICD-10-CM

## 2022-07-22 DIAGNOSIS — I10 PRIMARY HYPERTENSION: Primary | ICD-10-CM

## 2022-07-22 DIAGNOSIS — I25.10 CORONARY ARTERY DISEASE INVOLVING NATIVE CORONARY ARTERY OF NATIVE HEART WITHOUT ANGINA PECTORIS: ICD-10-CM

## 2022-07-22 DIAGNOSIS — D62 ACUTE BLOOD LOSS ANEMIA (ABLA): ICD-10-CM

## 2022-07-22 DIAGNOSIS — S72.002A CLOSED LEFT HIP FRACTURE, INITIAL ENCOUNTER (HCC): ICD-10-CM

## 2022-07-22 DIAGNOSIS — Z79.4 DIABETES MELLITUS TYPE 2, INSULIN DEPENDENT (HCC): ICD-10-CM

## 2022-07-22 DIAGNOSIS — E11.9 DIABETES MELLITUS TYPE 2, INSULIN DEPENDENT (HCC): ICD-10-CM

## 2022-07-22 PROBLEM — N28.9 WORSENING RENAL FUNCTION: Status: RESOLVED | Noted: 2022-02-01 | Resolved: 2022-07-22
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
## 2022-07-22 PROBLEM — Z97.8 FOLEY CATHETER IN PLACE: Status: RESOLVED | Noted: 2022-07-05 | Resolved: 2022-07-22
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
## 2022-07-22 PROCEDURE — 99496 TRANSJ CARE MGMT HIGH F2F 7D: CPT | Performed by: FAMILY MEDICINE

## 2022-07-22 NOTE — ASSESSMENT & PLAN NOTE
Speak with cardio about changing from the noac to asa   Or maybe call the ins about eliquis   xaralto was $300

## 2022-07-22 NOTE — PROGRESS NOTES
Virtual TCM Visit:    Verification of patient location:    Patient is located in the following state in which I hold an active license [place]        Assessment/Plan:        Problem List Items Addressed This Visit        Endocrine    Diabetes mellitus type 2, insulin dependent ([place] [address] )       Lab Results   Component Value Date    HGBA1C 7 1 (H) 02/18/2022   on lantus 18 qhs   BG   Evening 160-210     2 nights ago 260            Cardiovascular and Mediastinum    Hypertension - Primary     138/66           CAD (coronary artery disease)     ASA mwf per cardio notes          Atrial fibrillation ([place] [address] )     Speak with cardio about changing from the noac to asa   Or maybe call the ins about eliquis   xaralto was $300               Other    Acute blood loss anemia (ABLA)     9 0 last check not on iron   I would recommend iron   Next h/h November with renal            Other Visit Diagnoses     Closed left hip fracture, initial encounter ([place] [address] )        6/25  Left femoral intramedullary nail               Reason for visit is tcm     Encounter provider Ronald Sebastian MD       Provider located at [address] [place] [phone]      Recent Visits  No visits were found meeting these conditions  Showing recent visits within past 7 days and meeting all other requirements  Today's Visits  Date Type Provider Dept   07/22/22 Telemedicine Ronald Sebastian MD [facility]   Showing today's visits and meeting all other requirements  Future Appointments  No visits were found meeting these conditions  Showing future appointments within next 150 days and meeting all other requirements       After connecting through [employer], the patient was identified by name and date of birth  Jose Krueger was informed that this is a telemedicine visit and that the visit is being conducted through [address] [place] [address] Now and patient was informed that this is a secure, HIPAA-compliant platform   She agrees to proceed     My office door was closed  No one else was in the room  She acknowledged consent and understanding of privacy and security of the video platform  The patient has agreed to participate and understands they can discontinue the visit at any time  Patient is aware this is a billable service  Subjective:     Patient ID: Marino Flores is a 68 y o  female  HPI     Here for tcm with son   At home   No pain   Can walk with walker but weak   BG levels aregoing well  No head   BM 3 x a week which is normal     Alexandra for leg pain   Helping but she might not need it   Will let me know         Review of Systems   Constitutional: Negative for fever and unexpected weight change  HENT: Negative for nosebleeds and trouble swallowing  Eyes: Positive for visual disturbance  Respiratory: Negative for chest tightness and shortness of breath  Cardiovascular: Negative for chest pain, palpitations and leg swelling  Gastrointestinal: Negative for abdominal pain, constipation, diarrhea and nausea  Endocrine: Negative for cold intolerance  Genitourinary: Negative for dysuria and urgency  Musculoskeletal: Positive for arthralgias and gait problem  Negative for joint swelling and myalgias  Skin: Negative for rash  Neurological: Positive for weakness  Negative for tremors, seizures and syncope  Hematological: Does not bruise/bleed easily  Psychiatric/Behavioral: Negative for hallucinations and suicidal ideas  Objective:    Vitals:    07/22/22 1528 07/22/22 1534   Weight: 88 9 kg (196 lb) 88 9 kg (196 lb)       Physical Exam  Constitutional:       Appearance: She is well-developed  She is obese  Comments: In chair    Eyes:      Conjunctiva/sclera: Conjunctivae normal    Pulmonary:      Effort: Pulmonary effort is normal    Musculoskeletal:      Cervical back: Normal range of motion  Neurological:      Mental Status: She is alert     Psychiatric:         Behavior: Behavior normal  Transitional Care Management Review:  Carrol Martinez is a 68 y o  female here for TCM follow up  During the TCM phone call patient stated:    TCM Call     Date and time call was made  7/18/2022  [address] reviewed  Records reviewed    Patient was hospitialized at  Other (comment)    Comment  was in [facility] from 6/24-6/30 and transferred to [facility] until 7/18/2022    Date of Admission  06/24/22    Date of discharge  07/18/22    Diagnosis  Closed left hip fracture    Disposition  Home    Were the patients medications reviewed and updated  Yes    Current Symptoms  None      TCM Call     Post hospital issues  None    Should patient be enrolled in anticoag monitoring? No    Scheduled for follow up? Yes    Patients specialists  Nephrologist; Urologist    Did you obtain your prescribed medications  Yes    Do you need help managing your prescriptions or medications  No    Is transportation to your appointment needed  No    I have advised the patient to call PCP with any new or worsening symptoms  Coco Isaiah, [address] [place] [address]  Family    The type of support provided  None    Do you have social support  Yes, as much as I need    Are you recieving any outpatient services  No    Are you recieving home care services  Yes    Types of home care services  Home PT    Are you using any community resources  No    Current waiver services  No    Have you fallen in the last 12 months  Yes    How many times  1    Interperter language line needed  No    Counseling  Patient          I spent 20 minutes with the patient during this visit  Christine Luque MD      VIRTUAL VISIT DISCLAIMER    Carrol Martinez verbally agrees to participate in [employer]   Pt is aware that [employer] could be limited without vital signs or the ability to perform a full hands-on physical Mehulaj Beth understands she or the provider may request at any time to terminate the video visit and request the patient to seek care or treatment in person

## 2022-07-24 NOTE — CASE COMMUNICATION
[facility] has admitted your patient to [facility] service with the following disciplines:      SN, PT and OT  This report is informational only, no responses is needed  Primary focus of home health care CP assess, Edema assess  Patient stated goals of care I want to get stronger so  I can do more for myself and not fall as often  Anticipated visit pattern and next visit date next SN visit 7 26 22 visit pattern 1 x weekly for 3 wee ks  See medication list - meds in home differ from AVS NA  Significant clinical findings NA  Potential barriers to goal achievement none  Other pertinent information NA    Thank you for allowing us to participate in the care of your patient        Meri Schwab RN

## 2022-07-25 ENCOUNTER — HOME CARE VISIT (OUTPATIENT)
Dept: HOME HEALTH SERVICES | Facility: HOME HEALTHCARE | Age: 77
End: 2022-07-25
Payer: COMMERCIAL

## 2022-07-25 VITALS — HEART RATE: 62 BPM | OXYGEN SATURATION: 96 % | DIASTOLIC BLOOD PRESSURE: 58 MMHG | SYSTOLIC BLOOD PRESSURE: 122 MMHG

## 2022-07-25 PROCEDURE — G0151 HHCP-SERV OF PT,EA 15 MIN: HCPCS

## 2022-07-27 ENCOUNTER — HOME CARE VISIT (OUTPATIENT)
Dept: HOME HEALTH SERVICES | Facility: HOME HEALTHCARE | Age: 77
End: 2022-07-27
Payer: COMMERCIAL

## 2022-07-27 ENCOUNTER — APPOINTMENT (OUTPATIENT)
Dept: LAB | Age: 77
End: 2022-07-27
Payer: COMMERCIAL

## 2022-07-27 VITALS — DIASTOLIC BLOOD PRESSURE: 70 MMHG | OXYGEN SATURATION: 94 % | HEART RATE: 67 BPM | SYSTOLIC BLOOD PRESSURE: 125 MMHG

## 2022-07-27 VITALS
TEMPERATURE: 96.4 F | HEART RATE: 78 BPM | OXYGEN SATURATION: 93 % | SYSTOLIC BLOOD PRESSURE: 140 MMHG | DIASTOLIC BLOOD PRESSURE: 62 MMHG | RESPIRATION RATE: 18 BRPM

## 2022-07-27 DIAGNOSIS — I48.91 ATRIAL FIBRILLATION, UNSPECIFIED TYPE (HCC): ICD-10-CM

## 2022-07-27 LAB
ANION GAP SERPL CALCULATED.3IONS-SCNC: 3 MMOL/L (ref 4–13)
BUN SERPL-MCNC: 25 MG/DL (ref 5–25)
CALCIUM SERPL-MCNC: 8.1 MG/DL (ref 8.3–10.1)
CHLORIDE SERPL-SCNC: 110 MMOL/L (ref 96–108)
CO2 SERPL-SCNC: 28 MMOL/L (ref 21–32)
CREAT SERPL-MCNC: 2.29 MG/DL (ref 0.6–1.3)
GFR SERPL CREATININE-BSD FRML MDRD: 20 ML/MIN/1.73SQ M
GLUCOSE SERPL-MCNC: 129 MG/DL (ref 65–140)
POTASSIUM SERPL-SCNC: 4.7 MMOL/L (ref 3.5–5.3)
SODIUM SERPL-SCNC: 141 MMOL/L (ref 135–147)

## 2022-07-27 PROCEDURE — 36415 COLL VENOUS BLD VENIPUNCTURE: CPT

## 2022-07-27 PROCEDURE — G0152 HHCP-SERV OF OT,EA 15 MIN: HCPCS

## 2022-07-27 PROCEDURE — G0299 HHS/HOSPICE OF RN EA 15 MIN: HCPCS

## 2022-07-27 PROCEDURE — 80048 BASIC METABOLIC PNL TOTAL CA: CPT

## 2022-07-28 ENCOUNTER — HOME CARE VISIT (OUTPATIENT)
Dept: HOME HEALTH SERVICES | Facility: HOME HEALTHCARE | Age: 77
End: 2022-07-28
Payer: COMMERCIAL

## 2022-07-28 VITALS — OXYGEN SATURATION: 94 % | HEART RATE: 91 BPM | SYSTOLIC BLOOD PRESSURE: 122 MMHG | DIASTOLIC BLOOD PRESSURE: 62 MMHG

## 2022-07-28 PROCEDURE — G0151 HHCP-SERV OF PT,EA 15 MIN: HCPCS

## 2022-07-28 NOTE — CASE COMMUNICATION
Homecare occupational therapy evaluation completed 7/27/2022  plan of care 2x a week for 4 weeks to increase safety and independence with shower , toilet transfer  training patient and caregiver on UE home exercise program, ECT, fall prevention

## 2022-07-29 ENCOUNTER — HOME CARE VISIT (OUTPATIENT)
Dept: HOME HEALTH SERVICES | Facility: HOME HEALTHCARE | Age: 77
End: 2022-07-29
Payer: COMMERCIAL

## 2022-07-29 VITALS — OXYGEN SATURATION: 96 % | HEART RATE: 81 BPM | DIASTOLIC BLOOD PRESSURE: 70 MMHG | SYSTOLIC BLOOD PRESSURE: 140 MMHG

## 2022-07-29 PROCEDURE — G0152 HHCP-SERV OF OT,EA 15 MIN: HCPCS

## 2022-08-01 ENCOUNTER — APPOINTMENT (EMERGENCY)
Dept: RADIOLOGY | Facility: HOSPITAL | Age: 77
DRG: 204 | End: 2022-08-01
Payer: COMMERCIAL

## 2022-08-01 ENCOUNTER — HOME CARE VISIT (OUTPATIENT)
Dept: HOME HEALTH SERVICES | Facility: HOME HEALTHCARE | Age: 77
End: 2022-08-01
Payer: COMMERCIAL

## 2022-08-01 ENCOUNTER — HOSPITAL ENCOUNTER (INPATIENT)
Facility: HOSPITAL | Age: 77
LOS: 1 days | Discharge: HOME HEALTH | DRG: 204 | End: 2022-08-03
Attending: EMERGENCY MEDICINE | Admitting: INTERNAL MEDICINE
Payer: COMMERCIAL

## 2022-08-01 VITALS — SYSTOLIC BLOOD PRESSURE: 138 MMHG | DIASTOLIC BLOOD PRESSURE: 78 MMHG | HEART RATE: 65 BPM | OXYGEN SATURATION: 93 %

## 2022-08-01 DIAGNOSIS — R06.2 WHEEZING: Primary | ICD-10-CM

## 2022-08-01 PROBLEM — R79.89 ELEVATED D-DIMER: Status: ACTIVE | Noted: 2022-08-01

## 2022-08-01 LAB
2HR DELTA HS TROPONIN: 3 NG/L
4HR DELTA HS TROPONIN: 4 NG/L
ALBUMIN SERPL BCP-MCNC: 3.3 G/DL (ref 3.5–5)
ALP SERPL-CCNC: 91 U/L (ref 34–104)
ALT SERPL W P-5'-P-CCNC: 5 U/L (ref 7–52)
ANION GAP SERPL CALCULATED.3IONS-SCNC: 5 MMOL/L (ref 4–13)
APTT PPP: 38 SECONDS (ref 23–37)
AST SERPL W P-5'-P-CCNC: 11 U/L (ref 13–39)
BASOPHILS # BLD AUTO: 0.02 THOUSANDS/ΜL (ref 0–0.1)
BASOPHILS NFR BLD AUTO: 0 % (ref 0–1)
BILIRUB SERPL-MCNC: 0.42 MG/DL (ref 0.2–1)
BNP SERPL-MCNC: 957 PG/ML (ref 0–100)
BUN SERPL-MCNC: 26 MG/DL (ref 5–25)
CALCIUM ALBUM COR SERPL-MCNC: 9 MG/DL (ref 8.3–10.1)
CALCIUM SERPL-MCNC: 8.4 MG/DL (ref 8.4–10.2)
CARDIAC TROPONIN I PNL SERPL HS: 23 NG/L
CARDIAC TROPONIN I PNL SERPL HS: 26 NG/L
CARDIAC TROPONIN I PNL SERPL HS: 27 NG/L
CHLORIDE SERPL-SCNC: 105 MMOL/L (ref 96–108)
CO2 SERPL-SCNC: 28 MMOL/L (ref 21–32)
CREAT SERPL-MCNC: 1.91 MG/DL (ref 0.6–1.3)
D DIMER PPP FEU-MCNC: 2.67 UG/ML FEU
EOSINOPHIL # BLD AUTO: 0 THOUSAND/ΜL (ref 0–0.61)
EOSINOPHIL NFR BLD AUTO: 0 % (ref 0–6)
ERYTHROCYTE [DISTWIDTH] IN BLOOD BY AUTOMATED COUNT: 15.5 % (ref 11.6–15.1)
FLUAV RNA RESP QL NAA+PROBE: NEGATIVE
FLUBV RNA RESP QL NAA+PROBE: NEGATIVE
GFR SERPL CREATININE-BSD FRML MDRD: 24 ML/MIN/1.73SQ M
GLUCOSE SERPL-MCNC: 173 MG/DL (ref 65–140)
GLUCOSE SERPL-MCNC: 196 MG/DL (ref 65–140)
HCT VFR BLD AUTO: 28.3 % (ref 34.8–46.1)
HGB BLD-MCNC: 8.6 G/DL (ref 11.5–15.4)
IMM GRANULOCYTES # BLD AUTO: 0.06 THOUSAND/UL (ref 0–0.2)
IMM GRANULOCYTES NFR BLD AUTO: 1 % (ref 0–2)
INR PPP: 2.07 (ref 0.84–1.19)
LYMPHOCYTES # BLD AUTO: 1.11 THOUSANDS/ΜL (ref 0.6–4.47)
LYMPHOCYTES NFR BLD AUTO: 14 % (ref 14–44)
MCH RBC QN AUTO: 27.9 PG (ref 26.8–34.3)
MCHC RBC AUTO-ENTMCNC: 30.4 G/DL (ref 31.4–37.4)
MCV RBC AUTO: 92 FL (ref 82–98)
MONOCYTES # BLD AUTO: 0.64 THOUSAND/ΜL (ref 0.17–1.22)
MONOCYTES NFR BLD AUTO: 8 % (ref 4–12)
NEUTROPHILS # BLD AUTO: 6.14 THOUSANDS/ΜL (ref 1.85–7.62)
NEUTS SEG NFR BLD AUTO: 77 % (ref 43–75)
NRBC BLD AUTO-RTO: 0 /100 WBCS
PLATELET # BLD AUTO: 349 THOUSANDS/UL (ref 149–390)
PMV BLD AUTO: 10.3 FL (ref 8.9–12.7)
POTASSIUM SERPL-SCNC: 4.9 MMOL/L (ref 3.5–5.3)
PROT SERPL-MCNC: 6.7 G/DL (ref 6.4–8.4)
PROTHROMBIN TIME: 23.6 SECONDS (ref 11.6–14.5)
RBC # BLD AUTO: 3.08 MILLION/UL (ref 3.81–5.12)
RSV RNA RESP QL NAA+PROBE: NEGATIVE
SARS-COV-2 RNA RESP QL NAA+PROBE: NEGATIVE
SODIUM SERPL-SCNC: 138 MMOL/L (ref 135–147)
WBC # BLD AUTO: 7.97 THOUSAND/UL (ref 4.31–10.16)

## 2022-08-01 PROCEDURE — G0151 HHCP-SERV OF PT,EA 15 MIN: HCPCS

## 2022-08-01 PROCEDURE — 80053 COMPREHEN METABOLIC PANEL: CPT

## 2022-08-01 PROCEDURE — 71045 X-RAY EXAM CHEST 1 VIEW: CPT

## 2022-08-01 PROCEDURE — 99285 EMERGENCY DEPT VISIT HI MDM: CPT | Performed by: EMERGENCY MEDICINE

## 2022-08-01 PROCEDURE — 0241U HB NFCT DS VIR RESP RNA 4 TRGT: CPT | Performed by: INTERNAL MEDICINE

## 2022-08-01 PROCEDURE — 99285 EMERGENCY DEPT VISIT HI MDM: CPT

## 2022-08-01 PROCEDURE — 85730 THROMBOPLASTIN TIME PARTIAL: CPT | Performed by: EMERGENCY MEDICINE

## 2022-08-01 PROCEDURE — 36415 COLL VENOUS BLD VENIPUNCTURE: CPT

## 2022-08-01 PROCEDURE — 82948 REAGENT STRIP/BLOOD GLUCOSE: CPT

## 2022-08-01 PROCEDURE — 84484 ASSAY OF TROPONIN QUANT: CPT

## 2022-08-01 PROCEDURE — 85025 COMPLETE CBC W/AUTO DIFF WBC: CPT

## 2022-08-01 PROCEDURE — 93005 ELECTROCARDIOGRAM TRACING: CPT

## 2022-08-01 PROCEDURE — 83880 ASSAY OF NATRIURETIC PEPTIDE: CPT

## 2022-08-01 PROCEDURE — 99220 PR INITIAL OBSERVATION CARE/DAY 70 MINUTES: CPT | Performed by: INTERNAL MEDICINE

## 2022-08-01 PROCEDURE — 85610 PROTHROMBIN TIME: CPT | Performed by: EMERGENCY MEDICINE

## 2022-08-01 PROCEDURE — 85379 FIBRIN DEGRADATION QUANT: CPT

## 2022-08-01 RX ORDER — RANOLAZINE 500 MG/1
500 TABLET, EXTENDED RELEASE ORAL DAILY
Status: DISCONTINUED | OUTPATIENT
Start: 2022-08-02 | End: 2022-08-03 | Stop reason: HOSPADM

## 2022-08-01 RX ORDER — FLUTICASONE PROPIONATE 50 MCG
1 SPRAY, SUSPENSION (ML) NASAL DAILY
Status: DISCONTINUED | OUTPATIENT
Start: 2022-08-02 | End: 2022-08-03 | Stop reason: HOSPADM

## 2022-08-01 RX ORDER — LANOLIN ALCOHOL/MO/W.PET/CERES
400 CREAM (GRAM) TOPICAL DAILY
Status: DISCONTINUED | OUTPATIENT
Start: 2022-08-02 | End: 2022-08-03 | Stop reason: HOSPADM

## 2022-08-01 RX ORDER — INSULIN GLARGINE 100 [IU]/ML
20 INJECTION, SOLUTION SUBCUTANEOUS
Status: DISCONTINUED | OUTPATIENT
Start: 2022-08-01 | End: 2022-08-03

## 2022-08-01 RX ORDER — HYDRALAZINE HYDROCHLORIDE 20 MG/ML
10 INJECTION INTRAMUSCULAR; INTRAVENOUS EVERY 6 HOURS PRN
Status: DISCONTINUED | OUTPATIENT
Start: 2022-08-01 | End: 2022-08-03 | Stop reason: HOSPADM

## 2022-08-01 RX ORDER — ALBUTEROL SULFATE 90 UG/1
2 AEROSOL, METERED RESPIRATORY (INHALATION) EVERY 4 HOURS PRN
Status: DISCONTINUED | OUTPATIENT
Start: 2022-08-01 | End: 2022-08-03 | Stop reason: HOSPADM

## 2022-08-01 RX ORDER — AMLODIPINE BESYLATE 5 MG/1
5 TABLET ORAL DAILY
Status: DISCONTINUED | OUTPATIENT
Start: 2022-08-01 | End: 2022-08-03 | Stop reason: HOSPADM

## 2022-08-01 RX ORDER — INSULIN LISPRO 100 [IU]/ML
2-12 INJECTION, SOLUTION INTRAVENOUS; SUBCUTANEOUS
Status: DISCONTINUED | OUTPATIENT
Start: 2022-08-02 | End: 2022-08-03 | Stop reason: HOSPADM

## 2022-08-01 RX ORDER — HEPARIN SODIUM 1000 [USP'U]/ML
6800 INJECTION, SOLUTION INTRAVENOUS; SUBCUTANEOUS ONCE
Status: COMPLETED | OUTPATIENT
Start: 2022-08-01 | End: 2022-08-01

## 2022-08-01 RX ORDER — GABAPENTIN 100 MG/1
100 CAPSULE ORAL
Status: DISCONTINUED | OUTPATIENT
Start: 2022-08-01 | End: 2022-08-03 | Stop reason: HOSPADM

## 2022-08-01 RX ORDER — PRAVASTATIN SODIUM 40 MG
40 TABLET ORAL
Status: DISCONTINUED | OUTPATIENT
Start: 2022-08-02 | End: 2022-08-03 | Stop reason: HOSPADM

## 2022-08-01 RX ORDER — HYDRALAZINE HYDROCHLORIDE 20 MG/ML
5 INJECTION INTRAMUSCULAR; INTRAVENOUS ONCE
Status: COMPLETED | OUTPATIENT
Start: 2022-08-01 | End: 2022-08-01

## 2022-08-01 RX ORDER — ASPIRIN 81 MG/1
81 TABLET, CHEWABLE ORAL DAILY
Status: DISCONTINUED | OUTPATIENT
Start: 2022-08-02 | End: 2022-08-03 | Stop reason: HOSPADM

## 2022-08-01 RX ORDER — MELATONIN
2000 DAILY
Status: DISCONTINUED | OUTPATIENT
Start: 2022-08-02 | End: 2022-08-03 | Stop reason: HOSPADM

## 2022-08-01 RX ORDER — MECLIZINE HCL 12.5 MG/1
12.5 TABLET ORAL EVERY 8 HOURS PRN
Status: DISCONTINUED | OUTPATIENT
Start: 2022-08-01 | End: 2022-08-03 | Stop reason: HOSPADM

## 2022-08-01 RX ORDER — ACETAMINOPHEN 325 MG/1
975 TABLET ORAL EVERY 8 HOURS SCHEDULED
Status: DISCONTINUED | OUTPATIENT
Start: 2022-08-01 | End: 2022-08-03 | Stop reason: HOSPADM

## 2022-08-01 RX ORDER — HEPARIN SODIUM 10000 [USP'U]/100ML
3-30 INJECTION, SOLUTION INTRAVENOUS
Status: DISCONTINUED | OUTPATIENT
Start: 2022-08-01 | End: 2022-08-02

## 2022-08-01 RX ORDER — CARVEDILOL 12.5 MG/1
25 TABLET ORAL EVERY 12 HOURS SCHEDULED
Status: DISCONTINUED | OUTPATIENT
Start: 2022-08-01 | End: 2022-08-03 | Stop reason: HOSPADM

## 2022-08-01 RX ORDER — CARVEDILOL 12.5 MG/1
25 TABLET ORAL EVERY 12 HOURS SCHEDULED
Status: DISCONTINUED | OUTPATIENT
Start: 2022-08-01 | End: 2022-08-01

## 2022-08-01 RX ADMIN — INSULIN GLARGINE 20 UNITS: 100 INJECTION, SOLUTION SUBCUTANEOUS at 22:32

## 2022-08-01 RX ADMIN — ACETAMINOPHEN 975 MG: 325 TABLET ORAL at 22:33

## 2022-08-01 RX ADMIN — CARVEDILOL 25 MG: 12.5 TABLET, FILM COATED ORAL at 22:33

## 2022-08-01 RX ADMIN — HEPARIN SODIUM 18 UNITS/KG/HR: 10000 INJECTION, SOLUTION INTRAVENOUS at 20:06

## 2022-08-01 RX ADMIN — GABAPENTIN 100 MG: 100 CAPSULE ORAL at 22:33

## 2022-08-01 RX ADMIN — HEPARIN SODIUM 6800 UNITS: 1000 INJECTION INTRAVENOUS; SUBCUTANEOUS at 20:07

## 2022-08-01 RX ADMIN — HYDRALAZINE HYDROCHLORIDE 5 MG: 20 INJECTION, SOLUTION INTRAMUSCULAR; INTRAVENOUS at 19:10

## 2022-08-01 RX ADMIN — AMLODIPINE BESYLATE 5 MG: 5 TABLET ORAL at 22:32

## 2022-08-01 NOTE — HOME HEALTH
Patient has audible wheeze today and she notes earlier today some chest discomfort  Recommending to DIL to call  who will advise ED visit  TO inform SN and OT regarding limited activity today and patient refusing to participate in PT program today due to not feeling well  Plan to resume next visit  Reinforced BLE elevation as well as ankle pumps to keep circulation in distal BLEs

## 2022-08-01 NOTE — QUICK NOTE
[facility] Service Attending Physician Attestation Note - Admission    I have seen and examined Ethan Sandoval personally and have reviewed the medical record independently  I have reviewed the case with the resident physician including all assessments and the plan of care for each  I agree with the resident physician and offer the following addendum to the below statements by the resident physician:     Date Evaluated:  08/01/2022  Time Evaluated:  7:00 p m  Subjective / HPI:  Patient presented with complaints of wheezing started this morning, intermittent, associated with some shortness of breath  Denies any chest pain, tightness, fever, chills  Denies any cough  Denies any nausea vomiting or abdominal pain  At the time of my examination patient states her wheezing has improved and she has only slight wheezing  The emergency department her D-dimer was elevated at 2 67, BNP 9 5 7  She has chronic kidney disease and could not undergo CTA chest       Exam:  General appearance:  Patient not in acute distress  Eyes:  Pupils equal reacting to light  ENT:  Moist oral mucous membranes  CVS:  S1-S2 heard, regular rate and rhythm, 1+ pedal edema  Chest:  Bilateral air entry present, decreased breath sounds in all lung fields, occasional wheezing  Abdomen:  Soft, nontender, bowel sounds present  CNS:  No focal neurological deficits  Genitourinary: deferred  Skin:  No acute rash   psychiatric:  No psychosis  Musculoskeletal:  No joint deformities    Assessment and Plan:  Wheezing-no prior history of asthma, COPD, nonsmoker  Intermittent, currently unclear etiology  Continue on albuterol nebulizer as needed for wheezing    Elevated BNP however no congestion on chest x-ray, crackles on auscultation  Unlikely CHF  Elevated V-nobcn-xlss out PE, IV heparin drip, V/Q scan  Diabetes mellitus type 2, insulin dependent, sleep give sliding scale insulin  Essential hypertension  CAD continue on aspirin  History of atrial fibrillation  Anemia appears to be chronic, at baseline, monitor  Education and Discussions with Family / Patient:  Patient    Time Spent for Care: 70 minutes  More than 50% of total time spent on counseling and coordination of care as described above  Current Patient Status: Observation   Anticipated Length of Stay:  Patient will be admitted on an Observation basis with an anticipated length of stay of  less than 2 midnights  Justification for Hospital Stay:  Wheezing, shortness of breath    Epic / Care Everywhere Records Reviewed Independently: Yes     For detailed history, assessment, and plan of care, please review the statements below by Dr Tevin Saeed MD

## 2022-08-01 NOTE — ED PROVIDER NOTES
History  Chief Complaint   Patient presents with    Wheezing     Pt presents to ED due to wheezing starting during the night  Patient is a 66-year-old female with a past medical history of diabetes, HTN, vertigo, HLD, neuropathy, and MI status post pacemaker and stent who presented to the ED for wheezing  Patient states that at 5:00 a m  this morning, she woke up with severe wheezing  Patient called out to her mother who came in to help her she is still lying down and up until 10:00 a m  she was still feeling wheezy  As a result, she decided to come into the hospital   Patient states that she was recently in the hospital because of a fall on 2022  She did have a broken hip on the left side which was operated on 2022  Patient is in therapy after the hip replacement  Patient denies any fevers, chills, constipation, diarrhea, numbness, weakness, and tingling  Wheezing  Severity:  Mild  Onset quality:  Sudden  Duration:  9 hours  Timing:  Constant  Progression:  Worsening      Prior to Admission Medications   Prescriptions Last Dose Informant Patient Reported? Taking?    B-D UF III MINI PEN NEEDLES 31G X 5 MM MISC   No No   Sig: Use to test qd   Cholecalciferol (Vitamin D) 50 MCG (2000 UT) tablet   No No   Sig: Take 1 tablet (2,000 Units total) by mouth daily   NIFEdipine ER (ADALAT CC) 60 MG 24 hr tablet   Yes No   Sig: Take 60 mg by mouth daily   acetaminophen (TYLENOL) 325 mg tablet   No No   Sig: Take 3 tablets (975 mg total) by mouth every 8 (eight) hours   aspirin 81 mg chewable tablet   Yes No   Sig: Chew 81 mg daily   bisacodyl (DULCOLAX) 10 mg suppository   No No   Sig: Insert 1 suppository (10 mg total) into the rectum daily as needed for constipation   carvedilol (COREG) 25 mg tablet   Yes No   Sig: Take 25 mg by mouth every 12 (twelve) hours    fluticasone (FLONASE) 50 mcg/act nasal spray   No No   Si spray into each nostril daily   folic acid (FOLVITE) 340 mcg tablet   Yes No Sig: Take 400 mcg by mouth daily   folic acid (FOLVITE) 547 mcg tablet   Yes No   Sig: Take 400 mcg by mouth daily   gabapentin (NEURONTIN) 100 mg capsule   No No   Sig: Take 1 capsule (100 mg total) by mouth daily at bedtime   insulin glargine (Lantus SoloStar) 100 units/mL injection pen   No No   Sig: Inject 20 Units under the skin daily at bedtime   meclizine (ANTIVERT) 12 5 MG tablet   Yes No   Sig: Take by mouth every 8 (eight) hours as needed for dizziness   oxyCODONE (ROXICODONE) 5 immediate release tablet   No No   Si 5 mg to 5 mg p o  Every 4 hours as needed for moderate to severe pain  Ongoing therapy  pravastatin (PRAVACHOL) 40 mg tablet   No No   Sig: Take 1 tablet (40 mg total) by mouth daily   ranolazine (RANEXA) 500 mg 12 hr tablet   Yes No   Sig: daily     rivaroxaban (XARELTO) 15 mg tablet  Family Member Yes No   Sig: Take 15 mg by mouth daily      Facility-Administered Medications: None       Past Medical History:   Diagnosis Date    Atrial fibrillation (HCC)     Chronic anemia     Chronic kidney disease     Colonoscopy refused 2019    pt declines    Dyslipidemia     Hypertension     Mammogram declined 2017    Proteinuria     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       Past Surgical History:   Procedure Laterality Date    CARDIAC PACEMAKER PLACEMENT      CATARACT EXTRACTION       SECTION      CHOLECYSTECTOMY      CORONARY ANGIOPLASTY WITH STENT PLACEMENT      EYE SURGERY      MAMMO (HISTORICAL)      Pt states she will not ever have again-     [place] OPEN RX FEMUR FX+INTRAMED RIMA Left 2022    Procedure: INSERTION NAIL IM FEMUR ANTEGRADE (TROCHANTERIC);   Surgeon: Shama Henriquez DO;  [facility];  Service: Orthopedics    TUBAL LIGATION         Family History   Problem Relation Age of Onset    Diabetes Father     Hypertension Father     Heart disease Father     Cancer Brother         smoker    Diabetes Other     Heart attack Other     Hypertension Other     Asthma Other      I have reviewed and agree with the history as documented  E-Cigarette/Vaping    E-Cigarette Use Never User      E-Cigarette/Vaping Substances    Nicotine No     THC No     CBD No     Flavoring No     Other No     Unknown No      Social History     Tobacco Use    Smoking status: Never Smoker    Smokeless tobacco: Never Used   Vaping Use    Vaping Use: Never used   Substance Use Topics    Alcohol use: Never    Drug use: Never        Review of Systems   Constitutional: Negative  HENT: Negative  Eyes: Negative  Respiratory: Positive for wheezing  Cardiovascular: Negative  Gastrointestinal: Negative  Endocrine: Negative  Genitourinary: Negative  Musculoskeletal: Negative  Skin: Negative  Allergic/Immunologic: Negative  Neurological: Negative  Hematological: Negative  Psychiatric/Behavioral: Negative  Physical Exam  ED Triage Vitals   Temperature Pulse Respirations Blood Pressure SpO2   08/01/22 1320 08/01/22 1314 08/01/22 1314 08/01/22 1314 08/01/22 1334   98 1 °F (36 7 °C) 61 18 (!) 182/76 95 %      Temp Source Heart Rate Source Patient Position - Orthostatic VS BP Location FiO2 (%)   08/01/22 1314 08/01/22 1314 08/01/22 1314 08/01/22 1314 --   Oral Monitor Sitting Right arm       Pain Score       --                    Orthostatic Vital Signs  Vitals:    08/01/22 1314 08/01/22 1628   BP: (!) 182/76 (!) 185/105   Pulse: 61 60   Patient Position - Orthostatic VS: Sitting Lying       Physical Exam  Vitals reviewed  Constitutional:       General: She is in acute distress  Appearance: She is obese  HENT:      Head: Normocephalic and atraumatic  Eyes:      Extraocular Movements: Extraocular movements intact  Conjunctiva/sclera: Conjunctivae normal       Pupils: Pupils are equal, round, and reactive to light  Cardiovascular:      Rate and Rhythm: Normal rate and regular rhythm     Pulmonary:      Breath sounds: Wheezing (More in the upper lung fields ) present  Abdominal:      Palpations: Abdomen is soft  Musculoskeletal:      Left lower leg: Edema present  Skin:     General: Skin is warm  Neurological:      General: No focal deficit present  Mental Status: She is alert and oriented to person, place, and time  Mental status is at baseline  Psychiatric:         Mood and Affect: Mood normal          Behavior: Behavior normal          Thought Content: Thought content normal          ED Medications  Medications   heparin (porcine) injection 6,800 Units (has no administration in time range)   heparin (porcine) 25,000 units in 0 45% NaCl 250 mL infusion (premix) (has no administration in time range)       Diagnostic Studies  Results Reviewed     Procedure Component Value Units Date/Time    APTT [[id number]]     Lab Status: No result Specimen: Blood     Protime-INR [[id number]]     Lab Status: No result Specimen: Blood     HS Troponin I 2hr [[id number]]  (Normal) Collected: 08/01/22 1628    Lab Status: Final result Specimen: Blood from Arm, Left Updated: 08/01/22 1702     hs TnI 2hr 26 ng/L      Delta 2hr hsTnI 3 ng/L     D-dimer, quantitative [[id number]]  (Abnormal) Collected: 08/01/22 1434    Lab Status: Final result Specimen: Blood from Arm, Right Updated: 08/01/22 1613     D-Dimer, Quant 2 67 ug/ml FEU     Narrative: In the evaluation for possible pulmonary embolism, in the appropriate (Well's Score of 4 or less) patient, the age adjusted d-dimer cutoff for this patient can be calculated as:    Age x 0 01 (in ug/mL) for Age-adjusted D-dimer exclusion threshold for a patient over 50 years      HS Troponin I 4hr [[id number]]     Lab Status: No result Specimen: Blood     B-Type Natriuretic Peptide(BNP) [facility], [facility], [facility] Only [[id number]]  (Abnormal) Collected: 08/01/22 1434    Lab Status: Final result Specimen: Blood from Arm, Right Updated: 08/01/22 1518      pg/mL     HS Troponin 0hr (reflex protocol) [[id number]]  (Normal) Collected: 08/01/22 1434    Lab Status: Final result Specimen: Blood from Arm, Right Updated: 08/01/22 1518     hs TnI 0hr 23 ng/L     Comprehensive metabolic panel [[id number]]  (Abnormal) Collected: 08/01/22 1434    Lab Status: Final result Specimen: Blood from Arm, Right Updated: 08/01/22 1509     Sodium 138 mmol/L      Potassium 4 9 mmol/L      Chloride 105 mmol/L      CO2 28 mmol/L      ANION GAP 5 mmol/L      BUN 26 mg/dL      Creatinine 1 91 mg/dL      Glucose 196 mg/dL      Calcium 8 4 mg/dL      Corrected Calcium 9 0 mg/dL      AST 11 U/L      ALT 5 U/L      Alkaline Phosphatase 91 U/L      Total Protein 6 7 g/dL      Albumin 3 3 g/dL      Total Bilirubin 0 42 mg/dL      eGFR 24 ml/min/1 73sq m     Narrative:      National Kidney Disease Foundation guidelines for Chronic Kidney Disease (CKD):     Stage 1 with normal or high GFR (GFR > 90 mL/min/1 73 square meters)    Stage 2 Mild CKD (GFR = 60-89 mL/min/1 73 square meters)    Stage 3A Moderate CKD (GFR = 45-59 mL/min/1 73 square meters)    Stage 3B Moderate CKD (GFR = 30-44 mL/min/1 73 square meters)    Stage 4 Severe CKD (GFR = 15-29 mL/min/1 73 square meters)    Stage 5 End Stage CKD (GFR <15 mL/min/1 73 square meters)  Note: GFR calculation is accurate only with a steady state creatinine    CBC and differential [[id number]]  (Abnormal) Collected: 08/01/22 1434    Lab Status: Final result Specimen: Blood from Arm, Right Updated: 08/01/22 1450     WBC 7 97 Thousand/uL      RBC 3 08 Million/uL      Hemoglobin 8 6 g/dL      Hematocrit 28 3 %      MCV 92 fL      MCH 27 9 pg      MCHC 30 4 g/dL      RDW 15 5 %      MPV 10 3 fL      Platelets 608 Thousands/uL      nRBC 0 /100 WBCs      Neutrophils Relative 77 %      Immat GRANS % 1 %      Lymphocytes Relative 14 %      Monocytes Relative 8 %      Eosinophils Relative 0 %      Basophils Relative 0 %      Neutrophils Absolute 6 14 Thousands/µL      Immature Grans Absolute 0 06 Thousand/uL      Lymphocytes Absolute 1 11 Thousands/µL      Monocytes Absolute 0 64 Thousand/µL      Eosinophils Absolute 0 00 Thousand/µL      Basophils Absolute 0 02 Thousands/µL                  X-ray chest 1 view portable   ED Interpretation by Keisha Adrian MD (08/01 1400)   This film was interpreted independently by me  No infiltrate, cardiac silhouette normal, no pleural effusions or pulmonary edema  Final Result by Aakash Velasquez MD (08/01 1659)   Linear density seen left lower lung due to atelectasis   No acute consolidation or congestion            Workstation performed: [id number]         Nuclear Medicine ED order    (Results Pending)         Procedures  ECG 12 Lead Documentation Only    Date/Time: 8/1/2022 3:10 PM  Performed by: Devora Parekh DO  Authorized by: Devora Parekh DO     Patient location:  ED  Previous ECG:     Previous ECG:  Compared to current    Similarity:  No change  Interpretation:     Interpretation: abnormal    Rate:     ECG rate:  Atrial-paced    ECG rate assessment: normal    Rhythm:     Rhythm: paced    Pacing:     Capture:  Complete    Type of pacing:  Atrial          ED Course  ED Course as of 08/01/22 1808   Mon Aug 01, 2022   1350 Triage was done  Patient wheezing on examination and s/p hip surgery on 7/25/22  Cardiac work-up ordered  1518 hs TnI 0hr: 23   1702 Dimer 2 67 and   Nuclear medicine ED ordered  MDM  Number of Diagnoses or Management Options  Diagnosis management comments: Patient is a 49-year-old female who presented to the ED for wheezing  Patient is s/p hip surgery on 07/25/2022  Cardiac workup was done for the patient  D-dimer was 2 67 and   The study was not able to be conducted on her due to her poor GFR is a nuclear medicine ordered but they cannot do the study until tomorrow morning  Patient is still wheezing on physical examination so patient will be admitted under observation    The nuclear study will also be obtained tomorrow morning  Patient will be admitted with VT heparin for prophylaxis  Pending labs to be followed while on observation  Amount and/or Complexity of Data Reviewed  Independent visualization of images, tracings, or specimens: yes        Disposition  Final diagnoses:   None     ED Disposition     None      Follow-up Information    None         Patient's Medications   Discharge Prescriptions    No medications on file     No discharge procedures on file  PDMP Review       Value Time User    PDMP Reviewed  Yes 6/30/2022 11:16 AM Bernadette Ricci PA-C           ED Provider  Attending physically available and evaluated Yandel Quintero  I managed the patient along with the ED Attending      Electronically Signed by         Tamie Lopes DO  08/01/22 5487

## 2022-08-01 NOTE — ED ATTENDING ATTESTATION
8/1/2022  IChandu MD, saw and evaluated the patient  I have discussed the patient with the resident/non-physician practitioner and agree with the resident's/non-physician practitioner's findings, Plan of Care, and MDM as documented in the resident's/non-physician practitioner's note, except where noted  All available labs and Radiology studies were reviewed  I was present for key portions of any procedure(s) performed by the resident/non-physician practitioner and I was immediately available to provide assistance  At this point I agree with the current assessment done in the Emergency Department  I have conducted an independent evaluation of this patient a history and physical is as follows:    S:  Chief Complaint   Patient presents with    Wheezing     Pt presents to ED due to wheezing starting during the night  Matilde Heaton is a 68 y o  female who presents with chief complaint of worsening shortness of breath  She reports that since coming home from rehab she has had shortness of breath with exertion but since this morning she has had shortness of breath with wheezing even at rest   No chest pain, palpitations, nausea, vomiting or diaphoresis  O:  ED Triage Vitals   Temperature Pulse Respirations Blood Pressure SpO2   08/01/22 1320 08/01/22 1314 08/01/22 1314 08/01/22 1314 08/01/22 1334   98 1 °F (36 7 °C) 61 18 (!) 182/76 95 %      Temp Source Heart Rate Source Patient Position - Orthostatic VS BP Location FiO2 (%)   08/01/22 1314 08/01/22 1314 08/01/22 1314 08/01/22 1314 --   Oral Monitor Sitting Right arm       Pain Score       --                Physical Exam  Vitals and nursing note reviewed  Constitutional:       General: She is in acute distress  Appearance: She is well-developed  HENT:      Head: Normocephalic and atraumatic  Eyes:      Extraocular Movements: Extraocular movements intact  Pupils: Pupils are equal, round, and reactive to light     Neck: Vascular: No JVD  Cardiovascular:      Rate and Rhythm: Normal rate and regular rhythm  Heart sounds: Normal heart sounds  No murmur heard  No friction rub  No gallop  Pulmonary:      Effort: Pulmonary effort is normal  No respiratory distress  Breath sounds: Wheezing and rales present  Chest:      Chest wall: No tenderness  Musculoskeletal:         General: Swelling and tenderness present  Normal range of motion  Cervical back: Normal range of motion  Left lower leg: Edema present  Skin:     General: Skin is warm and dry  Neurological:      General: No focal deficit present  Mental Status: She is alert and oriented to person, place, and time  Psychiatric:         Behavior: Behavior normal          Thought Content:  Thought content normal          Judgment: Judgment normal         A/P:  Background: 68 y o  female presents with shortness of breath    Differential DX includes but is not limited to: PE, pneumonia, chf, deconditioning, anemia, atypical acs    Plan: cardiac workup, ddimer, bnp, chest xray    ED Course         Critical Care Time  ECG 12 Lead Documentation Only    Date/Time: 8/1/2022 2:33 PM  Performed by: Paz Chan MD  Authorized by: Paz Chan MD     Indications / Diagnosis:  Shortness of breath, wheezing  ECG reviewed by me, the ED Provider: yes    Patient location:  ED  Previous ECG:     Previous ECG:  Compared to current    Comparison ECG info:  7/12/20    Similarity:  No change  Interpretation:     Interpretation: abnormal    Rate:     ECG rate:  60    ECG rate assessment: normal    Rhythm:     Rhythm: paced    Pacing:     Capture:  Complete  Ectopy:     Ectopy: none    QRS:     QRS axis:  Normal    QRS intervals:  Normal  Conduction:     Conduction: normal    ST segments:     ST segments:  Normal  T waves:     T waves: normal        Time reflects when diagnosis was documented in both MDM as applicable and the Disposition within this note     Time User Action Codes Description Comment    8/1/2022  6:12 PM Mat Licona Add [R06 2] Wheezing       ED Disposition     ED Disposition   Admit    Condition   Stable    Date/Time   Mon Aug 1, 2022  6:12 PM    Comment   Case was discussed with Dr Consuelo Vernon and the patient's admission status was agreed to be Admission Status: observation status to the service of Dr Consuelo Vernon              Follow-up Information    None

## 2022-08-02 ENCOUNTER — APPOINTMENT (OUTPATIENT)
Dept: VASCULAR ULTRASOUND | Facility: HOSPITAL | Age: 77
DRG: 204 | End: 2022-08-02
Payer: COMMERCIAL

## 2022-08-02 ENCOUNTER — APPOINTMENT (OUTPATIENT)
Dept: NUCLEAR MEDICINE | Facility: HOSPITAL | Age: 77
DRG: 204 | End: 2022-08-02
Payer: COMMERCIAL

## 2022-08-02 ENCOUNTER — APPOINTMENT (OUTPATIENT)
Dept: NON INVASIVE DIAGNOSTICS | Facility: HOSPITAL | Age: 77
DRG: 204 | End: 2022-08-02
Payer: COMMERCIAL

## 2022-08-02 PROBLEM — I50.9 CHF (CONGESTIVE HEART FAILURE) (HCC): Status: ACTIVE | Noted: 2022-08-02

## 2022-08-02 PROBLEM — M31.6 GIANT CELL ARTERITIS (HCC): Status: ACTIVE | Noted: 2022-08-02

## 2022-08-02 LAB
ALBUMIN SERPL BCP-MCNC: 2.9 G/DL (ref 3.5–5)
ALP SERPL-CCNC: 79 U/L (ref 34–104)
ALT SERPL W P-5'-P-CCNC: 3 U/L (ref 7–52)
ANION GAP SERPL CALCULATED.3IONS-SCNC: 4 MMOL/L (ref 4–13)
AORTIC ROOT: 3 CM
APICAL FOUR CHAMBER EJECTION FRACTION: 57 %
APTT PPP: 62 SECONDS (ref 23–37)
APTT PPP: >210 SECONDS (ref 23–37)
ASCENDING AORTA: 3.2 CM
AST SERPL W P-5'-P-CCNC: 9 U/L (ref 13–39)
ATRIAL RATE: 60 BPM
BASOPHILS # BLD AUTO: 0.02 THOUSANDS/ΜL (ref 0–0.1)
BASOPHILS NFR BLD AUTO: 0 % (ref 0–1)
BILIRUB SERPL-MCNC: 0.36 MG/DL (ref 0.2–1)
BUN SERPL-MCNC: 22 MG/DL (ref 5–25)
CALCIUM ALBUM COR SERPL-MCNC: 9.1 MG/DL (ref 8.3–10.1)
CALCIUM SERPL-MCNC: 8.2 MG/DL (ref 8.4–10.2)
CHLORIDE SERPL-SCNC: 110 MMOL/L (ref 96–108)
CO2 SERPL-SCNC: 26 MMOL/L (ref 21–32)
CREAT SERPL-MCNC: 1.65 MG/DL (ref 0.6–1.3)
E WAVE DECELERATION TIME: 224 MS
EOSINOPHIL # BLD AUTO: 0 THOUSAND/ΜL (ref 0–0.61)
EOSINOPHIL NFR BLD AUTO: 0 % (ref 0–6)
ERYTHROCYTE [DISTWIDTH] IN BLOOD BY AUTOMATED COUNT: 15.6 % (ref 11.6–15.1)
ERYTHROCYTE [SEDIMENTATION RATE] IN BLOOD: 28 MM/HOUR (ref 0–29)
FRACTIONAL SHORTENING: 25 % (ref 28–44)
GFR SERPL CREATININE-BSD FRML MDRD: 29 ML/MIN/1.73SQ M
GLUCOSE P FAST SERPL-MCNC: 112 MG/DL (ref 65–99)
GLUCOSE SERPL-MCNC: 112 MG/DL (ref 65–140)
GLUCOSE SERPL-MCNC: 115 MG/DL (ref 65–140)
GLUCOSE SERPL-MCNC: 161 MG/DL (ref 65–140)
GLUCOSE SERPL-MCNC: 77 MG/DL (ref 65–140)
GLUCOSE SERPL-MCNC: 87 MG/DL (ref 65–140)
HCT VFR BLD AUTO: 25.9 % (ref 34.8–46.1)
HGB BLD-MCNC: 8.1 G/DL (ref 11.5–15.4)
IMM GRANULOCYTES # BLD AUTO: 0.04 THOUSAND/UL (ref 0–0.2)
IMM GRANULOCYTES NFR BLD AUTO: 1 % (ref 0–2)
INTERVENTRICULAR SEPTUM IN DIASTOLE (PARASTERNAL SHORT AXIS VIEW): 1.3 CM
INTERVENTRICULAR SEPTUM: 1.3 CM (ref 0.6–1.1)
LAAS-AP2: 22.2 CM2
LAAS-AP4: 27.3 CM2
LEFT ATRIUM SIZE: 4.2 CM
LEFT INTERNAL DIMENSION IN SYSTOLE: 2.4 CM (ref 2.1–4)
LEFT VENTRICULAR INTERNAL DIMENSION IN DIASTOLE: 3.2 CM (ref 3.5–6)
LEFT VENTRICULAR POSTERIOR WALL IN END DIASTOLE: 1.2 CM
LEFT VENTRICULAR STROKE VOLUME: 19 ML
LVSV (TEICH): 19 ML
LYMPHOCYTES # BLD AUTO: 1.37 THOUSANDS/ΜL (ref 0.6–4.47)
LYMPHOCYTES NFR BLD AUTO: 21 % (ref 14–44)
MCH RBC QN AUTO: 28.7 PG (ref 26.8–34.3)
MCHC RBC AUTO-ENTMCNC: 31.3 G/DL (ref 31.4–37.4)
MCV RBC AUTO: 92 FL (ref 82–98)
MONOCYTES # BLD AUTO: 0.64 THOUSAND/ΜL (ref 0.17–1.22)
MONOCYTES NFR BLD AUTO: 10 % (ref 4–12)
MV E'TISSUE VEL-SEP: 9 CM/S
MV PEAK A VEL: 0.91 M/S
MV PEAK E VEL: 83 CM/S
MV STENOSIS PRESSURE HALF TIME: 65 MS
MV VALVE AREA P 1/2 METHOD: 3.38 CM2
NEUTROPHILS # BLD AUTO: 4.57 THOUSANDS/ΜL (ref 1.85–7.62)
NEUTS SEG NFR BLD AUTO: 68 % (ref 43–75)
NRBC BLD AUTO-RTO: 0 /100 WBCS
P AXIS: -24 DEGREES
PLATELET # BLD AUTO: 312 THOUSANDS/UL (ref 149–390)
PMV BLD AUTO: 10.2 FL (ref 8.9–12.7)
POTASSIUM SERPL-SCNC: 4.5 MMOL/L (ref 3.5–5.3)
PR INTERVAL: 250 MS
PROT SERPL-MCNC: 5.5 G/DL (ref 6.4–8.4)
QRS AXIS: -14 DEGREES
QRSD INTERVAL: 86 MS
QT INTERVAL: 448 MS
QTC INTERVAL: 448 MS
RA PRESSURE ESTIMATED: 8 MMHG
RBC # BLD AUTO: 2.82 MILLION/UL (ref 3.81–5.12)
RIGHT ATRIUM AREA SYSTOLE A4C: 18 CM2
RIGHT VENTRICLE ID DIMENSION: 4.5 CM
RV PSP: 41 MMHG
SL CV LEFT ATRIUM LENGTH A2C: 6.9 CM
SL CV LV EF: 60
SL CV PED ECHO LEFT VENTRICLE DIASTOLIC VOLUME (MOD BIPLANE) 2D: 40 ML
SL CV PED ECHO LEFT VENTRICLE SYSTOLIC VOLUME (MOD BIPLANE) 2D: 21 ML
SODIUM SERPL-SCNC: 140 MMOL/L (ref 135–147)
T WAVE AXIS: 4 DEGREES
TR MAX PG: 33 MMHG
TR PEAK VELOCITY: 2.9 M/S
TRICUSPID VALVE PEAK REGURGITATION VELOCITY: 2.89 M/S
VENTRICULAR RATE: 60 BPM
WBC # BLD AUTO: 6.64 THOUSAND/UL (ref 4.31–10.16)

## 2022-08-02 PROCEDURE — 78582 LUNG VENTILAT&PERFUS IMAGING: CPT

## 2022-08-02 PROCEDURE — 82948 REAGENT STRIP/BLOOD GLUCOSE: CPT

## 2022-08-02 PROCEDURE — 85730 THROMBOPLASTIN TIME PARTIAL: CPT | Performed by: PHYSICIAN ASSISTANT

## 2022-08-02 PROCEDURE — 93970 EXTREMITY STUDY: CPT | Performed by: SURGERY

## 2022-08-02 PROCEDURE — 80053 COMPREHEN METABOLIC PANEL: CPT

## 2022-08-02 PROCEDURE — 93306 TTE W/DOPPLER COMPLETE: CPT | Performed by: INTERNAL MEDICINE

## 2022-08-02 PROCEDURE — 93306 TTE W/DOPPLER COMPLETE: CPT

## 2022-08-02 PROCEDURE — A9540 TC99M MAA: HCPCS

## 2022-08-02 PROCEDURE — A9558 XE133 XENON 10MCI: HCPCS

## 2022-08-02 PROCEDURE — 93970 EXTREMITY STUDY: CPT

## 2022-08-02 PROCEDURE — G1004 CDSM NDSC: HCPCS

## 2022-08-02 PROCEDURE — 93010 ELECTROCARDIOGRAM REPORT: CPT | Performed by: INTERNAL MEDICINE

## 2022-08-02 PROCEDURE — 85730 THROMBOPLASTIN TIME PARTIAL: CPT

## 2022-08-02 PROCEDURE — 85025 COMPLETE CBC W/AUTO DIFF WBC: CPT

## 2022-08-02 PROCEDURE — 99232 SBSQ HOSP IP/OBS MODERATE 35: CPT | Performed by: INTERNAL MEDICINE

## 2022-08-02 PROCEDURE — 85652 RBC SED RATE AUTOMATED: CPT

## 2022-08-02 RX ORDER — HEPARIN SODIUM 5000 [USP'U]/ML
5000 INJECTION, SOLUTION INTRAVENOUS; SUBCUTANEOUS EVERY 8 HOURS SCHEDULED
Status: DISCONTINUED | OUTPATIENT
Start: 2022-08-02 | End: 2022-08-02

## 2022-08-02 RX ORDER — FUROSEMIDE 10 MG/ML
40 INJECTION INTRAMUSCULAR; INTRAVENOUS ONCE
Status: COMPLETED | OUTPATIENT
Start: 2022-08-02 | End: 2022-08-02

## 2022-08-02 RX ADMIN — AMLODIPINE BESYLATE 5 MG: 5 TABLET ORAL at 08:28

## 2022-08-02 RX ADMIN — PRAVASTATIN SODIUM 40 MG: 40 TABLET ORAL at 16:55

## 2022-08-02 RX ADMIN — CARVEDILOL 25 MG: 12.5 TABLET, FILM COATED ORAL at 21:06

## 2022-08-02 RX ADMIN — ACETAMINOPHEN 975 MG: 325 TABLET ORAL at 05:02

## 2022-08-02 RX ADMIN — Medication 2000 UNITS: at 08:28

## 2022-08-02 RX ADMIN — FUROSEMIDE 40 MG: 10 INJECTION, SOLUTION INTRAMUSCULAR; INTRAVENOUS at 14:39

## 2022-08-02 RX ADMIN — FLUTICASONE PROPIONATE 1 SPRAY: 50 SPRAY, METERED NASAL at 08:28

## 2022-08-02 RX ADMIN — INSULIN GLARGINE 10 UNITS: 100 INJECTION, SOLUTION SUBCUTANEOUS at 21:14

## 2022-08-02 RX ADMIN — ACETAMINOPHEN 975 MG: 325 TABLET ORAL at 21:06

## 2022-08-02 RX ADMIN — ACETAMINOPHEN 975 MG: 325 TABLET ORAL at 14:40

## 2022-08-02 RX ADMIN — INSULIN LISPRO 2 UNITS: 100 INJECTION, SOLUTION INTRAVENOUS; SUBCUTANEOUS at 12:42

## 2022-08-02 RX ADMIN — GABAPENTIN 100 MG: 100 CAPSULE ORAL at 21:06

## 2022-08-02 RX ADMIN — RANOLAZINE 500 MG: 500 TABLET, EXTENDED RELEASE ORAL at 08:27

## 2022-08-02 RX ADMIN — ASPIRIN 81 MG CHEWABLE TABLET 81 MG: 81 TABLET CHEWABLE at 08:28

## 2022-08-02 RX ADMIN — CARVEDILOL 25 MG: 12.5 TABLET, FILM COATED ORAL at 08:28

## 2022-08-02 RX ADMIN — FOLIC ACID TAB 400 MCG 400 MCG: 400 TAB at 08:27

## 2022-08-02 NOTE — ASSESSMENT & PLAN NOTE
Lab Results   Component Value Date    EGFR 29 08/02/2022    EGFR 24 08/01/2022    EGFR 20 07/27/2022    CREATININE 1 65 (H) 08/02/2022    CREATININE 1 91 (H) 08/01/2022    CREATININE 2 29 (H) 07/27/2022   Patient had recent SAHIL during recent hospitalization  Recent baseline appears to be 1 7-1 9    POA crea 1 91  Avoid nephrotoxins

## 2022-08-02 NOTE — ASSESSMENT & PLAN NOTE
Detail Level: Detailed Patient home meds:  Carvedilol 25 mg  Came in the ED with blood pressure of 186/76  Per conversation w/ son the BL BP is 165/80  PLAN:   Will start amlodipine Add 57544 Cpt? (Important Note: In 2017 The Use Of 19444 Is Being Tracked By Cms To Determine Future Global Period Reimbursement For Global Periods): no

## 2022-08-02 NOTE — ASSESSMENT & PLAN NOTE
Patient home meds:  Carvedilol 25 mg  Came in the ED with blood pressure of 186/76  Will start amlodipine  Will give prn hydralazine for SBP >180  Will call son to confirm baseline BP  Lasix 40 trial

## 2022-08-02 NOTE — ASSESSMENT & PLAN NOTE
Pt CBC on presentation was lower than patients baseline BL  The only other time in the patient chart when CBC values were this low was [id number] during L hip replacement surgury when patient became anemic secodary to blood loss and was transfused    Hgb   BL 9 9-10 9:   43HAP9959: <9 0   87XXY4481 8 6   70HXI4121 8 1  RBC   BL 3 6-4 05:    86CRX5659 <3 33   64UTC8171 3 08,   51BBY2397 2 82  HCT   BL 32 8 to 34 5  90OKX6730<30   75KWF6807 28 3  15XXE8214 25 9     MCV 92 on 02AUG2022    PLAN: Work up anemia as potential etiology of SOB    -CBC ordered for 03AUG2022  -As MCV 92 on 02AUG2022 Folate and B12 MMA levels were ordered

## 2022-08-02 NOTE — ASSESSMENT & PLAN NOTE
Lab Results   Component Value Date    EGFR 29 08/02/2022    EGFR 24 08/01/2022    EGFR 20 07/27/2022    CREATININE 1 65 (H) 08/02/2022    CREATININE 1 91 (H) 08/01/2022    CREATININE 2 29 (H) 07/27/2022   Patient had recent SAHIL during recent hospitalization  Recent baseline appears to be 1 7-1 9    POA crea 1 91  Avoid nephrotoxins  Will continue to monitor for now  BMP Ordered for [id number]

## 2022-08-02 NOTE — ASSESSMENT & PLAN NOTE
Lab Results   Component Value Date    EGFR 24 08/01/2022    EGFR 20 07/27/2022    EGFR 24 06/30/2022    CREATININE 1 91 (H) 08/01/2022    CREATININE 2 29 (H) 07/27/2022    CREATININE 1 97 (H) 06/30/2022   Patient had recent SAHIL during recent hospitalization  Recent baseline appears to be 1 7-1 9    POA crea 1 91  Avoid nephrotoxins  Will continue to monitor for now

## 2022-08-02 NOTE — ASSESSMENT & PLAN NOTE
Pt on rivaroxaban at home  Heprin was given but held dut to elevated PTT on 02AUG2022  PLAN  -Orders to redraw PTT were placed 02AUG2022  -PTT was found to be w/in therapeutic range for heprin 62   -Heprin restarted at 18:30 on 02AUG2022

## 2022-08-02 NOTE — ASSESSMENT & PLAN NOTE
Patient had 1 day history of acute shortness of breath described as wheezing  Recent left femoral fracture status post nailing via Orthopedics on 06/24     Workups:  BNP elevated at 957, D-dimer 2 67  Left calf tenderness  However patient is also not tachycardic, not hypoxic currently on room air  Plan will try to rule out PE   Echo, VQ scan were negative  Lower leg vascular studies negative   Medrol p r n   Every 6 hours  Respiratory protocol  Heparin drip halted due to elevated PTT restarted same day [id number]

## 2022-08-02 NOTE — ASSESSMENT & PLAN NOTE
Wt Readings from Last 3 Encounters:   08/02/22 89 4 kg (197 lb)   07/22/22 88 9 kg (196 lb)   07/21/22 88 kg (194 lb)       Pt presents w/ SOB, HTN, and subjective wheeze  BNP was elevated  PE was R/O w/ negative V/Q and LE US Doppler  PE was negative for LE edema, negative for wheezing/rales/ronchi  ECHO [id number] negative for reduced EF, diskensis, low filling volumes  PLAN:  -Walking Desat trial q 12 hrs  -Lasix 40 trial  -CBC BMP ordered for [id number]  -Follow Cardio Outpatient

## 2022-08-02 NOTE — ASSESSMENT & PLAN NOTE
Lab Results   Component Value Date    HGBA1C 7 1 (H) 02/18/2022       No results for input(s): POCGLU in the last 72 hours      Blood Sugar Average: Last 72 hrs:   continue home dose Lantus

## 2022-08-02 NOTE — QUICK NOTE
I called the son and had a 30 min conversation about their mother we covered the following topics:  -Pt is being followed by Dr Ronald Sebastian PCP who manages all the patients conditions including HTN, anemia, and breathing issues  -The son manages the patient treatments  -The son takes the patients BP readings and noted Baseline systolic is 933 and never seen above 180 on home readings    -The son stated that normally post hip surgery June2022 Ms Magda Mooney (our patient) can walk 30 yards w/ help of walker and would be out of breath but able to catch her breath relatively quickly upon rest   The son noted that on 01AUG2022 the patient walked 6-7 steps was out of breath, wheezing, panting, and couldn't catch her breath resting  The son left for work and had to returned home when their spouse called to say Ms Magda Mooney still was SOB  That is what prompted the visit to the ED  The son emphasizes that although the treating physicians did not appreciate a wheeze on pulmonary exam they heard audible "wheezing" at home and in the ED  -The episode was witnessed by the physical therapist "gladys" who works with [employer] and may be a good resource for input    -Son is working on reorganizing the house to make it more manageable and to be able to get Ms Magda Mooney to the ED faster if needed  Case Mangement consult may be helpful    -Son is working on [address] [employer] in [place] as well as [id number] but is available to  the patient on 05AUG2022  -Son was make aware of the order for Pulm Func tests that can be done outpatient    -Time was left for all questions to be answered

## 2022-08-02 NOTE — ASSESSMENT & PLAN NOTE
Lab Results   Component Value Date    HGBA1C 7 1 (H) 02/18/2022       Recent Labs     08/01/22  2156 08/02/22  0655 08/02/22  1100   POCGLU 173* 115 161*       Blood Sugar Average: Last 72 hrs:  (P) 529 [id number] continue home dose Lantus

## 2022-08-02 NOTE — H&P
[facility]  H&P- Joceline Screws 1945, 68 y o  female MRN: [id number]  Unit/Bed#: LORAINE Encounter: [id number]  Primary Care Provider: Poppy Bernal MD   Date and time admitted to hospital: 8/1/2022  1:11 PM    * Elevated d-dimer  Assessment & Plan  Patient had 1 day history of acute shortness of breath described as wheezing  Recent left femoral fracture status post nailing via Orthopedics on 06/24     Workups:  BNP elevated at 957, D-dimer 0 67  Left calf tenderness  However patient is also not tachycardic, not hypoxic currently on room air  Plan will try to rule out PE   Echo, VQ scan  Lower leg vascular studies  Medrol p r n  Every 6 hours  Respiratory protocol  Heparin drip      Hypertension  Assessment & Plan  Patient home meds:  Carvedilol 25 mg  Came in the ED with blood pressure of 186/76  Will start amlodipine  Will give prn hydralazine for SBP >180    Diabetes mellitus type 2, insulin dependent (HCC)  Assessment & Plan  Lab Results   Component Value Date    HGBA1C 7 1 (H) 02/18/2022       No results for input(s): POCGLU in the last 72 hours  Blood Sugar Average: Last 72 hrs:   continue home dose Lantus      Stage 3b chronic kidney disease [facility])  Assessment & Plan  Lab Results   Component Value Date    EGFR 24 08/01/2022    EGFR 20 07/27/2022    EGFR 24 06/30/2022    CREATININE 1 91 (H) 08/01/2022    CREATININE 2 29 (H) 07/27/2022    CREATININE 1 97 (H) 06/30/2022   Patient had recent SAHIL during recent hospitalization  Recent baseline appears to be 1 7-1 9  POA crea 1 91  Avoid nephrotoxins  Will continue to monitor for now    VTE Pharmacologic Prophylaxis: VTE Score: 13 High Risk (Score >/= 5) - Pharmacological DVT Prophylaxis Ordered: heparin drip  Sequential Compression Devices Ordered  Code Status: Prior Level 1 full code  Discussion with family: Updated  (son) at bedside      Anticipated Length of Stay: Patient will be admitted on an observation basis with an anticipated length of stay of less than 2 midnights secondary to PE rule out  Chief Complaint:  Acute shortness of breath    History of Present Illness:  Sergio Santo is a 68 y o  female with a PMH of recent fracture on 06/24/2022, 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, hypertension, hyperlipidemia, diabetes on insulin, who presents with wheezing  Now on the day of consult around 5:00 a m  When the patient woke up, patient had markedly increased shortness of breath  Denies any fevers any coughs,  Shortness of breath got better and 10:00 a m  However still was noted to be weak and short of breath at rest   Patient consulted the emergency department where she was found have a blood pressure of 187/76, D-dimer was elevated at 2 67, creatinine was elevated at 1 91 which is around the patient's baseline  BNP was elevated at 957  Due to high suspicion of PE patient was admitted  Patient does note left calf pain    Review of Systems:  Review of Systems   Constitutional: Negative for chills and fever  HENT: Negative for ear pain and sore throat  Eyes: Negative for pain and visual disturbance  Respiratory: Negative for cough and shortness of breath  Cardiovascular: Negative for chest pain and palpitations  Gastrointestinal: Negative for abdominal pain and vomiting  Genitourinary: Negative for dysuria and hematuria  Musculoskeletal: Negative for arthralgias and back pain  Skin: Negative for color change and rash  Neurological: Negative for seizures and syncope  All other systems reviewed and are negative        Past Medical and Surgical History:   Past Medical History:   Diagnosis Date    [facility]     Chronic anemia     Chronic kidney disease     Colonoscopy refused 11/2019    pt declines    Dyslipidemia     Hypertension     Mammogram declined 09/2017    Proteinuria     Tachy-sarah beth syndrome ([facility] [address] ) 6/24/2022       Past Surgical History:   Procedure Laterality Date    CARDIAC PACEMAKER PLACEMENT      CATARACT EXTRACTION       SECTION      CHOLECYSTECTOMY      CORONARY ANGIOPLASTY WITH STENT PLACEMENT      EYE SURGERY      MAMMO (HISTORICAL)      Pt states she will not ever have again- 2017    MD OPEN RX FEMUR FX+INTRAMED RIMA Left 2022    Procedure: INSERTION NAIL IM FEMUR ANTEGRADE (TROCHANTERIC); Surgeon: Arpan Zamorano DO;  Location: [facility];  Service: Orthopedics    TUBAL LIGATION         Meds/Allergies:  Prior to Admission medications    Medication Sig Start Date End Date Taking?  Authorizing Provider   acetaminophen (TYLENOL) 325 mg tablet Take 3 tablets (975 mg total) by mouth every 8 (eight) hours 22   Bernadette Gil PA-C   aspirin 81 mg chewable tablet Chew 81 mg daily    Historical Provider, MD LEON UF III MINI PEN NEEDLES 31G X 5 MM MISC Use to test qd 22   Anson Champion MD   carvedilol (COREG) 25 mg tablet Take 25 mg by mouth every 12 (twelve) hours  20   Historical Provider, MD   Cholecalciferol (Vitamin D) 50 MCG (2000 UT) tablet Take 1 tablet (2,000 Units total) by mouth daily 11/10/20   Anson Champion MD   fluticasone [facility]) 50 mcg/act nasal spray 1 spray into each nostril daily 11/3/21   Mary Carmen Hendricks PA-C   folic acid (FOLVITE) 015 mcg tablet Take 400 mcg by mouth daily    Historical Provider, MD   gabapentin (NEURONTIN) 100 mg capsule Take 1 capsule (100 mg total) by mouth daily at bedtime 22   Adam Maciel PA-C   insulin glargine (Lantus SoloStar) 100 units/mL injection pen Inject 20 Units under the skin daily at bedtime 21   Anson Champion MD   meclizine (ANTIVERT) 12 5 MG tablet Take by mouth every 8 (eight) hours as needed for dizziness    Historical Provider, MD   pravastatin (PRAVACHOL) 40 mg tablet Take 1 tablet (40 mg total) by mouth daily 3/8/22   Anson Champion MD   ranolazine (RANEXA) 500 mg 12 hr tablet daily   20   Historical Provider, MD rivaroxaban (XARELTO) 15 mg tablet Take 15 mg by mouth daily    Historical Provider, MD   bisacodyl (DULCOLAX) 10 mg suppository Insert 1 suppository (10 mg total) into the rectum daily as needed for constipation 6/30/22 8/1/22  Bernadette Maciel PA-C   folic acid (FOLVITE) 757 mcg tablet Take 400 mcg by mouth daily  8/1/22  Historical Provider, MD   NIFEdipine ER (ADALAT CC) 60 MG 24 hr tablet Take 60 mg by mouth daily  8/1/22  Historical Provider, MD   oxyCODONE (ROXICODONE) 5 immediate release tablet 2 5 mg to 5 mg p o  Every 4 hours as needed for moderate to severe pain  Ongoing therapy  6/30/22 8/1/22  Bernadette Elliott PA-C     I have reviewed home medications with patient personally  Allergies: No Known Allergies    Social History:  Marital Status:    Occupation: None  Patient Pre-hospital Living Situation: Home  Patient Pre-hospital Level of Mobility: unable to be assessed at time of evaluation  Patient Pre-hospital Diet Restrictions: None  Substance Use History:   Social History     Substance and Sexual Activity   Alcohol Use Never     Social History     Tobacco Use   Smoking Status Never Smoker   Smokeless Tobacco Never Used     Social History     Substance and Sexual Activity   Drug Use Never       Family History:  Family History   Problem Relation Age of Onset    Diabetes Father     Hypertension Father     Heart disease Father     Cancer Brother         smoker    Diabetes Other     Heart attack Other     Hypertension Other     Asthma Other        Physical Exam:     Vitals:   Blood Pressure: (!) 197/80 (08/01/22 2000)  Pulse: 64 (08/01/22 2000)  Temperature: 98 1 °F (36 7 °C) (08/01/22 1320)  Temp Source: Oral (08/01/22 1320)  Respirations: 18 (08/01/22 2000)  Weight - Scale: 89 8 kg (197 lb 15 6 oz) (08/01/22 1805)  SpO2: 96 % (08/01/22 2000)    Physical Exam  Vitals and nursing note reviewed  Constitutional:       General: She is not in acute distress  Appearance: She is well-developed  She is obese  Comments: Patient is not short of breath, currently on room air  Not tachycardic on vital signs review   HENT:      Head: Normocephalic and atraumatic  Nose: Nose normal       Mouth/Throat:      Mouth: Mucous membranes are moist    Eyes:      Conjunctiva/sclera: Conjunctivae normal    Cardiovascular:      Rate and Rhythm: Normal rate and regular rhythm  Heart sounds: No murmur heard  Pulmonary:      Effort: Pulmonary effort is normal  No respiratory distress  Breath sounds: Normal breath sounds  Abdominal:      Palpations: Abdomen is soft  Tenderness: There is no abdominal tenderness  Musculoskeletal:      Cervical back: Neck supple  Right lower leg: No edema  Left lower leg: No edema  Comments: Left calf tenderness noted, no erythema   Skin:     General: Skin is warm and dry  Capillary Refill: Capillary refill takes less than 2 seconds  Neurological:      General: No focal deficit present  Mental Status: She is alert and oriented to person, place, and time     Psychiatric:         Mood and Affect: Mood normal           Additional Data:     Lab Results:  Results from last 7 days   Lab Units 08/01/22  1434   WBC Thousand/uL 7 97   HEMOGLOBIN g/dL 8 6*   HEMATOCRIT % 28 3*   PLATELETS Thousands/uL 349   NEUTROS PCT % 77*   LYMPHS PCT % 14   MONOS PCT % 8   EOS PCT % 0     Results from last 7 days   Lab Units 08/01/22  1434   SODIUM mmol/L 138   POTASSIUM mmol/L 4 9   CHLORIDE mmol/L 105   CO2 mmol/L 28   BUN mg/dL 26*   CREATININE mg/dL 1 91*   ANION GAP mmol/L 5   CALCIUM mg/dL 8 4   ALBUMIN g/dL 3 3*   TOTAL BILIRUBIN mg/dL 0 42   ALK PHOS U/L 91   ALT U/L 5*   AST U/L 11*   GLUCOSE RANDOM mg/dL 196*     Results from last 7 days   Lab Units 08/01/22  1834   INR  2 07*                   Imaging: Reviewed radiology reports from this admission including: chest xray  X-ray chest 1 view portable   ED Interpretation by Philipp Friend MD (08/01 1400)   This film was interpreted independently by me  No infiltrate, cardiac silhouette normal, no pleural effusions or pulmonary edema  Final Result by Cedric Hamilton MD (08/01 1659)   Linear density seen left lower lung due to atelectasis   No acute consolidation or congestion            Workstation performed: [id number]         Nuclear Medicine ED order    (Results Pending)       EKG and Other Studies Reviewed on Admission:   · EKG: NSR  HR 80     ** Please Note: This note has been constructed using a voice recognition system   **

## 2022-08-02 NOTE — ASSESSMENT & PLAN NOTE
Patient had 1 day history of acute shortness of breath described as wheezing  Recent left femoral fracture status post nailing via Orthopedics on 06/24     Workups:  BNP elevated at 957, D-dimer 0 67  Left calf tenderness  However patient is also not tachycardic, not hypoxic currently on room air  Plan will try to rule out PE   Echo, VQ scan  Lower leg vascular studies  Medrol p r n   Every 6 hours  Respiratory protocol  Heparin drip

## 2022-08-02 NOTE — ASSESSMENT & PLAN NOTE
Patient endorsed left face pain that radiates to the left eye and left jaw  PLAN: R/O Giant cell arteritis  -Low suspicion   -ESR ordered  -If positive start high dose dexamethasone and biopsy

## 2022-08-02 NOTE — ASSESSMENT & PLAN NOTE
Wt Readings from Last 3 Encounters:   08/02/22 89 4 kg (197 lb)   07/22/22 88 9 kg (196 lb)   07/21/22 88 kg (194 lb)       Pt presents w/ SOB, HTN, and subjective wheeze  BNP was elevated  PE was R/O w/ negative V/Q and LE US Doppler  PE was negative for LE edema, negative for wheezing/rales/ronchi  ECHO 02AUG2022 negative for reduced EF, diskensis, low filling volumes  Walking trial [id number]: sPO2 walking 97-98%  PLAN:  -PFT order for outpatient submitted 02AUG2022  -Follow Cardio Outpatient

## 2022-08-02 NOTE — PLAN OF CARE
Problem: MOBILITY - ADULT  Goal: Maintain or return to baseline ADL function  Description: INTERVENTIONS:  -  Assess patient's ability to carry out ADLs; assess patient's baseline for ADL function and identify physical deficits which impact ability to perform ADLs (bathing, care of mouth/teeth, toileting, grooming, dressing, etc )  - Assess/evaluate cause of self-care deficits   - Assess range of motion  - Assess patient's mobility; develop plan if impaired  - Assess patient's need for assistive devices and provide as appropriate  - Encourage maximum independence but intervene and supervise when necessary  - Involve family in performance of ADLs  - Assess for home care needs following discharge   - Consider OT consult to assist with ADL evaluation and planning for discharge  - Provide patient education as appropriate  Outcome: Progressing     Problem: CARDIOVASCULAR - ADULT  Goal: Maintains optimal cardiac output and hemodynamic stability  Description: INTERVENTIONS:  - Monitor I/O, vital signs and rhythm  - Monitor for S/S and trends of decreased cardiac output  - Administer and titrate ordered vasoactive medications to optimize hemodynamic stability  - Assess quality of pulses, skin color and temperature  - Assess for signs of decreased coronary artery perfusion  - Instruct patient to report change in severity of symptoms  Outcome: Progressing     Problem: CARDIOVASCULAR - ADULT  Goal: Absence of cardiac dysrhythmias or at baseline rhythm  Description: INTERVENTIONS:  - Continuous cardiac monitoring, vital signs, obtain 12 lead EKG if ordered  - Administer antiarrhythmic and heart rate control medications as ordered  - Monitor electrolytes and administer replacement therapy as ordered  Outcome: Progressing     Problem: DISCHARGE PLANNING  Goal: Discharge to home or other facility with appropriate resources  Description: INTERVENTIONS:  - Identify barriers to discharge w/patient and caregiver  - Arrange for needed discharge resources and transportation as appropriate  - Identify discharge learning needs (meds, wound care, etc )  - Arrange for interpretive services to assist at discharge as needed  - Refer to Case Management Department for coordinating discharge planning if the patient needs post-hospital services based on physician/advanced practitioner order or complex needs related to functional status, cognitive ability, or social support system  Outcome: Progressing     Problem: Knowledge Deficit  Goal: Patient/family/caregiver demonstrates understanding of disease process, treatment plan, medications, and discharge instructions  Description: Complete learning assessment and assess knowledge base    Interventions:  - Provide teaching at level of understanding  - Provide teaching via preferred learning methods  Outcome: Progressing

## 2022-08-02 NOTE — PROGRESS NOTES
[facility]  Progress Note - Dat Ortega 1945, 68 y o  female MRN: [id number]  Unit/Bed#: S -01 Encounter: [id number]  Primary Care Provider: Jonathna Raman MD   Date and time admitted to hospital: 8/1/2022  1:11 PM    * CHF (congestive heart failure) [facility])  Assessment & Plan  Wt Readings from Last 3 Encounters:   08/02/22 89 4 kg (197 lb)   07/22/22 88 9 kg (196 lb)   07/21/22 88 kg (194 lb)       Pt presents w/ SOB, HTN, and subjective wheeze  BNP was elevated  PE was R/O w/ negative V/Q and LE US Doppler  PE was negative for LE edema, negative for wheezing/rales/ronchi  ECHO 02AUG2022 negative for reduced EF, diskensis, low filling volumes  PLAN:  -Walking Desat trial q 12 hrs  -Lasix 40 trial  -CBC BMP ordered for [id number]  -Follow Cardio Outpatient  Hypertension  Assessment & Plan  Patient home meds:  Carvedilol 25 mg  Came in the ED with blood pressure of 186/76  Will start amlodipine  Will give prn hydralazine for SBP >180  Will call son to confirm baseline BP  Lasix 40 trial    Stage 3b chronic kidney disease [facility])  Assessment & Plan  Lab Results   Component Value Date    EGFR 29 08/02/2022    EGFR 24 08/01/2022    EGFR 20 07/27/2022    CREATININE 1 65 (H) 08/02/2022    CREATININE 1 91 (H) 08/01/2022    CREATININE 2 29 (H) 07/27/2022   Patient had recent SAHIL during recent hospitalization  Recent baseline appears to be 1 7-1 9    POA crea 1 91  Avoid nephrotoxins  Will continue to monitor for now  BMP Ordered for [id number]    Diabetes mellitus type 2, insulin dependent [facility])  Assessment & Plan  Lab Results   Component Value Date    HGBA1C 7 1 (H) 02/18/2022       Recent Labs     08/01/22  2156 08/02/22  0655 08/02/22  1100   POCGLU 173* 115 161*       Blood Sugar Average: Last 72 hrs:  (P) 330 [id number] continue home dose Lantus      Anemia of chronic kidney failure, stage 4 (severe) (HCC)  Assessment & Plan  Pt CBC on presentation was lower than patients baseline BL  The only other time in the patient chart when CBC values were this low was [id number] during L hip replacement surgury when patient became anemic secodary to blood loss and was transfused  Hgb   BL 9 9-10 9:   [id number]: <9 0   51MOD9091 8 6   77NTX1674 8 1  RBC   BL 3 6-4 05:    28AFF6663 <3 33   40RPI3040 3 08,   65LTN0600 2 82  HCT   BL 32 8 to 34 5  83LIX6917<30   05MIV3357 28 3  87LVJ5622 25 9     MCV 92 on 02AUG2022    PLAN: Work up anemia as potential etiology of SOB    -CBC ordered for 03AUG2022  -As MCV 92 on 02AUG2022 Folate and B12 MMA levels were ordered      Atrial fibrillation (HCC)  Assessment & Plan  Pt on rivaroxaban at home  Heprin was given but held dut to elevated PTT on 02AUG2022  PLAN  -Orders to redraw PTT were placed 02AUG2022  -PTT was found to be w/in therapeutic range for heprin 62   -Heprin restarted at 18:30 on 02AUG2022  Giant cell arteritis [facility])  Assessment & Plan  Patient endorsed left face pain that radiates to the left eye and left jaw  PLAN: R/O Giant cell arteritis  -Low suspicion   -ESR ordered  -If positive start high dose dexamethasone and biopsy  Elevated d-dimer  Assessment & Plan  Patient had 1 day history of acute shortness of breath described as wheezing  Recent left femoral fracture status post nailing via Orthopedics on 06/24     Workups:  BNP elevated at 957, D-dimer 2 67  Left calf tenderness  However patient is also not tachycardic, not hypoxic currently on room air  Plan will try to rule out PE   Echo, VQ scan were negative  Lower leg vascular studies negative   Medrol p r n  Every 6 hours  Respiratory protocol  Heparin drip halted due to elevated PTT restarted same day 02AUG2022          VTE Pharmacologic Prophylaxis: VTE Score: 13 High Risk (Score >/= 5) - Pharmacological DVT Prophylaxis Ordered: heparin  Sequential Compression Devices Ordered      Patient Centered Rounds: I performed bedside rounds with nursing staff today   Discussions with Specialists or Other Care Team Provider: None    Education and Discussions with Family / Patient: Updated  (son) via phone  Current Length of Stay: 0 day(s)  Current Patient Status: Inpatient   Discharge Plan: Anticipate discharge in 24-48 hrs to home  Must work with case management to arrange transportation as patient is blind  Code Status: Level 1 - Full Code    Subjective:   Patient was seen at bedside and endorsed feeling better  Pt wants to go home as son is leaving for work tomorrow and not available for pickup  Pt notes that BP is under control and that the son takes care of the medications  Patient also noted left eye pain, left jaw pain  Objective:     Vitals:   Temp (24hrs), Av 9 °F (36 6 °C), Min:97 7 °F (36 5 °C), Max:98 °F (36 7 °C)    Temp:  [97 7 °F (36 5 °C)-98 °F (36 7 °C)] 97 7 °F (36 5 °C)  HR:  [60-65] 60  Resp:  [18] 18  BP: (112-197)/(60-80) 112/60  SpO2:  [94 %-98 %] 98 %  Body mass index is 34 9 kg/m²  Input and Output Summary (last 24 hours): Intake/Output Summary (Last 24 hours) at 2022 1847  Last data filed at 2022 1100  Gross per 24 hour   Intake 120 ml   Output 600 ml   Net -480 ml       Physical Exam:   Physical Exam  Constitutional:       General: She is not in acute distress  Appearance: She is obese  She is not ill-appearing, toxic-appearing or diaphoretic  HENT:      Head: Normocephalic  Nose: Nose normal       Mouth/Throat:      Mouth: Mucous membranes are moist       Pharynx: Oropharynx is clear  Eyes:      General: No scleral icterus  Comments: Patient is blind in both eyes  Cardiovascular:      Rate and Rhythm: Normal rate  Pulses: Normal pulses  Pulmonary:      Effort: Pulmonary effort is normal       Breath sounds: Normal breath sounds  Skin:     General: Skin is warm  Coloration: Skin is not jaundiced  Neurological:      General: No focal deficit present  Mental Status: She is alert and oriented to person, place, and time  Psychiatric:         Mood and Affect: Mood normal          Behavior: Behavior normal          Thought Content: Thought content normal          Judgment: Judgment normal           Additional Data:     Labs:  Results from last 7 days   Lab Units 08/02/22  0446   WBC Thousand/uL 6 64   HEMOGLOBIN g/dL 8 1*   HEMATOCRIT % 25 9*   PLATELETS Thousands/uL 312   NEUTROS PCT % 68   LYMPHS PCT % 21   MONOS PCT % 10   EOS PCT % 0     Results from last 7 days   Lab Units 08/02/22  0556   SODIUM mmol/L 140   POTASSIUM mmol/L 4 5   CHLORIDE mmol/L 110*   CO2 mmol/L 26   BUN mg/dL 22   CREATININE mg/dL 1 65*   ANION GAP mmol/L 4   CALCIUM mg/dL 8 2*   ALBUMIN g/dL 2 9*   TOTAL BILIRUBIN mg/dL 0 36   ALK PHOS U/L 79   ALT U/L 3*   AST U/L 9*   GLUCOSE RANDOM mg/dL 112     Results from last 7 days   Lab Units 08/01/22  1834   INR  2 07*     Results from last 7 days   Lab Units 08/02/22  1551 08/02/22  1100 08/02/22  0655 08/01/22  2156   POC GLUCOSE mg/dl 87 161* 115 173*               Lines/Drains:  Invasive Devices  Report    Peripheral Intravenous Line  Duration           Peripheral IV 08/01/22 Left Antecubital 1 day          Drain  Duration           Urethral Catheter Latex 16 Fr  34 days              Urinary Catheter:  Goal for removal: Remove after 48 hrs of I/O monitoring           Telemetry:  Telemetry Orders (From admission, onward)             24 Hour Telemetry Monitoring  Continuous x 24 Hours (Telem)        Expiring   References:    Telemetry Guidelines   Question:  Reason for 24 Hour Telemetry  Answer:  Pulmonary Embolism - 24 hours without resp  compromise, dysrhythmias, hemodynamically stable                 Telemetry Reviewed: Normal Sinus Rhythm  Indication for Continued Telemetry Use: No indication for continued use  Will discontinue                Imaging: Reviewed radiology reports from this admission including: chest xray    Recent Cultures (last 7 days):         Last 24 Hours Medication List:   Current Facility-Administered Medications   Medication Dose Route Frequency Provider Last Rate    acetaminophen  975 mg Oral [facility] Marian Triana MD      albuterol  2 puff Inhalation Q4H PRN Marian Triana MD      amLODIPine  5 mg Oral Daily Marian Triana MD      aspirin  81 mg Oral Daily Marian Triana MD      carvedilol  25 mg Oral Q12H John Hanna MD      cholecalciferol  2,000 Units Oral Daily Marian Triana MD      fluticasone  1 spray Nasal Daily Marian Triana MD      folic acid  504 mcg Oral Daily Marian Triana MD      gabapentin  100 mg Oral HS Marian Triana MD      heparin (porcine)  5,000 Units Subcutaneous [facility] Krishan Bourgeois MD      hydrALAZINE  10 mg Intravenous Q6H PRN Marian Triana MD      insulin glargine  20 Units Subcutaneous HS Marian Triana MD      insulin lispro  2-12 Units Subcutaneous TID [facility] Marian Triana MD      meclizine  12 5 mg Oral Q8H PRN Marian Triana MD      pravastatin  40 mg Oral Daily With Renée Fernandez MD      ranolazine  500 mg Oral Daily Marian Triana MD          Today, Patient Was Seen By: Krishan Bourgeois MD    **Please Note: This note may have been constructed using a voice recognition system  **

## 2022-08-02 NOTE — ASSESSMENT & PLAN NOTE
Patient home meds:  Carvedilol 25 mg  Came in the ED with blood pressure of 186/76  Will start amlodipine  Will give prn hydralazine for SBP >180

## 2022-08-02 NOTE — UTILIZATION REVIEW
Observation Admission Authorization Request   NOTIFICATION OF OBSERVATION ADMISSION/OBSERVATION AUTHORIZATION REQUEST   SERVICING FACILITY:   [address]  Tax ID: [id number]  NPI: [id number]  Place of Service: On [address] [facility] Code: 22  CPT Code for Observation: CPT   CPT 06488     ATTENDING PROVIDER:  Attending Name and NPI#: Keithhuey Arya [[id number]]  Address: [address]  Phone: [phone]     UTILIZATION REVIEW CONTACT:  London Saleh Utilization   Network Utilization Review Department  Phone: [phone]  Fax: [phone]  Email: [email or web address]     PHYSICIAN ADVISORY SERVICES:  FOR [id number] REVIEW - MEDICAL NECESSITY DENIAL  Phone: [phone]  Fax: [phone]  Email: [email or web address]     TYPE OF REQUEST:  Observation  Status     ADMISSION INFORMATION:  ADMISSION DATE/TIME:   PATIENT DIAGNOSIS CODE/DESCRIPTION:  Wheezing [R06 2]  DISCHARGE DATE/TIME: No discharge date for patient encounter  IMPORTANT INFORMATION:  Please contact London Saleh directly with any questions or concerns regarding this request  Department voicemails are confidential     Send requests for admission clinical reviews, concurrent reviews, approvals, and administrative denials due to lack of clinical to fax [phone]

## 2022-08-02 NOTE — UTILIZATION REVIEW
Initial Clinical Review  OBSERVATION  8/1/22 @1813  CONVERTED TO INPATIENT ADMISSION 8/2/22 @ 1435 DUE TO CONTINUED STAY REQUIRED TO EVALUATE AND TREAT PATIENT WITH CHF  WITH CONTINUED WORKUP - IV LASIX X 1 8/2 , MONITOR RESPONSE  LAB MONITORING  WALKING DESAT TRIAL  Admission: Date/Time/Statement:   Admission Orders (From admission, onward)     Ordered        08/02/22 1435  Inpatient Admission  Once            08/01/22 1813  Place in Observation  Once                      Orders Placed This Encounter   Procedures    Place in Observation     Standing Status:   Standing     Number of Occurrences:   1     Order Specific Question:   Level of Care     Answer:   Med Surg [16]    Inpatient Admission     Standing Status:   Standing     Number of Occurrences:   1     Order Specific Question:   Level of Care     Answer:   Med Surg [16]     Order Specific Question:   Estimated length of stay     Answer:   More than 2 Midnights     Order Specific Question:   Certification     Answer:   I certify that inpatient services are medically necessary for this patient for a duration of greater than two midnights  See H&P and MD Progress Notes for additional information about the patient's course of treatment  ED Arrival Information     Expected   -    Arrival   8/1/2022 13:04    Acuity   Urgent            Means of arrival   Wheelchair    Escorted by   Family Member    Service   Hospitalist    Admission type   Urgent            Arrival complaint   SOB           Chief Complaint   Patient presents with    Wheezing     Pt presents to ED due to wheezing starting during the night  Initial Presentation: 68 y o  female PMH of recent fracture on 06/24/2022, 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,HTN, HLD,CKD3 , DM on insulin, who presents with wheezing , SOB that started this am 0500 when woke up  On exam, BP elevated, L calf pain w/o erythema , no SOB noted on RA with sat 95-98 %  Normal lung sounds      Labs -D Dimer elevated 2 67, creat 1 91(1 7-1 9 baseline), , INR 2 07   Imaging shows L lower lung atelectasis   ECG- NSR   Pt given IV Hydralazine, started on heparin drip in ED  Pt admitted as OBS to telemetry with elevated D Dimer , r/o PE, HTN  Plan -  Telemetry, Echo, VQ scan, venous duplex BLE continue heparin drip, continue home Carvedilol, start Amlodipine, PRN Hydralazine SBP >180  Monitor creat   Date: 8/2  Converted to IP  Per nursing telemetry - sinus rhythm  Limited ROM BLE , unable to stand independently   VQ scan with low probability of PE   Venous duplex neg for DVT   Elevated PTT 8/2  Heparin drip d/c'ed   Rales towards the right base , breath sounds were decreased diffusely in all lung fields   Pt continues to feel SOB    Pt ordered IV lasix x1 this afternoon   Sat 94-98 % at rest on RA  BP improved this am to 150/62   Creat down to 1 65 today   Labs in am   Monitor sat w/ ambulation q12 h  Pt also reports L face pain radiating to l eye and L jaw  ESR ordered  Pt restarted home xarelto  Wt Readings from Last 3 Encounters:   08/02/22 89 4 kg (197 lb)   07/22/22 88 9 kg (196 lb)   07/21/22 88 kg (194 lb)        Date 8/3 day 2    ml last 24 hrs  O2 sat 96 % with ambulation this am on RA   Pt written for d/c today   For oupt PFT's    For outpt labs CBC, BMP ,  ED Triage Vitals   Temperature Pulse Respirations Blood Pressure SpO2   08/01/22 1320 08/01/22 1314 08/01/22 1314 08/01/22 1314 08/01/22 1334   98 1 °F (36 7 °C) 61 18 (!) 182/76 95 %      Temp Source Heart Rate Source Patient Position - Orthostatic VS BP Location FiO2 (%)   08/01/22 1314 08/01/22 1314 08/01/22 1314 08/01/22 1314 --   Oral Monitor Sitting Right arm       Pain Score       08/01/22 2233       No Pain          Wt Readings from Last 1 Encounters:   08/02/22 89 4 kg (197 lb)     Additional Vital Signs:   Date/Time Temp Pulse Resp BP MAP (mmHg) SpO2   08/03/22 0946 -- -- -- -- -- 96 %   08/03/22 0700 98 9 °F (37 2 °C) -- -- -- -- --   08/03/22 06:56:41 -- 63 18 133/62 86 95 %   08/02/22 21:08:45 97 4 °F (36 3 °C) Abnormal  62 16 135/52 80 94 %   08/02/22 1452 97 7 °F (36 5 °C) 60 18 112/60 -- 98 %   08/02/22 1020 -- 60 -- 156/62 -- --       Date/Time Temp Pulse Resp BP MAP (mmHg) SpO2   08/02/22 07:37:03 98 °F (36 7 °C) 65 18 156/62 93 94 %   08/01/22 21:51:58 98 °F (36 7 °C) 64 18 180/69 Abnormal  106 95 %   08/01/22 2000 -- 64 18 197/80 Abnormal  -- 96 %   08/01/22 1844 -- 80 18 211/84 Abnormal  -- 97 %   08/01/22 1628 -- 60 18 185/105 Abnormal  -- 98 %   08/01/22 1334 -- -- -- -- -- 95 %       Pertinent Labs/Diagnostic Test Results:   8/2 ECHO- TTE  Left Ventricle: Left ventricular cavity size is normal  Wall thickness is increased  The left ventricular ejection fraction is 60%  Systolic function is normal  Although no diagnostic regional wall motion abnormality was identified, this possibility cannot be completely excluded on the basis of this study  Diastolic function is normal     Right Ventricle: Right ventricular cavity size is mildly dilated  Systolic function is normal     Atrial Septum: 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    Mitral Valve: There is mild annular calcification    Tricuspid Valve: There is mild regurgitation  The estimated right ventricular systolic pressure is 06 92 mmHg  8/2 VAS BLE-  RIGHT LOWER LIMB:  No evidence of acute or chronic deep vein thrombosis  No evidence of superficial thrombophlebitis noted  Doppler evaluation shows a normal response to augmentation maneuvers  Popliteal, posterior tibial and anterior tibial arterial Doppler waveforms are  biphasic  LEFT LOWER LIMB:  No evidence of acute or chronic deep vein thrombosis  No evidence of superficial thrombophlebitis noted  Doppler evaluation shows a normal response to augmentation maneuvers  Popliteal, posterior tibial and anterior tibial arterial Doppler waveforms are  biphasic  This result has not been signed   Information might be incomplete  8/1 ECG-Interpretation: abnormal     Rate:     ECG rate:  60     ECG rate assessment: normal     Rhythm:     Rhythm: paced     Pacing:     Capture:  Complete   Ectopy:     Ectopy: none     QRS:     QRS axis:  Normal     QRS intervals:  Normal   Conduction:     Conduction: normal     ST segments:     ST segments:  Normal   T waves:     T waves: normal      NM lung ventilation / perfusion   Final Result by Trish Hylton MD (08/02 1132)      The probability for pulmonary embolus is low  Workstation performed: [id number]         X-ray chest 1 view portable   ED Interpretation by Varsha Dooley MD (08/01 1400)   This film was interpreted independently by me  No infiltrate, cardiac silhouette normal, no pleural effusions or pulmonary edema           Final Result by Yovani Kaba MD (08/01 1659)   Linear density seen left lower lung due to atelectasis   No acute consolidation or congestion            Workstation performed: [id number]           Results from last 7 days   Lab Units 08/01/22  1834   SARS-COV-2  Negative     Results from last 7 days   Lab Units 08/03/22  0425 08/02/22  0446 08/01/22  1434   WBC Thousand/uL 5 44 6 64 7 97   HEMOGLOBIN g/dL 8 1* 8 1* 8 6*   HEMATOCRIT % 26 5* 25 9* 28 3*   PLATELETS Thousands/uL 361 312 349   NEUTROS ABS Thousands/µL 3 45 4 57 6 14         Results from last 7 days   Lab Units 08/03/22  0425 08/02/22  0556 08/01/22  1434 07/27/22  1647   SODIUM mmol/L 142 140 138 141   POTASSIUM mmol/L 4 2 4 5 4 9 4 7   CHLORIDE mmol/L 109* 110* 105 110*   CO2 mmol/L 28 26 28 28   ANION GAP mmol/L 5 4 5 3*   BUN mg/dL 24 22 26* 25   CREATININE mg/dL 1 79* 1 65* 1 91* 2 29*   EGFR ml/min/1 73sq m 26 29 24 20   CALCIUM mg/dL 8 5 8 2* 8 4 8 1*     Results from last 7 days   Lab Units 08/02/22  0556 08/01/22  1434   AST U/L 9* 11*   ALT U/L 3* 5*   ALK PHOS U/L 79 91   TOTAL PROTEIN g/dL 5 5* 6 7   ALBUMIN g/dL 2 9* 3 3*   TOTAL BILIRUBIN mg/dL 0 36 0 42 Results from last 7 days   Lab Units 08/03/22  1100 08/03/22  0801 08/03/22  0741 08/03/22  0711 08/03/22  0701 08/02/22  2050 08/02/22  1551 08/02/22  1100 08/02/22  0655 08/01/22  2156   POC GLUCOSE mg/dl 180* 114 66 42* 44* 77 87 161* 115 173*     Results from last 7 days   Lab Units 08/03/22  0425 08/02/22  0556 08/01/22  1434 07/27/22  1647   GLUCOSE RANDOM mg/dL 40* 112 196* 129               Results from last 7 days   Lab Units 08/01/22  1834 08/01/22  1628 08/01/22  1434   HS TNI 0HR ng/L  --   --  23   HS TNI 2HR ng/L  --  26  --    HSTNI D2 ng/L  --  3  --    HS TNI 4HR ng/L 27  --   --    HSTNI D4 ng/L 4  --   --      Results from last 7 days   Lab Units 08/01/22  1434   D-DIMER QUANTITATIVE ug/ml FEU 2 67*     Results from last 7 days   Lab Units 08/02/22  1414 08/02/22  0222 08/01/22  1834   PROTIME seconds  --   --  23 6*   INR   --   --  2 07*   PTT seconds 62* >210* 38*                         Results from last 7 days   Lab Units 08/01/22  1434   BNP pg/mL 957*               Results from last 7 days   Lab Units 08/01/22  1834   INFLUENZA A PCR  Negative   INFLUENZA B PCR  Negative   RSV PCR  Negative             ED Treatment:   Medication Administration from 08/01/2022 1304 to 08/01/2022 2140       Date/Time Order Dose Route Action Comments     08/01/2022 2007 heparin (porcine) injection 6,800 Units 6,800 Units Intravenous Given      08/01/2022 2006 heparin (porcine) 25,000 units in 0 45% NaCl 250 mL infusion (premix) 18 Units/kg/hr Intravenous New Bag      08/01/2022 2002 heparin (porcine) 25,000 units in 0 45% NaCl 250 mL infusion (premix) 0 Units/kg/hr Intravenous Hold bag started in ED but Mar item still o/s     08/01/2022 1910 hydrALAZINE (APRESOLINE) injection 5 mg 5 mg Intravenous Given         Past Medical History:   Diagnosis Date    Atrial fibrillation ([facility] )     Chronic anemia     Chronic kidney disease     Colonoscopy refused 11/2019    pt declines    Dyslipidemia     Hypertension     Mammogram declined 09/2017    Proteinuria     [facility]eth [facility] 6/24/2022     Present on Admission:   Stage 3b chronic kidney disease ([facility] )   Hypertension   Atrial fibrillation (HCC)   Anemia of chronic kidney failure, stage 4 (severe) (HCC)      Admitting Diagnosis: Wheezing [R06 2]  Age/Sex: 68 y o  female  Admission Orders:  Scheduled Medications:  acetaminophen, 975 mg, Oral, Q8H [address]  amLODIPine, 5 mg, Oral, Daily  aspirin, 81 mg, Oral, Daily  carvedilol, 25 mg, Oral, Q12H RADHA  cholecalciferol, 2,000 Units, Oral, Daily  fluticasone, 1 spray, Nasal, Daily  folic acid, 162 mcg, Oral, Daily  gabapentin, 100 mg, Oral, HS  insulin glargine, 12 Units, Subcutaneous, HS  insulin lispro, 2-12 Units, Subcutaneous, TID AC  pravastatin, 40 mg, Oral, Daily With Dinner  ranolazine, 500 mg, Oral, Daily  rivaroxaban, 15 mg, Oral, Daily With Dinner    furosemide (LASIX) injection 40 mg  Dose: 40 mg  Freq: Once Route: IV  Start: 08/02/22 1400 End: 08/02/22 1439    Continuous IV Infusions:  heparin (porcine), 3-30 Units/kg/hr (Order-Specific), Intravenous, TitratedEnd: 08/02/22 1243      PRN Meds:  albuterol, 2 puff, Inhalation, Q4H PRN  hydrALAZINE, 10 mg, Intravenous, Q6H PRN  meclizine, 12 5 mg, Oral, Q8H PRN      Telemetry  SCD   OOB as clara      Network Utilization Review Department  ATTENTION: Please call with any questions or concerns to [phone] and carefully listen to the prompts so that you are directed to the right person  All voicemails are confidential   Carolyn Griggs all requests for admission clinical reviews, approved or denied determinations and any other requests to dedicated fax number below belonging to the campus where the patient is receiving treatment   List of dedicated fax numbers for the Facilities:  [address] DENIALS (Administrative/Medical Necessity) [phone]   [address] (Maternity/NICU/Pediatrics) [phone] [address] [facility] [phone]   [address]  [phone]   [address] [facility] [place] [phone] [address] [place] [address] [address] [phone]   [address] [phone]

## 2022-08-02 NOTE — RESPIRATORY THERAPY NOTE
RT Protocol Note  Carrol Martinez 68 y o  female MRN: [id number]  Unit/Bed#: S -01 Encounter: [id number]    Assessment    Principal Problem:    Elevated d-dimer  Active Problems:    Stage 3b chronic kidney disease ([place] [address] )    Diabetes mellitus type 2, insulin dependent ([place] [address] )    Hypertension    Atrial fibrillation [facility])      Home Pulmonary Medications:         Past Medical History:   Diagnosis Date    Atrial fibrillation ([place] [address] )     Chronic anemia     Chronic kidney disease     Colonoscopy refused 2019    pt declines    Dyslipidemia     Hypertension     Mammogram declined 2017    Proteinuria     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     Social History     Socioeconomic History    Marital status:      Spouse name: None    Number of children: None    Years of education: None    Highest education level: None   Occupational History    Occupation: retired   Tobacco Use    Smoking status: Never Smoker    Smokeless tobacco: Never Used   Vaping Use    Vaping Use: Never used   Substance and Sexual Activity    Alcohol use: Never    Drug use: Never    Sexual activity: Not Currently   Other Topics Concern    None   Social History Narrative    ** Merged History Encounter **         Most recent tobacco use screenin2019  Do you currently or have you served in [employer]: No  Were you activated, into active duty, as a member of the [employer] or as a Reservist: No  Marital status:   Exercise level: None  D    iet: Regular  General stress level: Low  Alcohol intake: None  Caffeine intake:  Moderate  Chewing tobacco: none  Illicit drugs: none  Seat belts used routinely: Yes  Sunscreen used routinely: Yes  Smoke alarm in home: Yes  Advance directive: No  Salt Int    juan antonio: minimal  Has the Patient had a mammogram to screen for breast cancer within 24 months: No  2019 - declines mammogram   Is the patient interested in a colorectal cancer screening: No  2019 - patient declines colonoscopy's     Social Determinants of Health     Financial Resource Strain: Not on file   Food Insecurity: Not on file   Transportation Needs: No Transportation Needs    Lack of Transportation (Medical): No    Lack of Transportation (Non-Medical): No   Physical Activity: Not on file   Stress: Not on file   Social Connections: Not on file   Intimate Partner Violence: Not on file   Housing Stability: Unknown    Unable to Pay for Housing in the Last Year: No    Number of Places Lived in the Last Year: Not on file    Unstable Housing in the Last Year: No       Subjective         Objective    Physical Exam:   Assessment Type: (P) Assess only  General Appearance: (P) Alert, Awake  Respiratory Pattern: (P) Normal  Chest Assessment: (P) Chest expansion symmetrical  Bilateral Breath Sounds: (P) Clear    Vitals:  Blood pressure (!) 180/69, pulse 64, temperature 98 °F (36 7 °C), resp  rate 18, weight 89 8 kg (197 lb 15 6 oz), SpO2 95 %  Imaging and other studies: I have personally reviewed pertinent reports              Plan    Respiratory Plan: (P) No distress/Pulmonary history, Discontinue Protocol

## 2022-08-03 VITALS
RESPIRATION RATE: 18 BRPM | HEIGHT: 63 IN | DIASTOLIC BLOOD PRESSURE: 63 MMHG | SYSTOLIC BLOOD PRESSURE: 130 MMHG | OXYGEN SATURATION: 93 % | TEMPERATURE: 98.9 F | BODY MASS INDEX: 34.91 KG/M2 | WEIGHT: 197 LBS | HEART RATE: 62 BPM

## 2022-08-03 DIAGNOSIS — R06.2 WHEEZING: ICD-10-CM

## 2022-08-03 LAB
ANION GAP SERPL CALCULATED.3IONS-SCNC: 5 MMOL/L (ref 4–13)
BASOPHILS # BLD AUTO: 0.02 THOUSANDS/ΜL (ref 0–0.1)
BASOPHILS NFR BLD AUTO: 0 % (ref 0–1)
BUN SERPL-MCNC: 24 MG/DL (ref 5–25)
CALCIUM SERPL-MCNC: 8.5 MG/DL (ref 8.4–10.2)
CHLORIDE SERPL-SCNC: 109 MMOL/L (ref 96–108)
CO2 SERPL-SCNC: 28 MMOL/L (ref 21–32)
CREAT SERPL-MCNC: 1.79 MG/DL (ref 0.6–1.3)
EOSINOPHIL # BLD AUTO: 0 THOUSAND/ΜL (ref 0–0.61)
EOSINOPHIL NFR BLD AUTO: 0 % (ref 0–6)
ERYTHROCYTE [DISTWIDTH] IN BLOOD BY AUTOMATED COUNT: 15.8 % (ref 11.6–15.1)
FOLATE SERPL-MCNC: >20 NG/ML (ref 3.1–17.5)
GFR SERPL CREATININE-BSD FRML MDRD: 26 ML/MIN/1.73SQ M
GLUCOSE SERPL-MCNC: 114 MG/DL (ref 65–140)
GLUCOSE SERPL-MCNC: 180 MG/DL (ref 65–140)
GLUCOSE SERPL-MCNC: 40 MG/DL (ref 65–140)
GLUCOSE SERPL-MCNC: 42 MG/DL (ref 65–140)
GLUCOSE SERPL-MCNC: 44 MG/DL (ref 65–140)
GLUCOSE SERPL-MCNC: 66 MG/DL (ref 65–140)
HCT VFR BLD AUTO: 26.5 % (ref 34.8–46.1)
HGB BLD-MCNC: 8.1 G/DL (ref 11.5–15.4)
IMM GRANULOCYTES # BLD AUTO: 0.04 THOUSAND/UL (ref 0–0.2)
IMM GRANULOCYTES NFR BLD AUTO: 1 % (ref 0–2)
LYMPHOCYTES # BLD AUTO: 1.33 THOUSANDS/ΜL (ref 0.6–4.47)
LYMPHOCYTES NFR BLD AUTO: 24 % (ref 14–44)
MCH RBC QN AUTO: 27.9 PG (ref 26.8–34.3)
MCHC RBC AUTO-ENTMCNC: 30.6 G/DL (ref 31.4–37.4)
MCV RBC AUTO: 91 FL (ref 82–98)
MONOCYTES # BLD AUTO: 0.6 THOUSAND/ΜL (ref 0.17–1.22)
MONOCYTES NFR BLD AUTO: 11 % (ref 4–12)
NEUTROPHILS # BLD AUTO: 3.45 THOUSANDS/ΜL (ref 1.85–7.62)
NEUTS SEG NFR BLD AUTO: 64 % (ref 43–75)
NRBC BLD AUTO-RTO: 0 /100 WBCS
PLATELET # BLD AUTO: 361 THOUSANDS/UL (ref 149–390)
PMV BLD AUTO: 10.1 FL (ref 8.9–12.7)
POTASSIUM SERPL-SCNC: 4.2 MMOL/L (ref 3.5–5.3)
RBC # BLD AUTO: 2.9 MILLION/UL (ref 3.81–5.12)
SODIUM SERPL-SCNC: 142 MMOL/L (ref 135–147)
VIT B12 SERPL-MCNC: 1244 PG/ML (ref 100–900)
WBC # BLD AUTO: 5.44 THOUSAND/UL (ref 4.31–10.16)

## 2022-08-03 PROCEDURE — 82607 VITAMIN B-12: CPT

## 2022-08-03 PROCEDURE — 82746 ASSAY OF FOLIC ACID SERUM: CPT

## 2022-08-03 PROCEDURE — 83918 ORGANIC ACIDS TOTAL QUANT: CPT

## 2022-08-03 PROCEDURE — 82948 REAGENT STRIP/BLOOD GLUCOSE: CPT

## 2022-08-03 PROCEDURE — 80048 BASIC METABOLIC PNL TOTAL CA: CPT

## 2022-08-03 PROCEDURE — 99239 HOSP IP/OBS DSCHRG MGMT >30: CPT | Performed by: INTERNAL MEDICINE

## 2022-08-03 PROCEDURE — 85025 COMPLETE CBC W/AUTO DIFF WBC: CPT

## 2022-08-03 RX ORDER — RANOLAZINE 500 MG/1
500 TABLET, EXTENDED RELEASE ORAL DAILY
Qty: 30 TABLET | Refills: 0 | Status: SHIPPED | OUTPATIENT
Start: 2022-08-04 | End: 2022-10-21 | Stop reason: SDUPTHER

## 2022-08-03 RX ORDER — AMLODIPINE BESYLATE 5 MG/1
5 TABLET ORAL DAILY
Qty: 30 TABLET | Refills: 0 | Status: SHIPPED | OUTPATIENT
Start: 2022-08-04 | End: 2022-10-14

## 2022-08-03 RX ORDER — INSULIN GLARGINE 100 [IU]/ML
12 INJECTION, SOLUTION SUBCUTANEOUS
Qty: 3.6 ML | Refills: 0 | Status: SHIPPED | OUTPATIENT
Start: 2022-08-03 | End: 2022-09-06 | Stop reason: SDUPTHER

## 2022-08-03 RX ORDER — INSULIN GLARGINE 100 [IU]/ML
12 INJECTION, SOLUTION SUBCUTANEOUS
Status: DISCONTINUED | OUTPATIENT
Start: 2022-08-03 | End: 2022-08-03 | Stop reason: HOSPADM

## 2022-08-03 RX ORDER — AMLODIPINE BESYLATE 5 MG/1
TABLET ORAL
Qty: 90 TABLET | OUTPATIENT
Start: 2022-08-03

## 2022-08-03 RX ORDER — MECLIZINE HCL 12.5 MG/1
12.5 TABLET ORAL EVERY 8 HOURS PRN
Qty: 30 TABLET | Refills: 0 | Status: SHIPPED | OUTPATIENT
Start: 2022-08-03

## 2022-08-03 RX ORDER — RANOLAZINE 500 MG/1
TABLET, EXTENDED RELEASE ORAL
Qty: 90 TABLET | OUTPATIENT
Start: 2022-08-03

## 2022-08-03 RX ADMIN — CARVEDILOL 25 MG: 12.5 TABLET, FILM COATED ORAL at 09:19

## 2022-08-03 RX ADMIN — INSULIN LISPRO 2 UNITS: 100 INJECTION, SOLUTION INTRAVENOUS; SUBCUTANEOUS at 11:17

## 2022-08-03 RX ADMIN — FLUTICASONE PROPIONATE 1 SPRAY: 50 SPRAY, METERED NASAL at 09:19

## 2022-08-03 RX ADMIN — ASPIRIN 81 MG CHEWABLE TABLET 81 MG: 81 TABLET CHEWABLE at 09:19

## 2022-08-03 RX ADMIN — FOLIC ACID TAB 400 MCG 400 MCG: 400 TAB at 09:19

## 2022-08-03 RX ADMIN — AMLODIPINE BESYLATE 5 MG: 5 TABLET ORAL at 09:19

## 2022-08-03 RX ADMIN — RANOLAZINE 500 MG: 500 TABLET, EXTENDED RELEASE ORAL at 09:19

## 2022-08-03 RX ADMIN — Medication 2000 UNITS: at 09:19

## 2022-08-03 RX ADMIN — ACETAMINOPHEN 975 MG: 325 TABLET ORAL at 05:17

## 2022-08-03 NOTE — PLAN OF CARE
Problem: MOBILITY - ADULT  Goal: Maintain or return to baseline ADL function  Description: INTERVENTIONS:  -  Assess patient's ability to carry out ADLs; assess patient's baseline for ADL function and identify physical deficits which impact ability to perform ADLs (bathing, care of mouth/teeth, toileting, grooming, dressing, etc )  - Assess/evaluate cause of self-care deficits   - Assess range of motion  - Assess patient's mobility; develop plan if impaired  - Assess patient's need for assistive devices and provide as appropriate  - Encourage maximum independence but intervene and supervise when necessary  - Involve family in performance of ADLs  - Assess for home care needs following discharge   - Consider OT consult to assist with ADL evaluation and planning for discharge  - Provide patient education as appropriate  Outcome: Progressing  Goal: Maintains/Returns to pre admission functional level  Description: INTERVENTIONS:  - Perform BMAT or MOVE assessment daily    - Set and communicate daily mobility goal to care team and patient/family/caregiver  - Collaborate with rehabilitation services on mobility goals if consulted  - Perform Range of Motion 2 times a day  - Reposition patient every 2 hours    - Dangle patient 3 times a day  - Stand patient 3 times a day  - Ambulate patient 3 times a day  - Out of bed to chair 3 times a day   - Out of bed for meals 3 times a day  - Out of bed for toileting  - Record patient progress and toleration of activity level   Outcome: Progressing     Problem: Potential for Falls  Goal: Patient will remain free of falls  Description: INTERVENTIONS:  - Educate patient/family on patient safety including physical limitations  - Instruct patient to call for assistance with activity   - Consult OT/PT to assist with strengthening/mobility   - Keep Call bell within reach  - Keep bed low and locked with side rails adjusted as appropriate  - Keep care items and personal belongings within reach  - Initiate and maintain comfort rounds  - Make Fall Risk Sign visible to staff  - Offer Toileting every 2 Hours, in advance of need  - Initiate/Maintain bed alarm  - Obtain necessary fall risk management equipment  - Apply yellow socks and bracelet for high fall risk patients  - Consider moving patient to room near nurses station  Outcome: Progressing     Problem: CARDIOVASCULAR - ADULT  Goal: Maintains optimal cardiac output and hemodynamic stability  Description: INTERVENTIONS:  - Monitor I/O, vital signs and rhythm  - Monitor for S/S and trends of decreased cardiac output  - Administer and titrate ordered vasoactive medications to optimize hemodynamic stability  - Assess quality of pulses, skin color and temperature  - Assess for signs of decreased coronary artery perfusion  - Instruct patient to report change in severity of symptoms  Outcome: Progressing  Goal: Absence of cardiac dysrhythmias or at baseline rhythm  Description: INTERVENTIONS:  - Continuous cardiac monitoring, vital signs, obtain 12 lead EKG if ordered  - Administer antiarrhythmic and heart rate control medications as ordered  - Monitor electrolytes and administer replacement therapy as ordered  Outcome: Progressing     Problem: DISCHARGE PLANNING  Goal: Discharge to home or other facility with appropriate resources  Description: INTERVENTIONS:  - Identify barriers to discharge w/patient and caregiver  - Arrange for needed discharge resources and transportation as appropriate  - Identify discharge learning needs (meds, wound care, etc )  - Arrange for interpretive services to assist at discharge as needed  - Refer to Case Management Department for coordinating discharge planning if the patient needs post-hospital services based on physician/advanced practitioner order or complex needs related to functional status, cognitive ability, or social support system  Outcome: Progressing     Problem: Knowledge Deficit  Goal: Patient/family/caregiver demonstrates understanding of disease process, treatment plan, medications, and discharge instructions  Description: Complete learning assessment and assess knowledge base    Interventions:  - Provide teaching at level of understanding  - Provide teaching via preferred learning methods  Outcome: Progressing

## 2022-08-03 NOTE — ASSESSMENT & PLAN NOTE
Pt on rivaroxaban at home  Heprin was given but held dut to elevated PTT on [id number]     PLAN  Rivaroxaban

## 2022-08-03 NOTE — ASSESSMENT & PLAN NOTE
Pt CBC on presentation was lower than patients baseline BL  The only other time in the patient chart when CBC values were this low was [id number] during L hip replacement surgury when patient became anemic secodary to blood loss and was transfused  Hgb   BL 9 9-10 9:   75XLH7099: <9 0   20FRR1326 8 6   81KVF2873 8 1  RBC   BL 3 6-4 05:    [id number] <3 33   39AGH6126 3 08,   33TQU7502 2 82  HCT   BL 32 8 to 34 5  22LYQ4752<30   60AHO4233 28 3  36KVR0250 25 9     MCV 92 on [id number]    PLAN: Work up anemia as potential etiology of SOB     Please consider checking Iron panel outpatient

## 2022-08-03 NOTE — CASE MANAGEMENT
Case Management Assessment & Discharge Planning Note    Patient name Robel Rowell  Location S /S -01 MRN [id number]  : 1945 Date 8/3/2022       Current Admission Date: 2022  Current Admission Diagnosis:CHF (congestive heart failure) [facility])   Patient Active Problem List    Diagnosis Date Noted    CHF (congestive heart failure) ([place] [address] ) 2022    Giant cell arteritis ([place] [address] ) 2022    Elevated d-dimer 2022    Atrial flutter, paroxysmal ([place] [address] ) 2022    Full code status 2022    Lower obstructive uropathy 2022    Acute blood loss anemia (ABLA) 2022    MI (myocardial infarction) ([place] [address] ) 2022    Cardiac pacemaker in situ 2022    H/O heart artery stent 2022    Closed left hip fracture ([place] [address] ) 2022    Blind 2022    Diabetes mellitus type 2, insulin dependent ([place] [address] ) 2022    Hypertension 2022    CAD (coronary artery disease) 2022    Atrial fibrillation ([place] [address] ) 2022    Anemia of chronic kidney failure, stage 4 (severe) ([place] [address] ) 2022    Vertigo 2022    Sick sinus syndrome ([place] [address] ) 2021    Obesity, morbid ([place] [address] ) 2021    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[place] [address] ) 2021    Multiple thyroid nodules 2020    Hypokalemia 2020    Closed head injury 2020    Heterozygous MTHFR mutation [id number] 2019    Stage 3 chronic kidney disease ([facility] ) 2019    Stage 3b chronic kidney disease ([place] [address] ) 2019      LOS (days): 1  Geometric Mean LOS (GMLOS) (days): 2 10  Days to GMLOS:1 3     OBJECTIVE:    Risk of Unplanned Readmission Score: 20 65     Current admission status: Inpatient    Preferred Pharmacy:   [address], [address] [place] [id number]  Phone: [phone] Fax: [phone]    Primary Care Provider: Benjamín Garcia MD    Primary Insurance: [facility]  Secondary Insurance: ASSESSMENT:  [id number] [place] [address], [address] [facility] Representative - Son   Primary Phone: [phone] (Mobile)               Patient Information  Admitted from[de-identified] Home  Mental Status: Alert  During Assessment patient was accompanied by: Not accompanied during assessment  Assessment information provided by[de-identified] Son Coffey Suni)  Primary Caregiver: Family  Caregiver's Name[de-identified] Vj Ortiz and his wife  Support Systems: Son, Parent, Family members  [place] of Residence: [address] [place] [address] do you live in?: [place] entry access options   Select all that apply : Stairs  Number of steps to enter home : 1  Type of Current Residence: [address] [place] home  Upon entering residence, is there a bedroom on the main floor (no further steps)?: Yes (son working on setting up first floor for pt)  Living Arrangements: Lives w/ Son, Lives w/ Extended Family    Activities of Daily Living Prior to Admission  Functional Status: Assistance  Completes ADLs independently?: No  Level of ADL dependence: Assistance  Ambulates independently?: Yes  Does patient use assisted devices?: Yes  Assisted Devices (DME) used: [employer]  Does patient have a history of Outpatient Therapy (PT/OT)?: No  Does the patient have a history of Short-Term Rehab?: Yes (HFM)  Does patient have a history of HHC?: Yes  Does patient currently have [place] [address]?: Yes    Current Home Health Care  Type of Current Home Care Services: Home PT, Home OT, Nurse visit  [address][de-identified] [facility] Provider[de-identified] PCP    Patient Information Continued  Income Source: Pension/detention  Does patient have prescription coverage?: Yes  Does patient receive dialysis treatments?: No  Does patient have a history of substance abuse?: No  Does patient have a history of Mental Health Diagnosis?: No    Means of Transportation  Means of Transport to Appts[de-identified] Family transport  In the past 12 months, has lack of transportation kept you from medical appointments or from getting medications?: No  In the past 12 months, has lack of transportation kept you from meetings, work, or from getting things needed for daily living?: No  Was application for public transport provided?: N/A    DISCHARGE DETAILS:    Discharge planning discussed with[de-identified] pt sonRand of Choice: Yes  Comments - Freedom of Choice: Return home with [facility]    Contacts  Patient Contacts: Walt Luevano, son  Relationship to Patient[de-identified] Family  Contact Method: Phone  Phone Number: [phone]  Reason/Outcome: Continuity of Care, Emergency Contact, Referral, Discharge [address]         Is the patient interested in [address] at discharge?: Yes  [address] requested[de-identified] Nursing, Physical Therapy, Occupational [address] Name[de-identified] [facility] Provider[de-identified] PCP  Home Health Services Needed[de-identified] Other (comment), Strengthening/Theraputic Exercises to Improve Function, Gait/ADL Training, Evaluate Functional Status and Safety  Homebound Criteria Met[de-identified] Uses an Assist Device (i e  cane, walker, etc)  Supporting Clincal Findings[de-identified] Limited Endurance    Other Referral/Resources/Interventions Provided:  Interventions: Transportation, [address]  Referral Comments: CM spoke with pt son, Walt Luevano  He is aware of d/c today however, he is currently at work  CM offered to assist with transportation home for pt  Waltamy Luevano reports he is going to speak with his employer and see if he is able to leave work early today to come pick pt up - He reports he will update CM (has contact information)  Walt Luevano reports that pt is current with [facility] since d/c to home from [facility] about 2 weeks ago  Walt Chloé reports he is also working on [facility] first floor set up for the pt  Walt Luevano reports they would like to continue with [facility] at d/c  CM sent referral via [employer]  As per [facility] pt current with RN PT/OT services  F/U providers updated      Treatment Team Recommendation: Home with Home Health Care  Discharge Destination Plan[de-identified] Home with [facility] ([facility])

## 2022-08-03 NOTE — ASSESSMENT & PLAN NOTE
Lab Results   Component Value Date    HGBA1C 7 1 (H) 02/18/2022       Recent Labs     08/02/22  0655 08/02/22  1100 08/02/22  1551 08/02/22 2050   POCGLU 115 161* 87 77       Blood Sugar Average: Last 72 hrs:  (P) 122 6 continue home dose Lantus

## 2022-08-03 NOTE — QUICK NOTE
Notified by pharmacy that after heparin drip was d/c by primary team after PE r/o, only VTE prophylaxis reordered  Due to patient's AFib and heterozygous MTHFR mutation they were concerned patient needs full AC rather than prophylaxis  Home AC: Xarelto, has been maintained on this per chart review despite CKD stage 3/4    After discussion with pharmacy and thorough chart review, will resume patient's home Xarelto for full AC       Abilio Moe MD  Family Medicine PGY  8/2/2022  11:29 PM

## 2022-08-03 NOTE — DISCHARGE INSTR - AVS FIRST PAGE
Dear Abi Perez,     It was our pleasure to care for you here at [facility]  It is our hope that we were always able to exceed the expected standards for your care during your stay  You were hospitalized due to SOB  You were cared for on the 4th floor by Ben Fry MD under the service of Kirstin Summers,  with the [facility] Hospitalist [facility] who covers for your primary care physician (PCP), Som Vásquez MD, while you were hospitalized  If you have any questions or concerns related to this hospitalization, you may contact us at [phone]  For follow up as well as any medication refills, we recommend that you follow up with your primary care physician  A registered nurse will reach out to you by phone within a few days after your discharge to answer any additional questions that you may have after going home  However, at this time we provide for you here, the most important instructions / recommendations at discharge:     Notable Medication Adjustments -   Please START taking: amlodipine (Norvsc) 5mg Tablet once by mouth daily  Please Start taking inusline glargine (Launtus) 100untis/mL subq inj 12 units at bedtime  If blood glucose goes over 200 please go back to original dose  Please CHANGE how you take Meclizine (Antivert) 12 5 mg tablet by mouth every  8hrs needed for dizziness  Ranolazine (Ranexa) 500mg 12 hr tablet by mouth daily  Testing Required after Discharge -   Pulmonary Function test Outpatient  Prescription with this order  Important follow up information -   Please follow up w/ your PCP  Other Instructions -   Please get well, it was an honor and privilege to be part of your health care team   You have a wonderful family that takes such good care of each other  Talking to your son was a tahir      Please review this entire after visit summary as additional general instructions including medication list, appointments, activity, diet, any pertinent wound care, and other additional recommendations from your care team that may be provided for you        Sincerely,     Deepa Garcia MD

## 2022-08-03 NOTE — CASE MANAGEMENT
Case Management Progress Note    Patient name Anali Lipoma  Location S /S -01 MRN [id number]  : 1945 Date 8/3/2022       LOS (days): 1  Geometric Mean LOS (GMLOS) (days): 2 10  Days to GMLOS:1 2        OBJECTIVE:    Current admission status: Inpatient  Preferred Pharmacy:   [place] [id number]  Phone: [phone] Fax: [phone]    Primary Care Provider: Marcia Estrada MD    Primary Insurance: [facility]  Secondary Insurance:     PROGRESS NOTE:    JAGDEEP BUENOA able to resume services at d/c  - F/U providers updated      CM received a phone call from pt son, Brandon Miller, that he will pick pt up around 44:30 pm

## 2022-08-03 NOTE — ASSESSMENT & PLAN NOTE
Patient had 1 day history of acute shortness of breath described as wheezing  Recent left femoral fracture status post nailing via Orthopedics on 06/24     Workups:  BNP elevated at 957, D-dimer 2 67  Left calf tenderness  However patient is also not tachycardic, not hypoxic currently on room air  Plan will try to rule out PE   Echo, VQ scan were negative  Lower leg vascular studies negative   Medrol p r n  Every 6 hours  Rivaroxaban

## 2022-08-03 NOTE — TELEPHONE ENCOUNTER
Lisa Godfrey is not a patient of the [facility] [place] office  No longer under the care of Dr Valentín Jackson   Will refuse refill request

## 2022-08-04 ENCOUNTER — HOME CARE VISIT (OUTPATIENT)
Dept: HOME HEALTH SERVICES | Facility: HOME HEALTHCARE | Age: 77
End: 2022-08-04
Payer: COMMERCIAL

## 2022-08-04 NOTE — PROGRESS NOTES
[facility]  Progress Note - Caterina Cardenas 1945, 68 y o  female MRN: [id number]  Unit/Bed#: S -01 Encounter: [id number]  Primary Care Provider: Blas Lei MD   Date and time admitted to hospital: 8/1/2022  1:11 PM    * CHF (congestive heart failure) [facility])  Assessment & Plan  Wt Readings from Last 3 Encounters:   08/02/22 89 4 kg (197 lb)   07/22/22 88 9 kg (196 lb)   07/21/22 88 kg (194 lb)       Pt presents w/ SOB, HTN, and subjective wheeze  BNP was elevated  PE was R/O w/ negative V/Q and LE US Doppler  PE was negative for LE edema, negative for wheezing/rales/ronchi  ECHO 02AUG2022 negative for reduced EF, diskensis, low filling volumes  Walking trial [id number]: sPO2 walking 97-98%  PLAN:  -PFT order for outpatient submitted 02AUG2022  -Follow Cardio Outpatient  Hypertension  Assessment & Plan  Patient home meds:  Carvedilol 25 mg  Came in the ED with blood pressure of 186/76  Per conversation w/ son the BL BP is 165/80  PLAN:   Will start amlodipine    Stage 3b chronic kidney disease [facility])  Assessment & Plan  Lab Results   Component Value Date    EGFR 29 08/02/2022    EGFR 24 08/01/2022    EGFR 20 07/27/2022    CREATININE 1 65 (H) 08/02/2022    CREATININE 1 91 (H) 08/01/2022    CREATININE 2 29 (H) 07/27/2022   Patient had recent SAHIL during recent hospitalization  Recent baseline appears to be 1 7-1 9  POA crea 1 91  Avoid nephrotoxins    Diabetes mellitus type 2, insulin dependent [facility])  Assessment & Plan  Lab Results   Component Value Date    HGBA1C 7 1 (H) 02/18/2022       Recent Labs     08/02/22  0655 08/02/22  1100 08/02/22  1551 08/02/22 2050   POCGLU 115 161* 87 77       Blood Sugar Average: Last 72 hrs:  (P) 122 6 continue home dose Lantus      Anemia of chronic kidney failure, stage 4 (severe) (HCC)  Assessment & Plan  Pt CBC on presentation was lower than patients baseline BL   The only other time in the patient chart when CBC values were this low was [id number] during L hip replacement surgury when patient became anemic secodary to blood loss and was transfused  Hgb   BL 9 9-10 9:   14VXY1373: <9 0   88SES8793 8 6   33VZE3922 8 1  RBC   BL 3 6-4 05:    00RAA4701 <3 33   49ZJC3208 3 08,   44FVS7010 2 82  HCT   BL 32 8 to 34 5  36OJE5627<30   02ZOW5571 28 3  17YES0892 25 9     MCV 92 on 02AUG2022    PLAN: Work up anemia as potential etiology of SOB  Please consider checking Iron panel outpatient     Atrial fibrillation [facility])  Assessment & Plan  Pt on rivaroxaban at home  Heprin was given but held dut to elevated PTT on 02AUG2022  PLAN  Rivaroxaban    Elevated d-dimer  Assessment & Plan  Patient had 1 day history of acute shortness of breath described as wheezing  Recent left femoral fracture status post nailing via Orthopedics on 06/24     Workups:  BNP elevated at 957, D-dimer 2 67  Left calf tenderness  However patient is also not tachycardic, not hypoxic currently on room air  Plan will try to rule out PE   Echo, VQ scan were negative  Lower leg vascular studies negative   Medrol p r n  Every 6 hours  Rivaroxaban  VTE Pharmacologic Prophylaxis: VTE Score: 13 High Risk (Score >/= 5) - Pharmacological DVT Prophylaxis Ordered: enoxaparin (Lovenox)  Sequential Compression Devices Ordered  Patient Centered Rounds: I performed bedside rounds with nursing staff today  Discussions with Specialists or Other Care Team Provider: Podiatry    Education and Discussions with Family / Patient: Updated  (daughter) via phone  Current Length of Stay: 1 day(s)  Current Patient Status: Inpatient   Discharge Plan: Anticipate discharge tomorrow to home with home services  Code Status: Prior    Subjective:   Patient was seen at bedside and stated feeling much better and pleased with the progress  Patient is still concerned about the possibility of further foot infections  Podiatry agreed to see the patient that evening       Objective: Vitals:   No data recorded  Body mass index is 34 9 kg/m²  Input and Output Summary (last 24 hours):   No intake or output data in the 24 hours ending 08/1945    Physical Exam:   Physical Exam  Constitutional:       General: She is not in acute distress  Appearance: Normal appearance  She is obese  She is not ill-appearing, toxic-appearing or diaphoretic  HENT:      Head: Normocephalic and atraumatic  Nose: Nose normal       Mouth/Throat:      Mouth: Mucous membranes are moist       Pharynx: Oropharynx is clear  No oropharyngeal exudate or posterior oropharyngeal erythema  Eyes:      General: No scleral icterus  Right eye: Discharge present  Left eye: No discharge  Conjunctiva/sclera: Conjunctivae normal    Cardiovascular:      Rate and Rhythm: Normal rate  Pulmonary:      Effort: Pulmonary effort is normal       Breath sounds: Normal breath sounds  Abdominal:      General: There is no distension  Palpations: Abdomen is soft  There is no mass  Tenderness: There is no abdominal tenderness  There is no guarding or rebound  Hernia: No hernia is present  Skin:     Coloration: Skin is not jaundiced  Neurological:      Mental Status: She is oriented to person, place, and time  Mental status is at baseline  Psychiatric:         Mood and Affect: Mood normal          Behavior: Behavior normal          Thought Content: Thought content normal          Judgment: Judgment normal       Patient cellulitis was much improved and seemed to be retreating compared to the previous day and the markings       Additional Data:     Labs:  Results from last 7 days   Lab Units 08/03/22  0425   WBC Thousand/uL 5 44   HEMOGLOBIN g/dL 8 1*   HEMATOCRIT % 26 5*   PLATELETS Thousands/uL 361   NEUTROS PCT % 64   LYMPHS PCT % 24   MONOS PCT % 11   EOS PCT % 0     Results from last 7 days   Lab Units 08/03/22  0425 08/02/22  0556   SODIUM mmol/L 142 140   POTASSIUM mmol/L 4 2 4 5   CHLORIDE mmol/L 109* 110*   CO2 mmol/L 28 26   BUN mg/dL 24 22   CREATININE mg/dL 1 79* 1 65*   ANION GAP mmol/L 5 4   CALCIUM mg/dL 8 5 8 2*   ALBUMIN g/dL  --  2 9*   TOTAL BILIRUBIN mg/dL  --  0 36   ALK PHOS U/L  --  79   ALT U/L  --  3*   AST U/L  --  9*   GLUCOSE RANDOM mg/dL 40* 112     Results from last 7 days   Lab Units 08/01/22  1834   INR  2 07*     Results from last 7 days   Lab Units 08/03/22  1100 08/03/22  0801 08/03/22  0741 08/03/22  0711 08/03/22  0701 08/02/22  2050 08/02/22  1551 08/02/22  1100 08/02/22  0655 08/01/22  2156   POC GLUCOSE mg/dl 180* 114 66 42* 44* 77 87 161* 115 173*               Lines/Drains:  Invasive Devices  Report    Drain  Duration           Urethral Catheter Latex 16 Fr  36 days              Urinary Catheter:  Goal for removal: No longer needed  Will place order to discontinue               Imaging: No pertinent imaging reviewed  Recent Cultures (last 7 days):         Last 24 Hours Medication List:        Today, Patient Was Seen By: Alo Diop MD    **Please Note: This note may have been constructed using a voice recognition system  **

## 2022-08-05 ENCOUNTER — HOME CARE VISIT (OUTPATIENT)
Dept: HOME HEALTH SERVICES | Facility: HOME HEALTHCARE | Age: 77
End: 2022-08-05
Payer: COMMERCIAL

## 2022-08-05 ENCOUNTER — APPOINTMENT (OUTPATIENT)
Dept: RADIOLOGY | Facility: CLINIC | Age: 77
End: 2022-08-05
Payer: COMMERCIAL

## 2022-08-05 ENCOUNTER — OFFICE VISIT (OUTPATIENT)
Dept: OBGYN CLINIC | Facility: CLINIC | Age: 77
End: 2022-08-05

## 2022-08-05 ENCOUNTER — TRANSITIONAL CARE MANAGEMENT (OUTPATIENT)
Dept: FAMILY MEDICINE CLINIC | Facility: CLINIC | Age: 77
End: 2022-08-05

## 2022-08-05 VITALS
HEIGHT: 63 IN | BODY MASS INDEX: 34.91 KG/M2 | DIASTOLIC BLOOD PRESSURE: 64 MMHG | SYSTOLIC BLOOD PRESSURE: 120 MMHG | WEIGHT: 197 LBS

## 2022-08-05 VITALS
TEMPERATURE: 97.3 F | HEART RATE: 61 BPM | RESPIRATION RATE: 16 BRPM | DIASTOLIC BLOOD PRESSURE: 62 MMHG | SYSTOLIC BLOOD PRESSURE: 122 MMHG | OXYGEN SATURATION: 95 %

## 2022-08-05 DIAGNOSIS — Z98.890 STATUS POST HIP SURGERY: ICD-10-CM

## 2022-08-05 DIAGNOSIS — Z98.890 STATUS POST HIP SURGERY: Primary | ICD-10-CM

## 2022-08-05 DIAGNOSIS — S72.002D CLOSED FRACTURE OF LEFT HIP WITH ROUTINE HEALING, SUBSEQUENT ENCOUNTER: ICD-10-CM

## 2022-08-05 PROCEDURE — 73552 X-RAY EXAM OF FEMUR 2/>: CPT

## 2022-08-05 PROCEDURE — 99024 POSTOP FOLLOW-UP VISIT: CPT | Performed by: ORTHOPAEDIC SURGERY

## 2022-08-05 PROCEDURE — G0299 HHS/HOSPICE OF RN EA 15 MIN: HCPCS

## 2022-08-05 NOTE — PROGRESS NOTES
Ortho Sports Medicine Hip- Post-op Visit    Assesment:   68 y o  female 6 weeks status post left hip insertional nail IM femur antegrade (trochanteric), DOS: 6/25/22, doing well    Plan:    Post-Operative treatment:    Ice to knee for 20 minutes at least 1-2 times daily  PT for ROM/strengthening to knee, hip and core  Gilda and her some had been advised that she has no current restrictions  Tylenol for pain  Imaging: All imaging from today was reviewed by myself and explained to the patient  We will obtain repeat xrays of the left femur on the next visit  Weight bearing:  as tolerated     ROM:  Full    Brace:  No brace needed    DVT Prophylaxis:  Ambulation    Follow up:   Return in about 6 weeks (around 9/16/2022) for Recheck, repeat X-rays  Patient was advised that if they have any fevers, chills, chest pain, shortness of breath, redness or drainage from the incision, please let our office know immediately  Chief Complaint   Patient presents with    Left Hip - Post-op       History of Present Illness: The patient is a 68 y o  female who is being evaluated post operatively 6 weeks status post left hip insertional nail IM femur antegrade (trochanteric), DOS: 6/25/22  Since the prior visit, She reports significant improvement  She reports knee pain and intermittent swelling from her hip down to her foot  She was recently admitted to the hospital for wheezing a few days ago and was also tested for DVT of the left lower limb, which was negative  Pain is well controlled  The patient is using ice to control swelling  They have started home physical therapy  The patient  Ambulation for DVT ppx  The patient has been ambulating without crutches, but has been between her walker and wheelchair  The patient denies any fevers, chills, calf pain, chest pain/shortness of breath, redness or drainage from the incision           I have reviewed the past medical, surgical, social and family history, medications and allergies as documented in the EMR  Review of systems: ROS is negative other than that noted in the HPI  Constitutional: Negative for fatigue and fever  Physical Exam:    Blood pressure 120/64, height 5' 3" (1 6 m), weight 89 4 kg (197 lb)  General/Constitutional: NAD, well developed, well nourished  HENT: Normocephalic, atraumatic  CV: Intact distal pulses, regular rate  Resp: No respiratory distress or labored breathing  Abd: No abdominal distention  Lymphatic: No lymphadenopathy palpated  Neuro: Alert and Oriented x 3, no focal deficits noted  Psych: Normal mood, normal affect, normal judgement, normal behavior  Skin: Warm, dry, no rashes, no erythema     Hip Exam (focused):    left hip:     No dislocation/deformity  ROM: Full  Greater troch:non-tender   SI joint: non-tender  Sri test: negative  Hortensia's test:  negative  Abduction: 5/5  AT/GS intact     No calf tenderness to palpation bilaterally    Incisions show no erythema, no drainage    LE NV Exam: +2 DP/PT pulses bilaterally  Sensation intact to light touch L2-S1 bilaterally      Hip Imaging:    X-rays of the left hip were reviewed, which demonstrate adequate alignment s/p left hip insertional nail IM femur  I have reviewed the radiology report anddo not currently have a radiology reading from  [facility], but will check the result once the reading is performed        Scribe Attestation    I,:  Kristin Glynn am acting as a scribe while in the presence of the attending physician :       I,:  Ramiro Calhoun DO personally performed the services described in this documentation    as scribed in my presence :

## 2022-08-07 LAB — METHYLMALONATE SERPL-SCNC: 201 NMOL/L (ref 0–378)

## 2022-08-08 ENCOUNTER — HOME CARE VISIT (OUTPATIENT)
Dept: HOME HEALTH SERVICES | Facility: HOME HEALTHCARE | Age: 77
End: 2022-08-08
Payer: COMMERCIAL

## 2022-08-08 ENCOUNTER — TELEPHONE (OUTPATIENT)
Dept: OBGYN CLINIC | Facility: HOSPITAL | Age: 77
End: 2022-08-08

## 2022-08-08 NOTE — DISCHARGE SUMMARY
[facility]  Discharge- Paolo Bui 1945, 68 y o  female MRN: [id number]  Unit/Bed#: S -01 Encounter: [id number]  Primary Care Provider: Sebastian Nunez MD   Date and time admitted to hospital: 8/1/2022  1:11 PM    * CHF (congestive heart failure) [facility])  Assessment & Plan  Wt Readings from Last 3 Encounters:   08/05/22 89 4 kg (197 lb)   08/02/22 89 4 kg (197 lb)   07/22/22 88 9 kg (196 lb)       Pt presents w/ SOB, HTN, and subjective wheeze  BNP was elevated  PE was R/O w/ negative V/Q and LE US Doppler  PE was negative for LE edema, negative for wheezing/rales/ronchi  ECHO 02AUG2022 negative for reduced EF, diskensis, low filling volumes  Walking trial [id number]: sPO2 walking 97-98%  PLAN:  -PFT order for outpatient submitted 02AUG2022  -Follow Cardio Outpatient  Hypertension  Assessment & Plan  Patient home meds:  Carvedilol 25 mg  Came in the ED with blood pressure of 186/76  Per conversation w/ son the BL BP is 165/80  PLAN:   Will start amlodipine    Stage 3b chronic kidney disease [facility])  Assessment & Plan  Lab Results   Component Value Date    EGFR 26 08/03/2022    EGFR 29 08/02/2022    EGFR 24 08/01/2022    CREATININE 1 79 (H) 08/03/2022    CREATININE 1 65 (H) 08/02/2022    CREATININE 1 91 (H) 08/01/2022   Patient had recent SAHIL during recent hospitalization  Recent baseline appears to be 1 7-1 9  POA crea 1 91  Avoid nephrotoxins    Diabetes mellitus type 2, insulin dependent (HCC)  Assessment & Plan  Lab Results   Component Value Date    HGBA1C 7 1 (H) 02/18/2022       No results for input(s): POCGLU in the last 72 hours  Blood Sugar Average: Last 72 hrs:   continue home dose Lantus      Anemia of chronic kidney failure, stage 4 (severe) ([facility])  Assessment & Plan  Pt CBC on presentation was lower than patients baseline BL   The only other time in the patient chart when CBC values were this low was 25JUN2022 during L hip replacement surgury when patient became anemic secodary to blood loss and was transfused  Hgb   BL 9 9-10 9:   25PJT1132: <9 0   67DTD9638 8 6   35HLH2407 8 1  RBC   BL 3 6-4 05:    75ZME4501 <3 33   24KNW3855 3 08,   40VTV7386 2 82  HCT   BL 32 8 to 34 5  88SAC4901<30   11DUW4912 28 3  67YXH2601 25 9     MCV 92 on 02AUG2022    PLAN: Work up anemia as potential etiology of SOB  Please consider checking Iron panel outpatient     Atrial fibrillation [facility])  Assessment & Plan  Pt on rivaroxaban at home  Heprin was given but held dut to elevated PTT on 02AUG2022  PLAN  Rivaroxaban    Elevated d-dimer  Assessment & Plan  Patient had 1 day history of acute shortness of breath described as wheezing  Recent left femoral fracture status post nailing via Orthopedics on 06/24     Workups:  BNP elevated at 957, D-dimer 2 67  Left calf tenderness  However patient is also not tachycardic, not hypoxic currently on room air  Plan will try to rule out PE   Echo, VQ scan were negative  Lower leg vascular studies negative   Medrol p r n  Every 6 hours  Rivaroxaban  Medical Problems             Resolved Problems  Date Reviewed: 8/5/2022   None               Discharging Resident: Ayan Benitez MD  Discharging Attending: No att  providers found  PCP: León Lacy MD  Admission Date:   Admission Orders (From admission, onward)     Ordered        08/02/22 1435  Inpatient Admission  Once            08/01/22 1813  Place in Observation  Once                      Discharge Date: 08/03/22    Consultations During Hospital Stay:  · None     Procedures Performed:   · None    Significant Findings / Test Results:   X-ray chest 1 view portable    Result Date: 8/1/2022  Impression: Linear density seen left lower lung due to atelectasis No acute consolidation or congestion Workstation performed: [id number]     NM lung ventilation / perfusion    Result Date: 8/2/2022  Impression: The probability for pulmonary embolus is low   Workstation performed: [id number] Incidental Findings:   · None    Test Results Pending at Discharge (will require follow up): · None     Outpatient Tests Requested:  · None    Complications:  None    Reason for Admission: SOB    Hospital Course:   Sandy Lomax is a 68 y o  female patient who originally presented to the hospital on 8/1/2022 due to SOB  68 y o  female w/ pmhx CVA (11yo) CKD, insulin dependent DM, HTN, HLD, neuropathy MI s/p stent pacemaker, fall s/p left hip replacement 25JUL2022, who presents w/ worsening SOB  Pt presented to the ED w/ improving wheezing but still SOB afebrile, hypertensive (187/76)  Pt workup positive for elevated D-Dimer, 2 67, , no CTA as pt has CKD  Pt was admitted on 01AUG2022 into the care of [facility] due to suspision of PE vs CHF vs Pneumonia, vs anemia, vs deconditionoing post L hip surgery  Workup for PE was negative, workup for CHF was also negative  Patient was stablized and cleared for D/C by YIFAN on 03AUG2022      Please see above list of diagnoses and related plan for additional information  Condition at Discharge: stable    Discharge Day Visit / Exam:   Subjective:  Patient was seen at bedside and noted no further exacerbations of SOB  Patient noted no acute events overnight  Vitals: Blood Pressure: 130/63 (08/03/22 1508)  Pulse: 62 (08/03/22 1508)  Temperature: 98 9 °F (37 2 °C) (08/03/22 1508)  Temp Source: Oral (08/03/22 1508)  Respirations: 18 (08/03/22 1508)  Height: 5' 3" (160 cm) (08/02/22 1020)  Weight - Scale: 89 4 kg (197 lb) (08/02/22 1020)  SpO2: 93 % (08/03/22 1508)  Exam:   Physical Exam  Constitutional:       General: She is not in acute distress  Appearance: She is obese  She is not toxic-appearing or diaphoretic  HENT:      Head: Normocephalic  Nose: Nose normal       Mouth/Throat:      Mouth: Mucous membranes are moist       Pharynx: Oropharynx is clear  No oropharyngeal exudate or posterior oropharyngeal erythema     Eyes:      General: No scleral icterus  Right eye: No discharge  Left eye: No discharge  Conjunctiva/sclera: Conjunctivae normal    Cardiovascular:      Rate and Rhythm: Normal rate  Pulmonary:      Effort: Pulmonary effort is normal       Breath sounds: Normal breath sounds  Abdominal:      General: Bowel sounds are normal       Tenderness: There is no abdominal tenderness  There is no guarding or rebound  Neurological:      Mental Status: She is alert and oriented to person, place, and time  Psychiatric:         Mood and Affect: Mood normal          Behavior: Behavior normal          Thought Content: Thought content normal          Judgment: Judgment normal           Discussion with Family: Updated  (son) via phone  Discharge instructions/Information to patient and family:   See after visit summary for information provided to patient and family  Provisions for Follow-Up Care:  See after visit summary for information related to follow-up care and any pertinent home health orders  Disposition:   Home    Planned Readmission: None    Discharge Medications:  See after visit summary for reconciled discharge medications provided to patient and/or family        **Please Note: This note may have been constructed using a voice recognition system**

## 2022-08-08 NOTE — ASSESSMENT & PLAN NOTE
Lab Results   Component Value Date    EGFR 26 08/03/2022    EGFR 29 08/02/2022    EGFR 24 08/01/2022    CREATININE 1 79 (H) 08/03/2022    CREATININE 1 65 (H) 08/02/2022    CREATININE 1 91 (H) 08/01/2022   Patient had recent SAHIL during recent hospitalization  Recent baseline appears to be 1 7-1 9    POA crea 1 91  Avoid nephrotoxins

## 2022-08-08 NOTE — ASSESSMENT & PLAN NOTE
Wt Readings from Last 3 Encounters:   08/05/22 89 4 kg (197 lb)   08/02/22 89 4 kg (197 lb)   07/22/22 88 9 kg (196 lb)       Pt presents w/ SOB, HTN, and subjective wheeze  BNP was elevated  PE was R/O w/ negative V/Q and LE US Doppler  PE was negative for LE edema, negative for wheezing/rales/ronchi  ECHO 02AUG2022 negative for reduced EF, diskensis, low filling volumes  Walking trial [id number]: sPO2 walking 97-98%  PLAN:  -PFT order for outpatient submitted 02AUG2022  -Follow Cardio Outpatient

## 2022-08-08 NOTE — ASSESSMENT & PLAN NOTE
Patient home meds:  Carvedilol 25 mg  Came in the ED with blood pressure of 186/76  Per conversation w/ son the BL BP is 165/80  PLAN:   Will start amlodipine

## 2022-08-08 NOTE — ASSESSMENT & PLAN NOTE
Pt CBC on presentation was lower than patients baseline BL  The only other time in the patient chart when CBC values were this low was 20IET6987 during L hip replacement surgury when patient became anemic secodary to blood loss and was transfused  Hgb   BL 9 9-10 9:   08OYW1595: <9 0   24EWA7917 8 6   21DCU0631 8 1  RBC   BL 3 6-4 05:    10GLV4496 <3 33   48GHM6542 3 08,   01JZK5929 2 82  HCT   BL 32 8 to 34 5  45EXK1055<30   01YVH5924 28 3  56LIR0101 25 9     MCV 92 on 09QHV6723    PLAN: Work up anemia as potential etiology of SOB     Please consider checking Iron panel outpatient

## 2022-08-09 ENCOUNTER — HOME CARE VISIT (OUTPATIENT)
Dept: HOME HEALTH SERVICES | Facility: HOME HEALTHCARE | Age: 77
End: 2022-08-09
Payer: COMMERCIAL

## 2022-08-09 VITALS — HEART RATE: 76 BPM | DIASTOLIC BLOOD PRESSURE: 64 MMHG | OXYGEN SATURATION: 96 % | SYSTOLIC BLOOD PRESSURE: 140 MMHG

## 2022-08-09 PROCEDURE — G0151 HHCP-SERV OF PT,EA 15 MIN: HCPCS

## 2022-08-09 NOTE — CASE COMMUNICATION
PT Initial Evaluation today  Patient reports fall on Saturday morning  More stiffness in the left LE  edema  Declines need to see DR  Bout of vomiting this morning  Unable to transfer sit to stand for me today  Advised patient and family to supervise all transfers and ambulation  Plan to resume skilled home care PT 2W4  WIll see Friday for PT revisit and resumed therapeutic exs  transfer and gait training  Will continue to progres s towards functional goals from initial admission

## 2022-08-12 ENCOUNTER — HOME CARE VISIT (OUTPATIENT)
Dept: HOME HEALTH SERVICES | Facility: HOME HEALTHCARE | Age: 77
End: 2022-08-12
Payer: COMMERCIAL

## 2022-08-12 VITALS — DIASTOLIC BLOOD PRESSURE: 60 MMHG | SYSTOLIC BLOOD PRESSURE: 140 MMHG

## 2022-08-12 VITALS — DIASTOLIC BLOOD PRESSURE: 80 MMHG | SYSTOLIC BLOOD PRESSURE: 130 MMHG | HEART RATE: 65 BPM | OXYGEN SATURATION: 97 %

## 2022-08-12 PROCEDURE — G0151 HHCP-SERV OF PT,EA 15 MIN: HCPCS

## 2022-08-12 PROCEDURE — G0299 HHS/HOSPICE OF RN EA 15 MIN: HCPCS

## 2022-08-12 PROCEDURE — G0152 HHCP-SERV OF OT,EA 15 MIN: HCPCS

## 2022-08-13 NOTE — CASE COMMUNICATION
Homecare occupational therapy evaluation completed 8/13/2022  Plan of care 2x a week for 4 weeks to resume training on BUE home exercise program, patient and caregiver training on ECT , safety with ADL performance and assess DME needed

## 2022-08-16 ENCOUNTER — HOME CARE VISIT (OUTPATIENT)
Dept: HOME HEALTH SERVICES | Facility: HOME HEALTHCARE | Age: 77
End: 2022-08-16
Payer: COMMERCIAL

## 2022-08-16 VITALS — HEART RATE: 62 BPM | OXYGEN SATURATION: 97 % | DIASTOLIC BLOOD PRESSURE: 70 MMHG | SYSTOLIC BLOOD PRESSURE: 135 MMHG

## 2022-08-16 VITALS
OXYGEN SATURATION: 96 % | DIASTOLIC BLOOD PRESSURE: 80 MMHG | TEMPERATURE: 97.5 F | SYSTOLIC BLOOD PRESSURE: 130 MMHG | HEART RATE: 65 BPM | RESPIRATION RATE: 16 BRPM

## 2022-08-16 VITALS — SYSTOLIC BLOOD PRESSURE: 152 MMHG | DIASTOLIC BLOOD PRESSURE: 64 MMHG

## 2022-08-16 PROCEDURE — G0151 HHCP-SERV OF PT,EA 15 MIN: HCPCS

## 2022-08-16 PROCEDURE — G0152 HHCP-SERV OF OT,EA 15 MIN: HCPCS

## 2022-08-18 ENCOUNTER — HOME CARE VISIT (OUTPATIENT)
Dept: HOME HEALTH SERVICES | Facility: HOME HEALTHCARE | Age: 77
End: 2022-08-18
Payer: COMMERCIAL

## 2022-08-18 VITALS
TEMPERATURE: 97.1 F | HEART RATE: 55 BPM | OXYGEN SATURATION: 98 % | SYSTOLIC BLOOD PRESSURE: 112 MMHG | RESPIRATION RATE: 16 BRPM | DIASTOLIC BLOOD PRESSURE: 60 MMHG

## 2022-08-18 VITALS — DIASTOLIC BLOOD PRESSURE: 66 MMHG | SYSTOLIC BLOOD PRESSURE: 120 MMHG

## 2022-08-18 PROCEDURE — G0151 HHCP-SERV OF PT,EA 15 MIN: HCPCS

## 2022-08-18 PROCEDURE — G0299 HHS/HOSPICE OF RN EA 15 MIN: HCPCS

## 2022-08-19 ENCOUNTER — HOME CARE VISIT (OUTPATIENT)
Dept: HOME HEALTH SERVICES | Facility: HOME HEALTHCARE | Age: 77
End: 2022-08-19
Payer: COMMERCIAL

## 2022-08-19 VITALS — SYSTOLIC BLOOD PRESSURE: 140 MMHG | OXYGEN SATURATION: 98 % | DIASTOLIC BLOOD PRESSURE: 60 MMHG | HEART RATE: 62 BPM

## 2022-08-19 PROCEDURE — G0152 HHCP-SERV OF OT,EA 15 MIN: HCPCS

## 2022-08-22 ENCOUNTER — HOME CARE VISIT (OUTPATIENT)
Dept: HOME HEALTH SERVICES | Facility: HOME HEALTHCARE | Age: 77
End: 2022-08-22
Payer: COMMERCIAL

## 2022-08-22 VITALS — OXYGEN SATURATION: 99 % | DIASTOLIC BLOOD PRESSURE: 66 MMHG | HEART RATE: 76 BPM | SYSTOLIC BLOOD PRESSURE: 136 MMHG

## 2022-08-22 PROCEDURE — G0299 HHS/HOSPICE OF RN EA 15 MIN: HCPCS

## 2022-08-22 PROCEDURE — 400013 VN SOC

## 2022-08-22 PROCEDURE — G0151 HHCP-SERV OF PT,EA 15 MIN: HCPCS

## 2022-08-23 VITALS
SYSTOLIC BLOOD PRESSURE: 112 MMHG | RESPIRATION RATE: 16 BRPM | DIASTOLIC BLOOD PRESSURE: 58 MMHG | OXYGEN SATURATION: 97 % | HEART RATE: 60 BPM | TEMPERATURE: 97.3 F

## 2022-08-24 ENCOUNTER — HOME CARE VISIT (OUTPATIENT)
Dept: HOME HEALTH SERVICES | Facility: HOME HEALTHCARE | Age: 77
End: 2022-08-24
Payer: COMMERCIAL

## 2022-08-24 VITALS — DIASTOLIC BLOOD PRESSURE: 70 MMHG | OXYGEN SATURATION: 97 % | HEART RATE: 57 BPM | SYSTOLIC BLOOD PRESSURE: 120 MMHG

## 2022-08-24 VITALS — HEART RATE: 84 BPM

## 2022-08-24 DIAGNOSIS — N18.30 TYPE 2 DIABETES MELLITUS WITH STAGE 3 CHRONIC KIDNEY DISEASE, WITH LONG-TERM CURRENT USE OF INSULIN (HCC): ICD-10-CM

## 2022-08-24 DIAGNOSIS — E11.22 TYPE 2 DIABETES MELLITUS WITH STAGE 3 CHRONIC KIDNEY DISEASE, WITH LONG-TERM CURRENT USE OF INSULIN (HCC): ICD-10-CM

## 2022-08-24 DIAGNOSIS — Z79.4 TYPE 2 DIABETES MELLITUS WITH STAGE 3 CHRONIC KIDNEY DISEASE, WITH LONG-TERM CURRENT USE OF INSULIN (HCC): ICD-10-CM

## 2022-08-24 PROCEDURE — G0151 HHCP-SERV OF PT,EA 15 MIN: HCPCS

## 2022-08-24 PROCEDURE — G0152 HHCP-SERV OF OT,EA 15 MIN: HCPCS

## 2022-08-24 RX ORDER — PRAVASTATIN SODIUM 40 MG
40 TABLET ORAL DAILY
Qty: 90 TABLET | Refills: 1 | Status: SHIPPED | OUTPATIENT
Start: 2022-08-24 | End: 2022-10-14

## 2022-08-26 ENCOUNTER — HOME CARE VISIT (OUTPATIENT)
Dept: HOME HEALTH SERVICES | Facility: HOME HEALTHCARE | Age: 77
End: 2022-08-26
Payer: COMMERCIAL

## 2022-08-26 VITALS — HEART RATE: 82 BPM | DIASTOLIC BLOOD PRESSURE: 85 MMHG | SYSTOLIC BLOOD PRESSURE: 140 MMHG | OXYGEN SATURATION: 97 %

## 2022-08-26 PROCEDURE — G0152 HHCP-SERV OF OT,EA 15 MIN: HCPCS

## 2022-08-29 ENCOUNTER — HOME CARE VISIT (OUTPATIENT)
Dept: HOME HEALTH SERVICES | Facility: HOME HEALTHCARE | Age: 77
End: 2022-08-29
Payer: COMMERCIAL

## 2022-08-29 VITALS — SYSTOLIC BLOOD PRESSURE: 122 MMHG | HEART RATE: 76 BPM | OXYGEN SATURATION: 98 % | DIASTOLIC BLOOD PRESSURE: 60 MMHG

## 2022-08-29 PROCEDURE — G0151 HHCP-SERV OF PT,EA 15 MIN: HCPCS

## 2022-08-30 ENCOUNTER — HOME CARE VISIT (OUTPATIENT)
Dept: HOME HEALTH SERVICES | Facility: HOME HEALTHCARE | Age: 77
End: 2022-08-30
Payer: COMMERCIAL

## 2022-08-30 VITALS — HEART RATE: 60 BPM | OXYGEN SATURATION: 97 % | SYSTOLIC BLOOD PRESSURE: 120 MMHG | DIASTOLIC BLOOD PRESSURE: 65 MMHG

## 2022-08-30 PROCEDURE — G0152 HHCP-SERV OF OT,EA 15 MIN: HCPCS

## 2022-08-31 ENCOUNTER — HOME CARE VISIT (OUTPATIENT)
Dept: HOME HEALTH SERVICES | Facility: HOME HEALTHCARE | Age: 77
End: 2022-08-31
Payer: COMMERCIAL

## 2022-08-31 VITALS — HEART RATE: 82 BPM | SYSTOLIC BLOOD PRESSURE: 142 MMHG | OXYGEN SATURATION: 94 % | DIASTOLIC BLOOD PRESSURE: 62 MMHG

## 2022-08-31 PROCEDURE — G0151 HHCP-SERV OF PT,EA 15 MIN: HCPCS

## 2022-08-31 NOTE — CASE COMMUNICATION
Patient has reached all goals of home care physical therapy and will be discharged to an independent home program   [facility] will follow up with physician visits as needed and will consult if progressing to OPPT

## 2022-09-02 ENCOUNTER — HOSPITAL ENCOUNTER (OUTPATIENT)
Dept: PULMONOLOGY | Facility: HOSPITAL | Age: 77
Discharge: HOME | End: 2022-09-02

## 2022-09-02 ENCOUNTER — HOME CARE VISIT (OUTPATIENT)
Dept: HOME HEALTH SERVICES | Facility: HOME HEALTHCARE | Age: 77
End: 2022-09-02
Payer: COMMERCIAL

## 2022-09-02 VITALS — DIASTOLIC BLOOD PRESSURE: 70 MMHG | SYSTOLIC BLOOD PRESSURE: 150 MMHG | HEART RATE: 72 BPM | OXYGEN SATURATION: 99 %

## 2022-09-02 PROCEDURE — G0152 HHCP-SERV OF OT,EA 15 MIN: HCPCS

## 2022-09-06 RX ORDER — INSULIN GLARGINE 100 [IU]/ML
INJECTION, SOLUTION SUBCUTANEOUS
COMMUNITY
Start: 2022-08-04

## 2022-09-06 RX ORDER — NIFEDIPINE 60 MG/1
TABLET, EXTENDED RELEASE ORAL
COMMUNITY
Start: 2022-07-20 | End: 2022-10-14

## 2022-09-07 ENCOUNTER — OFFICE VISIT (OUTPATIENT)
Dept: FAMILY MEDICINE CLINIC | Facility: CLINIC | Age: 77
End: 2022-09-07
Payer: COMMERCIAL

## 2022-09-07 ENCOUNTER — HOME CARE VISIT (OUTPATIENT)
Dept: HOME HEALTH SERVICES | Facility: HOME HEALTHCARE | Age: 77
End: 2022-09-07
Payer: COMMERCIAL

## 2022-09-07 VITALS — DIASTOLIC BLOOD PRESSURE: 70 MMHG | HEART RATE: 64 BPM | OXYGEN SATURATION: 97 % | SYSTOLIC BLOOD PRESSURE: 145 MMHG

## 2022-09-07 VITALS
RESPIRATION RATE: 16 BRPM | SYSTOLIC BLOOD PRESSURE: 124 MMHG | WEIGHT: 170 LBS | DIASTOLIC BLOOD PRESSURE: 78 MMHG | HEART RATE: 78 BPM | HEIGHT: 63 IN | BODY MASS INDEX: 30.12 KG/M2 | OXYGEN SATURATION: 99 %

## 2022-09-07 DIAGNOSIS — Z79.4 DIABETES MELLITUS TYPE 2, INSULIN DEPENDENT (HCC): Primary | ICD-10-CM

## 2022-09-07 DIAGNOSIS — Z23 NEED FOR VACCINATION: ICD-10-CM

## 2022-09-07 DIAGNOSIS — E66.09 CLASS 1 OBESITY DUE TO EXCESS CALORIES WITH SERIOUS COMORBIDITY AND BODY MASS INDEX (BMI) OF 30.0 TO 30.9 IN ADULT: ICD-10-CM

## 2022-09-07 DIAGNOSIS — I48.91 ATRIAL FIBRILLATION, UNSPECIFIED TYPE (HCC): ICD-10-CM

## 2022-09-07 DIAGNOSIS — E11.9 DIABETES MELLITUS TYPE 2, INSULIN DEPENDENT (HCC): Primary | ICD-10-CM

## 2022-09-07 DIAGNOSIS — I25.10 CORONARY ARTERY DISEASE INVOLVING NATIVE CORONARY ARTERY OF NATIVE HEART WITHOUT ANGINA PECTORIS: ICD-10-CM

## 2022-09-07 DIAGNOSIS — Z00.00 WELL ADULT EXAM: ICD-10-CM

## 2022-09-07 DIAGNOSIS — Z98.890: ICD-10-CM

## 2022-09-07 PROCEDURE — G0180 MD CERTIFICATION HHA PATIENT: HCPCS | Performed by: INTERNAL MEDICINE

## 2022-09-07 PROCEDURE — G0152 HHCP-SERV OF OT,EA 15 MIN: HCPCS

## 2022-09-07 PROCEDURE — 90677 PCV20 VACCINE IM: CPT

## 2022-09-07 PROCEDURE — 99214 OFFICE O/P EST MOD 30 MIN: CPT

## 2022-09-07 PROCEDURE — G0439 PPPS, SUBSEQ VISIT: HCPCS

## 2022-09-07 PROCEDURE — G0009 ADMIN PNEUMOCOCCAL VACCINE: HCPCS

## 2022-09-07 NOTE — PATIENT INSTRUCTIONS
Medicare Preventive Visit Patient Instructions  Thank you for completing your Welcome to Medicare Visit or Medicare Annual Wellness Visit today  Your next wellness visit will be due in one year (9/8/2023)  The screening/preventive services that you may require over the next 5-10 years are detailed below  Some tests may not apply to you based off risk factors and/or age  Screening tests ordered at today's visit but not completed yet may show as past due  Also, please note that scanned in results may not display below  Preventive Screenings:  Service Recommendations Previous Testing/Comments   Colorectal Cancer Screening  * Colonoscopy    * Fecal Occult Blood Test (FOBT)/Fecal Immunochemical Test (FIT)  * Fecal DNA/Cologuard Test  * Flexible Sigmoidoscopy Age: 39-70 years old   Colonoscopy: every 10 years (may be performed more frequently if at higher risk)  OR  FOBT/FIT: every 1 year  OR  Cologuard: every 3 years  OR  Sigmoidoscopy: every 5 years  Screening may be recommended earlier than age 39 if at higher risk for colorectal cancer  Also, an individualized decision between you and your healthcare provider will decide whether screening between the ages of 74-80 would be appropriate  Colonoscopy: 11/28/2019  FOBT/FIT: 06/29/2022  Cologuard: 11/28/2019  Sigmoidoscopy: Not on file          Breast Cancer Screening Age: 36 years old  Frequency: every 1-2 years  Not required if history of left and right mastectomy Mammogram: Not on file        Cervical Cancer Screening Between the ages of 21-29, pap smear recommended once every 3 years  Between the ages of 33-67, can perform pap smear with HPV co-testing every 5 years     Recommendations may differ for women with a history of total hysterectomy, cervical cancer, or abnormal pap smears in past  Pap Smear: Not on file    Screening Not Indicated   Hepatitis C Screening Once for adults born between 1945 and 1965  More frequently in patients at high risk for Hepatitis C Hep C Antibody: Not on file        Diabetes Screening 1-2 times per year if you're at risk for diabetes or have pre-diabetes Fasting glucose: 112 mg/dL (8/2/2022)  A1C: 7 1 % (2/18/2022)  Screening Not Indicated  History Diabetes   Cholesterol Screening Once every 5 years if you don't have a lipid disorder  May order more often based on risk factors  Lipid panel: 02/18/2022    Screening Current     Other Preventive Screenings Covered by Medicare:  1  Abdominal Aortic Aneurysm (AAA) Screening: covered once if your at risk  You're considered to be at risk if you have a family history of AAA  2  Lung Cancer Screening: covers low dose CT scan once per year if you meet all of the following conditions: (1) Age 50-69; (2) No signs or symptoms of lung cancer; (3) Current smoker or have quit smoking within the last 15 years; (4) You have a tobacco smoking history of at least 20 pack years (packs per day multiplied by number of years you smoked); (5) You get a written order from a healthcare provider  3  Glaucoma Screening: covered annually if you're considered high risk: (1) You have diabetes OR (2) Family history of glaucoma OR (3)  aged 48 and older OR (3)  American aged 72 and older  3  Osteoporosis Screening: covered every 2 years if you meet one of the following conditions: (1) You're estrogen deficient and at risk for osteoporosis based off medical history and other findings; (2) Have a vertebral abnormality; (3) On glucocorticoid therapy for more than 3 months; (4) Have primary hyperparathyroidism; (5) On osteoporosis medications and need to assess response to drug therapy  · Last bone density test (DXA Scan): Not on file  5  HIV Screening: covered annually if you're between the age of 12-76  Also covered annually if you are younger than 13 and older than 72 with risk factors for HIV infection   For pregnant patients, it is covered up to 3 times per pregnancy  Immunizations:  Immunization Recommendations   Influenza Vaccine Annual influenza vaccination during flu season is recommended for all persons aged >= 6 months who do not have contraindications   Pneumococcal Vaccine   * Pneumococcal conjugate vaccine = PCV13 (Prevnar 13), PCV15 (Vaxneuvance), PCV20 (Prevnar 20)  * Pneumococcal polysaccharide vaccine = PPSV23 (Pneumovax) Adults 25-60 years old: 1-3 doses may be recommended based on certain risk factors  Adults 72 years old: 1-2 doses may be recommended based off what pneumonia vaccine you previously received   Hepatitis B Vaccine 3 dose series if at intermediate or high risk (ex: diabetes, end stage renal disease, liver disease)   Tetanus (Td) Vaccine - COST NOT COVERED BY MEDICARE PART B Following completion of primary series, a booster dose should be given every 10 years to maintain immunity against tetanus  Td may also be given as tetanus wound prophylaxis  Tdap Vaccine - COST NOT COVERED BY MEDICARE PART B Recommended at least once for all adults  For pregnant patients, recommended with each pregnancy  Shingles Vaccine (Shingrix) - COST NOT COVERED BY MEDICARE PART B  2 shot series recommended in those aged 48 and above     Health Maintenance Due:      Topic Date Due    Hepatitis C Screening  Never done    Breast Cancer Screening: Mammogram  08/27/2099 (Originally 7/26/1985)     Immunizations Due:      Topic Date Due    Pneumococcal Vaccine: 65+ Years (3 - PPSV23 or PCV20) 10/16/2020    COVID-19 Vaccine (4 - Booster for Moderna series) 04/10/2022    Influenza Vaccine (1) 09/01/2022     Advance Directives   What are advance directives? Advance directives are legal documents that state your wishes and plans for medical care  These plans are made ahead of time in case you lose your ability to make decisions for yourself  Advance directives can apply to any medical decision, such as the treatments you want, and if you want to donate organs  What are the types of advance directives? There are many types of advance directives, and each state has rules about how to use them  You may choose a combination of any of the following:  · Living will: This is a written record of the treatment you want  You can also choose which treatments you do not want, which to limit, and which to stop at a certain time  This includes surgery, medicine, IV fluid, and tube feedings  · Durable power of  for healthcare [facility]): This is a written record that states who you want to make healthcare choices for you when you are unable to make them for yourself  This person, called a proxy, is usually a family member or a friend  You may choose more than 1 proxy  · Do not resuscitate (DNR) order:  A DNR order is used in case your heart stops beating or you stop breathing  It is a request not to have certain forms of treatment, such as CPR  A DNR order may be included in other types of advance directives  · Medical directive: This covers the care that you want if you are in a coma, near death, or unable to make decisions for yourself  You can list the treatments you want for each condition  Treatment may include pain medicine, surgery, blood transfusions, dialysis, IV or tube feedings, and a ventilator (breathing machine)  · Values history: This document has questions about your views, beliefs, and how you feel and think about life  This information can help others choose the care that you would choose  Why are advance directives important? An advance directive helps you control your care  Although spoken wishes may be used, it is better to have your wishes written down  Spoken wishes can be misunderstood, or not followed  Treatments may be given even if you do not want them  An advance directive may make it easier for your family to make difficult choices about your care  Fall Prevention    Fall prevention  includes ways to make your home and other areas safer  It also includes ways you can move more carefully to prevent a fall  Health conditions that cause changes in your blood pressure, vision, or muscle strength and coordination may increase your risk for falls  Medicines may also increase your risk for falls if they make you dizzy, weak, or sleepy  Fall prevention tips:   · Stand or sit up slowly  · Use assistive devices as directed  · Wear shoes that fit well and have soles that   · Wear a personal alarm  · Stay active  · Manage your medical conditions  Home Safety Tips:  · Add items to prevent falls in the bathroom  · Keep paths clear  · Install bright lights in your home  · Keep items you use often on shelves within reach  · Paint or place reflective tape on the edges of your stairs  Urinary Incontinence   Urinary incontinence (UI)  is when you lose control of your bladder  UI develops because your bladder cannot store or empty urine properly  The 3 most common types of UI are stress incontinence, urge incontinence, or both  Medicines:   · May be given to help strengthen your bladder control  Report any side effects of medication to your healthcare provider  Do pelvic muscle exercises often:  Your pelvic muscles help you stop urinating  Squeeze these muscles tight for 5 seconds, then relax for 5 seconds  Gradually work up to squeezing for 10 seconds  Do 3 sets of 15 repetitions a day, or as directed  This will help strengthen your pelvic muscles and improve bladder control  Train your bladder:  Go to the bathroom at set times, such as every 2 hours, even if you do not feel the urge to go  You can also try to hold your urine when you feel the urge to go  For example, hold your urine for 5 minutes when you feel the urge to go  As that becomes easier, hold your urine for 10 minutes  Self-care:   · Keep a UI record  Write down how often you leak urine and how much you leak   Make a note of what you were doing when you leaked urine   · Drink liquids as directed  You may need to limit the amount of liquid you drink to help control your urine leakage  Do not drink any liquid right before you go to bed  Limit or do not have drinks that contain caffeine or alcohol  · Prevent constipation  Eat a variety of high-fiber foods  Good examples are high-fiber cereals, beans, vegetables, and whole-grain breads  Walking is the best way to trigger your intestines to have a bowel movement  · Exercise regularly and maintain a healthy weight  Weight loss and exercise will decrease pressure on your bladder and help you control your leakage  · Use a catheter as directed  to help empty your bladder  A catheter is a tiny, plastic tube that is put into your bladder to drain your urine  · Go to behavior therapy as directed  Behavior therapy may be used to help you learn to control your urge to urinate  Weight Management   Why it is important to manage your weight:  Being overweight increases your risk of health conditions such as heart disease, high blood pressure, type 2 diabetes, and certain types of cancer  It can also increase your risk for osteoarthritis, sleep apnea, and other respiratory problems  Aim for a slow, steady weight loss  Even a small amount of weight loss can lower your risk of health problems  How to lose weight safely:  A safe and healthy way to lose weight is to eat fewer calories and get regular exercise  You can lose up about 1 pound a week by decreasing the number of calories you eat by 500 calories each day  Healthy meal plan for weight management:  A healthy meal plan includes a variety of foods, contains fewer calories, and helps you stay healthy  A healthy meal plan includes the following:  · Eat whole-grain foods more often  A healthy meal plan should contain fiber  Fiber is the part of grains, fruits, and vegetables that is not broken down by your body   Whole-grain foods are healthy and provide extra fiber in your diet  Some examples of whole-grain foods are whole-wheat breads and pastas, oatmeal, brown rice, and bulgur  · Eat a variety of vegetables every day  Include dark, leafy greens such as spinach, kale, jose elias greens, and mustard greens  Eat yellow and orange vegetables such as carrots, sweet potatoes, and winter squash  · Eat a variety of fruits every day  Choose fresh or canned fruit (canned in its own juice or light syrup) instead of juice  Fruit juice has very little or no fiber  · Eat low-fat dairy foods  Drink fat-free (skim) milk or 1% milk  Eat fat-free yogurt and low-fat cottage cheese  Try low-fat cheeses such as mozzarella and other reduced-fat cheeses  · Choose meat and other protein foods that are low in fat  Choose beans or other legumes such as split peas or lentils  Choose fish, skinless poultry (chicken or turkey), or lean cuts of red meat (beef or pork)  Before you cook meat or poultry, cut off any visible fat  · Use less fat and oil  Try baking foods instead of frying them  Add less fat, such as margarine, sour cream, regular salad dressing and mayonnaise to foods  Eat fewer high-fat foods  Some examples of high-fat foods include french fries, doughnuts, ice cream, and cakes  · Eat fewer sweets  Limit foods and drinks that are high in sugar  This includes candy, cookies, regular soda, and sweetened drinks  Exercise:  Exercise at least 30 minutes per day on most days of the week  Some examples of exercise include walking, biking, dancing, and swimming  You can also fit in more physical activity by taking the stairs instead of the elevator or parking farther away from stores  Ask your healthcare provider about the best exercise plan for you  © Copyright [employer] 2018 Information is for End User's use only and may not be sold, redistributed or otherwise used for commercial purposes   All illustrations and images included in CareNotes® are the copyrighted property of [employer]  or [facility]    Type 2 Diabetes Management for Adults   AMBULATORY CARE:   Type 2 diabetes  is a disease that affects how your body uses glucose (sugar)  Either your body cannot make enough insulin, or it cannot use the insulin correctly  It is important to keep diabetes controlled to prevent damage to your heart, blood vessels, and other organs  Have someone call your local emergency number (911 in the [address]) if:   · You cannot be woken  · You have signs of diabetic ketoacidosis:     ? confusion, fatigue    ? vomiting    ? rapid heartbeat    ? fruity smelling breath    ? extreme thirst    ? dry mouth and skin    · You have any of the following signs of a heart attack:      ? Squeezing, pressure, or pain in your chest    ? You may  also have any of the following:     § Discomfort or pain in your back, neck, jaw, stomach, or arm    § Shortness of breath    § Nausea or vomiting    § Lightheadedness or a sudden cold sweat    · You have any of the following signs of a stroke:      ? Numbness or drooping on one side of your face     ? Weakness in an arm or leg    ? Confusion or difficulty speaking    ? Dizziness, a severe headache, or vision loss    Call your doctor or diabetes care team if:   · You have a sore or wound that will not heal     · You have a change in the amount you urinate  · Your blood sugar levels are higher than your target goals  · You often have lower blood sugar levels than your target goals  · Your skin is red, dry, warm, or swollen  · You have trouble coping with diabetes, or you feel anxious or depressed  · You have questions or concerns about your condition or care  What you need to know about high blood sugar levels:  High blood sugar levels may not cause any symptoms  You may feel more thirsty or urinate more often than usual  Over time, high blood sugar levels can damage your nerves, blood vessels, tissues, and organs   The following can increase your blood sugar levels:  · Large meals or large amounts of carbohydrates at one time    · Less physical activity    · Stress    · Illness    · A lower dose of medicine or insulin, or a late dose    What you need to know about low blood sugar levels: You can prevent symptoms such as shakiness, dizziness, irritability, or confusion by preventing your blood sugar levels from going too low  · Treat a low blood sugar level right away  ? Drink 4 ounces of juice or have 1 tube of glucose gel  ? Check your blood sugar level again 10 to 15 minutes later  ? When the level goes back to normal, eat a meal or snack to prevent another decrease  · Keep glucose gel, raisins, or hard candy with you at all times to treat a low blood sugar level  · Your blood sugar level can get too low if you take diabetes medicine or insulin and do not eat enough food  · If you use insulin, check your blood sugar level before you exercise  ? If your blood sugar level is below 100 mg/dL, eat 4 crackers or 2 ounces of raisins, or drink 4 ounces of juice  ? Check your level every 30 minutes if you exercise more than 1 hour  ? You may need a snack during or after exercise  What you can do to manage your blood sugar levels:   · Check your blood sugar levels as directed and as needed  Several items are available to use to check your levels  You may need to check by testing a drop of blood in a glucose monitor  You may instead be given a continuous glucose monitoring (CGM) device  The device is worn at all times  The CGM checks your blood sugar level every 5 minutes  It sends results to an electronic device such as a smart phone  A CGM can be used with or without an insulin pump  Talk with your provider to find out which method is best for you  The goal for blood sugar levels before meals  is between 80 and 130 mg/dL and 2 hours after eating  is lower than 180 mg/dL  · Make healthy food choices    Work with a dietitian to develop a meal plan that works for you and your schedule  A dietitian can help you learn how to eat the right amount of carbohydrates during your meals and snacks  Carbohydrates can raise your blood sugar level if you eat too many at one time  Examples of foods that contain carbohydrates are breads, cereals, rice, pasta, and sweets  · Get regular physical activity  Physical activity can help you get to your target blood sugar level goal and manage your weight  Get at least 150 minutes of moderate to vigorous aerobic physical activity each week  Do not miss more than 2 days in a row  Do not sit longer than 30 minutes at a time  Your healthcare provider can help you create an activity plan  The plan can include the best activities for you and can help you build your strength and endurance  · Maintain a healthy weight  Ask your healthcare provider what a healthy weight is for you  Ask him or her to help you create a safe weight loss plan if you are overweight  · Take your diabetes medicine or insulin as directed  You may need diabetes medicine, insulin, or both to help control your blood sugar levels  Your healthcare provider will teach you how and when to take your diabetes medicine or insulin  You will also be taught about side effects oral diabetes medicine can cause  Insulin may be injected, or given through a pump or pen  You and your care team will discuss which method is best for you  ? An insulin pump  is an implanted device that gives your insulin 24 hours a day  An insulin pump prevents the need for multiple insulin injections in a day  ? An insulin pen  is a device prefilled with the right amount of insulin  ? You and your family members will be taught how to draw up and give insulin  if this is the best method for you  Your education team will also teach you how to dispose of needles and syringes  ?  You will learn how much insulin you need  and when to give it  You will be taught when not to give insulin  You will also be taught what to do if your blood sugar level drops too low  This may happen if you take insulin and do not eat the right amount of carbohydrates  Other things you can do to manage type 2 diabetes:   · Wear medical alert identification  Wear medical alert jewelry or carry a card that says you have diabetes  Ask your provider where to get these items  · Do not smoke  Nicotine and other chemicals in cigarettes and cigars can cause lung and blood vessel damage  It also makes it more difficult to manage your diabetes  Ask your provider for information if you currently smoke and need help to quit  Do not use e-cigarettes or smokeless tobacco in place of cigarettes or to help you quit  They still contain nicotine  · Check your feet each day for cuts, scratches, calluses, or other wounds  Look for redness and swelling, and feel for warmth  Wear shoes that fit well  Check your shoes for rocks or other objects that can hurt your feet  Do not walk barefoot or wear shoes without socks  Wear cotton socks to help keep your feet dry  · Ask about vaccines you may need  You have a higher risk for serious illness if you get the flu, pneumonia, COVID-19, or hepatitis  Ask your provider if you should get vaccines to prevent these or other diseases, and when to get the vaccines  · Talk to your care team if you become stressed about diabetes care  Sometimes being able to fit diabetes care into your life can cause increased stress  The stress can cause you not to take care of yourself properly  Your care team can help by offering tips about self-care  Your care team may suggest you talk to a mental health provider  The provider can listen and offer help with self-care issues  Follow up with your doctor or diabetes care team as directed: You may need to have blood tests done before your follow-up visit   The test results will show if changes need to be made in your treatment or self-care  Write down your questions so you remember to ask them during your visits  Talk to your provider if you cannot afford your medicine  © Copyright [employer] 2022 Information is for End User's use only and may not be sold, redistributed or otherwise used for commercial purposes  All illustrations and images included in CareNotes® are the copyrighted property of [employer]  or Maurizio Gage   The above information is an  only  It is not intended as medical advice for individual conditions or treatments  Talk to your doctor, nurse or pharmacist before following any medical regimen to see if it is safe and effective for you  Ejercicios de Kegel para mujeres   CUIDADO AMBULATORIO:   Los ejercicios de Kegel ayudan a fortalecer los músculos pélvicos  Los músculos pélvicos sostienen los órganos de la pelvis, sera cabral vejiga y [place]  Estos ejercicios ayudan a evitar o [employer] con la incontinencia urinaria (pérdida de [facility])  La incontinencia puede ser causada por el [place], el parto o la [place]  Comuníquese con cabral médico si:  · No siente que los músculos se contraen o relajan  · Continúa con pérdidas de [facility]  · Usted tiene preguntas o inquietudes acerca de cabral condición o cuidado  Uso correcto de los músculos: Los músculos pélvicos son los músculos que [place] para controlar el flujo de [facility]  Para ubicar estos músculos, interrumpa y reinicie el flujo de [facility] varias veces  Usted se familiarizará con la sensación de contraer y relajar estos músculos  Cómo realizar los ejercicios de Kegel:  · Vacíe la vejiga  Puede acostarse, pararse o sentarse para hacer estos ejercicios  Cuando intente hacer estos ejercicios por primera vez, susu vez sea más fácil si se acuesta  Contraiga o tensione los músculos pélvicos lentamente  Usted seguramente sentirá sera si estuviera aguantando la orina o gases   [place] posición por 3 segundos  Relaje por 3 segundos  Repita tonny ciclo 10 veces  · Elliott 10 series de ejercicios Kegel, al menos 3 veces por día  No contenga la respiración cuando elliott los ejercicios Kegel  Mantenga relajados los músculos del [place], espalda y piernas  · A medida que los músculos se fortalecen, podrá mantener la contracción por más tiempo  Cabral médico puede pedirle que aumente la contracción del músculo pélvico a 10 segundos  Después de contraer iris 10 segundos, relaje iris 10 segundos  Qué más debe saber:  · Anurag vez que sabe cómo hacer los ejercicios de Kegel, use diferentes posiciones  Puede hacer estos ejercicios mientras está recostada en el suelo, sentada en el escritorio o viendo la televisión, y [place] de pie  · Es posible que note anurag mejora en el control de la vejiga dentro de unas 6 semanas  · Contraiga los músculos pélvicos antes de estornudar, toser o levantar algo para evitar un escape de [place]  Acuda a la consulta de control con cabral médico según las indicaciones: Anote ean preguntas para que se acuerde de hacerlas iris ean visitas  © Copyright [employer] 2022 Information is for End User's use only and may not be sold, redistributed or otherwise used for commercial purposes  All illustrations and images included in CareNotes® are the copyrighted property of [employer] , [employer]  or [address] [place] [address] es sólo para uso en educación  Cabral intención no es darle un consejo médico sobre enfermedades o tratamientos  Colsulte con cabral [place] farmacéutico antes de seguir cualquier régimen médico para saber si es seguro y efectivo para usted

## 2022-09-07 NOTE — PROGRESS NOTES
Assessment and Plan:     Problem List Items Addressed This Visit        Endocrine    Diabetes mellitus type 2, insulin dependent ([address] ) - Primary    Relevant Medications    Lantus SoloStar 100 units/mL SOPN    Other Relevant Orders    HEMOGLOBIN A1C W/ EAG ESTIMATION    Basic metabolic panel    CBC and differential    Microalbumin / creatinine urine ratio    UA w Reflex to Microscopic w Reflex to Culture -Lab Collect       Cardiovascular and Mediastinum    CAD (coronary artery disease)    Relevant Medications    NIFEdipine (PROCARDIA XL) 60 mg 24 hr tablet    Atrial fibrillation (HCC)    Relevant Medications    NIFEdipine (PROCARDIA XL) 60 mg 24 hr tablet      Other Visit Diagnoses     History of repair of left hip joint        Relevant Orders    Ambulatory Referral to Physical Therapy    Ambulatory Referral to Occupational Therapy    Need for vaccination        Relevant Orders    Pneumococcal Conjugate Vaccine 20-valent (Pcv20) (Completed)    Well adult exam        Class 1 obesity due to excess calories with serious comorbidity and body mass index (BMI) of 30 0 to 30 9 in adult               Preventive health issues were discussed with patient, and age appropriate screening tests were ordered as noted in patient's After Visit Summary  Personalized health advice and appropriate referrals for health education or preventive services given if needed, as noted in patient's After Visit Summary  History of Present Illness:     Patient presents for a Medicare Wellness Visit    Here to go over chronic issues and labs / imaging studies if applicable     Not black stools   But at the end was fresh blood   X 2   Nothing today   Friday with vomiting  x12 and diarrhea x1     Nothing since then though   Did eat fried shrimp   Might be the culprit     Hx of constipation and hemorrhoids        Patient Care Team:  Afshan Bhakta MD as PCP - General (Family Medicine)  Afshan Bhakta MD as PCP - [address] [place], [place] [id number] [facility] ([facility])  [facility] MD Qiana Gallardo MD Enrigue Banco, MD (Nephrology)     Review of Systems:     Review of Systems   Constitutional: Negative for fever and unexpected weight change  HENT: Negative for nosebleeds and trouble swallowing  Eyes: Positive for visual disturbance  Respiratory: Negative for chest tightness and shortness of breath  Cardiovascular: Negative for chest pain, palpitations and leg swelling  Gastrointestinal: Negative for abdominal pain, constipation, diarrhea and nausea  Endocrine: Negative for cold intolerance  Genitourinary: Negative for dysuria and urgency  Musculoskeletal: Positive for arthralgias and gait problem  Negative for joint swelling and myalgias  Skin: Negative for rash  Neurological: Positive for weakness  Negative for tremors, seizures and syncope  Hematological: Does not bruise/bleed easily  Psychiatric/Behavioral: Negative for hallucinations and suicidal ideas          Problem List:     Patient Active Problem List   Diagnosis    Heterozygous MTHFR mutation [id number]    Stage 3 chronic kidney disease (HCC)    Stage 3b chronic kidney disease (HCC)    Hypokalemia    Closed head injury    Multiple thyroid nodules    Sick sinus syndrome (HCC)    Obesity, morbid ([place] [address] )    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HCC)    Anemia of chronic kidney failure, stage 4 (severe) (HCC)    Vertigo    Closed left hip fracture (HCC)    Blind    Diabetes mellitus type 2, insulin dependent ([facility] )    Hypertension    CAD (coronary artery disease)    Atrial fibrillation (HCC)    MI [facility] infarction) [facility] )    Cardiac pacemaker in situ    H/O heart artery stent    Acute blood loss anemia (ABLA)    Lower obstructive uropathy    Atrial flutter, paroxysmal (HCC)    Full code status    Elevated d-dimer    CHF (congestive heart failure) ([facility] )      Past Medical and Surgical History:     Past Medical History:   Diagnosis Date    Atrial fibrillation ([facility] )     Chronic anemia     Chronic kidney disease     Colonoscopy refused 2019    pt declines    Dyslipidemia     Hypertension     Mammogram declined 2017    Proteinuria     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     Past Surgical History:   Procedure Laterality Date    CARDIAC PACEMAKER PLACEMENT      CATARACT EXTRACTION       SECTION      CHOLECYSTECTOMY      CORONARY ANGIOPLASTY WITH STENT PLACEMENT      EYE SURGERY      MAMMO (HISTORICAL)      Pt states she will not ever have again-     [place] OPEN RX FEMUR FX+INTRAMED RIMA Left 2022    Procedure: INSERTION NAIL IM FEMUR ANTEGRADE (TROCHANTERIC); Surgeon: Giovanni Mccartney DO;  [facility];  Service: Orthopedics    TUBAL LIGATION        Family History:     Family History   Problem Relation Age of Onset    Diabetes Father     Hypertension Father     Heart disease Father     Cancer Brother         smoker    Diabetes Other     Heart attack Other     Hypertension Other     Asthma Other       Social History:     Social History     Socioeconomic History    Marital status:      Spouse name: None    Number of children: None    Years of education: None    Highest education level: None   Occupational History    Occupation: retired   Tobacco Use    Smoking status: Never Smoker    Smokeless tobacco: Never Used   Vaping Use    Vaping Use: Never used   Substance and Sexual Activity    Alcohol use: Never    Drug use: Never    Sexual activity: Not Currently   Other Topics Concern    None   Social History Narrative    ** Merged History Encounter **         Most recent tobacco use screenin2019  Do you currently or have you served in the [employer]: No  Were you activated, into active duty, as a member of the [employer] or as a Reservist: No  Marital status:   Exercise level: None  D    iet: Regular  General stress level: Low  Alcohol intake: None  Caffeine intake:  Moderate  Chewing tobacco: none  Illicit drugs: none  Seat belts used routinely: Yes  Sunscreen used routinely: Yes  Smoke alarm in home: Yes  Advance directive: No  Salt Int    juan antonio: minimal  Has the Patient had a mammogram to screen for breast cancer within 24 months: No  11/1/2019 - declines mammogram   Is the patient interested in a colorectal cancer screening: No  11/1/2019 - patient declines colonoscopy's     Social Determinants of Health     Financial Resource Strain: Low Risk     Difficulty of Paying Living Expenses: Not hard at all   Food Insecurity: Not on file   Transportation Needs: No Transportation Needs    Lack of Transportation (Medical): No    Lack of Transportation (Non-Medical):  No   Physical Activity: Not on file   Stress: Not on file   Social Connections: Not on file   Intimate Partner Violence: Not on file   Housing Stability: Unknown    Unable to Pay for Housing in the Last Year: No    Number of Places Lived in the Last Year: Not on file    Unstable Housing in the Last Year: No      Medications and Allergies:     Current Outpatient Medications   Medication Sig Dispense Refill    acetaminophen (TYLENOL) 325 mg tablet Take 3 tablets (975 mg total) by mouth every 8 (eight) hours  0    amLODIPine (NORVASC) 5 mg tablet Take 1 tablet (5 mg total) by mouth daily 30 tablet 0    aspirin 81 mg chewable tablet Chew 81 mg daily      B-D UF III MINI PEN NEEDLES 31G X 5 MM MISC Use to test qd 100 each 11    carvedilol (COREG) 25 mg tablet Take 25 mg by mouth every 12 (twelve) hours       Cholecalciferol (Vitamin D) 50 MCG (2000 UT) tablet Take 1 tablet (2,000 Units total) by mouth daily 90 tablet 1    fluticasone (FLONASE) 50 mcg/act nasal spray 1 spray into each nostril daily 9 9 mL 0    folic acid (FOLVITE) 972 mcg tablet Take 400 mcg by mouth daily      gabapentin (NEURONTIN) 100 mg capsule Take 1 capsule (100 mg total) by mouth daily at bedtime 14 capsule 0    Lantus SoloStar 100 units/mL SOPN       meclizine (ANTIVERT) 12 5 MG tablet Take 1 tablet (12 5 mg total) by mouth every 8 (eight) hours as needed for dizziness 30 tablet 0    NIFEdipine (PROCARDIA XL) 60 mg 24 hr tablet       pravastatin (PRAVACHOL) 40 mg tablet Take 1 tablet (40 mg total) by mouth daily 90 tablet 1    ranolazine (RANEXA) 500 mg 12 hr tablet Take 1 tablet (500 mg total) by mouth daily 30 tablet 0    rivaroxaban (XARELTO) 15 mg tablet Take 15 mg by mouth daily       No current facility-administered medications for this visit  No Known Allergies   Immunizations:     Immunization History   Administered Date(s) Administered    COVID-19 MODERNA VACC 0 25 ML IM BOOSTER 12/10/2021    COVID-19 MODERNA VACC 0 5 ML IM 02/12/2021, 03/12/2021    INFLUENZA 10/16/2019    Influenza Injectable, MDCK, Preservative Free, Quadrivalent, 0 5 mL 10/16/2019    Pneumococcal Conjugate 13-Valent 10/16/2019    Pneumococcal Conjugate PCV 7 09/25/2016    Pneumococcal Conjugate Vaccine 20-valent (Pcv20), Polysace 09/07/2022    Pneumococcal Polysaccharide PPV23 09/25/2015    Tdap 09/25/2015    Zoster 09/25/2015      Health Maintenance:         Topic Date Due    Hepatitis C Screening  Never done    Breast Cancer Screening: Mammogram  08/27/2099 (Originally 7/26/1985)         Topic Date Due    COVID-19 Vaccine (4 - Booster for Moderna series) 04/10/2022    Influenza Vaccine (1) 09/01/2022      Medicare Screening Tests and Risk Assessments:     May Martinez is here for her Subsequent Wellness visit  Health Risk Assessment:   Patient rates overall health as good  Patient feels that their physical health rating is same  Patient is very satisfied with their life  Eyesight was rated as same  Hearing was rated as same  Patient feels that their emotional and mental health rating is same  Patients states they are never, rarely angry  Patient states they are never, rarely unusually tired/fatigued  Pain experienced in the last 7 days has been none   Patient states that she has experienced no weight loss or gain in last 6 months  Depression Screening:   PHQ-2 Score: 0      Fall Risk Screening: In the past year, patient has experienced: history of falling in past year    Number of falls: 2 or more  Injured during fall?: Yes    Feels unsteady when standing or walking?: Yes    Worried about falling?: Yes      Urinary Incontinence Screening:   Patient has leaked urine accidently in the last six months  Home Safety:  Patient has trouble with stairs inside or outside of their home  Patient has working smoke alarms and has working carbon monoxide detector  Home safety hazards include: none  Nutrition:   Current diet is Regular  Medications:   Patient is currently taking over-the-counter supplements  OTC medications include: see medication list  Patient is not able to manage medications  Activities of Daily Living (ADLs)/Instrumental Activities of Daily Living (IADLs):   Walk and transfer into and out of bed and chair?: Yes  Dress and groom yourself?: Yes    Bathe or shower yourself?: No    Feed yourself?  Yes  Do your laundry/housekeeping?: No  Manage your money, pay your bills and track your expenses?: No  Make your own meals?: No    Do your own shopping?: No    Previous Hospitalizations:   Any hospitalizations or ED visits within the last 12 months?: Yes    How many hospitalizations have you had in the last year?: 1-2    Advance Care Planning:   Living will: No    Durable POA for healthcare: No    Advanced directive: No      PREVENTIVE SCREENINGS      Cardiovascular Screening:    General: Screening Current      Diabetes Screening:     General: Screening Not Indicated and History Diabetes      Cervical Cancer Screening:    General: Screening Not Indicated      Lung Cancer Screening:     General: Screening Not Indicated    Screening, Brief Intervention, and Referral to Treatment (SBIRT)    Screening  Typical number of drinks in a day: 0  Typical number of drinks in a week: 0  Interpretation: Low risk drinking behavior  Single Item Drug Screening:  How often have you used an illegal drug (including marijuana) or a prescription medication for non-medical reasons in the past year? never    Single Item Drug Screen Score: 0  Interpretation: Negative screen for possible drug use disorder    No exam data present     Physical Exam:     /78 (BP Location: Left arm, Patient Position: Sitting, Cuff Size: Large)   Pulse 78   Resp 16   Ht 5' 3" (1 6 m)   Wt 77 1 kg (170 lb)   SpO2 99%   BMI 30 11 kg/m²     Physical Exam  Vitals and nursing note reviewed  Constitutional:       Appearance: She is well-developed  HENT:      Head: Normocephalic and atraumatic  Right Ear: External ear normal       Left Ear: External ear normal       Nose: Nose normal    Eyes:      Conjunctiva/sclera: Conjunctivae normal       Pupils: Pupils are equal, round, and reactive to light  Cardiovascular:      Rate and Rhythm: Normal rate and regular rhythm  Heart sounds: Normal heart sounds  No murmur heard  Pulmonary:      Effort: Pulmonary effort is normal       Breath sounds: Normal breath sounds  No wheezing  Abdominal:      General: Bowel sounds are normal       Palpations: Abdomen is soft  Musculoskeletal:         General: No tenderness  Normal range of motion  Cervical back: Normal range of motion and neck supple  Lymphadenopathy:      Cervical: No cervical adenopathy  Skin:     General: Skin is warm and dry  Capillary Refill: Capillary refill takes less than 2 seconds  Neurological:      Mental Status: She is alert and oriented to person, place, and time  Motor: Weakness present  Gait: Gait abnormal    Psychiatric:         Behavior: Behavior normal          Thought Content:  Thought content normal          Judgment: Judgment normal           Blas Lei MD

## 2022-09-09 ENCOUNTER — HOME CARE VISIT (OUTPATIENT)
Dept: HOME HEALTH SERVICES | Facility: HOME HEALTHCARE | Age: 77
End: 2022-09-09
Payer: COMMERCIAL

## 2022-09-09 VITALS — DIASTOLIC BLOOD PRESSURE: 70 MMHG | SYSTOLIC BLOOD PRESSURE: 135 MMHG | HEART RATE: 63 BPM | OXYGEN SATURATION: 98 %

## 2022-09-09 PROCEDURE — G0152 HHCP-SERV OF OT,EA 15 MIN: HCPCS

## 2022-09-16 ENCOUNTER — APPOINTMENT (OUTPATIENT)
Dept: RADIOLOGY | Facility: MEDICAL CENTER | Age: 77
End: 2022-09-16
Payer: COMMERCIAL

## 2022-09-16 ENCOUNTER — OFFICE VISIT (OUTPATIENT)
Dept: OBGYN CLINIC | Facility: MEDICAL CENTER | Age: 77
End: 2022-09-16

## 2022-09-16 VITALS
BODY MASS INDEX: 30.11 KG/M2 | SYSTOLIC BLOOD PRESSURE: 134 MMHG | HEIGHT: 63 IN | HEART RATE: 67 BPM | DIASTOLIC BLOOD PRESSURE: 77 MMHG

## 2022-09-16 DIAGNOSIS — Z98.890 STATUS POST HIP SURGERY: ICD-10-CM

## 2022-09-16 DIAGNOSIS — Z98.890 STATUS POST HIP SURGERY: Primary | ICD-10-CM

## 2022-09-16 PROCEDURE — 99024 POSTOP FOLLOW-UP VISIT: CPT | Performed by: ORTHOPAEDIC SURGERY

## 2022-09-16 PROCEDURE — 73552 X-RAY EXAM OF FEMUR 2/>: CPT

## 2022-09-16 NOTE — PROGRESS NOTES
Ortho Sports Medicine Hip- Post-op Visit    Assesment:   68 y o  female 11 weeks status post left hip insertional nail IM femur antegrade, DOS: 6/25/22, doing well on making improvements in her functioning  Plan:    Post-Operative treatment:    Ice to knee for 20 minutes at least 1-2 times daily  PT for ROM/strengthening to knee, hip and core  Tylenol for pain  She should continue to do home physical therapy and work on getting back to her baseline physical functioning  Encouraged to have help around when using the walker in order to help with safety and catching her if the accident was to occur  We will see her in 2-3 months to assess how her recovery is going  Imaging: All imaging from today was reviewed by myself and explained to the patient  Weight bearing:  as tolerated     ROM:  Full    Brace:  No brace needed    DVT Prophylaxis:  Ambulation    Follow up:   Return in about 2 months (around 11/16/2022)  Patient was advised that if they have any fevers, chills, chest pain, shortness of breath, redness or drainage from the incision, please let our office know immediately  Chief Complaint   Patient presents with    Left Leg - Follow-up    Left Hip - Follow-up       History of Present Illness: The patient is a 68 y o  female who is being evaluated post operatively 11 weeks  status post left hip insertional nail IM femur antegrade (danyell), DOS:6/25/2022    Since the prior visit, She reports significant improvement  Pain is well controlled  The patient is using ice to control swelling  They have started home physical therapy  She feels this is greatly helping with her recovery  A family member with her in the room states that she is at about 50% of her normal physical functioning  Is doing well     The patient  Ambulation for DVT ppx  The patient has been ambulating with a walker  She was using a walker for ambulation prior to the fracture    Getting back to using a walker would be her baseline  She is careful to have people around her when using her walker in case of a fall or accident  The patient denies any fevers, chills, calf pain, chest pain/shortness of breath, redness or drainage from the incision  I have reviewed the past medical, surgical, social and family history, medications and allergies as documented in the EMR  Review of systems: ROS is negative other than that noted in the HPI  Constitutional: Negative for fatigue and fever  Physical Exam:    Blood pressure 134/77, pulse 67, height 5' 3" (1 6 m)  General/Constitutional: NAD, well developed, well nourished  HENT: Normocephalic, atraumatic  CV: Intact distal pulses, regular rate  Resp: No respiratory distress or labored breathing  Abd: No abdominal distention  Lymphatic: No lymphadenopathy palpated  Neuro: Alert and Oriented x 3, no focal deficits noted  Psych: Normal mood, normal affect, normal judgement, normal behavior  Skin: Warm, dry, no rashes, no erythema     Hip Exam (focused):    left hip:     No dislocation/deformity  ROM: Full  Greater troch:non-tender   SI joint: non-tender  Sri test: negative  Hortensia's test:  negative  Abduction: 5/5  AT/GS intact     No calf tenderness to palpation bilaterally    Incisions show no erythema, no drainage    LE NV Exam: +2 DP/PT pulses bilaterally  Sensation intact to light touch L2-S1 bilaterally      Hip Imaging:    X-rays of the left hip were reviewed, which demonstrate near appropriate alignment and healthy callus formation  I have reviewed the radiology report andagree with their impression        Scribe Attestation    I,:   am acting as a scribe while in the presence of the attending physician :       I,:   personally performed the services described in this documentation    as scribed in my presence :

## 2022-09-21 ENCOUNTER — EVALUATION (OUTPATIENT)
Dept: PHYSICAL THERAPY | Facility: CLINIC | Age: 77
End: 2022-09-21
Payer: COMMERCIAL

## 2022-09-21 DIAGNOSIS — Z98.890: ICD-10-CM

## 2022-09-21 PROCEDURE — 97162 PT EVAL MOD COMPLEX 30 MIN: CPT

## 2022-09-21 PROCEDURE — 97110 THERAPEUTIC EXERCISES: CPT

## 2022-09-21 NOTE — PROGRESS NOTES
PT Evaluation     Today's date: 2022  Patient name: Quinten Bean  : 1945  MRN: [id number]  Referring provider: Macrina Moe MD  Dx:   Encounter Diagnosis     ICD-10-CM    1  History of repair of left hip joint  Z98 [id number] Ambulatory Referral to Physical Therapy                  Assessment  Assessment details: The patient reports to PT following a left hip fracture  She presents with decreased hip ROM, impaired LLE strength and decreased joint mobility  The patient has difficulty with stairs, walking and standng and this is limiting her ability to complete ADLs  The patient has limited hip ROM so this will be the first thing we focus on as we begin PT  As this improves we will then focus on restoring her lost hip strength to help her better complete functional activities  The patient would benefit from PT to help restore ROM, strength and joint mobility  Impairments: abnormal gait, abnormal or restricted ROM, activity intolerance, impaired balance, impaired physical strength, pain with function, weight-bearing intolerance and poor body mechanics  Understanding of Dx/Px/POC: excellent  Goals  STG : (2 weeks) - Patient demonstrates independence with HEP to be able to transition to HEP in the long term  (2 weeks) - Decrease patient's pain by 50%  LTG: (6 weeks) - Improve LLE strength to at least 4/5 to help support the patient during functional activities     (6 weeks) - Improve hip ROM to [facility] to help improve proper hip mechanics during gait  (8 weeks) - The patient will be able to stand for 5 minutes without support from her walker  Subjective Evaluation    History of Present Illness  Date of onset: 2022  Mechanism of injury: The patient had a fall 3 months ago and she fractured her left hip  She had surgery to repair the broken hip  For the past 3 months she has had at home PT and she has been making good improvements    The patient previously ambulated using a walker and has now returned to using the walker for ambulation  She attempted stairs a while ago when she was having therapy and was unable to successfully use the stairs so she has not tried since  The patient has a previous medical history of being legally blind  The patient will occasionally have a twinge of pain but it goes away quickly and the majority of the time she is pain free  Pain  Current pain ratin  At worst pain ratin  Location: Left Hip  Aggravating factors: walking and stair climbing    Patient Goals  Patient goal: The patient wants to be able to stand for longer iwthout using the walker  Objective     Active Range of Motion   Left Hip   Flexion: 72 degrees   Abduction: 23 degrees   External rotation (90/90): 37 degrees   Internal rotation (90/90): 17 degrees     Strength/Myotome Testing     Left Hip   Planes of Motion   Flexion: 2+  Abduction: 3  External rotation: 3+  Internal rotation: 3+    Right Hip   Planes of Motion   Flexion: 4  Abduction: 4  External rotation: 4  Internal rotation: 4    Left Knee   Flexion: 3+  Extension: 4    Right Knee   Flexion: 3+  Extension: 4    Ambulation     Ambulation: Level Surfaces     Additional Level Surfaces Ambulation Details  The patient requires verbal cues for directions since she is legally blind  The patient has decreased stance time on the left and decreased step length with the right  The patient requires a rolling walker for ambulation                 Precautions:     Diagnosis:    Precautions:    Primary Goals:    *asterisks by exercise = given for HEP   Manuals                                                There Ex        Seated Marches x10ea       Clams Seated GTB x20                                                               Neuro Re-Ed                                                                                                         Re-evaluation              Ther Act                                         Modalities

## 2022-09-23 ENCOUNTER — TELEPHONE (OUTPATIENT)
Dept: OBGYN CLINIC | Facility: HOSPITAL | Age: 77
End: 2022-09-23

## 2022-09-23 NOTE — TELEPHONE ENCOUNTER
Pt sees Dr Pavel Patrick from radiology called with sinificant findings   Please look at the pt XRAY of [facility]    CB#[phone]

## 2022-09-23 NOTE — TELEPHONE ENCOUNTER
Noted  Thank you  We will see the patient in the office as scheduled  Imaging was reviewed and demonstrates no change to alignment of fracture or hardware

## 2022-09-26 ENCOUNTER — OFFICE VISIT (OUTPATIENT)
Dept: PHYSICAL THERAPY | Facility: CLINIC | Age: 77
End: 2022-09-26
Payer: COMMERCIAL

## 2022-09-26 DIAGNOSIS — Z98.890: Primary | ICD-10-CM

## 2022-09-26 PROCEDURE — 97140 MANUAL THERAPY 1/> REGIONS: CPT

## 2022-09-26 PROCEDURE — 97110 THERAPEUTIC EXERCISES: CPT

## 2022-09-26 NOTE — PROGRESS NOTES
Daily Note     Today's date: 2022  Patient name: Elvi Mc  : 1945  MRN: [id number]  Referring provider: Lydia Berumen MD  Dx:   Encounter Diagnosis     ICD-10-CM    1  History of repair of left hip joint  [id number]                   Subjective: The patient reports that her left hip is not feeling sore today  Objective: See treatment diary below      Assessment: Tolerated treatment well  Patient demonstrated fatigue post treatment and would benefit from continued PT    The patient did well with her exercises today  She did require assistance for SLR but it was not much assistance  Her hamstrings a fairly  She had some slight pain with IR and ABD stretching today but overall she tolerated manual techniques fairly well  Plan: Continue per plan of care        Precautions:     Diagnosis:    Precautions:    Primary Goals:    *asterisks by exercise = given for HEP   Manuals       Hip PROM  AB                                      There Ex        Seated Marches x10ea       Clams Seated GTB x20 Supine GTB x20      SLR   AAROM 2x10      LAQ  GTB x20ea      HS Curl  GTB x20ea                                      Neuro Re-Ed        Bridge  2x10                                                                                               Re-evaluation              Ther Act              Sit to Stand    x10 W/ ecc lower                       Modalities

## 2022-09-28 ENCOUNTER — OFFICE VISIT (OUTPATIENT)
Dept: PHYSICAL THERAPY | Facility: CLINIC | Age: 77
End: 2022-09-28
Payer: COMMERCIAL

## 2022-09-28 DIAGNOSIS — Z98.890: Primary | ICD-10-CM

## 2022-09-28 PROCEDURE — 97140 MANUAL THERAPY 1/> REGIONS: CPT

## 2022-09-28 PROCEDURE — 97110 THERAPEUTIC EXERCISES: CPT

## 2022-09-28 NOTE — PROGRESS NOTES
Daily Note     Today's date: 2022  Patient name: Susan Carvajal  : 1945  MRN: [id number]  Referring provider: Jeffy Angelo MD  Dx:   Encounter Diagnosis     ICD-10-CM    1  History of repair of left hip joint  Z98 890        Start Time: 745  Stop Time: 830  Total time in clinic (min): 45 minutes    Subjective: The patient reports that she was not that sore after last session  Objective: See treatment diary below      Assessment: Tolerated treatment well  Patient demonstrated fatigue post treatment and would benefit from continued PT    The patient tolerated manual stretching a little better today  Her strengthening exercises went well but I can tell that the patient's core muscles are weak when she is doing transitional movements on the table  I added in a standing hip ext and the patient did very well with this  Plan: Continue per plan of care        Precautions:     Diagnosis:    Precautions:    Primary Goals:    *asterisks by exercise = given for HEP   Manuals      Hip PROM  AB AB                                     There Ex        Seated Marches x10ea       Clams Seated GTB x20 Supine GTB x20 Supine GTB x20     SLR   AAROM 2x10 AAROM 2x10     LAQ  GTB x20ea GTB x20ea     HS Curl  GTB x20ea GTB x20ea     Standing hip ext   GTB 2x10                             Neuro Re-Ed        Bridge  2x10 2x10                                                                                              Re-evaluation              Ther Act              Sit to Stand    x10 W/ ecc lower                       Modalities

## 2022-09-30 ENCOUNTER — OFFICE VISIT (OUTPATIENT)
Dept: PHYSICAL THERAPY | Facility: CLINIC | Age: 77
End: 2022-09-30
Payer: COMMERCIAL

## 2022-09-30 DIAGNOSIS — Z98.890: Primary | ICD-10-CM

## 2022-09-30 PROCEDURE — 97112 NEUROMUSCULAR REEDUCATION: CPT

## 2022-09-30 PROCEDURE — 97110 THERAPEUTIC EXERCISES: CPT

## 2022-09-30 NOTE — PROGRESS NOTES
Daily Note     Today's date: 2022  Patient name: Manuel Arguello  : 1945  MRN: [id number]  Referring provider: Ada Sanchez MD  Dx:   Encounter Diagnosis     ICD-10-CM    1  History of repair of left hip joint  Z98 890        Start Time: 1230  Stop Time: 1315  Total time in clinic (min): 45 minutes    Subjective: The patient reports that on Wednesday and yesterday she was feeling sore in her left shin and left thigh  This was bothering her when she tried transition from sit to stand  Objective: See treatment diary below      Assessment: Tolerated treatment well  Patient demonstrated fatigue post treatment and would benefit from continued PT    Since the patient was sore after last session we decided to skip some of her strengthening exercises today  Instead we worked on some of her standing and walking  The patient has a tendency to shorten her step with the right leg to decrease stance time on the left  During her standing balance she has a tendency to lose her balance in the posterior direction and her hamstrings tire quickly  Plan: Progress treatment as tolerated         Precautions:     Diagnosis:    Precautions:    Primary Goals:    *asterisks by exercise = given for HEP   Manuals     Hip PROM  AB AB                                     There Ex        Seated Marches x10ea       Clams Seated GTB x20 Supine GTB x20 Supine GTB x20 Seated GTB x20    SLR   AAROM 2x10 AAROM 2x10 AAROM x10  AROM x10    LAQ  GTB x20ea GTB x20ea GTB x20ea    HS Curl  GTB x20ea GTB x20ea GTB x20ea    Standing hip ext   GTB 2x10                             Neuro Re-Ed        Bridge  2x10 2x10     Standing balance    3x30"                                                                    Gait W/ walker    40' 1 Lap             Re-evaluation              Ther Act              Sit to Stand    x10 W/ ecc lower                       Modalities

## 2022-10-03 ENCOUNTER — OFFICE VISIT (OUTPATIENT)
Dept: PHYSICAL THERAPY | Facility: CLINIC | Age: 77
End: 2022-10-03
Payer: COMMERCIAL

## 2022-10-03 DIAGNOSIS — Z98.890: Primary | ICD-10-CM

## 2022-10-03 PROCEDURE — 97110 THERAPEUTIC EXERCISES: CPT

## 2022-10-03 PROCEDURE — 97140 MANUAL THERAPY 1/> REGIONS: CPT

## 2022-10-03 NOTE — PROGRESS NOTES
Daily Note     Today's date: 10/3/2022  Patient name: Bebeto Nino  : 1945  MRN: [id number]  Referring provider: Bao Hogan MD  Dx:   Encounter Diagnosis     ICD-10-CM    1  History of repair of left hip joint  Z98 890                   Subjective: The patient reports that she did not have any pain after last session  Objective: See treatment diary below      Assessment: Tolerated treatment well  Patient demonstrated fatigue post treatment and would benefit from continued PT    The patient is making good progress with her hip ABD ROM  Her quad strength is getting better but her hamstring strength is still quite limited  This is limiting the amount of distance she can walk as these muscles fatigue quickly  Plan: Progress treatment as tolerated         Precautions:     Diagnosis:    Precautions:    Primary Goals:    *asterisks by exercise = given for HEP   Manuals 9/21 9/26 9/28 9/30 10/3   Hip PROM  AB AB                                     There Ex        Seated Marches x10ea       Clams Seated GTB x20 Supine GTB x20 Supine GTB x20 Seated GTB x20 Seated GTB x20   SLR   AAROM 2x10 AAROM 2x10 AAROM x10  AROM x10 AAROM 2x10   LAQ  GTB x20ea GTB x20ea GTB x20ea GTB x20ea   HS Curl  GTB x20ea GTB x20ea GTB x20ea GTB x20ea   Standing hip ext   GTB 2x10                             Neuro Re-Ed        Bridge  2x10 2x10  2x10   Standing balance    3x30"                                                                    Gait W/ walker    40' 1 Lap 40' 1 Lap            Re-evaluation              Ther Act              Sit to Stand    x10 W/ ecc lower      x5  W/ ecc lower                 Modalities

## 2022-10-07 ENCOUNTER — OFFICE VISIT (OUTPATIENT)
Dept: PHYSICAL THERAPY | Facility: CLINIC | Age: 77
End: 2022-10-07
Payer: COMMERCIAL

## 2022-10-07 DIAGNOSIS — Z98.890: Primary | ICD-10-CM

## 2022-10-07 PROCEDURE — 97110 THERAPEUTIC EXERCISES: CPT

## 2022-10-07 PROCEDURE — 97112 NEUROMUSCULAR REEDUCATION: CPT

## 2022-10-07 NOTE — PROGRESS NOTES
Daily Note     Today's date: 10/7/2022  Patient name: Cara Pink  : 1945  MRN: [id number]  Referring provider: Darryl Mullins MD  Dx:   Encounter Diagnosis     ICD-10-CM    1  History of repair of left hip joint  Z98 890                   Subjective: The patient reports that she felt fine after last session  Yesterday during the day she was feeling fine but then at night time she was sitting for a while and when she went to get back up she had a lot of trouble and she could not stand up straight  She had pain in the right quad and the left shin  Objective: See treatment diary below      Assessment: Tolerated treatment well  Patient demonstrated fatigue post treatment and would benefit from continued PT    Today we worked hard on the patient's hamstring strength  I modified her bridges slightly to encourage more hamstring activation and during her HS curls I had her pull the band further underneath her  The patient was able to walk further today than previously but it still seems to be her left hamstring that is limiting her  Plan: Progress treatment as tolerated         Precautions:     Diagnosis:    Precautions:    Primary Goals:    *asterisks by exercise = given for HEP   Manuals 10/7 9/26 9/28 9/30 10/3   Hip PROM  AB AB                                     There Ex        Seated Marches        Clams Supine GTB x20 Supine GTB x20 Supine GTB x20 Seated GTB x20 Seated GTB x20   SLR  AAROM 2x10 AAROM 2x10 AAROM 2x10 AAROM x10  AROM x10 AAROM 2x10   LAQ  GTB x20ea GTB x20ea GTB x20ea GTB x20ea   HS Curl GTB x20ea GTB x20ea GTB x20ea GTB x20ea GTB x20ea   Standing hip ext   GTB 2x10                             Neuro Re-Ed        Bridge 2x10 2x10 2x10  2x10   Standing balance    3x30"                                                                    Gait W/ walker 50' x2   40' 1 Lap 40' 1 Lap            Re-evaluation              Ther Act              Sit to Stand  2x10  x10 W/ ecc lower x5  W/ ecc lower                 Modalities BH/Other specify

## 2022-10-08 ENCOUNTER — HOSPITAL ENCOUNTER (INPATIENT)
Facility: HOSPITAL | Age: 77
LOS: 5 days | Discharge: HOME HEALTH | DRG: 057 | End: 2022-10-14
Attending: EMERGENCY MEDICINE | Admitting: INTERNAL MEDICINE
Payer: COMMERCIAL

## 2022-10-08 ENCOUNTER — HOSPITAL ENCOUNTER (OUTPATIENT)
Facility: HOSPITAL | Age: 77
Setting detail: OBSERVATION
Discharge: HOME | End: 2022-10-08
Attending: EMERGENCY MEDICINE | Admitting: INTERNAL MEDICINE
Payer: COMMERCIAL

## 2022-10-08 ENCOUNTER — APPOINTMENT (OUTPATIENT)
Dept: CT IMAGING | Facility: HOSPITAL | Age: 77
End: 2022-10-08
Payer: COMMERCIAL

## 2022-10-08 DIAGNOSIS — G45.9 TIA (TRANSIENT ISCHEMIC ATTACK): ICD-10-CM

## 2022-10-08 DIAGNOSIS — R44.3 HALLUCINATIONS: ICD-10-CM

## 2022-10-08 DIAGNOSIS — H54.3 BLINDNESS OF BOTH EYES: ICD-10-CM

## 2022-10-08 DIAGNOSIS — I10 PRIMARY HYPERTENSION: ICD-10-CM

## 2022-10-08 DIAGNOSIS — I25.10 CORONARY ARTERY DISEASE INVOLVING NATIVE CORONARY ARTERY OF NATIVE HEART WITHOUT ANGINA PECTORIS: ICD-10-CM

## 2022-10-08 DIAGNOSIS — R29.90 STROKE-LIKE SYMPTOMS: Primary | ICD-10-CM

## 2022-10-08 DIAGNOSIS — R53.1 LEFT-SIDED WEAKNESS: Primary | ICD-10-CM

## 2022-10-08 DIAGNOSIS — I48.91 ATRIAL FIBRILLATION, UNSPECIFIED TYPE (HCC): ICD-10-CM

## 2022-10-08 PROBLEM — N18.4 STAGE 4 CHRONIC KIDNEY DISEASE (HCC): Chronic | Status: ACTIVE | Noted: 2022-10-08

## 2022-10-08 PROBLEM — E04.1 LEFT THYROID NODULE: Status: ACTIVE | Noted: 2022-10-08

## 2022-10-08 PROBLEM — Z87.81 HISTORY OF FRACTURE OF LEFT HIP: Chronic | Status: ACTIVE | Noted: 2022-10-08

## 2022-10-08 LAB
ANION GAP SERPL CALCULATED.3IONS-SCNC: 6 MMOL/L (ref 4–13)
ANION GAP SERPL CALCULATED.3IONS-SCNC: 6 MMOL/L (ref 4–13)
APTT PPP: 24 SECONDS (ref 23–37)
APTT PPP: 24 SECONDS (ref 23–37)
BUN SERPL-MCNC: 19 MG/DL (ref 5–25)
BUN SERPL-MCNC: 19 MG/DL (ref 5–25)
CALCIUM SERPL-MCNC: 9.3 MG/DL (ref 8.4–10.2)
CALCIUM SERPL-MCNC: 9.3 MG/DL (ref 8.4–10.2)
CARDIAC TROPONIN I PNL SERPL HS: 10 NG/L
CARDIAC TROPONIN I PNL SERPL HS: 10 NG/L
CHLORIDE SERPL-SCNC: 104 MMOL/L (ref 96–108)
CHLORIDE SERPL-SCNC: 104 MMOL/L (ref 96–108)
CO2 SERPL-SCNC: 31 MMOL/L (ref 21–32)
CO2 SERPL-SCNC: 31 MMOL/L (ref 21–32)
CREAT SERPL-MCNC: 1.6 MG/DL (ref 0.6–1.3)
CREAT SERPL-MCNC: 1.6 MG/DL (ref 0.6–1.3)
ERYTHROCYTE [DISTWIDTH] IN BLOOD BY AUTOMATED COUNT: 14.6 % (ref 11.6–15.1)
ERYTHROCYTE [DISTWIDTH] IN BLOOD BY AUTOMATED COUNT: 14.6 % (ref 11.6–15.1)
GFR SERPL CREATININE-BSD FRML MDRD: 30 ML/MIN/1.73SQ M
GFR SERPL CREATININE-BSD FRML MDRD: 30 ML/MIN/1.73SQ M
GLUCOSE SERPL-MCNC: 132 MG/DL (ref 65–140)
GLUCOSE SERPL-MCNC: 133 MG/DL (ref 65–140)
GLUCOSE SERPL-MCNC: 158 MG/DL (ref 65–140)
GLUCOSE SERPL-MCNC: 60 MG/DL (ref 65–140)
GLUCOSE SERPL-MCNC: 63 MG/DL (ref 65–140)
GLUCOSE SERPL-MCNC: 63 MG/DL (ref 65–140)
GLUCOSE SERPL-MCNC: 72 MG/DL (ref 65–140)
HCT VFR BLD AUTO: 34.2 % (ref 34.8–46.1)
HCT VFR BLD AUTO: 34.2 % (ref 34.8–46.1)
HGB BLD-MCNC: 10.5 G/DL (ref 11.5–15.4)
HGB BLD-MCNC: 10.5 G/DL (ref 11.5–15.4)
INR PPP: 1.06 (ref 0.84–1.19)
INR PPP: 1.06 (ref 0.84–1.19)
MCH RBC QN AUTO: 27.3 PG (ref 26.8–34.3)
MCH RBC QN AUTO: 27.3 PG (ref 26.8–34.3)
MCHC RBC AUTO-ENTMCNC: 30.7 G/DL (ref 31.4–37.4)
MCHC RBC AUTO-ENTMCNC: 30.7 G/DL (ref 31.4–37.4)
MCV RBC AUTO: 89 FL (ref 82–98)
MCV RBC AUTO: 89 FL (ref 82–98)
PLATELET # BLD AUTO: 193 THOUSANDS/UL (ref 149–390)
PLATELET # BLD AUTO: 193 THOUSANDS/UL (ref 149–390)
PMV BLD AUTO: 11.1 FL (ref 8.9–12.7)
PMV BLD AUTO: 11.1 FL (ref 8.9–12.7)
POTASSIUM SERPL-SCNC: 4.1 MMOL/L (ref 3.5–5.3)
POTASSIUM SERPL-SCNC: 4.1 MMOL/L (ref 3.5–5.3)
PROTHROMBIN TIME: 14 SECONDS (ref 11.6–14.5)
PROTHROMBIN TIME: 14 SECONDS (ref 11.6–14.5)
RBC # BLD AUTO: 3.85 MILLION/UL (ref 3.81–5.12)
RBC # BLD AUTO: 3.85 MILLION/UL (ref 3.81–5.12)
SODIUM SERPL-SCNC: 141 MMOL/L (ref 135–147)
SODIUM SERPL-SCNC: 141 MMOL/L (ref 135–147)
WBC # BLD AUTO: 6.82 THOUSAND/UL (ref 4.31–10.16)
WBC # BLD AUTO: 6.82 THOUSAND/UL (ref 4.31–10.16)

## 2022-10-08 PROCEDURE — 99285 EMERGENCY DEPT VISIT HI MDM: CPT | Performed by: EMERGENCY MEDICINE

## 2022-10-08 PROCEDURE — 85730 THROMBOPLASTIN TIME PARTIAL: CPT | Performed by: EMERGENCY MEDICINE

## 2022-10-08 PROCEDURE — 82948 REAGENT STRIP/BLOOD GLUCOSE: CPT

## 2022-10-08 PROCEDURE — 99204 OFFICE O/P NEW MOD 45 MIN: CPT | Performed by: PSYCHIATRY & NEUROLOGY

## 2022-10-08 PROCEDURE — 70496 CT ANGIOGRAPHY HEAD: CPT

## 2022-10-08 PROCEDURE — 84484 ASSAY OF TROPONIN QUANT: CPT | Performed by: EMERGENCY MEDICINE

## 2022-10-08 PROCEDURE — 96365 THER/PROPH/DIAG IV INF INIT: CPT

## 2022-10-08 PROCEDURE — 85610 PROTHROMBIN TIME: CPT | Performed by: EMERGENCY MEDICINE

## 2022-10-08 PROCEDURE — 92610 EVALUATE SWALLOWING FUNCTION: CPT

## 2022-10-08 PROCEDURE — 85027 COMPLETE CBC AUTOMATED: CPT | Performed by: EMERGENCY MEDICINE

## 2022-10-08 PROCEDURE — 93005 ELECTROCARDIOGRAM TRACING: CPT

## 2022-10-08 PROCEDURE — 99285 EMERGENCY DEPT VISIT HI MDM: CPT

## 2022-10-08 PROCEDURE — 70498 CT ANGIOGRAPHY NECK: CPT

## 2022-10-08 PROCEDURE — 80048 BASIC METABOLIC PNL TOTAL CA: CPT | Performed by: EMERGENCY MEDICINE

## 2022-10-08 PROCEDURE — 36415 COLL VENOUS BLD VENIPUNCTURE: CPT | Performed by: EMERGENCY MEDICINE

## 2022-10-08 PROCEDURE — 96366 THER/PROPH/DIAG IV INF ADDON: CPT

## 2022-10-08 PROCEDURE — 99220 PR INITIAL OBSERVATION CARE/DAY 70 MINUTES: CPT | Performed by: INTERNAL MEDICINE

## 2022-10-08 RX ORDER — SENNOSIDES 8.6 MG
1 TABLET ORAL
Status: DISCONTINUED | OUTPATIENT
Start: 2022-10-08 | End: 2022-10-14 | Stop reason: HOSPADM

## 2022-10-08 RX ORDER — HYDRALAZINE HYDROCHLORIDE 20 MG/ML
5 INJECTION INTRAMUSCULAR; INTRAVENOUS EVERY 6 HOURS PRN
Status: DISCONTINUED | OUTPATIENT
Start: 2022-10-08 | End: 2022-10-10

## 2022-10-08 RX ORDER — ACETAMINOPHEN 325 MG/1
650 TABLET ORAL EVERY 6 HOURS PRN
Status: DISCONTINUED | OUTPATIENT
Start: 2022-10-08 | End: 2022-10-14 | Stop reason: HOSPADM

## 2022-10-08 RX ORDER — LANOLIN ALCOHOL/MO/W.PET/CERES
400 CREAM (GRAM) TOPICAL DAILY
Status: DISCONTINUED | OUTPATIENT
Start: 2022-10-08 | End: 2022-10-14 | Stop reason: HOSPADM

## 2022-10-08 RX ORDER — MECLIZINE HCL 12.5 MG/1
12.5 TABLET ORAL EVERY 8 HOURS PRN
Status: DISCONTINUED | OUTPATIENT
Start: 2022-10-08 | End: 2022-10-14 | Stop reason: HOSPADM

## 2022-10-08 RX ORDER — ASPIRIN 81 MG/1
81 TABLET, CHEWABLE ORAL DAILY
Status: DISCONTINUED | OUTPATIENT
Start: 2022-10-08 | End: 2022-10-14 | Stop reason: HOSPADM

## 2022-10-08 RX ORDER — INSULIN LISPRO 100 [IU]/ML
1-6 INJECTION, SOLUTION INTRAVENOUS; SUBCUTANEOUS
Status: DISCONTINUED | OUTPATIENT
Start: 2022-10-08 | End: 2022-10-14 | Stop reason: HOSPADM

## 2022-10-08 RX ORDER — ATORVASTATIN CALCIUM 40 MG/1
40 TABLET, FILM COATED ORAL EVERY EVENING
Status: DISCONTINUED | OUTPATIENT
Start: 2022-10-08 | End: 2022-10-14 | Stop reason: HOSPADM

## 2022-10-08 RX ORDER — MELATONIN
2000 DAILY
Status: DISCONTINUED | OUTPATIENT
Start: 2022-10-08 | End: 2022-10-14 | Stop reason: HOSPADM

## 2022-10-08 RX ORDER — ONDANSETRON 2 MG/ML
4 INJECTION INTRAMUSCULAR; INTRAVENOUS EVERY 6 HOURS PRN
Status: DISCONTINUED | OUTPATIENT
Start: 2022-10-08 | End: 2022-10-14 | Stop reason: HOSPADM

## 2022-10-08 RX ORDER — RANOLAZINE 500 MG/1
500 TABLET, EXTENDED RELEASE ORAL DAILY
Status: DISCONTINUED | OUTPATIENT
Start: 2022-10-08 | End: 2022-10-14 | Stop reason: HOSPADM

## 2022-10-08 RX ORDER — SODIUM CHLORIDE, SODIUM LACTATE, POTASSIUM CHLORIDE, CALCIUM CHLORIDE 600; 310; 30; 20 MG/100ML; MG/100ML; MG/100ML; MG/100ML
100 INJECTION, SOLUTION INTRAVENOUS CONTINUOUS
Status: DISCONTINUED | OUTPATIENT
Start: 2022-10-08 | End: 2022-10-09

## 2022-10-08 RX ORDER — INSULIN LISPRO 100 [IU]/ML
1-5 INJECTION, SOLUTION INTRAVENOUS; SUBCUTANEOUS
Status: DISCONTINUED | OUTPATIENT
Start: 2022-10-08 | End: 2022-10-14 | Stop reason: HOSPADM

## 2022-10-08 RX ORDER — FLUTICASONE PROPIONATE 50 MCG
1 SPRAY, SUSPENSION (ML) NASAL DAILY
Status: DISCONTINUED | OUTPATIENT
Start: 2022-10-08 | End: 2022-10-14 | Stop reason: HOSPADM

## 2022-10-08 RX ORDER — CARVEDILOL 12.5 MG/1
25 TABLET ORAL EVERY 12 HOURS SCHEDULED
Status: DISCONTINUED | OUTPATIENT
Start: 2022-10-08 | End: 2022-10-12

## 2022-10-08 RX ADMIN — RANOLAZINE 500 MG: 500 TABLET, EXTENDED RELEASE ORAL at 13:00

## 2022-10-08 RX ADMIN — ATORVASTATIN CALCIUM 40 MG: 40 TABLET, FILM COATED ORAL at 17:28

## 2022-10-08 RX ADMIN — Medication 2000 UNITS: at 13:00

## 2022-10-08 RX ADMIN — INSULIN LISPRO 1 UNITS: 100 INJECTION, SOLUTION INTRAVENOUS; SUBCUTANEOUS at 22:07

## 2022-10-08 RX ADMIN — FOLIC ACID TAB 400 MCG 400 MCG: 400 TAB at 14:05

## 2022-10-08 RX ADMIN — SODIUM CHLORIDE, SODIUM LACTATE, POTASSIUM CHLORIDE, AND CALCIUM CHLORIDE 250 ML: .6; .31; .03; .02 INJECTION, SOLUTION INTRAVENOUS at 07:38

## 2022-10-08 RX ADMIN — SODIUM CHLORIDE, SODIUM LACTATE, POTASSIUM CHLORIDE, AND CALCIUM CHLORIDE 100 ML/HR: .6; .31; .03; .02 INJECTION, SOLUTION INTRAVENOUS at 14:47

## 2022-10-08 RX ADMIN — ASPIRIN 81 MG CHEWABLE TABLET 81 MG: 81 TABLET CHEWABLE at 13:00

## 2022-10-08 RX ADMIN — IOHEXOL 85 ML: 350 INJECTION, SOLUTION INTRAVENOUS at 07:22

## 2022-10-08 RX ADMIN — RIVAROXABAN 15 MG: 15 TABLET, FILM COATED ORAL at 13:00

## 2022-10-08 RX ADMIN — CARVEDILOL 25 MG: 12.5 TABLET, FILM COATED ORAL at 14:27

## 2022-10-08 RX ADMIN — FLUTICASONE PROPIONATE 1 SPRAY: 50 SPRAY, METERED NASAL at 13:00

## 2022-10-08 NOTE — ASSESSMENT & PLAN NOTE
· Patient came in due to a brief episode of right facial droop and slurring of speech and difficulty drinking water earlier today and ended up spilling it  Symptoms had all resolved  According to the patient's son, patient had similar symptoms about 2 years ago and was diagnosed with Bell's palsy  Patient also told me, that she has swallowing problems even before  · Likely TIA, with patient having cardiovascular risk factors as well as AFib, with patient not taking Xarelto for about a week now  Patient's initial hypoglycemia may have contributed  · CT scan of the head revealed no acute intracranial abnormality but was found to have chronic lacunar infarct, ronen  · CTA of the head and neck:  Unremarkable  · Stroke workup/pathway  · Appreciate neurology input  · Continue Xarelto, aspirin and patient will now be on high-intensity statin with atorvastatin  · Neuro checks  · MRI of the brain (patient's son carries the card of her mom's pacemaker; needs to determine if pacemaker is compatible with MRI)  · Transthoracic echocardiogram with bubble study  · Lipid profile, hemoglobin A1c  · Telemetry  · Stat CT scan of the head without contrast for acute change  · Permissive hypertension for the 1st 24 hours (please see plans under hypertension)  · Consider addition of EEG if no stroke found as patient has had similar episodes in the past   · PT OT and speech therapy consults  · Aspiration precautions

## 2022-10-08 NOTE — H&P
[facility]  H&P- Anali Lipoma 1945, 68 y o  female MRN: [id number]  Unit/Bed#: ED-43 Encounter: [id number]  Primary Care Provider: Marcia Estrada MD   Date and time admitted to hospital: 10/8/2022  7:01 AM    Stroke-like symptoms  Assessment & Plan  · Patient came in due to a brief episode of right facial droop and slurring of speech and difficulty drinking water earlier today and ended up spilling it  Symptoms had all resolved  According to the patient's son, patient had similar symptoms about 2 years ago and was diagnosed with Bell's palsy  Patient also told me, that she has swallowing problems even before  · Likely TIA, with patient having cardiovascular risk factors as well as AFib, with patient not taking Xarelto for about a week now  Patient's initial hypoglycemia may have contributed  · CT scan of the head revealed no acute intracranial abnormality but was found to have chronic lacunar infarct, ronen  · CTA of the head and neck:  Unremarkable  · Stroke workup/pathway  · Appreciate neurology input  · Continue Xarelto, aspirin and patient will now be on high-intensity statin with atorvastatin  · Neuro checks  · MRI of the brain (patient's son carries the card of her mom's pacemaker; needs to determine if pacemaker is compatible with MRI)  · Transthoracic echocardiogram with bubble study  · Lipid profile, hemoglobin A1c  · Telemetry  · Stat CT scan of the head without contrast for acute change  · Permissive hypertension for the 1st 24 hours (please see plans under hypertension)  · Consider addition of EEG if no stroke found as patient has had similar episodes in the past   · PT OT and speech therapy consults  · Aspiration precautions      Hypertension  Assessment & Plan  · According to the patient and patient's son at bedside, her blood pressure when she initially came in to the emergency room was normal   However, from the vital signs monitoring that are recorded, patient has been having severe hypertension  · Per neurologist, due to the probable stroke/TIA, for permissive hypertension for the 1st 24 hours (goal blood pressure is less than or equal to 200/110 for 1st 24 hours)  · Continue carvedilol as patient has history of coronary artery disease  · IV blood pressure medication on as needed basis  · On patient's medication list, it was written that patient on amlodipine and/or nifedipine XL  Hold off for now  If blood pressures are still elevated after 24 hours and if Neurology had cleared the patient for permissive hypertension, may restart one of those calcium channel blocker  · Adjust treatment accordingly  Diabetes mellitus type 2, insulin dependent; with initial hypoglycemia in the emergency room [facility])  Assessment & Plan  Lab Results   Component Value Date    HGBA1C 7 1 (H) 10/05/2022       Recent Labs     10/08/22  0706 10/08/22  1102   POCGLU 60* 72       Blood Sugar Average: Last 72 hrs:  (P) 66     · Hold off patient's Lantus for now  · Insulin sliding scale for now  · Diabetic diet aside from renal diet  · Monitor blood sugars  · Adjust treatment accordingly  · Hypoglycemia protocol  Atrial fibrillation (HCC)  Assessment & Plan  · Continue carvedilol and Xarelto  · According to the patient's son, due to high cost of Xarelto, patient has not taken Xarelto for about a week now  Patient's son told me, that her mother followed up with her cardiologist a few weeks ago and they told him about the high cost of the Xarelto and they were told that there is no alternative that will cost less  They were told alternatives are Pradaxa and Eliquis  So the patient was continued on Xarelto  According the patient's son, patient is being prescribed on Xarelto for about 2 years now  · Case management consult regarding finding out actual cost of Pradaxa and Eliquis, to find out if this may be a better alternative for the patient    If cost is still high, we have to determine if Coumadin could be an alternative, may need to talk to patient's cardiologist       Stage 4 chronic kidney disease [facility])  Assessment & Plan  Lab Results   Component Value Date    EGFR 26 08/03/2022    EGFR 29 08/02/2022    EGFR 24 08/01/2022    CREATININE 1 79 (H) 08/03/2022    CREATININE 1 65 (H) 08/02/2022    CREATININE 1 91 (H) 08/01/2022     · Stable  · Based on my review, patient's baseline creatinine is around 1 6 to 1 8  · Monitor kidney function  · Avoid nephrotoxins  · Avoid hypotension  · Input and output monitoring  · Since patient had received IV dye with CT scan, I will have the patient on IV fluid x1 L, as prevention for contrast induced nephropathy  · For further evaluation and management if this worsens significantly  Anemia of chronic kidney failure, stage 4 (severe) (HCC)  Assessment & Plan  · Mild and stable  · No active bleeding  · Monitor  · For further evaluation and management if this worsens significantly    CAD (coronary artery disease)  Assessment & Plan  · Stable  · Continue cardiovascular medications (aspirin and beta-blocker)  Left thyroid nodule  Assessment & Plan  · Incidental finding  · Check TSH with reflex free T4  · Will likely need outpatient thyroid ultrasound in the outpatient  · Outpatient follow-up with primary care physician  History of fracture of left hip  Assessment & Plan  · According to the patient, had surgery for this left hip fracture in June of this year  · Patient still has baseline weakness of the left lower extremity due to this  · PT OT evaluation and management  · Pain control p r n  Lajean Cassette · Outpatient follow-up with primary care physician/orthopedic surgeon  VTE Pharmacologic Prophylaxis: VTE Score: 9 High Risk (Score >/= 5) - Pharmacological DVT Prophylaxis Ordered: rivaroxaban (Xarelto)  Sequential Compression Devices Ordered    Code Status: Level 1 - Full Code   Discussion with family: Updated  (son) at bedside  Anticipated Length of Stay: Patient will be admitted on an observation basis with an anticipated length of stay of less than 2 midnights secondary to Above findings and plans       Total Time for Visit, including Counseling / Coordination of Care: 70 minutes Greater than 50% of this total time spent on direct patient counseling and coordination of care  Chief Complaint:  Right facial droop and slurring of speech  History of Present Illness:  Isma Vickers is a 68 y o  female with a PMH significant for atrial fibrillation on Xarelto, coronary artery disease, sick sinus syndrome with pacemaker, chronic kidney disease stage 4, hypertension, dyslipidemia, diabetes mellitus type 2 insulin requiring, who presents with complaints of right facial droop and slurring of speech  According to the patient, when she woke up this morning and was about to drink water, she spilled the water, as patient was not able to bring the cup to her mouth (according to the patient's son, her mother missed placing the cup to her mouth)  After this, according to the patient's son, patient was found to have slurring of speech and difficulty in speaking and could not understand the words that she has been speaking  Aside from this, patient was noted to have a right facial droop  They called EMS and EN route, according to the patient's son, patient's slurring of speech and right facial droop started to improve  According to the patient's son, by the time they came in to the ER, all of patient's symptoms had resolved  According to the patient's son, proximally 1 to 2 years ago, patient had similar problems with right facial droop and slurring of speech and was diagnosed with Bell's palsy  According to the patient's son, when they arrived, her mother's blood pressure was normal however, on the vital signs record, her blood pressures are severely high    Patient also came in hypoglycemic with blood sugar of 60  Repeat 1 was 72  Patient has not eaten yet at the time I saw the patient  Patient also told me, that she has history of at times problems swallowing, for which at times she would cough when eating/drinking  Patient also had some mild headache, otherwise no other complaints other the ones mentioned above  She denies any dizziness or any loss of consciousness  According to the patient patient's son, patient has baseline left lower extremity weakness due to her recent left hip fracture status post surgery last 2022  Review of Systems:  Review of Systems     Ten point review systems done and they were negative except for the ones I mentioned in my history of present illness and occasional constipation  Patient denies any fever, chills or any cough or colds  Patient denies any chest pains or shortness of breath  Patient denies any nausea or vomiting or any abdominal pains  Patient denies any urinary problems  For the other symptoms, please see notes above  Past Medical and Surgical History:   Past Medical History:   Diagnosis Date   • Atrial fibrillation ([facility] )    • Chronic anemia    • Chronic kidney disease    • Colonoscopy refused 2019    pt declines   • Dyslipidemia    • Hypertension    • Mammogram declined 2017   • Proteinuria    • 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 Authorizing Provider   acetaminophen (TYLENOL) 325 mg tablet Take 3 tablets (975 mg total) by mouth every 8 (eight) hours 6/30/22   Bernadette Choi PA-C   amLODIPine (NORVASC) 5 mg tablet Take 1 tablet (5 mg total) by mouth daily 8/4/22 9/7/22  Ayan Benitez MD   aspirin 81 mg chewable tablet Chew 81 mg daily    Historical Provider, MD LEON UF III MINI PEN NEEDLES 31G X 5 MM MISC Use to test qd 2/7/22   León Lacy MD   carvedilol (COREG) 25 mg tablet Take 25 mg by mouth every 12 (twelve) hours  8/7/20   Historical Provider, MD   Cholecalciferol (Vitamin D) 50 MCG (2000 UT) tablet Take 1 tablet (2,000 Units total) by mouth daily 11/10/20   León Lacy MD   fluticasone [facility]) 50 mcg/act nasal spray 1 spray into each nostril daily 11/3/21   Ethan Martinez PA-C   folic acid (FOLVITE) 860 mcg tablet Take 400 mcg by mouth daily    Historical Provider, MD   gabapentin (NEURONTIN) 100 mg capsule Take 1 capsule (100 mg total) by mouth daily at bedtime  Patient not taking: Reported on 9/16/2022 6/30/22   Jaymie Maciel PA-C   Lantus SoloStar 100 units/mL SOPN  8/4/22   Historical Provider, MD   meclizine (ANTIVERT) 12 5 MG tablet Take 1 tablet (12 5 mg total) by mouth every 8 (eight) hours as needed for dizziness 8/3/22   Ayan Benitez MD   NIFEdipine (PROCARDIA XL) 60 mg 24 hr tablet  7/20/22   Historical Provider, MD   pravastatin (PRAVACHOL) 40 mg tablet Take 1 tablet (40 mg total) by mouth daily 8/24/22   León Lacy MD   ranolazine (RANEXA) 500 mg 12 hr tablet Take 1 tablet (500 mg total) by mouth daily 8/4/22 9/7/22  Ayan Benitez MD   rivaroxaban (XARELTO) 15 mg tablet Take 15 mg by mouth daily    Historical Provider, MD   bisacodyl (DULCOLAX) 10 mg suppository Insert 1 suppository (10 mg total) into the rectum daily as needed for constipation 6/30/22 8/1/22  Jaymie Maciel PA-C     I have reviewed home medications using recent Epic encounter  Allergies: No Known Allergies    Social History:  Marital Status:    Patient Pre-hospital Living Situation: Home, With other family member: Son  Social History     Substance and Sexual Activity   Alcohol Use Never     Social History     Tobacco Use   Smoking Status Never Smoker   Smokeless Tobacco Never Used     Social History     Substance and Sexual Activity   Drug Use Never       Family History:  Family History   Problem Relation Age of Onset   • Diabetes Father    • Hypertension Father    • Heart disease Father    • Cancer Brother         smoker   • Diabetes Other    • Heart attack Other    • Hypertension Other    • Asthma Other        Physical Exam:     Vitals:   Blood Pressure: (!) 211/64 (10/08/22 1128)  Pulse: 60 (10/08/22 1128)  Temperature: 98 °F (36 7 °C) (10/08/22 0700)  Temp Source: Oral (10/08/22 0700)  Respirations: 17 (10/08/22 1128)  Weight - Scale: 83 1 kg (183 lb 3 2 oz) (10/08/22 0700)  SpO2: 96 % (10/08/22 1128)    Physical Exam  Vitals and nursing note reviewed  Exam conducted with a chaperone present  Constitutional:       General: She is not in acute distress  Appearance: She is not ill-appearing, toxic-appearing or diaphoretic  HENT:      Head: Normocephalic and atraumatic  Mouth/Throat:      Mouth: Mucous membranes are dry  Pharynx: Oropharynx is clear  No oropharyngeal exudate or posterior oropharyngeal erythema  Eyes:      General: No scleral icterus  Right eye: No discharge  Left eye: No discharge  Comments: Patient is blind bilaterally  Neck:      Vascular: No carotid bruit  Cardiovascular:      Rate and Rhythm: Normal rate and regular rhythm  Heart sounds: Normal heart sounds  Pulmonary:      Effort: Pulmonary effort is normal  No respiratory distress  Breath sounds: Normal breath sounds  Abdominal:      General: Bowel sounds are normal  There is no distension  Palpations: Abdomen is soft  Tenderness: There is no abdominal tenderness  Musculoskeletal:      Right lower leg: No edema  Left lower leg: No edema  Skin:     General: Skin is warm  Coloration: Skin is not pale  Findings: No erythema or rash  Neurological:      General: No focal deficit present  Mental Status: She is alert and oriented to person, place, and time  Mental status is at baseline  Cranial Nerves: Cranial nerve deficit present  Sensory: Sensory deficit present  Motor: Weakness present  Comments: Motor examination:  Bilateral upper extremities:  5/5; left lower extremity:  3/5, more due to limitation of range of motion due to previous left hip fracture status post surgery; right lower extremity:  4 to 5/5  Sensory examination were all 100% to light touch in all extremities, except for right hand with 50 to 60% sensation to light touch  Cranial nerve 2:  Patient is blind bilaterally at baseline  Other cranial nerves were normal    Psychiatric:         Mood and Affect: Mood normal          Behavior: Behavior normal          Thought Content: Thought content normal               Additional Data:     Lab Results:              Results from last 7 days   Lab Units 10/08/22  1102 10/08/22  0706   POC GLUCOSE mg/dl 72 60*     Results from last 7 days   Lab Units 10/05/22  0706   HEMOGLOBIN A1C % 7 1*           Imaging: Reviewed radiology reports from this admission including: CT head and CT angiogram of the head and neck  MRI Inpatient Order    (Results Pending)       EKG and Other Studies Reviewed on Admission:   · EKG: Paced rhythm  HR Sixty-six per minute  ** Please Note: This note has been constructed using a voice recognition system   **

## 2022-10-08 NOTE — ASSESSMENT & PLAN NOTE
· Mild and stable  · No active bleeding  · Monitor    · For further evaluation and management if this worsens significantly

## 2022-10-08 NOTE — CONSULTS
Consultation - Stroke   Lee Roque 68 y o  female MRN: [id number]  Unit/Bed#: ED-43 Encounter: [id number]      Assessment/Plan     * Stroke-like symptoms  Assessment & Plan  Lee Roque is a 68 y o  female w/ PMH DM complicated by blindness, HTN, AF on Xarelto, pacemaker, MI who p/w acute-onset right-sided weakness, dysarthria and facial droop initially concerning for acute ischemic stroke particularly in the setting of patient having missed about a week's worth of Xarelto doses   • Presenting sx: improving RUE weakness, dysarthria, facial droop; NIHSS: 3 for baseline blindness; tPA not given 2/2 resolving sx  • Workup:  o CTH/CTA: negative for acute blood, signs of ischemia, LVO or critical stenosis; mild b/l intra- and extracranial ICA athero; 1 6x1 6 left thyroid nodule  o Labs: Hgb A1c 7 1 (10/5/22), LDL pending; folate, B12, MMA ok  o MRI: pending  o Echo (8/2/22): LVEF 60%, mobile atrial septal aneurysm, mild RV dilation    Plan:  • Continue stroke pathway for now  • AP/AC and high intensity statin  o Continue patient's home Xarelto and aspirin  o Atorvastatin 40 mg  • Adjusted permissive HTN x 24 hrs (goal BP < 200/110), labetalol prn  • F/u labs: FLP, CMP, CBCD, TSH  • F/u MRI, TTE - please complete w/ bubble study  • Continue monitoring on telemetry, frequent neuro checks, stat CTH for acute change  • Replete lytes, goal euglycemia (gluc < 180) and normothermia  • PT/OT/PMR/ST w/ swallow eval  • Consult CM for both stroke and medication affordability (Xarelto > $100 monthly copay)  • Stroke education if stroke found  • F/u thyroid U/S for nodules if needed  • May benefit from outpatient neurocognitive evaluation  • Consider addition of EEG if no stroke found as patient has had similar episodes in the past    Thrombolytic Decision: Patient not a candidate  Symptoms resolved/clearly non disabling  Recommendations for outpatient neurological follow up have yet to be determined      History of Present Illness     Reason for Consult / Principal Problem: stroke alert  Hx and PE limited by: n/a  Patient last known well: about 5:45-6:00am  Stroke alert called: 6:46 am  Neurology time of arrival: immediate by phone  HPI: Beatriz Lewis is a 68 y o  right handed female w/ PMH DM, HTN, AF on Xarelto who presents with acute-onset dysarthria, dysphagia, RUE weakness and right facial droop after awaking at her baseline around 5:30 am  Was eating breakfast and tried to bring drink up to her mouth and ended up spilling all over herself  NIHSS 3-4 for possible dysarthria and baseline blindness  CTH/CTA negative for acute blood, signs of ischemia, LVO or critical stenosis  Son notes that she is back to baseline after imaging  He reports that she has missed about a week of Xarelto due to cost  No history of stroke or seizure  Denies N/V/F/C, abdominal pain, dizziness, HA, visual changes, new weakness or sensory changes, bowel or bladder dysfunction  Son does note that she has been having intermittent STM and attentional issues last several months  Inpatient consult to Neurology  Consult performed by: Octaviano Rod MD  Consult ordered by: Tyrel Chaudhry MD        Review of Systems   Constitutional: Negative for chills, fatigue and fever  HENT: Negative for hearing loss, trouble swallowing and voice change  Eyes: Negative for photophobia and visual disturbance  Respiratory: Negative for shortness of breath  Cardiovascular: Negative for chest pain  Gastrointestinal: Negative for diarrhea, nausea and vomiting  Neurological: Positive for weakness  Negative for dizziness, tremors, seizures, light-headedness, numbness and headaches  Historical Information   No past medical history on file  No past surgical history on file    Social History   Social History     Substance and Sexual Activity   Alcohol Use Not on file     Social History     Substance and Sexual Activity   Drug Use Not on file     No existing history information found  No existing history information found  Social History     Tobacco Use   Smoking Status Not on file   Smokeless Tobacco Not on file     Family History: No family history on file  Review of previous medical records underway    Meds/Allergies   all current active meds have been reviewed, current meds:   Current Facility-Administered Medications   Medication Dose Route Frequency   • lactated ringers bolus 250 mL  250 mL Intravenous Once   , and PTA meds:   None     Not on File    Objective   Vitals: There were no vitals taken for this visit  ,There is no height or weight on file to calculate BMI  No intake or output data in the 24 hours ending 10/08/22 0853    Invasive Devices: Invasive Devices  Report    Peripheral Intravenous Line  Duration           Peripheral IV 10/08/22 Right Antecubital <1 day                Physical Exam Vitals reviewed  Constitutional:    Not in acute distress  Normal appearance  Not ill-appearing, toxic-appearing or diaphoretic  HENT:   Normocephalic and atraumatic  External ear normal b/l  Nose normal  No congestion or rhinorrhea  Mucous membranes are moist   Oropharynx is clear  No oropharyngeal exudate or posterior oropharyngeal erythema  Eyes:    No scleral icterus  No discharge b/l  Conjunctivae normal    Pulmonary:  Pulmonary effort is normal  No respiratory distress  GI: abdomen not distended; in diaper  Musculoskeletal: no gross deformities  Skin:   Skin is not pale  Psychiatric:       Mood normal  Affect congruent    Neurologic Exam:   Mental Status   Alert and oriented to person, place, and time  Attention is normal  Speech is fluent w/ mild if any dysarthria     Cranial Nerves   CN II patient legally blind at baseline from diabetic complications   CN III, IV, VI pupils constricted and minimally reactive, EOMI, no CN III palsy, no CN VI palsy  no nystagmus   CN V   Facial sensation symmetric     CN VII  Facial expression full, symmetric  CN VIII, IX, X Palate symmetric  CN XI trapezius strength symmetric  CN XII symmetric lingual protrusion    Motor Exam   Muscle bulk and tone grossly normal; Pronator drift absent b/l  Strength intact and symmetric BUE  Intact RLE  Limited exam LLE 2/2 hip replacement    Sensory Exam   Light touch intact and symmetric BUE/BLE    Gait, Coordination, and Reflexes   FTN normal b/l; no significant tremor observed  Reflexes: Brachioradialis: 2+ b/l    Biceps: 2+ b/l    Patellar: 2+ b/l     Plantar response downgoing b/l    NIHSS:  1a Level of Consciousness: 0 = Alert   1b  LOC Questions: 0 = Answers both correctly   1c  LOC Commands: 0 = Obeys both correctly   2  Best Gaze: 0 = Normal   3  Visual: 3 = Bilateral hemianopia   4  Facial Palsy: 0=Normal symmetric movement   5a  Motor Right Arm: 0=No drift, limb holds 90 (or 45) degrees for full 10 seconds   5b  Motor Left Arm: 0=No drift, limb holds 90 (or 45) degrees for full 10 seconds   6a  Motor Right Le=No drift, limb holds 90 (or 45) degrees for full 10 seconds   6b  Motor Left Le=> patient appears to be full strength on exam but unable to lift off bed 2/2 recent hip replacement   7  Limb Ataxia:  0=Absent   8  Sensory: 0=Normal; no sensory loss   9  Best Language:  0=No aphasia, normal   10  Dysarthria: 0=Normal articulation   11  Extinction and Inattention (formerly Neglect): 0=No abnormality   Total Score: 3     Time NIHSS was completed: 7:35    Modified Guilherme Score:  3 (Moderate disability; requiring some help, but able to walk without assistance)    Lab Results: I have personally reviewed pertinent reports    , CBC:   Results from last 7 days   Lab Units 10/08/22  0801   WBC Thousand/uL 6 82   RBC Million/uL 3 85   HEMOGLOBIN g/dL 10 5*   HEMATOCRIT % 34 2*   MCV fL 89   PLATELETS Thousands/uL 193   , BMP/CMP:   Results from last 7 days   Lab Units 10/08/22  0801   SODIUM mmol/L 141   POTASSIUM mmol/L 4 1   CHLORIDE mmol/L 104   CO2 mmol/L 31 BUN mg/dL 19   CREATININE mg/dL 1 60*   CALCIUM mg/dL 9 3   EGFR ml/min/1 73sq m 30   , Vitamin B12:   , HgBA1C:   , TSH:   , Coagulation:   Results from last 7 days   Lab Units 10/08/22  0801   INR  1 06   , Lipid Profile:   , Ammonia:   , Urinalysis:       Invalid input(s): URIBILINOGEN, Drug Screen:   , Medication Drug Levels:       Invalid input(s): CARBAMAZEPINE,  PHENOBARB, LACOSAMIDE, OXCARBAZEPINE  Imaging Studies: I have personally reviewed pertinent reports   , I have personally reviewed pertinent films in PACS, and I have personally reviewed pertinent films in PACS with a Radiologist   CTA stroke alert (head/neck)   Final Result by Bo Alejandre DO (10/08 63)      No significant carotid or vertebral artery stenosis  No focal intracranial stenosis or aneurysm  Findings were directly discussed with Elias Zapien at 7:45 AM on 10/8/2022  Workstation performed: [id number]         CT stroke alert brain   Final Result by Bo Alejandre DO (10/08 7738)      No acute intracranial abnormality  Chronic microangiopathic changes  Chronic lacunar infarct in the miguel  Findings were directly discussed with Elias Zapien at 7:42 AM on 10/8/2022  Workstation performed: [id number]           EKG, Pathology, and Other Studies: I have personally reviewed pertinent reports  VTE Prophylaxis: Sequential compression device Shara Rotunda)     Code Status: No Order  Advance Directive and Living Will:      Power of :    POLST:      Counseling / Coordination of Care  Total time spent today 47 minutes  Greater than 50% of total time was spent with the patient and / or family counseling and / or coordination of care   A description of the counseling / coordination of care: stroke alert      Rema Santoro MD  [facility], PGY-III

## 2022-10-08 NOTE — Clinical Note
Case was discussed with dr Shanelle Love and the patient's admission status was agreed to be Admission Status: observation status to the service of Dr Archana Ornelas

## 2022-10-08 NOTE — ASSESSMENT & PLAN NOTE
· According to the patient, had surgery for this left hip fracture in June of this year  · Patient still has baseline weakness of the left lower extremity due to this  · PT OT evaluation and management  · Pain control p patyt Thibodeaux · Outpatient follow-up with primary care physician/orthopedic surgeon

## 2022-10-08 NOTE — SPEECH THERAPY NOTE
Speech-Language Pathology Bedside Swallow Evaluation      Patient Name: Sandy Lomax    MPGRH'B Date: 10/8/2022     Problem List  Active Problems:    Anemia of chronic kidney failure, stage 4 (severe) (HCC)    Diabetes mellitus type 2, insulin dependent; with initial hypoglycemia in the emergency room [facility])    Hypertension    CAD (coronary artery disease)    Atrial fibrillation ([facility] )    Stroke-like symptoms    Stage 4 chronic kidney disease ([facility] )    History of fracture of left hip    Left thyroid nodule      Past Medical History  Past Medical History:   Diagnosis Date   • Atrial fibrillation ([facility] [address] )    • Chronic anemia    • Chronic kidney disease    • Colonoscopy refused 2019    pt declines   • Dyslipidemia    • Hypertension    • Mammogram declined 2017   • Proteinuria    • Tachy-sarah beth syndrome ([facility] [address] ) 2022       Past Surgical History  Past Surgical History:   Procedure Laterality Date   • CARDIAC PACEMAKER PLACEMENT     • CATARACT EXTRACTION     •  SECTION     • CHOLECYSTECTOMY     • CORONARY ANGIOPLASTY WITH STENT PLACEMENT     • EYE SURGERY     • MAMMO (HISTORICAL)      Pt states she will not ever have again-    • MI OPEN RX FEMUR FX+INTRAMED RIMA Left 2022    Procedure: INSERTION NAIL IM FEMUR ANTEGRADE (TROCHANTERIC); Surgeon: Jory Hernández DO;  Location: [facility];  Service: Orthopedics   • TUBAL LIGATION         Summary   Pt presented with functional appearing oral and pharyngeal stage swallowing skills with materials administered today  All s/s dysarthria resolved  Recommended Diet: regular diet and thin liquids   Recommended Form of Meds: as desired       Current Medical Status  Sandy Lomax is a 68 y o  female with a PMH significant for atrial fibrillation on Xarelto, CAD, SSS with pacemaker, CKD stage 4, hypertension, dyslipidemia, diabetes mellitus type 2 insulin requiring  She presents this am with complaints of right facial droop and slurring of speech    DX: stroke-like sxs (see above for full list of DX)  SLP Evaluation ordered and completed bedside  Current Precautions: Aspiration     Allergies:  No known food allergies    Past medical history:  Please see H&P for details    Special Studies:  MRI of brain pending (if PPM in place allowed)    Social/Education/Vocational Hx:  Pt lives with family (4 generations in same household)    Swallow Information   Current Risks for Dysphagia & Aspiration: r/o CVA  Baseline Diet: regular diet and thin liquids      Baseline Assessment   Behavior/Cognition: alert  Speech/Language Status: able to participate in conversation (comprehends and speaks [place] and [address])  Has lived in [place] since age 6  Patient Positioning: upright in bed  Pain Status/Interventions/Response to Interventions: No report of or nonverbal indications of pain  Swallow Mechanism Exam  Facial: symmetrical  Labial: WFL  Lingual: WFL  Velum: symmetrical  Mandible: adequate ROM  Dentition: edentulous  Vocal quality:clear/adequate   Volitional Cough: strong/productive   Respiratory Status: on RA         Consistencies Assessed and Performance   Consistencies Administered: thin liquids, puree and solids  Materials administered included water, applesauce, pudding, julee cracker, cookie    Oral Stage:   Mastication was adequate with the materials administered today  Bolus formation and transfer were functional with no significant oral residue noted  No overt s/s reduced oral control  Pharyngeal Stage:   Swallow Mechanics:  Swallowing initiation appeared prompt  Laryngeal rise was palpated and judged to be within functional limits  No coughing, throat clearing, change in vocal quality or respiratory status noted today  Esophageal Concerns: none reported    Summary and Recommendations (see above)    Results Reviewed with: patient, RN and family     Treatment Recommended: None at this time

## 2022-10-08 NOTE — PLAN OF CARE
Problem: MOBILITY - ADULT  Goal: Maintain or return to baseline ADL function  Description: INTERVENTIONS:  -  Assess patient's ability to carry out ADLs; assess patient's baseline for ADL function and identify physical deficits which impact ability to perform ADLs (bathing, care of mouth/teeth, toileting, grooming, dressing, etc )  - Assess/evaluate cause of self-care deficits   - Assess range of motion  - Assess patient's mobility; develop plan if impaired  - Assess patient's need for assistive devices and provide as appropriate  - Encourage maximum independence but intervene and supervise when necessary  - Involve family in performance of ADLs  - Assess for home care needs following discharge   - Consider OT consult to assist with ADL evaluation and planning for discharge  - Provide patient education as appropriate  Outcome: Progressing  Goal: Maintains/Returns to pre admission functional level  Description: INTERVENTIONS:  - Perform BMAT or MOVE assessment daily    - Set and communicate daily mobility goal to care team and patient/family/caregiver  - Collaborate with rehabilitation services on mobility goals if consulted  - Perform Range of Motion  times a day  - Reposition patient every  hours    - Dangle patient  times a day  - Stand patient  times a day  - Ambulate patient  times a day  - Out of bed to chair  times a day   - Out of bed for meals  times a day  - Out of bed for toileting  - Record patient progress and toleration of activity level   Outcome: Progressing     Problem: NEUROSENSORY - ADULT  Goal: Achieves stable or improved neurological status  Description: INTERVENTIONS  - Monitor and report changes in neurological status  - Monitor vital signs such as temperature, blood pressure, glucose, and any other labs ordered   - Initiate measures to prevent increased intracranial pressure  - Monitor for seizure activity and implement precautions if appropriate      Outcome: Progressing  Goal: Remains free of injury related to seizures activity  Description: INTERVENTIONS  - Maintain airway, patient safety  and administer oxygen as ordered  - Monitor patient for seizure activity, document and report duration and description of seizure to physician/advanced practitioner  - If seizure occurs,  ensure patient safety during seizure  - Reorient patient post seizure  - Seizure pads on all 4 side rails  - Instruct patient/family to notify RN of any seizure activity including if an aura is experienced  - Instruct patient/family to call for assistance with activity based on nursing assessment  - Administer anti-seizure medications if ordered    Outcome: Progressing  Goal: Achieves maximal functionality and self care  Description: INTERVENTIONS  - Monitor swallowing and airway patency with patient fatigue and changes in neurological status  - Encourage and assist patient to increase activity and self care     - Encourage visually impaired, hearing impaired and aphasic patients to use assistive/communication devices  Outcome: Progressing

## 2022-10-08 NOTE — ASSESSMENT & PLAN NOTE
Christy Archer is a 68 y o  female w/ PMH DM, HTN, AF on Xarelto, pacemaker, MI who p/w acute-onset right-sided weakness, dysarthria and facial droop initially concerning for acute ischemic stroke particularly in the setting of patient having missed about a week's worth of Xarelto doses   • Presenting sx: improving RUE weakness, dysarthria, facial droop; NIHSS: 3 for baseline blindness; tPA not given 2/2 resolving sx  • Workup:  o CTH/CTA: negative for acute blood, signs of ischemia, LVO or critical stenosis; mild b/l intra- and extracranial ICA athero  o Labs: Hgb A1c 7 1 (10/5/22), LDL pending  o MRI: pending  o Echo (8/2/22):  LVEF 60%, mobile atrial septal aneurysm, mild RV dilation    Plan:  • Continue stroke pathway for now  • AP/AC and high intensity statin  o Continue patient's home Xarelto and aspirin  o Atorvastatin 40 mg  • Permissive HTN x 24 hrs (goal BP < 220/110), labetalol prn  • F/u labs: A1c, FLP, CMP, CBCD  • F/u MRI, TTE - please complete w/ bubble study  • Continue monitoring on telemetry, frequent neuro checks, stat CTH for acute change  • Replete lytes, goal euglycemia (gluc < 180) and normothermia  • PT/OT/PMR/ST w/ swallow eval  • Stroke education if stroke found  • Consider addition of EEG if no stroke found as patient has had similar episodes in the past

## 2022-10-08 NOTE — ASSESSMENT & PLAN NOTE
· Incidental finding  · Check TSH with reflex free T4  · Will likely need outpatient thyroid ultrasound in the outpatient  · Outpatient follow-up with primary care physician

## 2022-10-08 NOTE — ASSESSMENT & PLAN NOTE
Lab Results   Component Value Date    EGFR 26 08/03/2022    EGFR 29 08/02/2022    EGFR 24 08/01/2022    CREATININE 1 79 (H) 08/03/2022    CREATININE 1 65 (H) 08/02/2022    CREATININE 1 91 (H) 08/01/2022     · Stable  · Based on my review, patient's baseline creatinine is around 1 6 to 1 8  · Monitor kidney function  · Avoid nephrotoxins  · Avoid hypotension  · Input and output monitoring  · Since patient had received IV dye with CT scan, I will have the patient on IV fluid x1 L, as prevention for contrast induced nephropathy  · For further evaluation and management if this worsens significantly

## 2022-10-08 NOTE — CONSULTS
Consultation - Stroke   Sea Hayden 68 y o  female MRN: [id number]  Unit/Bed#: ED-43 Encounter: [id number]      Assessment/Plan     * Stroke-like symptoms  Assessment & Plan  Please note: initial consult note placed in the patient encounter where the consult orders were placed  This is being copied over for clarity so information does not get missed    Sea Hayden is a 68 y o  female w/ PMH DM complicated by blindness, HTN, AF on Xarelto, pacemaker, MI who p/w acute-onset right-sided weakness, dysarthria and facial droop initially concerning for acute ischemic stroke particularly in the setting of patient having missed about a week's worth of Xarelto doses  • Presenting sx: improving RUE weakness, dysarthria, facial droop; NIHSS: 3 for baseline blindness; tPA not given 2/2 resolving sx  • Workup:  o CTH/CTA: negative for acute blood, signs of ischemia, LVO or critical stenosis; mild b/l intra- and extracranial ICA athero; 1 6x1 6 left thyroid nodule  o Labs: Hgb A1c 7 1 (10/5/22), LDL pending; folate, B12, MMA ok  o MRI: pending  o Echo (8/2/22):  LVEF 60%, mobile atrial septal aneurysm, mild RV dilation    Plan:  • Continue stroke pathway for now  • AP/AC and high intensity statin  o Continue patient's home Xarelto and aspirin  o Atorvastatin 40 mg  • Adjusted permissive HTN x 24 hrs (goal BP < 200/110), labetalol prn  • F/u labs: FLP, CMP, CBCD, TSH  • F/u MRI, TTE - please complete w/ bubble study  • Continue monitoring on telemetry, frequent neuro checks, stat CTH for acute change  • Replete lytes, goal euglycemia (gluc < 180) and normothermia  • PT/OT/PMR/ST w/ swallow eval  • Consult CM for both stroke and medication affordability (Xarelto > $100 monthly copay)  • Stroke education if stroke found  • F/u thyroid U/S for nodules if needed  • May benefit from outpatient neurocognitive evaluation  • Consider addition of EEG if no stroke found as patient has had similar episodes in the past    Thrombolytic Decision: Patient not a candidate  Symptoms resolved/clearly non disabling  Recommendations for outpatient neurological follow up have yet to be determined  History of Present Illness     Reason for Consult / Principal Problem: stroke alert  Hx and PE limited by: n/a  Patient last known well: about 5:45-6:00am  Stroke alert called: 6:46 am  Neurology time of arrival: immediate by phone  HPI: Paolo Bui is a 68 y o  right handed female w/ PMH DM, HTN, AF on Xarelto who presents with acute-onset dysarthria, dysphagia, RUE weakness and right facial droop after awaking at her baseline around 5:30 am  Was eating breakfast and tried to bring drink up to her mouth and ended up spilling all over herself  NIHSS 3-4 for possible dysarthria and baseline blindness  CTH/CTA negative for acute blood, signs of ischemia, LVO or critical stenosis  Son notes that she is back to baseline after imaging  He reports that she has missed about a week of Xarelto due to cost  No history of stroke or seizure  Denies N/V/F/C, abdominal pain, dizziness, HA, visual changes, new weakness or sensory changes, bowel or bladder dysfunction  Son does note that she has been having intermittent STM and attentional issues last several months  Please note: initial consult note placed in the patient encounter where the consult orders were placed  This is being copied over for clarity so information does not get missed    Inpatient consult to Neurology  Consult performed by: Jony Steinberg MD  Consult ordered by: Vera Guerrero MD        Review of Systems   Constitutional: Negative for chills, fatigue and fever  HENT: Negative for hearing loss, trouble swallowing and voice change  Eyes: Negative for photophobia and visual disturbance  Respiratory: Negative for shortness of breath  Cardiovascular: Negative for chest pain  Gastrointestinal: Negative for diarrhea, nausea and vomiting     Neurological: Positive for weakness  Negative for dizziness, tremors, seizures, light-headedness, numbness and headaches  Historical Information   No past medical history on file  No past surgical history on file  Social History   Social History     Substance and Sexual Activity   Alcohol Use Not on file     Social History     Substance and Sexual Activity   Drug Use Not on file     No existing history information found  No existing history information found  Social History     Tobacco Use   Smoking Status Not on file   Smokeless Tobacco Not on file     Family History: No family history on file  Review of previous medical records underway    Meds/Allergies   all current active meds have been reviewed, current meds:   Current Facility-Administered Medications   Medication Dose Route Frequency   • lactated ringers bolus 250 mL  250 mL Intravenous Once   , and PTA meds:   None     Not on File    Objective   Vitals: There were no vitals taken for this visit  ,There is no height or weight on file to calculate BMI  No intake or output data in the 24 hours ending 10/08/22 0853    Invasive Devices: Invasive Devices  Report    Peripheral Intravenous Line  Duration           Peripheral IV 10/08/22 Right Antecubital <1 day                Physical Exam Vitals reviewed  Constitutional:    Not in acute distress  Normal appearance  Not ill-appearing, toxic-appearing or diaphoretic  HENT:   Normocephalic and atraumatic  External ear normal b/l  Nose normal  No congestion or rhinorrhea  Mucous membranes are moist   Oropharynx is clear  No oropharyngeal exudate or posterior oropharyngeal erythema  Eyes:    No scleral icterus  No discharge b/l  Conjunctivae normal    Pulmonary:  Pulmonary effort is normal  No respiratory distress  GI: abdomen not distended; in diaper  Musculoskeletal: no gross deformities  Skin:   Skin is not pale     Psychiatric:       Mood normal  Affect congruent    Neurologic Exam:   Mental Status   Alert and oriented to person, place, and time  Attention is normal  Speech is fluent w/ mild if any dysarthria     Cranial Nerves   CN II patient legally blind at baseline from diabetic complications   CN III, IV, VI pupils constricted and minimally reactive, EOMI, no CN III palsy, no CN VI palsy  no nystagmus   CN V   Facial sensation symmetric  CN VII  Facial expression full, symmetric  CN VIII, IX, X Palate symmetric  CN XI trapezius strength symmetric  CN XII symmetric lingual protrusion    Motor Exam   Muscle bulk and tone grossly normal; Pronator drift absent b/l  Strength intact and symmetric BUE  Intact RLE  Limited exam LLE 2/2 hip replacement    Sensory Exam   Light touch intact and symmetric BUE/BLE    Gait, Coordination, and Reflexes   FTN normal b/l; no significant tremor observed  Reflexes: Brachioradialis: 2+ b/l    Biceps: 2+ b/l    Patellar: 2+ b/l     Plantar response downgoing b/l    NIHSS:  1a Level of Consciousness: 0 = Alert   1b  LOC Questions: 0 = Answers both correctly   1c  LOC Commands: 0 = Obeys both correctly   2  Best Gaze: 0 = Normal   3  Visual: 3 = Bilateral hemianopia   4  Facial Palsy: 0=Normal symmetric movement   5a  Motor Right Arm: 0=No drift, limb holds 90 (or 45) degrees for full 10 seconds   5b  Motor Left Arm: 0=No drift, limb holds 90 (or 45) degrees for full 10 seconds   6a  Motor Right Le=No drift, limb holds 90 (or 45) degrees for full 10 seconds   6b  Motor Left Le=> patient appears to be full strength on exam but unable to lift off bed 2/2 recent hip replacement   7  Limb Ataxia:  0=Absent   8  Sensory: 0=Normal; no sensory loss   9  Best Language:  0=No aphasia, normal   10  Dysarthria: 0=Normal articulation   11   Extinction and Inattention (formerly Neglect): 0=No abnormality   Total Score: 3     Time NIHSS was completed: 7:35    Modified Yudi Score:  3 (Moderate disability; requiring some help, but able to walk without assistance)    Lab Results: I have personally reviewed pertinent reports  , CBC:   Results from last 7 days   Lab Units 10/08/22  0801   WBC Thousand/uL 6 82   RBC Million/uL 3 85   HEMOGLOBIN g/dL 10 5*   HEMATOCRIT % 34 2*   MCV fL 89   PLATELETS Thousands/uL 193   , BMP/CMP:   Results from last 7 days   Lab Units 10/08/22  0801   SODIUM mmol/L 141   POTASSIUM mmol/L 4 1   CHLORIDE mmol/L 104   CO2 mmol/L 31   BUN mg/dL 19   CREATININE mg/dL 1 60*   CALCIUM mg/dL 9 3   EGFR ml/min/1 73sq m 30   , Vitamin B12:   , HgBA1C:   , TSH:   , Coagulation:   Results from last 7 days   Lab Units 10/08/22  0801   INR  1 06   , Lipid Profile:   , Ammonia:   , Urinalysis:       Invalid input(s): URIBILINOGEN, Drug Screen:   , Medication Drug Levels:       Invalid input(s): CARBAMAZEPINE,  PHENOBARB, LACOSAMIDE, OXCARBAZEPINE  Imaging Studies: I have personally reviewed pertinent reports   , I have personally reviewed pertinent films in PACS, and I have personally reviewed pertinent films in PACS with a Radiologist   CTA stroke alert (head/neck)   Final Result by Pushpa Hyde DO (10/08 9360)      No significant carotid or vertebral artery stenosis  No focal intracranial stenosis or aneurysm  Findings were directly discussed with Juan Carlos Cuevas at 7:45 AM on 10/8/2022  Workstation performed: [id number]         CT stroke alert brain   Final Result by Pushpa Hyde DO (10/08 1847)      No acute intracranial abnormality  Chronic microangiopathic changes  Chronic lacunar infarct in the ronen  Findings were directly discussed with Juan Carlos Cuevas at 7:42 AM on 10/8/2022  Workstation performed: [id number]           EKG, Pathology, and Other Studies: I have personally reviewed pertinent reports      VTE Prophylaxis: Sequential compression device [place]     Code Status: No Order  Advance Directive and Living Will:      Power of :    POLST:      Counseling / Coordination of Care  Total time spent today 47 minutes  Greater than 50% of total time was spent with the patient and / or family counseling and / or coordination of care   A description of the counseling / coordination of care: stroke alert      MD Lilia Lopez Neurology Residency, PGY-III

## 2022-10-08 NOTE — ASSESSMENT & PLAN NOTE
· According to the patient and patient's son at bedside, her blood pressure when she initially came in to the emergency room was normal   However, from the vital signs monitoring that are recorded, patient has been having severe hypertension  · Per neurologist, due to the probable stroke/TIA, for permissive hypertension for the 1st 24 hours (goal blood pressure is less than or equal to 200/110 for 1st 24 hours)  · Continue carvedilol as patient has history of coronary artery disease  · IV blood pressure medication on as needed basis  · On patient's medication list, it was written that patient on amlodipine and/or nifedipine XL  Hold off for now  If blood pressures are still elevated after 24 hours and if Neurology had cleared the patient for permissive hypertension, may restart one of those calcium channel blocker  · Adjust treatment accordingly

## 2022-10-08 NOTE — ASSESSMENT & PLAN NOTE
Lab Results   Component Value Date    HGBA1C 7 1 (H) 10/05/2022       Recent Labs     10/08/22  0706 10/08/22  1102   POCGLU 60* 72       Blood Sugar Average: Last 72 hrs:  (P) 66     · Hold off patient's Lantus for now  · Insulin sliding scale for now  · Diabetic diet aside from renal diet  · Monitor blood sugars  · Adjust treatment accordingly  · Hypoglycemia protocol

## 2022-10-08 NOTE — ED PROVIDER NOTES
History  Chief Complaint   Patient presents with   • STROKE Alert     Pt presents by EMS  Prehospital stroke alert called due to left sided facial droop, weakness, aphasia that presented 1 hour prior to coming into the ED     Patient is a 70-year-old female referred in by EMS as a stroke alert  Patient states she woke up around 0530 this morning normal but when she went to go to the bathroom she started coughing after which she had trouble talking and had a headache  At this time she also had numbness in both of her hands  On arrival to the hospital patient states her symptoms are improving  Patient is right handed  Patient does not walk on her own at baseline and needs assistance  Patient states she had a hip operation in  and has a pacemaker in  Pt is supposed to be on Xarelto but ran out about 1 weeks ago  Pt is blind at baseline  Prior to Admission Medications   Prescriptions Last Dose Informant Patient Reported? Taking?    B-D UF III MINI PEN NEEDLES 31G X 5 MM MISC   No No   Sig: Use to test qd   Cholecalciferol (Vitamin D) 50 MCG (2000 UT) tablet   No No   Sig: Take 1 tablet (2,000 Units total) by mouth daily   Lantus SoloStar 100 units/mL SOPN   Yes No   NIFEdipine (PROCARDIA XL) 60 mg 24 hr tablet   Yes No   acetaminophen (TYLENOL) 325 mg tablet   No No   Sig: Take 3 tablets (975 mg total) by mouth every 8 (eight) hours   amLODIPine (NORVASC) 5 mg tablet   No No   Sig: Take 1 tablet (5 mg total) by mouth daily   aspirin 81 mg chewable tablet   Yes No   Sig: Chew 81 mg daily   carvedilol (COREG) 25 mg tablet   Yes No   Sig: Take 25 mg by mouth every 12 (twelve) hours    fluticasone (FLONASE) 50 mcg/act nasal spray   No No   Si spray into each nostril daily   folic acid (FOLVITE) 259 mcg tablet   Yes No   Sig: Take 400 mcg by mouth daily   gabapentin (NEURONTIN) 100 mg capsule   No No   Sig: Take 1 capsule (100 mg total) by mouth daily at bedtime   Patient not taking: Reported on 2022   meclizine (ANTIVERT) 12 5 MG tablet   No No   Sig: Take 1 tablet (12 5 mg total) by mouth every 8 (eight) hours as needed for dizziness   pravastatin (PRAVACHOL) 40 mg tablet   No No   Sig: Take 1 tablet (40 mg total) by mouth daily   ranolazine (RANEXA) 500 mg 12 hr tablet   No No   Sig: Take 1 tablet (500 mg total) by mouth daily   rivaroxaban (XARELTO) 15 mg tablet  Family Member Yes No   Sig: Take 15 mg by mouth daily      Facility-Administered Medications: None       Past Medical History:   Diagnosis Date   • Atrial fibrillation (HCC)    • Chronic anemia    • Chronic kidney disease    • Colonoscopy refused 2019    pt declines   • Dyslipidemia    • Hypertension    • Mammogram declined 2017   • Proteinuria    • 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       Past Surgical History:   Procedure Laterality Date   • CARDIAC PACEMAKER PLACEMENT     • CATARACT EXTRACTION     •  SECTION     • CHOLECYSTECTOMY     • CORONARY ANGIOPLASTY WITH STENT PLACEMENT     • EYE SURGERY     • MAMMO (HISTORICAL)      Pt states she will not ever have again-    • TX OPEN RX FEMUR FX+INTRAMED RIMA Left 2022    Procedure: INSERTION NAIL IM FEMUR ANTEGRADE (TROCHANTERIC); Surgeon: Jocelyn Fiore DO;  Location: [facility];  Service: Orthopedics   • TUBAL LIGATION         Family History   Problem Relation Age of Onset   • Diabetes Father    • Hypertension Father    • Heart disease Father    • Cancer Brother         smoker   • Diabetes Other    • Heart attack Other    • Hypertension Other    • Asthma Other      I have reviewed and agree with the history as documented      E-Cigarette/Vaping   • E-Cigarette Use Never User      E-Cigarette/Vaping Substances   • Nicotine No    • THC No    • CBD No    • Flavoring No    • Other No    • Unknown No      Social History     Tobacco Use   • Smoking status: Never Smoker   • Smokeless tobacco: Never Used   Vaping Use   • Vaping Use: Never used   Substance Use Topics   • Alcohol use: Never   • Drug use: Never        Review of Systems   Constitutional: Negative for chills and fever  HENT: Negative for ear pain and sore throat  Eyes: Negative for pain and visual disturbance  Respiratory: Negative for cough and shortness of breath  Cardiovascular: Negative for chest pain and palpitations  Gastrointestinal: Negative for abdominal pain, diarrhea, nausea and vomiting  Genitourinary: Negative for dysuria and hematuria  Musculoskeletal: Positive for gait problem (recent L hip surgery, needs assistance to walk)  Negative for arthralgias, back pain, neck pain and neck stiffness  Skin: Negative for color change and rash  Neurological: Positive for facial asymmetry, speech difficulty, numbness (hands b/l) and headaches  Negative for dizziness, seizures and syncope  All other systems reviewed and are negative  Physical Exam  ED Triage Vitals [10/08/22 0700]   Temperature Pulse Respirations Blood Pressure SpO2   98 °F (36 7 °C) 66 17 (!) 184/115 97 %      Temp Source Heart Rate Source Patient Position - Orthostatic VS BP Location FiO2 (%)   Oral Right Lying -- --      Pain Score       --             Orthostatic Vital Signs  Vitals:    10/08/22 0830 10/08/22 0845 10/08/22 0900 10/08/22 0915   BP: (!) 221/89 (!) 227/85 (!) 212/80 (!) 216/78   Pulse: 60 60 60 61   Patient Position - Orthostatic VS: Lying  Lying Lying       Physical Exam  Constitutional:       General: She is not in acute distress  HENT:      Head: Normocephalic and atraumatic  Nose: Nose normal       Mouth/Throat:      Mouth: Mucous membranes are moist       Pharynx: Oropharynx is clear  No oropharyngeal exudate or posterior oropharyngeal erythema  Eyes:      Conjunctiva/sclera: Conjunctivae normal    Cardiovascular:      Rate and Rhythm: Normal rate and regular rhythm  Pulses: Normal pulses  Heart sounds: Normal heart sounds     Pulmonary:      Effort: Pulmonary effort is normal  Breath sounds: Normal breath sounds  Abdominal:      General: Abdomen is flat  Palpations: Abdomen is soft  Tenderness: There is no abdominal tenderness  Musculoskeletal:         General: No tenderness, deformity or signs of injury  Cervical back: Normal range of motion and neck supple  No tenderness  Comments: Decreased ROM in LLE due to recent hip surgery   Skin:     General: Skin is warm and dry  Findings: No bruising, lesion or rash  Neurological:      Mental Status: She is alert and oriented to person, place, and time  Cranial Nerves: No cranial nerve deficit  Sensory: No sensory deficit  Comments: Slightly weaker hand  on L than right  Left leg could not raise due to hip issue           ED Medications  Medications - No data to display    Diagnostic Studies  Results Reviewed     Procedure Component Value Units Date/Time    Fingerstick Glucose (POCT) [[id number]]  (Abnormal) Collected: 10/08/22 0706    Lab Status: Final result Updated: 10/08/22 0717     POC Glucose 60 mg/dl                  No orders to display         Procedures  ECG 12 Lead Documentation Only    Date/Time: 10/8/2022 10:00 AM  Performed by: Alexus Madden MD  Authorized by: Alexus Madden MD     ECG reviewed by me, the ED Provider: yes    Patient location:  ED  Previous ECG:     Previous ECG:  Compared to current  Rate:     ECG rate:  66    ECG rate assessment: normal    Rhythm:     Rhythm: sinus rhythm and paced    Pacing:     Capture:  Complete    Type of pacing:  Atrial  Ectopy:     Ectopy: none    QRS:     QRS axis:  Normal  ST segments:     ST segments:  Normal  T waves:     T waves: normal            ED Course                  Stroke Assessment     Row Name 10/08/22 0732             NIH Stroke Scale    Interval Baseline      Level of Consciousness (1a ) 0      LOC Questions (1b ) 0      LOC Commands (1c ) 0      Best Gaze (2 ) 0      Visual (3 ) 0      Facial Palsy (4 ) 0      Motor Arm, Left (5a ) 0      Motor Arm, Right (5b ) 0      Motor Leg, Left (6a ) 0      Motor Leg, Right (6b ) 0      Limb Ataxia (7 ) 0      Sensory (8 ) 0      Best Language (9 ) 0      Dysarthria (10 ) 1  Son states slightly slurred from baseline but improving      Extinction and Inattention (11 ) (Formerly Neglect) 0      Total 1              Flowsheet Row Most Recent Value   Thrombolytic Decision Options    Thrombolytic Decision Patient not a candidate  Patient is not a candidate options Symptoms resolved/clearly non disabling  [symptoms largely resolved with improving speech, no headache]                            MDM  Number of Diagnoses or Management Options  Stroke-like symptoms  Diagnosis management comments: Patient is a 59-year-old female presented with stroke-like symptoms  Patient states she had right-sided facial drooping and slurred speech as well as numbness both of her hands  Stroke alert was called  Imaging and patient were assessed by Neurology  On arrival to the emergency department patient states her symptoms are improving with only slight slurred speech noted  CTA Head- IMPRESSION:     No significant carotid or vertebral artery stenosis      No focal intracranial stenosis or aneurysm      Findings were directly discussed with Donna De Los Santos at 7:45 AM on 10/8/2022      Workstation performed: [id number]    CT stroke- IMPRESSION:     No acute intracranial abnormality  Chronic microangiopathic changes  Chronic lacunar infarct in the ronen      Findings were directly discussed with Donna De Los Santos at 7:42 AM on 10/8/2022      Workstation performed: [id number]    Patient was not a candidate for tPA  Patient in stable condition and per son back to baseline  Patient admitted to observation on Dr Mercedes Wade          Disposition  Final diagnoses:   Stroke-like symptoms     Time reflects when diagnosis was documented in both MDM as applicable and the Disposition within this note     Time User Action Codes Description Comment    10/8/2022  9:43 AM Arabella Gutierrez Add [R29 [id number]] Stroke-like symptoms       ED Disposition     ED Disposition   Admit    Condition   Stable    Date/Time   Sat Oct 8, 2022  9:42 AM    Comment   Case was discussed with Dr Sinan Bell and the patient's admission status was agreed to be Admission Status: observation status to the service of Dr Kenton Coto  Follow-up Information    None         Patient's Medications   Discharge Prescriptions    No medications on file     No discharge procedures on file  PDMP Review       Value Time User    PDMP Reviewed  Yes 6/30/2022 11:16 AM Bernadette Costa PA-C           ED Provider  Attending physically available and evaluated Isma Vickers  I managed the patient along with the ED Attending      Electronically Signed by         Ronnie Bermudez MD  10/08/22 3971

## 2022-10-08 NOTE — ASSESSMENT & PLAN NOTE
· Continue carvedilol and Xarelto  · According to the patient's son, due to high cost of Xarelto, patient has not taken Xarelto for about a week now  Patient's son told me, that her mother followed up with her cardiologist a few weeks ago and they told him about the high cost of the Xarelto and they were told that there is no alternative that will cost less  They were told alternatives are Pradaxa and Eliquis  So the patient was continued on Xarelto  According the patient's son, patient is being prescribed on Xarelto for about 2 years now  · Case management consult regarding finding out actual cost of Pradaxa and Eliquis, to find out if this may be a better alternative for the patient    If cost is still high, we have to determine if Coumadin could be an alternative, may need to talk to patient's cardiologist

## 2022-10-09 LAB
ANION GAP SERPL CALCULATED.3IONS-SCNC: 7 MMOL/L (ref 4–13)
BUN SERPL-MCNC: 18 MG/DL (ref 5–25)
CALCIUM SERPL-MCNC: 8.9 MG/DL (ref 8.4–10.2)
CHLORIDE SERPL-SCNC: 105 MMOL/L (ref 96–108)
CHOLEST SERPL-MCNC: 108 MG/DL
CO2 SERPL-SCNC: 29 MMOL/L (ref 21–32)
CREAT SERPL-MCNC: 1.67 MG/DL (ref 0.6–1.3)
ERYTHROCYTE [DISTWIDTH] IN BLOOD BY AUTOMATED COUNT: 14.6 % (ref 11.6–15.1)
EST. AVERAGE GLUCOSE BLD GHB EST-MCNC: 157 MG/DL
GFR SERPL CREATININE-BSD FRML MDRD: 29 ML/MIN/1.73SQ M
GLUCOSE SERPL-MCNC: 123 MG/DL (ref 65–140)
GLUCOSE SERPL-MCNC: 151 MG/DL (ref 65–140)
GLUCOSE SERPL-MCNC: 164 MG/DL (ref 65–140)
GLUCOSE SERPL-MCNC: 181 MG/DL (ref 65–140)
GLUCOSE SERPL-MCNC: 186 MG/DL (ref 65–140)
GLUCOSE SERPL-MCNC: 62 MG/DL (ref 65–140)
HBA1C MFR BLD: 7.1 %
HCT VFR BLD AUTO: 32.7 % (ref 34.8–46.1)
HDLC SERPL-MCNC: 49 MG/DL
HGB BLD-MCNC: 10.2 G/DL (ref 11.5–15.4)
LDLC SERPL CALC-MCNC: 49 MG/DL (ref 0–100)
MCH RBC QN AUTO: 27.3 PG (ref 26.8–34.3)
MCHC RBC AUTO-ENTMCNC: 31.2 G/DL (ref 31.4–37.4)
MCV RBC AUTO: 87 FL (ref 82–98)
PLATELET # BLD AUTO: 231 THOUSANDS/UL (ref 149–390)
PMV BLD AUTO: 10.9 FL (ref 8.9–12.7)
POTASSIUM SERPL-SCNC: 4.3 MMOL/L (ref 3.5–5.3)
RBC # BLD AUTO: 3.74 MILLION/UL (ref 3.81–5.12)
SODIUM SERPL-SCNC: 141 MMOL/L (ref 135–147)
TRIGL SERPL-MCNC: 52 MG/DL
TSH SERPL DL<=0.05 MIU/L-ACNC: 1.38 UIU/ML (ref 0.45–4.5)
WBC # BLD AUTO: 8.53 THOUSAND/UL (ref 4.31–10.16)

## 2022-10-09 PROCEDURE — 85027 COMPLETE CBC AUTOMATED: CPT | Performed by: INTERNAL MEDICINE

## 2022-10-09 PROCEDURE — 99213 OFFICE O/P EST LOW 20 MIN: CPT | Performed by: PSYCHIATRY & NEUROLOGY

## 2022-10-09 PROCEDURE — 83036 HEMOGLOBIN GLYCOSYLATED A1C: CPT | Performed by: INTERNAL MEDICINE

## 2022-10-09 PROCEDURE — 84443 ASSAY THYROID STIM HORMONE: CPT | Performed by: INTERNAL MEDICINE

## 2022-10-09 PROCEDURE — 99232 SBSQ HOSP IP/OBS MODERATE 35: CPT | Performed by: PHYSICIAN ASSISTANT

## 2022-10-09 PROCEDURE — 80048 BASIC METABOLIC PNL TOTAL CA: CPT | Performed by: INTERNAL MEDICINE

## 2022-10-09 PROCEDURE — 80061 LIPID PANEL: CPT | Performed by: INTERNAL MEDICINE

## 2022-10-09 PROCEDURE — 82948 REAGENT STRIP/BLOOD GLUCOSE: CPT

## 2022-10-09 RX ORDER — DABIGATRAN ETEXILATE 75 MG/1
75 CAPSULE ORAL 2 TIMES DAILY
Qty: 60 CAPSULE | Refills: 0 | Status: SHIPPED | OUTPATIENT
Start: 2022-10-09 | End: 2022-10-14

## 2022-10-09 RX ADMIN — ASPIRIN 81 MG CHEWABLE TABLET 81 MG: 81 TABLET CHEWABLE at 09:25

## 2022-10-09 RX ADMIN — INSULIN LISPRO 1 UNITS: 100 INJECTION, SOLUTION INTRAVENOUS; SUBCUTANEOUS at 22:06

## 2022-10-09 RX ADMIN — RIVAROXABAN 15 MG: 15 TABLET, FILM COATED ORAL at 09:25

## 2022-10-09 RX ADMIN — ACETAMINOPHEN 650 MG: 325 TABLET ORAL at 17:14

## 2022-10-09 RX ADMIN — FOLIC ACID TAB 400 MCG 400 MCG: 400 TAB at 09:26

## 2022-10-09 RX ADMIN — ATORVASTATIN CALCIUM 40 MG: 40 TABLET, FILM COATED ORAL at 17:15

## 2022-10-09 RX ADMIN — CARVEDILOL 25 MG: 12.5 TABLET, FILM COATED ORAL at 22:05

## 2022-10-09 RX ADMIN — CARVEDILOL 25 MG: 12.5 TABLET, FILM COATED ORAL at 09:24

## 2022-10-09 RX ADMIN — INSULIN LISPRO 1 UNITS: 100 INJECTION, SOLUTION INTRAVENOUS; SUBCUTANEOUS at 09:25

## 2022-10-09 RX ADMIN — FLUTICASONE PROPIONATE 1 SPRAY: 50 SPRAY, METERED NASAL at 09:26

## 2022-10-09 RX ADMIN — INSULIN LISPRO 1 UNITS: 100 INJECTION, SOLUTION INTRAVENOUS; SUBCUTANEOUS at 12:40

## 2022-10-09 RX ADMIN — Medication 2000 UNITS: at 09:24

## 2022-10-09 RX ADMIN — RANOLAZINE 500 MG: 500 TABLET, EXTENDED RELEASE ORAL at 09:25

## 2022-10-09 NOTE — PROGRESS NOTES
At around 5 pm , patient complained of having chills  The patients temperature was 99 1 oral  Patient was given tylenol  Patients temperature was retaken at 6:04 pm  And their temp was 99 7 and still having chills  Provider was notified

## 2022-10-09 NOTE — CASE MANAGEMENT
Case Management Assessment & Discharge Planning Note    Patient name Erica Lees  Location S /S -01 MRN [id number]  : 1945 Date 10/9/2022       Current Admission Date: 10/8/2022  Current Admission Diagnosis:Stroke-like symptoms   Patient Active Problem List    Diagnosis Date Noted   • Stroke-like symptoms 10/08/2022   • Stage 4 chronic kidney disease ([facility] ) 10/08/2022   • History of fracture of left hip 10/08/2022   • Left thyroid nodule 10/08/2022   • CHF (congestive heart failure) ([place] [address] ) 2022   • Elevated d-dimer 2022   • Atrial flutter, paroxysmal ([place] [address] ) 2022   • Full code status 2022   • Lower obstructive uropathy 2022   • Acute blood loss anemia (ABLA) 2022   • MI (myocardial infarction) ([place] [address] ) 2022   • Cardiac pacemaker in situ 2022   • H/O heart artery stent 2022   • Closed left hip fracture ([place] [address] ) 2022   • Blind 2022   • Diabetes mellitus type 2, insulin dependent; with initial hypoglycemia in the emergency room ([place] [address] ) 2022   • Hypertension 2022   • CAD (coronary artery disease) 2022   • Atrial fibrillation ([place] [address] ) 2022   • Anemia of chronic kidney failure, stage 4 (severe) ([place] [address] ) 2022   • Vertigo 2022   • Sick sinus syndrome ([place] [address] ) 2021   • Obesity, morbid ([place] [address] ) 2021   • 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[place] [address] ) 2021   • Multiple thyroid nodules 2020   • Hypokalemia 2020   • Closed head injury 2020   • Heterozygous MTHFR mutation [id number] 2019   • Stage 3 chronic kidney disease ([facility] ) 2019   • Stage 3b chronic kidney disease ([facility] ) 2019      LOS (days): 0  Geometric Mean LOS (GMLOS) (days):   Days to GMLOS:     OBJECTIVE:    Risk of Unplanned Readmission Score: 20 55         Current admission status: Inpatient       Preferred Pharmacy:   [address], [address] [place] [id number]  Phone: [phone] Fax: [phone]    Primary Care Provider: Mary Robbins MD    Primary Insurance: [facility]  Secondary Insurance:     ASSESSMENT:  [id number] [address], [address] Representative - Son   Primary Phone: [phone] (Mobile)                         Readmission Root Cause  30 Day Readmission: No    Patient Information  Admitted from[de-identified] Home  Mental Status: Alert  During Assessment patient was accompanied by: Not accompanied during assessment  Assessment information provided by[de-identified] Patient  Primary Caregiver: Family  Caregiver's Name[de-identified] Filiberto(son) and his wife  Caregiver's Relationship to Patient[de-identified] Family Member  Caregiver's Telephone Number[de-identified] [phone]  Support Systems: Son, Family members  [place] of Residence: [address] [place] [address] do you live in?: [place] entry access options  Select all that apply : Stairs  Number of steps to enter home  : 1  Do the steps have railings?: No  Type of Current Residence: [place]  In the last 12 months, was there a time when you were not able to pay the mortgage or rent on time?: No  In the last 12 months, was there a time when you did not have a steady place to sleep or slept in a shelter (including now)?: No  Homeless/housing insecurity resource given?: N/A  Living Arrangements: Lives w/ Son, Lives w/ Extended Family  Is patient a ?: No    Activities of Daily Living Prior to Admission  Functional Status: Assistance  Completes ADLs independently?: No  Level of ADL dependence: Assistance (Daughter-in-law assists with bathing and dressing of pt )  Ambulates independently?: Yes  Does patient use assisted devices?: Yes  Assisted Devices (DME) used: Lita Bearded  Does patient currently own DME?: Yes  What DME does the patient currently own?: Lita Bearded  Does patient have a history of Outpatient Therapy (PT/OT)?: No  Does the patient have a history of Short-Term Rehab?: Yes (Pt reports being in a short-term rehab 2 months ago  Doesn't remember name )  Does patient have a history of HHC?: No  Does patient currently have [address]?: No         Patient Information Continued  Income Source: SSI/SSD  Does patient have prescription coverage?: Yes  Within the past 12 months, you worried that your food would run out before you got the money to buy more : Never true  Within the past 12 months, the food you bought just didn't last and you didn't have money to get more : Never true  Food insecurity resource given?: N/A  Does patient receive dialysis treatments?: No  Does patient have a history of substance abuse?: No  Does patient have a history of Mental Health Diagnosis?: No         Means of Transportation  Means of Transport to Appts[de-identified] Family transport  In the past 12 months, has lack of transportation kept you from medical appointments or from getting medications?: No  In the past 12 months, has lack of transportation kept you from meetings, work, or from getting things needed for daily living?: No  Was application for public transport provided?: N/A        DISCHARGE DETAILS:    Discharge planning discussed with[de-identified] Patient at bedside  Freedom of Choice: Yes  Comments - Freedom of Choice: Pending MRI and PT/OT recommendations

## 2022-10-09 NOTE — ASSESSMENT & PLAN NOTE
· According to the patient's son, due to high cost of Xarelto, patient has not taken Xarelto for about a week now, was seen by cardiologist a few weeks ago and was told there are alternatives were Pradaxa and Eliquis     · Case management consult for pricing review:  · Price check for Pradaxa and Eliquis  · If cost is still high, would need to determine if Coumadin could be alternative and discuss with patient's cardiologist   · C/w Xarelto for now, Coreg

## 2022-10-09 NOTE — ASSESSMENT & PLAN NOTE
· Significant hypertension in the ED, SBP >200s, although patient asymptomatic  · Home medication: Norvasc, Procardia, Coreg  · BP reviewed, still hypertensive with 176/88 last checked today  · C/w Coreg given cardiovascular history, hold Norvasc and Procardia for permissive hypertension per Neurology  · Permissive HTN X 24 hours with goal BP less than or equal to 200/110  · C/w hydralazine PRN   · Adjust treatment accordingly

## 2022-10-09 NOTE — PROGRESS NOTES
NEUROLOGY RESIDENCY PROGRESS NOTE     Name: Dat Ortega   Age & Sex: 68 y o  female   MRN: [id number]  Unit/Bed#: S -01   Encounter: [id number]    [address] [place] [address]     * Stroke-like symptoms  Assessment & Plan  Dat Ortega is a 68 y o  female w/ PMH DM complicated by blindness, HTN, AF on Xarelto, pacemaker, MI who p/w acute-onset right-sided weakness, dysarthria and facial droop initially concerning for acute ischemic stroke particularly in the setting of patient having missed about a week's worth of Xarelto doses but could also be in setting of hypoglycemia (glucose 60) or hypertensive encephalopathy given her persistently elevated pressures on arrival    · Presenting sx: improving RUE weakness, dysarthria, facial droop; NIHSS: 3 for baseline blindness; tPA not given 2/2 resolving sx  · Workup:  ? CTH/CTA: negative for acute blood, signs of ischemia, LVO or critical stenosis; mild b/l intra- and extracranial ICA athero; 1 6x1 6 left thyroid nodule  ? Labs: Hgb A1c 7 1 (10/5/22), LDL pending; folate, B12, MMA ok, glucose 60 on arrival  ? MRI: pending  ? Echo (8/2/22): LVEF 60%, mobile atrial septal aneurysm, mild RV dilation     Plan:  · Continue stroke pathway for now  · AP/AC and high intensity statin  ? Continue patient's home Xarelto and aspirin  ?  Atorvastatin 40 mg  · Adjusted permissive HTN x 24 hrs (goal BP < 200/110), labetalol prn  · F/u labs: FLP, CMP, CBCD, TSH  · F/u MRI, TTE - please complete w/ bubble study given known aneurysm   · Continue monitoring on telemetry, frequent neuro checks, stat CTH for acute change  · Replete lytes, goal euglycemia (gluc < 180) and normothermia  · PT/OT/PMR/ST w/ swallow eval  · Consult CM for both stroke and medication affordability (Xarelto > $100 monthly copay)  · Stroke education if stroke found  · F/u thyroid U/S for nodules if needed  · May benefit from outpatient neurocognitive evaluation  · Consider addition of EEG if no stroke found as patient has had similar episodes in the past    Recommendations for outpatient neurological follow up have yet to be determined  Pending for d/c: MRI    SUBJECTIVE     Patient was seen and examined  No critical events overnight  Denies N/V/F/C, abdominal pain, dizziness, HA, visual changes, new weakness, bowel or bladder dysfunction  Does report BUE hand tingling that started last night and is overall improving at this time  No signs of CTS  ROS negative unless otherwise noted    OBJECTIVE     Patient ID: Sebastian Marino is a 68 y o  female  Vitals:    10/09/22 0302 10/09/22 0308 10/09/22 0742 10/09/22 0900   BP: 154/88 141/79 144/62 (!) 176/88   BP Location:   Left arm    Pulse: 64 61     Resp:  17 18    Temp:  99 4 °F (37 4 °C)     TempSrc:       SpO2: 95% 95%     Weight:          Temperature: Temp (24hrs), Av 4 °F (37 4 °C), Min:99 4 °F (37 4 °C), Max:99 4 °F (37 4 °C)   Temperature: 99 4 °F (37 4 °C)    Physical Exam Vitals reviewed  Constitutional:    Not in acute distress  Normal appearance  Not ill-appearing, toxic-appearing or diaphoretic  HENT:   Normocephalic and atraumatic  External ear normal b/l  Nose normal  No congestion or rhinorrhea  Mucous membranes are moist   Oropharynx is clear  No oropharyngeal exudate or posterior oropharyngeal erythema  Eyes:    No scleral icterus  No discharge b/l  Conjunctivae normal    Pulmonary:  Pulmonary effort is normal  No respiratory distress  GI: abdomen not distended; in diaper  Musculoskeletal: no gross deformities  Skin:   Skin is not pale  Psychiatric:       Mood normal  Affect congruent     Neurologic Exam:   Mental Status   Alert and oriented to person, place, and time     Attention is normal  Speech is fluent w/ mild if any dysarthria      Cranial Nerves   CN II patient legally blind at baseline from diabetic complications   CN III, IV, VI pupils constricted and minimally reactive, EOMI, no CN III palsy, no CN VI palsy  no nystagmus   CN V   Facial sensation symmetric  CN VII  Facial expression full, symmetric  CN VIII, IX, X Palate symmetric  CN XI trapezius strength symmetric  CN XII symmetric lingual protrusion     Motor Exam   Muscle bulk and tone grossly normal; Pronator drift absent b/l  Strength intact and symmetric BUE  Intact RLE  Limited exam LLE 2/2 hip replacement     Sensory Exam   Light touch intact and symmetric BUE/BLE  Tingling b/l hands all fingers and palms, improving from last night  Tinel negative at wrist     Gait, Coordination, and Reflexes   FTN normal b/l; no significant tremor observed  Reflexes: Brachioradialis: 2+ b/l    Biceps: 2+ b/l    Patellar: 2+ b/l     Plantar response downgoing b/l    LABORATORY DATA     Labs: I have personally reviewed pertinent reports  Results from last 7 days   Lab Units 10/09/22  0533   WBC Thousand/uL 8 53   HEMOGLOBIN g/dL 10 2*   HEMATOCRIT % 32 7*   PLATELETS Thousands/uL 231      Results from last 7 days   Lab Units 10/09/22  0533   POTASSIUM mmol/L 4 3   CHLORIDE mmol/L 105   CO2 mmol/L 29   BUN mg/dL 18   CREATININE mg/dL 1 67*   CALCIUM mg/dL 8 9                  Lab Results   Component Value Date    HGBA1C 7 1 (H) 10/05/2022    LDLCALC 49 10/09/2022     IMAGING & DIAGNOSTIC TESTING     Radiology Results: I have personally reviewed pertinent reports  and I have personally reviewed pertinent films in PACS  MRI Inpatient Order    (Results Pending)       Other Diagnostic Testing: I have personally reviewed pertinent reports        ACTIVE MEDICATIONS     Current Facility-Administered Medications   Medication Dose Route Frequency   • acetaminophen (TYLENOL) tablet 650 mg  650 mg Oral Q6H PRN   • aspirin chewable tablet 81 mg  81 mg Oral Daily   • atorvastatin (LIPITOR) tablet 40 mg  40 mg Oral QPM   • carvedilol (COREG) tablet 25 mg  25 mg Oral Q12H [address]   • cholecalciferol (VITAMIN D3) tablet 2,000 Units  2,000 Units Oral Daily   • fluticasone (FLONASE) 50 mcg/act nasal spray 1 spray  1 spray Nasal Daily   • folic acid (FOLVITE) tablet 400 mcg  400 mcg Oral Daily   • hydrALAZINE (APRESOLINE) injection 5 mg  5 mg Intravenous Q6H PRN   • insulin lispro (HumaLOG) 100 units/mL subcutaneous injection 1-5 Units  1-5 Units Subcutaneous HS   • insulin lispro (HumaLOG) 100 units/mL subcutaneous injection 1-6 Units  1-6 Units Subcutaneous TID AC   • meclizine (ANTIVERT) tablet 12 5 mg  12 5 mg Oral Q8H PRN   • ondansetron (ZOFRAN) injection 4 mg  4 mg Intravenous Q6H PRN   • ranolazine (RANEXA) 12 hr tablet 500 mg  500 mg Oral Daily   • rivaroxaban (XARELTO) tablet 15 mg  15 mg Oral Daily   • senna (SENOKOT) tablet 8 6 mg  1 tablet Oral HS PRN     VTE Pharmacologic Prophylaxis: Rivaroxaban (Xarelto)  VTE Mechanical Prophylaxis: sequential compression device    ==  Servando Carpenter MD  Resident, Neurology, [address] [place] [address]

## 2022-10-09 NOTE — UTILIZATION REVIEW
Initial Clinical Review    Admission: Date/Time/Statement:   OBSERVATION 10/8 0943 CHANGED TO INPATIENT 10/9 1153 for CONTINUED EVALUATION OF ETIOLOGY OF ACUTE ONSET STROKE LIKE SYMPTOMS     Admission Orders (From admission, onward)     Ordered        10/09/22 1153  Inpatient Admission  Once            10/08/22 0943  Place in Observation  Once                          ED Arrival Information     Expected   -    Arrival   10/8/2022 07:00    Acuity   Emergent            Means of arrival   Ambulance    Escorted by   [facility]    Service   Hospitalist    Admission type   Emergency            Arrival complaint   -           Chief Complaint   Patient presents with   • STROKE Alert     Pt presents by EMS  Prehospital stroke alert called due to left sided facial droop, weakness, aphasia that presented 1 hour prior to coming into the ED       Initial Presentation: 68 y o  female presents to ED from home via EMS  with complaints of right facial droop and slurring of speech  Pt unable to drink water from a cup, missed mouth, family could not understand her speech  Symptoms improved en route, resolved upon arrival  to ED  Blood glucose noted to be 60, repeat 72  Pt reports history of similar symptons 2 yrs ago, dx with Bell's palsy, reports occasional dysphagia, currently has a HA  Hx Afib on Xarelto, HTN  Stage 4 CKD, T2DM  CT head no acute injury, chronic lacunar infarct ronen  CTA head/neck wnl, MRI brain will be done if pacemaker is MRI compatible    Admitted as observation to med surg for stroke like symptoms suspected TIA , HTN  Plan consult neurology IVF x 1 L for prevention IV contrast nephropathy, avoid nephrotoxins and hypotension, Allow permissive hypertension with goal BP </= 200/110 first 24 hrs,  monitor renal function, telemetry , neuro checks accu check blood glucose, check TTE, lipids, A1c, maintain aspiration precautions      Neurology 10/8  Exam notes decreased elevation LLE, appears to be deconditioning in setting recent hip surgery  No longer has right facial asymmetry, dysarthria, RUE weakness  Back to her baseline  Pt /family reports she missed AC doses x 1 week due to cost    Patient is not an IV TN K candidate  given the resolution of her symptoms despite being in therapeutic window  Had symptoms not resolved to her baseline, in that situation we could have given her IV TNK as she has not had any anticoagulation in a week  Continue to evaluate for toxic metabolic derangements  Stat head CT with any neurological change  Differential includes TIA versus cardioembolic acute stroke her symptoms dissipated versus symptomatic hypoglycemia  Date:    10/9       10/9 CHANGED TO INPATIENT  Awaiting determination of compatibility of PPM for MRI imaging, TTE with bubble study, Post Discharge anticoagulation determination  Patient missed one week oral AC due to cost  CM Price check Pradaxa and Eliquis   If cost still high, would need to determine if coumading is acceptable alternative form patient's cardiologist   C/w xarelto for now              ED Triage Vitals   Temperature Pulse Respirations Blood Pressure SpO2   10/08/22 0700 10/08/22 0700 10/08/22 0700 10/08/22 0700 10/08/22 0700   98 °F (36 7 °C) 66 17 (!) 184/115 97 %      Temp Source Heart Rate Source Patient Position - Orthostatic VS BP Location FiO2 (%)   10/08/22 0700 10/08/22 0700 10/08/22 0700 10/09/22 0742 --   Oral Right Lying Left arm       Pain Score       10/08/22 1659       No Pain          Wt Readings from Last 1 Encounters:   10/08/22 83 1 kg (183 lb 3 2 oz)     Additional Vital Signs:    Date/Time Temp Pulse Resp BP MAP (mmHg) SpO2 O2 Device Patient Position - Orthostatic VS   10/09/22 0900 -- -- -- 176/88 Abnormal  -- -- -- --   10/09/22 07:42:02 -- -- 18 144/62 89 -- -- Lying   10/09/22 0308 99 4 °F (37 4 °C) 61 17 141/79 100 95 % -- --   10/09/22 03:02:03 -- 64 -- 154/88 110 95 % -- --   10/08/22 21:39:47 -- 60 18 174/67 Abnormal  103 96 % -- --   10/08/22 17:01:15 -- 60 -- 184/76 Abnormal  112 95 % -- --   10/08/22 1615 -- 60 18 176/74 Abnormal  -- 94 % None (Room air) --   10/08/22 1515 -- 60 17 198/81 Abnormal  -- 97 % None (Room air) --   10/08/22 1445 -- 60 18 208/91 Abnormal  -- 96 % None (Room air) --   10/08/22 1345 -- 63 17 215/88 Abnormal  -- 96 % None (Room air) --   10/08/22 1315 -- 60 18 208/83 Abnormal  -- 96 % None (Room air) Lying   10/08/22 1245 -- 60 20 218/85 Abnormal  -- 94 % None (Room air) --   10/08/22 1215 -- 60 18 216/83 Abnormal  -- 96 % None (Room air) --   10/08/22 1210 -- 60 20 212/80 Abnormal  -- 94 % None (Room air) --       Pertinent Labs/Diagnostic Test Results:   MRI Inpatient Order    (Results Pending)         Results from last 7 days   Lab Units 10/09/22  0533   WBC Thousand/uL 8 53   HEMOGLOBIN g/dL 10 2*   HEMATOCRIT % 32 7*   PLATELETS Thousands/uL 231         Results from last 7 days   Lab Units 10/09/22  0533   SODIUM mmol/L 141   POTASSIUM mmol/L 4 3   CHLORIDE mmol/L 105   CO2 mmol/L 29   ANION GAP mmol/L 7   BUN mg/dL 18   CREATININE mg/dL 1 67*   EGFR ml/min/1 73sq m 29   CALCIUM mg/dL 8 9         Results from last 7 days   Lab Units 10/09/22  0742 10/08/22  2140 10/08/22  1702 10/08/22  1627 10/08/22  1102 10/08/22  0706   POC GLUCOSE mg/dl 181* 158* 133 132 72 60*     Results from last 7 days   Lab Units 10/09/22  0533   GLUCOSE RANDOM mg/dL 164*         Results from last 7 days   Lab Units 10/05/22  0706   HEMOGLOBIN A1C % 7 1*   EAG mg/dL 157*     No results found for: BETA-HYDROXYBUTYRATE                               Results from last 7 days   Lab Units 10/09/22  0533   TSH 3RD GENERATON uIU/mL 1 377                                                                                               ED Treatment:   Medication Administration from 10/08/2022 0700 to 10/08/2022 1638       Date/Time Order Dose Route Action Comments     10/08/2022 1427 carvedilol (COREG) tablet 25 mg 25 mg Oral Given /82  HR 60     10/08/2022 1300 cholecalciferol (VITAMIN D3) tablet 2,000 Units 2,000 Units Oral Given      10/08/2022 1300 fluticasone (FLONASE) 50 mcg/act nasal spray 1 spray 1 spray Nasal Given      20/09/5358 3315 folic acid (FOLVITE) tablet 400 mcg 400 mcg Oral Given      10/08/2022 1300 ranolazine (RANEXA) 12 hr tablet 500 mg 500 mg Oral Given      10/08/2022 1300 rivaroxaban (XARELTO) tablet 15 mg 15 mg Oral Given      10/08/2022 1447 lactated ringers infusion 100 mL/hr Intravenous New Bag      10/08/2022 1300 aspirin chewable tablet 81 mg 81 mg Oral Given         Past Medical History:   Diagnosis Date   • Atrial fibrillation (HCC)    • Chronic anemia    • Chronic kidney disease    • Colonoscopy refused 11/2019    pt declines   • Dyslipidemia    • Hypertension    • Mammogram declined 09/2017   • Proteinuria    • Tachy-sarah beth syndrome ([facility] ) 6/24/2022     Present on Admission:  • Anemia of chronic kidney failure, stage 4 (severe) (HCC)  • Atrial fibrillation (HCC)  • Stroke-like symptoms  • Stage 4 chronic kidney disease (HCC)  • Hypertension  • CAD (coronary artery disease)  • Left thyroid nodule      Admitting Diagnosis: Stroke-like symptoms [R29 90]  Age/Sex: 68 y o  female  Admission Orders:  Scheduled Medications:  aspirin, 81 mg, Oral, Daily  atorvastatin, 40 mg, Oral, QPM  carvedilol, 25 mg, Oral, Q12H RADHA  cholecalciferol, 2,000 Units, Oral, Daily  fluticasone, 1 spray, Nasal, Daily  folic acid, 551 mcg, Oral, Daily  insulin lispro, 1-5 Units, Subcutaneous, HS  insulin lispro, 1-6 Units, Subcutaneous, TID AC  ranolazine, 500 mg, Oral, Daily  rivaroxaban, 15 mg, Oral, Daily      Continuous IV Infusions:     PRN Meds:  acetaminophen, 650 mg, Oral, Q6H PRN  hydrALAZINE, 5 mg, Intravenous, Q6H PRN  meclizine, 12 5 mg, Oral, Q8H PRN  ondansetron, 4 mg, Intravenous, Q6H PRN  senna, 1 tablet, Oral, HS PRN      POC glucose qac and HS     ASpiration precautions    Up with assist    IS, I/O   Neuro checks q1x4, q2h x 8h, q4h x 72 hr    ECHO MRI brain  SCD's     IP CONSULT TO CASE MANAGEMENT  IP CONSULT TO NUTRITION SERVICES    Network Utilization Review Department  ATTENTION: Please call with any questions or concerns to [phone] and carefully listen to the prompts so that you are directed to the right person  All voicemails are confidential   Mg Ray all requests for admission clinical reviews, approved or denied determinations and any other requests to dedicated fax number below belonging to the campus where the patient is receiving treatment   List of dedicated fax numbers for the Facilities:  [address] DENIALS (Administrative/Medical Necessity) [phone]   [address] (Maternity/NICU/Pediatrics) [phone]   912 Claudia Pabon [phone]   [facility] [phone]   1306 [facility] [address] [address] Tamra Kenny 28 [phone]   [address] [facility] [facility] 815 [facility] [phone]

## 2022-10-09 NOTE — ASSESSMENT & PLAN NOTE
· Incidental finding  · TSH wnl  · Will likely need outpatient thyroid ultrasound in the outpatient  · Outpatient follow-up with primary care physician

## 2022-10-09 NOTE — PROGRESS NOTES
[facility]  Progress Note - Abi Leoneer 1945, 68 y o  female MRN: [id number]  Unit/Bed#: S -01 Encounter: [id number]  Primary Care Provider: Som Vásquez MD   Date and time admitted to hospital: 10/8/2022  7:01 AM    * Stroke-like symptoms  Assessment & Plan  Patient came in due to brief episode of right facial droop and slurring of speech and difficulty drinking water earlier today and ended up spilling it  Symptoms had all resolved  According to the patient's son, patient had similar symptoms about 2 years ago and was diagnosed with Bell's palsy, also with reported chronic difficulty swallowing  · Significant cardiac history with AFib, has not been taking Xarelto for about a week due to cost   · Also consider hypoglycemia contributing  · Stroke workup/pathway:  · CT head showed chronic lacunar infarct, ronen, otherwise no acute findings  · CTA of the head and neck unremarkable  · TSH wnl, lipid panel overall stable  · HgbA1c: pending  · MRI brain pending, to confirm if pacemaker compatible  · TTE with bubble study pending  · C/w ASA, high-intensity statin and prior home Xarelto  · Telemetry reviewed, paced rhythm with HR 60s with few PVCs, no acute events overnight  · Neurology consulted:  · Continue stroke pathway for now  · Permissive HTN X 24 hours with goal BP less than or equal to 200/110, labetalol p r n  · Consider EEG if no stroke found given similar episodes in the past  · PT/OT and speech therapy consults  · Neuro checks  · Stat CT scan of the head without contrast for acute change  · Aspiration precautions  Hypertension  Assessment & Plan  · Significant hypertension in the ED, SBP >200s, although patient asymptomatic  · Home medication: Norvasc, Procardia, Coreg    · BP reviewed, still hypertensive with 176/88 last checked today  · C/w Coreg given cardiovascular history, hold Norvasc and Procardia for permissive hypertension per Neurology  · Permissive HTN X 24 hours with goal BP less than or equal to 200/110  · C/w hydralazine PRN   · Adjust treatment accordingly  Atrial fibrillation ([address] )  Assessment & Plan  · According to the patient's son, due to high cost of Xarelto, patient has not taken Xarelto for about a week now, was seen by cardiologist a few weeks ago and was told there are alternatives were Pradaxa and Eliquis  · Case management consult for pricing review:  · Price check for Pradaxa and Eliquis  · If cost is still high, would need to determine if Coumadin could be alternative and discuss with patient's cardiologist   · C/w Xarelto for now, Coreg      Left thyroid nodule  Assessment & Plan  · Incidental finding  · TSH wnl  · Will likely need outpatient thyroid ultrasound in the outpatient  · Outpatient follow-up with primary care physician  History of fracture of left hip  Assessment & Plan  · According to the patient, had surgery for this left hip fracture in June of this year  · Patient still has baseline weakness of the left lower extremity due to this  · PT/OT evaluation and management  · Pain control p r n  Quynh Lyons · Outpatient follow-up with primary care physician/orthopedic surgeon  Stage 4 chronic kidney disease [facility])  Assessment & Plan  Lab Results   Component Value Date    EGFR 29 10/09/2022    EGFR 26 08/03/2022    EGFR 29 08/02/2022    CREATININE 1 67 (H) 10/09/2022    CREATININE 1 79 (H) 08/03/2022    CREATININE 1 65 (H) 08/02/2022     · Baseline creatinine is around 1 6-1 8  · Renal function stable at baseline  · S/p IV fluids for prevention of contrast induced nephropathy  · Avoid nephrotoxins, hypotension    · Monitor BMP    CAD (coronary artery disease)  Assessment & Plan  · Continue Coreg, Ranexa, ASA, statin, Xarelto    Diabetes mellitus type 2, insulin dependent; with initial hypoglycemia in the emergency room [facility])  Assessment & Plan  Lab Results   Component Value Date    HGBA1C 7 1 (H) 10/05/2022       Recent Labs 10/08/22  1702 10/08/22  2140 10/09/22  0742 10/09/22  1117   POCGLU 133 158* 181* 186*       Blood Sugar Average: Last 72 hrs:  (P) 131 [id number]     · BG in 40s on arrival to ED, improved without interventions  · On Lantus at home, no documentation of prior dosing  · BG currently stable  · Hold Lantus, c/w SSI coverage with Accu-Cheks for now  · Hypoglycemia protocol  · Diabetic diet    Anemia of chronic kidney failure, stage 4 (severe) (HCC)  Assessment & Plan  · Anemia of chronic disease with CKD, baseline Hgb 7-9  · Hgb remains stable, no active bleeding  · Continue folate  · Monitor CBC        VTE Pharmacologic Prophylaxis: VTE Score: 9 High Risk (Score >/= 5) - Pharmacological DVT Prophylaxis Ordered: rivaroxaban (Xarelto)  Sequential Compression Devices Ordered  Patient Centered Rounds: I performed bedside rounds with nursing staff today  Discussions with Specialists or Other Care Team Provider:  Case management    Education and Discussions with Family / Patient: Updated  (son) via phone  Time Spent for Care: 45 minutes  More than 50% of total time spent on counseling and coordination of care as described above  Current Length of Stay: 0 day(s)  Current Patient Status: Observation   Certification Statement: The patient, admitted on an observation basis, will now require > 2 midnight hospital stay due to Pending MRI, determination for anticoagulation on discharge  Discharge Plan: Anticipate discharge in 24-48 hrs to discharge location to be determined pending rehab evaluations  Code Status: Level 1 - Full Code    Subjective:   Patient seen at bedside this a m , denies any acute complaints at this time, states she feels her speech is better      Objective:     Vitals:   Temp (24hrs), Av 4 °F (37 4 °C), Min:99 4 °F (37 4 °C), Max:99 4 °F (37 4 °C)    Temp:  [99 4 °F (37 4 °C)] 99 4 °F (37 4 °C)  HR:  [60-64] 61  Resp:  [17-20] 18  BP: (141-218)/(62-91) 176/88  SpO2:  [94 %-97 %] 95 %  Body mass index is 32 45 kg/m²  Input and Output Summary (last 24 hours): Intake/Output Summary (Last 24 hours) at 10/9/2022 1152  Last data filed at 10/8/2022 2101  Gross per 24 hour   Intake 0 ml   Output --   Net 0 ml       Physical Exam:   Physical Exam  Constitutional:       General: She is not in acute distress  Appearance: She is not ill-appearing, toxic-appearing or diaphoretic  Cardiovascular:      Rate and Rhythm: Normal rate and regular rhythm  Pulses: Normal pulses  Heart sounds: Normal heart sounds  Pulmonary:      Effort: Pulmonary effort is normal  No respiratory distress  Breath sounds: Normal breath sounds  Abdominal:      General: Bowel sounds are normal  There is no distension  Palpations: Abdomen is soft  Tenderness: There is no abdominal tenderness  Musculoskeletal:         General: No swelling or tenderness  Skin:     General: Skin is warm  Neurological:      General: No focal deficit present  Mental Status: She is alert and oriented to person, place, and time  Comments: Mild confusion about medical situation, able to answer questions appropriately, speech normal  No overt right-sided facial droop     Psychiatric:         Mood and Affect: Mood normal          Behavior: Behavior normal          Additional Data:     Labs:  Results from last 7 days   Lab Units 10/09/22  0533   WBC Thousand/uL 8 53   HEMOGLOBIN g/dL 10 2*   HEMATOCRIT % 32 7*   PLATELETS Thousands/uL 231     Results from last 7 days   Lab Units 10/09/22  0533   SODIUM mmol/L 141   POTASSIUM mmol/L 4 3   CHLORIDE mmol/L 105   CO2 mmol/L 29   BUN mg/dL 18   CREATININE mg/dL 1 67*   ANION GAP mmol/L 7   CALCIUM mg/dL 8 9   GLUCOSE RANDOM mg/dL 164*         Results from last 7 days   Lab Units 10/09/22  1117 10/09/22  0742 10/08/22  2140 10/08/22  1702 10/08/22  1627 10/08/22  1102 10/08/22  0706   POC GLUCOSE mg/dl 186* 181* 158* 133 132 72 60*     Results from last 7 days   Lab Units 10/05/22  0706   HEMOGLOBIN A1C % 7 1*           Lines/Drains:  Invasive Devices  Report    Peripheral Intravenous Line  Duration           Peripheral IV 10/08/22 Right Antecubital 1 day          Drain  Duration           Urethral Catheter Latex 16 Fr  102 days              Urinary Catheter:  Goal for removal: Voiding trial when ambulation improves           Telemetry:  Telemetry Orders (From admission, onward)             48 Hour Telemetry Monitoring  Continuous x 48 hours        References:    Telemetry Guidelines   Question:  Reason for 48 Hour Telemetry  Answer:  Acute CVA (<24 hrs old, hemispheric strokes, selected brainstem strokes, cardiac arrhythmias)                 Telemetry Reviewed: Paced rhythm, HR 60s with few PVCs  Indication for Continued Telemetry Use: Acute CVA             Imaging: Reviewed radiology reports from this admission including: CT head    Recent Cultures (last 7 days):         Last 24 Hours Medication List:   Current Facility-Administered Medications   Medication Dose Route Frequency Provider Last Rate   • acetaminophen  650 mg Oral Q6H PRN Liam Hall MD     • aspirin  81 mg Oral Daily Liam Neal MD     • atorvastatin  40 mg Oral QPM Liam Hall MD     • carvedilol  25 mg Oral Q12H Kendy Neal MD     • cholecalciferol  2,000 Units Oral Daily Giovanny Neal MD     • fluticasone  1 spray Nasal Daily Giovanny Neal MD     • folic acid  361 mcg Oral Daily Giovanny Neal MD     • hydrALAZINE  5 mg Intravenous Q6H PRN Giovanny Neal MD     • insulin lispro  1-5 Units Subcutaneous HS Giovanny Neal MD     • insulin lispro  1-6 Units Subcutaneous TID AC Liam Neal MD     • meclizine  12 5 mg Oral Q8H PRN Liam Neal MD     • ondansetron  4 mg Intravenous Q6H PRN Liam Hall MD     • ranolazine  500 mg Oral Daily Marissa Neal MD     • rivaroxaban  15 mg Oral Daily Marissa Neal MD     • senna  1 tablet Oral HS PRN Dakotah Lim MD          Today, Patient Was Seen By: Darry Osgood    **Please Note: This note may have been constructed using a voice recognition system  **

## 2022-10-09 NOTE — ASSESSMENT & PLAN NOTE
Patient came in due to brief episode of right facial droop and slurring of speech and difficulty drinking water earlier today and ended up spilling it  Symptoms had all resolved  According to the patient's son, patient had similar symptoms about 2 years ago and was diagnosed with Bell's palsy, also with reported chronic difficulty swallowing  · Significant cardiac history with AFib, has not been taking Xarelto for about a week due to cost   · Also consider hypoglycemia contributing  · Stroke workup/pathway:  · CT head showed chronic lacunar infarct, ronen, otherwise no acute findings  · CTA of the head and neck unremarkable  · TSH wnl, lipid panel overall stable  · HgbA1c: pending  · MRI brain pending, to confirm if pacemaker compatible  · TTE with bubble study pending  · C/w ASA, high-intensity statin and prior home Xarelto  · Telemetry reviewed, paced rhythm with HR 60s with few PVCs, no acute events overnight  · Neurology consulted:  · Continue stroke pathway for now  · Permissive HTN X 24 hours with goal BP less than or equal to 200/110, labetalol p r n  · Consider EEG if no stroke found given similar episodes in the past  · PT/OT and speech therapy consults  · Neuro checks  · Stat CT scan of the head without contrast for acute change  · Aspiration precautions

## 2022-10-09 NOTE — PLAN OF CARE
Pt AAOx4, pt denies pain, pt resting in bed  Purwick draining yellow urine  Pt assist x2 reposition  Tray table, personal items and call bell within reach

## 2022-10-09 NOTE — ASSESSMENT & PLAN NOTE
Lab Results   Component Value Date    EGFR 29 10/09/2022    EGFR 26 08/03/2022    EGFR 29 08/02/2022    CREATININE 1 67 (H) 10/09/2022    CREATININE 1 79 (H) 08/03/2022    CREATININE 1 65 (H) 08/02/2022     · Baseline creatinine is around 1 6-1 8  · Renal function stable at baseline  · S/p IV fluids for prevention of contrast induced nephropathy  · Avoid nephrotoxins, hypotension    · Monitor BMP

## 2022-10-09 NOTE — CASE MANAGEMENT
Case Management Progress Note    Patient name Nghia Ramos  Location [address] 324/S -01 MRN [id number]  : 1945 Date 10/9/2022       LOS (days): 0  Geometric Mean LOS (GMLOS) (days):   Days to GMLOS:        OBJECTIVE:        Current admission status: Inpatient  Preferred Pharmacy:   [facility] [address], [address] [place] [id number]  Phone: [phone] Fax: [phone]    Primary Care Provider: Dontae Angela MD    Primary Insurance: [facility]  Secondary Insurance:     PROGRESS NOTE:  CM called pt's pharmacy to price check Eloquis and Pradaxa [address], [address])  Pharmacy closed today  Will need to follow up tomorrow  ELLYN Cline contacted via TT and made aware call will need to be made again tomorrow

## 2022-10-09 NOTE — ASSESSMENT & PLAN NOTE
· Anemia of chronic disease with CKD, baseline Hgb 7-9  · Hgb remains stable, no active bleeding    · Continue folate  · Monitor CBC

## 2022-10-09 NOTE — ASSESSMENT & PLAN NOTE
· According to the patient, had surgery for this left hip fracture in June of this year  · Patient still has baseline weakness of the left lower extremity due to this  · PT/OT evaluation and management  · Pain control wiliam Valerio · Outpatient follow-up with primary care physician/orthopedic surgeon

## 2022-10-09 NOTE — ASSESSMENT & PLAN NOTE
Lab Results   Component Value Date    HGBA1C 7 1 (H) 10/05/2022       Recent Labs     10/08/22  1702 10/08/22  2140 10/09/22  0742 10/09/22  1117   POCGLU 133 158* 181* 186*       Blood Sugar Average: Last 72 hrs:  (P) 131 [id number]     · BG in 40s on arrival to ED, improved without interventions  · On Lantus at home, no documentation of prior dosing  · BG currently stable  · Hold Lantus, c/w SSI coverage with Accu-Cheks for now  · Hypoglycemia protocol    · Diabetic diet

## 2022-10-09 NOTE — PLAN OF CARE
Problem: MOBILITY - ADULT  Goal: Maintain or return to baseline ADL function  Description: INTERVENTIONS:  -  Assess patient's ability to carry out ADLs; assess patient's baseline for ADL function and identify physical deficits which impact ability to perform ADLs (bathing, care of mouth/teeth, toileting, grooming, dressing, etc )  - Assess/evaluate cause of self-care deficits   - Assess range of motion  - Assess patient's mobility; develop plan if impaired  - Assess patient's need for assistive devices and provide as appropriate  - Encourage maximum independence but intervene and supervise when necessary  - Involve family in performance of ADLs  - Assess for home care needs following discharge   - Consider OT consult to assist with ADL evaluation and planning for discharge  - Provide patient education as appropriate  Outcome: Progressing  Goal: Maintains/Returns to pre admission functional level  Description: INTERVENTIONS:  - Perform BMAT or MOVE assessment daily    - Set and communicate daily mobility goal to care team and patient/family/caregiver  - Collaborate with rehabilitation services on mobility goals if consulted  - Perform Range of Motion  times a day  - Reposition patient every  hours    - Dangle patient  times a day  - Stand patient  times a day  - Ambulate patient  times a day  - Out of bed to chair  times a day   - Out of bed for meals  times a day  - Out of bed for toileting  - Record patient progress and toleration of activity level   Outcome: Progressing     Problem: NEUROSENSORY - ADULT  Goal: Achieves stable or improved neurological status  Description: INTERVENTIONS  - Monitor and report changes in neurological status  - Monitor vital signs such as temperature, blood pressure, glucose, and any other labs ordered   - Initiate measures to prevent increased intracranial pressure  - Monitor for seizure activity and implement precautions if appropriate      Outcome: Progressing  Goal: Remains free of injury related to seizures activity  Description: INTERVENTIONS  - Maintain airway, patient safety  and administer oxygen as ordered  - Monitor patient for seizure activity, document and report duration and description of seizure to physician/advanced practitioner  - If seizure occurs,  ensure patient safety during seizure  - Reorient patient post seizure  - Seizure pads on all 4 side rails  - Instruct patient/family to notify RN of any seizure activity including if an aura is experienced  - Instruct patient/family to call for assistance with activity based on nursing assessment  - Administer anti-seizure medications if ordered    Outcome: Progressing  Goal: Achieves maximal functionality and self care  Description: INTERVENTIONS  - Monitor swallowing and airway patency with patient fatigue and changes in neurological status  - Encourage and assist patient to increase activity and self care     - Encourage visually impaired, hearing impaired and aphasic patients to use assistive/communication devices  Outcome: Progressing     Problem: Prexisting or High Potential for Compromised Skin Integrity  Goal: Skin integrity is maintained or improved  Description: INTERVENTIONS:  - Identify patients at risk for skin breakdown  - Assess and monitor skin integrity  - Assess and monitor nutrition and hydration status  - Monitor labs   - Assess for incontinence   - Turn and reposition patient  - Assist with mobility/ambulation  - Relieve pressure over bony prominences  - Avoid friction and shearing  - Provide appropriate hygiene as needed including keeping skin clean and dry  - Evaluate need for skin moisturizer/barrier cream  - Collaborate with interdisciplinary team   - Patient/family teaching  - Consider wound care consult   Outcome: Progressing

## 2022-10-10 ENCOUNTER — APPOINTMENT (OUTPATIENT)
Dept: MRI IMAGING | Facility: HOSPITAL | Age: 77
DRG: 057 | End: 2022-10-10
Payer: COMMERCIAL

## 2022-10-10 ENCOUNTER — APPOINTMENT (INPATIENT)
Dept: NON INVASIVE DIAGNOSTICS | Facility: HOSPITAL | Age: 77
DRG: 057 | End: 2022-10-10
Payer: COMMERCIAL

## 2022-10-10 LAB
ANION GAP SERPL CALCULATED.3IONS-SCNC: 5 MMOL/L (ref 4–13)
ATRIAL RATE: 66 BPM
BUN SERPL-MCNC: 25 MG/DL (ref 5–25)
CALCIUM SERPL-MCNC: 8.4 MG/DL (ref 8.4–10.2)
CHLORIDE SERPL-SCNC: 106 MMOL/L (ref 96–108)
CO2 SERPL-SCNC: 29 MMOL/L (ref 21–32)
CREAT SERPL-MCNC: 2.22 MG/DL (ref 0.6–1.3)
ERYTHROCYTE [DISTWIDTH] IN BLOOD BY AUTOMATED COUNT: 14.8 % (ref 11.6–15.1)
GFR SERPL CREATININE-BSD FRML MDRD: 20 ML/MIN/1.73SQ M
GLUCOSE SERPL-MCNC: 144 MG/DL (ref 65–140)
GLUCOSE SERPL-MCNC: 152 MG/DL (ref 65–140)
GLUCOSE SERPL-MCNC: 167 MG/DL (ref 65–140)
GLUCOSE SERPL-MCNC: 191 MG/DL (ref 65–140)
GLUCOSE SERPL-MCNC: 300 MG/DL (ref 65–140)
GLUCOSE SERPL-MCNC: 390 MG/DL (ref 65–140)
HCT VFR BLD AUTO: 30.5 % (ref 34.8–46.1)
HGB BLD-MCNC: 9.5 G/DL (ref 11.5–15.4)
MCH RBC QN AUTO: 27.8 PG (ref 26.8–34.3)
MCHC RBC AUTO-ENTMCNC: 31.1 G/DL (ref 31.4–37.4)
MCV RBC AUTO: 89 FL (ref 82–98)
P AXIS: -16 DEGREES
PA SYSTOLIC PRESSURE: 37 MMHG
PLATELET # BLD AUTO: 191 THOUSANDS/UL (ref 149–390)
PMV BLD AUTO: 10.8 FL (ref 8.9–12.7)
POTASSIUM SERPL-SCNC: 4.3 MMOL/L (ref 3.5–5.3)
PR INTERVAL: 168 MS
QRS AXIS: -5 DEGREES
QRSD INTERVAL: 86 MS
QT INTERVAL: 420 MS
QTC INTERVAL: 440 MS
RA PRESSURE ESTIMATED: 5 MMHG
RBC # BLD AUTO: 3.42 MILLION/UL (ref 3.81–5.12)
RV PSP: 39 MMHG
SL CV LV EF: 65
SODIUM SERPL-SCNC: 140 MMOL/L (ref 135–147)
T WAVE AXIS: 87 DEGREES
TR MAX PG: 34 MMHG
TR PEAK VELOCITY: 2.9 M/S
TRICUSPID VALVE PEAK REGURGITATION VELOCITY: 2.93 M/S
VENTRICULAR RATE: 66 BPM
WBC # BLD AUTO: 6.35 THOUSAND/UL (ref 4.31–10.16)

## 2022-10-10 PROCEDURE — 93308 TTE F-UP OR LMTD: CPT | Performed by: INTERNAL MEDICINE

## 2022-10-10 PROCEDURE — 97163 PT EVAL HIGH COMPLEX 45 MIN: CPT

## 2022-10-10 PROCEDURE — 82948 REAGENT STRIP/BLOOD GLUCOSE: CPT

## 2022-10-10 PROCEDURE — 93308 TTE F-UP OR LMTD: CPT

## 2022-10-10 PROCEDURE — 97167 OT EVAL HIGH COMPLEX 60 MIN: CPT

## 2022-10-10 PROCEDURE — 99232 SBSQ HOSP IP/OBS MODERATE 35: CPT | Performed by: PHYSICIAN ASSISTANT

## 2022-10-10 PROCEDURE — 93325 DOPPLER ECHO COLOR FLOW MAPG: CPT

## 2022-10-10 PROCEDURE — G1004 CDSM NDSC: HCPCS

## 2022-10-10 PROCEDURE — 70551 MRI BRAIN STEM W/O DYE: CPT

## 2022-10-10 PROCEDURE — 97116 GAIT TRAINING THERAPY: CPT

## 2022-10-10 PROCEDURE — 80048 BASIC METABOLIC PNL TOTAL CA: CPT | Performed by: PHYSICIAN ASSISTANT

## 2022-10-10 PROCEDURE — 93321 DOPPLER ECHO F-UP/LMTD STD: CPT | Performed by: INTERNAL MEDICINE

## 2022-10-10 PROCEDURE — 93325 DOPPLER ECHO COLOR FLOW MAPG: CPT | Performed by: INTERNAL MEDICINE

## 2022-10-10 PROCEDURE — 85027 COMPLETE CBC AUTOMATED: CPT | Performed by: PHYSICIAN ASSISTANT

## 2022-10-10 PROCEDURE — 93321 DOPPLER ECHO F-UP/LMTD STD: CPT

## 2022-10-10 RX ORDER — HYDRALAZINE HYDROCHLORIDE 20 MG/ML
5 INJECTION INTRAMUSCULAR; INTRAVENOUS EVERY 6 HOURS PRN
Status: DISCONTINUED | OUTPATIENT
Start: 2022-10-10 | End: 2022-10-14 | Stop reason: HOSPADM

## 2022-10-10 RX ORDER — INSULIN GLARGINE 100 [IU]/ML
10 INJECTION, SOLUTION SUBCUTANEOUS
Status: DISCONTINUED | OUTPATIENT
Start: 2022-10-10 | End: 2022-10-12

## 2022-10-10 RX ORDER — NIFEDIPINE 30 MG/1
60 TABLET, EXTENDED RELEASE ORAL DAILY
Status: DISCONTINUED | OUTPATIENT
Start: 2022-10-11 | End: 2022-10-11

## 2022-10-10 RX ORDER — SODIUM CHLORIDE 9 MG/ML
50 INJECTION, SOLUTION INTRAVENOUS CONTINUOUS
Status: DISPENSED | OUTPATIENT
Start: 2022-10-10 | End: 2022-10-10

## 2022-10-10 RX ORDER — AMLODIPINE BESYLATE 5 MG/1
5 TABLET ORAL DAILY
Status: DISCONTINUED | OUTPATIENT
Start: 2022-10-11 | End: 2022-10-11

## 2022-10-10 RX ORDER — INSULIN LISPRO 100 [IU]/ML
5 INJECTION, SOLUTION INTRAVENOUS; SUBCUTANEOUS ONCE
Status: COMPLETED | OUTPATIENT
Start: 2022-10-10 | End: 2022-10-11

## 2022-10-10 RX ORDER — INSULIN GLARGINE 100 [IU]/ML
5 INJECTION, SOLUTION SUBCUTANEOUS
Status: DISCONTINUED | OUTPATIENT
Start: 2022-10-10 | End: 2022-10-10

## 2022-10-10 RX ADMIN — INSULIN LISPRO 5 UNITS: 100 INJECTION, SOLUTION INTRAVENOUS; SUBCUTANEOUS at 17:08

## 2022-10-10 RX ADMIN — INSULIN GLARGINE 10 UNITS: 100 INJECTION, SOLUTION SUBCUTANEOUS at 21:48

## 2022-10-10 RX ADMIN — INSULIN LISPRO 4 UNITS: 100 INJECTION, SOLUTION INTRAVENOUS; SUBCUTANEOUS at 12:02

## 2022-10-10 RX ADMIN — FLUTICASONE PROPIONATE 1 SPRAY: 50 SPRAY, METERED NASAL at 12:48

## 2022-10-10 RX ADMIN — CARVEDILOL 25 MG: 12.5 TABLET, FILM COATED ORAL at 21:48

## 2022-10-10 RX ADMIN — FOLIC ACID TAB 400 MCG 400 MCG: 400 TAB at 09:16

## 2022-10-10 RX ADMIN — RIVAROXABAN 15 MG: 15 TABLET, FILM COATED ORAL at 09:12

## 2022-10-10 RX ADMIN — ATORVASTATIN CALCIUM 40 MG: 40 TABLET, FILM COATED ORAL at 17:08

## 2022-10-10 RX ADMIN — CARVEDILOL 25 MG: 12.5 TABLET, FILM COATED ORAL at 09:29

## 2022-10-10 RX ADMIN — RANOLAZINE 500 MG: 500 TABLET, EXTENDED RELEASE ORAL at 09:12

## 2022-10-10 RX ADMIN — Medication 2000 UNITS: at 09:12

## 2022-10-10 RX ADMIN — INSULIN LISPRO 1 UNITS: 100 INJECTION, SOLUTION INTRAVENOUS; SUBCUTANEOUS at 21:52

## 2022-10-10 RX ADMIN — SODIUM CHLORIDE 50 ML/HR: 0.9 INJECTION, SOLUTION INTRAVENOUS at 14:46

## 2022-10-10 RX ADMIN — ASPIRIN 81 MG CHEWABLE TABLET 81 MG: 81 TABLET CHEWABLE at 09:12

## 2022-10-10 NOTE — ASSESSMENT & PLAN NOTE
· Significant hypertension in the ED, SBP >200s, although patient asymptomatic  · Home medication: Norvasc, Procardia, Coreg  · BP reviewed, still hypertensive with 151/54 last checked today  · C/w Coreg given cardiovascular history, hold Norvasc and Procardia for permissive hypertension per Neurology  · Permissive HTN X 24 hours with goal BP less than or equal to 200/110  · C/w hydralazine PRN   · Adjust treatment accordingly

## 2022-10-10 NOTE — ASSESSMENT & PLAN NOTE
Lab Results   Component Value Date    EGFR 20 10/10/2022    EGFR 29 10/09/2022    EGFR 26 08/03/2022    CREATININE 2 22 (H) 10/10/2022    CREATININE 1 67 (H) 10/09/2022    CREATININE 1 79 (H) 08/03/2022     · Baseline creatinine is around 1 6-1 8  Cr now elevated 2 22 today  · Recent ECHO 8/2022: EF 22%, normal systolic and diastolic function  · Suspect to be 2/2 contrast induced nephropathy from CTA, initially had received IV fluids  · Will give very gentle IV fluid hydration x 8 hours today  · Avoid nephrotoxins, hypotension    · Monitor BMP

## 2022-10-10 NOTE — ASSESSMENT & PLAN NOTE
Lab Results   Component Value Date    HGBA1C 7 1 (H) 10/09/2022       Recent Labs     10/09/22  1703 10/09/22  2040 10/10/22  0924 10/10/22  1158   POCGLU 123 151* 144* 300*       Blood Sugar Average: Last 72 hrs:  (P) 101 [id number]     · BG in 40s on arrival to ED, improved without interventions  · Home regimen:  Lantus 12 units q h s  · BG was low yesterday, now elevated 300 today  · Start low-dose Lantus and titrate up as needed  · SSI coverage with Accu-Cheks for now  · Hypoglycemia protocol    · Diabetic diet

## 2022-10-10 NOTE — QUICK NOTE
MRI reviewed  No acute strokes  Multiple chronic, likely small-vessel strokes  Innumerable foci of blooming artifact consistent with chronic microhemorrhages and concerning for cerebral amyloid angiopathy (CAA)  In the setting of CAA, patient is high risk for intracerebral/intraparenchymal bleed  There will need to be a conversation with the cardiologists regarding the need for anticoagulation and the risk benefit discussion to be had with family  She is also at risk for seizure given the chronic microhemorrhages and should likely get a routine EEG  It is possible that her episode prior to admission was either a TIA or a seizure  Also possible that this was non neurologic in origin and that these are incidental findings   CAA is likely the etiology of patient's memory dysfunction and will continue to progress    Plan:  • Recommend aggressive BP management to reduce risk of bleed  • Recommend cessation of full AC and continuing with just AP from bleed-risk standpoint but should d/w cardiology about the risks  • Obtain routine EEG  • F/u echo  • Continue telemetry monitoring  • Rest of plan as outlined in prior notes

## 2022-10-10 NOTE — CASE MANAGEMENT
Case Management Discharge Planning Note    Patient name Robel Rowell  Location S /S -22 MRN [id number]  : 1945 Date 10/10/2022       Current Admission Date: 10/8/2022  Current Admission Diagnosis:Stroke-like symptoms   Patient Active Problem List    Diagnosis Date Noted   • Stroke-like symptoms 10/08/2022   • Stage 4 chronic kidney disease ([facility] ) 10/08/2022   • History of fracture of left hip 10/08/2022   • Left thyroid nodule 10/08/2022   • CHF (congestive heart failure) ([place] [address] ) 2022   • Elevated d-dimer 2022   • Atrial flutter, paroxysmal ([place] [address] ) 2022   • Full code status 2022   • Lower obstructive uropathy 2022   • Acute blood loss anemia (ABLA) 2022   • MI (myocardial infarction) ([place] [address] ) 2022   • Cardiac pacemaker in situ 2022   • H/O heart artery stent 2022   • Closed left hip fracture ([place] [address] ) 2022   • Blind 2022   • Diabetes mellitus type 2, insulin dependent; with initial hypoglycemia in the emergency room ([place] [address] ) 2022   • Hypertension 2022   • CAD (coronary artery disease) 2022   • Atrial fibrillation ([place] [address] ) 2022   • Anemia of chronic kidney failure, stage 4 (severe) ([place] [address] ) 2022   • Vertigo 2022   • Sick sinus syndrome ([place] [address] ) 2021   • Obesity, morbid ([place] [address] ) 2021   • 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[place] [address] ) 2021   • Multiple thyroid nodules 2020   • Hypokalemia 2020   • Closed head injury 2020   • Heterozygous MTHFR mutation [id number] 2019   • Stage 3 chronic kidney disease ([facility] ) 2019   • Stage 3b chronic kidney disease ([facility] ) 2019      LOS (days): 1  Geometric Mean LOS (GMLOS) (days):   Days to GMLOS:     OBJECTIVE:  Risk of Unplanned Readmission Score: 21 73         Current admission status: Inpatient   Preferred Pharmacy:   [facility] [address] [place] [id number]  Phone: [phone] Fax: [phone]    Primary Care Provider: Hailey Wheeler MD    Primary Insurance: [facility]  Secondary Insurance:     DISCHARGE DETAILS:    Discharge planning discussed with[de-identified] patient at bedside  [place] of Choice: Yes (re: rehab)  Comments - [place] of Choice: refusing rehab - reports wish to return home with out-patient PT that son had been transporting to   contacted family/caregiver?: No- see comments (reports son works from 10-8pm; requesting he be called in AM as he does not have his phone on him while at work)  Were Treatment Team discharge recommendations reviewed with patient/caregiver?: Yes  Did patient/caregiver verbalize understanding of patient care needs?: Yes  Were patient/caregiver advised of the risks associated with not following Treatment Team discharge recommendations?: Yes    Contacts  Patient Contacts: Estela Montalvo, simona  Relationship to Patient[de-identified] [address]         Is the patient interested in [facility] at discharge?: No    DME Referral Provided  Referral made for DME?: No    Other Referral/Resources/Interventions Provided:  Interventions: SNF  Referral Comments: PT/OT nicolleal recommending rehab at discharge  Met with patient at bedside to discuss same  Patient reports she has a history of rehab at [facility], but does not want to return  Also is not interested in home care services  States that she feels she was doing well with out-patient therapy, which son was taking her to 2-3x/week prior to hospitalization  Patient is legally blind, so son, daughter-in-law, and adult grandchildren (18yo and 19yo) assist with care at home  Patient states that she walks with a walker or uses her wheelchair  Family assists with transfering her into her chair and into the car  Daughter-in-law assists with toileting and other ADLs as needed   Offer made to contact son to discuss d/c plan, but patient reports that he works from 10am-8pm at [employer] and does not have his phone on him  Requesting he be contacted in AM  Will need to confirm with family that they are able to manage patient's care at home, given recommendation for rehab  Will continue to follow      Would you like to participate in our [address] service program?  : No - Declined    Treatment Team Recommendation: SNF, Short Term Rehab  Discharge Destination Plan[de-identified] Home (requesting resumption of out-patient therapy)  Transport at Discharge : Family                             IMM Given (Date):: 10/10/22

## 2022-10-10 NOTE — ASSESSMENT & PLAN NOTE
· According to the patient's son, due to high cost of Xarelto, patient has not taken Xarelto for about a week now, was seen by cardiologist a few weeks ago and was told there are alternatives were Pradaxa and Eliquis  · Case management consult for pricing review:  · Price check: Pradaxa not covered, would be >$500   Eliquis $141 04/month, same as Xarelto  · Discussed with patient's son, reported that he arrange finances to pay for Xarelto  · C/w Xarelto for now, Coreg

## 2022-10-10 NOTE — PHYSICAL THERAPY NOTE
PHYSICAL THERAPY EVALUATION NOTE    Patient Name: Jose Krueger  JJWHU'W Date: 10/10/2022  AGE:   68 y o  Mrn:   [id number]  ADMIT DX:  Stroke-like symptoms [R29 90]    Past Medical History:   Diagnosis Date    Atrial fibrillation ([facility] )     Chronic anemia     Chronic kidney disease     Colonoscopy refused 11/2019    pt declines    Dyslipidemia     Hypertension     Mammogram declined 09/2017    Proteinuria     Tachy-sarah beth syndrome ([facility] ) 6/24/2022     Length Of Stay: 1  PHYSICAL THERAPY EVALUATION :    10/10/22 1040   PT Last Visit   PT Visit Date 10/10/22   Pain Assessment   Pain Assessment Tool 0-10   Pain Score No Pain   Restrictions/Precautions   Other Precautions Chair Alarm; Bed Alarm;Multiple lines; Fall Risk;Telemetry; Visual impairment  (visual impairment)   Home Living   Type of Home House   Home Layout Two level; Able to live on main level with bedroom/bathroom; Other (Comment)  (1 ALONDRA)   Additional Comments lives w/ family  ambualtes w/ walker in home and uses wheelchair to mobilize in community  needs assitance w/ ADLs and IADls  2 falls in last 6 months  General   Additional Pertinent History 10/9/22 at 20:39 blood pressure was 194/84   Family/Caregiver Present No   Cognition   Arousal/Participation Alert   Orientation Level Oriented to person; Other (Comment)  (pt was identified w/ full name, birth date)   Following Commands Follows one step commands with increased time or repetition   Comments room air resting pulse ox 95% and 60 BPM  supine blood pressure 131/67  Subjective   Subjective pt seen supine in bed  agreed to participate in PT eval  denied pain or dizziness     RUE Assessment   RUE Assessment WFL   LUE Assessment   LUE Assessment WFL   RLE Assessment   RLE Assessment WFL  (3+ to 4-/5)   LLE Assessment   LLE Assessment WFL  (3 to 3+/5)   Coordination   Sensation X  (impaired vision)   Light Touch   RLE Light Touch Grossly intact   LLE Light Touch Grossly intact   Bed Mobility   Supine to Sit 2  Maximal assistance   Additional items Assist x 1;HOB elevated; Bedrails; Increased time required;Verbal cues  (for bedrail use, trunk/LE positioning)   Transfers   Sit to Stand 2  Maximal assistance   Additional items Assist x 1; Increased time required;Verbal cues  (for LE positioning)   Stand to Sit 3  Moderate assistance   Additional items Assist x 1; Increased time required;Verbal cues  (for body positioning)   Ambulation/Elevation   Gait pattern Decreased L stance; Foward flexed; Short stride; Excessively slow   Gait Assistance 2  Maximal assist   Additional items Assist x 1;Verbal cues  (for walker positioning, full step length)   Assistive Device Rolling walker   Distance 3 feet  (additional not possible due to fatigue)   Stair Management Assistance Not tested  (due to limited ambulation tolerance)   Balance   Static Sitting Fair   Static Standing Poor  (w/ roller walker)   Ambulatory Poor -  (w/ roller walker)   Activity Tolerance   Activity Tolerance Patient limited by fatigue   Nurse Made Aware spoke to Briseida Boss [facility]   Assessment   Problem List Decreased strength;Decreased endurance; Impaired balance;Decreased mobility; Decreased safety awareness; Impaired vision;Obesity   Assessment Pt presents with complaints of right facial droop and slurring of speech  Dx: stroke-like symptoms, HTN, a-fib, left hip fx and weakness, CKD, DM, and anemia  order placed for PT eval and tx, w/ activity order of up w/ assist  pt presents w/ comorbidities of a-fib, CAD, SSS, cardiac pacemaker, CKD, HTN, DM, left hip fx repair, and dyslipidemia and personal factors of advanced age, mobilizing w/ assistive device, stair(s) to enter home, positive fall history, inability to perform IADLs and inability to perform ADLs   pt presents w/ weakness, decreased endurance, impaired balance, gait deviations, visual impairment, decreased safety awareness and fall risk  these impairments are evident in findings from physical examination (weakness), mobility assessment (need for mod to max assist w/ all phases of mobility when usually mobilizing independently, tolerance to only 3 feet of ambulation and need for cueing for mobility technique), and Barthel Index: 30/100  pt needed input for task focus and mobility technique  pt is at risk for falls due to physical and safety awareness deficits  pt's clinical presentation is unstable/unpredictable (evident in poor blood pressure control, need for assist w/ all phases of mobility when usually mobilizing independently, tolerance to only 3 feet of ambulation and need for input for mobility technique/safety)  pt needs inpatient PT tx to improve mobility deficits and progress mobility training as appropriate  discharge recommendation is for inpatient rehab to reduce fall risk and maximize level of functional independence  Goals   Patient Goals go home   STG Expiration Date 10/20/22   Short Term Goal #1 pt will: Increase bilateral LE strength 1/2 grade to facilitate independent mobility, Perform bed mobility w/ minx1 to increase level of independence, Perform all transfers w/ minx1 to improve independence, Ambulate 120 ft  with roller walker w/ minx1 w/o LOB to improve functional independence, Navigate 1 stair(s) w/ modx1 with unilateral handrail to facilitate return to previous living environment, Increase ambulatory balance 1 grade to decrease risk for falls, Complete exercise program independently to increase strength and endurance, Tolerate 3 hr OOB to faciliate upright tolerance, Improve Barthel Index score to 50 or greater to facilitate independence and Complete Timed Up and Go or Comfortable Gait Speed to further assess mobility and monitor progress   PT Treatment Day 1   Plan   Treatment/Interventions Functional transfer training;LE strengthening/ROM; Therapeutic exercise;Elevations; Endurance training;Patient/family training;Equipment eval/education; Bed mobility;Gait training   PT Frequency 3-5x/wk   Recommendation   PT Discharge Recommendation Post acute rehabilitation services   AM-PAC Basic Mobility Inpatient   Turning in Bed Without Bedrails 2   Lying on Back to Sitting on Edge of Flat Bed 2   Moving Bed to Chair 2   Standing Up From Chair 2   Walk in Room 2   Climb 3-5 Stairs 1   Basic Mobility Inpatient Raw Score 11   Basic Mobility Standardized Score 30 25   Highest Level Of Mobility   -[facility] Goal 4: Move to chair/commode   -[facility] Achieved 5: Stand (1 or more minutes)   Barthel Index   Feeding 5   Bathing 0   Grooming Score 0   Dressing Score 0   Bladder Score 0   Bowels Score 10   Toilet Use Score 5   Transfers (Bed/Chair) Score 10   Mobility (Level Surface) Score 0   Stairs Score 0   Barthel Index Score 30   Additional Treatment Session   Start Time 1040   End Time 1050   Treatment Assessment Pt agreed to participate in additional ambulation to address physical and mobility deficits noted during eval  Sit < - > stand transfer w/ modx1  Ambulated 5 feet w/ roller walker w/ modx1 and chair follow  Additional ambulation was not possible due to fatigue  Therapist provided education to pt including walker management and transfer technique  Pt shows improvement w/ increased activity tolerance  Further inpatient PT intervention is necessary to decrease fall risk and maximize functional independence  Equipment Use roller walker   Additional Treatment Day 1   End of Consult   Patient Position at End of Consult Bedside chair;Bed/Chair alarm activated; All needs within reach     The patient's AM-PAC Basic Mobility Inpatient Short Form Raw Score is 11  A Raw score of less than or equal to 16 suggests the patient may benefit from discharge to post-acute rehabilitation services  Please also refer to the recommendation of the Physical Therapist for safe discharge planning      Skilled PT recommended while in hospital and upon DC to progress pt toward treatment goals       Arletta Sack

## 2022-10-10 NOTE — ED ATTENDING ATTESTATION
10/8/2022  IAdair MD, saw and evaluated the patient  I have discussed the patient with the resident/non-physician practitioner and agree with the resident's/non-physician practitioner's findings, Plan of Care, and MDM as documented in the resident's/non-physician practitioner's note, except where noted  All available labs and Radiology studies were reviewed  I was present for key portions of any procedure(s) performed by the resident/non-physician practitioner and I was immediately available to provide assistance  At this point I agree with the current assessment done in the Emergency Department    I have conducted an independent evaluation of this patient a history and physical is as follows:see h and p above- agree with er resident tx plan    ED Course  ED Course as of 10/10/22 1917   Sat Oct 08, 2022   0726 Er md note- accucheck 61   5 Er md note- as per son pt with paf- is supposed to be on xarelto and asa-- ran out of xarelto approx 7-10 days ago    0726 Er md note- neurology resident now -seeing pt and talking with son -- - son stqates speech and r sided lower face and r arm weakness lasted about 10 minutes- son states speech not quite back to 100 % normal for her- but much improved    0755 Er md note-  ecg-  atrial paced rhythm - no qcute findings    1120 Er md note- pt did not take any of her meds this am          Critical Care Time  Procedures

## 2022-10-10 NOTE — OCCUPATIONAL THERAPY NOTE
Occupational Therapy Evaluation     Patient Name: Tom Lux  GZZZD'H Date: 10/10/2022  Problem List  Principal Problem:    Stroke-like symptoms  Active Problems:    Anemia of chronic kidney failure, stage 4 (severe) (HCC)    Diabetes mellitus type 2, insulin dependent; with initial hypoglycemia in the emergency room [facility])    Hypertension    CAD (coronary artery disease)    Atrial fibrillation ([facility] [address] )    Stage 4 chronic kidney disease ([facility] [address] )    History of fracture of left hip    Left thyroid nodule    Past Medical History  Past Medical History:   Diagnosis Date    Atrial fibrillation (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     Past Surgical History  Past Surgical History:   Procedure Laterality Date    CARDIAC PACEMAKER PLACEMENT      CATARACT EXTRACTION       SECTION      CHOLECYSTECTOMY      CORONARY ANGIOPLASTY WITH STENT PLACEMENT      EYE SURGERY      MAMMO (HISTORICAL)      Pt states she will not ever have again-     ID OPEN RX FEMUR FX+INTRAMED RIMA Left 2022    Procedure: INSERTION NAIL IM FEMUR ANTEGRADE (TROCHANTERIC); Surgeon: Kimberlee Washington DO;  Location: [facility];  Service: Orthopedics    TUBAL LIGATION           10/10/22 1513   OT Last Visit   OT Visit Date 10/10/22   Note Type   Note type Evaluation   Additional Comments Nsg staff verbally cleared pt for OT evaluation  Pt received  supine in bed c HOB semi-elevated on this date reporting no pain + is agreeable to OT session @ this time  @ exit, pt remains in  supine in bed c HOB semi-elevated c all lines intact, all needs met, call bell in hand + nsg aware of location/disposition  Pain Assessment   Pain Assessment Tool 0-10   Pain Score No Pain   Restrictions/Precautions   Other Precautions Chair Alarm; Bed Alarm;Multiple lines; Fall Risk;Telemetry; Visual impairment   Home Living   Type of [facility] Two level; Able to live on main level with bedroom/bathroom;Stairs to enter without rails  (1 ALONDRA)   Bathroom Shower/Tub Walk-in shower   Bathroom Toilet Standard  (commode over toilet)   Bathroom Equipment Shower chair;Grab bars in shower;Commode   [address] [facility]; Wheelchair-manual   Prior Function   Level of [place] Needs assistance with ADLs; Needs assistance with IADLS   L[facility] With [facility] Help From Family   IADLs Family/Friend/Other provides transportation   Falls in the last 6 months 1 to 4  (2x)   Lifestyle   Autonomy Pt lives in  2 story home c son + @ baseline, performs ADLs  with A, IADLs with A + fxl mobility with A with RW  (-)   Pt is retired  Reciprocal Relationships Son   Service to Others Family relations   Intrinsic Gratification Listens to TV   Subjective   Subjective "I didn't really think rehab helped"   ADL   Eating Assistance 4  Minimal Assistance   Grooming Assistance 4  Minimal Assistance   UB Bathing Assistance 4  Minimal Assistance   LB Bathing Assistance 2  Maximal Assistance   [address] 2  Maximal [address]  1  Total Assistance   Additional Comments Given fxl performance skills + deficits, therapist suspecting via clinical judgement + skilled analysis; pt currently requires stated assist above to perform each area of ADL d/t limitations including: fxl activity tolerance limitations, difficulty performing bend/reach-anterior trunk flexion, requires external assist to complete transitional movements, BUE strength, high fall risk, hypertension, visual field deficits, sitting balance deficits and standing balance deficits   Bed Mobility   Supine to Sit 2  Maximal assistance   Additional items Assist x 1;HOB elevated; Bedrails; Increased time required;Verbal cues   Sit to Supine 3  Moderate assistance   Additional items Assist x 1;Bedrails; Increased time required;Verbal cues   Additional Comments Sustained EOB sit for majority of session  Denies pain/dizziness/SOB+ reports resolution of admitting symptoms including dysarthria, facial droop (none observed)  Balance   Static Sitting Fair   Dynamic Sitting Fair -   Static Standing Poor   Ambulatory Poor -   Activity Tolerance   Activity Tolerance Patient limited by fatigue   Nurse Jeffy staff made aware of current fxn, recommendations for d/c planning, fall risk + pt's location upon exit  Angelina Menjivar)   RUE Assessment   RUE Assessment WFL  (3+/5 grossly)   LUE Assessment   LUE Assessment WFL  (3+/5 grossly)   Hand Function   Gross Motor Coordination Functional   Fine Motor Coordination Functional   Sensation   Light Touch No apparent deficits   Vision-Basic Assessment   Current Vision   (Legally blind @ baseline)   Psychosocial   Psychosocial (WDL) WDL   Cognition   Arousal/Participation Responsive; Cooperative; Alert   Attention Within functional limits   Orientation Level Oriented to person;Oriented to place;Oriented to situation   Following Commands Follows one step commands without difficulty   Assessment   Limitation Decreased ADL status; Decreased UE strength;Decreased Safe judgement during ADL;Decreased endurance;Decreased high-level ADLs; Decreased self-care trans   Prognosis Fair   Assessment Patient is a 68 y o  female seen for OT evaluation s/p admit to  [facility] on 10/8/2022 w/Stroke-like symptoms + commorbidities/PMHx (as listed in medical record) affecting patient's functional performance c ADL tasks at time of assessment  OT orders placed for evaluation and treatment to assess pt's ADLs, cognitive status + performance during functional tasks in order to formulate appropriate d/c recommendations  Therapist performed at least two patient identifiers during session including name and wristband  Personal factors affecting patient at time of initial evaluation include: inaccessible home environment, step(s) to enter environment, limited home support, inability to perform ADLs and inability to ambulate household distances  Pt's clinical presentation is currently evolving given new onset deficits that effect pt's occupational performance and ability to safely return to PLOF including decrease activity tolerance, decrease standing balance, decrease sitting balance, decrease performance during ADL tasks, decrease UB MS, decrease generalized strength, decrease activity engagement, decrease performance during functional transfers and high fall risk combined with medical complications of hypertension , abnormal renal lab values, abnormal CBC, abnormal blood sugars and need for input for mobility technique/safety  This evaluation required an extensive review of medical and/or therapy records and additional review of physical, cognitive and psychosocial history related to functional performance  Based upon functional performance deficits and assessments, this evaluation has been identified as a  high complexity evaluation  Patient to benefit from continued Occupational Therapy treatment while in the hospital to address aforementioned deficits and maximize level of functional independence with ADLs and functional mobility  Pt currently requires A to facilitate appropriate d/c plan, supportive family involved and demonstrates F awareness in d/c plan  From OT standpoint, recommendation at time of d/c would be Short Term Rehab  Plan   Treatment Interventions ADL retraining;Functional transfer training;UE strengthening/ROM; Endurance training;Patient/family training;Equipment evaluation/education; Compensatory technique education; Energy conservation; Activityengagement   Goal Expiration Date 10/20/22   OT Treatment Day 0   OT Frequency 3-5x/wk   Recommendation   OT Discharge Recommendation Post acute rehabilitation services   AM-PAC Daily Activity Inpatient   Lower Body Dressing 2   Bathing 2   Toileting 2   Upper Body Dressing 3   Grooming 3   Eating 3   Daily Activity Raw Score 15   Daily Activity Standardized Score (Calc for Raw Score >=11) 34 69   AM-[facility] Applied Cognition Inpatient   Following a Speech/Presentation 4   Understanding Ordinary Conversation 4   Taking Medications 2   Remembering Where Things Are Placed or Put Away 2   Remembering List of 4-5 Errands 2   Taking Care of Complicated Tasks 2   Applied Cognition Raw Score 16   Applied Cognition Standardized Score 35 03   Barthel Index   Feeding 5   Bathing 0   Grooming Score 0   Dressing Score 0   Bladder Score 0   Bowels Score 10   Toilet Use Score 5   Transfers (Bed/Chair) Score 10   Mobility (Level Surface) Score 0   Stairs Score 0   Barthel Index Score 30   The patient's raw score on the AM-PAC Daily Activity inpatient short form is 15, standardized score is 34 69, less than 39 4  Patients at this level are likely to benefit from DC to post-acute rehabilitation services  Please refer to the recommendation of the Occupational Therapist for safe DC planning  GOALS:    *ADL transfers with (S) for inc'd independence with ADLs/purposeful tasks    *UB ADL with (S) for inc'd independence with self cares    *LB ADL with Mod (A) using AE prn for inc'd independence with self cares    *Toileting with Min (A) for clothing management and hygiene for return to PLOF with personal care    *Increase stand tolerance x 2  m for inc'd tolerance with standing purposeful tasks    *Participate in 10m UE therex to increase overall stamina/activity tolerance for purposeful tasks    *Bed mobility- (I) for inc'd independence to manage own comfort and initiate EOB & OOB purposeful tasks    *Patient will verbalize 3 safety awareness/ principles to prevent falls in the home setting       *Patient will verbalize and demonstrate use of energy conservation/deep breathing techniques and work simplification skills during functional activities with no verbal cues  *Patient will increase OOB/sitting tolerance to 2-4 hours per day to increase participation in self-care and leisure tasks with no s/s of exertion  *Patient will engage in ongoing cognitive assessment to assist with safe discharge planning/recommendations         Nathan Soto, OTR/L

## 2022-10-10 NOTE — CASE MANAGEMENT
Case Management Progress Note    Patient name Tom Lux  Location S /S -01 MRN [id number]  : 1945 Date 10/10/2022       LOS (days): 1  Geometric Mean LOS (GMLOS) (days):   Days to GMLOS:        OBJECTIVE:        Current admission status: Inpatient  Preferred Pharmacy:   [address], [place] [id number]  Phone: [phone] Fax: [phone]    Primary Care Provider: Doug Pat MD    Primary Insurance: [facility]  Secondary Insurance:     PROGRESS NOTE:    Call made to [facility] to follow-up on case of Pradaxa and Eliquis  Per pharmacy tech, Cristina Jackson, they could not fill both at the same time due to drug interactions; this writer requested both be run to get approximate cost as patient had trouble affording Xarelto prior to hospitalization  Relays cost for Eliquis would be $[phone] 04, and Pradaxa is coming up as not covered by her plan, so cash price is over $500/month  TT sent to [facility] to relay above

## 2022-10-10 NOTE — ASSESSMENT & PLAN NOTE
· According to the patient, had surgery for this left hip fracture in June of this year  · Patient still has baseline weakness of the left lower extremity due to this  · PT/OT evaluation and management  · Pain control wiliam Henderson · Outpatient follow-up with primary care physician/orthopedic surgeon

## 2022-10-10 NOTE — PLAN OF CARE
Problem: PHYSICAL THERAPY ADULT  Goal: Performs mobility at highest level of function for planned discharge setting  See evaluation for individualized goals  Description: Treatment/Interventions: Functional transfer training, LE strengthening/ROM, Therapeutic exercise, Elevations, Endurance training, Patient/family training, Equipment eval/education, Bed mobility, Gait training          See flowsheet documentation for full assessment, interventions and recommendations  Note:    Problem List: Decreased strength, Decreased endurance, Impaired balance, Decreased mobility, Decreased safety awareness, Impaired vision, Obesity  Assessment: Pt presents with complaints of right facial droop and slurring of speech  Dx: stroke-like symptoms, HTN, a-fib, left hip fx and weakness, CKD, DM, and anemia  order placed for PT eval and tx, w/ activity order of up w/ assist  pt presents w/ comorbidities of a-fib, CAD, SSS, cardiac pacemaker, CKD, HTN, DM, left hip fx repair, and dyslipidemia and personal factors of advanced age, mobilizing w/ assistive device, stair(s) to enter home, positive fall history, inability to perform IADLs and inability to perform ADLs  pt presents w/ weakness, decreased endurance, impaired balance, gait deviations, visual impairment, decreased safety awareness and fall risk  these impairments are evident in findings from physical examination (weakness), mobility assessment (need for mod to max assist w/ all phases of mobility when usually mobilizing independently, tolerance to only 3 feet of ambulation and need for cueing for mobility technique), and Barthel Index: 30/100  pt needed input for task focus and mobility technique  pt is at risk for falls due to physical and safety awareness deficits   pt's clinical presentation is unstable/unpredictable (evident in poor blood pressure control, need for assist w/ all phases of mobility when usually mobilizing independently, tolerance to only 3 feet of ambulation and need for input for mobility technique/safety)  pt needs inpatient PT tx to improve mobility deficits and progress mobility training as appropriate  discharge recommendation is for inpatient rehab to reduce fall risk and maximize level of functional independence  PT Discharge Recommendation: Post acute rehabilitation services    See flowsheet documentation for full assessment

## 2022-10-10 NOTE — PLAN OF CARE
Problem: MOBILITY - ADULT  Goal: Maintain or return to baseline ADL function  Description: INTERVENTIONS:  -  Assess patient's ability to carry out ADLs; assess patient's baseline for ADL function and identify physical deficits which impact ability to perform ADLs (bathing, care of mouth/teeth, toileting, grooming, dressing, etc )  - Assess/evaluate cause of self-care deficits   - Assess range of motion  - Assess patient's mobility; develop plan if impaired  - Assess patient's need for assistive devices and provide as appropriate  - Encourage maximum independence but intervene and supervise when necessary  - Involve family in performance of ADLs  - Assess for home care needs following discharge   - Consider OT consult to assist with ADL evaluation and planning for discharge  - Provide patient education as appropriate  Outcome: Progressing  Goal: Maintains/Returns to pre admission functional level  Description: INTERVENTIONS:  - Perform BMAT or MOVE assessment daily    - Set and communicate daily mobility goal to care team and patient/family/caregiver  - Collaborate with rehabilitation services on mobility goals if consulted  - Perform Range of Motion   - Reposition patient every 2 hours    - Stand patient 3 times a day  - Out of bed to chair 3 times a day   - Record patient progress and toleration of activity level   Outcome: Progressing     Problem: NEUROSENSORY - ADULT  Goal: Achieves stable or improved neurological status  Description: INTERVENTIONS  - Monitor and report changes in neurological status  - Monitor vital signs such as temperature, blood pressure, glucose, and any other labs ordered   - Initiate measures to prevent increased intracranial pressure  - Monitor for seizure activity and implement precautions if appropriate      Outcome: Progressing  Goal: Remains free of injury related to seizures activity  Description: INTERVENTIONS  - Maintain airway, patient safety  and administer oxygen as ordered  - Monitor patient for seizure activity, document and report duration and description of seizure to physician/advanced practitioner  - If seizure occurs,  ensure patient safety during seizure  - Reorient patient post seizure  - Seizure pads on all 4 side rails  - Instruct patient/family to notify RN of any seizure activity including if an aura is experienced  - Instruct patient/family to call for assistance with activity based on nursing assessment  - Administer anti-seizure medications if ordered    Outcome: Progressing  Goal: Achieves maximal functionality and self care  Description: INTERVENTIONS  - Monitor swallowing and airway patency with patient fatigue and changes in neurological status  - Encourage and assist patient to increase activity and self care  - Encourage visually impaired, hearing impaired and aphasic patients to use assistive/communication devices  Outcome: Progressing     Problem: Prexisting or High Potential for Compromised Skin Integrity  Goal: Skin integrity is maintained or improved  Description: INTERVENTIONS:  - Identify patients at risk for skin breakdown  - Assess and monitor skin integrity  - Assess and monitor nutrition and hydration status  - Monitor labs   - Assess for incontinence   - Turn and reposition patient  - Assist with mobility/ambulation  - Relieve pressure over bony prominences  - Avoid friction and shearing  - Provide appropriate hygiene as needed including keeping skin clean and dry  - Evaluate need for skin moisturizer/barrier cream  - Collaborate with interdisciplinary team   - Patient/family teaching  - Consider wound care consult   Outcome: Progressing     Problem: Nutrition/Hydration-ADULT  Goal: Nutrient/Hydration intake appropriate for improving, restoring or maintaining nutritional needs  Description: Monitor and assess patient's nutrition/hydration status for malnutrition   Collaborate with interdisciplinary team and initiate plan and interventions as ordered  Monitor patient's weight and dietary intake as ordered or per policy  Utilize nutrition screening tool and intervene as necessary  Determine patient's food preferences and provide high-protein, high-caloric foods as appropriate       INTERVENTIONS:  - Monitor oral intake, urinary output, labs, and treatment plans  - Assess nutrition and hydration status and recommend course of action  - Evaluate amount of meals eaten  - Assist patient with eating if necessary   - Allow adequate time for meals  - Recommend/ encourage appropriate diets, oral nutritional supplements, and vitamin/mineral supplements  - Order, calculate, and assess calorie counts as needed  - Recommend, monitor, and adjust tube feedings and TPN/PPN based on assessed needs  - Assess need for intravenous fluids  - Provide specific nutrition/hydration education as appropriate  - Include patient/family/caregiver in decisions related to nutrition  Outcome: Progressing

## 2022-10-10 NOTE — PROGRESS NOTES
[facility]  Progress Note - Carrol Martinez 1945, 68 y o  female MRN: [id number]  Unit/Bed#: S -01 Encounter: [id number]  Primary Care Provider: Christine Luque MD   Date and time admitted to hospital: 10/8/2022  7:01 AM    * Stroke-like symptoms  Assessment & Plan  Patient came in due to brief episode of right facial droop and slurring of speech and difficulty drinking water earlier today and ended up spilling it  Symptoms had all resolved  According to the patient's son, patient had similar symptoms about 2 years ago and was diagnosed with Bell's palsy, also with reported chronic difficulty swallowing  · Significant cardiac history with AFib, has not been taking Xarelto for about a week due to cost   · Also consider hypoglycemia contributing  · Stroke workup/pathway:  · CT head showed chronic lacunar infarct, ronen, otherwise no acute findings  · CTA of the head and neck unremarkable  · TSH wnl, lipid panel overall stable  · HgbA1c: pending  · MRI brain today, pending results  · TTE with bubble study today, pending results  · C/w ASA, high-intensity statin and prior home Xarelto  · Telemetry reviewed, paced rhythm with HR 60s with few PVCs, no acute events overnight  · Neurology consulted:  · Continue stroke pathway for now  · Permissive HTN X 24 hours with goal BP less than or equal to 200/110  · Consider EEG if no stroke found given similar episodes in the past  · PT/OT and speech therapy consults  · Neuro checks  · Stat CT scan of the head without contrast for acute change  · Aspiration precautions  Hypertension  Assessment & Plan  · Significant hypertension in the ED, SBP >200s, although patient asymptomatic  · Home medication: Norvasc, Procardia, Coreg    · BP reviewed, still hypertensive with 151/54 last checked today  · C/w Coreg given cardiovascular history, hold Norvasc and Procardia for permissive hypertension per Neurology  · Permissive HTN X 24 hours with goal BP less than or equal to 200/110  · C/w hydralazine PRN   · Adjust treatment accordingly  Stage 4 chronic kidney disease [facility])  Assessment & Plan  Lab Results   Component Value Date    EGFR 20 10/10/2022    EGFR 29 10/09/2022    EGFR 26 08/03/2022    CREATININE 2 22 (H) 10/10/2022    CREATININE 1 67 (H) 10/09/2022    CREATININE 1 79 (H) 08/03/2022     · Baseline creatinine is around 1 6-1 8  Cr now elevated 2 22 today  · Recent ECHO 8/2022: EF 21%, normal systolic and diastolic function  · Suspect to be 2/2 contrast induced nephropathy from CTA, initially had received IV fluids  · Will give very gentle IV fluid hydration x 8 hours today  · Avoid nephrotoxins, hypotension  · Monitor BMP    Atrial fibrillation ([address] )  Assessment & Plan  · According to the patient's son, due to high cost of Xarelto, patient has not taken Xarelto for about a week now, was seen by cardiologist a few weeks ago and was told there are alternatives were Pradaxa and Eliquis  · Case management consult for pricing review:  · Price check: Pradaxa not covered, would be >$500  Eliquis $141 04/month, same as Xarelto  · Discussed with patient's son, reported that he arrange finances to pay for Xarelto  · C/w Xarelto for now, Coreg      Left thyroid nodule  Assessment & Plan  · Incidental finding  · TSH wnl  · Will likely need outpatient thyroid ultrasound in the outpatient  · Outpatient follow-up with primary care physician  History of fracture of left hip  Assessment & Plan  · According to the patient, had surgery for this left hip fracture in June of this year  · Patient still has baseline weakness of the left lower extremity due to this  · PT/OT evaluation and management  · Pain control p r n  Roxane Skipper · Outpatient follow-up with primary care physician/orthopedic surgeon      CAD (coronary artery disease)  Assessment & Plan  · Continue Coreg, Ranexa, ASA, statin, Xarelto    Diabetes mellitus type 2, insulin dependent; with initial hypoglycemia in the emergency room [facility])  Assessment & Plan  Lab Results   Component Value Date    HGBA1C 7 1 (H) 10/09/2022       Recent Labs     10/09/22  1703 10/09/22  2040 10/10/22  0924 10/10/22  1158   POCGLU 123 151* 144* 300*       Blood Sugar Average: Last 72 hrs:  (P) 526 [id number]     · BG in 40s on arrival to ED, improved without interventions  · Home regimen:  Lantus 12 units q h s  · BG was low yesterday, now elevated 300 today  · Start low-dose Lantus and titrate up as needed  · SSI coverage with Accu-Cheks for now  · Hypoglycemia protocol  · Diabetic diet    Anemia of chronic kidney failure, stage 4 (severe) (HCC)  Assessment & Plan  · Anemia of chronic disease with CKD, baseline Hgb 7-9  · Hgb remains stable, no active bleeding  · Continue folate  · Monitor CBC      VTE Pharmacologic Prophylaxis: VTE Score: 9 High Risk (Score >/= 5) - Pharmacological DVT Prophylaxis Ordered: rivaroxaban (Xarelto)  Sequential Compression Devices Ordered  Patient Centered Rounds: I performed bedside rounds with nursing staff today  Discussions with Specialists or Other Care Team Provider:  Case management    Education and Discussions with Family / Patient: Updated  (son) via phone  Time Spent for Care: 45 minutes  More than 50% of total time spent on counseling and coordination of care as described above  Current Length of Stay: 1 day(s)  Current Patient Status: Inpatient   Certification Statement: The patient will continue to require additional inpatient hospital stay due to Pending TTE, MRI  Discharge Plan: Anticipate discharge in 24-48 hrs to home  Code Status: Level 1 - Full Code    Subjective:   Patient seen at bedside this a m , denies any acute complaints at this time      Objective:     Vitals:   Temp (24hrs), Av °F (37 2 °C), Min:98 1 °F (36 7 °C), Max:99 7 °F (37 6 °C)    Temp:  [98 1 °F (36 7 °C)-99 7 °F (37 6 °C)] 99 °F (37 2 °C)  HR:  [60-64] 61  Resp:  [17-18] 17  BP: (130-194)/(43-84) 151/54  SpO2:  [93 %-97 %] 97 %  Body mass index is 32 42 kg/m²  Input and Output Summary (last 24 hours): Intake/Output Summary (Last 24 hours) at 10/10/2022 1225  Last data filed at 10/10/2022 0334  Gross per 24 hour   Intake --   Output 400 ml   Net -400 ml       Physical Exam:   Physical Exam  Constitutional:       General: She is not in acute distress  Appearance: She is not ill-appearing, toxic-appearing or diaphoretic  Cardiovascular:      Rate and Rhythm: Normal rate and regular rhythm  Pulses: Normal pulses  Heart sounds: Normal heart sounds  Pulmonary:      Effort: Pulmonary effort is normal  No respiratory distress  Breath sounds: Normal breath sounds  Abdominal:      General: Bowel sounds are normal  There is no distension  Palpations: Abdomen is soft  Tenderness: There is no abdominal tenderness  Musculoskeletal:         General: No swelling or tenderness  Skin:     General: Skin is warm  Neurological:      General: No focal deficit present  Mental Status: She is alert and oriented to person, place, and time     Psychiatric:         Mood and Affect: Mood normal          Behavior: Behavior normal           Additional Data:     Labs:  Results from last 7 days   Lab Units 10/10/22  0457   WBC Thousand/uL 6 35   HEMOGLOBIN g/dL 9 5*   HEMATOCRIT % 30 5*   PLATELETS Thousands/uL 191     Results from last 7 days   Lab Units 10/10/22  0457   SODIUM mmol/L 140   POTASSIUM mmol/L 4 3   CHLORIDE mmol/L 106   CO2 mmol/L 29   BUN mg/dL 25   CREATININE mg/dL 2 22*   ANION GAP mmol/L 5   CALCIUM mg/dL 8 4   GLUCOSE RANDOM mg/dL 152*         Results from last 7 days   Lab Units 10/10/22  1158 10/10/22  0924 10/09/22  2040 10/09/22  1703 10/09/22  1557 10/09/22  1117 10/09/22  0742 10/08/22  2140 10/08/22  1702 10/08/22  1627 10/08/22  1102 10/08/22  0706   POC GLUCOSE mg/dl 300* 144* 151* 123 62* 186* 181* 158* 133 132 72 60*     Results from last 7 days   Lab Units 10/09/22  0533 10/05/22  0706   HEMOGLOBIN A1C % 7 1* 7 1*           Lines/Drains:  Invasive Devices  Report    Peripheral Intravenous Line  Duration           Peripheral IV 10/08/22 Right Antecubital 2 days          Drain  Duration           Urethral Catheter Latex 16 Fr  103 days              Urinary Catheter:   Goal for removal: Voiding trial when ambulation improves           Telemetry:  Telemetry Orders (From admission, onward)             48 Hour Telemetry Monitoring  Continuous x 48 hours        References:    Telemetry Guidelines   Question:  Reason for 48 Hour Telemetry  Answer:  Acute CVA (<24 hrs old, hemispheric strokes, selected brainstem strokes, cardiac arrhythmias)                 Telemetry Reviewed: Paced, HR 60s  Indication for Continued Telemetry Use: Acute CVA             Imaging: No pertinent imaging reviewed      Recent Cultures (last 7 days):         Last 24 Hours Medication List:   Current Facility-Administered Medications   Medication Dose Route Frequency Provider Last Rate   • acetaminophen  650 mg Oral Q6H PRN Liam Ruth MD     • aspirin  81 mg Oral Daily Grant Neal MD     • atorvastatin  40 mg Oral QPM Grant Neal MD     • carvedilol  25 mg Oral Q12H Wichomaberlin Neal MD     • cholecalciferol  2,000 Units Oral Daily Grant Neal MD     • fluticasone  1 spray Nasal Daily Grant Neal MD     • folic acid  252 mcg Oral Daily Grant Neal MD     • hydrALAZINE  5 mg Intravenous Q6H PRN Grant Neal MD     • insulin glargine  5 Units Subcutaneous HS Ava Bourgeois PA-C     • insulin lispro  1-5 Units Subcutaneous HS Grant Neal MD     • insulin lispro  1-6 Units Subcutaneous TID AC Liam Ruth MD     • meclizine  12 5 mg Oral Q8H PRN Liam Neal MD     • ondansetron  4 mg Intravenous Q6H PRN Liam Krystin Talamantes MD     • ranolazine  500 mg Oral Daily Fox Neal MD     • rivaroxaban  15 mg Oral Daily Fox Neal MD     • senna  1 tablet Oral HS PRN Fox Neal MD     • sodium chloride  50 mL/hr Intravenous Continuous Zakia Sandoval PA-C          Today, Patient Was Seen By: Zakia Sandoval    **Please Note: This note may have been constructed using a voice recognition system  **

## 2022-10-10 NOTE — UTILIZATION REVIEW
Continued Stay Review    Date: 10/10/22                          Current Patient Class: IP  Current Level of Care:  MS    HPI:77 y o  female initially admitted on 10/8 as OBS converted to IP 10/9/22 with acute onset stroke -like symptoms, suspected TIA -R facial droop and slurred speech  Patient missed one week oral AC due to cost     Neurology following   Assessment/Plan: 10/10   PT/OT recommends post acute rehab   Price check for Eliquis would be $141 04,(same as Xarelto pt had been on but couldn't afford ) and Pradaxa is coming up as not covered by her plan, so cash price is over $500/month  Pt's son, reported that he will arrange finances to pay for Xarelto  Medicine-BP elevated 151/54   Procardia and Amlodipine to start tomorrow   Creat now elevated to 2 22 from baseline 1 6-1 8     Suspect to be 2/2 contrast induced nephropathy from CTA  Plan - gentle IVF x 8 hrs today  Monitor creat  Glucose low yesterday, today 300  Start low dose lantus   No focal neuro deficit noted today   Alert, oriented  Telemetry= paced with rate in 60's   Neurology-MRI today shows no acute strokes  Shows multiple chronic, likely small-vessel strokes  Innumerable foci of blooming artifact consistent with chronic microhemorrhages and concerning for cerebral amyloid angiopathy (CAA)  In the setting of CAA, being on patient's [facility] with high risk for bleed  Needs cardiology consult to discuss  need for [facility], risk vs benefit  Pt also at risk of seizure w/ chronic microhemorrhages - obtain routine EEG   Possible that her episode prior to admission was either a TIA or a seizure  Possible thatwas non neurologic in origin and that these are incidental findings  CAA is likely the etiology of patient's memory dysfunction and will continue to progress  Needs aggressive BP management, continue telemetry     Vital Signs:   Date/Time Temp Pulse Resp BP MAP (mmHg) SpO2 O2 Device Patient Position - Orthostatic VS   10/10/22 11:57:13 -- 61 17 151/54 86 97 % -- --   10/10/22 09:27:11 -- -- -- 157/66 96 -- -- --   10/10/22 02:42:53 99 °F (37 2 °C) 63 -- 130/43 Abnormal  72 95 % -- --   10/10/22 02:40:19 99 °F (37 2 °C) 64 -- -- -- 94 % -- --   10/09/22 21:44:33 -- 60 18 154/58 90 93 % -- --   10/09/22 20:39:01 99 3 °F (37 4 °C) 62 18 194/84 Abnormal  121 93 % -- --   10/09/22 1804 99 7 °F (37 6 °C) -- -- -- -- -- -- --   10/09/22 17:14:38 -- 60 -- -- -- 97 % -- --   10/09/22 1711 99 1 °F (37 3 °C) -- -- -- -- -- -- --   10/09/22 1516 98 1 °F (36 7 °C) 61 18 162/72 104 96 % None (Room air      Date and Time Eye Opening Best Verbal Response Best Motor Response Oakley Coma Scale Score   10/09/22 1900 4 4 6 14   10/09/22 1500 4 5 6 15     Date and Time R Pupil Size (mm) L Pupil Size (mm) R Pupil Reaction L Pupil Reaction   10/10/22 0300 2 2 Brisk Brisk   10/09/22 2300 2 2 Brisk Brisk   10/09/22 1900 2 2 Brisk Brisk   10/09/22 1500 2 2 Brisk Brisk         Pertinent Labs/Diagnostic Results:   10/10 MRI brain-1  Innumerable foci of blooming artifact most suggestive of amyloid angiopathy  Differential diagnosis could include sequela of chronic hypertension/chronic hypertensive encephalopathy  No acute intracranial hemorrhage  2   No acute infarction  3   Moderate, chronic microangiopathy  4   Chronic appearing bilateral pontine lacunar infarctions increased from the prior study  10/10 ECHO-TTE    Left Ventricle: Left ventricular cavity size is normal  Wall thickness is normal  The left ventricular ejection fraction is 65%  Systolic function is normal  Although no diagnostic regional wall motion abnormality was identified, this possibility cannot be completely excluded on the basis of this study  Diastolic function is normal   •  Right Ventricle: Right ventricular cavity size is normal  Systolic function is normal   •  Atrial Septum: No patent foramen ovale detected by saline bubble injection, at rest or with provocation by Valsalva  •  Pulmonary Artery:  The estimated pulmonary artery systolic pressure is 70 4 mmHg   The pulmonary artery systolic pressure is mildly increased                Results from last 7 days   Lab Units 10/10/22  0457 10/09/22  0533   WBC Thousand/uL 6 35 8 53   HEMOGLOBIN g/dL 9 5* 10 2*   HEMATOCRIT % 30 5* 32 7*   PLATELETS Thousands/uL 191 231         Results from last 7 days   Lab Units 10/10/22  0457 10/09/22  0533   SODIUM mmol/L 140 141   POTASSIUM mmol/L 4 3 4 3   CHLORIDE mmol/L 106 105   CO2 mmol/L 29 29   ANION GAP mmol/L 5 7   BUN mg/dL 25 18   CREATININE mg/dL 2 22* 1 67*   EGFR ml/min/1 73sq m 20 29   CALCIUM mg/dL 8 4 8 9         Results from last 7 days   Lab Units 10/10/22  1158 10/10/22  0924 10/09/22  2040 10/09/22  1703 10/09/22  1557 10/09/22  1117 10/09/22  0742 10/08/22  2140 10/08/22  1702 10/08/22  1627 10/08/22  1102 10/08/22  0706   POC GLUCOSE mg/dl 300* 144* 151* 123 62* 186* 181* 158* 133 132 72 60*     Results from last 7 days   Lab Units 10/10/22  0457 10/09/22  0533   GLUCOSE RANDOM mg/dL 152* 164*         Results from last 7 days   Lab Units 10/09/22  0533 10/05/22  0706   HEMOGLOBIN A1C % 7 1* 7 1*   EAG mg/dl 157 157*               Results from last 7 days   Lab Units 10/09/22  0533   TSH 3RD GENERATON uIU/mL 1 377                           Medications:   Scheduled Medications:  aspirin, 81 mg, Oral, Daily  atorvastatin, 40 mg, Oral, QPM  carvedilol, 25 mg, Oral, Q12H RADHA  cholecalciferol, 2,000 Units, Oral, Daily  fluticasone, 1 spray, Nasal, Daily  folic acid, 124 mcg, Oral, Daily  insulin glargine, 5 Units, Subcutaneous, HS  insulin lispro, 1-5 Units, Subcutaneous, HS  insulin lispro, 1-6 Units, Subcutaneous, TID AC  ranolazine, 500 mg, Oral, Daily  rivaroxaban, 15 mg, Oral, Daily    amLODIPine (NORVASC) tablet 5 mg  Dose: 5 mg  Freq: Daily Route: PO  Start: 10/11/22 0900  NIFEdipine (PROCARDIA XL) 24 hr tablet 60 mg  Dose: 60 mg  Freq: Daily Route: PO  Start: 10/11/22 0900    Continuous IV Infusions:  sodium chloride, 50 mL/hr, Intravenous, Continuous      PRN Meds:  acetaminophen, 650 mg, Oral, Q6H PRN x1 10/9  hydrALAZINE, 5 mg, Intravenous, Q6H PRN  meclizine, 12 5 mg, Oral, Q8H PRN  ondansetron, 4 mg, Intravenous, Q6H PRN  senna, 1 tablet, Oral, HS PRN        Discharge Plan: TBD    Network Utilization Review Department  ATTENTION: Please call with any questions or concerns to [phone] and carefully listen to the prompts so that you are directed to the right person  All voicemails are confidential   [facility] all requests for admission clinical reviews, approved or denied determinations and any other requests to dedicated fax number below belonging to the campus where the patient is receiving treatment   List of dedicated fax numbers for the Facilities:  [address] DENIALS (Administrative/Medical Necessity) [phone]   [address] (Maternity/NICU/Pediatrics) [phone]   918 Claudia Pabon [phone]   [facility] 77 [phone]   130 [facility] 150 [facility] [place] [address] [address] Tamra Kenny 28 [phone]   [address] [place] Parris Santana [facility] [address] [phone]

## 2022-10-10 NOTE — ASSESSMENT & PLAN NOTE
Patient came in due to brief episode of right facial droop and slurring of speech and difficulty drinking water earlier today and ended up spilling it  Symptoms had all resolved  According to the patient's son, patient had similar symptoms about 2 years ago and was diagnosed with Bell's palsy, also with reported chronic difficulty swallowing  · Significant cardiac history with AFib, has not been taking Xarelto for about a week due to cost   · Also consider hypoglycemia contributing  · Stroke workup/pathway:  · CT head showed chronic lacunar infarct, ronen, otherwise no acute findings  · CTA of the head and neck unremarkable  · TSH wnl, lipid panel overall stable  · HgbA1c: pending  · MRI brain today, pending results  · TTE with bubble study today, pending results  · C/w ASA, high-intensity statin and prior home Xarelto  · Telemetry reviewed, paced rhythm with HR 60s with few PVCs, no acute events overnight  · Neurology consulted:  · Continue stroke pathway for now  · Permissive HTN X 24 hours with goal BP less than or equal to 200/110  · Consider EEG if no stroke found given similar episodes in the past  · PT/OT and speech therapy consults  · Neuro checks  · Stat CT scan of the head without contrast for acute change  · Aspiration precautions

## 2022-10-10 NOTE — PLAN OF CARE
Problem: MOBILITY - ADULT  Goal: Maintain or return to baseline ADL function  Description: INTERVENTIONS:  -  Assess patient's ability to carry out ADLs; assess patient's baseline for ADL function and identify physical deficits which impact ability to perform ADLs (bathing, care of mouth/teeth, toileting, grooming, dressing, etc )  - Assess/evaluate cause of self-care deficits   - Assess range of motion  - Assess patient's mobility; develop plan if impaired  - Assess patient's need for assistive devices and provide as appropriate  - Encourage maximum independence but intervene and supervise when necessary  - Involve family in performance of ADLs  - Assess for home care needs following discharge   - Consider OT consult to assist with ADL evaluation and planning for discharge  - Provide patient education as appropriate  Outcome: Progressing  Goal: Maintains/Returns to pre admission functional level  Description: INTERVENTIONS:  - Perform BMAT or MOVE assessment daily    - Set and communicate daily mobility goal to care team and patient/family/caregiver  - Collaborate with rehabilitation services on mobility goals if consulted  - Perform Range of Motion  times a day  - Reposition patient every  hours    - Dangle patient  times a day  - Stand patient  times a day  - Ambulate patient  times a day  - Out of bed to chair times a day   - Out of bed for meals times a day  - Out of bed for toileting  - Record patient progress and toleration of activity level   Outcome: Progressing     Problem: NEUROSENSORY - ADULT  Goal: Achieves stable or improved neurological status  Description: INTERVENTIONS  - Monitor and report changes in neurological status  - Monitor vital signs such as temperature, blood pressure, glucose, and any other labs ordered   - Initiate measures to prevent increased intracranial pressure  - Monitor for seizure activity and implement precautions if appropriate      Outcome: Progressing  Goal: Remains free of injury related to seizures activity  Description: INTERVENTIONS  - Maintain airway, patient safety  and administer oxygen as ordered  - Monitor patient for seizure activity, document and report duration and description of seizure to physician/advanced practitioner  - If seizure occurs,  ensure patient safety during seizure  - Reorient patient post seizure  - Seizure pads on all 4 side rails  - Instruct patient/family to notify RN of any seizure activity including if an aura is experienced  - Instruct patient/family to call for assistance with activity based on nursing assessment  - Administer anti-seizure medications if ordered    Outcome: Progressing  Goal: Achieves maximal functionality and self care  Description: INTERVENTIONS  - Monitor swallowing and airway patency with patient fatigue and changes in neurological status  - Encourage and assist patient to increase activity and self care     - Encourage visually impaired, hearing impaired and aphasic patients to use assistive/communication devices  Outcome: Progressing     Problem: Prexisting or High Potential for Compromised Skin Integrity  Goal: Skin integrity is maintained or improved  Description: INTERVENTIONS:  - Identify patients at risk for skin breakdown  - Assess and monitor skin integrity  - Assess and monitor nutrition and hydration status  - Monitor labs   - Assess for incontinence   - Turn and reposition patient  - Assist with mobility/ambulation  - Relieve pressure over bony prominences  - Avoid friction and shearing  - Provide appropriate hygiene as needed including keeping skin clean and dry  - Evaluate need for skin moisturizer/barrier cream  - Collaborate with interdisciplinary team   - Patient/family teaching  - Consider wound care consult   Outcome: Progressing     Problem: Nutrition/Hydration-ADULT  Goal: Nutrient/Hydration intake appropriate for improving, restoring or maintaining nutritional needs  Description: Monitor and assess patient's nutrition/hydration status for malnutrition  Collaborate with interdisciplinary team and initiate plan and interventions as ordered  Monitor patient's weight and dietary intake as ordered or per policy  Utilize nutrition screening tool and intervene as necessary  Determine patient's food preferences and provide high-protein, high-caloric foods as appropriate       INTERVENTIONS:  - Monitor oral intake, urinary output, labs, and treatment plans  - Assess nutrition and hydration status and recommend course of action  - Evaluate amount of meals eaten  - Assist patient with eating if necessary   - Allow adequate time for meals  - Recommend/ encourage appropriate diets, oral nutritional supplements, and vitamin/mineral supplements  - Order, calculate, and assess calorie counts as needed  - Recommend, monitor, and adjust tube feedings and TPN/PPN based on assessed needs  - Assess need for intravenous fluids  - Provide specific nutrition/hydration education as appropriate  - Include patient/family/caregiver in decisions related to nutrition  Outcome: Progressing

## 2022-10-10 NOTE — Clinical Note
Given fxl performance skills + deficits, therapist suspecting via clinical judgement + skilled analysis; pt currently requires stated assist above to perform each area of ADL d/t limitations including: {pmdeficits:52025}      {pmdnt:31214}      Pt performed short distance ADL related fxl mobility {pmfxlmob:85294} {pmassistlevels:22717} {pmad:57057} simulating toileting distances  {pmpain:12029} overt LOB demonstrated + pt {pmc:57723} {pmd:78301} /  {pmpain:24585} SOB/ {pmpain:72194} dizziness during transitional movements  Reports feeling {pmst:48976}  {pmgf:31345} safety awareness noted while navigating t/o room  {pmfxl:75746}  {pmc:33381}        PT/OT co-eval on this date d/t medical complexity, ambulatory dysfunction c high fall risk, various impeding lines + requirement for skilled interdisciplinary analysis of appropriate d/c recommendations  PT/OT POC/goals I'ly determined per respective discipline  ({pmpt:15688})        None reported  No acute changes reported since hospitalization  SBT administered: scored *** indicating {pmsbt:86286} cognitive impairment  Patient is a 68 y o  female seen for OT evaluation s/p admit to  {PM location:38623} on 10/8/2022 w/Stroke-like symptoms + commorbidities/PMHx (as listed in medical record) affecting patient's functional performance c ADL tasks at time of assessment  OT orders placed for evaluation and treatment to assess pt's ADLs, cognitive status + performance during functional tasks in order to formulate appropriate d/c recommendations  Therapist performed at least two patient identifiers during session including name and wristband  Personal factors affecting patient at time of initial evaluation include: {NCPERSONALFACTORS:03179}     Pt's clinical presentation is currently {MSLstable:15700} given new onset deficits that effect pt's occupational performance and ability to safely return to PLOF including {NCOTdeficits:31612} combined with medical complications of {QOACKVYMNKGUJLKFKGASCY:37085}  This evaluation required an extensive review of medical and/or therapy records and additional review of physical, cognitive and psychosocial history related to functional performance  Based upon functional performance deficits and assessments, this evaluation has been identified as a  {pmcomplex:81847} complexity evaluation  Patient to benefit from continued Occupational Therapy treatment while in the hospital to address aforementioned deficits and maximize level of functional independence with ADLs and functional mobility  Pt currently {pmdc:63496}  From OT standpoint, recommendation at time of d/c would be {RV recommendation:87419}  @ this time, pt presents @ baseline fxn including I c ADLs/fxl mobility  D/t aforementioned factors, no further skilled occupational therapy services warranted during hospitalization/ following return home

## 2022-10-11 PROBLEM — N17.9 ACUTE KIDNEY INJURY SUPERIMPOSED ON CHRONIC KIDNEY DISEASE (HCC): Status: ACTIVE | Noted: 2022-10-08

## 2022-10-11 PROBLEM — N18.9 ACUTE KIDNEY INJURY SUPERIMPOSED ON CHRONIC KIDNEY DISEASE (HCC): Status: ACTIVE | Noted: 2022-10-08

## 2022-10-11 LAB
ANION GAP SERPL CALCULATED.3IONS-SCNC: 5 MMOL/L (ref 4–13)
BASOPHILS # BLD AUTO: 0.02 THOUSANDS/ΜL (ref 0–0.1)
BASOPHILS NFR BLD AUTO: 0 % (ref 0–1)
BUN SERPL-MCNC: 26 MG/DL (ref 5–25)
CALCIUM SERPL-MCNC: 8.3 MG/DL (ref 8.4–10.2)
CHLORIDE SERPL-SCNC: 106 MMOL/L (ref 96–108)
CO2 SERPL-SCNC: 30 MMOL/L (ref 21–32)
CREAT SERPL-MCNC: 2.12 MG/DL (ref 0.6–1.3)
EOSINOPHIL # BLD AUTO: 0 THOUSAND/ΜL (ref 0–0.61)
EOSINOPHIL NFR BLD AUTO: 0 % (ref 0–6)
ERYTHROCYTE [DISTWIDTH] IN BLOOD BY AUTOMATED COUNT: 14.6 % (ref 11.6–15.1)
GFR SERPL CREATININE-BSD FRML MDRD: 21 ML/MIN/1.73SQ M
GLUCOSE SERPL-MCNC: 174 MG/DL (ref 65–140)
GLUCOSE SERPL-MCNC: 179 MG/DL (ref 65–140)
GLUCOSE SERPL-MCNC: 183 MG/DL (ref 65–140)
GLUCOSE SERPL-MCNC: 446 MG/DL (ref 65–140)
GLUCOSE SERPL-MCNC: 79 MG/DL (ref 65–140)
GLUCOSE SERPL-MCNC: 94 MG/DL (ref 65–140)
HCT VFR BLD AUTO: 31.9 % (ref 34.8–46.1)
HGB BLD-MCNC: 10 G/DL (ref 11.5–15.4)
IMM GRANULOCYTES # BLD AUTO: 0.03 THOUSAND/UL (ref 0–0.2)
IMM GRANULOCYTES NFR BLD AUTO: 0 % (ref 0–2)
LYMPHOCYTES # BLD AUTO: 1.41 THOUSANDS/ΜL (ref 0.6–4.47)
LYMPHOCYTES NFR BLD AUTO: 21 % (ref 14–44)
MCH RBC QN AUTO: 27.3 PG (ref 26.8–34.3)
MCHC RBC AUTO-ENTMCNC: 31.3 G/DL (ref 31.4–37.4)
MCV RBC AUTO: 87 FL (ref 82–98)
MONOCYTES # BLD AUTO: 0.68 THOUSAND/ΜL (ref 0.17–1.22)
MONOCYTES NFR BLD AUTO: 10 % (ref 4–12)
NEUTROPHILS # BLD AUTO: 4.72 THOUSANDS/ΜL (ref 1.85–7.62)
NEUTS SEG NFR BLD AUTO: 69 % (ref 43–75)
NRBC BLD AUTO-RTO: 0 /100 WBCS
PLATELET # BLD AUTO: 220 THOUSANDS/UL (ref 149–390)
PMV BLD AUTO: 10.5 FL (ref 8.9–12.7)
POTASSIUM SERPL-SCNC: 3.8 MMOL/L (ref 3.5–5.3)
RBC # BLD AUTO: 3.66 MILLION/UL (ref 3.81–5.12)
SODIUM SERPL-SCNC: 141 MMOL/L (ref 135–147)
WBC # BLD AUTO: 6.86 THOUSAND/UL (ref 4.31–10.16)

## 2022-10-11 PROCEDURE — 80048 BASIC METABOLIC PNL TOTAL CA: CPT | Performed by: PHYSICIAN ASSISTANT

## 2022-10-11 PROCEDURE — 82948 REAGENT STRIP/BLOOD GLUCOSE: CPT

## 2022-10-11 PROCEDURE — 99232 SBSQ HOSP IP/OBS MODERATE 35: CPT | Performed by: PHYSICIAN ASSISTANT

## 2022-10-11 PROCEDURE — 85025 COMPLETE CBC W/AUTO DIFF WBC: CPT | Performed by: PHYSICIAN ASSISTANT

## 2022-10-11 RX ORDER — HYDRALAZINE HYDROCHLORIDE 25 MG/1
25 TABLET, FILM COATED ORAL EVERY 8 HOURS SCHEDULED
Status: DISCONTINUED | OUTPATIENT
Start: 2022-10-11 | End: 2022-10-14 | Stop reason: HOSPADM

## 2022-10-11 RX ORDER — SODIUM CHLORIDE 9 MG/ML
50 INJECTION, SOLUTION INTRAVENOUS CONTINUOUS
Status: DISPENSED | OUTPATIENT
Start: 2022-10-11 | End: 2022-10-12

## 2022-10-11 RX ORDER — AMLODIPINE BESYLATE 10 MG/1
10 TABLET ORAL DAILY
Status: DISCONTINUED | OUTPATIENT
Start: 2022-10-12 | End: 2022-10-14 | Stop reason: HOSPADM

## 2022-10-11 RX ADMIN — INSULIN LISPRO 1 UNITS: 100 INJECTION, SOLUTION INTRAVENOUS; SUBCUTANEOUS at 16:04

## 2022-10-11 RX ADMIN — HYDRALAZINE HYDROCHLORIDE 25 MG: 25 TABLET ORAL at 21:05

## 2022-10-11 RX ADMIN — ASPIRIN 81 MG CHEWABLE TABLET 81 MG: 81 TABLET CHEWABLE at 08:15

## 2022-10-11 RX ADMIN — ATORVASTATIN CALCIUM 40 MG: 40 TABLET, FILM COATED ORAL at 16:06

## 2022-10-11 RX ADMIN — CARVEDILOL 25 MG: 12.5 TABLET, FILM COATED ORAL at 08:15

## 2022-10-11 RX ADMIN — AMLODIPINE BESYLATE 5 MG: 5 TABLET ORAL at 08:15

## 2022-10-11 RX ADMIN — INSULIN LISPRO 5 UNITS: 100 INJECTION, SOLUTION INTRAVENOUS; SUBCUTANEOUS at 12:16

## 2022-10-11 RX ADMIN — CARVEDILOL 25 MG: 12.5 TABLET, FILM COATED ORAL at 21:05

## 2022-10-11 RX ADMIN — RANOLAZINE 500 MG: 500 TABLET, EXTENDED RELEASE ORAL at 08:15

## 2022-10-11 RX ADMIN — INSULIN LISPRO 1 UNITS: 100 INJECTION, SOLUTION INTRAVENOUS; SUBCUTANEOUS at 12:17

## 2022-10-11 RX ADMIN — INSULIN GLARGINE 10 UNITS: 100 INJECTION, SOLUTION SUBCUTANEOUS at 21:05

## 2022-10-11 RX ADMIN — INSULIN LISPRO 1 UNITS: 100 INJECTION, SOLUTION INTRAVENOUS; SUBCUTANEOUS at 21:06

## 2022-10-11 RX ADMIN — HYDRALAZINE HYDROCHLORIDE 25 MG: 25 TABLET ORAL at 15:57

## 2022-10-11 RX ADMIN — RIVAROXABAN 15 MG: 15 TABLET, FILM COATED ORAL at 08:15

## 2022-10-11 RX ADMIN — FOLIC ACID TAB 400 MCG 400 MCG: 400 TAB at 08:16

## 2022-10-11 RX ADMIN — NIFEDIPINE 60 MG: 30 TABLET, FILM COATED, EXTENDED RELEASE ORAL at 08:15

## 2022-10-11 RX ADMIN — Medication 2000 UNITS: at 08:15

## 2022-10-11 RX ADMIN — SODIUM CHLORIDE 50 ML/HR: 0.9 INJECTION, SOLUTION INTRAVENOUS at 12:19

## 2022-10-11 NOTE — INCIDENTAL FINDINGS
The following findings require follow up:  Radiographic finding   Finding:  Thyroid nodule   Follow up required:  Thyroid ultrasound   Follow up should be done within 4 weeks     Please notify the following clinician to assist with the follow up:  Family doctor

## 2022-10-11 NOTE — PLAN OF CARE
Problem: NEUROSENSORY - ADULT  Goal: Achieves stable or improved neurological status  Description: INTERVENTIONS  - Monitor and report changes in neurological status  - Monitor vital signs such as temperature, blood pressure, glucose, and any other labs ordered   - Initiate measures to prevent increased intracranial pressure  - Monitor for seizure activity and implement precautions if appropriate      Outcome: Progressing  Goal: Remains free of injury related to seizures activity  Description: INTERVENTIONS  - Maintain airway, patient safety  and administer oxygen as ordered  - Monitor patient for seizure activity, document and report duration and description of seizure to physician/advanced practitioner  - If seizure occurs,  ensure patient safety during seizure  - Reorient patient post seizure  - Seizure pads on all 4 side rails  - Instruct patient/family to notify RN of any seizure activity including if an aura is experienced  - Instruct patient/family to call for assistance with activity based on nursing assessment  - Administer anti-seizure medications if ordered    Outcome: Progressing  Goal: Achieves maximal functionality and self care  Description: INTERVENTIONS  - Monitor swallowing and airway patency with patient fatigue and changes in neurological status  - Encourage and assist patient to increase activity and self care     - Encourage visually impaired, hearing impaired and aphasic patients to use assistive/communication devices  Outcome: Progressing     Problem: Prexisting or High Potential for Compromised Skin Integrity  Goal: Skin integrity is maintained or improved  Description: INTERVENTIONS:  - Identify patients at risk for skin breakdown  - Assess and monitor skin integrity  - Assess and monitor nutrition and hydration status  - Monitor labs   - Assess for incontinence   - Turn and reposition patient  - Assist with mobility/ambulation  - Relieve pressure over bony prominences  - Avoid friction and shearing  - Provide appropriate hygiene as needed including keeping skin clean and dry  - Evaluate need for skin moisturizer/barrier cream  - Collaborate with interdisciplinary team   - Patient/family teaching  - Consider wound care consult   Outcome: Progressing     Problem: Prexisting or High Potential for Compromised Skin Integrity  Goal: Skin integrity is maintained or improved  Description: INTERVENTIONS:  - Identify patients at risk for skin breakdown  - Assess and monitor skin integrity  - Assess and monitor nutrition and hydration status  - Monitor labs   - Assess for incontinence   - Turn and reposition patient  - Assist with mobility/ambulation  - Relieve pressure over bony prominences  - Avoid friction and shearing  - Provide appropriate hygiene as needed including keeping skin clean and dry  - Evaluate need for skin moisturizer/barrier cream  - Collaborate with interdisciplinary team   - Patient/family teaching  - Consider wound care consult   Outcome: Progressing     Problem: Nutrition/Hydration-ADULT  Goal: Nutrient/Hydration intake appropriate for improving, restoring or maintaining nutritional needs  Description: Monitor and assess patient's nutrition/hydration status for malnutrition  Collaborate with interdisciplinary team and initiate plan and interventions as ordered  Monitor patient's weight and dietary intake as ordered or per policy  Utilize nutrition screening tool and intervene as necessary  Determine patient's food preferences and provide high-protein, high-caloric foods as appropriate       INTERVENTIONS:  - Monitor oral intake, urinary output, labs, and treatment plans  - Assess nutrition and hydration status and recommend course of action  - Evaluate amount of meals eaten  - Assist patient with eating if necessary   - Allow adequate time for meals  - Recommend/ encourage appropriate diets, oral nutritional supplements, and vitamin/mineral supplements  - Order, calculate, and assess calorie counts as needed  - Recommend, monitor, and adjust tube feedings and TPN/PPN based on assessed needs  - Assess need for intravenous fluids  - Provide specific nutrition/hydration education as appropriate  - Include patient/family/caregiver in decisions related to nutrition  Outcome: Progressing

## 2022-10-11 NOTE — CONSULTS
Consultation - Cardiology Team One  Jean Friend 68 y o  female MRN: [id number]  Unit/Bed#: S -01 Encounter: [id number]    Inpatient consult to Cardiology  Consult performed by: Ulysses Bird, CRNP  Consult ordered by: Anna Flowers PA-C          Physician Requesting Consult: Demario Alonzo MD  Reason for Consult / Principal Problem: Hypertension with innumerable microhemorrhages on MRI     Assessment:   Stroke-like symptoms  -CT of head and CTA-negative for acute blood, signs of ischemia LVO or critical stenosis  -mild bilateral intra and extracranial ICA atherosclerosis  -MRI no acute strokes, multiple bilateral likely small vessel chronic strokes, innumerable chronic micro hemorrhages consistent with CAA  -Echo 10/10/22:  EF 65%  No PFO     -continue aspirin 81 mg daily     Hypertension, uncontrolled  -Average blood pressure 168/69  Home regimen includes  -amlodipine 5 mg daily  -Coreg 25 mg b i d   -Procardia 60 mg daily  -increase amlodipine to 10 mg daily    Paroxysmal Atrial Fibrillation  -anticoagulated with Xarelto 15 mg daily  -status post Medtronic pacer    Coronary artery disease  -status post multiple stenting to RCA in 2019 and  2011  -continue aspirin 81 mg daily  -Ranexa 500 mg daily    Diabetes, type 2  -hemoglobin A1c 7 1 on 10/09/2022  -managed by primary team    Hyperlipidemia  -cholesterol 108, triglycerides 52 HDL 49 LDL 49 (10/09/2022)  -continue atorvastatin 40 mg daily    CKD, stage 3  -creatinine 2 12 and GFR 21 (10/11/2022)  -baseline creatinine 1 6-1 8  -follows with Dr Mart Green through [address] [facility]                ______________________________________________________________________________________    CC: Hypertension with innumerable microhemorrhages on MRI of head           History of Present Illness   HPI: Jean Friend is a 68y o  year old female who presented to the emergency room at [address] with acute onset of dysarthria, dysphagia, and right upper extremity weakness and right facial droop after awakening 10/8/2022 (baseline was noted at around 5:30 a m  A m  On the morning of 10/8/2022)  CT of the head and CTA negative for acute bleed, signs of ischemia, LV 0 or critical stenosis  The mild bilateral intra and extracranial ICA atherosclerosis noted and a 6 X 1 6 cm left thyroid nodule  MRI was performed and negative for acute stroke  Multiple bilateral likely small-vessel chronic strokes noted  Innumerable chronic microhemorrhages consistent with CAA  She follows with Dr Cl Burciaga at [facility]     Past medical history significant for hypertension, hypertensive cardiovascular disease, diabetes (type 2) with diabetic neuropathy, 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), CKD stage II, coronary artery disease (multiple stent placements to RCA in 2019, 12/2011 and 7/2011), hyperlipidemia, paroxysmal atrial flutter/atrial fibrillation, peripheral vascular disease, and legal blindness (since February 2017)  Echo performed 10/10/2022 revealed:  Left ventricular ejection fraction 65%  Systolic function is normal   No regional wall motion abnormality  Diastolic function normal   No patent foramen ovale by saline bubble injection at rest or with provocation by Valsalva  No significant valve disorder  EKG reviewed personally:  Atrial pacing noted  Non specific T wave inversion in lateral leads         Telemetry reviewed personally: atrial pacing noted,  No ectopy noted         Review of Systems   Constitutional: Negative for chills, decreased appetite, weight gain and weight loss  HENT: Negative  Eyes: Positive for vision loss in left eye and vision loss in right eye  Cardiovascular: Negative  Negative for chest pain, palpitations and syncope  Respiratory: Negative  Negative for shortness of breath and wheezing  Endocrine: Negative  Hematologic/Lymphatic: Negative  Skin: Negative  Musculoskeletal: Negative  Gastrointestinal: Negative  Negative for abdominal pain, dysphagia, nausea and vomiting  Genitourinary: Negative  Neurological: Negative for dizziness, focal weakness and weakness  Psychiatric/Behavioral: Negative  Allergic/Immunologic: Negative  Historical Information   Past Medical History:   Diagnosis Date   • Atrial fibrillation ([facility] )    • Chronic anemia    • Chronic kidney disease    • Colonoscopy refused 2019    pt declines   • Dyslipidemia    • Hypertension    • Mammogram declined 2017   • Proteinuria    • Tachy-sarah beth syndrome ([facility] ) 2022     Past Surgical History:   Procedure Laterality Date   • CARDIAC PACEMAKER PLACEMENT     • CATARACT EXTRACTION     •  SECTION     • CHOLECYSTECTOMY     • CORONARY ANGIOPLASTY WITH STENT PLACEMENT     • EYE SURGERY     • MAMMO (HISTORICAL)      Pt states she will not ever have again-    • [place] OPEN RX FEMUR FX+INTRAMED RIMA Left 2022    Procedure: INSERTION NAIL IM FEMUR ANTEGRADE (TROCHANTERIC); Surgeon: Giovanni Mccartney DO;  Location: [facility];  Service: Orthopedics   • TUBAL LIGATION       Social History     Substance and Sexual Activity   Alcohol Use Never     Social History     Substance and Sexual Activity   Drug Use Never     Social History     Tobacco Use   Smoking Status Never Smoker   Smokeless Tobacco Never Used     Family History: non-contributory    Meds/Allergies   all current active meds have been reviewed       No Known Allergies    Objective   Vitals: Blood pressure 170/74, pulse 63, temperature 97 8 °F (36 6 °C), temperature source Oral, resp  rate 18, height 5' 3" (1 6 m), weight 83 kg (183 lb), SpO2 94 %  ,     Body mass index is 32 42 kg/m²  ,     Systolic (65QJD), JBJ:537 , Min:132 , GWI:240     Diastolic (03YXQ), ZZQ:30, Min:54, Max:74    Wt Readings from Last 3 Encounters:   10/10/22 83 kg (183 lb)   22 77 1 kg (170 lb)   08/05/22 89 4 kg (197 lb)      Lab Results   Component Value Date    CREATININE 2 12 (H) 10/11/2022    CREATININE 2 22 (H) 10/10/2022    CREATININE 1 67 (H) 10/09/2022             Intake/Output Summary (Last 24 hours) at 10/11/2022 1050  Last data filed at 10/11/2022 0500  Gross per 24 hour   Intake 390 ml   Output 850 ml   Net -460 ml     Weight (last 2 days)     Date/Time Weight    10/10/22 11:57:13 83 (183)        Invasive Devices  Report    Peripheral Intravenous Line  Duration           Peripheral IV 10/08/22 Right Antecubital 3 days          Drain  Duration           Urethral Catheter Latex 16 Fr  104 days                  Physical Exam  Constitutional:       General: She is not in acute distress  Appearance: Normal appearance  She is obese  She is ill-appearing  HENT:      Head: Normocephalic and atraumatic  Nose: Nose normal       Mouth/Throat:      Mouth: Mucous membranes are moist    Eyes:      Conjunctiva/sclera: Conjunctivae normal    Neck:      Vascular: No carotid bruit  Cardiovascular:      Rate and Rhythm: Normal rate and regular rhythm  Pulses: Normal pulses  Heart sounds: Normal heart sounds  No murmur heard  Pulmonary:      Effort: Pulmonary effort is normal       Breath sounds: Normal breath sounds  No wheezing, rhonchi or rales  Abdominal:      General: Bowel sounds are normal       Palpations: Abdomen is soft  Musculoskeletal:      Cervical back: Neck supple  Right lower leg: Edema present  Left lower leg: Edema present  Skin:     General: Skin is warm and dry  Capillary Refill: Capillary refill takes less than 2 seconds  Neurological:      General: No focal deficit present  Mental Status: She is alert and oriented to person, place, and time  Motor: Weakness present  Psychiatric:         Mood and Affect: Mood normal          Behavior: Behavior normal          Thought Content:  Thought content normal          Judgment: Judgment normal            LABORATORY RESULTS:      CBC with diff:   Results from last 7 days   Lab Units 10/11/22  0542 10/10/22  0457 10/09/22  0533   WBC Thousand/uL 6 86 6 35 8 53   HEMOGLOBIN g/dL 10 0* 9 5* 10 2*   HEMATOCRIT % 31 9* 30 5* 32 7*   MCV fL 87 89 87   PLATELETS Thousands/uL 220 191 231   MCH pg 27 3 27 8 27 3   MCHC g/dL 31 3* 31 1* 31 2*   RDW % 14 6 14 8 14 6   MPV fL 10 5 10 8 10 9   NRBC AUTO /100 WBCs 0  --   --        CMP:  Results from last 7 days   Lab Units 10/11/22  0542 10/10/22  0457 10/09/22  0533   POTASSIUM mmol/L 3 8 4 3 4 3   CHLORIDE mmol/L 106 106 105   CO2 mmol/L 30 29 29   BUN mg/dL 26* 25 18   CREATININE mg/dL 2 12* 2 22* 1 67*   CALCIUM mg/dL 8 3* 8 4 8 9   EGFR ml/min/1 73sq m 21 20 29       BMP:  Results from last 7 days   Lab Units 10/11/22  0542 10/10/22  0457 10/09/22  0533   POTASSIUM mmol/L 3 8 4 3 4 3   CHLORIDE mmol/L 106 106 105   CO2 mmol/L 30 29 29   BUN mg/dL 26* 25 18   CREATININE mg/dL 2 12* 2 22* 1 67*   CALCIUM mg/dL 8 3* 8 4 8 9          No results found for: NTBNP                Results from last 7 days   Lab Units 10/09/22  0533 10/05/22  0706   HEMOGLOBIN A1C % 7 1* 7 1*          Results from last 7 days   Lab Units 10/09/22  0533   TSH 3RD GENERATON uIU/mL 1 377           Lipid Profile:   No results found for: CHOL  Lab Results   Component Value Date    HDL 49 (L) 10/09/2022    HDL 63 02/18/2022     Lab Results   Component Value Date    LDLCALC 49 10/09/2022    LDLCALC 36 02/18/2022     Lab Results   Component Value Date    TRIG 52 10/09/2022    TRIG 55 02/18/2022         Cardiac testing:   No results found for this or any previous visit  No results found for this or any previous visit  No valid procedures specified  No results found for this or any previous visit  Imaging: I have personally reviewed pertinent reports      XR femur 2 vw left    Result Date: 9/22/2022  Narrative: LEFT FEMUR INDICATION:   [id number]: Other specified postprocedural states  COMPARISON:  8/5/2022 VIEWS:  XR FEMUR 2 VW LEFT FINDINGS: Stable alignment status post ORIF for a left mid femoral diaphysis fracture  There is some lucency surrounding the proximal femoral nail in the region of the intertrochanteric fracture raising suspicion for early changes of loosening  Left hip and left knee degenerative change noted  No lytic or blastic osseous lesion  There are atherosclerotic calcifications  Soft tissues are otherwise unremarkable  Impression: Status post ORIF for a left intertrochanteric and femoral diaphysis fractures  There is stable alignment present with some lucency surrounding the proximal left femoral intramedullary nail raising suspicion for early changes of loosening  Recommend short-term interval follow-up  The study was marked in EPIC for significant notification  Workstation performed: [id number]     MRI brain wo contrast    Result Date: 10/10/2022  Narrative: MRI BRAIN WITHOUT CONTRAST INDICATION: Brief right facial droop and slurring of speech; probable TIA  Laura Burow COMPARISON:   7/6/2018 TECHNIQUE:  Sagittal T1, axial T2, axial FLAIR, axial T1, axial Downey and axial diffusion imaging  IMAGE QUALITY:  Mild motion artifact limits evaluation  Some diagnostic information is obtained  Diffusion imaging is of diagnostic quality and therefore acute infarction can't be excluded  FINDINGS: BRAIN PARENCHYMA:  There are innumerable punctate foci of blooming artifact throughout the supra and infratentorial brain on susceptibility weighted imaging  Compared to the prior study these are significantly increased although the prior study use to different technique on the current susceptibility weighted imaging sequence is more sensitive for detecting these foci of blooming artifact/chronic hemosiderin staining    Small scattered hyperintensities on T2/FLAIR imaging are noted in the periventricular and subcortical white matter demonstrating an appearance that is statistically most likely to represent moderate microangiopathic change  Chronic bilateral pontine lacunar infarctions are increased from the prior study  Cerebellar tonsils are normally positioned  No extra-axial fluid collection  No acute intracranial hemorrhage  VENTRICLES:  Enlargement of ventricles and extra-axial CSF spaces, out of proportion to the patient's age most consistent with cerebral and cerebellar atrophy  SELLA AND PITUITARY GLAND:  Normal  ORBITS:  Right lens implant noted PARANASAL SINUSES:  Normal  VASCULATURE:  Evaluation of the major intracranial vasculature demonstrates appropriate flow voids  CALVARIUM AND SKULL BASE:  Normal  EXTRACRANIAL SOFT TISSUES:  Normal      Impression: 1  Innumerable foci of blooming artifact most suggestive of amyloid angiopathy  Differential diagnosis could include sequela of chronic hypertension/chronic hypertensive encephalopathy  No acute intracranial hemorrhage  2   No acute infarction  3   Moderate, chronic microangiopathy  4   Chronic appearing bilateral pontine lacunar infarctions increased from the prior study  Workstation performed: [id number]     Echo follow up/limited w/ contrast if indicated    Result Date: 10/10/2022  Narrative: •  Left Ventricle: Left ventricular cavity size is normal  Wall thickness is normal  The left ventricular ejection fraction is 65%  Systolic function is normal  Although no diagnostic regional wall motion abnormality was identified, this possibility cannot be completely excluded on the basis of this study  Diastolic function is normal  •  Right Ventricle: Right ventricular cavity size is normal  Systolic function is normal  •  Atrial Septum: No patent foramen ovale detected by saline bubble injection, at rest or with provocation by Valsalva  •  Pulmonary Artery: The estimated pulmonary artery systolic pressure is 11 2 mmHg  The pulmonary artery systolic pressure is mildly increased             Counseling / Coordination of Care  Total floor / unit time spent today 45 minutes  Greater than 50% of total time was spent with the patient and / or family counseling and / or coordination of care  A description of the counseling / coordination of care: Review of history, current assessment, development of a plan  Code Status: Level 1 - Full Code    ** Please Note: Dragon 360 Dictation voice to text software may have been used in the creation of this document   **

## 2022-10-11 NOTE — ASSESSMENT & PLAN NOTE
· Significant hypertension in the ED, SBP >200s  · Home medication: Norvasc, Procardia, Coreg    · Initially patient had permissive hypertension in the setting of acute stroke workup but at this time it is important that we stabilize her blood pressure

## 2022-10-11 NOTE — ASSESSMENT & PLAN NOTE
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

## 2022-10-11 NOTE — ASSESSMENT & PLAN NOTE
Patient came in due to brief episode of right facial droop,slurring of speech and difficulty drinking water  · Significant cardiac history with AFib, has not been taking Xarelto for about a week due to cost   · Also consider hypoglycemia contributing  · Stroke workup/pathway:  · CTA of the head and neck unremarkable  · MRI brain:  Innumerable foci of blooming artifact most suggestive of amyloid angiopathy  Differential diagnosis could include sequela of chronic hypertension/chronic hypertensive encephalopathy  No acute intracranial hemorrhage or infarction  Moderate, chronic microangiopathy  Chronic appearing bilateral pontine lacunar infarctions increased from the prior study  ·  Neurology recommending EEG and cardiology consult  · TTE with bubble study normal  · Defer to Neurology and Cardiology regarding best options for anti-platelet/anticoagulant  · PT/OT and speech therapy consults

## 2022-10-11 NOTE — PROGRESS NOTES
[facility]  Progress Note - Elvi Mc 1945, 68 y o  female MRN: [id number]  Unit/Bed#: S -01 Encounter: [id number]  Primary Care Provider: Mario Alberto Kilgore MD   Date and time admitted to hospital: 10/8/2022  7:01 AM    * Stroke-like symptoms  Assessment & Plan  Patient came in due to brief episode of right facial droop,slurring of speech and difficulty drinking water  · Significant cardiac history with AFib, has not been taking Xarelto for about a week due to cost   · Also consider hypoglycemia contributing  · Stroke workup/pathway:  · CTA of the head and neck unremarkable  · MRI brain:  Innumerable foci of blooming artifact most suggestive of amyloid angiopathy  Differential diagnosis could include sequela of chronic hypertension/chronic hypertensive encephalopathy  No acute intracranial hemorrhage or infarction  Moderate, chronic microangiopathy  Chronic appearing bilateral pontine lacunar infarctions increased from the prior study  ·  Neurology recommending EEG and cardiology consult  · TTE with bubble study normal  · Defer to Neurology and Cardiology regarding best options for anti-platelet/anticoagulant  · PT/OT and speech therapy consults  Stage 3b/4 chronic kidney disease [facility])  Assessment & Plan  Lab Results   Component Value Date    EGFR 21 10/11/2022    EGFR 20 10/10/2022    EGFR 29 10/09/2022    CREATININE 2 12 (H) 10/11/2022    CREATININE 2 22 (H) 10/10/2022    CREATININE 1 67 (H) 10/09/2022     · Baseline creatinine is around 1 6-1 9  · Noted to be 2 22 on admission 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 from recent CT contrast done on October 8th  Patient received gentle IV fluids x 8 hours with improvement in creatinine to 2  12    Will provide 12 hours of gentle IV fluids  · Continue to trend and avoid nephrotoxic agents  · Follow up with [facility]    Atrial fibrillation [facility])  Assessment & Plan  · According to the patient's son, due to high cost of Xarelto, patient has not taken Xarelto for about a week now, was seen by cardiologist a few weeks ago and was told the alternatives were Pradaxa and Eliquis  · Case management consult for pricing review:  · Price check: Pradaxa not covered, would be >$500  Eliquis $141 04/month, same as Xarelto  · Discussed with patient's son, reported that he arranged finances to pay for Xarelto  · Neurology now concerned that anticoagulation may need to be discontinued with risk of intracerebral hemorrhage but awaiting Cardiology input      Hypertension  Assessment & Plan  · Significant hypertension in the ED, SBP >200s  · Home medication: Norvasc, Procardia, Coreg  · Initially patient had permissive hypertension in the setting of acute stroke workup but at this time it is important that we stabilize her blood pressure      Diabetes mellitus type 2, insulin dependent; with initial hypoglycemia in the emergency room [facility])  Assessment & Plan  Lab Results   Component Value Date    HGBA1C 7 1 (H) 10/09/2022       Recent Labs     10/10/22  1859 10/10/22  2142 10/11/22  0747 10/11/22  1118   POCGLU 191* 167* 94 174*       Blood Sugar Average: Last 72 hrs:  (P) 159 [id number]     · BG in 40s on arrival to ED, improved without interventions  · Home regimen:  Lantus 12 units q h s , currently on 10 units with stable glucose  · SSI coverage with Accu-Cheks for now  · Hypoglycemia protocol  · Diabetic diet    CAD (coronary artery disease)  Assessment & Plan  · Continue Coreg, Ranexa, ASA, statin      VTE Pharmacologic Prophylaxis: VTE Score: 9 Xarelto    Patient Centered Rounds:  Called patient's nurse  Discussions with Specialists or Other Care Team Provider:  Case management    Education and Discussions with Family / Patient: Updated  (son) via phone  Time Spent for Care: 30 minutes  More than 50% of total time spent on counseling and coordination of care as described above      Current Length of Stay: 2 day(s)  Current Patient Status: Inpatient   Certification Statement: The patient will continue to require additional inpatient hospital stay due to Awaiting cardiac and neuro workup and decision regarding medications  Discharge Plan: Anticipate discharge tomorrow to home  Code Status: Level 1 - Full Code    Subjective:   Patient offered no new complaints or concerns  When I entered the room she needed to use the restroom and myself and the PCA assisted her  Noted that she is blind, and she also reported to me she is having trouble with her memory lately    Objective:     Vitals:   Temp (24hrs), Av 3 °F (36 8 °C), Min:97 8 °F (36 6 °C), Max:98 7 °F (37 1 °C)    Temp:  [97 8 °F (36 6 °C)-98 7 °F (37 1 °C)] 97 8 °F (36 6 °C)  HR:  [60-63] 63  Resp:  [17-18] 18  BP: (132-170)/(62-74) 170/74  SpO2:  [94 %-98 %] 94 %  Body mass index is 32 42 kg/m²  Input and Output Summary (last 24 hours): Intake/Output Summary (Last 24 hours) at 10/11/2022 1207  Last data filed at 10/11/2022 0500  Gross per 24 hour   Intake 390 ml   Output 850 ml   Net -460 ml       Physical Exam:   Physical Exam  Vitals reviewed  Constitutional:       General: She is not in acute distress  Appearance: She is obese  She is not ill-appearing, toxic-appearing or diaphoretic  Eyes:      General:         Right eye: No discharge  Left eye: No discharge  Comments: blind   Cardiovascular:      Rate and Rhythm: Normal rate and regular rhythm  Heart sounds: No murmur heard  Pulmonary:      Effort: No respiratory distress  Breath sounds: No stridor  No wheezing or rhonchi  Abdominal:      General: There is no distension  Tenderness: There is no guarding  Musculoskeletal:      Right lower leg: No edema  Left lower leg: No edema  Skin:     General: Skin is warm and dry  Coloration: Skin is not jaundiced or pale  Findings: No bruising, erythema, lesion or rash     Neurological:      Mental Status: She is alert  Comments: Ambulates with walker and supervision  No dysarthria or aphasia noted  No facial droop  Psychiatric:         Mood and Affect: Mood normal          Thought Content: Thought content normal           Additional Data:     Labs:  Results from last 7 days   Lab Units 10/11/22  0542   WBC Thousand/uL 6 86   HEMOGLOBIN g/dL 10 0*   HEMATOCRIT % 31 9*   PLATELETS Thousands/uL 220   NEUTROS PCT % 69   LYMPHS PCT % 21   MONOS PCT % 10   EOS PCT % 0     Results from last 7 days   Lab Units 10/11/22  0542   SODIUM mmol/L 141   POTASSIUM mmol/L 3 8   CHLORIDE mmol/L 106   CO2 mmol/L 30   BUN mg/dL 26*   CREATININE mg/dL 2 12*   ANION GAP mmol/L 5   CALCIUM mg/dL 8 3*   GLUCOSE RANDOM mg/dL 79         Results from last 7 days   Lab Units 10/11/22  1118 10/11/22  0747 10/10/22  2142 10/10/22  1859 10/10/22  1703 10/10/22  1158 10/10/22  0924 10/09/22  2040 10/09/22  1703 10/09/22  1557 10/09/22  1117 10/09/22  0742   POC GLUCOSE mg/dl 174* 94 167* 191* 390* 300* 144* 151* 123 62* 186* 181*     Results from last 7 days   Lab Units 10/09/22  0533 10/05/22  0706   HEMOGLOBIN A1C % 7 1* 7 1*           Lines/Drains:  Invasive Devices  Report    Peripheral Intravenous Line  Duration           Peripheral IV 10/08/22 Right Antecubital 3 days          Drain  Duration           Urethral Catheter Latex 16 Fr  104 days              Urinary Catheter:  Goal for removal:            Telemetry:  Telemetry Orders (From admission, onward)             48 Hour Telemetry Monitoring  Continuous x 48 hours        References:    Telemetry Guidelines   Question:  Reason for 48 Hour Telemetry  Answer:  Acute CVA (<24 hrs old, hemispheric strokes, selected brainstem strokes, cardiac arrhythmias)                 Telemetry Reviewed: NSR  Indication for Continued Telemetry Use: eval for afib   Dc if ok with [facility]             Imaging: reviewed MRI with son    Recent Cultures (last 7 days):         Last 24 Hours Medication List:   Current Facility-Administered Medications   Medication Dose Route Frequency Provider Last Rate   • acetaminophen  650 mg Oral Q6H PRN Liam Wiley MD     • [START ON 10/12/2022] amLODIPine  10 mg Oral Daily TENNILLE Moreno     • aspirin  81 mg Oral Daily Narda Neal MD     • atorvastatin  40 mg Oral QPM Narda Neal MD     • carvedilol  25 mg Oral Q12H Dalmaberlin Neal MD     • cholecalciferol  2,000 Units Oral Daily Narda Neal MD     • fluticasone  1 spray Nasal Daily Narda Neal MD     • folic acid  694 mcg Oral Daily Narda Neal MD     • hydrALAZINE  5 mg Intravenous Q6H PRN Lorena Espana PA-C     • insulin glargine  10 Units Subcutaneous HS Ava Bourgeois PA-C     • insulin lispro  1-5 Units Subcutaneous HS Narda Neal MD     • insulin lispro  1-6 Units Subcutaneous TID AC Liam Neal MD     • insulin lispro  5 Units Subcutaneous Once Ava Bourgeois PA-C     • meclizine  12 5 mg Oral Q8H PRN Liam Wiley MD     • NIFEdipine  60 mg Oral Daily Ava Bourgeois PA-C     • ondansetron  4 mg Intravenous Q6H PRN Narda Neal MD     • ranolazine  500 mg Oral Daily Narda Neal MD     • rivaroxaban  15 mg Oral Daily Narda Neal MD     • senna  1 tablet Oral HS PRN Narda Neal MD     • sodium chloride  50 mL/hr Intravenous Continuous Maya Adkins PA-C          Today, Patient Was Seen By: Ewelina Honeycutt    **Please Note: This note may have been constructed using a voice recognition system  **

## 2022-10-11 NOTE — PROGRESS NOTES
NEUROLOGY RESIDENCY PROGRESS NOTE     Name: Lisa Godfrey   Age & Sex: 68 y o  female   MRN: [id number]  Unit/Bed#: S -01   Encounter: [id number]    [address] [facility]     * Stroke-like symptoms  Assessment & Plan  Lisa Godfrey is a 68 y o  female w/ PMH DM complicated by blindness, HTN, AF on Xarelto, pacemaker, MI who p/w acute-onset right-sided weakness, dysarthria and facial droop initially concerning for ischemic stroke vs  hypertensive encephalopathy  However, MRI concerning for CAA  innumerable chronic microhemorrhages but with no acute strokes and numerous chronic strokes so TIA vs seizure remain highest on differential  Patient was hypoglycemic on arrival but she notes this happens frequently and she never experiences symptoms  · Presenting sx: improving RUE weakness, dysarthria, facial droop; NIHSS: 3 for baseline blindness; tPA not given 2/2 resolving sx  · Workup:  ? CTH/CTA: negative for acute blood, signs of ischemia, LVO or critical stenosis; mild b/l intra- and extracranial ICA athero; 1 6x1 6 left thyroid nodule  ? Labs: Hgb A1c 7 1 (10/5/22), LDL 49; folate, B12, MMA ok, glucose 60 on arrival, TSH normal  ? MRI: no acute strokes; multiple bilateral likely small vessel chronic strokes; innumerable chronic microhemorrhages consistent with CAA  ? Echo (8/2/22): LVEF 60%, mobile atrial septal aneurysm, mild RV dilation  ? Echo (10/10/22):  no PFO     Plan:  · Discontinue stroke pathway  · Aggressive blood pressure management as with CAA patient is high risk for ICH  · Consider reducing statin - she currently has low LDL and is at risk for bleed  · Consider discontinuing Xarelto as with CAA patient is high risk for ICH  · Consider EEG  · Continue monitoring on telemetry, frequent neuro checks, stat CTH for acute change  · Replete lytes, goal euglycemia (gluc < 180), normotension and normothermia  · F/u thyroid U/S for nodules if needed  · May benefit from outpatient neurocognitive evaluation    * Cerebral amyloid angiopathy  Assessment & Plan  Patient w/ developing memory problems presented as a stroke alert as described above and found on MRI to have likely CAA  In the setting of CAA, patient is high risk for intraparenchymal bleed  It is possible that her episode prior to admission was either a TIA or a seizure  Also possible that this was non-neurologic in origin and that these are incidental findings  CAA is likely the etiology of patient's memory dysfunction and will continue to progress  • MRI reviewed: no acute strokes  Multiple chronic, likely small-vessel strokes  Innumerable foci of blooming artifact consistent with chronic microhemorrhages and concerning for cerebral amyloid angiopathy (CAA)     Plan:  · Recommend aggressive BP management to reduce risk of bleed  · Recommend cessation of full AC and continuing with just AP from bleed-risk standpoint but should d/w cardiology about the risks  · Obtain routine EEG      Jose Krueger will need f/u in about 6 weeks with neurovascular or general attending/resident/AP  She will not need outpatient testing from inpatient standpoint but likely needs formal cognitive assessment    Pending for d/c: EEG    SUBJECTIVE     Patient was seen and examined  No critical events overnight  Denies N/V/F/C, abdominal pain, dizziness, HA, new weakness or sensory changes, bowel or bladder dysfunction    ROS negative unless otherwise noted    OBJECTIVE     Patient ID: Jose Krueger is a 68 y o  female      Vitals:    10/10/22 1157 10/10/22 1736 10/10/22 1900 10/11/22 0711   BP: 151/54 132/62 166/64 170/74   BP Location:   Left arm Left arm   Pulse: 61 61 60 63   Resp: 17 17 17 18   Temp:  98 7 °F (37 1 °C) 98 3 °F (36 8 °C) 97 8 °F (36 6 °C)   TempSrc:  Oral Oral Oral   SpO2: 97% 98% 97% 94%   Weight: 83 kg (183 lb)      Height: 5' 3" (1 6 m)         Temperature: Temp (24hrs), Av 3 °F (36 8 °C), Min:97 8 °F (36 6 °C), Max:98 7 °F (37 1 °C) Temperature: 97 8 °F (36 6 °C)    Physical Exam Vitals reviewed  Constitutional:    Not in acute distress  Normal appearance  Not ill-appearing, toxic-appearing or diaphoretic  HENT:   Normocephalic and atraumatic   External ear normal b/l  Nose normal  No congestion or rhinorrhea  Mucous membranes are moist   Oropharynx is clear  No oropharyngeal exudate or posterior oropharyngeal erythema  Eyes:    No scleral icterus   No discharge b/l   Conjunctivae normal    Pulmonary:  Pulmonary effort is normal  No respiratory distress  GI: abdomen not distended; in diaper  Musculoskeletal: no gross deformities  Skin:   Skin is not pale  Psychiatric:       Mood normal  Affect congruent     Neurologic Exam:   Mental Status   Alert and intermittently oriented  Attention is normal  Speech is fluent w/ mild if any dysarthria      Cranial Nerves   CN II patient legally blind at baseline from diabetic complications but can see some shadows and identify when a person is in front of her  CN III, IV, VI pupils constricted and minimally reactive, EOMI, no CN III palsy, no CN VI palsy  no nystagmus   CN V   Facial sensation symmetric  CN VII  Facial expression full, symmetric  CN VIII, IX, X Palate symmetric  CN XI trapezius strength symmetric  CN XII symmetric lingual protrusion     Motor Exam   Muscle bulk and tone grossly normal; Pronator drift absent b/l  Strength intact and symmetric BUE  Intact RLE  Limited exam LLE 2/2 hip replacement     Sensory Exam   Light touch intact and symmetric BUE/BLE  Tingling b/l hands all fingers and palms, improving from last night  Tinel negative at wrist     Gait, Coordination, and Reflexes   FTN normal b/l; no significant tremor observed  Reflexes: Brachioradialis: 2+ b/l    Biceps: 2+ b/l    Patellar: 2+ b/l     Plantar response downgoing b/l      LABORATORY DATA     Labs: I have personally reviewed pertinent reports        Results from last 7 days   Lab Units 10/11/22  4314 10/10/22  0457 10/09/22  0533   WBC Thousand/uL 6 86 6 35 8 53   HEMOGLOBIN g/dL 10 0* 9 5* 10 2*   HEMATOCRIT % 31 9* 30 5* 32 7*   PLATELETS Thousands/uL 220 191 231   NEUTROS PCT % 69  --   --    MONOS PCT % 10  --   --       Results from last 7 days   Lab Units 10/11/22  0542 10/10/22  0457 10/09/22  0533   POTASSIUM mmol/L 3 8 4 3 4 3   CHLORIDE mmol/L 106 106 105   CO2 mmol/L 30 29 29   BUN mg/dL 26* 25 18   CREATININE mg/dL 2 12* 2 22* 1 67*   CALCIUM mg/dL 8 3* 8 4 8 9                  Lab Results   Component Value Date    HGBA1C 7 1 (H) 10/09/2022    LDLCALC 49 10/09/2022       IMAGING & DIAGNOSTIC TESTING     Radiology Results: I have personally reviewed pertinent reports  and I have personally reviewed pertinent films in PACS  MRI brain wo contrast   Final Result by Maicol Raman MD (10/10 1452)         1  Innumerable foci of blooming artifact most suggestive of amyloid angiopathy  Differential diagnosis could include sequela of chronic hypertension/chronic hypertensive encephalopathy  No acute intracranial hemorrhage  2   No acute infarction  3   Moderate, chronic microangiopathy  4   Chronic appearing bilateral pontine lacunar infarctions increased from the prior study  Workstation performed: [id number]             Other Diagnostic Testing: I have personally reviewed pertinent reports        ACTIVE MEDICATIONS     Current Facility-Administered Medications   Medication Dose Route Frequency   • acetaminophen (TYLENOL) tablet 650 mg  650 mg Oral Q6H PRN   • amLODIPine (NORVASC) tablet 5 mg  5 mg Oral Daily   • aspirin chewable tablet 81 mg  81 mg Oral Daily   • atorvastatin (LIPITOR) tablet 40 mg  40 mg Oral QPM   • carvedilol (COREG) tablet 25 mg  25 mg Oral Q12H [facility]   • cholecalciferol (VITAMIN D3) tablet 2,000 Units  2,000 Units Oral Daily   • fluticasone (FLONASE) 50 mcg/act nasal spray 1 spray  1 spray Nasal Daily   • folic acid (FOLVITE) tablet 400 mcg  400 mcg Oral Daily   • hydrALAZINE (APRESOLINE) injection 5 mg  5 mg Intravenous Q6H PRN   • insulin glargine (LANTUS) subcutaneous injection 10 Units 0 1 mL  10 Units Subcutaneous HS   • insulin lispro (HumaLOG) 100 units/mL subcutaneous injection 1-5 Units  1-5 Units Subcutaneous HS   • insulin lispro (HumaLOG) 100 units/mL subcutaneous injection 1-6 Units  1-6 Units Subcutaneous TID AC   • insulin lispro (HumaLOG) 100 units/mL subcutaneous injection 5 Units  5 Units Subcutaneous Once   • meclizine (ANTIVERT) tablet 12 5 mg  12 5 mg Oral Q8H PRN   • NIFEdipine (PROCARDIA XL) 24 hr tablet 60 mg  60 mg Oral Daily   • ondansetron (ZOFRAN) injection 4 mg  4 mg Intravenous Q6H PRN   • ranolazine (RANEXA) 12 hr tablet 500 mg  500 mg Oral Daily   • rivaroxaban (XARELTO) tablet 15 mg  15 mg Oral Daily   • senna (SENOKOT) tablet 8 6 mg  1 tablet Oral HS PRN     VTE Pharmacologic Prophylaxis: Patient currently on Xarelto but is at high risk for intracranial bleed  VTE Mechanical Prophylaxis: sequential compression device    ==  Garima Walton MD  Resident, Neurology, [address] [place] [address]

## 2022-10-12 ENCOUNTER — APPOINTMENT (INPATIENT)
Dept: CT IMAGING | Facility: HOSPITAL | Age: 77
DRG: 057 | End: 2022-10-12
Payer: COMMERCIAL

## 2022-10-12 ENCOUNTER — OFFICE VISIT (OUTPATIENT)
Dept: PHYSICAL THERAPY | Facility: CLINIC | Age: 77
End: 2022-10-12
Payer: COMMERCIAL

## 2022-10-12 PROBLEM — G93.40 ENCEPHALOPATHY ACUTE: Status: ACTIVE | Noted: 2022-10-12

## 2022-10-12 LAB
ANION GAP SERPL CALCULATED.3IONS-SCNC: 4 MMOL/L (ref 4–13)
BUN SERPL-MCNC: 28 MG/DL (ref 5–25)
CALCIUM SERPL-MCNC: 8.5 MG/DL (ref 8.4–10.2)
CHLORIDE SERPL-SCNC: 104 MMOL/L (ref 96–108)
CO2 SERPL-SCNC: 30 MMOL/L (ref 21–32)
CREAT SERPL-MCNC: 2.17 MG/DL (ref 0.6–1.3)
GFR SERPL CREATININE-BSD FRML MDRD: 21 ML/MIN/1.73SQ M
GLUCOSE SERPL-MCNC: 130 MG/DL (ref 65–140)
GLUCOSE SERPL-MCNC: 159 MG/DL (ref 65–140)
GLUCOSE SERPL-MCNC: 162 MG/DL (ref 65–140)
GLUCOSE SERPL-MCNC: 172 MG/DL (ref 65–140)
GLUCOSE SERPL-MCNC: 266 MG/DL (ref 65–140)
POTASSIUM SERPL-SCNC: 4.2 MMOL/L (ref 3.5–5.3)
SODIUM SERPL-SCNC: 138 MMOL/L (ref 135–147)

## 2022-10-12 PROCEDURE — 80048 BASIC METABOLIC PNL TOTAL CA: CPT | Performed by: PHYSICIAN ASSISTANT

## 2022-10-12 PROCEDURE — 99232 SBSQ HOSP IP/OBS MODERATE 35: CPT | Performed by: PHYSICIAN ASSISTANT

## 2022-10-12 PROCEDURE — G1004 CDSM NDSC: HCPCS

## 2022-10-12 PROCEDURE — 70450 CT HEAD/BRAIN W/O DYE: CPT

## 2022-10-12 PROCEDURE — 82948 REAGENT STRIP/BLOOD GLUCOSE: CPT

## 2022-10-12 PROCEDURE — 99232 SBSQ HOSP IP/OBS MODERATE 35: CPT | Performed by: PSYCHIATRY & NEUROLOGY

## 2022-10-12 RX ORDER — LANOLIN ALCOHOL/MO/W.PET/CERES
3 CREAM (GRAM) TOPICAL
Status: DISCONTINUED | OUTPATIENT
Start: 2022-10-12 | End: 2022-10-14 | Stop reason: HOSPADM

## 2022-10-12 RX ORDER — INSULIN GLARGINE 100 [IU]/ML
12 INJECTION, SOLUTION SUBCUTANEOUS
Status: DISCONTINUED | OUTPATIENT
Start: 2022-10-12 | End: 2022-10-14 | Stop reason: HOSPADM

## 2022-10-12 RX ORDER — DIVALPROEX SODIUM 250 MG/1
250 TABLET, DELAYED RELEASE ORAL EVERY 12 HOURS SCHEDULED
Status: DISCONTINUED | OUTPATIENT
Start: 2022-10-12 | End: 2022-10-14 | Stop reason: HOSPADM

## 2022-10-12 RX ORDER — WATER 1000 ML/1000ML
INJECTION, SOLUTION INTRAVENOUS
Status: COMPLETED
Start: 2022-10-12 | End: 2022-10-12

## 2022-10-12 RX ORDER — CARVEDILOL 12.5 MG/1
25 TABLET ORAL 2 TIMES DAILY WITH MEALS
Status: DISCONTINUED | OUTPATIENT
Start: 2022-10-12 | End: 2022-10-14 | Stop reason: HOSPADM

## 2022-10-12 RX ORDER — OLANZAPINE 10 MG/1
2.5 INJECTION, POWDER, LYOPHILIZED, FOR SOLUTION INTRAMUSCULAR ONCE
Status: COMPLETED | OUTPATIENT
Start: 2022-10-12 | End: 2022-10-12

## 2022-10-12 RX ORDER — HEPARIN SODIUM 5000 [USP'U]/ML
5000 INJECTION, SOLUTION INTRAVENOUS; SUBCUTANEOUS EVERY 8 HOURS SCHEDULED
Status: DISCONTINUED | OUTPATIENT
Start: 2022-10-12 | End: 2022-10-14 | Stop reason: HOSPADM

## 2022-10-12 RX ORDER — SODIUM CHLORIDE 9 MG/ML
50 INJECTION, SOLUTION INTRAVENOUS CONTINUOUS
Status: DISCONTINUED | OUTPATIENT
Start: 2022-10-12 | End: 2022-10-14 | Stop reason: HOSPADM

## 2022-10-12 RX ADMIN — WATER 10 ML: 1 INJECTION INTRAMUSCULAR; INTRAVENOUS; SUBCUTANEOUS at 20:42

## 2022-10-12 RX ADMIN — Medication 3 MG: at 22:23

## 2022-10-12 RX ADMIN — INSULIN LISPRO 1 UNITS: 100 INJECTION, SOLUTION INTRAVENOUS; SUBCUTANEOUS at 08:11

## 2022-10-12 RX ADMIN — FOLIC ACID TAB 400 MCG 400 MCG: 400 TAB at 08:08

## 2022-10-12 RX ADMIN — HEPARIN SODIUM 5000 UNITS: 5000 INJECTION INTRAVENOUS; SUBCUTANEOUS at 20:41

## 2022-10-12 RX ADMIN — DIVALPROEX SODIUM 250 MG: 250 TABLET, DELAYED RELEASE ORAL at 20:48

## 2022-10-12 RX ADMIN — INSULIN LISPRO 1 UNITS: 100 INJECTION, SOLUTION INTRAVENOUS; SUBCUTANEOUS at 13:08

## 2022-10-12 RX ADMIN — ATORVASTATIN CALCIUM 40 MG: 40 TABLET, FILM COATED ORAL at 18:59

## 2022-10-12 RX ADMIN — SODIUM CHLORIDE 50 ML/HR: 0.9 INJECTION, SOLUTION INTRAVENOUS at 15:11

## 2022-10-12 RX ADMIN — INSULIN LISPRO 2 UNITS: 100 INJECTION, SOLUTION INTRAVENOUS; SUBCUTANEOUS at 18:59

## 2022-10-12 RX ADMIN — CARVEDILOL 25 MG: 12.5 TABLET, FILM COATED ORAL at 20:40

## 2022-10-12 RX ADMIN — INSULIN GLARGINE 12 UNITS: 100 INJECTION, SOLUTION SUBCUTANEOUS at 22:23

## 2022-10-12 RX ADMIN — CARVEDILOL 25 MG: 12.5 TABLET, FILM COATED ORAL at 14:26

## 2022-10-12 RX ADMIN — Medication 2000 UNITS: at 08:07

## 2022-10-12 RX ADMIN — ASPIRIN 81 MG CHEWABLE TABLET 81 MG: 81 TABLET CHEWABLE at 08:08

## 2022-10-12 RX ADMIN — RANOLAZINE 500 MG: 500 TABLET, EXTENDED RELEASE ORAL at 08:07

## 2022-10-12 RX ADMIN — DIVALPROEX SODIUM 250 MG: 250 TABLET, DELAYED RELEASE ORAL at 14:23

## 2022-10-12 RX ADMIN — HEPARIN SODIUM 5000 UNITS: 5000 INJECTION INTRAVENOUS; SUBCUTANEOUS at 13:10

## 2022-10-12 RX ADMIN — OLANZAPINE 2.5 MG: 10 INJECTION, POWDER, LYOPHILIZED, FOR SOLUTION INTRAMUSCULAR at 20:42

## 2022-10-12 RX ADMIN — HYDRALAZINE HYDROCHLORIDE 25 MG: 25 TABLET ORAL at 20:41

## 2022-10-12 RX ADMIN — HYDRALAZINE HYDROCHLORIDE 25 MG: 25 TABLET ORAL at 06:00

## 2022-10-12 RX ADMIN — AMLODIPINE BESYLATE 10 MG: 10 TABLET ORAL at 08:07

## 2022-10-12 NOTE — PROGRESS NOTES
[facility]  Progress Note - Natasha Rivera 1945, 68 y o  female MRN: [id number]  Unit/Bed#: S -01 Encounter: [id number]  Primary Care Provider: Mary Robbins MD   Date and time admitted to hospital: 10/8/2022  7:01 AM    Stroke-like symptoms  Assessment & Plan  Patient came in due to brief episode of right facial droop,slurring of speech and difficulty drinking water  · Significant cardiac history with AFib, had not been taking Xarelto for about a week due to cost   · Also consider hypoglycemia contributing on admission  · Stroke workup/pathway:  · CTA of the head and neck unremarkable  · MRI brain:  Innumerable foci of blooming artifact most suggestive of amyloid angiopathy  Differential diagnosis could include sequela of chronic hypertension/chronic hypertensive encephalopathy  No acute intracranial hemorrhage or infarction  Moderate, chronic microangiopathy  Chronic appearing bilateral pontine lacunar infarctions increased from the prior study  · TTE with bubble study normal  · Appreciate Neurology and Cardiology input-patient was taken off of Xarelto due to high bleeding risk based on her MRI and is to remain on aspirin/statin alone  · PT/OT and speech therapy consults appreciated  * Encephalopathy acute  Assessment & Plan  · Patient with some element of progressive memory loss but with approximately 1 day of acutely altered mental status    Per discussion with patient's son she appears to be delirious and hallucinating, telling him that people said she was dying and reporting visits from people she has not seen in a long time  · Will check stat head CT  · Check EEG inpt vs outpt  · Requested neurology reassessment today    Acute kidney injury superimposed on chronic kidney disease 3/4  Assessment & Plan  Lab Results   Component Value Date    EGFR 21 10/12/2022    EGFR 21 10/11/2022    EGFR 20 10/10/2022    CREATININE 2 17 (H) 10/12/2022    CREATININE 2 12 (H) 10/11/2022    CREATININE 2 22 (H) 10/10/2022     · Baseline creatinine is around 1 6-1 9  · Noted to be 2 22 on admission likely due to contrast nephropathy from recent CT contrast done on October 8th  Patient received gentle IVF but creatinine remains above baseline, will continue IVF for now if tolerating  · Continue to trend and avoid nephrotoxic agents  · Follow up with [facility]    Atrial fibrillation [facility])  Assessment & Plan  · Continue beta-blocker  · After discussion between Neurology and Cardiology, Xarelto was discontinued due to high risk of intracranial bleeding      Hypertension  Assessment & Plan  · Significant hypertension in the ED, SBP >200s  · Home medication regimen: Norvasc, Procardia, Coreg  · Initially patient was allowed permissive hypertension in the setting of acute stroke workup  Given results of MRI, management of blood pressure is critically important  Continue Norvasc 10 mg daily, Coreg 25 mg p o  B i d , hydralazine 25 mg t i d       Diabetes mellitus type 2, insulin dependent [facility])  Assessment & Plan  Lab Results   Component Value Date    HGBA1C 7 1 (H) 10/09/2022       Recent Labs     10/11/22  1528 10/11/22  1604 10/11/22  2011 10/12/22  0758   POCGLU 446* 179* 183* 159*       Blood Sugar Average: Last 72 hrs:  (P) 195 625     · BG in 40s on arrival to ED, improved without intervention  · Home regimen:  Lantus 12 units q h s  will resume this dose now  · SSI coverage with Accu-Cheks for now  · Hypoglycemia protocol  Monitor cautiously    Overall blood sugars are within goal range of 140-180 but with occasional significant spikes (greater than 400 yesterday x 1)  · Diabetic diet    CAD (coronary artery disease)  Assessment & Plan  · Continue Coreg, Ranexa, ASA, statin      VTE Pharmacologic Prophylaxis: VTE Score: 9  I confirmed with Neurology she can start DVT prophylaxis  Patient Centered Rounds:  Spoke with RN  Discussions with Specialists or Other Care Team Provider: Spoke with case management and Neurology    Education and Discussions with Family / Patient: Updated  (son) via phone  Time Spent for Care: 35 min  More than 50% of total time spent on counseling and coordination of care as described above  Current Length of Stay: 3 day(s)  Current Patient Status: Inpatient   Certification Statement: The patient will continue to require additional inpatient hospital stay due to Delirium  Discharge Plan: Family wishes to take patient home when stable, discharge initially planned for today will be held in the setting of acute delirium    Code Status: Level 1 - Full Code    Subjective: When I entered patient told me that she is in her mother's bed and that she wants to go back to her own room  She states that people were in that told her she is dying  She denies any headache  She was lying in a pool of urine as her pure wick had been disconnected  Objective:     Vitals:   Temp (24hrs), Av 1 °F (36 7 °C), Min:97 8 °F (36 6 °C), Max:98 4 °F (36 9 °C)    Temp:  [97 8 °F (36 6 °C)-98 4 °F (36 9 °C)] 98 1 °F (36 7 °C)  HR:  [60-62] 61  Resp:  [18] 18  BP: (117-153)/(54-69) 117/54  SpO2:  [95 %-100 %] 95 %  Body mass index is 32 42 kg/m²  Input and Output Summary (last 24 hours): Intake/Output Summary (Last 24 hours) at 10/12/2022 1107  Last data filed at 10/12/2022 0401  Gross per 24 hour   Intake 240 ml   Output 0 ml   Net 240 ml       Physical Exam:   Physical Exam  Vitals reviewed  Constitutional:       General: She is not in acute distress  Appearance: She is obese  She is not ill-appearing, toxic-appearing or diaphoretic  Comments: Patient seen lying sideways with her legs off the bed in a puddle of urine   HENT:      Head: Atraumatic  Eyes:      General: No scleral icterus  Right eye: No discharge  Left eye: No discharge        Conjunctiva/sclera: Conjunctivae normal       Comments: blind   Cardiovascular:      Rate and Rhythm: Normal rate and regular rhythm  Heart sounds: No murmur heard  Pulmonary:      Effort: No respiratory distress  Breath sounds: No stridor  No wheezing or rhonchi  Comments: No dyspnea or tachypnea  Abdominal:      Tenderness: There is no guarding  Musculoskeletal:      Right lower leg: No edema  Left lower leg: No edema  Skin:     General: Skin is warm and dry  Coloration: Skin is not jaundiced or pale  Findings: No bruising, erythema, lesion or rash  Neurological:      Mental Status: She is alert  Comments: Awake alert but appears confused at this time  No abnormal movements noted     Psychiatric:      Comments: Not agitated         Additional Data:     Labs:  Results from last 7 days   Lab Units 10/11/22  0542   WBC Thousand/uL 6 86   HEMOGLOBIN g/dL 10 0*   HEMATOCRIT % 31 9*   PLATELETS Thousands/uL 220   NEUTROS PCT % 69   LYMPHS PCT % 21   MONOS PCT % 10   EOS PCT % 0     Results from last 7 days   Lab Units 10/12/22  1006   SODIUM mmol/L 138   POTASSIUM mmol/L 4 2   CHLORIDE mmol/L 104   CO2 mmol/L 30   BUN mg/dL 28*   CREATININE mg/dL 2 17*   ANION GAP mmol/L 4   CALCIUM mg/dL 8 5   GLUCOSE RANDOM mg/dL 130         Results from last 7 days   Lab Units 10/12/22  0758 10/11/22  2011 10/11/22  1604 10/11/22  1528 10/11/22  1118 10/11/22  0747 10/10/22  2142 10/10/22  1859 10/10/22  1703 10/10/22  1158 10/10/22  0924 10/09/22  2040   POC GLUCOSE mg/dl 159* 183* 179* 446* 174* 94 167* 191* 390* 300* 144* 151*     Results from last 7 days   Lab Units 10/09/22  0533   HEMOGLOBIN A1C % 7 1*           Lines/Drains:  Invasive Devices  Report    Peripheral Intravenous Line  Duration           Peripheral IV 10/11/22 Right Antecubital <1 day          Drain  Duration           Urethral Catheter Latex 16 Fr  105 days              Urinary Catheter:  Goal for removal: inaccurate--no head         Imaging:    Recent Cultures (last 7 days):         Last 24 Hours Medication List:   Current Facility-Administered Medications   Medication Dose Route Frequency Provider Last Rate   • acetaminophen  650 mg Oral Q6H PRN Liam Tamez MD     • amLODIPine  10 mg Oral Daily TENNILLE Ennis     • aspirin  81 mg Oral Daily Liam Tamez MD     • atorvastatin  40 mg Oral QPM Mikael Neal MD     • carvedilol  25 mg Oral Q12H Kendy Neal MD     • cholecalciferol  2,000 Units Oral Daily Mikael Neal MD     • fluticasone  1 spray Nasal Daily Mikael Neal MD     • folic acid  476 mcg Oral Daily Mikael Neal MD     • hydrALAZINE  5 mg Intravenous Q6H PRN Jason Trujillo PA-C     • hydrALAZINE  25 mg Oral Q8H [address] TENNILLE Jaquez     • insulin glargine  12 Units Subcutaneous HS Maya Adkins PA-C     • insulin lispro  1-5 Units Subcutaneous HS Mikael Neal MD     • insulin lispro  1-6 Units Subcutaneous TID AC Liam Neal MD     • meclizine  12 5 mg Oral Q8H PRN Mikael Neal MD     • melatonin  3 mg Oral HS Maya Adkins PA-C     • ondansetron  4 mg Intravenous Q6H PRN Mikael Neal MD     • ranolazine  500 mg Oral Daily Mikael Nael MD     • senna  1 tablet Oral HS PRN Mikael Neal MD     • sodium chloride  50 mL/hr Intravenous Continuous Maya Adkins PA-C          Today, Patient Was Seen By: Rico Zaidi    **Please Note: This note may have been constructed using a voice recognition system  **

## 2022-10-12 NOTE — ASSESSMENT & PLAN NOTE
Lab Results   Component Value Date    HGBA1C 7 1 (H) 10/09/2022       Recent Labs     10/11/22  1528 10/11/22  1604 10/11/22  2011 10/12/22  0758   POCGLU 446* 179* 183* 159*       Blood Sugar Average: Last 72 hrs:  (P) 195 625     · BG in 40s on arrival to ED, improved without intervention  · Home regimen:  Lantus 12 units q h s  will resume this dose now  · SSI coverage with Accu-Cheks for now  · Hypoglycemia protocol  Monitor cautiously    Overall blood sugars are within goal range of 140-180 but with occasional significant spikes (greater than 400 yesterday x 1)  · Diabetic diet

## 2022-10-12 NOTE — ASSESSMENT & PLAN NOTE
· Significant hypertension in the ED, SBP >200s  · Home medication regimen: Norvasc, Procardia, Coreg  · Initially patient was allowed permissive hypertension in the setting of acute stroke workup  Given results of MRI, management of blood pressure is critically important    Continue Norvasc 10 mg daily, Coreg 25 mg p o  B i d , hydralazine 25 mg t i d

## 2022-10-12 NOTE — ASSESSMENT & PLAN NOTE
Lab Results   Component Value Date    EGFR 21 10/12/2022    EGFR 21 10/11/2022    EGFR 20 10/10/2022    CREATININE 2 17 (H) 10/12/2022    CREATININE 2 12 (H) 10/11/2022    CREATININE 2 22 (H) 10/10/2022     · Baseline creatinine is around 1 6-1 9  · Noted to be 2 22 on admission likely due to contrast nephropathy from recent CT contrast done on October 8th    Patient received gentle IVF but creatinine remains above baseline, will continue IVF for now if tolerating  · Continue to trend and avoid nephrotoxic agents  · Follow up with [facility] [address] [facility]

## 2022-10-12 NOTE — PLAN OF CARE
Problem: NEUROSENSORY - ADULT  Goal: Achieves stable or improved neurological status  Description: INTERVENTIONS  - Monitor and report changes in neurological status  - Monitor vital signs such as temperature, blood pressure, glucose, and any other labs ordered   - Initiate measures to prevent increased intracranial pressure  - Monitor for seizure activity and implement precautions if appropriate      Outcome: Progressing  Goal: Remains free of injury related to seizures activity  Description: INTERVENTIONS  - Maintain airway, patient safety  and administer oxygen as ordered  - Monitor patient for seizure activity, document and report duration and description of seizure to physician/advanced practitioner  - If seizure occurs,  ensure patient safety during seizure  - Reorient patient post seizure  - Seizure pads on all 4 side rails  - Instruct patient/family to notify RN of any seizure activity including if an aura is experienced  - Instruct patient/family to call for assistance with activity based on nursing assessment  - Administer anti-seizure medications if ordered    Outcome: Progressing  Goal: Achieves maximal functionality and self care  Description: INTERVENTIONS  - Monitor swallowing and airway patency with patient fatigue and changes in neurological status  - Encourage and assist patient to increase activity and self care     - Encourage visually impaired, hearing impaired and aphasic patients to use assistive/communication devices  Outcome: Progressing     Problem: Prexisting or High Potential for Compromised Skin Integrity  Goal: Skin integrity is maintained or improved  Description: INTERVENTIONS:  - Identify patients at risk for skin breakdown  - Assess and monitor skin integrity  - Assess and monitor nutrition and hydration status  - Monitor labs   - Assess for incontinence   - Turn and reposition patient  - Assist with mobility/ambulation  - Relieve pressure over bony prominences  - Avoid friction and shearing  - Provide appropriate hygiene as needed including keeping skin clean and dry  - Evaluate need for skin moisturizer/barrier cream  - Collaborate with interdisciplinary team   - Patient/family teaching  - Consider wound care consult   Outcome: Progressing     Problem: Nutrition/Hydration-ADULT  Goal: Nutrient/Hydration intake appropriate for improving, restoring or maintaining nutritional needs  Description: Monitor and assess patient's nutrition/hydration status for malnutrition  Collaborate with interdisciplinary team and initiate plan and interventions as ordered  Monitor patient's weight and dietary intake as ordered or per policy  Utilize nutrition screening tool and intervene as necessary  Determine patient's food preferences and provide high-protein, high-caloric foods as appropriate       INTERVENTIONS:  - Monitor oral intake, urinary output, labs, and treatment plans  - Assess nutrition and hydration status and recommend course of action  - Evaluate amount of meals eaten  - Assist patient with eating if necessary   - Allow adequate time for meals  - Recommend/ encourage appropriate diets, oral nutritional supplements, and vitamin/mineral supplements  - Order, calculate, and assess calorie counts as needed  - Recommend, monitor, and adjust tube feedings and TPN/PPN based on assessed needs  - Assess need for intravenous fluids  - Provide specific nutrition/hydration education as appropriate  - Include patient/family/caregiver in decisions related to nutrition  Outcome: Progressing

## 2022-10-12 NOTE — PROGRESS NOTES
NEUROLOGY RESIDENCY PROGRESS NOTE     Name: Anali Velazquez   Age & Sex: 68 y o  female   MRN: [id number]  Unit/Bed#: S -01   Encounter: [id number]    ASSESSMENT & PLAN     Cerebral amyloid angiopathy  Assessment & Plan  Patient w/ developing memory problems presented as a stroke alert as described above and found on MRI to have likely CAA  In the setting of CAA, patient is high risk for intraparenchymal bleed  It is possible that her episode prior to admission was either a TIA or a seizure  Also possible that this was non-neurologic in origin and that these are incidental findings  CAA is likely the etiology of patient's memory dysfunction and will continue to progress  • MRI reviewed: no acute strokes  Multiple chronic, likely small-vessel strokes  Innumerable foci of blooming artifact consistent with chronic microhemorrhages and concerning for cerebral amyloid angiopathy (CAA)     Plan:  · Recommend close BP management to reduce risk of bleed  · Recommend cessation of full AC and continuing with just AP from bleed-risk standpoint and did d/w w/ cardiology who are   · Obtain routine EEG  · Discussed with family (son and DIL at length who expressed understanding)    Hallucinations and vivid dreams  Assessment & Plan  Patient with ongoing history of vivid dreams evolving into possible waking hallucinations in the setting of being legally blind and having progressing MCI in the context of newly discovered CAA  Could be hospital delirium but most likely related to her chronic conditions  Could be related to LBD or hallucinations in the setting of blindness  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 but could have components that are similar in terms of pathophysiology  Per d/w DIL and son she is at her cognitive baseline on neuro re-evaluation today      Plan:  • Will trial depakote 250 mg bid and observe 24-48 hrs  • Would obtain EEG    * Stroke-like symptoms  Assessment & Plan  [address] [facility] Willis Vega is a 68 y o  female w/ PMH DM complicated by blindness, HTN, AF on Xarelto, pacemaker, MI who p/w acute-onset but transient right-sided weakness, dysarthria and facial droop initially concerning for ischemic stroke vs  hypertensive encephalopathy  However, MRI did NOT show acute stroke and there were signs concerning for CAA w/ innumerable chronic microhemorrhages  Also has multiple chronic strokes  TIA vs seizure remain on differential  In setting of CAA and labile BP, Xarelto considered a risk for ICH  D/w cardiology, medicine and family about risk of stroke vs bleed and decision made to discontinue the Xarelto  · Presenting sx: improving RUE weakness, dysarthria, facial droop; NIHSS: 3 for baseline blindness; tPA not given 2/2 resolving sx  Patient was hypoglycemic on arrival but she notes this happens frequently and she never experiences symptoms  · Workup:  ? CTH/CTA: negative for acute blood, signs of ischemia, LVO or critical stenosis; mild b/l intra- and extracranial ICA athero; 1 6x1 6 left thyroid nodule  ? Labs: Hgb A1c 7 1 (10/5/22), LDL 49; folate, B12, MMA ok, glucose 60 on arrival, TSH normal  ? MRI: no acute strokes; multiple bilateral likely small vessel chronic strokes; innumerable chronic microhemorrhages consistent with CAA  ? Echo (8/2/22): LVEF 60%, mobile atrial septal aneurysm, mild RV dilation  ? Echo (10/10/22):  no PFO     Plan:  · Discontinue stroke pathway  · Aggressive blood pressure management as with CAA patient is high risk for ICH  · Consider reducing statin - she currently has low LDL and is at risk for bleed  · Consider discontinuing Xarelto as with CAA patient is high risk for ICH - risk remains for ischemic storke risk from PAF standpoint but after d/w cardiology, medicine and family, we can elect to take this route for now  · EEG  · Continue monitoring on telemetry, frequent neuro checks, stat CTH for acute change  · Replete lytes, goal euglycemia (gluc < 180), normotension and normothermia  · F/u thyroid U/S for nodules if needed  · May benefit from outpatient neurocognitive evaluation      Abelino Mills will need f/u in about 4-6 weeks with neurovascular or general attending/resident/AP  She will not need outpatient testing from inpatient standpoint but likely needs formal cognitive assessment    Pending for d/c: EEG    SUBJECTIVE     Patient was seen and examined  No critical events overnight  Did have an episode of confusion this morning where she was mixing up reality and dreams and called her son mentioning things like being visiting by people who were not here and people telling her things that also did not happen  She has intermittent insight into this confusion  Otherwise at her cognitive baselin on re-evaluation today  Denies N/V/F/C, abdominal pain, dizziness, HA, new weakness or sensory changes, bowel or bladder dysfunction     ROS negative unless otherwise noted    OBJECTIVE     Patient ID: Abelino Mills is a 68 y o  female  Vitals:    10/11/22 2013 10/11/22 2102 10/12/22 0600 10/12/22 0804   BP: 142/66 145/57 148/64 117/54   BP Location: Left arm Left arm     Pulse: 60 62 61    Resp: 18 18 18 18   Temp: 98 4 °F (36 9 °C) 98 1 °F (36 7 °C)     TempSrc: Oral Oral     SpO2: 100%   95%   Weight:       Height:          Temperature: Temp (24hrs), Av 1 °F (36 7 °C), Min:97 8 °F (36 6 °C), Max:98 4 °F (36 9 °C)   Temperature: 98 1 °F (36 7 °C)    Physical Exam Vitals reviewed  Constitutional:    Not in acute distress  Normal appearance  Not ill-appearing, toxic-appearing or diaphoretic  HENT:   Normocephalic and atraumatic   External ear normal b/l  Nose normal  No congestion or rhinorrhea  Mucous membranes are moist   Oropharynx is clear  No oropharyngeal exudate or posterior oropharyngeal erythema  Eyes:    No scleral icterus   No discharge b/l   Conjunctivae normal    Pulmonary:  Pulmonary effort is normal  No respiratory distress     Reida Ohs not distended; in diaper  Musculoskeletal: no gross deformities  Skin:   Skin is not pale  Psychiatric:       Mood normal  Affect congruent     Neurologic Exam:   Mental Status   Alert and oriented to person and place but not time  Attention is normal  Speech is fluent w/ mild if any dysarthria      Cranial Nerves   CN II patient legally blind at baseline from diabetic complications but can see some shadows and identify when a person is in front of her  CN III, IV, VI pupils constricted and minimally reactive, EOMI, no CN III palsy, no CN VI palsy  no nystagmus   CN V   Facial sensation symmetric  CN VII  Facial expression full, symmetric  CN VIII, IX, X Palate symmetric  CN XI trapezius strength symmetric  CN XII symmetric lingual protrusion     Motor Exam   Muscle bulk and tone grossly normal; Pronator drift absent b/l  Strength intact and symmetric BUE  Intact RLE  Limited exam LLE 2/2 hip replacement     Sensory Exam   Light touch intact and symmetric BUE/BLE       Gait, Coordination, and Reflexes   FTN normal b/l; no significant tremor observed    LABORATORY DATA     Labs: I have personally reviewed pertinent reports  Results from last 7 days   Lab Units 10/11/22  0542 10/10/22  0457 10/09/22  0533   WBC Thousand/uL 6 86 6 35 8 53   HEMOGLOBIN g/dL 10 0* 9 5* 10 2*   HEMATOCRIT % 31 9* 30 5* 32 7*   PLATELETS Thousands/uL 220 191 231   NEUTROS PCT % 69  --   --    MONOS PCT % 10  --   --       Results from last 7 days   Lab Units 10/12/22  1006 10/11/22  0542 10/10/22  0457   POTASSIUM mmol/L 4 2 3 8 4 3   CHLORIDE mmol/L 104 106 106   CO2 mmol/L 30 30 29   BUN mg/dL 28* 26* 25   CREATININE mg/dL 2 17* 2 12* 2 22*   CALCIUM mg/dL 8 5 8 3* 8 4       Lab Results   Component Value Date    HGBA1C 7 1 (H) 10/09/2022    LDLCALC 49 10/09/2022     IMAGING & DIAGNOSTIC TESTING     Radiology Results: I have personally reviewed pertinent reports     and I have personally reviewed pertinent films in PACS  MRI brain wo contrast   Final Result by Cathleen Shrestha MD (10/10 5956)         1  Innumerable foci of blooming artifact most suggestive of amyloid angiopathy  Differential diagnosis could include sequela of chronic hypertension/chronic hypertensive encephalopathy  No acute intracranial hemorrhage  2   No acute infarction  3   Moderate, chronic microangiopathy  4   Chronic appearing bilateral pontine lacunar infarctions increased from the prior study  Workstation performed: [id number]         CT head wo contrast    (Results Pending)     Other Diagnostic Testing: I have personally reviewed pertinent reports        ACTIVE MEDICATIONS     Current Facility-Administered Medications   Medication Dose Route Frequency   • acetaminophen (TYLENOL) tablet 650 mg  650 mg Oral Q6H PRN   • amLODIPine (NORVASC) tablet 10 mg  10 mg Oral Daily   • aspirin chewable tablet 81 mg  81 mg Oral Daily   • atorvastatin (LIPITOR) tablet 40 mg  40 mg Oral QPM   • carvedilol (COREG) tablet 25 mg  25 mg Oral Q12H [address]   • cholecalciferol (VITAMIN D3) tablet 2,000 Units  2,000 Units Oral Daily   • fluticasone (FLONASE) 50 mcg/act nasal spray 1 spray  1 spray Nasal Daily   • folic acid (FOLVITE) tablet 400 mcg  400 mcg Oral Daily   • heparin (porcine) subcutaneous injection 5,000 Units  5,000 Units Subcutaneous Q8H [address]   • hydrALAZINE (APRESOLINE) injection 5 mg  5 mg Intravenous Q6H PRN   • hydrALAZINE (APRESOLINE) tablet 25 mg  25 mg Oral Q8H [address]   • insulin glargine (LANTUS) subcutaneous injection 12 Units 0 12 mL  12 Units Subcutaneous HS   • insulin lispro (HumaLOG) 100 units/mL subcutaneous injection 1-5 Units  1-5 Units Subcutaneous HS   • insulin lispro (HumaLOG) 100 units/mL subcutaneous injection 1-6 Units  1-6 Units Subcutaneous TID AC   • meclizine (ANTIVERT) tablet 12 5 mg  12 5 mg Oral Q8H PRN   • melatonin tablet 3 mg  3 mg Oral HS   • ondansetron (ZOFRAN) injection 4 mg  4 mg Intravenous Q6H PRN   • ranolazine (RANEXA) 12 hr tablet 500 mg  500 mg Oral Daily   • senna (SENOKOT) tablet 8 6 mg  1 tablet Oral HS PRN   • sodium chloride 0 9 % infusion  50 mL/hr Intravenous Continuous     VTE Pharmacologic Prophylaxis: ok to be on vte ppx with the understand of risk benefit balance  VTE Mechanical Prophylaxis: sequential compression device    ==  Usman Mena MD  Resident, Neurology, [address] [place] [address]

## 2022-10-12 NOTE — ASSESSMENT & PLAN NOTE
· Continue beta-blocker  · After discussion between Neurology and Cardiology, Xarelto was discontinued due to high risk of intracranial bleeding

## 2022-10-12 NOTE — ASSESSMENT & PLAN NOTE
Patient came in due to brief episode of right facial droop,slurring of speech and difficulty drinking water  · Significant cardiac history with AFib, had not been taking Xarelto for about a week due to cost   · Also consider hypoglycemia contributing on admission  · Stroke workup/pathway:  · CTA of the head and neck unremarkable  · MRI brain:  Innumerable foci of blooming artifact most suggestive of amyloid angiopathy  Differential diagnosis could include sequela of chronic hypertension/chronic hypertensive encephalopathy  No acute intracranial hemorrhage or infarction  Moderate, chronic microangiopathy  Chronic appearing bilateral pontine lacunar infarctions increased from the prior study  · TTE with bubble study normal  · Appreciate Neurology and Cardiology input-patient was taken off of Xarelto due to high bleeding risk based on her MRI and is to remain on aspirin/statin alone  · PT/OT and speech therapy consults appreciated

## 2022-10-12 NOTE — PROGRESS NOTES
Progress Note - Cardiology Team 1  Sandy Lomax 68 y o  female MRN: [id number]  Unit/Bed#: S -01 Encounter: [id number]        Principal Problem:    Encephalopathy acute  Active Problems:    Anemia of chronic kidney failure, stage 4 (severe) (HCC)    Diabetes mellitus type 2, insulin dependent (HCC)    Hypertension    CAD (coronary artery disease)    Atrial fibrillation (HCC)    Stroke-like symptoms    Acute kidney injury superimposed on chronic kidney disease 3/4    Left thyroid nodule      Assessment/Plan     Stroke-like symptoms  -CT of head and CTA-negative for acute blood, signs of ischemia LVO or critical stenosis  -mild bilateral intra and extracranial ICA atherosclerosis  -MRI no acute strokes, multiple bilateral likely small vessel chronic strokes, innumerable chronic micro hemorrhages consistent with CAA  -Echo 10/10/22:  EF 65%  No PFO     -continue aspirin 81 mg daily      Hypertension, uncontrolled  Arrived with /64  -amlodipine 5 mg daily- increased to 10mg daily   D/c'ed procardia  - Coreg 25 mg b i d   - hydralazine 25mg TID  's     Paroxysmal Atrial Fibrillation  -taken off xarelto d/t high bleeding risk based on MRI- on Asa  -status post [employer] pacer     Coronary artery disease  -status post multiple stenting to RCA in 2019 and  2011  -continue aspirin 81 mg daily, BB  -Ranexa 500 mg daily  - follows with Dr Neida Sher     Diabetes, type 2  -hemoglobin A1c 7 1 on 10/09/2022  -managed by primary team     Hyperlipidemia  -cholesterol 108, triglycerides 52 HDL 49 LDL 49 (10/09/2022)  -continue atorvastatin 40 mg daily     CKD, stage 3  -creatinine 2 12 and GFR 21 (10/11/2022)  -baseline creatinine 1 6-1 8  -follows with Dr Chava Licona through [facility]       Subjective/Objective   Chief Complaint/Subjective  Patient without cp, sob  Son at bedside  No events overnight        Vitals: /54   Pulse 61   Temp 98 1 °F (36 7 °C) (Oral)   Resp 18   Ht 5' 3" (1 6 m)   Wt 83 kg (183 lb)   SpO2 95%   BMI 32 42 kg/m²     Vitals:    10/08/22 0700 10/10/22 1157   Weight: 83 1 kg (183 lb 3 2 oz) 83 kg (183 lb)     Orthostatic Blood Pressures    Flowsheet Row Most Recent Value   Blood Pressure 117/54 filed at 10/12/2022 0804   Patient Position - Orthostatic VS Lying filed at 10/11/2022 2102            Intake/Output Summary (Last 24 hours) at 10/12/2022 1113  Last data filed at 10/12/2022 0401  Gross per 24 hour   Intake 240 ml   Output 0 ml   Net 240 ml       Invasive Devices  Report    Peripheral Intravenous Line  Duration           Peripheral IV 10/11/22 Right Antecubital <1 day          Drain  Duration           Urethral Catheter Latex 16 Fr  105 days                Current Facility-Administered Medications   Medication Dose Route Frequency   • acetaminophen (TYLENOL) tablet 650 mg  650 mg Oral Q6H PRN   • amLODIPine (NORVASC) tablet 10 mg  10 mg Oral Daily   • aspirin chewable tablet 81 mg  81 mg Oral Daily   • atorvastatin (LIPITOR) tablet 40 mg  40 mg Oral QPM   • carvedilol (COREG) tablet 25 mg  25 mg Oral Q12H [address]   • cholecalciferol (VITAMIN D3) tablet 2,000 Units  2,000 Units Oral Daily   • fluticasone (FLONASE) 50 mcg/act nasal spray 1 spray  1 spray Nasal Daily   • folic acid (FOLVITE) tablet 400 mcg  400 mcg Oral Daily   • hydrALAZINE (APRESOLINE) injection 5 mg  5 mg Intravenous Q6H PRN   • hydrALAZINE (APRESOLINE) tablet 25 mg  25 mg Oral Q8H [address]   • insulin glargine (LANTUS) subcutaneous injection 12 Units 0 12 mL  12 Units Subcutaneous HS   • insulin lispro (HumaLOG) 100 units/mL subcutaneous injection 1-5 Units  1-5 Units Subcutaneous HS   • insulin lispro (HumaLOG) 100 units/mL subcutaneous injection 1-6 Units  1-6 Units Subcutaneous TID AC   • meclizine (ANTIVERT) tablet 12 5 mg  12 5 mg Oral Q8H PRN   • melatonin tablet 3 mg  3 mg Oral HS   • ondansetron (ZOFRAN) injection 4 mg  4 mg Intravenous Q6H PRN   • ranolazine (RANEXA) 12 hr tablet 500 mg  500 mg Oral Daily • senna (SENOKOT) tablet 8 6 mg  1 tablet Oral HS PRN   • sodium chloride 0 9 % infusion  50 mL/hr Intravenous Continuous         Physical Exam: /54   Pulse 61   Temp 98 1 °F (36 7 °C) (Oral)   Resp 18   Ht 5' 3" (1 6 m)   Wt 83 kg (183 lb)   SpO2 95%   BMI 32 42 kg/m²     General Appearance:    Alert, cooperative, no distress, appears stated age   Head:    Normocephalic, no scleral icterus   Eyes:    PERRL   Nose:   Nares normal, septum midline, no drainage    Throat:   Lips, mucosa, and tongue normal   Neck:   Supple, symmetrical, trachea midline,       no carotid    bruit or JVD   Back:     Symmetric, no CVA tenderness   Lungs:     Clear to auscultation bilaterally, respirations unlabored   Chest Wall:    No tenderness or deformity    Heart:    Regular rate and rhythm, S1 and S2 normal, no murmur, rub   or gallop   Abdomen:     Soft, non-tender, bowel sounds active all four quadrants,     no masses, no organomegaly   Extremities:   Extremities normal, atraumatic, no cyanosis or edema   Pulses:   2+ and symmetric all extremities   Skin:   Skin color, texture, turgor normal, no rashes or lesions   Neurologic:   Alert and oriented to person place and time                 Lab Results:   Recent Results (from the past 72 hour(s))   Fingerstick Glucose (POCT)    Collection Time: 10/09/22 11:17 AM   Result Value Ref Range    POC Glucose 186 (H) 65 - 140 mg/dl   Fingerstick Glucose (POCT)    Collection Time: 10/09/22  3:57 PM   Result Value Ref Range    POC Glucose 62 (L) 65 - 140 mg/dl   Fingerstick Glucose (POCT)    Collection Time: 10/09/22  5:03 PM   Result Value Ref Range    POC Glucose 123 65 - 140 mg/dl   Fingerstick Glucose (POCT)    Collection Time: 10/09/22  8:40 PM   Result Value Ref Range    POC Glucose 151 (H) 65 - 140 mg/dl   CBC and Platelet    Collection Time: 10/10/22  4:57 AM   Result Value Ref Range    WBC 6 35 4 31 - 10 16 Thousand/uL    RBC 3 42 (L) 3 81 - 5 12 Million/uL    Hemoglobin 9 5 (L) 11 5 - 15 4 g/dL    Hematocrit 30 5 (L) 34 8 - 46 1 %    MCV 89 82 - 98 fL    MCH 27 8 26 8 - 34 3 pg    MCHC 31 1 (L) 31 4 - 37 4 g/dL    RDW 14 8 11 6 - 15 1 %    Platelets 628 004 - 941 Thousands/uL    MPV 10 8 8 9 - 12 7 fL   Basic metabolic panel    Collection Time: 10/10/22  4:57 AM   Result Value Ref Range    Sodium 140 135 - 147 mmol/L    Potassium 4 3 3 5 - 5 3 mmol/L    Chloride 106 96 - 108 mmol/L    CO2 29 21 - 32 mmol/L    ANION GAP 5 4 - 13 mmol/L    BUN 25 5 - 25 mg/dL    Creatinine 2 22 (H) 0 60 - 1 30 mg/dL    Glucose 152 (H) 65 - 140 mg/dL    Calcium 8 4 8 4 - 10 2 mg/dL    eGFR 20 ml/min/1 73sq m   Fingerstick Glucose (POCT)    Collection Time: 10/10/22  9:24 AM   Result Value Ref Range    POC Glucose 144 (H) 65 - 140 mg/dl   Fingerstick Glucose (POCT)    Collection Time: 10/10/22 11:58 AM   Result Value Ref Range    POC Glucose 300 (H) 65 - 140 mg/dl   Echo follow up/limited w/ contrast if indicated    Collection Time: 10/10/22 11:58 AM   Result Value Ref Range    Triscuspid Valve Regurgitation Peak Gradient 34 0 mmHg    Tricuspid valve peak regurgitation velocity 2 93 m/s    TR Peak Joey 2 9 m/s    LV EF 65     Est  RA pres 5 0 mmHg    Right Ventricular Peak Systolic Pressure 39 66 mmHg    PASP 37 0 mmHg   Fingerstick Glucose (POCT)    Collection Time: 10/10/22  5:03 PM   Result Value Ref Range    POC Glucose 390 (H) 65 - 140 mg/dl   Fingerstick Glucose (POCT)    Collection Time: 10/10/22  6:59 PM   Result Value Ref Range    POC Glucose 191 (H) 65 - 140 mg/dl   Fingerstick Glucose (POCT)    Collection Time: 10/10/22  9:42 PM   Result Value Ref Range    POC Glucose 167 (H) 65 - 140 mg/dl   Basic metabolic panel    Collection Time: 10/11/22  5:42 AM   Result Value Ref Range    Sodium 141 135 - 147 mmol/L    Potassium 3 8 3 5 - 5 3 mmol/L    Chloride 106 96 - 108 mmol/L    CO2 30 21 - 32 mmol/L    ANION GAP 5 4 - 13 mmol/L    BUN 26 (H) 5 - 25 mg/dL    Creatinine 2 12 (H) 0 60 - 1 30 mg/dL Glucose 79 65 - 140 mg/dL    Calcium 8 3 (L) 8 4 - 10 2 mg/dL    eGFR 21 ml/min/1 73sq m   CBC and differential    Collection Time: 10/11/22  5:42 AM   Result Value Ref Range    WBC 6 86 4 31 - 10 16 Thousand/uL    RBC 3 66 (L) 3 81 - 5 12 Million/uL    Hemoglobin 10 0 (L) 11 5 - 15 4 g/dL    Hematocrit 31 9 (L) 34 8 - 46 1 %    MCV 87 82 - 98 fL    MCH 27 3 26 8 - 34 3 pg    MCHC 31 3 (L) 31 4 - 37 4 g/dL    RDW 14 6 11 6 - 15 1 %    MPV 10 5 8 9 - 12 7 fL    Platelets 946 970 - 344 Thousands/uL    nRBC 0 /100 WBCs    Neutrophils Relative 69 43 - 75 %    Immat GRANS % 0 0 - 2 %    Lymphocytes Relative 21 14 - 44 %    Monocytes Relative 10 4 - 12 %    Eosinophils Relative 0 0 - 6 %    Basophils Relative 0 0 - 1 %    Neutrophils Absolute 4 72 1 85 - 7 62 Thousands/µL    Immature Grans Absolute 0 03 0 00 - 0 20 Thousand/uL    Lymphocytes Absolute 1 41 0 60 - 4 47 Thousands/µL    Monocytes Absolute 0 68 0 17 - 1 22 Thousand/µL    Eosinophils Absolute 0 00 0 00 - 0 61 Thousand/µL    Basophils Absolute 0 02 0 00 - 0 10 Thousands/µL   Fingerstick Glucose (POCT)    Collection Time: 10/11/22  7:47 AM   Result Value Ref Range    POC Glucose 94 65 - 140 mg/dl   Fingerstick Glucose (POCT)    Collection Time: 10/11/22 11:18 AM   Result Value Ref Range    POC Glucose 174 (H) 65 - 140 mg/dl   Fingerstick Glucose (POCT)    Collection Time: 10/11/22  3:28 PM   Result Value Ref Range    POC Glucose 446 (H) 65 - 140 mg/dl   Fingerstick Glucose (POCT)    Collection Time: 10/11/22  4:04 PM   Result Value Ref Range    POC Glucose 179 (H) 65 - 140 mg/dl   Fingerstick Glucose (POCT)    Collection Time: 10/11/22  8:11 PM   Result Value Ref Range    POC Glucose 183 (H) 65 - 140 mg/dl   Fingerstick Glucose (POCT)    Collection Time: 10/12/22  7:58 AM   Result Value Ref Range    POC Glucose 159 (H) 65 - 140 mg/dl   Basic metabolic panel    Collection Time: 10/12/22 10:06 AM   Result Value Ref Range    Sodium 138 135 - 147 mmol/L Potassium 4 2 3 5 - 5 3 mmol/L    Chloride 104 96 - 108 mmol/L    CO2 30 21 - 32 mmol/L    ANION GAP 4 4 - 13 mmol/L    BUN 28 (H) 5 - 25 mg/dL    Creatinine 2 17 (H) 0 60 - 1 30 mg/dL    Glucose 130 65 - 140 mg/dL    Calcium 8 5 8 4 - 10 2 mg/dL    eGFR 21 ml/min/1 73sq m     Imaging: I have personally reviewed pertinent reports  Counseling / Coordination of Care  Total time spent today 20 minutes  Greater than 50% of total time was spent with the patient and / or family counseling and / or coordination of care

## 2022-10-12 NOTE — ASSESSMENT & PLAN NOTE
· Patient with some element of progressive memory loss but with approximately 1 day of acutely altered mental status    Per discussion with patient's son she appears to be delirious and hallucinating, telling him that people said she was dying and reporting visits from people she has not seen in a long time  · Will check stat head CT  · Check EEG inpt vs outpt  · Requested neurology reassessment today

## 2022-10-13 LAB
ANION GAP SERPL CALCULATED.3IONS-SCNC: 6 MMOL/L (ref 4–13)
BASOPHILS # BLD AUTO: 0.02 THOUSANDS/ΜL (ref 0–0.1)
BASOPHILS NFR BLD AUTO: 0 % (ref 0–1)
BUN SERPL-MCNC: 29 MG/DL (ref 5–25)
CALCIUM SERPL-MCNC: 8.3 MG/DL (ref 8.4–10.2)
CHLORIDE SERPL-SCNC: 105 MMOL/L (ref 96–108)
CO2 SERPL-SCNC: 26 MMOL/L (ref 21–32)
CREAT SERPL-MCNC: 2.19 MG/DL (ref 0.6–1.3)
EOSINOPHIL # BLD AUTO: 0 THOUSAND/ΜL (ref 0–0.61)
EOSINOPHIL NFR BLD AUTO: 0 % (ref 0–6)
ERYTHROCYTE [DISTWIDTH] IN BLOOD BY AUTOMATED COUNT: 14.6 % (ref 11.6–15.1)
GFR SERPL CREATININE-BSD FRML MDRD: 21 ML/MIN/1.73SQ M
GLUCOSE SERPL-MCNC: 103 MG/DL (ref 65–140)
GLUCOSE SERPL-MCNC: 114 MG/DL (ref 65–140)
GLUCOSE SERPL-MCNC: 130 MG/DL (ref 65–140)
GLUCOSE SERPL-MCNC: 211 MG/DL (ref 65–140)
GLUCOSE SERPL-MCNC: 60 MG/DL (ref 65–140)
HCT VFR BLD AUTO: 34 % (ref 34.8–46.1)
HGB BLD-MCNC: 10.6 G/DL (ref 11.5–15.4)
IMM GRANULOCYTES # BLD AUTO: 0.02 THOUSAND/UL (ref 0–0.2)
IMM GRANULOCYTES NFR BLD AUTO: 0 % (ref 0–2)
LYMPHOCYTES # BLD AUTO: 2.12 THOUSANDS/ΜL (ref 0.6–4.47)
LYMPHOCYTES NFR BLD AUTO: 31 % (ref 14–44)
MCH RBC QN AUTO: 26.8 PG (ref 26.8–34.3)
MCHC RBC AUTO-ENTMCNC: 31.2 G/DL (ref 31.4–37.4)
MCV RBC AUTO: 86 FL (ref 82–98)
MONOCYTES # BLD AUTO: 0.59 THOUSAND/ΜL (ref 0.17–1.22)
MONOCYTES NFR BLD AUTO: 9 % (ref 4–12)
NEUTROPHILS # BLD AUTO: 4.18 THOUSANDS/ΜL (ref 1.85–7.62)
NEUTS SEG NFR BLD AUTO: 60 % (ref 43–75)
NRBC BLD AUTO-RTO: 0 /100 WBCS
PLATELET # BLD AUTO: 222 THOUSANDS/UL (ref 149–390)
PMV BLD AUTO: 10.9 FL (ref 8.9–12.7)
POTASSIUM SERPL-SCNC: 3.9 MMOL/L (ref 3.5–5.3)
RBC # BLD AUTO: 3.95 MILLION/UL (ref 3.81–5.12)
SODIUM SERPL-SCNC: 137 MMOL/L (ref 135–147)
WBC # BLD AUTO: 6.93 THOUSAND/UL (ref 4.31–10.16)

## 2022-10-13 PROCEDURE — 97530 THERAPEUTIC ACTIVITIES: CPT

## 2022-10-13 PROCEDURE — 97535 SELF CARE MNGMENT TRAINING: CPT

## 2022-10-13 PROCEDURE — 82948 REAGENT STRIP/BLOOD GLUCOSE: CPT

## 2022-10-13 PROCEDURE — 80048 BASIC METABOLIC PNL TOTAL CA: CPT | Performed by: PHYSICIAN ASSISTANT

## 2022-10-13 PROCEDURE — 85025 COMPLETE CBC W/AUTO DIFF WBC: CPT | Performed by: PHYSICIAN ASSISTANT

## 2022-10-13 PROCEDURE — 99232 SBSQ HOSP IP/OBS MODERATE 35: CPT | Performed by: PSYCHIATRY & NEUROLOGY

## 2022-10-13 PROCEDURE — 99232 SBSQ HOSP IP/OBS MODERATE 35: CPT | Performed by: PHYSICIAN ASSISTANT

## 2022-10-13 RX ADMIN — DIVALPROEX SODIUM 250 MG: 250 TABLET, DELAYED RELEASE ORAL at 08:28

## 2022-10-13 RX ADMIN — HYDRALAZINE HYDROCHLORIDE 25 MG: 25 TABLET ORAL at 15:04

## 2022-10-13 RX ADMIN — Medication 3 MG: at 21:40

## 2022-10-13 RX ADMIN — HEPARIN SODIUM 5000 UNITS: 5000 INJECTION INTRAVENOUS; SUBCUTANEOUS at 21:42

## 2022-10-13 RX ADMIN — DIVALPROEX SODIUM 250 MG: 250 TABLET, DELAYED RELEASE ORAL at 21:42

## 2022-10-13 RX ADMIN — ASPIRIN 81 MG CHEWABLE TABLET 81 MG: 81 TABLET CHEWABLE at 08:28

## 2022-10-13 RX ADMIN — HEPARIN SODIUM 5000 UNITS: 5000 INJECTION INTRAVENOUS; SUBCUTANEOUS at 05:29

## 2022-10-13 RX ADMIN — HEPARIN SODIUM 5000 UNITS: 5000 INJECTION INTRAVENOUS; SUBCUTANEOUS at 15:06

## 2022-10-13 RX ADMIN — RANOLAZINE 500 MG: 500 TABLET, EXTENDED RELEASE ORAL at 08:27

## 2022-10-13 RX ADMIN — CARVEDILOL 25 MG: 12.5 TABLET, FILM COATED ORAL at 21:40

## 2022-10-13 RX ADMIN — Medication 2000 UNITS: at 08:27

## 2022-10-13 RX ADMIN — FOLIC ACID TAB 400 MCG 400 MCG: 400 TAB at 08:28

## 2022-10-13 RX ADMIN — AMLODIPINE BESYLATE 10 MG: 10 TABLET ORAL at 08:27

## 2022-10-13 RX ADMIN — INSULIN GLARGINE 12 UNITS: 100 INJECTION, SOLUTION SUBCUTANEOUS at 21:41

## 2022-10-13 RX ADMIN — HYDRALAZINE HYDROCHLORIDE 25 MG: 25 TABLET ORAL at 05:29

## 2022-10-13 RX ADMIN — CARVEDILOL 25 MG: 12.5 TABLET, FILM COATED ORAL at 08:16

## 2022-10-13 RX ADMIN — SODIUM CHLORIDE 50 ML/HR: 0.9 INJECTION, SOLUTION INTRAVENOUS at 14:52

## 2022-10-13 RX ADMIN — INSULIN LISPRO 1 UNITS: 100 INJECTION, SOLUTION INTRAVENOUS; SUBCUTANEOUS at 21:44

## 2022-10-13 RX ADMIN — HYDRALAZINE HYDROCHLORIDE 25 MG: 25 TABLET ORAL at 21:41

## 2022-10-13 RX ADMIN — ATORVASTATIN CALCIUM 40 MG: 40 TABLET, FILM COATED ORAL at 17:22

## 2022-10-13 RX ADMIN — FLUTICASONE PROPIONATE 1 SPRAY: 50 SPRAY, METERED NASAL at 08:26

## 2022-10-13 NOTE — OCCUPATIONAL THERAPY NOTE
Occupational Therapy Progress Note     Patient Name: Nghia Ramos  MRQAC'W Date: 10/13/2022  Problem List  Principal Problem:    Encephalopathy acute  Active Problems:    Anemia of chronic kidney failure, stage 4 (severe) (HCC)    Diabetes mellitus type 2, insulin dependent ([facility] [address] )    Hypertension    CAD (coronary artery disease)    Atrial fibrillation (HCC)    Stroke-like symptoms    Acute kidney injury superimposed on chronic kidney disease 3/4    Left thyroid nodule        10/13/22 1047   OT Last Visit   OT Visit Date 10/13/22   Note Type   Note Type Treatment   Pain Assessment   Pain Assessment Tool 0-10   Pain Score No Pain   Restrictions/Precautions   Other Precautions Cognitive; Chair Alarm; Bed Alarm;Visual impairment; Fall Risk;Multiple lines  (legally blind)   ADL   Where Assessed Chair   Grooming Assistance 5  Supervision/Setup   Grooming Deficit Setup;Verbal cueing;Supervision/safety; Increased time to complete;Wash/dry hands; Wash/dry face  (use of mouth wash)   Grooming Comments setup while seated   UB Bathing Assistance 4  Minimal Assistance   UB Bathing Deficit Setup;Verbal cueing; Increased time to complete;Supervision/safety   UB Bathing Comments increased time to complete   LB Bathing Assistance 3  Moderate Assistance   LB Bathing Deficit Steadying;Setup;Verbal cueing;Supervision/safety; Increased time to complete;Right lower leg including foot; Left lower leg including foot; Buttocks; Perineal area   LB Bathing Comments assist to wash buttocks w/ RW support; mod assist steadying to complete pericare   UB Dressing Assistance 4  Minimal Assistance   UB Dressing Deficit Setup;Supervision/safety;Verbal cueing; Increased time to complete   LB Dressing Assistance 3  Moderate Assistance   LB Dressing Deficit Steadying;Setup; Requires assistive device for steadying;Supervision/safety;Verbal cueing; Increased time to complete; Don/doff R sock; Don/doff L sock; Thread RLE into underwear; Thread LLE into underwear;Pull up over hips   LB Dressing Comments assist to don/doff socks, assist to thread hospital underwear through LEs and mod assist steadying support w/ assist to pull up over hips and no LOB   Toileting Assistance  3  Moderate Assistance   Toileting Deficit Setup;Steadying;Supervison/safety;Verbal cueing; Increased time to complete; Bedside commode;Clothing management down;Clothing management up   Toileting Comments transferred to [facility] unable to go   Bed Mobility   Supine to Sit 3  Moderate assistance   Additional items Assist x 1; Increased time required;Verbal cues;LE management; Bedrails;HOB elevated   Additional Comments increased time to complete and assist to reach EOB   Transfers   Sit to Stand 3  Moderate assistance   Additional items Assist x 1; Increased time required;Verbal cues;Armrests   Stand to Sit 4  Minimal assistance   Additional items Assist x 1; Increased time required;Verbal cues;Armrests   Toilet transfer 3  Moderate assistance   Additional items Assist x 1; Increased time required;Verbal cues; Commode   Additional Comments cues for hand placement and positioning   Functional Mobility   Functional Mobility 3  Moderate assistance   Additional Comments assist x1 w/ increased time to complete and cues for safety and positioning and sequencing due to visual deficits   Additional items Rolling walker   Subjective   Subjective "I was going to outpatient therapy before"   Cognition   Overall Cognitive Status [facility]   Arousal/Participation Alert; Responsive; Cooperative   Attention Attends with cues to redirect   Orientation Level Oriented to person;Oriented to time;Oriented to place  (not specific date or day of week)   Memory Decreased short term memory   Following Commands Follows one step commands with increased time or repetition   Comments pt engages in appropriate conversations, pleasant and cooperative   Additional Activities   Additional Activities Other (Comment)  (education on safety and positioning) Additional Activities Comments pt receptive   Activity Tolerance   Activity Tolerance Patient limited by fatigue;Patient tolerated treatment well   Medical Staff Made Aware appropriate to see per RNShantell   Assessment   Assessment PT seen for skilled OT session focused on ADLs, functional transfers and mobility, home safety education  Pt w/ mod assist supine>sit bed mobility w/ increased time to complete  Pt w/ MOD assist sit>stand from bed w/ increased time  Pt w/ MOD assist functional mobility w/ RW and cues for sequencing to [facility]  Pt w/ MOD assist x1 stand>sit transfer  Pt w/ MOD assist toileting w/ clothing management was unable to utilize [facility]  Pt w/ MOD assist x1 functional mobility short distance to recliner  Pt while seated in recliner:setup grooming and self-feeding, min assist UB ADLs w/ increased time  Pt w/ MOD assist LB Bathing: able to wash thighs, assist to wash buttocks w/ RW support; mod assist steadying to complete pericare, assist for lower LEs  Pt w/ MOD assist LB Dressing: assist to don/doff socks, assist to thread hospital underwear through LEs and mod assist steadying support w/ assist to pull up over hips and no LOB  Pt educated on benefit of moving every 2 hours and pt states "Yeah I know I should move more at home I just sit and watch tv"  Pt seated upright in recliner w/ all needs met and alarm intact  Pt continues to be limited due to decreased strength and endurance, impaired activity tolerance, fall risk, multiple lines, decreased STM, all causing a decline in ADLs, functional transfers and mobility  Recommend STR when medically stable  Will continue to follow  The patient's raw score on the AM-PAC Daily Activity inpatient short form is 15, standardized score is 34 69, less than 39 4  Patients at this level are likely to benefit from discharge to post-acute rehabilitation services  Please refer to the recommendation of the Occupational Therapist for safe discharge planning     Plan Treatment Interventions ADL retraining;Functional transfer training;UE strengthening/ROM; Endurance training;Cognitive reorientation;Patient/family training;Equipment evaluation/education; Compensatory technique education; Activityengagement; Energy conservation   Goal Expiration Date 10/20/22   OT Treatment Day 1   OT Frequency 3-5x/wk   Recommendation   OT Discharge Recommendation Post acute rehabilitation services   AM-PAC Daily Activity Inpatient   Lower Body Dressing 2   Bathing 2   Toileting 2   Upper Body Dressing 3   Grooming 3   Eating 3   Daily Activity Raw Score 15   Daily Activity Standardized Score (Calc for Raw Score >=11) 34 69   AM-PAC Applied Cognition Inpatient   Following a Speech/Presentation 4   Understanding Ordinary Conversation 4   Taking Medications 2   Remembering Where Things Are Placed or Put Away 3   Remembering List of 4-5 Errands 2   Taking Care of Complicated Tasks 2   Applied Cognition Raw Score 17   Applied Cognition Standardized Score 36 52   Modified Prospect Scale   Modified Prospect Scale 4   End of Consult   Education Provided Yes   Patient Position at End of Consult Bedside chair;Bed/Chair alarm activated; All needs within reach   Nurse Communication Nurse aware of consult     Documentation completed by: Marianela Bauman MS, OTR/L

## 2022-10-13 NOTE — CASE MANAGEMENT
Case Management Discharge Planning Note    Patient name Anali Lippreet  Location S /S -39 MRN [id number]  : 1945 Date 10/13/2022       Current Admission Date: 10/8/2022  Current Admission Diagnosis:Encephalopathy acute   Patient Active Problem List    Diagnosis Date Noted   • Encephalopathy acute 10/12/2022   • Stroke-like symptoms 10/08/2022   • Acute kidney injury superimposed on chronic kidney disease 3/4 10/08/2022   • History of fracture of left hip 10/08/2022   • Left thyroid nodule 10/08/2022   • CHF (congestive heart failure) ([place] [address] ) 2022   • Elevated d-dimer 2022   • Atrial flutter, paroxysmal ([place] [address] ) 2022   • Full code status 2022   • Lower obstructive uropathy 2022   • Acute blood loss anemia (ABLA) 2022   • MI (myocardial infarction) ([place] [address] ) 2022   • Cardiac pacemaker in situ 2022   • H/O heart artery stent 2022   • Closed left hip fracture ([facility] ) 2022   • Blind 2022   • Diabetes mellitus type 2, insulin dependent ([place] [address] ) 2022   • Hypertension 2022   • CAD (coronary artery disease) 2022   • Atrial fibrillation ([place] [address] ) 2022   • Anemia of chronic kidney failure, stage 4 (severe) ([place] [address] ) 2022   • Vertigo 2022   • Sick sinus syndrome ([facility] ) 2021   • Obesity, morbid ([place] [address] ) 2021   • 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[place] [address] ) 2021   • Multiple thyroid nodules 2020   • Hypokalemia 2020   • Closed head injury 2020   • Heterozygous MTHFR mutation [id number] 2019   • Stage 3 chronic kidney disease ([facility] ) 2019   • Stage 3b chronic kidney disease ([facility] ) 2019      LOS (days): 4  Geometric Mean LOS (GMLOS) (days): 2 90  Days to GMLOS:-1 3     OBJECTIVE:  Risk of Unplanned Readmission Score: 29 02         Current admission status: Inpatient   Preferred Pharmacy:   [facility], [address] [place] [address]  Phone: [phone] Fax: [phone]    Primary Care Provider: Poppy Bernal MD    Primary Insurance: [facility]  Secondary Insurance:     DISCHARGE DETAILS:    Discharge planning discussed with[de-identified] patient and son, Joshua Diaz, at bedside  [place] of Choice: Yes (re: home care)  Comments - Freedom of Choice: son agreeable for home care; requesting services he had prior (per chart - [facility]) as patient really liked [facility]   CM contacted family/caregiver?: Yes (sonJoshua, at bedside)  Were Treatment Team discharge recommendations reviewed with patient/caregiver?: Yes  Did patient/caregiver verbalize understanding of patient care needs?: Yes  Were patient/caregiver advised of the risks associated with not following Treatment Team discharge recommendations?: Yes    Contacts  Patient Contacts: simona Bell  Relationship to Patient[de-identified] Family  Contact Method: In Person  Reason/Outcome: Continuity of Care, Emergency Contact, Referral, Discharge [address]         Is the patient interested in [facility] at discharge?: Yes  [facility]ia [facility]man requested[de-identified] Nursing, Occupational Therapy, Physical [address] Name[de-identified] Other  [address] [place] [address] Provider[de-identified] PCP  Home Health Services Needed[de-identified] Evaluate Functional Status and Safety, Gait/ADL Training, Strengthening/Theraputic Exercises to Improve Function  Homebound Criteria Met[de-identified] Requires the Assistance of Another Person for Safe Ambulation or to Leave the Home, Uses an Assist Device (i e  cane, walker, etc)  Supporting Clincal Findings[de-identified] Limited Endurance, Fatigues Easliy in [employer]    DME Referral Provided  Referral made for DME?: No    Other Referral/Resources/Interventions Provided:  Interventions: [facility]  Referral Comments: Met with sonJoshua, at bedside in anticipation of d/c within 24h  Reviewed recommendation from PT and son now reports he'd like home care services arranged   Relayed that patient had really liked previous agency (notes show [facility] had [facility] August), [facility]  via [address]; awaiting response  Son will transport patient home at discharge - will follow to confirm home care arrangements      Would you like to participate in our [address] service program?  : No - Declined    Treatment Team Recommendation: Short Term Rehab, SNF  Discharge Destination Plan[de-identified] Home with [place] at Discharge : Family

## 2022-10-13 NOTE — PROGRESS NOTES
Progress Note - Cardiology Team 1  Cara Pink 68 y o  female MRN: [id number]  Unit/Bed#: S -01 Encounter: [id number]        Principal Problem:    Encephalopathy acute  Active Problems:    Anemia of chronic kidney failure, stage 4 (severe) (HCC)    Diabetes mellitus type 2, insulin dependent (HCC)    Hypertension    CAD (coronary artery disease)    Atrial fibrillation (HCC)    Stroke-like symptoms    Acute kidney injury superimposed on chronic kidney disease 3/4    Left thyroid nodule      Assessment/Plan      Stroke-like symptoms  -CT of head and CTA-negative for acute blood, signs of ischemia LVO or critical stenosis  -mild bilateral intra and extracranial ICA atherosclerosis  -MRI no acute strokes, multiple bilateral likely small vessel chronic strokes, innumerable chronic micro hemorrhages consistent with CAA  - neurology feels cerebral amyloid angiopathy  -Echo 10/10/22:  EF 65%   No PFO     -continue aspirin 81 mg daily      Hypertension, uncontrolled  Arrived with /64  -amlodipine 5 mg daily- increased to 10mg daily  D/c'ed procardia  - Coreg 25 mg b i d   - hydralazine 25mg TID  - neurology recommends close BP monitoring as increased bleeding risk   - BP mainly 110-140's/60's     Paroxysmal Atrial Fibrillation  -taken off xarelto d/t high bleeding risk based on MRI- on Asa  -status post Medtronic pacer     Coronary artery disease  -status post multiple stenting to RCA in 2019 and  2011  -continue aspirin 81 mg daily, BB  -Ranexa 500 mg daily  - follows with Dr Miguel Schwab     Diabetes, type 2  -hemoglobin A1c 7 1 on 10/09/2022  -managed by primary team     Hyperlipidemia  -cholesterol 108, triglycerides 52 HDL 49 LDL 49 (10/09/2022)  -continue atorvastatin 40 mg daily     CKD, stage 3  -creatinine 2 12 and GFR 21   -baseline creatinine 1 6-1 8  -follows with Dr Sushma Blankenship through [facility]     No further cardiac recs  Will sign off , call with questions       Subjective/Objective   Chief Complaint/Subjective  No events overnight  No cp, sob        Vitals: /65   Pulse 63   Temp 98 4 °F (36 9 °C) (Oral)   Resp 16   Ht 5' 3" (1 6 m)   Wt 83 kg (183 lb)   SpO2 97%   BMI 32 42 kg/m²     Vitals:    10/08/22 0700 10/10/22 1157   Weight: 83 1 kg (183 lb 3 2 oz) 83 kg (183 lb)     Orthostatic Blood Pressures    Flowsheet Row Most Recent Value   Blood Pressure 144/65 filed at 10/13/2022 3573   Patient Position - Orthostatic VS Lying filed at 10/12/2022 2031            Intake/Output Summary (Last 24 hours) at 10/13/2022 1002  Last data filed at 10/12/2022 1423  Gross per 24 hour   Intake 180 ml   Output --   Net 180 ml       Invasive Devices  Report    Peripheral Intravenous Line  Duration           Peripheral IV 10/11/22 Right Antecubital 1 day          Drain  Duration           Urethral Catheter Latex 16 Fr   106 days                Current Facility-Administered Medications   Medication Dose Route Frequency   • acetaminophen (TYLENOL) tablet 650 mg  650 mg Oral Q6H PRN   • amLODIPine (NORVASC) tablet 10 mg  10 mg Oral Daily   • aspirin chewable tablet 81 mg  81 mg Oral Daily   • atorvastatin (LIPITOR) tablet 40 mg  40 mg Oral QPM   • carvedilol (COREG) tablet 25 mg  25 mg Oral BID With Meals   • cholecalciferol (VITAMIN D3) tablet 2,000 Units  2,000 Units Oral Daily   • divalproex sodium (DEPAKOTE) EC tablet 250 mg  250 mg Oral Q12H RADHA   • fluticasone (FLONASE) 50 mcg/act nasal spray 1 spray  1 spray Nasal Daily   • folic acid (FOLVITE) tablet 400 mcg  400 mcg Oral Daily   • heparin (porcine) subcutaneous injection 5,000 Units  5,000 Units Subcutaneous Q8H [facility]   • hydrALAZINE (APRESOLINE) injection 5 mg  5 mg Intravenous Q6H PRN   • hydrALAZINE (APRESOLINE) tablet 25 mg  25 mg Oral Q8H [facility]   • insulin glargine (LANTUS) subcutaneous injection 12 Units 0 12 mL  12 Units Subcutaneous HS   • insulin lispro (HumaLOG) 100 units/mL subcutaneous injection 1-5 Units  1-5 Units Subcutaneous HS   • insulin lispro (HumaLOG) 100 units/mL subcutaneous injection 1-6 Units  1-6 Units Subcutaneous TID AC   • meclizine (ANTIVERT) tablet 12 5 mg  12 5 mg Oral Q8H PRN   • melatonin tablet 3 mg  3 mg Oral HS   • ondansetron (ZOFRAN) injection 4 mg  4 mg Intravenous Q6H PRN   • ranolazine (RANEXA) 12 hr tablet 500 mg  500 mg Oral Daily   • senna (SENOKOT) tablet 8 6 mg  1 tablet Oral HS PRN   • sodium chloride 0 9 % infusion  50 mL/hr Intravenous Continuous         Physical Exam: /65   Pulse 63   Temp 98 4 °F (36 9 °C) (Oral)   Resp 16   Ht 5' 3" (1 6 m)   Wt 83 kg (183 lb)   SpO2 97%   BMI 32 42 kg/m²     General Appearance:    Alert, cooperative, no distress, appears stated age   Head:    Normocephalic, no scleral icterus   Eyes:    PERRL   Nose:   Nares normal, septum midline, no drainage    Throat:   Lips, mucosa, and tongue normal   Neck:   Supple, symmetrical, trachea midline,              Lungs:     Clear to auscultation bilaterally, respirations unlabored        Heart:    Regular rate and rhythm, S1 and S2 normal, no murmur, rub   or gallop   Abdomen:     Soft, non-tender   Extremities:   Extremities normal, atraumatic, no cyanosis or edema       Skin:   Skin color, texture, turgor normal, no rashes or lesions   Neurologic:   Alert and oriented to person place and time                 Lab Results:   Recent Results (from the past 72 hour(s))   Fingerstick Glucose (POCT)    Collection Time: 10/10/22 11:58 AM   Result Value Ref Range    POC Glucose 300 (H) 65 - 140 mg/dl   Echo follow up/limited w/ contrast if indicated    Collection Time: 10/10/22 11:58 AM   Result Value Ref Range    Triscuspid Valve Regurgitation Peak Gradient 34 0 mmHg    Tricuspid valve peak regurgitation velocity 2 93 m/s    TR Peak Joey 2 9 m/s    LV EF 65     Est  RA pres 5 0 mmHg    Right Ventricular Peak Systolic Pressure 95 29 mmHg    PASP 37 0 mmHg   Fingerstick Glucose (POCT)    Collection Time: 10/10/22  5:03 PM   Result Value Ref Range    POC Glucose 390 (H) 65 - 140 mg/dl   Fingerstick Glucose (POCT)    Collection Time: 10/10/22  6:59 PM   Result Value Ref Range    POC Glucose 191 (H) 65 - 140 mg/dl   Fingerstick Glucose (POCT)    Collection Time: 10/10/22  9:42 PM   Result Value Ref Range    POC Glucose 167 (H) 65 - 140 mg/dl   Basic metabolic panel    Collection Time: 10/11/22  5:42 AM   Result Value Ref Range    Sodium 141 135 - 147 mmol/L    Potassium 3 8 3 5 - 5 3 mmol/L    Chloride 106 96 - 108 mmol/L    CO2 30 21 - 32 mmol/L    ANION GAP 5 4 - 13 mmol/L    BUN 26 (H) 5 - 25 mg/dL    Creatinine 2 12 (H) 0 60 - 1 30 mg/dL    Glucose 79 65 - 140 mg/dL    Calcium 8 3 (L) 8 4 - 10 2 mg/dL    eGFR 21 ml/min/1 73sq m   CBC and differential    Collection Time: 10/11/22  5:42 AM   Result Value Ref Range    WBC 6 86 4 31 - 10 16 Thousand/uL    RBC 3 66 (L) 3 81 - 5 12 Million/uL    Hemoglobin 10 0 (L) 11 5 - 15 4 g/dL    Hematocrit 31 9 (L) 34 8 - 46 1 %    MCV 87 82 - 98 fL    MCH 27 3 26 8 - 34 3 pg    MCHC 31 3 (L) 31 4 - 37 4 g/dL    RDW 14 6 11 6 - 15 1 %    MPV 10 5 8 9 - 12 7 fL    Platelets 055 705 - 076 Thousands/uL    nRBC 0 /100 WBCs    Neutrophils Relative 69 43 - 75 %    Immat GRANS % 0 0 - 2 %    Lymphocytes Relative 21 14 - 44 %    Monocytes Relative 10 4 - 12 %    Eosinophils Relative 0 0 - 6 %    Basophils Relative 0 0 - 1 %    Neutrophils Absolute 4 72 1 85 - 7 62 Thousands/µL    Immature Grans Absolute 0 03 0 00 - 0 20 Thousand/uL    Lymphocytes Absolute 1 41 0 60 - 4 47 Thousands/µL    Monocytes Absolute 0 68 0 17 - 1 22 Thousand/µL    Eosinophils Absolute 0 00 0 00 - 0 61 Thousand/µL    Basophils Absolute 0 02 0 00 - 0 10 Thousands/µL   Fingerstick Glucose (POCT)    Collection Time: 10/11/22  7:47 AM   Result Value Ref Range    POC Glucose 94 65 - 140 mg/dl   Fingerstick Glucose (POCT)    Collection Time: 10/11/22 11:18 AM   Result Value Ref Range    POC Glucose 174 (H) 65 - 140 mg/dl   Fingerstick Glucose (POCT) Collection Time: 10/11/22  3:28 PM   Result Value Ref Range    POC Glucose 446 (H) 65 - 140 mg/dl   Fingerstick Glucose (POCT)    Collection Time: 10/11/22  4:04 PM   Result Value Ref Range    POC Glucose 179 (H) 65 - 140 mg/dl   Fingerstick Glucose (POCT)    Collection Time: 10/11/22  8:11 PM   Result Value Ref Range    POC Glucose 183 (H) 65 - 140 mg/dl   Fingerstick Glucose (POCT)    Collection Time: 10/12/22  7:58 AM   Result Value Ref Range    POC Glucose 159 (H) 65 - 140 mg/dl   Basic metabolic panel    Collection Time: 10/12/22 10:06 AM   Result Value Ref Range    Sodium 138 135 - 147 mmol/L    Potassium 4 2 3 5 - 5 3 mmol/L    Chloride 104 96 - 108 mmol/L    CO2 30 21 - 32 mmol/L    ANION GAP 4 4 - 13 mmol/L    BUN 28 (H) 5 - 25 mg/dL    Creatinine 2 17 (H) 0 60 - 1 30 mg/dL    Glucose 130 65 - 140 mg/dL    Calcium 8 5 8 4 - 10 2 mg/dL    eGFR 21 ml/min/1 73sq m   Fingerstick Glucose (POCT)    Collection Time: 10/12/22 11:09 AM   Result Value Ref Range    POC Glucose 162 (H) 65 - 140 mg/dl   Fingerstick Glucose (POCT)    Collection Time: 10/12/22  4:08 PM   Result Value Ref Range    POC Glucose 266 (H) 65 - 140 mg/dl   Fingerstick Glucose (POCT)    Collection Time: 10/12/22  8:26 PM   Result Value Ref Range    POC Glucose 172 (H) 65 - 140 mg/dl   Basic metabolic panel    Collection Time: 10/13/22  5:18 AM   Result Value Ref Range    Sodium 137 135 - 147 mmol/L    Potassium 3 9 3 5 - 5 3 mmol/L    Chloride 105 96 - 108 mmol/L    CO2 26 21 - 32 mmol/L    ANION GAP 6 4 - 13 mmol/L    BUN 29 (H) 5 - 25 mg/dL    Creatinine 2 19 (H) 0 60 - 1 30 mg/dL    Glucose 103 65 - 140 mg/dL    Calcium 8 3 (L) 8 4 - 10 2 mg/dL    eGFR 21 ml/min/1 73sq m   CBC and differential    Collection Time: 10/13/22  5:18 AM   Result Value Ref Range    WBC 6 93 4 31 - 10 16 Thousand/uL    RBC 3 95 3 81 - 5 12 Million/uL    Hemoglobin 10 6 (L) 11 5 - 15 4 g/dL    Hematocrit 34 0 (L) 34 8 - 46 1 %    MCV 86 82 - 98 fL    MCH 26 8 26 8 - 34 3 pg    MCHC 31 2 (L) 31 4 - 37 4 g/dL    RDW 14 6 11 6 - 15 1 %    MPV 10 9 8 9 - 12 7 fL    Platelets 165 847 - 257 Thousands/uL    nRBC 0 /100 WBCs    Neutrophils Relative 60 43 - 75 %    Immat GRANS % 0 0 - 2 %    Lymphocytes Relative 31 14 - 44 %    Monocytes Relative 9 4 - 12 %    Eosinophils Relative 0 0 - 6 %    Basophils Relative 0 0 - 1 %    Neutrophils Absolute 4 18 1 85 - 7 62 Thousands/µL    Immature Grans Absolute 0 02 0 00 - 0 20 Thousand/uL    Lymphocytes Absolute 2 12 0 60 - 4 47 Thousands/µL    Monocytes Absolute 0 59 0 17 - 1 22 Thousand/µL    Eosinophils Absolute 0 00 0 00 - 0 61 Thousand/µL    Basophils Absolute 0 02 0 00 - 0 10 Thousands/µL   Fingerstick Glucose (POCT)    Collection Time: 10/13/22  8:27 AM   Result Value Ref Range    POC Glucose 60 (L) 65 - 140 mg/dl     Imaging: I have personally reviewed pertinent reports  Counseling / Coordination of Care  Total time spent today 20 minutes  Greater than 50% of total time was spent with the patient and / or family counseling and / or coordination of care

## 2022-10-13 NOTE — PROGRESS NOTES
[facility]  Progress Note - [facility] 1945, 68 y o  female MRN: [id number]  Unit/Bed#: S -01 Encounter: [id number]  Primary Care Provider: Afshan Bhakta MD   Date and time admitted to hospital: 10/8/2022  7:01 AM    Stroke-like symptoms  Assessment & Plan  Patient came in due to brief episode of right facial droop,slurring of speech and difficulty drinking water  · Significant cardiac history with AFib, had not been taking Xarelto for about a week due to cost   · Also consider hypoglycemia contributing on admission  · Stroke workup/pathway:  · CTA of the head and neck unremarkable  · MRI brain:  Innumerable foci of blooming artifact most suggestive of amyloid angiopathy  Differential diagnosis could include sequela of chronic hypertension/chronic hypertensive encephalopathy  No acute intracranial hemorrhage or infarction  Moderate, chronic microangiopathy  Chronic appearing bilateral pontine lacunar infarctions increased from the prior study  · TTE with bubble study normal  · Appreciate Neurology and Cardiology input-patient was taken off of Xarelto due to high bleeding risk based on her MRI and is to remain on aspirin/statin alone  · PT/OT and speech therapy consults appreciated  * Encephalopathy acute  Assessment & Plan  · Resolved- at baseline today  · head CT- negative for acute findings  · EEG outpatient  · Neurology consultation appreciated- symptoms suspicious for lewy body disease  Patient started on Depakote 250 mg BID     · Outpatient follow-up with neurology    Acute kidney injury superimposed on chronic kidney disease 3/4  Assessment & Plan  Lab Results   Component Value Date    EGFR 21 10/13/2022    EGFR 21 10/12/2022    EGFR 21 10/11/2022    CREATININE 2 19 (H) 10/13/2022    CREATININE 2 17 (H) 10/12/2022    CREATININE 2 12 (H) 10/11/2022     · Baseline creatinine is around 1 6-1 9  · Noted to be 2 22 on admission likely due to contrast nephropathy from recent CT contrast done on October 8th  Patient received gentle IVF but creatinine remains above baseline, will continue IVF for now if tolerating  · Continue to trend and avoid nephrotoxic agents  · Follow up with [facility]    Atrial fibrillation [facility])  Assessment & Plan  · Continue beta-blocker  · After discussion between Neurology and Cardiology, Xarelto was discontinued due to high risk of intracranial bleeding      Left thyroid nodule  Assessment & Plan  · Incidental finding  · TSH wnl  · Will likely need outpatient thyroid ultrasound in the outpatient  · Outpatient follow-up with primary care physician  CAD (coronary artery disease)  Assessment & Plan  · Continue Coreg, Ranexa, ASA, statin    Hypertension  Assessment & Plan  · Significant hypertension in the ED, SBP >200s  · Home medication regimen: Norvasc, Procardia, Coreg  · Initially patient was allowed permissive hypertension in the setting of acute stroke workup  Given results of MRI, management of blood pressure is critically important  Continue Norvasc 10 mg daily, Coreg 25 mg p o  B i d , hydralazine 25 mg t i d       Diabetes mellitus type 2, insulin dependent [facility])  Assessment & Plan  Lab Results   Component Value Date    HGBA1C 7 1 (H) 10/09/2022       Recent Labs     10/12/22  2026 10/13/22  0827 10/13/22  1044 10/13/22  1619   POCGLU 172* 60* 114 130       Blood Sugar Average: Last 72 hrs:  (P) 195 [id number]     · BG in 40s on arrival to ED, improved without intervention  · Home regimen:  Lantus 12 units q h s  will resume this dose now  · SSI coverage with Accu-Cheks for now  · Hypoglycemia protocol  Monitor cautiously    Overall blood sugars are within goal range of 140-180 but with occasional significant spikes   · Diabetic diet    Anemia of chronic kidney failure, stage 4 (severe) ([facility])  Assessment & Plan  · Anemia of chronic disease with CKD, baseline Hgb 7-9  · Hgb remains stable, no active bleeding  · Continue folate  · Monitor CBC        VTE Pharmacologic Prophylaxis: VTE Score: 9 High Risk (Score >/= 5) - Pharmacological DVT Prophylaxis Ordered: heparin  Sequential Compression Devices Ordered  Patient Centered Rounds: I performed bedside rounds with nursing staff today  Discussions with Specialists or Other Care Team Provider: nursing, CM, neurology    Education and Discussions with Family / Patient: Updated  (son) at bedside  Time Spent for Care: 20 minutes  More than 50% of total time spent on counseling and coordination of care as described above  Current Length of Stay: 4 day(s)  Current Patient Status: Inpatient   Certification Statement: The patient will continue to require additional inpatient hospital stay due to continued monitoring  Discharge Plan: Anticipate discharge tomorrow to home with home services  Code Status: Level 1 - Full Code    Subjective: The patient was seen and examined  The patient states she had a hallucination last night, none today  Objective:     Vitals:   Temp (24hrs), Av 6 °F (37 °C), Min:98 4 °F (36 9 °C), Max:98 7 °F (37 1 °C)    Temp:  [98 4 °F (36 9 °C)-98 7 °F (37 1 °C)] 98 7 °F (37 1 °C)  HR:  [61-66] 66  Resp:  [16] 16  BP: (116-177)/(51-73) 147/63  SpO2:  [97 %] 97 %  Body mass index is 32 42 kg/m²  Input and Output Summary (last 24 hours):   No intake or output data in the 24 hours ending 10/13/22 1705    Physical Exam:   Physical Exam  Vitals and nursing note reviewed  Constitutional:       General: She is awake  Appearance: Normal appearance  Cardiovascular:      Rate and Rhythm: Normal rate and regular rhythm  Pulmonary:      Effort: Pulmonary effort is normal       Breath sounds: Normal breath sounds  Abdominal:      Palpations: Abdomen is soft  Tenderness: There is no abdominal tenderness  Skin:     General: Skin is warm  Neurological:      General: No focal deficit present        Mental Status: She is alert and oriented to person, place, and time  Psychiatric:         Attention and Perception: Attention normal          Mood and Affect: Mood normal          Speech: Speech normal          Behavior: Behavior is cooperative  Additional Data:     Labs:  Results from last 7 days   Lab Units 10/13/22  0518   WBC Thousand/uL 6 93   HEMOGLOBIN g/dL 10 6*   HEMATOCRIT % 34 0*   PLATELETS Thousands/uL 222   NEUTROS PCT % 60   LYMPHS PCT % 31   MONOS PCT % 9   EOS PCT % 0     Results from last 7 days   Lab Units 10/13/22  0518   SODIUM mmol/L 137   POTASSIUM mmol/L 3 9   CHLORIDE mmol/L 105   CO2 mmol/L 26   BUN mg/dL 29*   CREATININE mg/dL 2 19*   ANION GAP mmol/L 6   CALCIUM mg/dL 8 3*   GLUCOSE RANDOM mg/dL 103         Results from last 7 days   Lab Units 10/13/22  1619 10/13/22  1044 10/13/22  0827 10/12/22  2026 10/12/22  1608 10/12/22  1109 10/12/22  0758 10/11/22  2011 10/11/22  1604 10/11/22  1528 10/11/22  1118 10/11/22  0747   POC GLUCOSE mg/dl 130 114 60* 172* 266* 162* 159* 183* 179* 446* 174* 94     Results from last 7 days   Lab Units 10/09/22  0533   HEMOGLOBIN A1C % 7 1*           Lines/Drains:  Invasive Devices  Report    Peripheral Intravenous Line  Duration           Peripheral IV 10/11/22 Right Antecubital 1 day          Drain  Duration           Urethral Catheter Latex 16 Fr  106 days              Urinary Catheter:  Goal for removal: N/A - Chronic Key               Imaging: No pertinent imaging reviewed      Recent Cultures (last 7 days):         Last 24 Hours Medication List:   Current Facility-Administered Medications   Medication Dose Route Frequency Provider Last Rate   • acetaminophen  650 mg Oral Q6H PRN Liam Ferguson MD     • amLODIPine  10 mg Oral Daily TENNILLE Hu     • aspirin  81 mg Oral Daily Liam Ferguson MD     • atorvastatin  40 mg Oral QPM Liam Ferguson MD     • carvedilol  25 mg Oral BID With Meals Giulia Garcia CRNP     • cholecalciferol  2,000 Units Oral Daily Mitchell Neal MD     • divalproex sodium  250 mg Oral Q12H [address] Meghna Miller MD     • fluticasone  1 spray Nasal Daily Mitchell Neal MD     • folic acid  991 mcg Oral Daily Alpiniano Alexandra Buerger, MD     • heparin (porcine)  5,000 Units Subcutaneous Q8H [address] Maya Adkins PA-C     • hydrALAZINE  5 mg Intravenous Q6H PRN Prisca Hewitt PA-C     • hydrALAZINE  25 mg Oral Q8H [address] TENNILLE Jaquez     • insulin glargine  12 Units Subcutaneous HS Maya Adkins PA-C     • insulin lispro  1-5 Units Subcutaneous HS Mitchell Neal MD     • insulin lispro  1-6 Units Subcutaneous TID AC Liam Neal MD     • meclizine  12 5 mg Oral Q8H PRN Liam Neal MD     • melatonin  3 mg Oral HS Maya Adkins PA-C     • ondansetron  4 mg Intravenous Q6H PRN Mitchell Neal MD     • ranolazine  500 mg Oral Daily Mitchell Neal MD     • senna  1 tablet Oral HS PRN Mitchell Neal MD     • sodium chloride  50 mL/hr Intravenous Continuous Maya Adkins PA-C 50 mL/hr (10/13/22 9832)        Today, Patient Was Seen By: Janeen Alonzo    **Please Note: This note may have been constructed using a voice recognition system  **

## 2022-10-13 NOTE — ASSESSMENT & PLAN NOTE
Lab Results   Component Value Date    EGFR 21 10/13/2022    EGFR 21 10/12/2022    EGFR 21 10/11/2022    CREATININE 2 19 (H) 10/13/2022    CREATININE 2 17 (H) 10/12/2022    CREATININE 2 12 (H) 10/11/2022     · Baseline creatinine is around 1 6-1 9  · Noted to be 2 22 on admission likely due to contrast nephropathy from recent CT contrast done on October 8th    Patient received gentle IVF but creatinine remains above baseline, will continue IVF for now if tolerating  · Continue to trend and avoid nephrotoxic agents  · Follow up with [facility] nephrology

## 2022-10-13 NOTE — PHYSICAL THERAPY NOTE
Physical Therapy Cancellation Note         10/13/22 1608   PT Last Visit   PT Visit Date 10/13/22   Note Type   Note Type Cancelled Session   Cancel Reasons Other   Assessment   Assessment attempted to see pt for PT interventoin but pt refused stating "it's late afternoon and i'm tired " PT session cancelled due to pt refusing  will follow and continue PT as appropriate  notified [facility] of cancellation       Dez Weathers

## 2022-10-13 NOTE — PLAN OF CARE
Problem: OCCUPATIONAL THERAPY ADULT  Goal: Performs self-care activities at highest level of function for planned discharge setting  See evaluation for individualized goals  Description: Treatment Interventions: ADL retraining, Functional transfer training, UE strengthening/ROM, Endurance training, Patient/family training, Equipment evaluation/education, Compensatory technique education, Energy conservation, Activityengagement          See flowsheet documentation for full assessment, interventions and recommendations  Outcome: Progressing  Note: Limitation: Decreased ADL status, Decreased UE strength, Decreased Safe judgement during ADL, Decreased endurance, Decreased high-level ADLs, Decreased self-care trans  Prognosis: Fair  Assessment: PT seen for skilled OT session focused on ADLs, functional transfers and mobility, home safety education  Pt w/ mod assist supine>sit bed mobility w/ increased time to complete  Pt w/ MOD assist sit>stand from bed w/ increased time  Pt w/ MOD assist functional mobility w/ RW and cues for sequencing to [facility]  Pt w/ MOD assist x1 stand>sit transfer  Pt w/ MOD assist toileting w/ clothing management was unable to utilize [facility]  Pt w/ MOD assist x1 functional mobility short distance to recliner  Pt while seated in recliner:setup grooming and self-feeding, min assist UB ADLs w/ increased time  Pt w/ MOD assist LB Bathing: able to wash thighs, assist to wash buttocks w/ RW support; mod assist steadying to complete pericare, assist for lower LEs  Pt w/ MOD assist LB Dressing: assist to don/doff socks, assist to thread hospital underwear through LEs and mod assist steadying support w/ assist to pull up over hips and no LOB  Pt educated on benefit of moving every 2 hours and pt states "Yeah I know I should move more at home I just sit and watch tv"  Pt seated upright in recliner w/ all needs met and alarm intact   Pt continues to be limited due to decreased strength and endurance, impaired activity tolerance, fall risk, multiple lines, decreased STM, all causing a decline in ADLs, functional transfers and mobility  Recommend STR when medically stable  Will continue to follow  The patient's raw score on the AM-PAC Daily Activity inpatient short form is 15, standardized score is 34 69, less than 39 4  Patients at this level are likely to benefit from discharge to post-acute rehabilitation services  Please refer to the recommendation of the Occupational Therapist for safe discharge planning       OT Discharge Recommendation: Post acute rehabilitation services

## 2022-10-13 NOTE — PROGRESS NOTES
NEUROLOGY RESIDENCY PROGRESS NOTE     Name: Robel Rowell   Age & Sex: 68 y o  female   MRN: [id number]  Unit/Bed#: S -01   Encounter: [id number]    [facility]     * Stroke-like symptoms  Assessment & Plan  Robel Rowell is a 68 y o  female w/ PMH DM complicated by blindness, HTN, AF on Xarelto, pacemaker, MI who p/w acute-onset but transient right-sided weakness, dysarthria and facial droop initially concerning for ischemic stroke vs  hypertensive encephalopathy given patient's elevated admission BP  However, MRI did NOT show acute stroke or PRES and there were signs concerning for CAA w/ innumerable chronic microhemorrhages  Also has multiple chronic strokes  TIA vs seizure remain on differential  In setting of CAA and labile BP, Xarelto considered a risk for ICH  D/w cardiology, medicine and family about risk of stroke vs bleed and decision made to discontinue the Xarelto  · Presenting sx: improving RUE weakness, dysarthria, facial droop; NIHSS: 3 for baseline blindness; tPA not given 2/2 resolving sx  Patient was hypoglycemic on arrival but she notes this happens frequently and she never experiences symptoms  · Workup:  ? CTH/CTA: negative for acute blood, signs of ischemia, LVO or critical stenosis; mild b/l intra- and extracranial ICA athero; 1 6x1 6 left thyroid nodule  ? Labs: Hgb A1c 7 1 (10/5/22), LDL 49; folate, B12, MMA ok, glucose 60 on arrival, TSH normal  ? MRI: no acute strokes; multiple bilateral likely small vessel chronic strokes; innumerable chronic microhemorrhages consistent with CAA  ? Echo (8/2/22): LVEF 60%, mobile atrial septal aneurysm, mild RV dilation  ? Echo (10/10/22):  no PFO     Plan:  · Discontinue stroke pathway  · Aggressive blood pressure management as with CAA patient is high risk for ICH  · Consider reducing statin - she currently has low LDL and is at risk for bleed  · Consider discontinuing Xarelto as with CAA patient is high risk for ICH - risk remains for ischemic storke risk from PAF standpoint but after d/w cardiology, medicine and family, we can elect to take this route for now  · EEG  · Continue monitoring on telemetry, frequent neuro checks, stat CTH for acute change  · Replete lytes, goal euglycemia (gluc < 180), normotension and normothermia  · F/u thyroid U/S for nodules if needed  · May benefit from outpatient neurocognitive evaluation    Cerebral amyloid angiopathy  Assessment & Plan  Patient w/ developing memory problems presented as a stroke alert as described above and found on MRI to have likely CAA  In the setting of CAA, patient is high risk for intraparenchymal bleed  It is possible that her episode prior to admission was either a TIA or a seizure  Also possible that this was non-neurologic in origin and that these are incidental findings  CAA is likely at least part of the etiology of patient's memory dysfunction and will continue to progress  • MRI reviewed: no acute strokes  Multiple chronic, likely small-vessel strokes  Innumerable foci of blooming artifact consistent with chronic microhemorrhages and concerning for cerebral amyloid angiopathy (CAA)     Plan:  · Recommend close BP management to reduce risk of bleed  · Recommend cessation of full AC and continuing with just AP from bleed-risk standpoint and did d/w w/ cardiology who are   · Obtain routine EEG  · Discussed with family (son and DIL at length who expressed understanding)    Hallucinations and vivid dreams  Assessment & Plan  Patient with ongoing history of vivid dreams evolving into possible waking hallucinations in the setting of being legally blind and having progressing MCI in the context of newly discovered CAA  Could be hospital delirium but most likely related to her chronic conditions  Could be related to LBD or hallucinations in the setting of blindness   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 but could have components that are similar in terms of pathophysiology  Per d/w DIL and son she is at her cognitive baseline on neuro re-evaluation today  Plan:  • Will trial depakote 250 mg bid and observe 24-48 hrs  • Would obtain EEG    Erica Lees will need f/u in about 4-6 weeks with neurovascular or general attending/resident/AP  She will not need outpatient testing from inpatient standpoint but likely needs formal cognitive assessment    Pending for d/c: clinical stability    SUBJECTIVE     Patient was seen and examined  No critical events overnight  Cognitively stable from her baseline  Did have some overnight vivid dreams/hallucinations about which she had impaired insight early in the morning but improved later in the day  Denies N/V/F/C, abdominal pain, dizziness, HA, new weakness or sensory changes, bowel or bladder dysfunction     ROS negative unless otherwise noted    OBJECTIVE     Patient ID: Erica Lees is a 68 y o  female  Vitals:    10/12/22 1500 10/12/22 2031 10/12/22 2300 10/13/22 0526   BP: 111/55 (!) 177/73 116/54 126/51   BP Location: Right arm Left arm     Pulse: 65 61     Resp: 18 16     Temp: (!) 97 4 °F (36 3 °C) 98 4 °F (36 9 °C)     TempSrc: Oral Oral     SpO2: 100% 97%     Weight:       Height:          Temperature: Temp (24hrs), Av 9 °F (36 6 °C), Min:97 4 °F (36 3 °C), Max:98 4 °F (36 9 °C)   Temperature: 98 4 °F (36 9 °C)    Physical Exam Vitals reviewed  Constitutional:    Not in acute distress  Normal appearance  Not ill-appearing, toxic-appearing or diaphoretic  HENT:   Normocephalic and atraumatic   External ear normal b/l  Nose normal  No congestion or rhinorrhea  Mucous membranes are moist   Oropharynx is clear  No oropharyngeal exudate or posterior oropharyngeal erythema  Eyes:    No scleral icterus   No discharge b/l   Conjunctivae normal    Pulmonary:  Pulmonary effort is normal  No respiratory distress     GI: abdomen not distended; in diaper  Musculoskeletal: no gross deformities  Skin:   Skin is not pale    Psychiatric:       Mood normal  Affect congruent     Neurologic Exam:   Mental Status   Alert and oriented to person and place but not time  Attention is normal  Speech is fluent w/ mild if any dysarthria      Cranial Nerves   CN II patient legally blind at baseline from diabetic complications but can see some shadows and identify when a person is in front of her  CN III, IV, VI pupils constricted and minimally reactive, EOMI, no CN III palsy, no CN VI palsy  no nystagmus   CN V   Facial sensation symmetric  CN VII  Facial expression full, symmetric  CN VIII, IX, X Palate symmetric  CN XI trapezius strength symmetric  CN XII symmetric lingual protrusion     Motor Exam   Muscle bulk and tone grossly normal; Pronator drift absent b/l  Strength intact and symmetric BUE  Intact RLE  Limited exam LLE 2/2 hip replacement     Sensory Exam   Light touch intact and symmetric BUE/BLE       Gait, Coordination, and Reflexes   FTN normal b/l; no significant tremor observed    LABORATORY DATA     Labs: I have personally reviewed pertinent reports  Results from last 7 days   Lab Units 10/13/22  0518 10/11/22  0542 10/10/22  0457   WBC Thousand/uL 6 93 6 86 6 35   HEMOGLOBIN g/dL 10 6* 10 0* 9 5*   HEMATOCRIT % 34 0* 31 9* 30 5*   PLATELETS Thousands/uL 222 220 191   NEUTROS PCT % 60 69  --    MONOS PCT % 9 10  --       Results from last 7 days   Lab Units 10/13/22  0518 10/12/22  1006 10/11/22  0542   POTASSIUM mmol/L 3 9 4 2 3 8   CHLORIDE mmol/L 105 104 106   CO2 mmol/L 26 30 30   BUN mg/dL 29* 28* 26*   CREATININE mg/dL 2 19* 2 17* 2 12*   CALCIUM mg/dL 8 3* 8 5 8 3*       Lab Results   Component Value Date    HGBA1C 7 1 (H) 10/09/2022    LDLCALC 49 10/09/2022     IMAGING & DIAGNOSTIC TESTING     Radiology Results: I have personally reviewed pertinent reports     and I have personally reviewed pertinent films in PACS  CT head wo contrast   Final Result by Lorraine Vanegas MD (10/12 1314)      No acute intracranial abnormality  Chronic microangiopathic changes  Workstation performed: [id number]         MRI brain wo contrast   Final Result by Farshad Mireles MD (10/10 1452)         1  Innumerable foci of blooming artifact most suggestive of amyloid angiopathy  Differential diagnosis could include sequela of chronic hypertension/chronic hypertensive encephalopathy  No acute intracranial hemorrhage  2   No acute infarction  3   Moderate, chronic microangiopathy  4   Chronic appearing bilateral pontine lacunar infarctions increased from the prior study  Workstation performed: [id number]           Other Diagnostic Testing: I have personally reviewed pertinent reports        ACTIVE MEDICATIONS     Current Facility-Administered Medications   Medication Dose Route Frequency   • acetaminophen (TYLENOL) tablet 650 mg  650 mg Oral Q6H PRN   • amLODIPine (NORVASC) tablet 10 mg  10 mg Oral Daily   • aspirin chewable tablet 81 mg  81 mg Oral Daily   • atorvastatin (LIPITOR) tablet 40 mg  40 mg Oral QPM   • carvedilol (COREG) tablet 25 mg  25 mg Oral BID With Meals   • cholecalciferol (VITAMIN D3) tablet 2,000 Units  2,000 Units Oral Daily   • divalproex sodium (DEPAKOTE) EC tablet 250 mg  250 mg Oral Q12H RADHA   • fluticasone (FLONASE) 50 mcg/act nasal spray 1 spray  1 spray Nasal Daily   • folic acid (FOLVITE) tablet 400 mcg  400 mcg Oral Daily   • heparin (porcine) subcutaneous injection 5,000 Units  5,000 Units Subcutaneous Q8H [address]   • hydrALAZINE (APRESOLINE) injection 5 mg  5 mg Intravenous Q6H PRN   • hydrALAZINE (APRESOLINE) tablet 25 mg  25 mg Oral Q8H [address]   • insulin glargine (LANTUS) subcutaneous injection 12 Units 0 12 mL  12 Units Subcutaneous HS   • insulin lispro (HumaLOG) 100 units/mL subcutaneous injection 1-5 Units  1-5 Units Subcutaneous HS   • insulin lispro (HumaLOG) 100 units/mL subcutaneous injection 1-6 Units  1-6 Units Subcutaneous TID AC   • meclizine (ANTIVERT) tablet 12 5 mg  12 5 mg Oral Q8H PRN   • melatonin tablet 3 mg  3 mg Oral HS   • ondansetron (ZOFRAN) injection 4 mg  4 mg Intravenous Q6H PRN   • ranolazine (RANEXA) 12 hr tablet 500 mg  500 mg Oral Daily   • senna (SENOKOT) tablet 8 6 mg  1 tablet Oral HS PRN   • sodium chloride 0 9 % infusion  50 mL/hr Intravenous Continuous     VTE Pharmacologic Prophylaxis: ok to be on vte ppx with the understand of risk benefit balance  VTE Mechanical Prophylaxis: sequential compression device    ==  Orval MD Kumar  Resident, Neurology, [address] [place] [address]

## 2022-10-13 NOTE — ASSESSMENT & PLAN NOTE
Lab Results   Component Value Date    HGBA1C 7 1 (H) 10/09/2022       Recent Labs     10/12/22  2026 10/13/22  0827 10/13/22  1044 10/13/22  1619   POCGLU 172* 60* 114 130       Blood Sugar Average: Last 72 hrs:  (P) 195 [id number]     · BG in 40s on arrival to ED, improved without intervention  · Home regimen:  Lantus 12 units q h s  will resume this dose now  · SSI coverage with Accu-Cheks for now  · Hypoglycemia protocol  Monitor cautiously    Overall blood sugars are within goal range of 140-180 but with occasional significant spikes   · Diabetic diet

## 2022-10-13 NOTE — ASSESSMENT & PLAN NOTE
· Resolved- at baseline today  · head CT- negative for acute findings  · EEG outpatient  · Neurology consultation appreciated- symptoms suspicious for lewy body disease  Patient started on Depakote 250 mg BID     · Outpatient follow-up with neurology

## 2022-10-14 ENCOUNTER — APPOINTMENT (OUTPATIENT)
Dept: PHYSICAL THERAPY | Facility: CLINIC | Age: 77
End: 2022-10-14

## 2022-10-14 ENCOUNTER — HOME HEALTH ADMISSION (OUTPATIENT)
Dept: HOME HEALTH SERVICES | Facility: HOME HEALTHCARE | Age: 77
End: 2022-10-14

## 2022-10-14 ENCOUNTER — TRANSITIONAL CARE MANAGEMENT (OUTPATIENT)
Dept: FAMILY MEDICINE CLINIC | Facility: CLINIC | Age: 77
End: 2022-10-14

## 2022-10-14 VITALS
HEART RATE: 67 BPM | WEIGHT: 183 LBS | SYSTOLIC BLOOD PRESSURE: 140 MMHG | TEMPERATURE: 98.7 F | OXYGEN SATURATION: 96 % | HEIGHT: 63 IN | BODY MASS INDEX: 32.43 KG/M2 | RESPIRATION RATE: 18 BRPM | DIASTOLIC BLOOD PRESSURE: 65 MMHG

## 2022-10-14 LAB
ANION GAP SERPL CALCULATED.3IONS-SCNC: 7 MMOL/L (ref 4–13)
BUN SERPL-MCNC: 33 MG/DL (ref 5–25)
CALCIUM SERPL-MCNC: 7.8 MG/DL (ref 8.4–10.2)
CHLORIDE SERPL-SCNC: 108 MMOL/L (ref 96–108)
CO2 SERPL-SCNC: 25 MMOL/L (ref 21–32)
CREAT SERPL-MCNC: 2.42 MG/DL (ref 0.6–1.3)
GFR SERPL CREATININE-BSD FRML MDRD: 18 ML/MIN/1.73SQ M
GLUCOSE SERPL-MCNC: 56 MG/DL (ref 65–140)
GLUCOSE SERPL-MCNC: 71 MG/DL (ref 65–140)
POTASSIUM SERPL-SCNC: 4.1 MMOL/L (ref 3.5–5.3)
SODIUM SERPL-SCNC: 140 MMOL/L (ref 135–147)

## 2022-10-14 PROCEDURE — 80048 BASIC METABOLIC PNL TOTAL CA: CPT | Performed by: PHYSICIAN ASSISTANT

## 2022-10-14 PROCEDURE — 99238 HOSP IP/OBS DSCHRG MGMT 30/<: CPT | Performed by: PHYSICIAN ASSISTANT

## 2022-10-14 PROCEDURE — 82948 REAGENT STRIP/BLOOD GLUCOSE: CPT

## 2022-10-14 RX ORDER — ATORVASTATIN CALCIUM 40 MG/1
40 TABLET, FILM COATED ORAL EVERY EVENING
Qty: 30 TABLET | Refills: 0 | Status: SHIPPED | OUTPATIENT
Start: 2022-10-14 | End: 2022-10-21 | Stop reason: SDUPTHER

## 2022-10-14 RX ORDER — AMLODIPINE BESYLATE 10 MG/1
10 TABLET ORAL DAILY
Qty: 30 TABLET | Refills: 0 | Status: SHIPPED | OUTPATIENT
Start: 2022-10-15 | End: 2022-10-21 | Stop reason: SDUPTHER

## 2022-10-14 RX ORDER — DIVALPROEX SODIUM 250 MG/1
250 TABLET, DELAYED RELEASE ORAL EVERY 12 HOURS SCHEDULED
Qty: 60 TABLET | Refills: 0 | Status: SHIPPED | OUTPATIENT
Start: 2022-10-14 | End: 2022-10-21 | Stop reason: SDUPTHER

## 2022-10-14 RX ORDER — HYDRALAZINE HYDROCHLORIDE 25 MG/1
25 TABLET, FILM COATED ORAL EVERY 8 HOURS SCHEDULED
Qty: 90 TABLET | Refills: 0 | Status: SHIPPED | OUTPATIENT
Start: 2022-10-14 | End: 2022-10-21 | Stop reason: SDUPTHER

## 2022-10-14 RX ADMIN — RANOLAZINE 500 MG: 500 TABLET, EXTENDED RELEASE ORAL at 08:00

## 2022-10-14 RX ADMIN — AMLODIPINE BESYLATE 10 MG: 10 TABLET ORAL at 08:00

## 2022-10-14 RX ADMIN — CARVEDILOL 25 MG: 12.5 TABLET, FILM COATED ORAL at 08:00

## 2022-10-14 RX ADMIN — Medication 2000 UNITS: at 08:00

## 2022-10-14 RX ADMIN — FOLIC ACID TAB 400 MCG 400 MCG: 400 TAB at 09:41

## 2022-10-14 RX ADMIN — HEPARIN SODIUM 5000 UNITS: 5000 INJECTION INTRAVENOUS; SUBCUTANEOUS at 05:07

## 2022-10-14 RX ADMIN — HYDRALAZINE HYDROCHLORIDE 25 MG: 25 TABLET ORAL at 05:08

## 2022-10-14 RX ADMIN — DIVALPROEX SODIUM 250 MG: 250 TABLET, DELAYED RELEASE ORAL at 09:41

## 2022-10-14 RX ADMIN — ASPIRIN 81 MG CHEWABLE TABLET 81 MG: 81 TABLET CHEWABLE at 08:00

## 2022-10-14 NOTE — CASE MANAGEMENT
Case Management Discharge Planning Note    Patient name Isma Vickers  Location S /S -64 MRN [id number]  : 1945 Date 10/14/2022       Current Admission Date: 10/8/2022  Current Admission Diagnosis:Encephalopathy acute   Patient Active Problem List    Diagnosis Date Noted   • Encephalopathy acute 10/12/2022   • Stroke-like symptoms 10/08/2022   • Acute kidney injury superimposed on chronic kidney disease 3/4 10/08/2022   • History of fracture of left hip 10/08/2022   • Left thyroid nodule 10/08/2022   • CHF (congestive heart failure) ([facility] [place] [facility] ) 2022   • Elevated d-dimer 2022   • Atrial flutter, paroxysmal ([place] [id number] ) 2022   • Full code status 2022   • Lower obstructive uropathy 2022   • Acute blood loss anemia (ABLA) 2022   • MI (myocardial infarction) ([facility] [place] [facility] ) 2022   • Cardiac pacemaker in situ 2022   • H/O heart artery stent 2022   • Closed left hip fracture ([facility] [place] [facility] ) 2022   • Blind 2022   • Diabetes mellitus type 2, insulin dependent ([facility] [place] [facility] ) 2022   • Hypertension 2022   • CAD (coronary artery disease) 2022   • Atrial fibrillation ([facility] [place] [facility] ) 2022   • Anemia of chronic kidney failure, stage 4 (severe) ([facility] [place] [facility] ) 2022   • Vertigo 2022   • Sick sinus syndrome ([facility] ) 2021   • Obesity, morbid ([facility] [place] [facility] ) 2021   • 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[facility] [place] [facility] ) 2021   • Multiple thyroid nodules 2020   • Hypokalemia 2020   • Closed head injury 2020   • Heterozygous MTHFR mutation [id number] 2019   • Stage 3 chronic kidney disease ([facility] ) 2019   • Stage 3b chronic kidney disease ([facility] ) 2019      LOS (days): 5  Geometric Mean LOS (GMLOS) (days): 2 90  Days to GMLOS:-2     OBJECTIVE:  Risk of Unplanned Readmission Score: 29 26         Current admission status: Inpatient   Preferred Pharmacy:   [id number] [address] [address]  Phone: [phone] Fax: [phone]    Primary Care Provider: Mary Robbins MD    Primary Insurance: [facility]  Secondary Insurance:     DISCHARGE DETAILS:    Discharge planning discussed with[de-identified] patient at bedside; son, Sydni Cobb, by phone  Freedom of Choice: Yes (re: home care)  Comments - [place] of Choice: [facility] able to accept; is family's preferred agency  CM contacted family/caregiver?: Yes (son, Sydni Cobb)  Were Treatment Team discharge recommendations reviewed with patient/caregiver?: Yes  Did patient/caregiver verbalize understanding of patient care needs?: Yes  Were patient/caregiver advised of the risks associated with not following Treatment Team discharge recommendations?: Yes    Contacts  Patient Contacts: simona Tovar  Relationship to Patient[de-identified] Family  Contact Method: Phone  Phone Number: [phone]  Reason/Outcome: Continuity of Care, Emergency Contact, Referral, Discharge [address] [facility] [place]         Is the patient interested in [facility] at discharge?: Yes  [facility] [facility]ley Shipman requested[de-identified] Nursing, Occupational Therapy, Physical [address] Name[de-identified] [facility] Provider[de-identified] PCP  Home Health Services Needed[de-identified] Evaluate Functional Status and Safety, Gait/ADL Training, Strengthening/Theraputic Exercises to Improve Function  Homebound Criteria Met[de-identified] Uses an Assist Device (i e  cane, walker, etc), Requires the Assistance of Another Person for Safe Ambulation or to Leave the Home  Supporting Clincal Findings[de-identified] Fatigues Easliy in [employer], Limited Endurance    DME Referral Provided  Referral made for DME?: No    Other Referral/Resources/Interventions Provided:  Interventions: [facility]  Referral Comments: [facility] confirmed ability to accept patient via [address]; informed of anticipated d/c for today  Met with patient at bedside and reviewed IMM; copy provided   Patient aware that [facility] able to accept for home care and in agreement with same  Call made to patient's son, Libia Lorenzo, to relay d/c for today and home care services through [address] [facility]; son confirmed agreement with plan and reports he will be to the hospital between 10:15-10:30 to transport patient home  IMM reviewed with son; confirmed agreement with d/c for this morning  Information for [facility] added to AVS for their records      Would you like to participate in our [address] service program?  : No - Declined    Treatment Team Recommendation: SNF, Short Term Rehab  Discharge Destination Plan[de-identified] Home with [place] at Discharge : Family                             IMM Given (Date):: 10/14/22  IMM Given to[de-identified] Patient (and reviewed with family)

## 2022-10-14 NOTE — ASSESSMENT & PLAN NOTE
Lab Results   Component Value Date    EGFR 18 10/14/2022    EGFR 21 10/13/2022    EGFR 21 10/12/2022    CREATININE 2 42 (H) 10/14/2022    CREATININE 2 19 (H) 10/13/2022    CREATININE 2 17 (H) 10/12/2022     · Baseline creatinine is around 1 6-1 9  · Noted to be 2 22 on admission likely due to contrast nephropathy from recent CT contrast done on October 8th  Patient received gentle IVF but creatinine remains above baseline    · Follow up with [address] nephrology

## 2022-10-14 NOTE — ASSESSMENT & PLAN NOTE
· Anemia of chronic disease with CKD, baseline Hgb 7-9  · Hgb remains stable, no active bleeding    · Continue folate

## 2022-10-14 NOTE — ASSESSMENT & PLAN NOTE
· Incidental finding  · TSH wnl  · Will likely need outpatient thyroid ultrasound as an outpatient  · Outpatient follow-up with primary care physician

## 2022-10-14 NOTE — ASSESSMENT & PLAN NOTE
Patient came in due to brief episode of right facial droop,slurring of speech and difficulty drinking water  · Also consider hypoglycemia contributing on admission  · Stroke workup/pathway:  · CTA of the head and neck unremarkable  · MRI brain:  Innumerable foci of blooming artifact most suggestive of amyloid angiopathy  Differential diagnosis could include sequela of chronic hypertension/chronic hypertensive encephalopathy  No acute intracranial hemorrhage or infarction  Moderate, chronic microangiopathy  Chronic appearing bilateral pontine lacunar infarctions increased from the prior study  · TTE with bubble study normal  · Appreciate Neurology and Cardiology input-patient was taken off of Xarelto due to high bleeding risk based on her MRI and is to remain on aspirin/statin alone  · PT/OT and speech therapy consults appreciated    · The patient was discharged home with home PT/OT  · Patient's son instructed to have patient follow-up with neurology in the office and PCP

## 2022-10-14 NOTE — PLAN OF CARE
Problem: MOBILITY - ADULT  Goal: Maintain or return to baseline ADL function  Description: INTERVENTIONS:  -  Assess patient's ability to carry out ADLs; assess patient's baseline for ADL function and identify physical deficits which impact ability to perform ADLs (bathing, care of mouth/teeth, toileting, grooming, dressing, etc )  - Assess/evaluate cause of self-care deficits   - Assess range of motion  - Assess patient's mobility; develop plan if impaired  - Assess patient's need for assistive devices and provide as appropriate  - Encourage maximum independence but intervene and supervise when necessary  - Involve family in performance of ADLs  - Assess for home care needs following discharge   - Consider OT consult to assist with ADL evaluation and planning for discharge  - Provide patient education as appropriate  Outcome: Progressing  Goal: Maintains/Returns to pre admission functional level  Description: INTERVENTIONS:  - Perform BMAT or MOVE assessment daily    - Set and communicate daily mobility goal to care team and patient/family/caregiver  - Collaborate with rehabilitation services on mobility goals if consulted  - Perform Range of Motion  times a day  - Reposition patient every  hours    - Dangle patient  times a day  - Stand patient  times a day  - Ambulate patient  times a day  - Out of bed to chair  times a day   - Out of bed for meals times a day  - Out of bed for toileting  - Record patient progress and toleration of activity level   Outcome: Progressing     Problem: NEUROSENSORY - ADULT  Goal: Achieves stable or improved neurological status  Description: INTERVENTIONS  - Monitor and report changes in neurological status  - Monitor vital signs such as temperature, blood pressure, glucose, and any other labs ordered   - Initiate measures to prevent increased intracranial pressure  - Monitor for seizure activity and implement precautions if appropriate      Outcome: Progressing  Goal: Remains free of injury related to seizures activity  Description: INTERVENTIONS  - Maintain airway, patient safety  and administer oxygen as ordered  - Monitor patient for seizure activity, document and report duration and description of seizure to physician/advanced practitioner  - If seizure occurs,  ensure patient safety during seizure  - Reorient patient post seizure  - Seizure pads on all 4 side rails  - Instruct patient/family to notify RN of any seizure activity including if an aura is experienced  - Instruct patient/family to call for assistance with activity based on nursing assessment  - Administer anti-seizure medications if ordered    Outcome: Progressing  Goal: Achieves maximal functionality and self care  Description: INTERVENTIONS  - Monitor swallowing and airway patency with patient fatigue and changes in neurological status  - Encourage and assist patient to increase activity and self care     - Encourage visually impaired, hearing impaired and aphasic patients to use assistive/communication devices  Outcome: Progressing     Problem: Prexisting or High Potential for Compromised Skin Integrity  Goal: Skin integrity is maintained or improved  Description: INTERVENTIONS:  - Identify patients at risk for skin breakdown  - Assess and monitor skin integrity  - Assess and monitor nutrition and hydration status  - Monitor labs   - Assess for incontinence   - Turn and reposition patient  - Assist with mobility/ambulation  - Relieve pressure over bony prominences  - Avoid friction and shearing  - Provide appropriate hygiene as needed including keeping skin clean and dry  - Evaluate need for skin moisturizer/barrier cream  - Collaborate with interdisciplinary team   - Patient/family teaching  - Consider wound care consult   Outcome: Progressing     Problem: Nutrition/Hydration-ADULT  Goal: Nutrient/Hydration intake appropriate for improving, restoring or maintaining nutritional needs  Description: Monitor and assess patient's nutrition/hydration status for malnutrition  Collaborate with interdisciplinary team and initiate plan and interventions as ordered  Monitor patient's weight and dietary intake as ordered or per policy  Utilize nutrition screening tool and intervene as necessary  Determine patient's food preferences and provide high-protein, high-caloric foods as appropriate       INTERVENTIONS:  - Monitor oral intake, urinary output, labs, and treatment plans  - Assess nutrition and hydration status and recommend course of action  - Evaluate amount of meals eaten  - Assist patient with eating if necessary   - Allow adequate time for meals  - Recommend/ encourage appropriate diets, oral nutritional supplements, and vitamin/mineral supplements  - Order, calculate, and assess calorie counts as needed  - Recommend, monitor, and adjust tube feedings and TPN/PPN based on assessed needs  - Assess need for intravenous fluids  - Provide specific nutrition/hydration education as appropriate  - Include patient/family/caregiver in decisions related to nutrition  Outcome: Progressing     Problem: Potential for Falls  Goal: Patient will remain free of falls  Description: INTERVENTIONS:  - Educate patient/family on patient safety including physical limitations  - Instruct patient to call for assistance with activity   - Consult OT/PT to assist with strengthening/mobility   - Keep Call bell within reach  - Keep bed low and locked with side rails adjusted as appropriate  - Keep care items and personal belongings within reach  - Initiate and maintain comfort rounds  - Make Fall Risk Sign visible to staff  - Offer Toileting every  Hours, in advance of need  - Initiate/Maintain alarm  - Obtain necessary fall risk management equipment:   - Apply yellow socks and bracelet for high fall risk patients  - Consider moving patient to room near nurses station  Outcome: Progressing

## 2022-10-14 NOTE — DISCHARGE SUMMARY
[facility]  Discharge- Inocencio Thomas 1945, 68 y o  female MRN: [id number]  Unit/Bed#: S -01 Encounter: [id number]  Primary Care Provider: Ethan Rutledge MD   Date and time admitted to hospital: 10/8/2022  7:01 AM    Stroke-like symptoms  Assessment & Plan  Patient came in due to brief episode of right facial droop,slurring of speech and difficulty drinking water  · Also consider hypoglycemia contributing on admission  · Stroke workup/pathway:  · CTA of the head and neck unremarkable  · MRI brain:  Innumerable foci of blooming artifact most suggestive of amyloid angiopathy  Differential diagnosis could include sequela of chronic hypertension/chronic hypertensive encephalopathy  No acute intracranial hemorrhage or infarction  Moderate, chronic microangiopathy  Chronic appearing bilateral pontine lacunar infarctions increased from the prior study  · TTE with bubble study normal  · Appreciate Neurology and Cardiology input-patient was taken off of Xarelto due to high bleeding risk based on her MRI and is to remain on aspirin/statin alone  · PT/OT and speech therapy consults appreciated  · The patient was discharged home with home PT/OT  · Patient's son instructed to have patient follow-up with neurology in the office and PCP      * Encephalopathy acute  Assessment & Plan  · Resolved- at baseline   · head CT- negative for acute findings  · EEG outpatient  · Neurology consultation appreciated- symptoms suspicious for lewy body disease  Patient started on Depakote 250 mg BID  The patient was discharged on this    · Outpatient follow-up with neurology    Acute kidney injury superimposed on chronic kidney disease 3/4  Assessment & Plan  Lab Results   Component Value Date    EGFR 18 10/14/2022    EGFR 21 10/13/2022    EGFR 21 10/12/2022    CREATININE 2 42 (H) 10/14/2022    CREATININE 2 19 (H) 10/13/2022    CREATININE 2 17 (H) 10/12/2022     · Baseline creatinine is around 1 6-1 9  · Noted to be 2 22 on admission likely due to contrast nephropathy from recent CT contrast done on October 8th  Patient received gentle IVF but creatinine remains above baseline  · Follow up with [address] nephrology    Atrial fibrillation [facility])  Assessment & Plan  · Continue beta-blocker  · After discussion between Neurology and Cardiology, Xarelto was discontinued due to high risk of intracranial bleeding      Left thyroid nodule  Assessment & Plan  · Incidental finding  · TSH wnl  · Will likely need outpatient thyroid ultrasound as an outpatient  · Outpatient follow-up with primary care physician  CAD (coronary artery disease)  Assessment & Plan  · Continue Coreg, Ranexa, ASA, statin    Hypertension  Assessment & Plan  · Significant hypertension in the ED, SBP >200s  · Home medication regimen: Norvasc, Procardia, Coreg  · Initially patient was allowed permissive hypertension in the setting of acute stroke workup  Given results of MRI, management of blood pressure is critically important  Continue Norvasc 10 mg daily, Coreg 25 mg p o  B i d , hydralazine 25 mg t i d - patient discharged on this regimen  · Outpatient follow-up with PCP      Diabetes mellitus type 2, insulin dependent [facility])  Assessment & Plan  Lab Results   Component Value Date    HGBA1C 7 1 (H) 10/09/2022       Recent Labs     10/13/22  1044 10/13/22  1619 10/13/22  2109 10/14/22  0732   POCGLU 114 130 211* 71       Blood Sugar Average: Last 72 hrs:  (P) 172 [id number]     · BG in 40s on arrival to ED, improved without intervention  · Home regimen:  Lantus 12 units q h s  will resume this  Anemia of chronic kidney failure, stage 4 (severe) ([facility])  Assessment & Plan  · Anemia of chronic disease with CKD, baseline Hgb 7-9  · Hgb remains stable, no active bleeding    · Continue folate      Medical Problems             Resolved Problems  Date Reviewed: 10/14/2022   None               Discharging Physician / Practitioner: Bee Alex Junior Armenta  PCP: Shanthi Mazariegos MD  Admission Date:   Admission Orders (From admission, onward)     Ordered        10/09/22 1153  Inpatient Admission  Once            10/08/22 0943  Place in Observation  Once                      Discharge Date: 10/14/22    Consultations During Hospital Stay:  · Neurology  · Cardiology    Procedures Performed:   · none    Significant Findings / Test Results:   CT head wo contrast    Result Date: 10/12/2022  Impression: No acute intracranial abnormality  Chronic microangiopathic changes  Workstation performed: [id number]     MRI brain wo contrast    Result Date: 10/10/2022  · Impression: 1  Innumerable foci of blooming artifact most suggestive of amyloid angiopathy  Differential diagnosis could include sequela of chronic hypertension/chronic hypertensive encephalopathy  No acute intracranial hemorrhage  2   No acute infarction  3   Moderate, chronic microangiopathy  4   Chronic appearing bilateral pontine lacunar infarctions increased from the prior study  Workstation performed: [id number]   ·     Incidental Findings:   · none     Test Results Pending at Discharge (will require follow up):   · none     Outpatient Tests Requested:  · none    Complications:  none    Reason for Admission: stroke-like symptoms    Hospital Course:   Sergio Santo is a 68 y o  female patient who originally presented to the hospital on 10/8/2022 due to stroke-like symptoms  Please see H&P by Dr Leonila Sandoval dated 10/8/2022 for complete details of presentation  The patient was admitted on the stroke pathway  Neurology consulted  MRI negative for acute CVA but showed Innumerable foci of blooming artifact most suggestive of amyloid angiopathy  Neurology recommended stopping anticoagulation  Cardiology consulted and agreed with holding anticoagulation and patient to remain on aspirin alone  The patient's son the expressed concern stating the patient was having hallucinations  CT head negative   Neurology started the patient on Depakote 250 mg twice a day and felt symptoms were suspicious for Lewy body disease for which an outpatient follow-up will neurology will be required  The patient's son instructed to follow-up with PCP and cardiology as well  Please see above list of diagnoses and related plan for additional information  Condition at Discharge: fair    Discharge Day Visit / Exam:   Subjective:    Vitals: Blood Pressure: 140/65 (10/14/22 0732)  Pulse: 67 (10/14/22 0732)  Temperature: 98 7 °F (37 1 °C) (10/14/22 0732)  Temp Source: Oral (10/14/22 0732)  Respirations: 18 (10/14/22 0732)  Height: 5' 3" (160 cm) (10/10/22 1157)  Weight - Scale: 83 kg (183 lb) (10/10/22 1157)  SpO2: 96 % (10/14/22 0732)  Exam:   Physical Exam  Vitals and nursing note reviewed  Constitutional:       Appearance: Normal appearance  HENT:      Head: Normocephalic and atraumatic  Cardiovascular:      Rate and Rhythm: Normal rate and regular rhythm  Heart sounds: Normal heart sounds  Pulmonary:      Effort: Pulmonary effort is normal       Breath sounds: Normal breath sounds  Abdominal:      General: There is no distension  Palpations: Abdomen is soft  Tenderness: There is no abdominal tenderness  Skin:     General: Skin is warm and dry  Neurological:      General: No focal deficit present  Mental Status: She is alert  Mental status is at baseline  Discussion with Family: Updated  (son) at bedside  Discharge instructions/Information to patient and family:   See after visit summary for information provided to patient and family  Provisions for Follow-Up Care:  See after visit summary for information related to follow-up care and any pertinent home health orders  Disposition:   Home with VNA Services (Reminder: Complete face to face encounter)    Planned Readmission: no     Discharge Statement:  I spent 25 minutes discharging the patient   This time was spent on the day of discharge  I had direct contact with the patient on the day of discharge  Greater than 50% of the total time was spent examining patient, answering all patient questions, arranging and discussing plan of care with patient as well as directly providing post-discharge instructions  Additional time then spent on discharge activities  Discharge Medications:  See after visit summary for reconciled discharge medications provided to patient and/or family        **Please Note: This note may have been constructed using a voice recognition system**

## 2022-10-14 NOTE — ASSESSMENT & PLAN NOTE
· Significant hypertension in the ED, SBP >200s  · Home medication regimen: Norvasc, Procardia, Coreg  · Initially patient was allowed permissive hypertension in the setting of acute stroke workup  Given results of MRI, management of blood pressure is critically important    Continue Norvasc 10 mg daily, Coreg 25 mg p o  B i d , hydralazine 25 mg t i d - patient discharged on this regimen  · Outpatient follow-up with PCP

## 2022-10-14 NOTE — PLAN OF CARE
Problem: MOBILITY - ADULT  Goal: Maintain or return to baseline ADL function  Description: INTERVENTIONS:  -  Assess patient's ability to carry out ADLs; assess patient's baseline for ADL function and identify physical deficits which impact ability to perform ADLs (bathing, care of mouth/teeth, toileting, grooming, dressing, etc )  - Assess/evaluate cause of self-care deficits   - Assess range of motion  - Assess patient's mobility; develop plan if impaired  - Assess patient's need for assistive devices and provide as appropriate  - Encourage maximum independence but intervene and supervise when necessary  - Involve family in performance of ADLs  - Assess for home care needs following discharge   - Consider OT consult to assist with ADL evaluation and planning for discharge  - Provide patient education as appropriate  Outcome: Completed  Goal: Maintains/Returns to pre admission functional level  Description: INTERVENTIONS:  - Perform BMAT or MOVE assessment daily    - Set and communicate daily mobility goal to care team and patient/family/caregiver  - Collaborate with rehabilitation services on mobility goals if consulted  - Perform Range of Motion  times a day  - Reposition patient every  hours    - Dangle patient  times a day  - Stand patient  times a day  - Ambulate patient  times a day  - Out of bed to chair  times a day   - Out of bed for meals  times a day  - Out of bed for toileting  - Record patient progress and toleration of activity level   Outcome: Completed     Problem: NEUROSENSORY - ADULT  Goal: Achieves stable or improved neurological status  Description: INTERVENTIONS  - Monitor and report changes in neurological status  - Monitor vital signs such as temperature, blood pressure, glucose, and any other labs ordered   - Initiate measures to prevent increased intracranial pressure  - Monitor for seizure activity and implement precautions if appropriate      Outcome: Completed  Goal: Remains free of injury related to seizures activity  Description: INTERVENTIONS  - Maintain airway, patient safety  and administer oxygen as ordered  - Monitor patient for seizure activity, document and report duration and description of seizure to physician/advanced practitioner  - If seizure occurs,  ensure patient safety during seizure  - Reorient patient post seizure  - Seizure pads on all 4 side rails  - Instruct patient/family to notify RN of any seizure activity including if an aura is experienced  - Instruct patient/family to call for assistance with activity based on nursing assessment  - Administer anti-seizure medications if ordered    Outcome: Completed  Goal: Achieves maximal functionality and self care  Description: INTERVENTIONS  - Monitor swallowing and airway patency with patient fatigue and changes in neurological status  - Encourage and assist patient to increase activity and self care     - Encourage visually impaired, hearing impaired and aphasic patients to use assistive/communication devices  Outcome: Completed     Problem: Prexisting or High Potential for Compromised Skin Integrity  Goal: Skin integrity is maintained or improved  Description: INTERVENTIONS:  - Identify patients at risk for skin breakdown  - Assess and monitor skin integrity  - Assess and monitor nutrition and hydration status  - Monitor labs   - Assess for incontinence   - Turn and reposition patient  - Assist with mobility/ambulation  - Relieve pressure over bony prominences  - Avoid friction and shearing  - Provide appropriate hygiene as needed including keeping skin clean and dry  - Evaluate need for skin moisturizer/barrier cream  - Collaborate with interdisciplinary team   - Patient/family teaching  - Consider wound care consult   Outcome: Completed     Problem: Nutrition/Hydration-ADULT  Goal: Nutrient/Hydration intake appropriate for improving, restoring or maintaining nutritional needs  Description: Monitor and assess patient's nutrition/hydration status for malnutrition  Collaborate with interdisciplinary team and initiate plan and interventions as ordered  Monitor patient's weight and dietary intake as ordered or per policy  Utilize nutrition screening tool and intervene as necessary  Determine patient's food preferences and provide high-protein, high-caloric foods as appropriate       INTERVENTIONS:  - Monitor oral intake, urinary output, labs, and treatment plans  - Assess nutrition and hydration status and recommend course of action  - Evaluate amount of meals eaten  - Assist patient with eating if necessary   - Allow adequate time for meals  - Recommend/ encourage appropriate diets, oral nutritional supplements, and vitamin/mineral supplements  - Order, calculate, and assess calorie counts as needed  - Recommend, monitor, and adjust tube feedings and TPN/PPN based on assessed needs  - Assess need for intravenous fluids  - Provide specific nutrition/hydration education as appropriate  - Include patient/family/caregiver in decisions related to nutrition  Outcome: Completed     Problem: Potential for Falls  Goal: Patient will remain free of falls  Description: INTERVENTIONS:  - Educate patient/family on patient safety including physical limitations  - Instruct patient to call for assistance with activity   - Consult OT/PT to assist with strengthening/mobility   - Keep Call bell within reach  - Keep bed low and locked with side rails adjusted as appropriate  - Keep care items and personal belongings within reach  - Initiate and maintain comfort rounds  - Make Fall Risk Sign visible to staff  - Offer Toileting every  Hours, in advance of need  - Initiate/Maintain alarm  - Obtain necessary fall risk management equipment:  Apply yellow socks and bracelet for high fall risk patients  - Consider moving patient to room near nurses station  Outcome: Completed

## 2022-10-14 NOTE — ASSESSMENT & PLAN NOTE
· Resolved- at baseline   · head CT- negative for acute findings  · EEG outpatient  · Neurology consultation appreciated- symptoms suspicious for lewy body disease  Patient started on Depakote 250 mg BID  The patient was discharged on this    · Outpatient follow-up with neurology

## 2022-10-16 ENCOUNTER — HOME CARE VISIT (OUTPATIENT)
Dept: HOME HEALTH SERVICES | Facility: HOME HEALTHCARE | Age: 77
End: 2022-10-16
Payer: COMMERCIAL

## 2022-10-16 VITALS
SYSTOLIC BLOOD PRESSURE: 130 MMHG | TEMPERATURE: 98 F | HEART RATE: 61 BPM | DIASTOLIC BLOOD PRESSURE: 70 MMHG | WEIGHT: 183 LBS | BODY MASS INDEX: 32.43 KG/M2 | OXYGEN SATURATION: 97 % | HEIGHT: 63 IN | RESPIRATION RATE: 18 BRPM

## 2022-10-16 PROCEDURE — G0299 HHS/HOSPICE OF RN EA 15 MIN: HCPCS

## 2022-10-16 PROCEDURE — 400013 VN SOC

## 2022-10-17 NOTE — CASE COMMUNICATION
[facility] has admitted your patient to [address] [place] [address] service with the following disciplines:      SN, PT and OT  This report is informational only, no responses is needed  Primary focus of home health care- Cardiac   Patient stated goals of care- Improve mobility and fatigue  Anticipated visit pattern and next visit date- 10/19 3w1 2w1 1w3   See medication list - meds in home differ from AVS  Significant clinical findings  Ti bhatt barriers to goal achievement- limited mobility  Fatigues easily  Other pertinent information    Thank you for allowing us to participate in the care of your patient

## 2022-10-17 NOTE — UTILIZATION REVIEW
NOTIFICATION OF ADMISSION DISCHARGE   This is a Notification of Discharge from [address] [place] [address]  Please be advised that this patient has been discharge from our facility  Below you will find the admission and discharge date and time including the patient’s disposition  UTILIZATION REVIEW CONTACT:  Lynette Fuentes MA  Utilization   Network Utilization Review Department  Phone: [phone] x carefully listen to the prompts  All voicemails are confidential   Email: [email or web address]     ADMISSION INFORMATION  PRESENTATION DATE: 10/8/2022  7:01 AM  OBERVATION ADMISSION DATE:   INPATIENT ADMISSION DATE: 10/9/22 11:53 AM   DISCHARGE DATE: 10/14/2022 11:45 AM  DISPOSITION: Home with [place] with [address] INFORMATION:  Send all requests for admission clinical reviews, approved or denied determinations and any other requests to dedicated fax number below belonging to the campus where the patient is receiving treatment   List of dedicated fax numbers:  [address] DENIALS (Administrative/Medical Necessity) [phone]   [address] (Maternity/NICU/Pediatrics) [phone]   [facility] [phone]   [place] [address] [phone]   [address] [phone]   220 [facility] [phone]   [address] [facility] [phone]   [address] [place] [address] [phone]   [facility]  [phone]   [address] [facility] [phone] [address] [facility] [address] [facility] [phone]

## 2022-10-18 ENCOUNTER — HOME CARE VISIT (OUTPATIENT)
Dept: HOME HEALTH SERVICES | Facility: HOME HEALTHCARE | Age: 77
End: 2022-10-18
Payer: COMMERCIAL

## 2022-10-18 VITALS — SYSTOLIC BLOOD PRESSURE: 133 MMHG | HEART RATE: 66 BPM | DIASTOLIC BLOOD PRESSURE: 59 MMHG | OXYGEN SATURATION: 98 %

## 2022-10-18 DIAGNOSIS — Z71.89 COMPLEX CARE COORDINATION: Primary | ICD-10-CM

## 2022-10-18 PROCEDURE — G0152 HHCP-SERV OF OT,EA 15 MIN: HCPCS

## 2022-10-18 NOTE — CASE COMMUNICATION
OT evaluation completed this date and plan of care established for 2week2 and 1week1 to address ADL training, ADL transfer training, BUE ther ex for UB strengthening, low vision compensation education and patient and caregiver education  Anticipate discharge in current living situation with family as caregivers

## 2022-10-19 ENCOUNTER — HOME CARE VISIT (OUTPATIENT)
Dept: HOME HEALTH SERVICES | Facility: HOME HEALTHCARE | Age: 77
End: 2022-10-19
Payer: COMMERCIAL

## 2022-10-19 ENCOUNTER — APPOINTMENT (OUTPATIENT)
Dept: PHYSICAL THERAPY | Facility: CLINIC | Age: 77
End: 2022-10-19

## 2022-10-19 VITALS
RESPIRATION RATE: 20 BRPM | DIASTOLIC BLOOD PRESSURE: 68 MMHG | SYSTOLIC BLOOD PRESSURE: 124 MMHG | HEART RATE: 70 BPM | OXYGEN SATURATION: 96 %

## 2022-10-19 PROCEDURE — G0151 HHCP-SERV OF PT,EA 15 MIN: HCPCS

## 2022-10-19 PROCEDURE — G0299 HHS/HOSPICE OF RN EA 15 MIN: HCPCS

## 2022-10-20 ENCOUNTER — HOME CARE VISIT (OUTPATIENT)
Dept: HOME HEALTH SERVICES | Facility: HOME HEALTHCARE | Age: 77
End: 2022-10-20
Payer: COMMERCIAL

## 2022-10-20 VITALS
TEMPERATURE: 97.2 F | DIASTOLIC BLOOD PRESSURE: 58 MMHG | HEART RATE: 69 BPM | RESPIRATION RATE: 16 BRPM | OXYGEN SATURATION: 99 % | SYSTOLIC BLOOD PRESSURE: 124 MMHG

## 2022-10-20 PROCEDURE — G0151 HHCP-SERV OF PT,EA 15 MIN: HCPCS

## 2022-10-20 NOTE — CASE COMMUNICATION
Home PT Eval completed with supporti DiL present   Home PT plan focus on Transfers  Gait  Endurance  Balance  Strengthening and Steps training if able and Progresive Home Ex  Balance and Walk program   Plan 2w4 towards goals of improved gait strength and endurance  Pt will be scheduled with primary PT upon her return week of 10/31

## 2022-10-21 ENCOUNTER — APPOINTMENT (OUTPATIENT)
Dept: PHYSICAL THERAPY | Facility: CLINIC | Age: 77
End: 2022-10-21

## 2022-10-21 ENCOUNTER — TELEPHONE (OUTPATIENT)
Dept: NEUROLOGY | Facility: CLINIC | Age: 77
End: 2022-10-21

## 2022-10-21 ENCOUNTER — OFFICE VISIT (OUTPATIENT)
Dept: FAMILY MEDICINE CLINIC | Facility: CLINIC | Age: 77
End: 2022-10-21
Payer: COMMERCIAL

## 2022-10-21 ENCOUNTER — PATIENT OUTREACH (OUTPATIENT)
Dept: FAMILY MEDICINE CLINIC | Facility: CLINIC | Age: 77
End: 2022-10-21

## 2022-10-21 ENCOUNTER — HOME CARE VISIT (OUTPATIENT)
Dept: HOME HEALTH SERVICES | Facility: HOME HEALTHCARE | Age: 77
End: 2022-10-21
Payer: COMMERCIAL

## 2022-10-21 VITALS
WEIGHT: 183 LBS | RESPIRATION RATE: 16 BRPM | HEART RATE: 78 BPM | DIASTOLIC BLOOD PRESSURE: 78 MMHG | BODY MASS INDEX: 32.43 KG/M2 | HEIGHT: 63 IN | SYSTOLIC BLOOD PRESSURE: 124 MMHG | OXYGEN SATURATION: 98 %

## 2022-10-21 VITALS — DIASTOLIC BLOOD PRESSURE: 59 MMHG | SYSTOLIC BLOOD PRESSURE: 120 MMHG | OXYGEN SATURATION: 98 % | HEART RATE: 61 BPM

## 2022-10-21 DIAGNOSIS — R29.90 STROKE-LIKE SYMPTOMS: ICD-10-CM

## 2022-10-21 DIAGNOSIS — I25.10 CORONARY ARTERY DISEASE INVOLVING NATIVE CORONARY ARTERY OF NATIVE HEART WITHOUT ANGINA PECTORIS: ICD-10-CM

## 2022-10-21 DIAGNOSIS — E66.09 CLASS 1 OBESITY DUE TO EXCESS CALORIES WITH SERIOUS COMORBIDITY AND BODY MASS INDEX (BMI) OF 32.0 TO 32.9 IN ADULT: ICD-10-CM

## 2022-10-21 DIAGNOSIS — I10 PRIMARY HYPERTENSION: ICD-10-CM

## 2022-10-21 DIAGNOSIS — R44.3 HALLUCINATIONS: ICD-10-CM

## 2022-10-21 DIAGNOSIS — Z79.4 DIABETES MELLITUS TYPE 2, INSULIN DEPENDENT (HCC): ICD-10-CM

## 2022-10-21 DIAGNOSIS — Z23 NEED FOR VACCINATION: Primary | ICD-10-CM

## 2022-10-21 DIAGNOSIS — E11.9 DIABETES MELLITUS TYPE 2, INSULIN DEPENDENT (HCC): ICD-10-CM

## 2022-10-21 DIAGNOSIS — I48.91 ATRIAL FIBRILLATION, UNSPECIFIED TYPE (HCC): ICD-10-CM

## 2022-10-21 PROCEDURE — G0152 HHCP-SERV OF OT,EA 15 MIN: HCPCS

## 2022-10-21 PROCEDURE — G0008 ADMIN INFLUENZA VIRUS VAC: HCPCS | Performed by: FAMILY MEDICINE

## 2022-10-21 PROCEDURE — 99495 TRANSJ CARE MGMT MOD F2F 14D: CPT | Performed by: FAMILY MEDICINE

## 2022-10-21 PROCEDURE — 90662 IIV NO PRSV INCREASED AG IM: CPT | Performed by: FAMILY MEDICINE

## 2022-10-21 RX ORDER — RANOLAZINE 500 MG/1
500 TABLET, EXTENDED RELEASE ORAL DAILY
Qty: 90 TABLET | Refills: 1 | Status: SHIPPED | OUTPATIENT
Start: 2022-10-21 | End: 2022-11-20

## 2022-10-21 RX ORDER — AMLODIPINE BESYLATE 10 MG/1
10 TABLET ORAL DAILY
Qty: 90 TABLET | Refills: 1 | Status: SHIPPED | OUTPATIENT
Start: 2022-10-21

## 2022-10-21 RX ORDER — HYDRALAZINE HYDROCHLORIDE 25 MG/1
25 TABLET, FILM COATED ORAL EVERY 8 HOURS SCHEDULED
Qty: 270 TABLET | Refills: 1 | Status: SHIPPED | OUTPATIENT
Start: 2022-10-21

## 2022-10-21 RX ORDER — DIVALPROEX SODIUM 250 MG/1
250 TABLET, DELAYED RELEASE ORAL EVERY 12 HOURS SCHEDULED
Qty: 180 TABLET | Refills: 1 | Status: SHIPPED | OUTPATIENT
Start: 2022-10-21

## 2022-10-21 RX ORDER — CARVEDILOL 25 MG/1
25 TABLET ORAL EVERY 12 HOURS SCHEDULED
Qty: 180 TABLET | Refills: 1 | Status: SHIPPED | OUTPATIENT
Start: 2022-10-21

## 2022-10-21 RX ORDER — ATORVASTATIN CALCIUM 40 MG/1
40 TABLET, FILM COATED ORAL EVERY EVENING
Qty: 90 TABLET | Refills: 1 | Status: SHIPPED | OUTPATIENT
Start: 2022-10-21

## 2022-10-21 NOTE — TELEPHONE ENCOUNTER
SLAN/ STROKE LIKE SX, CEREBRAL AMYLOID ANGIOPATHY, HALLUCINATIONS & VIVID DREAMS          NOTES: Bertha Agee will need f/u in about 4-6 weeks with neurovascular or general attending/resident/AP  She will not need outpatient testing from inpatient standpoint but likely needs formal cognitive assessment          SCHEDULE: 1/10/23 @ 4PM W/DR Blanca CASTRO [facility]  ADDED TO THE WAIT LIST  PROVIDED SON W/ALL APPOINTMENT DETAILS/OFFICE ADDRESS/PHONE NUMBER  SENT APPOINTMENT CARD/DIRECTIONS  [facility]  Sending to [facility] for a possible sooner appointment

## 2022-10-21 NOTE — PROGRESS NOTES
Assessment/Plan:    Follow up with neuro   And contwith good bp control  Asa and statin for CAD/ TIA   Added depakote for suspected Lewy body dementia     + halucinations but not a danger to herself or others     1  Need for vaccination  -     influenza vaccine, high-dose, PF 0 7 mL (FLUZONE HIGH-DOSE)    2  Hallucinations  -     divalproex sodium (DEPAKOTE) 250 mg EC tablet; Take 1 tablet (250 mg total) by mouth every 12 (twelve) hours    3  Stroke-like symptoms  -     atorvastatin (LIPITOR) 40 mg tablet; Take 1 tablet (40 mg total) by mouth every evening    4  Coronary artery disease involving native coronary artery of native heart without angina pectoris  -     ranolazine (RANEXA) 500 mg 12 hr tablet; Take 1 tablet (500 mg total) by mouth daily  -     atorvastatin (LIPITOR) 40 mg tablet; Take 1 tablet (40 mg total) by mouth every evening  -     carvedilol (COREG) 25 mg tablet; Take 1 tablet (25 mg total) by mouth every 12 (twelve) hours    5  Primary hypertension  -     hydrALAZINE (APRESOLINE) 25 mg tablet; Take 1 tablet (25 mg total) by mouth every 8 (eight) hours  -     amLODIPine (NORVASC) 10 mg tablet; Take 1 tablet (10 mg total) by mouth daily    6  Diabetes mellitus type 2, insulin dependent (HCC)  -     Hemoglobin A1C; Future; Expected date: 01/21/2023  -     Lipid Panel with Direct LDL reflex; Future; Expected date: 01/21/2023  -     CBC and differential; Future; Expected date: 01/21/2023  -     Comprehensive metabolic panel; Future; Expected date: 01/21/2023    7  Atrial fibrillation, unspecified type (HCC)    8  Class 1 obesity due to excess calories with serious comorbidity and body mass index (BMI) of 32 0 to 32 9 in adult       Subjective:      Patient ID: Carrol Martinez is a 68 y o  female      HPI     Here with son for TCM  Presented after acute facial droop and weakness while sitting and talking with son   Sx resolved   After hip fx and repair PT at home going well   She can stand and walk a few steps now     Taken off xaralto due to many micro-hemorhages seen on MRI   (was on it for afib)     DM on lantus qhs   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     The following portions of the patient's history were reviewed and updated as appropriate: allergies, current medications, past family history, past medical history, past social history, past surgical history and problem list     Review of Systems   Constitutional: Negative for fever and unexpected weight change  HENT: Negative for nosebleeds and trouble swallowing  Eyes: Positive for visual disturbance  Respiratory: Negative for chest tightness and shortness of breath  Cardiovascular: Negative for chest pain, palpitations and leg swelling  Gastrointestinal: Negative for abdominal pain, constipation, diarrhea and nausea  Endocrine: Negative for cold intolerance  Genitourinary: Negative for dysuria and urgency  Musculoskeletal: Positive for arthralgias and gait problem  Negative for joint swelling and myalgias  Skin: Negative for rash  Neurological: Positive for weakness  Negative for tremors, seizures and syncope  Hematological: Does not bruise/bleed easily  Psychiatric/Behavioral: Negative for hallucinations and suicidal ideas           Objective:      /78 (BP Location: Left arm, Patient Position: Sitting, Cuff Size: Large)   Pulse 78   Resp 16   Ht 5' 3" (1 6 m)   Wt 83 kg (183 lb)   SpO2 98%   BMI 32 42 kg/m²     Admission on 10/08/2022, Discharged on 10/14/2022   Component Date Value   • POC Glucose 10/08/2022 60 (A)   • POC Glucose 10/08/2022 72    • POC Glucose 10/08/2022 133    • POC Glucose 10/08/2022 132    • POC Glucose 10/08/2022 158 (A)   • Cholesterol 10/09/2022 108    • Triglycerides 10/09/2022 52    • HDL, Direct 10/09/2022 49 (A)   • LDL Calculated 10/09/2022 49    • Hemoglobin A1C 10/09/2022 7 1 (A)   • EAG 10/09/2022 157    • Sodium 10/09/2022 141    • Potassium 10/09/2022 4 3    • Chloride 10/09/2022 105    • CO2 10/09/2022 29    • ANION GAP 10/09/2022 7    • BUN 10/09/2022 18    • Creatinine 10/09/2022 1 67 (A)   • Glucose 10/09/2022 164 (A)   • Calcium 10/09/2022 8 9    • eGFR 10/09/2022 29    • WBC 10/09/2022 8 53    • RBC 10/09/2022 3 74 (A)   • Hemoglobin 10/09/2022 10 2 (A)   • Hematocrit 10/09/2022 32 7 (A)   • MCV 10/09/2022 87    • MCH 10/09/2022 27 3    • MCHC 10/09/2022 31 2 (A)   • RDW 10/09/2022 14 6    • Platelets 95/88/4399 231    • MPV 10/09/2022 10 9    • TSH 3RD GENERATON 10/09/2022 1 377    • POC Glucose 10/09/2022 181 (A)   • POC Glucose 10/09/2022 186 (A)   • POC Glucose 10/09/2022 62 (A)   • POC Glucose 10/09/2022 123    • POC Glucose 10/09/2022 151 (A)   • WBC 10/10/2022 6 35    • RBC 10/10/2022 3 42 (A)   • Hemoglobin 10/10/2022 9 5 (A)   • Hematocrit 10/10/2022 30 5 (A)   • MCV 10/10/2022 89    • MCH 10/10/2022 27 8    • MCHC 10/10/2022 31 1 (A)   • RDW 10/10/2022 14 8    • Platelets 58/89/7573 191    • MPV 10/10/2022 10 8    • Sodium 10/10/2022 140    • Potassium 10/10/2022 4 3    • Chloride 10/10/2022 106    • CO2 10/10/2022 29    • ANION GAP 10/10/2022 5    • BUN 10/10/2022 25    • Creatinine 10/10/2022 2 22 (A)   • Glucose 10/10/2022 152 (A)   • Calcium 10/10/2022 8 4    • eGFR 10/10/2022 20    • POC Glucose 10/10/2022 144 (A)   • POC Glucose 10/10/2022 300 (A)   • Triscuspid Valve Regurgi* 10/10/2022 34 0    • Tricuspid valve peak reg* 10/10/2022 2 93    • TR Peak Joey 10/10/2022 2 9    • LV EF 10/10/2022 65    • Est  RA pres 10/10/2022 5 0    • Right Ventricular Peak S* 10/10/2022 39 00    • PASP 10/10/2022 37 0    • POC Glucose 10/10/2022 390 (A)   • POC Glucose 10/10/2022 191 (A)   • Ventricular Rate 10/08/2022 66    • Atrial Rate 10/08/2022 66    • AR Interval 10/08/2022 168    • QRSD Interval 10/08/2022 86    • QT Interval 10/08/2022 420    • QTC Interval 10/08/2022 440    • P Axis 10/08/2022 -16    • QRS Axis 10/08/2022 -5    • T Wave Axis 10/08/2022 87    • POC Glucose 10/10/2022 167 (A) • Sodium 10/11/2022 141    • Potassium 10/11/2022 3 8    • Chloride 10/11/2022 106    • CO2 10/11/2022 30    • ANION GAP 10/11/2022 5    • BUN 10/11/2022 26 (A)   • Creatinine 10/11/2022 2 12 (A)   • Glucose 10/11/2022 79    • Calcium 10/11/2022 8 3 (A)   • eGFR 10/11/2022 21    • WBC 10/11/2022 6 86    • RBC 10/11/2022 3 66 (A)   • Hemoglobin 10/11/2022 10 0 (A)   • Hematocrit 10/11/2022 31 9 (A)   • MCV 10/11/2022 87    • MCH 10/11/2022 27 3    • MCHC 10/11/2022 31 3 (A)   • RDW 10/11/2022 14 6    • MPV 10/11/2022 10 5    • Platelets 70/81/4548 220    • nRBC 10/11/2022 0    • Neutrophils Relative 10/11/2022 69    • Immat GRANS % 10/11/2022 0    • Lymphocytes Relative 10/11/2022 21    • Monocytes Relative 10/11/2022 10    • Eosinophils Relative 10/11/2022 0    • Basophils Relative 10/11/2022 0    • Neutrophils Absolute 10/11/2022 4 72    • Immature Grans Absolute 10/11/2022 0 03    • Lymphocytes Absolute 10/11/2022 1 41    • Monocytes Absolute 10/11/2022 0 68    • Eosinophils Absolute 10/11/2022 0 00    • Basophils Absolute 10/11/2022 0 02    • POC Glucose 10/11/2022 94    • POC Glucose 10/11/2022 174 (A)   • POC Glucose 10/11/2022 446 (A)   • POC Glucose 10/11/2022 179 (A)   • POC Glucose 10/11/2022 183 (A)   • Sodium 10/12/2022 138    • Potassium 10/12/2022 4 2    • Chloride 10/12/2022 104    • CO2 10/12/2022 30    • ANION GAP 10/12/2022 4    • BUN 10/12/2022 28 (A)   • Creatinine 10/12/2022 2 17 (A)   • Glucose 10/12/2022 130    • Calcium 10/12/2022 8 5    • eGFR 10/12/2022 21    • POC Glucose 10/12/2022 159 (A)   • POC Glucose 10/12/2022 162 (A)   • POC Glucose 10/12/2022 266 (A)   • POC Glucose 10/12/2022 172 (A)   • Sodium 10/13/2022 137    • Potassium 10/13/2022 3 9    • Chloride 10/13/2022 105    • CO2 10/13/2022 26    • ANION GAP 10/13/2022 6    • BUN 10/13/2022 29 (A)   • Creatinine 10/13/2022 2 19 (A)   • Glucose 10/13/2022 103    • Calcium 10/13/2022 8 3 (A)   • eGFR 10/13/2022 21    • WBC 10/13/2022 6 93    • RBC 10/13/2022 3 95    • Hemoglobin 10/13/2022 10 6 (A)   • Hematocrit 10/13/2022 34 0 (A)   • MCV 10/13/2022 86    • MCH 10/13/2022 26 8    • MCHC 10/13/2022 31 2 (A)   • RDW 10/13/2022 14 6    • MPV 10/13/2022 10 9    • Platelets 68/05/3329 222    • nRBC 10/13/2022 0    • Neutrophils Relative 10/13/2022 60    • Immat GRANS % 10/13/2022 0    • Lymphocytes Relative 10/13/2022 31    • Monocytes Relative 10/13/2022 9    • Eosinophils Relative 10/13/2022 0    • Basophils Relative 10/13/2022 0    • Neutrophils Absolute 10/13/2022 4 18    • Immature Grans Absolute 10/13/2022 0 02    • Lymphocytes Absolute 10/13/2022 2 12    • Monocytes Absolute 10/13/2022 0 59    • Eosinophils Absolute 10/13/2022 0 00    • Basophils Absolute 10/13/2022 0 02    • POC Glucose 10/13/2022 60 (A)   • POC Glucose 10/13/2022 114    • POC Glucose 10/13/2022 130    • POC Glucose 10/13/2022 211 (A)   • Sodium 10/14/2022 140    • Potassium 10/14/2022 4 1    • Chloride 10/14/2022 108    • CO2 10/14/2022 25    • ANION GAP 10/14/2022 7    • BUN 10/14/2022 33 (A)   • Creatinine 10/14/2022 2 42 (A)   • Glucose 10/14/2022 56 (A)   • Calcium 10/14/2022 7 8 (A)   • eGFR 10/14/2022 18    • POC Glucose 10/14/2022 71    Admission on 10/08/2022, Discharged on 10/08/2022   Component Date Value   • Sodium 10/08/2022 141    • Potassium 10/08/2022 4 1    • Chloride 10/08/2022 104    • CO2 10/08/2022 31    • ANION GAP 10/08/2022 6    • BUN 10/08/2022 19    • Creatinine 10/08/2022 1 60 (A)   • Glucose 10/08/2022 63 (A)   • Calcium 10/08/2022 9 3    • eGFR 10/08/2022 30    • WBC 10/08/2022 6 82    • RBC 10/08/2022 3 85    • Hemoglobin 10/08/2022 10 5 (A)   • Hematocrit 10/08/2022 34 2 (A)   • MCV 10/08/2022 89    • MCH 10/08/2022 27 3    • MCHC 10/08/2022 30 7 (A)   • RDW 10/08/2022 14 6    • Platelets 41/74/1637 193    • MPV 10/08/2022 11 1    • Protime 10/08/2022 14 0    • INR 10/08/2022 1 06    • PTT 10/08/2022 24    • hs TnI 0hr 10/08/2022 10 Physical Exam  Vitals and nursing note reviewed  Constitutional:       Appearance: She is well-developed  She is obese  Comments: wheelchair   HENT:      Head: Normocephalic and atraumatic  Cardiovascular:      Rate and Rhythm: Normal rate and regular rhythm  Pulses:           Dorsalis pedis pulses are 2+ on the right side and 2+ on the left side  Heart sounds: Normal heart sounds  No murmur heard  Pulmonary:      Effort: Pulmonary effort is normal       Breath sounds: Normal breath sounds  No wheezing or rales  Abdominal:      General: Bowel sounds are normal  There is no distension  Palpations: Abdomen is soft  Tenderness: There is no abdominal tenderness  Musculoskeletal:         General: No tenderness  Cervical back: Normal range of motion and neck supple  Feet:      Right foot:      Skin integrity: No ulcer, skin breakdown, erythema, warmth, callus or dry skin  Left foot:      Skin integrity: No ulcer, skin breakdown, erythema, warmth, callus or dry skin  Lymphadenopathy:      Cervical: No cervical adenopathy  Skin:     General: Skin is warm and dry  Capillary Refill: Capillary refill takes less than 2 seconds  Findings: No rash  Neurological:      Mental Status: She is alert and oriented to person, place, and time  Cranial Nerves: No cranial nerve deficit  Sensory: No sensory deficit  Motor: Weakness present  No abnormal muscle tone  Gait: Gait abnormal    Psychiatric:         Behavior: Behavior normal          Thought Content: Thought content normal          Judgment: Judgment normal          BMI Counseling: Body mass index is 32 42 kg/m²  The BMI is above normal  Nutrition recommendations include decreasing portion sizes, encouraging healthy choices of fruits and vegetables, decreasing fast food intake, consuming healthier snacks and limiting drinks that contain sugar   Exercise recommendations include exercising 3-5 times per week  No pharmacotherapy was ordered  Rationale for BMI follow-up plan is due to patient being overweight or obese  Falls Plan of Care: balance, strength, and gait training instructions were provided  Medications that increase falls were reviewed           Alexei Bearden

## 2022-10-22 NOTE — PATIENT INSTRUCTIONS
Control de la diabetes tipo 2 en los adultos   CUIDADO AMBULATORIO:   La diabetes tipo 2 es anurag enfermedad que afecta la forma en que el cuerpo utiliza la glucosa (azúcar)  El cuerpo no puede producir suficiente insulina o es incapaz de usarla adecuadamente  Es importante controlar la diabetes para evitar el daño al corazón, los vasos sanguíneos y otros órganos  [place]dale a alguien que llame al número de emergencias local (911 en los [place]) si:  · No es posible despertarlo  · Tiene signos de cetoacidosis diabética:     ? confusión, fatiga    ? vómitos    ? latidos cardíacos rápidos    ? aliento con [employer] a frutas    ? sed extrema    ? sequedad en la boca y la piel    · Tiene alguno de los siguientes signos de un ataque cardíaco:      ? Estrujamiento, presión o tensión en cabral pecho    ? Usted también podría presentar alguno de los siguientes:     § Malestar o dolor en cabral espalda, cole, mandíbula, abdomen, o brazo    § Falta de [employer] o vómitos    § Desvanecimiento o sudor frío repentino    · Usted tiene alguno de los siguientes signos de derrame cerebral:      ? Adormecimiento o caída de un lado de cabral dejan    ? Debilidad en un Sammie [place] o anurag pierna    ? Confusión o debilidad para hablar    ? Mareos o dolor de gerry intenso, o pérdida de la visión  Llame a cabral médico o al equipo de atención diabética si:  · Tiene anurag llaga o anurag herida que no cicatrizan  · Tiene un cambio en la cantidad de [facility]  · Ean niveles de azúcar en la rob son superiores a las metas fijadas  · Usted a menudo tiene niveles de azúcar en la rob más bajos que ean metas fijadas  · Cabral piel está enrojecida, seca, caliente al tacto o inflamada  · Usted tiene problemas para sobrellevar cabral diabetes o se siente ansioso o deprimido  · Usted tiene preguntas o inquietudes acerca de cabral condición o cuidado      Lo que usted necesita saber sobre los niveles altos de azúcar en la rob: El azúcar alto en la rob puede no causar ningún síntoma  Puede sentir más sed u orinar con más frecuencia de lo habitual  Con el tiempo, los niveles altos de azúcar en la rob pueden dañar ean nervios, vasos sanguíneos, tejidos y órganos  Lo siguiente aumenta los niveles de azúcar en la rob:  · Comidas copiosas o grandes cantidades de carbohidratos de anurag marco a vez    · Menos actividad física    · Estrés    · Enfermedad    · Anurag dosis karthik de medicamento o [place], o anurag dosis tardía    Lo que usted necesita saber sobre los niveles bajos de azúcar en la rob: Usted puede prevenir síntomas sera temblores, mareos, irritabilidad o confusión asegurándose de que cabral azúcar en la rob no baje demasiado  · Trate un bajón de azúcar inmediatamente  ? Roger 4 onzas de jugo o 1 tubo de gel de glucosa  ? Revise nuevamente cabral nivel de azúcar en la rob en 10 a 15 minutos  ? Cuando el nivel regrese a la normalidad, coma un alimento o refrigerio para prevenir otro bajón  · Lleve siempre consigo gel de glucosa, uvas pasas o caramelos duros para tratar los niveles bajos de azúcar en la rob  · Cabral azúcar en la rob puede bajar demasiado si zuly un medicamento para la diabetes o la insulina y no consume suficientes alimentos  · Si [place] insulina, verifique cabral nivel de azúcar en la rob antes de hacer ejercicio  ? Si cabral nivel de azúcar en la rob es inferior a 100 mg/dL, coma 4 galletas, 2 onzas de uvas pasas o roger 4 onzas de jugo  ? Compruebe cabral nivel cada 30 minutos si hace ejercicio iris más de 1 hora  ? Puede que necesite anurag merienda iris o después de hacer ejercicio  Qué puede hacer para manejar ean niveles de azúcar en la rob:  · Revise ean niveles de azúcar en la rob según las indicaciones y según sea necesario  Hay varios elementos disponibles que puede utilizar para comprobar ean [place]  Puede que tenga que comprobarlo probando anurag gota de [place] en un medidor de glucosa   En cabral lugar, es posible que le den un monitor continuo de glucosa (MCG)  El dispositivo se lleva puesto en todo momento  El MCG revisa cabral nivel de azúcar en la rob cada 5 minutos  [employer] a un dispositivo electrónico, sera un teléfono inteligente  Un MCG puede utilizarse con o sin anurag bomba de insulina  Hable con cabral médico para averiguar cuál es el [address] para usted  El objetivo de los niveles de azúcar en la rob antes de las comidas es entre 80 y 130 mg/dL y 2 horas después de comer  es inferior a 180 mg/dL  · Elija opciones de alimentos saludables  Consulte con cabral dietista para crear un plan de comidas que funcione para usted y ean horarios  Un dietista puede ayudarlo para que aprenda cómo alimentarse piyush con la cantidad [place] de carbohidratos iris las comidas y [place]  Los carbohidratos pueden subir cabral azúcar en la rob si usted come demasiado a la vez  Algunos ejemplos de alimentos que contienen carbohidratos son panes, cereales, arroz, pasta y dulces  · Realice actividad física regularmente  La actividad física puede ayudarlo a alcanzar cabral objetivo de nivel de azúcar en rob y a controlar cabral peso  [place] al menos 150 minutos de [place] física [place] de moderada a vigorosa cada semana  No deje de realizarla iris más de 2 días seguidos  No permanezca sentada por más de 30 minutos cada vez  Cabral médico puede ayudarle a crear un plan de actividades  El plan puede incluir las mejores actividades para usted y puede ayudarlo a desarrollar cabral fuerza y Eunice Gigi  · Mantenga un peso saludable  Pregúntele a cabral médico cuál es el peso ideal para usted  [place] que lo ayude a crear un plan seguro para bajar de peso si tiene sobrepeso  · Tómese cabral medicamento para la diabetes o la insulina según las indicaciones  Es posible que necesite medicamento para la diabetes, insulina o ambos para controlar ean niveles de glucosa en rob   Cabral médico le indicará cómo y cuándo se debe radha cabral medicamento de diabetes o la insulina  También se le enseñarán los efectos secundarios que pueden causar los medicamentos orales para la diabetes  La insulina puede administrarse mediante inyección o anurag bomba de insulina o anurag pluma  Usted y cabral equipo de atención analizarán qué método es mejor para usted  ? La bomba de insulina es un dispositivo implantado que le da insulina las 24 horas del día  Anurag bomba de insulina previene la necesidad de inyecciones múltiples de insulina en un día  ? La pluma para insulina es un dispositivo precargado con la cantidad [place] de insulina  ? A usted y cabral breanna les enseñarán cómo preparar y administrar la insulina si tonny es el mejor método para usted  El equipo de educación también le enseñará cómo desechar las agujas y [place]  ? Aprenderá la cantidad de insulina que necesita y cuándo administrarla  Se le enseñará cuándo no administrar la insulina  También se le enseñará lo que debe hacer si cabral nivel de azúcar en la rob baja demasiado  [place] puede suceder si usted zuly insulina y no come la cantidad [place] de carbohidratos  Otras cosas que puede hacer para controlar la diabetes tipo 2:  · Use identificación de alerta médica  Use un brazalete o collar de alerta médica o lleve consigo anurag tarjeta que indique que tiene diabetes  Pregunte a cabral médico dónde puede conseguir esos artículos  · No fume  La nicotina y otras sustancias químicas de los cigarrillos y los cigarros pueden dañar el pulmón y los vasos sanguíneos  También dificulta el control de la diabetes  Pida información a cabral médico si usted actualmente fuma y necesita ayuda para dejar de fumar  No use cigarrillos electrónicos o tabaco sin humo en vez de cigarrillos o para tratar de dejar de fumar  Todos estos aún contienen nicotina  · Revise ean pies todos los días por cortadas, raspones, callos u otras heridas   [place] pendiente de enrojecimiento e inflamación y de calor al tacto  Use zapatos que le calcen piyush  Compruebe que no haya piedras u otros objetos dentro de ean zapatos que le podrían [facility]  No camine descalzo ni use zapatos sin calcetines  Use calcetines de algodón para ayudar a [employer]  · Pregunte sobre las vacunas que pudiera necesitar  Usted corre un mayor riesgo de presentar enfermedades graves si se contagia gripe, neumonía, COVID-19 o hepatitis  Pregunte a cabral médico si debe vacunarse para prevenir estas u otras enfermedades, y cuándo debe hacerlo  · Hable con cabral equipo de atención médica si se siente estresado por el cuidado de la diabetes  A veces, encajar el cuidado de la diabetes en cabral lainey puede provocar un aumento del estrés  El estrés puede hacer que no se cuide [facility]  Cabral equipo de atención médica puede ayudarlo con consejos sobre el autocuidado  Cabral equipo de [facility] puede sugerirle que hable con un profesional de la uri mental  Reza Moan profesional puede escuchar y ofrecer ayuda en cuestiones de autocuidado  Acuda a ean consultas de control con cabral médico o con el equipo de cuidado de la diabetes según le indicaron: Es posible que a usted le benjamín análisis de rob antes de la ruben de control  Los [employer] de las pruebas mostrarán si es necesario hacer cambios en el tratamiento o en los cuidados personales  Anote ean preguntas para que se acuerde de hacerlas iris ean visitas  Hable con cabral médico si no puede pagar ean medicamentos  © Copyright [employer] 2022 Information is for End User's use only and may not be sold, redistributed or otherwise used for commercial purposes  All illustrations and images included in CareNotes® are the copyrighted property of [employer]  or [address] [place] [address] es sólo para uso en educación  Cabral intención no es darle un consejo médico sobre enfermedades o tratamientos   Colsulte con cabral médico, enfermera o farmacéutico antes de seguir cualquier régimen médico para saber si es seguro y efectivo para usted

## 2022-10-24 ENCOUNTER — APPOINTMENT (OUTPATIENT)
Dept: PHYSICAL THERAPY | Facility: CLINIC | Age: 77
End: 2022-10-24

## 2022-10-24 ENCOUNTER — HOME CARE VISIT (OUTPATIENT)
Dept: HOME HEALTH SERVICES | Facility: HOME HEALTHCARE | Age: 77
End: 2022-10-24
Payer: COMMERCIAL

## 2022-10-24 VITALS
SYSTOLIC BLOOD PRESSURE: 132 MMHG | DIASTOLIC BLOOD PRESSURE: 58 MMHG | RESPIRATION RATE: 20 BRPM | OXYGEN SATURATION: 96 % | HEART RATE: 70 BPM

## 2022-10-24 PROCEDURE — G0151 HHCP-SERV OF PT,EA 15 MIN: HCPCS

## 2022-10-25 ENCOUNTER — TELEPHONE (OUTPATIENT)
Dept: HOME HEALTH SERVICES | Facility: HOME HEALTHCARE | Age: 77
End: 2022-10-25

## 2022-10-25 ENCOUNTER — HOME CARE VISIT (OUTPATIENT)
Dept: HOME HEALTH SERVICES | Facility: HOME HEALTHCARE | Age: 77
End: 2022-10-25
Payer: COMMERCIAL

## 2022-10-25 ENCOUNTER — PATIENT OUTREACH (OUTPATIENT)
Dept: FAMILY MEDICINE CLINIC | Facility: CLINIC | Age: 77
End: 2022-10-25

## 2022-10-25 VITALS
DIASTOLIC BLOOD PRESSURE: 60 MMHG | TEMPERATURE: 98.1 F | SYSTOLIC BLOOD PRESSURE: 122 MMHG | HEART RATE: 72 BPM | RESPIRATION RATE: 18 BRPM | OXYGEN SATURATION: 98 %

## 2022-10-25 PROCEDURE — G0152 HHCP-SERV OF OT,EA 15 MIN: HCPCS

## 2022-10-25 PROCEDURE — G0300 HHS/HOSPICE OF LPN EA 15 MIN: HCPCS

## 2022-10-25 NOTE — PROGRESS NOTES
Received referral via [employer] message  Call to patient's son  Introduced self, reviewed complex care management  Son reports he has [address] for nursing, physical, and occupational therapy  Son and his wife are primary caregivers  Declines ongoing outreach calls  Will close at this time, provided my name and number if future assistance needed

## 2022-10-27 ENCOUNTER — HOME CARE VISIT (OUTPATIENT)
Dept: HOME HEALTH SERVICES | Facility: HOME HEALTHCARE | Age: 77
End: 2022-10-27
Payer: COMMERCIAL

## 2022-10-27 VITALS
DIASTOLIC BLOOD PRESSURE: 70 MMHG | RESPIRATION RATE: 18 BRPM | SYSTOLIC BLOOD PRESSURE: 118 MMHG | OXYGEN SATURATION: 96 % | HEART RATE: 62 BPM

## 2022-10-27 PROCEDURE — G0151 HHCP-SERV OF PT,EA 15 MIN: HCPCS

## 2022-10-28 ENCOUNTER — HOME CARE VISIT (OUTPATIENT)
Dept: HOME HEALTH SERVICES | Facility: HOME HEALTHCARE | Age: 77
End: 2022-10-28
Payer: COMMERCIAL

## 2022-10-28 ENCOUNTER — APPOINTMENT (OUTPATIENT)
Dept: PHYSICAL THERAPY | Facility: CLINIC | Age: 77
End: 2022-10-28

## 2022-10-28 NOTE — CASE COMMUNICATION
OT out of compliance with visit frequency this week due to son notified [facility] office this AM that all visits today to be cancelled  OT unable to reschedule for this week

## 2022-11-01 DIAGNOSIS — E11.9 DIABETES MELLITUS TYPE 2, INSULIN DEPENDENT (HCC): Primary | ICD-10-CM

## 2022-11-01 DIAGNOSIS — Z79.4 DIABETES MELLITUS TYPE 2, INSULIN DEPENDENT (HCC): Primary | ICD-10-CM

## 2022-11-01 RX ORDER — INSULIN GLARGINE 100 [IU]/ML
12 INJECTION, SOLUTION SUBCUTANEOUS
Qty: 12 ML | Refills: 1 | Status: ON HOLD | OUTPATIENT
Start: 2022-11-01 | End: 2023-01-30

## 2022-11-02 ENCOUNTER — HOME CARE VISIT (OUTPATIENT)
Dept: HOME HEALTH SERVICES | Facility: HOME HEALTHCARE | Age: 77
End: 2022-11-02

## 2022-11-03 ENCOUNTER — HOME CARE VISIT (OUTPATIENT)
Dept: HOME HEALTH SERVICES | Facility: HOME HEALTHCARE | Age: 77
End: 2022-11-03

## 2022-11-03 VITALS — DIASTOLIC BLOOD PRESSURE: 50 MMHG | SYSTOLIC BLOOD PRESSURE: 118 MMHG | HEART RATE: 84 BPM

## 2022-11-03 VITALS
SYSTOLIC BLOOD PRESSURE: 112 MMHG | DIASTOLIC BLOOD PRESSURE: 60 MMHG | TEMPERATURE: 98.4 F | HEART RATE: 68 BPM | RESPIRATION RATE: 16 BRPM | OXYGEN SATURATION: 92 %

## 2022-11-04 ENCOUNTER — HOME CARE VISIT (OUTPATIENT)
Dept: HOME HEALTH SERVICES | Facility: HOME HEALTHCARE | Age: 77
End: 2022-11-04

## 2022-11-04 VITALS — HEART RATE: 80 BPM | DIASTOLIC BLOOD PRESSURE: 58 MMHG | OXYGEN SATURATION: 97 % | SYSTOLIC BLOOD PRESSURE: 108 MMHG

## 2022-11-07 ENCOUNTER — HOME CARE VISIT (OUTPATIENT)
Dept: HOME HEALTH SERVICES | Facility: HOME HEALTHCARE | Age: 77
End: 2022-11-07

## 2022-11-07 VITALS — SYSTOLIC BLOOD PRESSURE: 122 MMHG | DIASTOLIC BLOOD PRESSURE: 60 MMHG

## 2022-11-08 ENCOUNTER — HOME CARE VISIT (OUTPATIENT)
Dept: HOME HEALTH SERVICES | Facility: HOME HEALTHCARE | Age: 77
End: 2022-11-08

## 2022-11-10 ENCOUNTER — HOME CARE VISIT (OUTPATIENT)
Dept: HOME HEALTH SERVICES | Facility: HOME HEALTHCARE | Age: 77
End: 2022-11-10

## 2022-11-10 VITALS — DIASTOLIC BLOOD PRESSURE: 58 MMHG | HEART RATE: 62 BPM | SYSTOLIC BLOOD PRESSURE: 90 MMHG | OXYGEN SATURATION: 97 %

## 2022-11-10 NOTE — CASE COMMUNICATION
Patient has reached all goals of skilled home care PT and will continue with her HEP  She did indicate some afternoons she has knee and lower leg pain that limits her walking  I instructed patient and her family to address with you if this persists

## 2022-11-11 ENCOUNTER — HOSPITAL ENCOUNTER (INPATIENT)
Facility: HOSPITAL | Age: 77
LOS: 4 days | Discharge: HOME HEALTH | End: 2022-11-15
Attending: EMERGENCY MEDICINE | Admitting: INTERNAL MEDICINE

## 2022-11-11 ENCOUNTER — APPOINTMENT (EMERGENCY)
Dept: RADIOLOGY | Facility: HOSPITAL | Age: 77
End: 2022-11-11

## 2022-11-11 ENCOUNTER — TELEPHONE (OUTPATIENT)
Dept: FAMILY MEDICINE CLINIC | Facility: CLINIC | Age: 77
End: 2022-11-11

## 2022-11-11 DIAGNOSIS — Z79.4 DIABETES MELLITUS TYPE 2, INSULIN DEPENDENT (HCC): ICD-10-CM

## 2022-11-11 DIAGNOSIS — I25.10 CORONARY ARTERY DISEASE INVOLVING NATIVE CORONARY ARTERY OF NATIVE HEART WITHOUT ANGINA PECTORIS: ICD-10-CM

## 2022-11-11 DIAGNOSIS — I50.31 ACUTE DIASTOLIC CONGESTIVE HEART FAILURE (HCC): ICD-10-CM

## 2022-11-11 DIAGNOSIS — E87.70 VOLUME OVERLOAD: ICD-10-CM

## 2022-11-11 DIAGNOSIS — N18.32 STAGE 3B CHRONIC KIDNEY DISEASE (HCC): ICD-10-CM

## 2022-11-11 DIAGNOSIS — I10 HYPERTENSION: ICD-10-CM

## 2022-11-11 DIAGNOSIS — N18.9 ACUTE KIDNEY INJURY SUPERIMPOSED ON CHRONIC KIDNEY DISEASE (HCC): ICD-10-CM

## 2022-11-11 DIAGNOSIS — E11.9 DIABETES MELLITUS TYPE 2, INSULIN DEPENDENT (HCC): ICD-10-CM

## 2022-11-11 DIAGNOSIS — R06.00 DYSPNEA, UNSPECIFIED TYPE: ICD-10-CM

## 2022-11-11 DIAGNOSIS — Z87.81 HISTORY OF FRACTURE OF LEFT HIP: Chronic | ICD-10-CM

## 2022-11-11 DIAGNOSIS — I68.0 CEREBRAL AMYLOID ANGIOPATHY (HCC): ICD-10-CM

## 2022-11-11 DIAGNOSIS — I50.9 ACUTE EXACERBATION OF CHF (CONGESTIVE HEART FAILURE) (HCC): Primary | ICD-10-CM

## 2022-11-11 DIAGNOSIS — R29.90 STROKE-LIKE SYMPTOMS: ICD-10-CM

## 2022-11-11 DIAGNOSIS — N17.9 ACUTE KIDNEY INJURY SUPERIMPOSED ON CHRONIC KIDNEY DISEASE (HCC): ICD-10-CM

## 2022-11-11 DIAGNOSIS — N18.30 STAGE 3 CHRONIC KIDNEY DISEASE (HCC): ICD-10-CM

## 2022-11-11 DIAGNOSIS — E85.4 CEREBRAL AMYLOID ANGIOPATHY (HCC): ICD-10-CM

## 2022-11-11 LAB
ALBUMIN SERPL BCP-MCNC: 3.4 G/DL (ref 3.5–5)
ALP SERPL-CCNC: 52 U/L (ref 34–104)
ALT SERPL W P-5'-P-CCNC: 5 U/L (ref 7–52)
ANION GAP SERPL CALCULATED.3IONS-SCNC: 7 MMOL/L (ref 4–13)
AST SERPL W P-5'-P-CCNC: 9 U/L (ref 13–39)
BASOPHILS # BLD AUTO: 0.01 THOUSANDS/ÂΜL (ref 0–0.1)
BASOPHILS NFR BLD AUTO: 0 % (ref 0–1)
BILIRUB SERPL-MCNC: 0.47 MG/DL (ref 0.2–1)
BNP SERPL-MCNC: 859 PG/ML (ref 0–100)
BUN SERPL-MCNC: 34 MG/DL (ref 5–25)
CALCIUM ALBUM COR SERPL-MCNC: 8.9 MG/DL (ref 8.3–10.1)
CALCIUM SERPL-MCNC: 8.4 MG/DL (ref 8.4–10.2)
CARDIAC TROPONIN I PNL SERPL HS: 10 NG/L
CHLORIDE SERPL-SCNC: 104 MMOL/L (ref 96–108)
CO2 SERPL-SCNC: 27 MMOL/L (ref 21–32)
CREAT SERPL-MCNC: 2.34 MG/DL (ref 0.6–1.3)
EOSINOPHIL # BLD AUTO: 0 THOUSAND/ÂΜL (ref 0–0.61)
EOSINOPHIL NFR BLD AUTO: 0 % (ref 0–6)
ERYTHROCYTE [DISTWIDTH] IN BLOOD BY AUTOMATED COUNT: 17.2 % (ref 11.6–15.1)
GFR SERPL CREATININE-BSD FRML MDRD: 19 ML/MIN/1.73SQ M
GLUCOSE SERPL-MCNC: 223 MG/DL (ref 65–140)
HCT VFR BLD AUTO: 28.4 % (ref 34.8–46.1)
HGB BLD-MCNC: 9 G/DL (ref 11.5–15.4)
IMM GRANULOCYTES # BLD AUTO: 0.09 THOUSAND/UL (ref 0–0.2)
IMM GRANULOCYTES NFR BLD AUTO: 2 % (ref 0–2)
LYMPHOCYTES # BLD AUTO: 0.9 THOUSANDS/ÂΜL (ref 0.6–4.47)
LYMPHOCYTES NFR BLD AUTO: 15 % (ref 14–44)
MCH RBC QN AUTO: 27.4 PG (ref 26.8–34.3)
MCHC RBC AUTO-ENTMCNC: 31.7 G/DL (ref 31.4–37.4)
MCV RBC AUTO: 87 FL (ref 82–98)
MONOCYTES # BLD AUTO: 0.64 THOUSAND/ÂΜL (ref 0.17–1.22)
MONOCYTES NFR BLD AUTO: 11 % (ref 4–12)
NEUTROPHILS # BLD AUTO: 4.28 THOUSANDS/ÂΜL (ref 1.85–7.62)
NEUTS SEG NFR BLD AUTO: 72 % (ref 43–75)
NRBC BLD AUTO-RTO: 0 /100 WBCS
PLATELET # BLD AUTO: 167 THOUSANDS/UL (ref 149–390)
PMV BLD AUTO: 12.3 FL (ref 8.9–12.7)
POTASSIUM SERPL-SCNC: 4.7 MMOL/L (ref 3.5–5.3)
PROT SERPL-MCNC: 6.6 G/DL (ref 6.4–8.4)
RBC # BLD AUTO: 3.28 MILLION/UL (ref 3.81–5.12)
SODIUM SERPL-SCNC: 138 MMOL/L (ref 135–147)
WBC # BLD AUTO: 5.92 THOUSAND/UL (ref 4.31–10.16)

## 2022-11-11 RX ORDER — FUROSEMIDE 10 MG/ML
20 INJECTION INTRAMUSCULAR; INTRAVENOUS ONCE
Status: COMPLETED | OUTPATIENT
Start: 2022-11-11 | End: 2022-11-11

## 2022-11-11 RX ADMIN — FUROSEMIDE 20 MG: 10 INJECTION, SOLUTION INTRAMUSCULAR; INTRAVENOUS at 22:44

## 2022-11-11 NOTE — TELEPHONE ENCOUNTER
Patients son called regarding patient having bilateral edema and eight side edema and is SOB  Per Dr Helena Hampton who was sitting next to me for the triage  Patient should go to the ER to be evaluated

## 2022-11-12 ENCOUNTER — APPOINTMENT (INPATIENT)
Dept: VASCULAR ULTRASOUND | Facility: HOSPITAL | Age: 77
End: 2022-11-12

## 2022-11-12 PROBLEM — E87.70 VOLUME OVERLOAD: Status: ACTIVE | Noted: 2022-11-12

## 2022-11-12 LAB
2HR DELTA HS TROPONIN: 0 NG/L
ANION GAP SERPL CALCULATED.3IONS-SCNC: 8 MMOL/L (ref 4–13)
ATRIAL RATE: 61 BPM
BACTERIA UR QL AUTO: ABNORMAL /HPF
BASOPHILS # BLD AUTO: 0.01 THOUSANDS/ÂΜL (ref 0–0.1)
BASOPHILS NFR BLD AUTO: 0 % (ref 0–1)
BILIRUB UR QL STRIP: NEGATIVE
BUN SERPL-MCNC: 34 MG/DL (ref 5–25)
CALCIUM SERPL-MCNC: 7.7 MG/DL (ref 8.4–10.2)
CARDIAC TROPONIN I PNL SERPL HS: 10 NG/L
CHLORIDE SERPL-SCNC: 106 MMOL/L (ref 96–108)
CHOLEST SERPL-MCNC: 84 MG/DL
CLARITY UR: CLEAR
CO2 SERPL-SCNC: 25 MMOL/L (ref 21–32)
COLOR UR: YELLOW
CREAT SERPL-MCNC: 2.35 MG/DL (ref 0.6–1.3)
CREAT UR-MCNC: 95.6 MG/DL
EOSINOPHIL # BLD AUTO: 0 THOUSAND/ÂΜL (ref 0–0.61)
EOSINOPHIL NFR BLD AUTO: 0 % (ref 0–6)
ERYTHROCYTE [DISTWIDTH] IN BLOOD BY AUTOMATED COUNT: 17.2 % (ref 11.6–15.1)
FLUAV RNA RESP QL NAA+PROBE: NEGATIVE
FLUBV RNA RESP QL NAA+PROBE: NEGATIVE
GFR SERPL CREATININE-BSD FRML MDRD: 19 ML/MIN/1.73SQ M
GLUCOSE SERPL-MCNC: 110 MG/DL (ref 65–140)
GLUCOSE SERPL-MCNC: 166 MG/DL (ref 65–140)
GLUCOSE SERPL-MCNC: 168 MG/DL (ref 65–140)
GLUCOSE SERPL-MCNC: 184 MG/DL (ref 65–140)
GLUCOSE SERPL-MCNC: 190 MG/DL (ref 65–140)
GLUCOSE SERPL-MCNC: 70 MG/DL (ref 65–140)
GLUCOSE SERPL-MCNC: 99 MG/DL (ref 65–140)
GLUCOSE UR STRIP-MCNC: NEGATIVE MG/DL
HCT VFR BLD AUTO: 24.4 % (ref 34.8–46.1)
HDLC SERPL-MCNC: 48 MG/DL
HGB BLD-MCNC: 7.5 G/DL (ref 11.5–15.4)
HGB UR QL STRIP.AUTO: NEGATIVE
IMM GRANULOCYTES # BLD AUTO: 0.05 THOUSAND/UL (ref 0–0.2)
IMM GRANULOCYTES NFR BLD AUTO: 1 % (ref 0–2)
KETONES UR STRIP-MCNC: NEGATIVE MG/DL
LDLC SERPL CALC-MCNC: 29 MG/DL (ref 0–100)
LEUKOCYTE ESTERASE UR QL STRIP: ABNORMAL
LYMPHOCYTES # BLD AUTO: 1.12 THOUSANDS/ÂΜL (ref 0.6–4.47)
LYMPHOCYTES NFR BLD AUTO: 28 % (ref 14–44)
MCH RBC QN AUTO: 27.4 PG (ref 26.8–34.3)
MCHC RBC AUTO-ENTMCNC: 30.7 G/DL (ref 31.4–37.4)
MCV RBC AUTO: 89 FL (ref 82–98)
MONOCYTES # BLD AUTO: 0.59 THOUSAND/ÂΜL (ref 0.17–1.22)
MONOCYTES NFR BLD AUTO: 15 % (ref 4–12)
NEUTROPHILS # BLD AUTO: 2.2 THOUSANDS/ÂΜL (ref 1.85–7.62)
NEUTS SEG NFR BLD AUTO: 56 % (ref 43–75)
NITRITE UR QL STRIP: NEGATIVE
NON-SQ EPI CELLS URNS QL MICRO: ABNORMAL /HPF
NONHDLC SERPL-MCNC: 36 MG/DL
NRBC BLD AUTO-RTO: 0 /100 WBCS
PH UR STRIP.AUTO: 5 [PH]
PLATELET # BLD AUTO: 121 THOUSANDS/UL (ref 149–390)
PMV BLD AUTO: 11.7 FL (ref 8.9–12.7)
POTASSIUM SERPL-SCNC: 4.4 MMOL/L (ref 3.5–5.3)
PROT UR STRIP-MCNC: NEGATIVE MG/DL
PROT UR-MCNC: 11 MG/DL
PROT/CREAT UR: 0.12 MG/G{CREAT} (ref 0–0.1)
QRS AXIS: -10 DEGREES
QRSD INTERVAL: 94 MS
QT INTERVAL: 428 MS
QTC INTERVAL: 430 MS
RBC # BLD AUTO: 2.74 MILLION/UL (ref 3.81–5.12)
RBC #/AREA URNS AUTO: ABNORMAL /HPF
RSV RNA RESP QL NAA+PROBE: NEGATIVE
SARS-COV-2 RNA RESP QL NAA+PROBE: NEGATIVE
SODIUM SERPL-SCNC: 139 MMOL/L (ref 135–147)
SP GR UR STRIP.AUTO: 1.01 (ref 1–1.03)
T WAVE AXIS: 34 DEGREES
TRIGL SERPL-MCNC: 33 MG/DL
UROBILINOGEN UR STRIP-ACNC: <2 MG/DL
VENTRICULAR RATE: 61 BPM
WBC # BLD AUTO: 3.97 THOUSAND/UL (ref 4.31–10.16)
WBC #/AREA URNS AUTO: ABNORMAL /HPF

## 2022-11-12 RX ORDER — ASPIRIN 81 MG/1
81 TABLET, CHEWABLE ORAL DAILY
Status: DISCONTINUED | OUTPATIENT
Start: 2022-11-12 | End: 2022-11-15 | Stop reason: HOSPADM

## 2022-11-12 RX ORDER — INSULIN LISPRO 100 [IU]/ML
1-5 INJECTION, SOLUTION INTRAVENOUS; SUBCUTANEOUS
Status: DISCONTINUED | OUTPATIENT
Start: 2022-11-12 | End: 2022-11-15 | Stop reason: HOSPADM

## 2022-11-12 RX ORDER — CARVEDILOL 12.5 MG/1
25 TABLET ORAL EVERY 12 HOURS SCHEDULED
Status: DISCONTINUED | OUTPATIENT
Start: 2022-11-12 | End: 2022-11-15 | Stop reason: HOSPADM

## 2022-11-12 RX ORDER — INSULIN LISPRO 100 [IU]/ML
1-6 INJECTION, SOLUTION INTRAVENOUS; SUBCUTANEOUS
Status: DISCONTINUED | OUTPATIENT
Start: 2022-11-12 | End: 2022-11-15 | Stop reason: HOSPADM

## 2022-11-12 RX ORDER — BUMETANIDE 1 MG/1
1 TABLET ORAL 2 TIMES DAILY
Status: DISCONTINUED | OUTPATIENT
Start: 2022-11-12 | End: 2022-11-15 | Stop reason: HOSPADM

## 2022-11-12 RX ORDER — POLYETHYLENE GLYCOL 3350 17 G/17G
17 POWDER, FOR SOLUTION ORAL DAILY
Status: DISCONTINUED | OUTPATIENT
Start: 2022-11-12 | End: 2022-11-15 | Stop reason: HOSPADM

## 2022-11-12 RX ORDER — ACETAMINOPHEN 325 MG/1
975 TABLET ORAL EVERY 8 HOURS SCHEDULED
Status: DISCONTINUED | OUTPATIENT
Start: 2022-11-12 | End: 2022-11-15 | Stop reason: HOSPADM

## 2022-11-12 RX ORDER — RANOLAZINE 500 MG/1
500 TABLET, EXTENDED RELEASE ORAL DAILY
Status: DISCONTINUED | OUTPATIENT
Start: 2022-11-12 | End: 2022-11-15 | Stop reason: HOSPADM

## 2022-11-12 RX ORDER — FLUTICASONE PROPIONATE 50 MCG
1 SPRAY, SUSPENSION (ML) NASAL DAILY
Status: DISCONTINUED | OUTPATIENT
Start: 2022-11-12 | End: 2022-11-15 | Stop reason: HOSPADM

## 2022-11-12 RX ORDER — MELATONIN
2000 DAILY
Status: DISCONTINUED | OUTPATIENT
Start: 2022-11-12 | End: 2022-11-15 | Stop reason: HOSPADM

## 2022-11-12 RX ORDER — ATORVASTATIN CALCIUM 40 MG/1
40 TABLET, FILM COATED ORAL EVERY EVENING
Status: DISCONTINUED | OUTPATIENT
Start: 2022-11-12 | End: 2022-11-15 | Stop reason: HOSPADM

## 2022-11-12 RX ORDER — HEPARIN SODIUM 5000 [USP'U]/ML
5000 INJECTION, SOLUTION INTRAVENOUS; SUBCUTANEOUS EVERY 8 HOURS SCHEDULED
Status: DISCONTINUED | OUTPATIENT
Start: 2022-11-12 | End: 2022-11-15 | Stop reason: HOSPADM

## 2022-11-12 RX ORDER — ALBUMIN (HUMAN) 12.5 G/50ML
12.5 SOLUTION INTRAVENOUS ONCE
Status: COMPLETED | OUTPATIENT
Start: 2022-11-12 | End: 2022-11-12

## 2022-11-12 RX ORDER — HYDRALAZINE HYDROCHLORIDE 25 MG/1
25 TABLET, FILM COATED ORAL EVERY 8 HOURS SCHEDULED
Status: DISCONTINUED | OUTPATIENT
Start: 2022-11-12 | End: 2022-11-12

## 2022-11-12 RX ORDER — DIVALPROEX SODIUM 250 MG/1
250 TABLET, DELAYED RELEASE ORAL EVERY 12 HOURS SCHEDULED
Status: DISCONTINUED | OUTPATIENT
Start: 2022-11-12 | End: 2022-11-15 | Stop reason: HOSPADM

## 2022-11-12 RX ORDER — AMLODIPINE BESYLATE 10 MG/1
10 TABLET ORAL DAILY
Status: DISCONTINUED | OUTPATIENT
Start: 2022-11-12 | End: 2022-11-14

## 2022-11-12 RX ORDER — INSULIN GLARGINE 100 [IU]/ML
12 INJECTION, SOLUTION SUBCUTANEOUS
Status: DISCONTINUED | OUTPATIENT
Start: 2022-11-12 | End: 2022-11-13

## 2022-11-12 RX ORDER — LANOLIN ALCOHOL/MO/W.PET/CERES
400 CREAM (GRAM) TOPICAL DAILY
Status: DISCONTINUED | OUTPATIENT
Start: 2022-11-12 | End: 2022-11-15 | Stop reason: HOSPADM

## 2022-11-12 RX ADMIN — ACETAMINOPHEN 975 MG: 325 TABLET, FILM COATED ORAL at 06:12

## 2022-11-12 RX ADMIN — ACETAMINOPHEN 975 MG: 325 TABLET, FILM COATED ORAL at 14:07

## 2022-11-12 RX ADMIN — DIVALPROEX SODIUM 250 MG: 250 TABLET, DELAYED RELEASE ORAL at 01:52

## 2022-11-12 RX ADMIN — INSULIN GLARGINE 12 UNITS: 100 INJECTION, SOLUTION SUBCUTANEOUS at 21:20

## 2022-11-12 RX ADMIN — HYDRALAZINE HYDROCHLORIDE 25 MG: 25 TABLET ORAL at 06:13

## 2022-11-12 RX ADMIN — FLUTICASONE PROPIONATE 1 SPRAY: 50 SPRAY, METERED NASAL at 09:27

## 2022-11-12 RX ADMIN — HEPARIN SODIUM 5000 UNITS: 5000 INJECTION INTRAVENOUS; SUBCUTANEOUS at 14:07

## 2022-11-12 RX ADMIN — HEPARIN SODIUM 5000 UNITS: 5000 INJECTION INTRAVENOUS; SUBCUTANEOUS at 21:20

## 2022-11-12 RX ADMIN — DIVALPROEX SODIUM 250 MG: 250 TABLET, DELAYED RELEASE ORAL at 09:27

## 2022-11-12 RX ADMIN — ATORVASTATIN CALCIUM 40 MG: 40 TABLET, FILM COATED ORAL at 17:41

## 2022-11-12 RX ADMIN — RANOLAZINE 500 MG: 500 TABLET, FILM COATED, EXTENDED RELEASE ORAL at 09:25

## 2022-11-12 RX ADMIN — HEPARIN SODIUM 5000 UNITS: 5000 INJECTION INTRAVENOUS; SUBCUTANEOUS at 06:12

## 2022-11-12 RX ADMIN — ATORVASTATIN CALCIUM 40 MG: 40 TABLET, FILM COATED ORAL at 00:58

## 2022-11-12 RX ADMIN — BUMETANIDE 1 MG: 1 TABLET ORAL at 17:41

## 2022-11-12 RX ADMIN — CARVEDILOL 25 MG: 12.5 TABLET, FILM COATED ORAL at 00:58

## 2022-11-12 RX ADMIN — ACETAMINOPHEN 975 MG: 325 TABLET, FILM COATED ORAL at 00:57

## 2022-11-12 RX ADMIN — HYDRALAZINE HYDROCHLORIDE 25 MG: 25 TABLET ORAL at 00:58

## 2022-11-12 RX ADMIN — CARVEDILOL 25 MG: 12.5 TABLET, FILM COATED ORAL at 21:19

## 2022-11-12 RX ADMIN — BUMETANIDE 1 MG: 1 TABLET ORAL at 12:15

## 2022-11-12 RX ADMIN — FOLIC ACID TAB 400 MCG 400 MCG: 400 TAB at 09:27

## 2022-11-12 RX ADMIN — HEPARIN SODIUM 5000 UNITS: 5000 INJECTION INTRAVENOUS; SUBCUTANEOUS at 00:59

## 2022-11-12 RX ADMIN — CHOLECALCIFEROL TAB 25 MCG (1000 UNIT) 2000 UNITS: 25 TAB at 09:25

## 2022-11-12 RX ADMIN — ALBUMIN (HUMAN) 12.5 G: 0.25 INJECTION, SOLUTION INTRAVENOUS at 10:17

## 2022-11-12 RX ADMIN — INSULIN GLARGINE 12 UNITS: 100 INJECTION, SOLUTION SUBCUTANEOUS at 01:58

## 2022-11-12 RX ADMIN — INSULIN LISPRO 1 UNITS: 100 INJECTION, SOLUTION INTRAVENOUS; SUBCUTANEOUS at 12:15

## 2022-11-12 RX ADMIN — INSULIN LISPRO 1 UNITS: 100 INJECTION, SOLUTION INTRAVENOUS; SUBCUTANEOUS at 02:42

## 2022-11-12 RX ADMIN — DIVALPROEX SODIUM 250 MG: 250 TABLET, DELAYED RELEASE ORAL at 21:21

## 2022-11-12 RX ADMIN — AMLODIPINE BESYLATE 10 MG: 10 TABLET ORAL at 09:26

## 2022-11-12 RX ADMIN — ACETAMINOPHEN 975 MG: 325 TABLET, FILM COATED ORAL at 21:20

## 2022-11-12 RX ADMIN — ASPIRIN 81 MG CHEWABLE TABLET 81 MG: 81 TABLET CHEWABLE at 09:25

## 2022-11-12 RX ADMIN — CARVEDILOL 25 MG: 12.5 TABLET, FILM COATED ORAL at 09:27

## 2022-11-12 NOTE — ASSESSMENT & PLAN NOTE
Lab Results   Component Value Date    HGBA1C 7 1 (H) 10/09/2022       · Continue lantus 12 units HS  · Add accucheck , SSI

## 2022-11-12 NOTE — ASSESSMENT & PLAN NOTE
· Creatinine on admission 2 34  Previous baseline creatinine 1 6 to 1 9  Hospitalized 10/8-10/14 with creatinine peaking at 2 42 despite IV fluids,  SAHIL thought to be 2/2 contrast nephropathy  Does not appear patient had repeat labs as outpatient  • Diuresis: lasix 20 mg IV in ED  Hold further and await nephrology recs  • Monitor intake and output, daily weights  • Diet: Fluid restriction and 2 g Sodium    • Consult nephrology

## 2022-11-12 NOTE — ED PROVIDER NOTES
History  Chief Complaint   Patient presents with   • Shortness of Breath     Patient c/o generalized swelling since yesterday, palpitations, and SOB  HPI   Patient is a 49-year-old female drained significant cardiac history including congestive heart failure presenting with worsening shortness of breath over the last day and a half  She is accompanied by her son who says he noticed it started rather the morning acutely yesterday after she did her home physical therapy  Next she feels short of breath both at rest and while up and walking  She reports that he has some mild, tight chest pain response and associated worsening leg swelling  Does have some mild leg swelling at baseline  Prior to Admission Medications   Prescriptions Last Dose Informant Patient Reported? Taking?    B-D UF III MINI PEN NEEDLES 31G X 5 MM MISC   No No   Sig: Use to test qd   Cholecalciferol (Vitamin D) 50 MCG (2000 UT) tablet   No No   Sig: Take 1 tablet (2,000 Units total) by mouth daily   Lantus SoloStar 100 units/mL SOPN   No No   Sig: Inject 0 12 mL (12 Units total) under the skin daily at bedtime   acetaminophen (TYLENOL) 325 mg tablet   No No   Sig: Take 3 tablets (975 mg total) by mouth every 8 (eight) hours   amLODIPine (NORVASC) 10 mg tablet   No No   Sig: Take 1 tablet (10 mg total) by mouth daily   aspirin 81 mg chewable tablet   Yes No   Sig: Chew 81 mg daily   atorvastatin (LIPITOR) 40 mg tablet   No No   Sig: Take 1 tablet (40 mg total) by mouth every evening   carvedilol (COREG) 25 mg tablet   No No   Sig: Take 1 tablet (25 mg total) by mouth every 12 (twelve) hours   divalproex sodium (DEPAKOTE) 250 mg EC tablet   No No   Sig: Take 1 tablet (250 mg total) by mouth every 12 (twelve) hours   fluticasone (FLONASE) 50 mcg/act nasal spray   No No   Si spray into each nostril daily   folic acid (FOLVITE) 279 mcg tablet   Yes No   Sig: Take 400 mcg by mouth daily   hydrALAZINE (APRESOLINE) 25 mg tablet   No No Sig: Take 1 tablet (25 mg total) by mouth every 8 (eight) hours   meclizine (ANTIVERT) 12 5 MG tablet   No No   Sig: Take 1 tablet (12 5 mg total) by mouth every 8 (eight) hours as needed for dizziness   ranolazine (RANEXA) 500 mg 12 hr tablet   No No   Sig: Take 1 tablet (500 mg total) by mouth daily      Facility-Administered Medications: None       Past Medical History:   Diagnosis Date   • Atrial fibrillation (HCC)    • Chronic anemia    • Chronic kidney disease    • Colonoscopy refused 2019    pt declines   • Dyslipidemia    • Hypertension    • Mammogram declined 2017   • Proteinuria    • 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       Past Surgical History:   Procedure Laterality Date   • CARDIAC PACEMAKER PLACEMENT     • CATARACT EXTRACTION     •  SECTION     • CHOLECYSTECTOMY     • CORONARY ANGIOPLASTY WITH STENT PLACEMENT     • EYE SURGERY     • MAMMO (HISTORICAL)      Pt states she will not ever have again-    • MD OPEN RX FEMUR FX+INTRAMED RIMA Left 2022    Procedure: INSERTION NAIL IM FEMUR ANTEGRADE (TROCHANTERIC); Surgeon: Dinora Justin DO;  Location: [facility];  Service: Orthopedics   • TUBAL LIGATION         Family History   Problem Relation Age of Onset   • Diabetes Father    • Hypertension Father    • Heart disease Father    • Cancer Brother         smoker   • Diabetes Other    • Heart attack Other    • Hypertension Other    • Asthma Other      I have reviewed and agree with the history as documented  E-Cigarette/Vaping   • E-Cigarette Use Never User      E-Cigarette/Vaping Substances   • Nicotine No    • THC No    • CBD No    • Flavoring No    • Other No    • Unknown No      Social History     Tobacco Use   • Smoking status: Never Smoker   • Smokeless tobacco: Never Used   Vaping Use   • Vaping Use: Never used   Substance Use Topics   • Alcohol use: Never   • Drug use: Never        Review of Systems   Constitutional: Positive for fatigue   Negative for chills and fever    HENT: Negative for ear pain and sore throat  Eyes: Negative for pain and visual disturbance  Respiratory: Positive for chest tightness and shortness of breath  Negative for cough  Cardiovascular: Positive for leg swelling  Negative for chest pain and palpitations  Gastrointestinal: Negative for abdominal pain and vomiting  Genitourinary: Negative for dysuria and hematuria  Musculoskeletal: Negative for arthralgias and back pain  Skin: Negative for color change and rash  Neurological: Negative for seizures and syncope  All other systems reviewed and are negative  Physical Exam  ED Triage Vitals   Temperature Pulse Respirations Blood Pressure SpO2   11/11/22 2120 11/11/22 2120 11/11/22 2120 11/11/22 2120 11/11/22 2120   97 9 °F (36 6 °C) 67 20 136/64 96 %      Temp Source Heart Rate Source Patient Position - Orthostatic VS BP Location FiO2 (%)   11/11/22 2120 11/11/22 2120 11/11/22 2120 11/11/22 2120 --   Oral Monitor Sitting Right arm       Pain Score       11/11/22 2243       No Pain             Orthostatic Vital Signs  Vitals:    11/11/22 2120 11/11/22 2200 11/11/22 2243   BP: 136/64 137/63 142/66   Pulse: 67 60 60   Patient Position - Orthostatic VS: Sitting Lying Lying       Physical Exam  Vitals and nursing note reviewed  Constitutional:       General: She is not in acute distress  Appearance: She is well-developed  HENT:      Head: Normocephalic and atraumatic  Eyes:      Conjunctiva/sclera: Conjunctivae normal    Cardiovascular:      Rate and Rhythm: Normal rate and regular rhythm  Heart sounds: No murmur heard  Pulmonary:      Effort: Pulmonary effort is normal  No respiratory distress  Breath sounds: Examination of the right-lower field reveals decreased breath sounds  Examination of the left-lower field reveals decreased breath sounds  Decreased breath sounds and wheezing present  Abdominal:      Palpations: Abdomen is soft  Tenderness:  There is no abdominal tenderness  Musculoskeletal:      Cervical back: Neck supple  Right lower leg: Edema present  Left lower leg: Edema present  Comments: 3+ pitting to mid-calf bilaterally   Skin:     General: Skin is warm and dry  Neurological:      General: No focal deficit present  Mental Status: She is alert  Psychiatric:         Mood and Affect: Mood normal          ED Medications  Medications   furosemide (LASIX) injection 20 mg (20 mg Intravenous Given 11/11/22 4846)       Diagnostic Studies  Results Reviewed     Procedure Component Value Units Date/Time    FLU/RSV/COVID - if FLU/RSV clinically relevant [[id number]]  (Normal) Collected: 11/11/22 2340    Lab Status: Final result Specimen: Nares from Nose Updated: 11/12/22 0022     SARS-CoV-2 Negative     INFLUENZA A PCR Negative     INFLUENZA B PCR Negative     RSV PCR Negative    Narrative:      FOR PEDIATRIC PATIENTS - copy/paste COVID Guidelines URL to browser: [email or web address]    SARS-CoV-2 assay is a Nucleic Acid Amplification assay intended for the  qualitative detection of nucleic acid from SARS-CoV-2 in nasopharyngeal  swabs  Results are for the presumptive identification of SARS-CoV-2 RNA  Positive results are indicative of infection with SARS-CoV-2, the virus  causing COVID-19, but do not rule out bacterial infection or co-infection  with other viruses  Laboratories within the [place] and its  territories are required to report all positive results to the appropriate  public health authorities  Negative results do not preclude SARS-CoV-2  infection and should not be used as the sole basis for treatment or other  patient management decisions  Negative results must be combined with  clinical observations, patient history, and epidemiological information  This test has not been FDA cleared or approved      This test has been authorized by FDA under an Emergency Use Authorization  (EUA)  This test is only authorized for the duration of time the  declaration that circumstances exist justifying the authorization of the  emergency use of an in vitro diagnostic tests for detection of SARS-CoV-2  virus and/or diagnosis of COVID-19 infection under section 564(b)(1) of  the Act, 21 U  S C  205OBN-2(I)(7), unless the authorization is terminated  or revoked sooner  The test has been validated but independent review by FDA  and CLIA is pending  Test performed using [employer]: This RT-PCR assay targets N2,  a region unique to SARS-CoV-2  A conserved region in the E-gene was chosen  for pan-Sarbecovirus detection which includes SARS-CoV-2  According to CMS-2020-01-R, this platform meets the definition of high-throughput technology      HS Troponin I 4hr [[id number]]     Lab Status: No result Specimen: Blood     HS Troponin I 2hr [[id number]]     Lab Status: No result Specimen: Blood     HS Troponin 0hr (reflex protocol) [[id number]]  (Normal) Collected: 11/11/22 2200    Lab Status: Final result Specimen: Blood from Arm, Right Updated: 11/11/22 2232     hs TnI 0hr 10 ng/L     B-Type Natriuretic Peptide(BNP) [facility], [place], [facility] Only [[id number]]  (Abnormal) Collected: 11/11/22 2200    Lab Status: Final result Specimen: Blood from Arm, Right Updated: 11/11/22 2230      pg/mL     Comprehensive metabolic panel [[id number]]  (Abnormal) Collected: 11/11/22 2200    Lab Status: Final result Specimen: Blood from Arm, Right Updated: 11/11/22 2222     Sodium 138 mmol/L      Potassium 4 7 mmol/L      Chloride 104 mmol/L      CO2 27 mmol/L      ANION GAP 7 mmol/L      BUN 34 mg/dL      Creatinine 2 34 mg/dL      Glucose 223 mg/dL      Calcium 8 4 mg/dL      Corrected Calcium 8 9 mg/dL      AST 9 U/L      ALT 5 U/L      Alkaline Phosphatase 52 U/L      Total Protein 6 6 g/dL      Albumin 3 4 g/dL      Total Bilirubin 0 47 mg/dL      eGFR 19 ml/min/1 73sq m     Narrative:      [employer] [employer] guidelines for Chronic Kidney Disease (CKD):   •  Stage 1 with normal or high GFR (GFR > 90 mL/min/1 73 square meters)  •  Stage 2 Mild CKD (GFR = 60-89 mL/min/1 73 square meters)  •  Stage 3A Moderate CKD (GFR = 45-59 mL/min/1 73 square meters)  •  Stage 3B Moderate CKD (GFR = 30-44 mL/min/1 73 square meters)  •  Stage 4 Severe CKD (GFR = 15-29 mL/min/1 73 square meters)  •  Stage 5 End Stage CKD (GFR <15 mL/min/1 73 square meters)  Note: GFR calculation is accurate only with a steady state creatinine    CBC and differential [[id number]]  (Abnormal) Collected: 11/11/22 2200    Lab Status: Final result Specimen: Blood from Arm, Right Updated: 11/11/22 2207     WBC 5 92 Thousand/uL      RBC 3 28 Million/uL      Hemoglobin 9 0 g/dL      Hematocrit 28 4 %      MCV 87 fL      MCH 27 4 pg      MCHC 31 7 g/dL      RDW 17 2 %      MPV 12 3 fL      Platelets 710 Thousands/uL      nRBC 0 /100 WBCs      Neutrophils Relative 72 %      Immat GRANS % 2 %      Lymphocytes Relative 15 %      Monocytes Relative 11 %      Eosinophils Relative 0 %      Basophils Relative 0 %      Neutrophils Absolute 4 28 Thousands/µL      Immature Grans Absolute 0 09 Thousand/uL      Lymphocytes Absolute 0 90 Thousands/µL      Monocytes Absolute 0 64 Thousand/µL      Eosinophils Absolute 0 00 Thousand/µL      Basophils Absolute 0 01 Thousands/µL                  XR chest 1 view portable   ED Interpretation by Moriah Arroyo DO (11/12 0033)   Scattered hazy opacities throughout, bilateral pleural effusions            Procedures  ECG 12 Lead Documentation Only    Date/Time: 11/11/2022 10:40 PM  Performed by: Moriah Arroyo DO  Authorized by: Moriah Arroyo DO     Indications / Diagnosis:  Chest tightness, shortness of breath  ECG reviewed by me, the ED Provider: yes    Patient location:  ED  Previous ECG:     Comparison to cardiac monitor: Yes    Interpretation: Interpretation: non-specific    Rate:     ECG rate:  61    ECG rate assessment: normal    Rhythm:     Rhythm: paced    Pacing:     Capture:  Complete  Ectopy:     Ectopy: none    QRS:     QRS axis:  Normal  ST segments:     ST segments:  Normal  T waves:     T waves: normal            ED Course             HEART Risk Score    Flowsheet Row Most Recent Value   Heart Score Risk Calculator    History 0 Filed at: 11/12/2022 0034   ECG 0 Filed at: 11/12/2022 0034   Age 2 Filed at: 11/12/2022 0034   Risk Factors 2 Filed at: 11/12/2022 0034   Troponin 0 Filed at: 11/12/2022 0034   HEART Score 4 Filed at: 11/12/2022 0034                                MDM  Number of Diagnoses or Management Options  Acute exacerbation of CHF (congestive heart failure) ([facility] [address] ): established and worsening  Dyspnea, unspecified type: established and worsening  Diagnosis management comments: Patient is a 68year old female with significant cardiac history including previous MI, CHF presenting with worsening dyspnea over the last day and a half associated with leg swelling  On initial evaluation, primary suspicion is for CHF exacerbation given distant breath sounds, pitting edema to mid calf  Other cardiac pathology including ACS, infectious pathology such as pneumonia or viral infection also on differential  Basic lab workup including CBC, CMP, troponin, BNP were ordered  This was only significant for elevated BNP, though similar to previous measured value and her baseline decreased renal function  CXR was significant for scattered hazy opacities and bilateral pleural effusions  EKG showed a paced rhythm with no acute changes  She was given IV lasix 20mg and the case was discussed with [facility] team who admitted the patient for a presumed exacerbation of CHF         Amount and/or Complexity of Data Reviewed  Clinical lab tests: ordered and reviewed  Tests in the radiology section of CPT®: ordered and reviewed  Tests in the medicine section of CPT®: ordered and reviewed    Risk of Complications, Morbidity, and/or Mortality  Presenting problems: low  Diagnostic procedures: minimal  Management options: minimal    Patient Progress  Patient progress: stable      Disposition  Final diagnoses:   Acute exacerbation of CHF (congestive heart failure) ([place] [address] )   Dyspnea, unspecified type     Time reflects when diagnosis was documented in both MDM as applicable and the Disposition within this note     Time User Action Codes Description Comment    11/11/2022 11:26 PM Erick Michael Add [I50 9] Acute exacerbation of CHF (congestive heart failure) ([facility] )     11/11/2022 11:26 PM Erick Michael Add [R06 00] Dyspnea, unspecified type     11/12/2022 12:31 AM Gema Enid Add [E87 70] Volume overload     11/12/2022 12:31 AM Gema Enid Add [N17 9,  N18 9] Acute kidney injury superimposed on chronic kidney disease 3/4       ED Disposition     ED Disposition   Admit    Condition   Stable    Date/Time   Fri Nov 11, 2022 11:26 PM    Comment   Case was discussed with Orrin Pallas and the patient's admission status was agreed to be inpatient to the service of Dr Teo Lomas              Follow-up Information    None         Current Discharge Medication List      CONTINUE these medications which have NOT CHANGED    Details   acetaminophen (TYLENOL) 325 mg tablet Take 3 tablets (975 mg total) by mouth every 8 (eight) hours  Refills: 0    Associated Diagnoses: Closed left hip fracture (HCC)      amLODIPine (NORVASC) 10 mg tablet Take 1 tablet (10 mg total) by mouth daily  Qty: 90 tablet, Refills: 1    Associated Diagnoses: Primary hypertension      aspirin 81 mg chewable tablet Chew 81 mg daily      atorvastatin (LIPITOR) 40 mg tablet Take 1 tablet (40 mg total) by mouth every evening  Qty: 90 tablet, Refills: 1    Associated Diagnoses: Stroke-like symptoms; Coronary artery disease involving native coronary artery of native heart without angina pectoris      B-D UF III MINI PEN NEEDLES 31G X 5 MM MISC Use to test qd  Qty: 100 each, Refills: 11    Associated Diagnoses: Type 2 diabetes mellitus with stage 3a chronic kidney disease, with long-term current use of insulin ([facility])      carvedilol (COREG) 25 mg tablet Take 1 tablet (25 mg total) by mouth every 12 (twelve) hours  Qty: 180 tablet, Refills: 1    Associated Diagnoses: Coronary artery disease involving native coronary artery of native heart without angina pectoris      Cholecalciferol (Vitamin D) 50 MCG (2000 UT) tablet Take 1 tablet (2,000 Units total) by mouth daily  Qty: 90 tablet, Refills: 1    Associated Diagnoses: Vitamin D deficiency      divalproex sodium (DEPAKOTE) 250 mg EC tablet Take 1 tablet (250 mg total) by mouth every 12 (twelve) hours  Qty: 180 tablet, Refills: 1    Associated Diagnoses: Hallucinations      fluticasone (FLONASE) 50 mcg/act nasal spray 1 spray into each nostril daily  Qty: 9 9 mL, Refills: 0    Associated Diagnoses: Left ear pain      folic acid (FOLVITE) 383 mcg tablet Take 400 mcg by mouth daily      hydrALAZINE (APRESOLINE) 25 mg tablet Take 1 tablet (25 mg total) by mouth every 8 (eight) hours  Qty: 270 tablet, Refills: 1    Associated Diagnoses: Primary hypertension      Lantus SoloStar 100 units/mL SOPN Inject 0 12 mL (12 Units total) under the skin daily at bedtime  Qty: 12 mL, Refills: 1    Associated Diagnoses: Diabetes mellitus type 2, insulin dependent ([facility])      meclizine (ANTIVERT) 12 5 MG tablet Take 1 tablet (12 5 mg total) by mouth every 8 (eight) hours as needed for dizziness  Qty: 30 tablet, Refills: 0    Associated Diagnoses: Wheezing      ranolazine (RANEXA) 500 mg 12 hr tablet Take 1 tablet (500 mg total) by mouth daily  Qty: 90 tablet, Refills: 1    Associated Diagnoses: Coronary artery disease involving native coronary artery of native heart without angina pectoris           No discharge procedures on file      PDMP Review       Value Time User    PDMP Reviewed  Yes 11/11/2022 10:37 PM Abbe Irizarry Jyothi Davis MD           ED Provider  Attending physically available and evaluated Bertha Agee I managed the patient along with the ED Attending      Electronically Signed by    Daniel Senior DO     [address] [place] [address],   11/12/22 7118

## 2022-11-12 NOTE — PLAN OF CARE
Problem: SAFETY ADULT  Goal: Patient will remain free of falls  Description: INTERVENTIONS:  - Educate patient/family on patient safety including physical limitations  - Instruct patient to call for assistance with activity   - Consult OT/PT to assist with strengthening/mobility   - Keep Call bell within reach  - Keep bed low and locked with side rails adjusted as appropriate  - Keep care items and personal belongings within reach  - Initiate and maintain comfort rounds  - Make Fall Risk Sign visible to staff  - Initiate/Maintain bed alarm  - Obtain necessary fall risk management equipment  - Apply yellow socks and bracelet for high fall risk patients  - Consider moving patient to room near nurses station  Outcome: Progressing  Goal: Maintain or return to baseline ADL function  Description: INTERVENTIONS:  -  Assess patient's ability to carry out ADLs; assess patient's baseline for ADL function and identify physical deficits which impact ability to perform ADLs (bathing, care of mouth/teeth, toileting, grooming, dressing, etc )  - Assess/evaluate cause of self-care deficits   - Assess range of motion  - Assess patient's mobility; develop plan if impaired  - Assess patient's need for assistive devices and provide as appropriate  - Encourage maximum independence but intervene and supervise when necessary  - Involve family in performance of ADLs  - Assess for home care needs following discharge   - Consider OT consult to assist with ADL evaluation and planning for discharge  - Provide patient education as appropriate  Outcome: Progressing  Goal: Maintains/Returns to pre admission functional level  Description: INTERVENTIONS:  - Perform BMAT or MOVE assessment daily    - Set and communicate daily mobility goal to care team and patient/family/caregiver     - Collaborate with rehabilitation services on mobility goals if consulted  - Perform Range of Motion   - Out of bed for toileting  - Record patient progress and toleration of activity level   Outcome: Progressing    Problem: PAIN - ADULT  Goal: Verbalizes/displays adequate comfort level or baseline comfort level  Description: Interventions:  - Encourage patient to monitor pain and request assistance  - Assess pain using appropriate pain scale  - Administer analgesics based on type and severity of pain and evaluate response  - Implement non-pharmacological measures as appropriate and evaluate response  - Consider cultural and social influences on pain and pain management  - Notify physician/advanced practitioner if interventions unsuccessful or patient reports new pain  Outcome: Progressing

## 2022-11-12 NOTE — ASSESSMENT & PLAN NOTE
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

## 2022-11-12 NOTE — ASSESSMENT & PLAN NOTE
· BP stable  · Continue home regimen    Amlodipine 10 mg qd, carvedilol 25 mg q12 hours, hydralazine 25 mg q8 hours  · S/p lasix 20 mg IV x 1 in ED

## 2022-11-12 NOTE — ED ATTENDING ATTESTATION
11/11/2022  IBatsheva MD, saw and evaluated the patient  I have discussed the patient with the resident/non-physician practitioner and agree with the resident's/non-physician practitioner's findings, Plan of Care, and MDM as documented in the resident's/non-physician practitioner's note, except where noted  All available labs and Radiology studies were reviewed  I was present for key portions of any procedure(s) performed by the resident/non-physician practitioner and I was immediately available to provide assistance  At this point I agree with the current assessment done in the Emergency Department  I have conducted an independent evaluation of this patient a history and physical is as follows: Patient is a 68year old female with worsening sob and orthopnea for past 1 5 days  No fever or cough  No chest pain  No N/V  Was last seen in this ED on 10/8/22 for left sided weakness  [facility] website checked on this patient and last Rx filled was on 6/30/20 for oxycodone for 2 day supply  NCAT  Mucous membranes moist  Heart regular without murmur  Breath sounds diminished bilaterally with bibasilar rales  Abdomen soft and nontender  Good bowel sounds  No rash noted  (+) LE edema  DDX including but not limited to: doubt pneumonia; pleural effusion, CHF, doubt PE; PTX, ACS, MI, asthma exacerbation, COPD exacerbation, doubt COVID 19, anemia, metabolic abnormality, renal failure  I reviewed EKG, labs, and CXR and IV lasix to be given and patient will need admission for CHF       ED Course         Critical Care Time  Procedures

## 2022-11-12 NOTE — CONSULTS
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  • UA:  Bacteria, no leukocyturia  • UPCr 0 4g/g  • Plan:  • No indication of urgent dialysis at this time  • Maintain mean arterial pressure above 65 mmHg to avoid hypotension/hypoperfusion  • Avoid nephrotoxic agents such as NSAIDs and contrast if at all possible    • Avoid opioids   • Adjust medications to GFR  • Renal diet     #Volume/hypertension:  • Volume:  Fluid overload  • Blood pressure:  Normotensive /48mmhg, goal< 140/90  • Low sodium diet   • Amlodipine 10 mg consider decreasing dose not but blood pressure is borderline  • Coreg 25 b i d   • Hold hydralazine Hydralazine 25 t i d   Due to borderline blood pressure  • Start Bumex 1 mg b i d   • Monitor BP and urinary output  • Daily weight  • If further hypotension would stop amlodipine    #Acid base disorder  • serum HCO3 25mmol/L   • At goal    #CKD-MBD  • Last corrected calcium 8 9 mg/dL  • At goal    #Secondary Hyperparathyroidism   • Will check iPTH as outpatient guidelines levels KDIGO 2017    #Anemia:  · Current hemoglobin:  7 5 mg/dL, trending down Rule out active bleeding  · Treatment:  · Transfuse for hemoglobin less than 7 0 per primary service      HISTORY OF PRESENT ILLNESS:  Requesting Physician: Darvin Quiles MD  Reason for Consult:  SAHIL     Zackery Thomas is a 68 y o   female with PMH of AFib,  CAD status post pacemaker, CKD 4 (baseline creatinine 1 8-2 5 mg/dL), HTN, hyperlipidemia, DM, cerebral amyloid angiopathy, blindness who was admitted to [facility] [place] [facility] after presenting with shortness of breath lower extremity edema  A renal consultation is requested today for assistance in the management of SAHIL    PAST MEDICAL HISTORY:  Past Medical History:   Diagnosis Date   • Atrial fibrillation ([facility] )    • Chronic anemia    • Chronic kidney disease    • Colonoscopy refused 2019    pt declines   • Dyslipidemia    • Hypertension    • Mammogram declined 2017   • Proteinuria    • Tachy-sarah beth syndrome ([facility] [address] ) 2022       PAST SURGICAL HISTORY:  Past Surgical History:   Procedure Laterality Date   • CARDIAC PACEMAKER PLACEMENT     • CATARACT EXTRACTION     •  SECTION     • CHOLECYSTECTOMY     • CORONARY ANGIOPLASTY WITH STENT PLACEMENT     • EYE SURGERY     • MAMMO (HISTORICAL)      Pt states she will not ever have again-    • NM OPEN RX FEMUR FX+INTRAMED RIMA Left 2022    Procedure: INSERTION NAIL IM FEMUR ANTEGRADE (TROCHANTERIC);   Surgeon: Nai Chang DO;  [facility]: [facility];  Service: Orthopedics   • TUBAL LIGATION         ALLERGIES:  No Known Allergies    SOCIAL HISTORY:  Social History     Substance and Sexual Activity   Alcohol Use Never     Social History     Substance and Sexual Activity   Drug Use Never     Social History     Tobacco Use   Smoking Status Never Smoker   Smokeless Tobacco Never Used       FAMILY HISTORY:  Family History   Problem Relation Age of Onset   • Diabetes Father    • Hypertension Father    • Heart disease Father    • Cancer Brother         smoker   • Diabetes Other    • Heart attack Other    • Hypertension Other    • Asthma Other        MEDICATIONS:    Current Facility-Administered Medications:   •  acetaminophen (TYLENOL) tablet 975 mg, 975 mg, Oral, Q8H [address], Lowanda Charan, CRNP, 975 mg at 22 0495  •  amLODIPine (NORVASC) tablet 10 mg, 10 mg, Oral, Daily, Lowjose g Farrar CRNP, 10 mg at 22 3221  •  aspirin chewable tablet 81 mg, 81 mg, Oral, Daily, Niko Pretty, JORGE LUISNP, 81 mg at 11/12/22 3535  •  atorvastatin (LIPITOR) tablet 40 mg, 40 mg, Oral, QPM, Niko Pretty, CRNP, 40 mg at 11/12/22 0058  •  carvedilol (COREG) tablet 25 mg, 25 mg, Oral, Q12H [facility], Niko Pretty, CRNP, 25 mg at 11/12/22 0618  •  cholecalciferol (VITAMIN D3) tablet 2,000 Units, 2,000 Units, Oral, Daily, Niko Pretty, CRNP, 2,000 Units at 11/12/22 5568  •  divalproex sodium (DEPAKOTE) EC tablet 250 mg, 250 mg, Oral, Q12H [facility], [facility], 250 mg at 11/12/22 7844  •  fluticasone (FLONASE) 50 mcg/act nasal spray 1 spray, 1 spray, Nasal, Daily, Niko PretTENNILLE valentino, 1 spray at 23/09/38 3731  •  folic acid (FOLVITE) tablet 400 mcg, 400 mcg, Oral, Daily, Niko Pretty, CRNP, 400 mcg at 11/12/22 4610  •  heparin (porcine) subcutaneous injection 5,000 Units, 5,000 Units, Subcutaneous, Q8H [facility], [facility], 5,000 Units at 11/12/22 6944  •  hydrALAZINE (APRESOLINE) tablet 25 mg, 25 mg, Oral, Q8H [facility], Niko Pretty, CRNP, 25 mg at 11/12/22 1594  •  insulin glargine (LANTUS) subcutaneous injection 12 Units 0 12 mL, 12 Units, Subcutaneous, HS, TENNILLE Camargo, 12 Units at 11/12/22 0158  •  insulin lispro (HumaLOG) 100 units/mL subcutaneous injection 1-5 Units, 1-5 Units, Subcutaneous, HS, TENNILLE Camargo, 1 Units at 11/12/22 0242  •  insulin lispro (HumaLOG) 100 units/mL subcutaneous injection 1-6 Units, 1-6 Units, Subcutaneous, TID AC **AND** Fingerstick Glucose (POCT), , , TID AC, JORGE LUIS CamargoNP  •  polyethylene glycol (MIRALAX) packet 17 g, 17 g, Oral, Daily, TENNILLE Camargo  •  ranolazine (RANEXA) 12 hr tablet 500 mg, 500 mg, Oral, Daily, TENNILLE Camargo, 500 mg at 11/12/22 9756    REVIEW OF SYSTEMS:  Complete 10 point review of systems were obtained and discussed in length with the patient  Complete review of systems were negative / unremarkable except mentioned in the HPI section       Review of Systems - Psychological ROS: negative  Ophthalmic ROS: negative  ENT ROS: negative  Hematological and Lymphatic ROS: negative  Endocrine ROS: negative  Respiratory ROS: positive for - shortness of breath  Cardiovascular ROS: no chest pain or dyspnea on exertion  Gastrointestinal ROS: no abdominal pain, change in bowel habits, or black or bloody stools  Genito-Urinary ROS: no dysuria, trouble voiding, or hematuria  Musculoskeletal ROS: positive for - swelling in leg - bilateral  Neurological ROS: no TIA or stroke symptoms  Dermatological ROS: negative     PHYSICAL EXAM:  Current Weight: Weight - Scale: 88 5 kg (195 lb)  First Weight: Weight - Scale: 88 8 kg (195 lb 12 8 oz)  Vitals:    11/12/22 0926   BP: (!) 120/48   Pulse: 61   Resp:    Temp:    SpO2:        Intake/Output Summary (Last 24 hours) at 11/12/2022 1131  Last data filed at 11/12/2022 0901  Gross per 24 hour   Intake 120 ml   Output 350 ml   Net -230 ml     Wt Readings from Last 3 Encounters:   11/12/22 88 5 kg (195 lb)   10/21/22 83 kg (183 lb)   10/16/22 83 kg (183 lb)     Temp Readings from Last 3 Encounters:   11/12/22 97 7 °F (36 5 °C)   11/02/22 98 4 °F (36 9 °C)   10/25/22 98 1 °F (36 7 °C) (Temporal)     BP Readings from Last 3 Encounters:   11/12/22 (!) 120/48   11/10/22 90/58   11/07/22 122/60     Pulse Readings from Last 3 Encounters:   11/12/22 61   11/10/22 62   11/04/22 80        General:  Obese, no acute distress at this time  Skin:  No acute rash  Eyes:  No scleral icterus and noninjected  ENT:  mucous membranes moist  Neck:  no carotid bruits  Chest:  Decreased breath sounds bilaterally, good respiratory effort, no use of accessory respiratory muscles  CVS:  Regular rate and rhythm without rub   Abdomen:  soft and nontender   Extremities:  Lower extremity edema  Neuro:  No gross focality  Psych:  Alert ,       Invasive Devices:   Urethral Catheter Latex 16 Fr   (Active)     Lab Results:   Results from last 7 days   Lab Units 11/12/22  0501 11/11/22  2200   WBC Thousand/uL 3 97* 5 92   HEMOGLOBIN g/dL 7 5* 9 0*   HEMATOCRIT % 24 4* 28 4*   PLATELETS Thousands/uL 121* 167   POTASSIUM mmol/L 4 4 4 7   CHLORIDE mmol/L 106 104   CO2 mmol/L 25 27   BUN mg/dL 34* 34*   CREATININE mg/dL 2 35* 2 34*   CALCIUM mg/dL 7 7* 8 4       Other Studies:   VAS upper limb venous duplex scan, unilateral/limited   Final Result by Douglas Spence MD (11/12 2245)      XR chest 1 view portable   ED Interpretation by Tuyet Altamirano DO (11/12 1804)   Scattered hazy opacities throughout, bilateral pleural effusions          Portions of the record may have been created with voice recognition software  Occasional wrong word or "sound a like" substitutions may have occurred due to the inherent limitations of voice recognition software  Read the chart carefully and recognize, using context, where substitutions have occurred

## 2022-11-12 NOTE — PLAN OF CARE
Problem: METABOLIC, FLUID AND ELECTROLYTES - ADULT  Goal: Electrolytes maintained within normal limits  Description: INTERVENTIONS:  - Monitor labs and assess patient for signs and symptoms of electrolyte imbalances  - Administer electrolyte replacement as ordered  - Monitor response to electrolyte replacements, including repeat lab results as appropriate  - Instruct patient on fluid and nutrition as appropriate  Outcome: Not Progressing  Goal: Fluid balance maintained  Description: INTERVENTIONS:  - Monitor labs   - Monitor I/O and WT  - Instruct patient on fluid and nutrition as appropriate  - Assess for signs & symptoms of volume excess or deficit  Outcome: Not Progressing  Goal: Glucose maintained within target range  Description: INTERVENTIONS:  - Monitor Blood Glucose as ordered  - Assess for signs and symptoms of hyperglycemia and hypoglycemia  - Administer ordered medications to maintain glucose within target range  - Assess nutritional intake and initiate nutrition service referral as needed  Outcome: Progressing

## 2022-11-12 NOTE — ASSESSMENT & PLAN NOTE
· Presented with shortness of breath, leg swelling that began 11/10  · Echo 10/10/2022 EF 65% with normal diastolic function  • BNP: 859  • Appears patient had similar presentation in August with shortness of breath  Underwent negative VQ, dopplers, normal echo   at the time  Diuresis was attempted but did not improve symptoms  She was recommended for outpatient follow up with PFTs    • Send UA, upep, spep  • 1500 cc FR

## 2022-11-12 NOTE — H&P
[facility]  H&P- Sea Hayden 1945, 68 y o  female MRN: [id number]  Unit/Bed#: S -01 Encounter: [id number]  Primary Care Provider: Anson Champion MD   Date and time admitted to hospital: 11/11/2022  9:40 PM    * Acute kidney injury superimposed on chronic kidney disease 3/4  Assessment & Plan  · Creatinine on admission 2 34  Previous baseline creatinine 1 6 to 1 9  Hospitalized 10/8-10/14 with creatinine peaking at 2 42 despite IV fluids,  SAHIL thought to be 2/2 contrast nephropathy  Does not appear patient had repeat labs as outpatient  • Diuresis: lasix 20 mg IV in ED  Hold further and await nephrology recs  • Monitor intake and output, daily weights  • Diet: Fluid restriction and 2 g Sodium  • Consult nephrology     Volume overload  Assessment & Plan  · Presented with shortness of breath, leg swelling that began 11/10  · Echo 10/10/2022 EF 65% with normal diastolic function  • BNP: 859  • Appears patient had similar presentation in August with shortness of breath  Underwent negative VQ, dopplers, normal echo   at the time  Diuresis was attempted but did not improve symptoms  She was recommended for outpatient follow up with PFTs  • Send UA, upep, spep  • 1500 cc FR    Cerebral amyloid angiopathy (HCC)  Assessment & Plan  · Previously noted memory problems, hallucinations  Recent MRI brain showed likely CAA  Evaluated by neurology during prior hospitalization as part of stroke work up with the following recommendations  · Stop AC due to bleeding risk  Neurology previously okay with DVT ppx with subq heparin  · 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 for hallucinations / lewy body dementia    Atrial fibrillation ([facility] [address] )  Assessment & Plan  · Maintained on beta blocker, aspirin  · No longer on a/c due to high risk of bleeding  MRI brain consistent with chronic amyloid angiopathy  CAD (coronary artery disease)  Assessment & Plan  · S/p stent RCA    Without complaints of chest pain or shortness of breath  · Continue home regimen    Hypertension  Assessment & Plan  · BP stable  · Continue home regimen  Amlodipine 10 mg qd, carvedilol 25 mg q12 hours, hydralazine 25 mg q8 hours  · S/p lasix 20 mg IV x 1 in ED    Diabetes mellitus type 2, insulin dependent ([facility] [address] )  Assessment & Plan  Lab Results   Component Value Date    HGBA1C 7 1 (H) 10/09/2022       · Continue lantus 12 units HS  · Add accucheck , SSI    Anemia of chronic kidney failure, stage 4 (severe) (HCC)  Assessment & Plan  · Hgb stable, no bleeding  Baseline 7 to 9   · Maintained on folic acid  VTE Pharmacologic Prophylaxis: VTE Score: 9 High Risk (Score >/= 5) - Pharmacological DVT Prophylaxis Ordered: heparin  Sequential Compression Devices Ordered  Code Status: Level 1 - Full Code full  Discussion with family: Updated  (son) at bedside  Anticipated Length of Stay: Patient will be admitted on an inpatient basis with an anticipated length of stay of greater than 2 midnights secondary to volume overload, marbin  Total Time for Visit, including Counseling / Coordination of Care: 70 minutes Greater than 50% of this total time spent on direct patient counseling and coordination of care  Chief Complaint: shortness of breath, leg swelling    History of Present Illness:  Sergio Santo is a 68 y o  female with a PMH of atrial fibrillation, CAD, SSS s/p pacemaker, CKD 4, HTN, HLD, IDDM, cerebral amyloid angiopathy, blindness who presents with 1 1/2 days of shortness of breath and swelling to bilateral lower extremities and left upper extremity  Patient's son provides some of the information and reports she generally has some mild edema to her lower extremities which resolve with elevation  He reports the swelling is much worse and now has significant swelling to her left upper extremity    The patient tells me she has had very little urine output because she "is a diabetic" but isn't able to quantify how long urine output has been decreased  Hospitalized 10/8-10/14 for stroke work up  She underwent negative CTA head neck on 10/8  MRI 10/10 suggested cerebral amyloid angiopathy, chronic lacunar infarcts  She developed SAHIL 10/10 thought to be 2/2 contrast nephropathy  She was given IV fluids without improvement in kidney function with kidney function peaking at 2 42 on   She has not had any labs completed after hospital discharge  Admitted as inpatient  Nephrology consult requested  Review of Systems:  Review of Systems   Constitutional: Positive for fatigue  Respiratory: Positive for shortness of breath  Negative for cough, chest tightness and wheezing  Cardiovascular: Positive for leg swelling  Negative for chest pain and palpitations  Gastrointestinal: Positive for constipation  Genitourinary: Positive for decreased urine volume  All other systems reviewed and are negative  Past Medical and Surgical History:   Past Medical History:   Diagnosis Date   • Atrial fibrillation ([facility] [address] )    • Chronic anemia    • Chronic kidney disease    • Colonoscopy refused 2019    pt declines   • Dyslipidemia    • Hypertension    • Mammogram declined 2017   • Proteinuria    • Tachy-sarah beth syndrome ([facility] [address] ) 2022       Past Surgical History:   Procedure Laterality Date   • CARDIAC PACEMAKER PLACEMENT     • CATARACT EXTRACTION     •  SECTION     • CHOLECYSTECTOMY     • CORONARY ANGIOPLASTY WITH STENT PLACEMENT     • EYE SURGERY     • MAMMO (HISTORICAL)      Pt states she will not ever have again-    • [place] OPEN RX FEMUR FX+INTRAMED RIMA Left 2022    Procedure: INSERTION NAIL IM FEMUR ANTEGRADE (TROCHANTERIC); Surgeon: Carol Cerda DO;  Location: [facility];  Service: Orthopedics   • TUBAL LIGATION         Meds/Allergies:  Prior to Admission medications    Medication Sig Start Date End Date Taking?  Authorizing Provider   acetaminophen (TYLENOL) 325 mg tablet Take 3 tablets (975 mg total) by mouth every 8 (eight) hours 6/30/22   Bernadette Knox PA-C   amLODIPine (NORVASC) 10 mg tablet Take 1 tablet (10 mg total) by mouth daily 10/21/22   Mario Alberto Kilgore MD   aspirin 81 mg chewable tablet Chew 81 mg daily    Historical Provider, MD   atorvastatin (LIPITOR) 40 mg tablet Take 1 tablet (40 mg total) by mouth every evening 10/21/22   Mario Alberto Kilgore MD   B-D UF III MINI PEN NEEDLES 31G X 5 MM MISC Use to test qd 2/7/22   Mario Alberto Kilgore MD   carvedilol (COREG) 25 mg tablet Take 1 tablet (25 mg total) by mouth every 12 (twelve) hours 10/21/22   Mario Alberto Kilgore MD   Cholecalciferol (Vitamin D) 50 MCG (2000 UT) tablet Take 1 tablet (2,000 Units total) by mouth daily 11/10/20   Mario Alberto Kilgore MD   divalproex sodium (DEPAKOTE) 250 mg EC tablet Take 1 tablet (250 mg total) by mouth every 12 (twelve) hours 10/21/22   Mario Alberto Kilgore MD   fluticasone [facility]) 50 mcg/act nasal spray 1 spray into each nostril daily 11/3/21   Nick Ross PA-C   folic acid (FOLVITE) 691 mcg tablet Take 400 mcg by mouth daily    Historical Provider, MD   hydrALAZINE (APRESOLINE) 25 mg tablet Take 1 tablet (25 mg total) by mouth every 8 (eight) hours 10/21/22   Mario Alberto Kilgore MD   Lantus SoloStar 100 units/mL SOPN Inject 0 12 mL (12 Units total) under the skin daily at bedtime 11/1/22 1/30/23  Mario Alberto Kilgore MD   meclizine (ANTIVERT) 12 5 MG tablet Take 1 tablet (12 5 mg total) by mouth every 8 (eight) hours as needed for dizziness 8/3/22   Penny Flores MD   ranolazine (RANEXA) 500 mg 12 hr tablet Take 1 tablet (500 mg total) by mouth daily 10/21/22 11/20/22  Mario Alberto Kilgore MD   apixaban (Eliquis) 5 mg Take 1 tablet (5 mg total) by mouth 2 (two) times a day 10/9/22 10/14/22  Eloy Bridges PA-C   bisacodyl (DULCOLAX) 10 mg suppository Insert 1 suppository (10 mg total) into the rectum daily as needed for constipation 6/30/22 8/1/22  Luh Kumar PA-C   dabigatran etexilate (PRADAXA) 75 mg capsule Take 1 capsule (75 mg total) by mouth 2 (two) times a day 10/9/22 10/14/22  Dar Hollingsworth PA-C     I have reviewed home medications with a medical source (PCP, Pharmacy, other)  Allergies: No Known Allergies    Social History:  Marital Status:    Occupation:   Patient Pre-hospital Living Situation: Home  Patient Pre-hospital Level of Mobility: unable to be assessed at time of evaluation  Patient Pre-hospital Diet Restrictions:   Substance Use History:   Social History     Substance and Sexual Activity   Alcohol Use Never     Social History     Tobacco Use   Smoking Status Never Smoker   Smokeless Tobacco Never Used     Social History     Substance and Sexual Activity   Drug Use Never       Family History:  Family History   Problem Relation Age of Onset   • Diabetes Father    • Hypertension Father    • Heart disease Father    • Cancer Brother         smoker   • Diabetes Other    • Heart attack Other    • Hypertension Other    • Asthma Other        Physical Exam:     Vitals:   Blood Pressure: 139/52 (11/12/22 0036)  Pulse: 64 (11/12/22 0036)  Temperature: 98 1 °F (36 7 °C) (11/12/22 0036)  Temp Source: Oral (11/12/22 0036)  Respirations: 18 (11/12/22 0036)  SpO2: 93 % (11/12/22 0036)    Physical Exam  Constitutional:       General: She is not in acute distress  Appearance: Normal appearance  She is not ill-appearing or toxic-appearing  HENT:      Head: Normocephalic and atraumatic  Right Ear: External ear normal       Left Ear: External ear normal       Nose: Nose normal       Mouth/Throat:      Pharynx: Oropharynx is clear  Eyes:      Comments: Blind    Cardiovascular:      Rate and Rhythm: Normal rate and regular rhythm  Pulses: Normal pulses  Heart sounds: Normal heart sounds  Pulmonary:      Effort: Pulmonary effort is normal       Breath sounds: No wheezing, rhonchi or rales        Comments: Diminished breath sounds in all lung field  Slightly tachypnic  Chest:      Chest wall: No tenderness  Abdominal:      General: Bowel sounds are normal  There is distension  Palpations: Abdomen is soft  Tenderness: There is no abdominal tenderness  There is no right CVA tenderness, left CVA tenderness, guarding or rebound  Musculoskeletal:         General: Swelling (left upper extremity, non pitting) present  No tenderness  Normal range of motion  Cervical back: Normal range of motion  No rigidity or tenderness  Right lower leg: Edema (+3 ) present  Left lower leg: Edema (+3) present  Skin:     General: Skin is warm and dry  Capillary Refill: Capillary refill takes less than 2 seconds  Neurological:      General: No focal deficit present  Mental Status: She is alert and oriented to person, place, and time  Psychiatric:         Attention and Perception: Attention and perception normal          Mood and Affect: Mood normal          Speech: Speech normal          Behavior: Behavior normal          Thought Content:  Thought content normal          Cognition and Memory: Cognition and memory normal          Judgment: Judgment normal       Comments: Noted to have hallucinations at times, occurs mostly at night          Additional Data:     Lab Results:  Results from last 7 days   Lab Units 11/11/22  2200   WBC Thousand/uL 5 92   HEMOGLOBIN g/dL 9 0*   HEMATOCRIT % 28 4*   PLATELETS Thousands/uL 167   NEUTROS PCT % 72   LYMPHS PCT % 15   MONOS PCT % 11   EOS PCT % 0     Results from last 7 days   Lab Units 11/11/22  2200   SODIUM mmol/L 138   POTASSIUM mmol/L 4 7   CHLORIDE mmol/L 104   CO2 mmol/L 27   BUN mg/dL 34*   CREATININE mg/dL 2 34*   ANION GAP mmol/L 7   CALCIUM mg/dL 8 4   ALBUMIN g/dL 3 4*   TOTAL BILIRUBIN mg/dL 0 47   ALK PHOS U/L 52   ALT U/L 5*   AST U/L 9*   GLUCOSE RANDOM mg/dL 223*                       Imaging: Reviewed radiology reports from this admission including: chest xray  XR chest 1 view portable   ED Interpretation by [facility] DO Tray (11/12 4778)   Scattered hazy opacities throughout, bilateral pleural effusions      VAS upper limb venous duplex scan, unilateral/limited    (Results Pending)       EKG and Other Studies Reviewed on Admission:   · EKG: Paced rhythm  HR 61     ** Please Note: This note has been constructed using a voice recognition system   **

## 2022-11-12 NOTE — ASSESSMENT & PLAN NOTE
· Maintained on beta blocker, aspirin  · No longer on a/c due to high risk of bleeding  MRI brain consistent with chronic amyloid angiopathy

## 2022-11-12 NOTE — UTILIZATION REVIEW
Initial Clinical Review    Admission: Date/Time/Statement:   Admission Orders (From admission, onward)     Ordered        11/11/22 2327  INPATIENT ADMISSION  Once                      Orders Placed This Encounter   Procedures   • INPATIENT ADMISSION     Standing Status:   Standing     Number of Occurrences:   1     Order Specific Question:   Level of Care     Answer:   Med Surg [16]     Order Specific Question:   Estimated length of stay     Answer:   More than 2 Midnights     Order Specific Question:   Certification     Answer:   I certify that inpatient services are medically necessary for this patient for a duration of greater than two midnights  See H&P and MD Progress Notes for additional information about the patient's course of treatment  ED Arrival Information     Expected   -    Arrival   11/11/2022 21:12    Acuity   Emergent            Means of arrival   Wheelchair    Escorted by   Family Member    Service   Hospitalist    Admission type   Emergency            Arrival complaint   Swelling wasit down/Fatigue/SOB            Chief Complaint   Patient presents with   • Shortness of Breath     Patient c/o generalized swelling since yesterday, palpitations, and SOB  Initial Presentation: 68 y o  female from home to ED admitted inpatient due to SAHIL on CKD/Volume Overload  Presented due to worsening shortness of breath starting 1 5 days prior to arrival   Has some chest tightness and feels leg swelling worse  On exam:  Decreased breath sounds  Wheezing  Bilateral + 3 edema to mid calf    Bun 34, creatinine 2 34 with baseline of 1 6 - 1 9  CxR with bilateral Pleural effusions, scattered hazy opacities  In the ED given IV Lasix  Nephrology consulted  Fluid and sodium restriction  Further diuretics per nephrology  Continue govind Lantus and add accuchecks with SSI  Date: 11/12/22   Day 2: Continued leg swelling  On exam: appears volume overloaded  Start Bumex   Continue fluid restriction  Given IV albumin     11/12/22 per nephrology - patient has CKD G4A3 with baseline creatinine of 1 8 - 2 5  Plan is MAP > 65  Renal diet  Goal BP < 140/90  Hold Hydralazine, continue amlodipine, Coreg  Start Bumex  ED Triage Vitals   Temperature Pulse Respirations Blood Pressure SpO2   11/11/22 2120 11/11/22 2120 11/11/22 2120 11/11/22 2120 11/11/22 2120   97 9 °F (36 6 °C) 67 20 136/64 96 %      Temp Source Heart Rate Source Patient Position - Orthostatic VS BP Location FiO2 (%)   11/11/22 2120 11/11/22 2120 11/11/22 2120 11/11/22 2120 --   Oral Monitor Sitting Right arm       Pain Score       11/11/22 2243       No Pain          Wt Readings from Last 1 Encounters:   11/12/22 88 5 kg (195 lb)     Additional Vital Signs:   11/12/22 15:42:36 98 2 °F (36 8 °C) 64 -- 121/53 76 92 % -- --   11/12/22 1417 -- -- -- 138/48 Abnormal  -- -- -- --   11/12/22 12:10:48 -- 61 -- 109/43 Abnormal  65 93 % -- --   11/12/22 0926 -- 61 -- 120/48 Abnormal  -- -- -- --   11/12/22 07:43:27 97 7 °F (36 5 °C) 62 18 102/45 Abnormal  64 Abnormal  93 % -- --   11/12/22 0612 -- -- -- 119/49 Abnormal  -- -- -- --   11/12/22 03:05:28 98 °F (36 7 °C) 60 17 114/49 Abnormal  71 93 % -- --   11/12/22 00:36:31 98 1 °F (36 7 °C) 64 18 139/52 81 93 % None (Room air) Lying   11/11/22 2243 -- 60 20 142/66 95 94 % None (Room air) Lying   11/11/22 2200 -- 60 20 137/63 90 95 % None (Room air)        11/11 0701   11/12 0700 11/12 0701 11/13 0700   P  O    360   Total Intake(mL/kg)   360 (4 1)   Urine (mL/kg/hr)  350 250 (0 3)   Total Output  350 250   Net  -350 +110     Date/Time Weight Weight Method   11/12/22 0538 88 5 kg (195 lb) Standing scale   11/12/22 0506 88 8 kg (195 lb 12 8 oz) Standing scale       Pertinent Labs/Diagnostic Test Results:   VAS upper limb venous duplex scan, unilateral/limited   Final Result by Timothy Kumar MD (11/12 1047)      XR chest 1 view portable   ED Interpretation by Manuelito Díaz [address], DO (11/12 7189)   Scattered hazy opacities throughout, bilateral pleural effusions      Final Result by Aislinn Lucio MD (11/12 1143)      Mild fluid overload with small bilateral pleural effusions                    Workstation performed: [id number]           11/11/22 ecg Atrial-paced rhythm  Low voltage QRS  Cannot rule out Anterior infarct , age undetermined  Abnormal ECG  When compared with ECG of 08-OCT-2022 07:30,  Nonspecific T wave abnormality now evident in Inferior leads  Nonspecific T wave abnormality no longer evident in Lateral leads    Results from last 7 days   Lab Units 11/11/22  2340   SARS-COV-2  Negative     Results from last 7 days   Lab Units 11/12/22  0501 11/11/22  2200   WBC Thousand/uL 3 97* 5 92   HEMOGLOBIN g/dL 7 5* 9 0*   HEMATOCRIT % 24 4* 28 4*   PLATELETS Thousands/uL 121* 167   NEUTROS ABS Thousands/µL 2 20 4 28     Results from last 7 days   Lab Units 11/12/22  0501 11/11/22  2200   SODIUM mmol/L 139 138   POTASSIUM mmol/L 4 4 4 7   CHLORIDE mmol/L 106 104   CO2 mmol/L 25 27   ANION GAP mmol/L 8 7   BUN mg/dL 34* 34*   CREATININE mg/dL 2 35* 2 34*   EGFR ml/min/1 73sq m 19 19   CALCIUM mg/dL 7 7* 8 4     Results from last 7 days   Lab Units 11/11/22  2200   AST U/L 9*   ALT U/L 5*   ALK PHOS U/L 52   TOTAL PROTEIN g/dL 6 6   ALBUMIN g/dL 3 4*   TOTAL BILIRUBIN mg/dL 0 47     Results from last 7 days   Lab Units 11/12/22  1547 11/12/22  1115 11/12/22  0748 11/12/22  0233 11/12/22  0149   POC GLUCOSE mg/dl 99 168* 110 184* 190*     Results from last 7 days   Lab Units 11/12/22  0501 11/11/22  2200   GLUCOSE RANDOM mg/dL 166* 223*     Results from last 7 days   Lab Units 11/12/22  0052 11/11/22  2200   HS TNI 0HR ng/L  --  10   HS TNI 2HR ng/L 10  --    HSTNI D2 ng/L 0  --      Results from last 7 days   Lab Units 11/11/22  2200   BNP pg/mL 859*     Results from last 7 days   Lab Units 11/12/22  1356   CLARITY UA  Clear   COLOR UA  Yellow   SPEC GRAV UA  1 014   PH UA  5 0 GLUCOSE UA mg/dl Negative   KETONES UA mg/dl Negative   BLOOD UA  Negative   PROTEIN UA mg/dl Negative   NITRITE UA  Negative   BILIRUBIN UA  Negative   UROBILINOGEN UA (BE) mg/dl <2 0   LEUKOCYTES UA  Trace*   WBC UA /hpf 1-2   RBC UA /hpf None Seen   BACTERIA UA /hpf Innumerable*   EPITHELIAL CELLS WET PREP /hpf Occasional   CREATININE UR mg/dL 95 6   PROTEIN UR mg/dL 11   PROT/CREAT RATIO UR  0 12*     Results from last 7 days   Lab Units 11/11/22  2340   INFLUENZA A PCR  Negative   INFLUENZA B PCR  Negative   RSV PCR  Negative       ED Treatment:   Medication Administration from 11/11/2022 2112 to 11/12/2022 0036       Date/Time Order Dose Route Action Comments     11/11/2022 2244 furosemide (LASIX) injection 20 mg 20 mg Intravenous Given         Past Medical History:   Diagnosis Date   • Atrial fibrillation ([facility])    • Chronic anemia    • Chronic kidney disease    • Colonoscopy refused 11/2019    pt declines   • Dyslipidemia    • Hypertension    • Mammogram declined 09/2017   • Proteinuria    • 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     Present on Admission:  • Acute kidney injury superimposed on chronic kidney disease 3/4  • Atrial fibrillation ([facility])  • CAD (coronary artery disease)  • Hypertension  • Anemia of chronic kidney failure, stage 4 (severe) ([facility])      Admitting Diagnosis: Acute exacerbation of CHF (congestive heart failure) ([facility]) [I50 9]  Volume overload [E87 70]  Acute kidney injury superimposed on chronic kidney disease ([place] [address] ) [N17 9, N18 9]  Dyspnea, unspecified type [R06 00]  Age/Sex: 68 y o  female  Admission Orders: 11/11/22 3297 inpatient   Scheduled Medications:  acetaminophen, 975 mg, Oral, Q8H [address]  amLODIPine, 10 mg, Oral, Daily  aspirin, 81 mg, Oral, Daily  atorvastatin, 40 mg, Oral, QPM  bumetanide, 1 mg, Oral, BID  carvedilol, 25 mg, Oral, Q12H [address]  cholecalciferol, 2,000 Units, Oral, Daily  divalproex sodium, 250 mg, Oral, Q12H [facility]  fluticasone, 1 spray, Nasal, Daily  folic acid, 332 mcg, Oral, Daily  heparin (porcine), 5,000 Units, Subcutaneous, Q8H [facility]  insulin glargine, 12 Units, Subcutaneous, HS  insulin lispro, 1-5 Units, Subcutaneous, HS  insulin lispro, 1-6 Units, Subcutaneous, TID AC  polyethylene glycol, 17 g, Oral, Daily  ranolazine, 500 mg, Oral, Daily    albumin human (FLEXBUMIN) 25 % injection 12 5 g  Dose: 12 5 g  Freq: Once Route: IV  Last Dose: Stopped (11/12/22 1049)  Start: 11/12/22 0900 End: 11/12/22 1049    hydrALAZINE (APRESOLINE) tablet 25 mg  Dose: 25 mg  Freq: Every 8 hours scheduled Route: PO  Start: 11/12/22 0045 End: 11/12/22 1137    Continuous IV Infusions: none      PRN Meds: not used      Neuro vascular checks every 4 hours  Fluid restriction     IP CONSULT TO NEPHROLOGY    Network Utilization Review Department  ATTENTION: Please call with any questions or concerns to [phone] and carefully listen to the prompts so that you are directed to the right person  All voicemails are confidential   [facility] all requests for admission clinical reviews, approved or denied determinations and any other requests to dedicated fax number below belonging to the campus where the patient is receiving treatment   List of dedicated fax numbers for the Facilities:  [address] DENIALS (Administrative/Medical Necessity) [phone]   [address] (Maternity/NICU/Pediatrics) [phone]   915 Claudia Pabon [phone]   [facility] 77 [phone]   1306 [facility] 150 [facility] [place] [address] [place] [address] [id number] [place] [address] 28 [phone]   [address] [facility] Castaliatrixie Santana [facility] 134 1102 [facility] [place] [phone]

## 2022-11-13 LAB
GLUCOSE SERPL-MCNC: 110 MG/DL (ref 65–140)
GLUCOSE SERPL-MCNC: 117 MG/DL (ref 65–140)
GLUCOSE SERPL-MCNC: 141 MG/DL (ref 65–140)
GLUCOSE SERPL-MCNC: 146 MG/DL (ref 65–140)
GLUCOSE SERPL-MCNC: 41 MG/DL (ref 65–140)

## 2022-11-13 RX ORDER — INSULIN GLARGINE 100 [IU]/ML
8 INJECTION, SOLUTION SUBCUTANEOUS
Status: DISCONTINUED | OUTPATIENT
Start: 2022-11-13 | End: 2022-11-15

## 2022-11-13 RX ADMIN — HEPARIN SODIUM 5000 UNITS: 5000 INJECTION INTRAVENOUS; SUBCUTANEOUS at 22:59

## 2022-11-13 RX ADMIN — FOLIC ACID TAB 400 MCG 400 MCG: 400 TAB at 09:48

## 2022-11-13 RX ADMIN — BUMETANIDE 1 MG: 1 TABLET ORAL at 09:42

## 2022-11-13 RX ADMIN — HEPARIN SODIUM 5000 UNITS: 5000 INJECTION INTRAVENOUS; SUBCUTANEOUS at 06:41

## 2022-11-13 RX ADMIN — AMLODIPINE BESYLATE 10 MG: 10 TABLET ORAL at 09:44

## 2022-11-13 RX ADMIN — CARVEDILOL 25 MG: 12.5 TABLET, FILM COATED ORAL at 09:42

## 2022-11-13 RX ADMIN — ASPIRIN 81 MG CHEWABLE TABLET 81 MG: 81 TABLET CHEWABLE at 09:44

## 2022-11-13 RX ADMIN — CARVEDILOL 25 MG: 12.5 TABLET, FILM COATED ORAL at 23:00

## 2022-11-13 RX ADMIN — INSULIN GLARGINE 8 UNITS: 100 INJECTION, SOLUTION SUBCUTANEOUS at 22:59

## 2022-11-13 RX ADMIN — BUMETANIDE 1 MG: 1 TABLET ORAL at 17:10

## 2022-11-13 RX ADMIN — DIVALPROEX SODIUM 250 MG: 250 TABLET, DELAYED RELEASE ORAL at 23:02

## 2022-11-13 RX ADMIN — RANOLAZINE 500 MG: 500 TABLET, FILM COATED, EXTENDED RELEASE ORAL at 09:41

## 2022-11-13 RX ADMIN — ACETAMINOPHEN 975 MG: 325 TABLET, FILM COATED ORAL at 06:40

## 2022-11-13 RX ADMIN — CHOLECALCIFEROL TAB 25 MCG (1000 UNIT) 2000 UNITS: 25 TAB at 09:41

## 2022-11-13 RX ADMIN — HEPARIN SODIUM 5000 UNITS: 5000 INJECTION INTRAVENOUS; SUBCUTANEOUS at 14:29

## 2022-11-13 RX ADMIN — ACETAMINOPHEN 975 MG: 325 TABLET, FILM COATED ORAL at 22:59

## 2022-11-13 RX ADMIN — FLUTICASONE PROPIONATE 1 SPRAY: 50 SPRAY, METERED NASAL at 09:47

## 2022-11-13 RX ADMIN — ACETAMINOPHEN 975 MG: 325 TABLET, FILM COATED ORAL at 14:29

## 2022-11-13 RX ADMIN — ATORVASTATIN CALCIUM 40 MG: 40 TABLET, FILM COATED ORAL at 17:10

## 2022-11-13 RX ADMIN — DIVALPROEX SODIUM 250 MG: 250 TABLET, DELAYED RELEASE ORAL at 09:47

## 2022-11-13 NOTE — PLAN OF CARE
Problem: PAIN - ADULT  Goal: Verbalizes/displays adequate comfort level or baseline comfort level  Description: Interventions:  - Encourage patient to monitor pain and request assistance  - Assess pain using appropriate pain scale  - Administer analgesics based on type and severity of pain and evaluate response  - Implement non-pharmacological measures as appropriate and evaluate response  - Consider cultural and social influences on pain and pain management  - Notify physician/advanced practitioner if interventions unsuccessful or patient reports new pain  Outcome: Progressing     Problem: SAFETY ADULT  Goal: Patient will remain free of falls  Description: INTERVENTIONS:  - Educate patient/family on patient safety including physical limitations  - Instruct patient to call for assistance with activity   - Consult OT/PT to assist with strengthening/mobility   - Keep Call bell within reach  - Keep bed low and locked with side rails adjusted as appropriate  - Keep care items and personal belongings within reach  - Initiate and maintain comfort rounds  - Make Fall Risk Sign visible to staff  - Offer Toileting every  Hours, in advance of need  - Initiate/Maintain alarm  - Obtain necessary fall risk management equipment:   - Apply yellow socks and bracelet for high fall risk patients  - Consider moving patient to room near nurses station  Outcome: Progressing  Goal: Maintain or return to baseline ADL function  Description: INTERVENTIONS:  -  Assess patient's ability to carry out ADLs; assess patient's baseline for ADL function and identify physical deficits which impact ability to perform ADLs (bathing, care of mouth/teeth, toileting, grooming, dressing, etc )  - Assess/evaluate cause of self-care deficits   - Assess range of motion  - Assess patient's mobility; develop plan if impaired  - Assess patient's need for assistive devices and provide as appropriate  - Encourage maximum independence but intervene and supervise when necessary  - Involve family in performance of ADLs  - Assess for home care needs following discharge   - Consider OT consult to assist with ADL evaluation and planning for discharge  - Provide patient education as appropriate  Outcome: Progressing  Goal: Maintains/Returns to pre admission functional level  Description: INTERVENTIONS:  - Perform BMAT or MOVE assessment daily    - Set and communicate daily mobility goal to care team and patient/family/caregiver  - Collaborate with rehabilitation services on mobility goals if consulted  - Perform Range of Motion  times a day  - Reposition patient every  hours    - Dangle patient  times a day  - Stand patient  times a day  - Ambulate patient  times a day  - Out of bed to chair  times a day   - Out of bed for meals  times a day  - Out of bed for toileting  - Record patient progress and toleration of activity level   Outcome: Progressing     Problem: Prexisting or High Potential for Compromised Skin Integrity  Goal: Skin integrity is maintained or improved  Description: INTERVENTIONS:  - Identify patients at risk for skin breakdown  - Assess and monitor skin integrity  - Assess and monitor nutrition and hydration status  - Monitor labs   - Assess for incontinence   - Turn and reposition patient  - Assist with mobility/ambulation  - Relieve pressure over bony prominences  - Avoid friction and shearing  - Provide appropriate hygiene as needed including keeping skin clean and dry  - Evaluate need for skin moisturizer/barrier cream  - Collaborate with interdisciplinary team   - Patient/family teaching  - Consider wound care consult   Outcome: Progressing     Problem: MOBILITY - ADULT  Goal: Maintain or return to baseline ADL function  Description: INTERVENTIONS:  -  Assess patient's ability to carry out ADLs; assess patient's baseline for ADL function and identify physical deficits which impact ability to perform ADLs (bathing, care of mouth/teeth, toileting, grooming, dressing, etc )  - Assess/evaluate cause of self-care deficits   - Assess range of motion  - Assess patient's mobility; develop plan if impaired  - Assess patient's need for assistive devices and provide as appropriate  - Encourage maximum independence but intervene and supervise when necessary  - Involve family in performance of ADLs  - Assess for home care needs following discharge   - Consider OT consult to assist with ADL evaluation and planning for discharge  - Provide patient education as appropriate  Outcome: Progressing  Goal: Maintains/Returns to pre admission functional level  Description: INTERVENTIONS:  - Perform BMAT or MOVE assessment daily    - Set and communicate daily mobility goal to care team and patient/family/caregiver  - Collaborate with rehabilitation services on mobility goals if consulted  - Perform Range of Motion  times a day  - Reposition patient every  hours    - Dangle patient  times a day  - Stand patient  times a day  - Ambulate patient times a day  - Out of bed to chair  times a day   - Out of bed for meals times a day  - Out of bed for toileting  - Record patient progress and toleration of activity level   Outcome: Progressing

## 2022-11-13 NOTE — ASSESSMENT & PLAN NOTE
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

## 2022-11-13 NOTE — ASSESSMENT & PLAN NOTE
· BP stable  · Continue home regimen    Amlodipine 10 mg qd, carvedilol 25 mg q12 hours, hydralazine 25 mg q8 hours  · S/p lasix 20 mg IV x 1 in ED  · Blood pressure improving with diuresis

## 2022-11-13 NOTE — ASSESSMENT & PLAN NOTE
· Hgb stable, no bleeding  Baseline 7 to 9   · Hemoglobin 7 5 11 told 2022  · Labs from today pending  · No overt signs of bleeding   Patient reports no bleeding per rectum, or black stools

## 2022-11-13 NOTE — PROGRESS NOTES
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  · UA:  Bacteria, no leukocyturia  · UPCr 0 4g/g  · Plan:  · No indication of urgent dialysis at this time   · Maintain mean arterial pressure above 65 mmHg to avoid hypotension/hypoperfusion  · Avoid nephrotoxic agents such as NSAIDs and contrast if at all possible    · Avoid opioids   · Adjust medications to GFR  · Renal diet      #Volume/hypertension:  · Volume:  hypervolemic  · Blood pressure:  Normotensive /48mmhg, goal< 140/90  · Low sodium diet   · Amlodipine 10 mg consider   · Coreg 25 b i d   · Discontinue hydralazine due to borderline hypotension   · Continue Bumex 1 mg b i d , will reassess volume status tomorrow  · Monitor BP and urinary output  · Daily weight    #Acid base disorder  · serum HCO3 25mmol/L   · At goal     #CKD-MBD  · Last corrected calcium 8 9 mg/dL  · At goal     #Secondary Hyperparathyroidism   · Will check iPTH as outpatient guidelines levels KDIGO 2017     #Anemia:  · Current hemoglobin:  7 5 mg/dL, trending down Rule out active bleeding  · Treatment:  · Transfuse for hemoglobin less than 7 0 per primary service        SUBJECTIVE:  Patient seen and examined at bedside  Patient feels better, shortness of breath improving, no chest pain         OBJECTIVE:  Current Weight: Weight - Scale: 87 2 kg (192 lb 3 9 oz)  Vitals:    11/13/22 0823   BP: 132/52   Pulse: 63 Resp: 16   Temp: 98 2 °F (36 8 °C)   SpO2: 90%       Intake/Output Summary (Last 24 hours) at 11/13/2022 0939  Last data filed at 11/13/2022 0834  Gross per 24 hour   Intake 448 ml   Output 1300 ml   Net -852 ml     Wt Readings from Last 3 Encounters:   11/13/22 87 2 kg (192 lb 3 9 oz)   10/21/22 83 kg (183 lb)   10/16/22 83 kg (183 lb)     Temp Readings from Last 3 Encounters:   11/13/22 98 2 °F (36 8 °C)   11/02/22 98 4 °F (36 9 °C)   10/25/22 98 1 °F (36 7 °C) (Temporal)     BP Readings from Last 3 Encounters:   11/13/22 132/52   11/10/22 90/58   11/07/22 122/60     Pulse Readings from Last 3 Encounters:   11/13/22 63   11/10/22 62   11/04/22 80      General:  Obese, no acute distress at this time  Skin:  No acute rash  Eyes:  No scleral icterus and noninjected  ENT:  mucous membranes moist  Neck:  no carotid bruits  Chest:  Clear to auscultation percussion, good respiratory effort, no use of accessory respiratory muscles  CVS:  Regular rate and rhythm without rub   Abdomen:  soft and nontender   Extremities:  Lower extremity edema  Neuro:  No gross focality  Psych:  Alert , cooperative       Medications:    Current Facility-Administered Medications:   •  acetaminophen (TYLENOL) tablet 975 mg, 975 mg, Oral, Q8H [facility], TENNILLE Grimm, 975 mg at 11/13/22 9562  •  amLODIPine (NORVASC) tablet 10 mg, 10 mg, Oral, Daily, TENNILLE Grimm, 10 mg at 11/12/22 5878  •  aspirin chewable tablet 81 mg, 81 mg, Oral, Daily, TENNILLE Grimm, 81 mg at 11/12/22 8567  •  atorvastatin (LIPITOR) tablet 40 mg, 40 mg, Oral, QPM, TENNILLE Grimm, 40 mg at 11/12/22 0881  •  bumetanide (BUMEX) tablet 1 mg, 1 mg, Oral, BID, Megha Aguillon MD, 1 mg at 11/12/22 1741  •  carvedilol (COREG) tablet 25 mg, 25 mg, Oral, Q12H [facility], TENNILLE Grimm, 25 mg at 11/12/22 2116  •  cholecalciferol (VITAMIN D3) tablet 2,000 Units, 2,000 Units, Oral, Daily, TENNILLE Grimm, 2,000 Units at 11/12/22 7869  •  divalproex sodium (DEPAKOTE) EC tablet 250 mg, 250 mg, Oral, Q12H [address], Paola Alvarado, JORGE LUISNP, 250 mg at 11/12/22 2121  •  fluticasone (FLONASE) 50 mcg/act nasal spray 1 spray, 1 spray, Nasal, Daily, TENNILLE Reza, 1 spray at 42/73/02 6221  •  folic acid (FOLVITE) tablet 400 mcg, 400 mcg, Oral, Daily, TENNILLE Reza, 400 mcg at 11/12/22 1499  •  heparin (porcine) subcutaneous injection 5,000 Units, 5,000 Units, Subcutaneous, Q8H [address], JORGE LUIS RezaNP, 5,000 Units at 11/13/22 2174  •  insulin glargine (LANTUS) subcutaneous injection 12 Units 0 12 mL, 12 Units, Subcutaneous, HS, TENNILLE Reza, 12 Units at 11/12/22 2120  •  insulin lispro (HumaLOG) 100 units/mL subcutaneous injection 1-5 Units, 1-5 Units, Subcutaneous, HS, TENNILLE Reza, 1 Units at 11/12/22 0242  •  insulin lispro (HumaLOG) 100 units/mL subcutaneous injection 1-6 Units, 1-6 Units, Subcutaneous, TID AC, 1 Units at 11/12/22 1215 **AND** Fingerstick Glucose (POCT), , , TID AC, TENNILLE Reza  •  polyethylene glycol (MIRALAX) packet 17 g, 17 g, Oral, Daily, TENNILLE Reza  •  ranolazine (RANEXA) 12 hr tablet 500 mg, 500 mg, Oral, Daily, TENNILLE Reza, 500 mg at 11/12/22 7561    Laboratory Results:  Results from last 7 days   Lab Units 11/12/22  0501 11/11/22  2200   WBC Thousand/uL 3 97* 5 92   HEMOGLOBIN g/dL 7 5* 9 0*   HEMATOCRIT % 24 4* 28 4*   PLATELETS Thousands/uL 121* 167   SODIUM mmol/L 139 138   POTASSIUM mmol/L 4 4 4 7   CHLORIDE mmol/L 106 104   CO2 mmol/L 25 27   BUN mg/dL 34* 34*   CREATININE mg/dL 2 35* 2 34*   CALCIUM mg/dL 7 7* 8 4       VAS upper limb venous duplex scan, unilateral/limited   Final Result by Marlon Ernst MD (11/12 4762)      XR chest 1 view portable   ED Interpretation by Verona Mane DO (11/12 3641)   Scattered hazy opacities throughout, bilateral pleural effusions      Final Result by Kylie Rollins MD (11/12 9804)      Mild fluid overload with small bilateral pleural effusions                    Workstation performed: [id number]             Portions of the record may have been created with voice recognition software  Occasional wrong word or "sound a like" substitutions may have occurred due to the inherent limitations of voice recognition software  Read the chart carefully and recognize, using context, where substitutions have occurred

## 2022-11-13 NOTE — PROGRESS NOTES
[facility]  Progress Note - Darylene Logan 1945, 68 y o  female MRN: [id number]  Unit/Bed#: S -01 Encounter: [id number]  Primary Care Provider: Christina Cheung MD   Date and time admitted to hospital: 11/11/2022  9:40 PM    Volume overload  Assessment & Plan  · Presented with shortness of breath, leg swelling that began 11/10  · Echo 10/10/2022 EF 65% with normal diastolic function  • BNP: 859  • Appears patient had similar presentation in August with shortness of breath  Underwent negative VQ, dopplers, normal echo   at the time  Diuresis was attempted but did not improve symptoms  She was recommended for outpatient follow up with PFTs  • Send UA, upep, spep   • Currently started on Bumex 1 mg b i d   • Improved urine output noted, continue to monitor I&Os weights     * Acute kidney injury superimposed on chronic kidney disease 3/4  Assessment & Plan  · Creatinine on admission 2 34  Previous baseline creatinine 1 6 to 1 9  Hospitalized 10/8-10/14 with creatinine peaking at 2 42 despite IV fluids,  SAHIL thought to be 2/2 contrast nephropathy  Does not appear patient had repeat labs as outpatient  • Diuresis: lasix 20 mg IV in ED  Hold further and await nephrology recs  • Monitor intake and output, daily weights  • Diet: Fluid restriction and 2 g Sodium  • Close Nephrology follow-up ongoing  • Patient is hard stick, currently labs pending    Cerebral amyloid angiopathy ([facility] [address] )  Assessment & Plan  · Previously noted memory problems, hallucinations  Recent MRI brain showed likely CAA  Evaluated by neurology during prior hospitalization as part of stroke work up with the following recommendations  · Stopped AC due to bleeding risk  Neurology previously okay with DVT ppx with subq heparin  · 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 for hallucinations / lewy body dementia    Atrial fibrillation ([facility] [address] )  Assessment & Plan  · Maintained on beta blocker, aspirin    Pacemaker in place  · No longer on a/c due to high risk of bleeding  MRI brain consistent with chronic amyloid angiopathy  CAD (coronary artery disease)  Assessment & Plan  · S/p stent RCA  Without complaints of chest pain or shortness of breath  · Continue home regimen    Primary hypertension  Assessment & Plan  · BP stable  · Continue home regimen  Amlodipine 10 mg qd, carvedilol 25 mg q12 hours, hydralazine 25 mg q8 hours  · S/p lasix 20 mg IV x 1 in ED  · Blood pressure improving with diuresis    Diabetes mellitus type 2, insulin dependent ([facility] [address] )  Assessment & Plan  Lab Results   Component Value Date    HGBA1C 7 1 (H) 10/09/2022   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  · At home on lantus 12 units HS  · Add accucheck , SSI  · Hypoglycemia early morning-blood sugars were 41  · Will cut down Lantus to 8 units at bedtime    Anemia of chronic kidney failure, stage 4 (severe) (HCC)  Assessment & Plan  · Hgb stable, no bleeding  Baseline 7 to 9   · Hemoglobin 7 5 11 told 2022  · Labs from today pending  · No overt signs of bleeding  Patient reports no bleeding per rectum, or black stools      VTE Pharmacologic Prophylaxis: VTE Score: 9 High Risk (Score >/= 5) - Pharmacological DVT Prophylaxis Ordered: heparin  Sequential Compression Devices Ordered  Patient Centered Rounds: I performed bedside rounds with nursing staff today  Discussions with Specialists or Other Care Team Provider:     Education and Discussions with Family / Patient: Updated  (son) via phone  Time Spent for Care: 30 minutes  More than 50% of total time spent on counseling and coordination of care as described above  Current Length of Stay: 2 day(s)  Current Patient Status: Inpatient   Certification Statement: The patient will continue to require additional inpatient hospital stay due to Volume overload  Discharge Plan: Anticipate discharge in 48 hrs to home      Code Status: Level 1 - Full Code    Subjective:     Patient reports urinating more  She feels her swelling is getting better  Blood sugars were low this morning    Objective:     Vitals:   Temp (24hrs), Av 2 °F (36 8 °C), Min:98 1 °F (36 7 °C), Max:98 2 °F (36 8 °C)    Temp:  [98 1 °F (36 7 °C)-98 2 °F (36 8 °C)] 98 2 °F (36 8 °C)  HR:  [59-64] 63  Resp:  [16] 16  BP: (121-138)/(48-61) 132/52  SpO2:  [90 %-94 %] 90 %  Body mass index is 34 05 kg/m²  Input and Output Summary (last 24 hours): Intake/Output Summary (Last 24 hours) at 2022 1235  Last data filed at 2022 0834  Gross per 24 hour   Intake 448 ml   Output 1300 ml   Net -852 ml       Physical Exam:     Gen -Patient comfortable rest  Neck- Supple  No thyromegaly or lymphadenopathy  Lungs-Clear bilaterally without any wheeze or rales   Heart S1-S2, regular rate and rhythm, no murmurs  Abdomen-soft nontender, no organomegaly   Bowel sounds present  Extremities-no cyanosi,  clubbing   Pitting Edema both upper and lower extremities  Skin- no rash  Neuro-nonfocal       Additional Data:     Labs:  Results from last 7 days   Lab Units 22  0501   WBC Thousand/uL 3 97*   HEMOGLOBIN g/dL 7 5*   HEMATOCRIT % 24 4*   PLATELETS Thousands/uL 121*   NEUTROS PCT % 56   LYMPHS PCT % 28   MONOS PCT % 15*   EOS PCT % 0     Results from last 7 days   Lab Units 22  0501 22  2200   SODIUM mmol/L 139 138   POTASSIUM mmol/L 4 4 4 7   CHLORIDE mmol/L 106 104   CO2 mmol/L 25 27   BUN mg/dL 34* 34*   CREATININE mg/dL 2 35* 2 34*   ANION GAP mmol/L 8 7   CALCIUM mg/dL 7 7* 8 4   ALBUMIN g/dL  --  3 4*   TOTAL BILIRUBIN mg/dL  --  0 47   ALK PHOS U/L  --  52   ALT U/L  --  5*   AST U/L  --  9*   GLUCOSE RANDOM mg/dL 166* 223*         Results from last 7 days   Lab Units 22  1128 22  0917 22  0753 22  2252 22  1547 22  1115 22  0748 22  0233 22  0149   POC GLUCOSE mg/dl 146* 141* 41* 70 99 168* 110 184* 190*               Lines/Drains:  Invasive Devices Report    Peripheral Intravenous Line  Duration           Peripheral IV 10/08/22 Right Antecubital 36 days    Peripheral IV 11/11/22 Right Antecubital 1 day          Drain  Duration           Urethral Catheter Latex 16 Fr  137 days              Urinary Catheter:  Goal for removal: No longer needed  Will place order to discontinue               Imaging: Reviewed radiology reports from this admission including: chest xray    Recent Cultures (last 7 days):         Last 24 Hours Medication List:   Current Facility-Administered Medications   Medication Dose Route Frequency Provider Last Rate   • acetaminophen  975 mg Oral [facility] TENNILLE King     • amLODIPine  10 mg Oral Daily TENNILLE King     • aspirin  81 mg Oral Daily TENNILLE King     • atorvastatin  40 mg Oral QPM TENNILLE King     • bumetanide  1 mg Oral BID Eran Stockton MD     • carvedilol  25 mg Oral Q12H [address] TENNILLE Solomon     • cholecalciferol  2,000 Units Oral Daily TENNILLE King     • divalproex sodium  250 mg Oral Q12H [address] TENNILLE King     • fluticasone  1 spray Nasal Daily TENNILLE King     • folic acid  685 mcg Oral Daily TENNILLE King     • heparin (porcine)  5,000 Units Subcutaneous [facility] TENNILLE King     • insulin glargine  8 Units Subcutaneous HS Jevon Roque MD     • insulin lispro  1-5 Units Subcutaneous HS TENNILLE King     • insulin lispro  1-6 Units Subcutaneous TID AC TENNILLE King     • polyethylene glycol  17 g Oral Daily TENNILLE King     • ranolazine  500 mg Oral Daily TENNILLE King          Today, Patient Was Seen By: Niko Roque    **Please Note: This note may have been constructed using a voice recognition system  **

## 2022-11-13 NOTE — ASSESSMENT & PLAN NOTE
· Creatinine on admission 2 34  Previous baseline creatinine 1 6 to 1 9  Hospitalized 10/8-10/14 with creatinine peaking at 2 42 despite IV fluids,  SAHIL thought to be 2/2 contrast nephropathy  Does not appear patient had repeat labs as outpatient  • Diuresis: lasix 20 mg IV in ED  Hold further and await nephrology recs  • Monitor intake and output, daily weights  • Diet: Fluid restriction and 2 g Sodium  • Close Nephrology follow-up ongoing    • Patient is hard stick, currently labs pending

## 2022-11-13 NOTE — PLAN OF CARE
Problem: PAIN - ADULT  Goal: Verbalizes/displays adequate comfort level or baseline comfort level  Description: Interventions:  - Encourage patient to monitor pain and request assistance  - Assess pain using appropriate pain scale  - Administer analgesics based on type and severity of pain and evaluate response  - Implement non-pharmacological measures as appropriate and evaluate response  - Consider cultural and social influences on pain and pain management  - Notify physician/advanced practitioner if interventions unsuccessful or patient reports new pain  Outcome: Progressing     Problem: SAFETY ADULT  Goal: Patient will remain free of falls  Description: INTERVENTIONS:  - Educate patient/family on patient safety including physical limitations  - Instruct patient to call for assistance with activity   - Consult OT/PT to assist with strengthening/mobility   - Keep Call bell within reach  - Keep bed low and locked with side rails adjusted as appropriate  - Keep care items and personal belongings within reach  - Initiate and maintain comfort rounds  - Make Fall Risk Sign visible to staff  - Offer Toileting every  Hours, in advance of need  - Initiate/Maintain alarm  - Obtain necessary fall risk management equipment:   - Apply yellow socks and bracelet for high fall risk patients  - Consider moving patient to room near nurses station  Outcome: Progressing  Goal: Maintain or return to baseline ADL function  Description: INTERVENTIONS:  -  Assess patient's ability to carry out ADLs; assess patient's baseline for ADL function and identify physical deficits which impact ability to perform ADLs (bathing, care of mouth/teeth, toileting, grooming, dressing, etc )  - Assess/evaluate cause of self-care deficits   - Assess range of motion  - Assess patient's mobility; develop plan if impaired  - Assess patient's need for assistive devices and provide as appropriate  - Encourage maximum independence but intervene and supervise when necessary  - Involve family in performance of ADLs  - Assess for home care needs following discharge   - Consider OT consult to assist with ADL evaluation and planning for discharge  - Provide patient education as appropriate  Outcome: Progressing  Goal: Maintains/Returns to pre admission functional level  Description: INTERVENTIONS:  - Perform BMAT or MOVE assessment daily    - Set and communicate daily mobility goal to care team and patient/family/caregiver  - Collaborate with rehabilitation services on mobility goals if consulted  - Perform Range of Motion  times a day  - Reposition patient every  hours    - Dangle patient  times a day  - Stand patient  times a day  - Ambulate patient  times a day  - Out of bed to chair  times a day   - Out of bed for meals  times a day  - Out of bed for toileting  - Record patient progress and toleration of activity level   Outcome: Progressing     Problem: Prexisting or High Potential for Compromised Skin Integrity  Goal: Skin integrity is maintained or improved  Description: INTERVENTIONS:  - Identify patients at risk for skin breakdown  - Assess and monitor skin integrity  - Assess and monitor nutrition and hydration status  - Monitor labs   - Assess for incontinence   - Turn and reposition patient  - Assist with mobility/ambulation  - Relieve pressure over bony prominences  - Avoid friction and shearing  - Provide appropriate hygiene as needed including keeping skin clean and dry  - Evaluate need for skin moisturizer/barrier cream  - Collaborate with interdisciplinary team   - Patient/family teaching  - Consider wound care consult   Outcome: Progressing     Problem: MOBILITY - ADULT  Goal: Maintain or return to baseline ADL function  Description: INTERVENTIONS:  -  Assess patient's ability to carry out ADLs; assess patient's baseline for ADL function and identify physical deficits which impact ability to perform ADLs (bathing, care of mouth/teeth, toileting, grooming, dressing, etc )  - Assess/evaluate cause of self-care deficits   - Assess range of motion  - Assess patient's mobility; develop plan if impaired  - Assess patient's need for assistive devices and provide as appropriate  - Encourage maximum independence but intervene and supervise when necessary  - Involve family in performance of ADLs  - Assess for home care needs following discharge   - Consider OT consult to assist with ADL evaluation and planning for discharge  - Provide patient education as appropriate  Outcome: Progressing  Goal: Maintains/Returns to pre admission functional level  Description: INTERVENTIONS:  - Perform BMAT or MOVE assessment daily    - Set and communicate daily mobility goal to care team and patient/family/caregiver  - Collaborate with rehabilitation services on mobility goals if consulted  - Perform Range of Motion  times a day  - Reposition patient every  hours    - Dangle patient  times a day  - Stand patient  times a day  - Ambulate patient  times a day  - Out of bed to chair  times a day   - Out of bed for meals  times a day  - Out of bed for toileting  - Record patient progress and toleration of activity level   Outcome: Progressing     Problem: METABOLIC, FLUID AND ELECTROLYTES - ADULT  Goal: Electrolytes maintained within normal limits  Description: INTERVENTIONS:  - Monitor labs and assess patient for signs and symptoms of electrolyte imbalances  - Administer electrolyte replacement as ordered  - Monitor response to electrolyte replacements, including repeat lab results as appropriate  - Instruct patient on fluid and nutrition as appropriate  Outcome: Progressing  Goal: Fluid balance maintained  Description: INTERVENTIONS:  - Monitor labs   - Monitor I/O and WT  - Instruct patient on fluid and nutrition as appropriate  - Assess for signs & symptoms of volume excess or deficit  Outcome: Progressing  Goal: Glucose maintained within target range  Description: INTERVENTIONS:  - Monitor Blood Glucose as ordered  - Assess for signs and symptoms of hyperglycemia and hypoglycemia  - Administer ordered medications to maintain glucose within target range  - Assess nutritional intake and initiate nutrition service referral as needed  Outcome: Progressing     Problem: Nutrition/Hydration-ADULT  Goal: Nutrient/Hydration intake appropriate for improving, restoring or maintaining nutritional needs  Description: Monitor and assess patient's nutrition/hydration status for malnutrition  Collaborate with interdisciplinary team and initiate plan and interventions as ordered  Monitor patient's weight and dietary intake as ordered or per policy  Utilize nutrition screening tool and intervene as necessary  Determine patient's food preferences and provide high-protein, high-caloric foods as appropriate       INTERVENTIONS:  - Monitor oral intake, urinary output, labs, and treatment plans  - Assess nutrition and hydration status and recommend course of action  - Evaluate amount of meals eaten  - Assist patient with eating if necessary   - Allow adequate time for meals  - Recommend/ encourage appropriate diets, oral nutritional supplements, and vitamin/mineral supplements  - Order, calculate, and assess calorie counts as needed  - Recommend, monitor, and adjust tube feedings and TPN/PPN based on assessed needs  - Assess need for intravenous fluids  - Provide specific nutrition/hydration education as appropriate  - Include patient/family/caregiver in decisions related to nutrition  Outcome: Progressing

## 2022-11-13 NOTE — ASSESSMENT & PLAN NOTE
· Maintained on beta blocker, aspirin  Pacemaker in place  · No longer on a/c due to high risk of bleeding  MRI brain consistent with chronic amyloid angiopathy

## 2022-11-13 NOTE — ASSESSMENT & PLAN NOTE
· Previously noted memory problems, hallucinations  Recent MRI brain showed likely CAA  Evaluated by neurology during prior hospitalization as part of stroke work up with the following recommendations  · Stopped AC due to bleeding risk    Neurology previously okay with DVT ppx with subq heparin  · 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 for hallucinations / lewy body dementia

## 2022-11-13 NOTE — ASSESSMENT & PLAN NOTE
· Presented with shortness of breath, leg swelling that began 11/10  · Echo 10/10/2022 EF 65% with normal diastolic function  • BNP: 859  • Appears patient had similar presentation in August with shortness of breath  Underwent negative VQ, dopplers, normal echo   at the time  Diuresis was attempted but did not improve symptoms  She was recommended for outpatient follow up with PFTs    • Send UA, upep, spep   • Currently started on Bumex 1 mg b i d   • Improved urine output noted, continue to monitor I&Os weights

## 2022-11-14 ENCOUNTER — APPOINTMENT (INPATIENT)
Dept: ULTRASOUND IMAGING | Facility: HOSPITAL | Age: 77
End: 2022-11-14

## 2022-11-14 ENCOUNTER — HOME CARE VISIT (OUTPATIENT)
Dept: HOME HEALTH SERVICES | Facility: HOME HEALTHCARE | Age: 77
End: 2022-11-14

## 2022-11-14 LAB
ALBUMIN SERPL ELPH-MCNC: 3.13 G/DL (ref 3.5–5)
ALBUMIN SERPL ELPH-MCNC: 52.1 % (ref 52–65)
ALBUMIN UR ELPH-MCNC: 100 %
ALPHA1 GLOB MFR UR ELPH: 0 %
ALPHA1 GLOB SERPL ELPH-MCNC: 0.37 G/DL (ref 0.1–0.4)
ALPHA1 GLOB SERPL ELPH-MCNC: 6.2 % (ref 2.5–5)
ALPHA2 GLOB MFR UR ELPH: 0 %
ALPHA2 GLOB SERPL ELPH-MCNC: 0.64 G/DL (ref 0.4–1.2)
ALPHA2 GLOB SERPL ELPH-MCNC: 10.7 % (ref 7–13)
ANION GAP SERPL CALCULATED.3IONS-SCNC: 5 MMOL/L (ref 4–13)
B-GLOBULIN MFR UR ELPH: 0 %
BASOPHILS # BLD AUTO: 0.02 THOUSANDS/ÂΜL (ref 0–0.1)
BASOPHILS NFR BLD AUTO: 1 % (ref 0–1)
BETA GLOB ABNORMAL SERPL ELPH-MCNC: 0.35 G/DL (ref 0.4–0.8)
BETA1 GLOB SERPL ELPH-MCNC: 5.9 % (ref 5–13)
BETA2 GLOB SERPL ELPH-MCNC: 4.8 % (ref 2–8)
BETA2+GAMMA GLOB SERPL ELPH-MCNC: 0.29 G/DL (ref 0.2–0.5)
BUN SERPL-MCNC: 32 MG/DL (ref 5–25)
CALCIUM SERPL-MCNC: 7.7 MG/DL (ref 8.4–10.2)
CHLORIDE SERPL-SCNC: 105 MMOL/L (ref 96–108)
CO2 SERPL-SCNC: 28 MMOL/L (ref 21–32)
CREAT SERPL-MCNC: 2.31 MG/DL (ref 0.6–1.3)
EOSINOPHIL # BLD AUTO: 0 THOUSAND/ÂΜL (ref 0–0.61)
EOSINOPHIL NFR BLD AUTO: 0 % (ref 0–6)
ERYTHROCYTE [DISTWIDTH] IN BLOOD BY AUTOMATED COUNT: 17.4 % (ref 11.6–15.1)
GAMMA GLOB ABNORMAL SERPL ELPH-MCNC: 1.22 G/DL (ref 0.5–1.6)
GAMMA GLOB MFR UR ELPH: 0 %
GAMMA GLOB SERPL ELPH-MCNC: 20.3 % (ref 12–22)
GFR SERPL CREATININE-BSD FRML MDRD: 19 ML/MIN/1.73SQ M
GLUCOSE SERPL-MCNC: 109 MG/DL (ref 65–140)
GLUCOSE SERPL-MCNC: 140 MG/DL (ref 65–140)
GLUCOSE SERPL-MCNC: 155 MG/DL (ref 65–140)
GLUCOSE SERPL-MCNC: 159 MG/DL (ref 65–140)
GLUCOSE SERPL-MCNC: 95 MG/DL (ref 65–140)
HCT VFR BLD AUTO: 25.4 % (ref 34.8–46.1)
HGB BLD-MCNC: 8 G/DL (ref 11.5–15.4)
IGG/ALB SER: 1.09 {RATIO} (ref 1.1–1.8)
IMM GRANULOCYTES # BLD AUTO: 0.03 THOUSAND/UL (ref 0–0.2)
IMM GRANULOCYTES NFR BLD AUTO: 1 % (ref 0–2)
INTERPRETATION UR IFE-IMP: NORMAL
LYMPHOCYTES # BLD AUTO: 1.36 THOUSANDS/ÂΜL (ref 0.6–4.47)
LYMPHOCYTES NFR BLD AUTO: 38 % (ref 14–44)
MCH RBC QN AUTO: 27.7 PG (ref 26.8–34.3)
MCHC RBC AUTO-ENTMCNC: 31.5 G/DL (ref 31.4–37.4)
MCV RBC AUTO: 88 FL (ref 82–98)
MONOCYTES # BLD AUTO: 0.53 THOUSAND/ÂΜL (ref 0.17–1.22)
MONOCYTES NFR BLD AUTO: 15 % (ref 4–12)
NEUTROPHILS # BLD AUTO: 1.69 THOUSANDS/ÂΜL (ref 1.85–7.62)
NEUTS SEG NFR BLD AUTO: 45 % (ref 43–75)
NRBC BLD AUTO-RTO: 0 /100 WBCS
PLATELET # BLD AUTO: 152 THOUSANDS/UL (ref 149–390)
PMV BLD AUTO: 13.1 FL (ref 8.9–12.7)
POTASSIUM SERPL-SCNC: 4.5 MMOL/L (ref 3.5–5.3)
PROT PATTERN SERPL ELPH-IMP: ABNORMAL
PROT PATTERN UR ELPH-IMP: NORMAL
PROT SERPL-MCNC: 6 G/DL (ref 6.4–8.2)
PROT UR-MCNC: 13 MG/DL
RBC # BLD AUTO: 2.89 MILLION/UL (ref 3.81–5.12)
SODIUM SERPL-SCNC: 138 MMOL/L (ref 135–147)
WBC # BLD AUTO: 3.63 THOUSAND/UL (ref 4.31–10.16)

## 2022-11-14 RX ORDER — AMLODIPINE BESYLATE 5 MG/1
5 TABLET ORAL DAILY
Status: DISCONTINUED | OUTPATIENT
Start: 2022-11-15 | End: 2022-11-15 | Stop reason: HOSPADM

## 2022-11-14 RX ADMIN — FOLIC ACID TAB 400 MCG 400 MCG: 400 TAB at 08:31

## 2022-11-14 RX ADMIN — BUMETANIDE 1 MG: 1 TABLET ORAL at 08:28

## 2022-11-14 RX ADMIN — CHOLECALCIFEROL TAB 25 MCG (1000 UNIT) 2000 UNITS: 25 TAB at 08:27

## 2022-11-14 RX ADMIN — ASPIRIN 81 MG CHEWABLE TABLET 81 MG: 81 TABLET CHEWABLE at 08:28

## 2022-11-14 RX ADMIN — AMLODIPINE BESYLATE 10 MG: 10 TABLET ORAL at 08:28

## 2022-11-14 RX ADMIN — INSULIN GLARGINE 8 UNITS: 100 INJECTION, SOLUTION SUBCUTANEOUS at 21:39

## 2022-11-14 RX ADMIN — HEPARIN SODIUM 5000 UNITS: 5000 INJECTION INTRAVENOUS; SUBCUTANEOUS at 05:33

## 2022-11-14 RX ADMIN — CARVEDILOL 25 MG: 12.5 TABLET, FILM COATED ORAL at 21:39

## 2022-11-14 RX ADMIN — ACETAMINOPHEN 975 MG: 325 TABLET, FILM COATED ORAL at 21:38

## 2022-11-14 RX ADMIN — BUMETANIDE 1 MG: 1 TABLET ORAL at 18:30

## 2022-11-14 RX ADMIN — RANOLAZINE 500 MG: 500 TABLET, FILM COATED, EXTENDED RELEASE ORAL at 08:28

## 2022-11-14 RX ADMIN — FLUTICASONE PROPIONATE 1 SPRAY: 50 SPRAY, METERED NASAL at 08:29

## 2022-11-14 RX ADMIN — DIVALPROEX SODIUM 250 MG: 250 TABLET, DELAYED RELEASE ORAL at 08:32

## 2022-11-14 RX ADMIN — ACETAMINOPHEN 975 MG: 325 TABLET, FILM COATED ORAL at 15:06

## 2022-11-14 RX ADMIN — ACETAMINOPHEN 975 MG: 325 TABLET, FILM COATED ORAL at 05:33

## 2022-11-14 RX ADMIN — HEPARIN SODIUM 5000 UNITS: 5000 INJECTION INTRAVENOUS; SUBCUTANEOUS at 21:39

## 2022-11-14 RX ADMIN — DIVALPROEX SODIUM 250 MG: 250 TABLET, DELAYED RELEASE ORAL at 21:37

## 2022-11-14 RX ADMIN — HEPARIN SODIUM 5000 UNITS: 5000 INJECTION INTRAVENOUS; SUBCUTANEOUS at 15:06

## 2022-11-14 RX ADMIN — CARVEDILOL 25 MG: 12.5 TABLET, FILM COATED ORAL at 08:28

## 2022-11-14 RX ADMIN — ATORVASTATIN CALCIUM 40 MG: 40 TABLET, FILM COATED ORAL at 18:30

## 2022-11-14 NOTE — PLAN OF CARE
Problem: PAIN - ADULT  Goal: Verbalizes/displays adequate comfort level or baseline comfort level  Description: Interventions:  - Encourage patient to monitor pain and request assistance  - Assess pain using appropriate pain scale  - Administer analgesics based on type and severity of pain and evaluate response  - Implement non-pharmacological measures as appropriate and evaluate response  - Consider cultural and social influences on pain and pain management  - Notify physician/advanced practitioner if interventions unsuccessful or patient reports new pain  Outcome: Progressing     Problem: SAFETY ADULT  Goal: Patient will remain free of falls  Description: INTERVENTIONS:  - Educate patient/family on patient safety including physical limitations  - Instruct patient to call for assistance with activity   - Consult OT/PT to assist with strengthening/mobility   - Keep Call bell within reach  - Keep bed low and locked with side rails adjusted as appropriate  - Keep care items and personal belongings within reach  - Initiate and maintain comfort rounds  - Make Fall Risk Sign visible to staff  - Offer Toileting every 2 Hours, in advance of need  - Initiate/Maintain bed alarm  - Apply yellow socks and bracelet for high fall risk patients  - Consider moving patient to room near nurses station  Outcome: Progressing  Goal: Maintain or return to baseline ADL function  Description: INTERVENTIONS:  -  Assess patient's ability to carry out ADLs; assess patient's baseline for ADL function and identify physical deficits which impact ability to perform ADLs (bathing, care of mouth/teeth, toileting, grooming, dressing, etc )  - Assess/evaluate cause of self-care deficits   - Assess range of motion  - Assess patient's mobility; develop plan if impaired  - Assess patient's need for assistive devices and provide as appropriate  - Encourage maximum independence but intervene and supervise when necessary  - Involve family in performance of ADLs  - Assess for home care needs following discharge   - Consider OT consult to assist with ADL evaluation and planning for discharge  - Provide patient education as appropriate  Outcome: Progressing  Goal: Maintains/Returns to pre admission functional level  Description: INTERVENTIONS:  - Perform BMAT or MOVE assessment daily    - Set and communicate daily mobility goal to care team and patient/family/caregiver  - Collaborate with rehabilitation services on mobility goals if consulted  - Perform Range of Motion 3 times a day  - Reposition patient every 2 hours    - Dangle patient 3 times a day  - Stand patient 3 times a day  - Ambulate patient 3 times a day  - Out of bed to chair 3 times a day   - Out of bed for meals 3 times a day  - Out of bed for toileting  - Record patient progress and toleration of activity level   Outcome: Progressing     Problem: Prexisting or High Potential for Compromised Skin Integrity  Goal: Skin integrity is maintained or improved  Description: INTERVENTIONS:  - Identify patients at risk for skin breakdown  - Assess and monitor skin integrity  - Assess and monitor nutrition and hydration status  - Monitor labs   - Assess for incontinence   - Turn and reposition patient  - Assist with mobility/ambulation  - Relieve pressure over bony prominences  - Avoid friction and shearing  - Provide appropriate hygiene as needed including keeping skin clean and dry  - Evaluate need for skin moisturizer/barrier cream  - Collaborate with interdisciplinary team   - Patient/family teaching  - Consider wound care consult   Outcome: Progressing     Problem: MOBILITY - ADULT  Goal: Maintain or return to baseline ADL function  Description: INTERVENTIONS:  -  Assess patient's ability to carry out ADLs; assess patient's baseline for ADL function and identify physical deficits which impact ability to perform ADLs (bathing, care of mouth/teeth, toileting, grooming, dressing, etc )  - Assess/evaluate cause of self-care deficits   - Assess range of motion  - Assess patient's mobility; develop plan if impaired  - Assess patient's need for assistive devices and provide as appropriate  - Encourage maximum independence but intervene and supervise when necessary  - Involve family in performance of ADLs  - Assess for home care needs following discharge   - Consider OT consult to assist with ADL evaluation and planning for discharge  - Provide patient education as appropriate  Outcome: Progressing  Goal: Maintains/Returns to pre admission functional level  Description: INTERVENTIONS:  - Perform BMAT or MOVE assessment daily    - Set and communicate daily mobility goal to care team and patient/family/caregiver  - Collaborate with rehabilitation services on mobility goals if consulted  - Perform Range of Motion 3 times a day  - Reposition patient every 2 hours    - Dangle patient 3 times a day  - Stand patient 3 times a day  - Ambulate patient 3 times a day  - Out of bed to chair 3 times a day   - Out of bed for meals 3 times a day  - Out of bed for toileting  - Record patient progress and toleration of activity level   Outcome: Progressing     Problem: METABOLIC, FLUID AND ELECTROLYTES - ADULT  Goal: Electrolytes maintained within normal limits  Description: INTERVENTIONS:  - Monitor labs and assess patient for signs and symptoms of electrolyte imbalances  - Administer electrolyte replacement as ordered  - Monitor response to electrolyte replacements, including repeat lab results as appropriate  - Instruct patient on fluid and nutrition as appropriate  Outcome: Progressing  Goal: Fluid balance maintained  Description: INTERVENTIONS:  - Monitor labs   - Monitor I/O and WT  - Instruct patient on fluid and nutrition as appropriate  - Assess for signs & symptoms of volume excess or deficit  Outcome: Progressing  Goal: Glucose maintained within target range  Description: INTERVENTIONS:  - Monitor Blood Glucose as ordered  - Assess for signs and symptoms of hyperglycemia and hypoglycemia  - Administer ordered medications to maintain glucose within target range  - Assess nutritional intake and initiate nutrition service referral as needed  Outcome: Progressing     Problem: Nutrition/Hydration-ADULT  Goal: Nutrient/Hydration intake appropriate for improving, restoring or maintaining nutritional needs  Description: Monitor and assess patient's nutrition/hydration status for malnutrition  Collaborate with interdisciplinary team and initiate plan and interventions as ordered  Monitor patient's weight and dietary intake as ordered or per policy  Utilize nutrition screening tool and intervene as necessary  Determine patient's food preferences and provide high-protein, high-caloric foods as appropriate       INTERVENTIONS:  - Monitor oral intake, urinary output, labs, and treatment plans  - Assess nutrition and hydration status and recommend course of action  - Evaluate amount of meals eaten  - Assist patient with eating if necessary   - Allow adequate time for meals  - Recommend/ encourage appropriate diets, oral nutritional supplements, and vitamin/mineral supplements  - Order, calculate, and assess calorie counts as needed  - Recommend, monitor, and adjust tube feedings and TPN/PPN based on assessed needs  - Assess need for intravenous fluids  - Provide specific nutrition/hydration education as appropriate  - Include patient/family/caregiver in decisions related to nutrition  Outcome: Progressing Protopic Pregnancy And Lactation Text: This medication is Pregnancy Category C. It is unknown if this medication is excreted in breast milk when applied topically.

## 2022-11-14 NOTE — PHYSICAL THERAPY NOTE
PHYSICAL THERAPY EVALUATION NOTE    Patient Name: Joselin ALFAROO Date: 11/14/2022  AGE:   68 y o  Mrn:   [id number]  ADMIT DX:  Acute exacerbation of CHF (congestive heart failure) ([facility]) [I50 9]  Volume overload [E87 70]  Acute kidney injury superimposed on chronic kidney disease ([facility] [address] ) [N17 9, N18 9]  Dyspnea, unspecified type [R06 00]    Past Medical History:   Diagnosis Date    Atrial fibrillation ([facility] )     Chronic anemia     Chronic kidney disease     Colonoscopy refused 11/2019    pt declines    Dyslipidemia     Hypertension     Mammogram declined 09/2017    Proteinuria     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     Length Of Stay: 3  PHYSICAL THERAPY EVALUATION :    11/14/22 1526   PT Last Visit   PT Visit Date 11/14/22   Pain Assessment   Pain Assessment Tool 0-10   Pain Score No Pain   Restrictions/Precautions   Other Precautions Chair Alarm; Bed Alarm;Multiple lines; Fall Risk;Visual impairment   Home Living   Type of [address] [place] [address] One level; Other (Comment)  (1 ALONDRA)   Additional Comments lives w/ son and daughter in law  ambulates w/ walker in home and transport chair in community  needs assistance w/ ADLs and IADLs  no falls in last 6 months  General   Additional Pertinent History 11/12/22 at 12:10 blood pressure was 109/43   Family/Caregiver Present No   Cognition   Arousal/Participation Cooperative   Orientation Level Oriented to person; Other (Comment)  (pt was identified w/ full name, birth date)   Following Commands Follows one step commands with increased time or repetition   Comments room air resting pulse ox 94% and 81 BPM   Subjective   Subjective pt seen supine in bed  agreed to PT eval  denied pain or dizziness  occasional cues were needed for task focus and safety     RUE Assessment   RUE Assessment X  (shoulder limited, otherwise WFL)   LUE Assessment   LUE Assessment X  (shoulder limited, otherwise [facility])   RLE Assessment   RLE Assessment WFL  (3+ to 4-/5)   LLE Assessment   LLE Assessment WFL  (3+ to 4-/5)   Vision-Basic Assessment   Current Vision Other (Comment)  (legally blind)   Light Touch   RLE Light Touch Grossly intact   LLE Light Touch Grossly intact   Bed Mobility   Supine to Sit 3  Moderate assistance   Additional items Assist x 1;HOB elevated; Bedrails; Increased time required;Verbal cues;LE management  (for trunk/LE positioning)   Transfers   Sit to Stand 3  Moderate assistance   Additional items Assist x 1; Increased time required;Verbal cues  (for hand placement)   Stand to Sit 3  Moderate assistance   Additional items Assist x 1; Increased time required;Verbal cues  (for body positioning, hand placement)   Ambulation/Elevation   Gait pattern Wide AMERICO; Foward flexed; Excessively slow; Redundant gait at times; Short stride; Inconsistent funmilayo   Gait Assistance 3  Moderate assist   Additional items Assist x 1;Verbal cues; Tactile cues  (for walker positioning, obstacle negotation, breathing technique)   Assistive Device Rolling walker   Distance 4 feet  seated rest break  10 feet  (additioanl not possible due to fatigue)   Balance   Static Sitting Fair   Static Standing Poor +  (w/ roller walker)   Ambulatory Poor  (w/ roller walker)   Activity Tolerance   Activity Tolerance Patient limited by fatigue   Nurse Made Aware spoke to [address] [address]   Assessment   Problem List Decreased strength;Decreased endurance; Impaired balance;Decreased mobility; Decreased range of motion;Decreased safety awareness; Impaired vision   Assessment Pt presents with 1 and 1/2 days of shortness of breath and swelling to bilateral lower extremities and left upper extremity  Dx: SAHIL on CKD, DM, anemia, CAD, cerebral amyloid angiopathy, a-fib, and HTN   order placed for PT eval and tx  pt presents w/ comorbidities of atrial fibrillation, CAD, SSS s/p pacemaker, CKD, HTN, HLD, IDDM, anemia, dyslipidemia, right femur fx repair, and cerebral amyloid angiopathy and personal factors of advanced age, mobilizing w/ assistive device, stair(s) to enter home, visual impairments, inability to perform IADLs and inability to perform ADLs  pt presents w/ weakness, decreased ROM, decreased endurance, impaired balance, gait deviations, visual impairment, decreased safety awareness and LE edema  these impairments are evident in findings from physical examination (weakness and decreased ROM), mobility assessment (need for mod assist w/ all phases of mobility when usually mobilizing independently, tolerance to only 10 feet of ambulation and need for cueing for mobility technique), and Barthel Index: 30/100  pt needed input for task focus and mobility technique  pt is at risk for falls due to physical and safety awareness deficits  pt's clinical presentation is unstable/unpredictable (evident in poor blood pressure control, need for assist w/ all phases of mobility when usually mobilizing independently, tolerance to only 10 feet of ambulation and need for input for task focus and mobility technique)  pt needs inpatient PT tx to improve mobility deficits and progress mobility training as appropriate  discharge recommendation is for inpatient rehab to reduce fall risk and maximize level of functional independence  Goals   Patient Goals go home  walk around on my own  STG Expiration Date 11/24/22   Short Term Goal #1 pt will:  Increase bilateral LE strength 1/2 grade to facilitate independent mobility, Perform bed mobility w/ supervision to increase level of independence, Perform all transfers w/ supervision to improve independence, Ambulate 120 ft  with roller walker w/ supervision w/o LOB to improve functional independence, Navigate 1 stair(s) w/ supervision with unilateral handrail to facilitate return to previous living environment, Increase ambulatory balance 1/2 grade to decrease risk for falls, Tolerate 3 hr OOB to faciliate upright tolerance, Improve Barthel Index score to 50 or greater to facilitate independence and Complete Timed Up and Go or Comfortable Gait Speed to further assess mobility and monitor progress   PT Treatment Day 0   Plan   Treatment/Interventions Functional transfer training;LE strengthening/ROM; Therapeutic exercise; Endurance training;Patient/family training;Equipment eval/education;Elevations; Bed mobility;Gait training   PT Frequency 3-5x/wk   Recommendation   PT Discharge Recommendation Post acute rehabilitation services   AM-PAC Basic Mobility Inpatient   Turning in Bed Without Bedrails 3   Lying on Back to Sitting on Edge of Flat Bed 2   Moving Bed to Chair 2   Standing Up From Chair 2   Walk in Room 2   Climb 3-5 Stairs 1   Basic Mobility Inpatient Raw Score 12   Basic Mobility Standardized Score 32 23   Highest Level Of Mobility   -[facility] Goal 4: Move to chair/commode   -[facility] Achieved 5: Stand (1 or more minutes)   Barthel Index   Feeding 5   Bathing 0   Grooming Score 0   Dressing Score 0   Bladder Score 0   Bowels Score 10   Toilet Use Score 5   Transfers (Bed/Chair) Score 10   Mobility (Level Surface) Score 0   Stairs Score 0   Barthel Index Score 30   End of Consult   Patient Position at End of Consult Bedside chair;Bed/Chair alarm activated; All needs within reach     The patient's AM-PAC Basic Mobility Inpatient Short Form Raw Score is 12  A Raw score of less than or equal to 16 suggests the patient may benefit from discharge to post-acute rehabilitation services  Please also refer to the recommendation of the Physical Therapist for safe discharge planning  Skilled PT recommended while in hospital and upon DC to progress pt toward treatment goals       Lyndsay Palacios, PT

## 2022-11-14 NOTE — PROGRESS NOTES
[facility]  Progress Note - Marino Flores 1945, 68 y o  female MRN: [id number]  Unit/Bed#: S -Gisela Encounter: [id number]  Primary Care Provider: Vandana Galvan MD   Date and time admitted to hospital: 11/11/2022  9:40 PM    * Acute kidney injury superimposed on chronic kidney disease 3/4  Assessment & Plan  · Creatinine on admission 2 34     · Previous baseline creatinine 1 6 to 1 9  Last hospital admission  10/8-10/14 with creatinine peaking at 2 42 despite IV fluids  · Possible etiology LOVE  · Volume status: overload , no acidotic , no electrolyte imbalances  · Current therapy : bumex 1 mg bid, PVR  520ml unable to void further   Plan  Straight cath  Renal US as recommended per nephro  Monitor kidney indices  Avoid hypotension  Avoid nephrotoxins    Volume overload  Assessment & Plan  · Evidenced by JORGE LUIS + JOSE L  · Echo 10/10/2022 EF 65% with normal diastolic function  • Previous eval in aug for similar presentation unresponsive to diuretic therapy, thought process of a possible pulm etiology , pfts recommended , unfortunately wasn't performed   • UA : bland , spep : no monoclonal gammopathy   • Nephrology on board , recommendations : Continue Bumex 1 mg b i d   •  continue to monitor I&Os weights     Primary hypertension  Assessment & Plan  · BP stable, amlodipine dose decreased by nephro to 5 mg QD continue carvedilol, hydralazine dc'd       Diabetes mellitus type 2, insulin dependent (HCC)  Assessment & Plan  Lab Results   Component Value Date    HGBA1C 7 1 (H) 10/09/2022   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  · At home on lantus 12 units HS  · Has remained normoglycemic   · Continue lantus 8 U + SSI and daily glucose checks  · BS goal 140-180    Cerebral amyloid angiopathy (HCC)  Assessment & Plan  · Previously noted memory problems, hallucinations  Recent MRI brain showed likely CAA    Evaluated by neurology during prior hospitalization as part of stroke work up with the following recommendations  · Stopped AC due to bleeding risk  Neurology previously okay with DVT ppx with subq heparin  · 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 for hallucinations / lewy body dementia    Atrial fibrillation ([address] )  Assessment & Plan  · Maintained on beta blocker, aspirin  Pacemaker in place  · No longer on a/c due to high risk of bleeding  MRI brain consistent with chronic amyloid angiopathy  CAD (coronary artery disease)  Assessment & Plan  · S/p stent RCA  Without complaints of chest pain or shortness of breath  · Continue home regimen    Anemia of chronic kidney failure, stage 4 (severe) (HCC)  Assessment & Plan  · Hgb stable, no bleeding  Baseline 7 to 9   · Hemoglobin 7 5 11 told   · Labs from today pending  · No overt signs of bleeding  Patient reports no bleeding per rectum, or black stools          VTE Pharmacologic Prophylaxis:   VTE Score: 9 Moderate Risk (Score 3-4) - Pharmacological DVT Prophylaxis Ordered: Heparin  Mechanical VTE Prophylaxis in Place: No    Patient Centered Rounds: I have performed bedside rounds with nursing staff today  Discussions with Specialists or Other Care Team Provider: attending physician    Education and Discussions with Family / Patient: Updated  (son) via phone  Current Length of Stay: 3 day(s)    Current Patient Status: Inpatient     Discharge Plan / Estimated Discharge Date: Anticipate discharge in 48 hrs to home  Code Status: Level 1 - Full Code      Subjective:   No overnight events, denied any chest pain, shortness of breath or other acute complaints      Objective:     Vitals:   Temp (24hrs), Av 8 °F (36 6 °C), Min:97 6 °F (36 4 °C), Max:98 °F (36 7 °C)    Temp:  [97 6 °F (36 4 °C)-98 °F (36 7 °C)] 97 8 °F (36 6 °C)  HR:  [60-61] 60  Resp:  [16-18] 18  BP: (121-129)/(51-60) 128/58  SpO2:  [90 %-94 %] 94 %  Body mass index is 34 05 kg/m²  Input and Output Summary (last 24 hours):        Intake/Output Summary (Last 24 hours) at 11/14/2022 1428  Last data filed at 11/14/2022 1407  Gross per 24 hour   Intake 320 ml   Output 2695 ml   Net -2375 ml       Physical Exam:     Physical Exam  Vitals and nursing note reviewed  Constitutional:       General: She is not in acute distress  Appearance: Normal appearance  She is not toxic-appearing  HENT:      Head: Normocephalic and atraumatic  Right Ear: Tympanic membrane normal  There is no impacted cerumen  Left Ear: Tympanic membrane normal  There is no impacted cerumen  Nose: Nose normal  No congestion or rhinorrhea  Mouth/Throat:      Mouth: Mucous membranes are moist       Pharynx: Oropharynx is clear  No oropharyngeal exudate or posterior oropharyngeal erythema  Eyes:      Extraocular Movements: Extraocular movements intact  Conjunctiva/sclera: Conjunctivae normal       Pupils: Pupils are equal, round, and reactive to light  Cardiovascular:      Rate and Rhythm: Normal rate  Pulses: Normal pulses  Pulmonary:      Effort: Pulmonary effort is normal  No respiratory distress  Breath sounds: No wheezing or rales  Chest:      Chest wall: No tenderness  Abdominal:      General: Bowel sounds are normal  There is no distension  Palpations: Abdomen is soft  Tenderness: There is no abdominal tenderness  Musculoskeletal:      Cervical back: Normal range of motion  Right lower leg: Edema present  Left lower leg: Edema present  Skin:     General: Skin is warm  Capillary Refill: Capillary refill takes 2 to 3 seconds  Neurological:      Mental Status: She is alert and oriented to person, place, and time  Psychiatric:         Mood and Affect: Mood normal          Behavior: Behavior normal          Thought Content:  Thought content normal          Judgment: Judgment normal           Additional Data:     Labs:  Results from last 7 days   Lab Units 11/14/22  0529   WBC Thousand/uL 3 63*   HEMOGLOBIN g/dL 8 0* HEMATOCRIT % 25 4*   PLATELETS Thousands/uL 152   NEUTROS PCT % 45   LYMPHS PCT % 38   MONOS PCT % 15*   EOS PCT % 0     Results from last 7 days   Lab Units 11/14/22  0529 11/12/22  0501 11/11/22  2200   SODIUM mmol/L 138   < > 138   POTASSIUM mmol/L 4 5   < > 4 7   CHLORIDE mmol/L 105   < > 104   CO2 mmol/L 28   < > 27   BUN mg/dL 32*   < > 34*   CREATININE mg/dL 2 31*   < > 2 34*   ANION GAP mmol/L 5   < > 7   CALCIUM mg/dL 7 7*   < > 8 4   ALBUMIN g/dL  --   --  3 4*   TOTAL BILIRUBIN mg/dL  --   --  0 47   ALK PHOS U/L  --   --  52   ALT U/L  --   --  5*   AST U/L  --   --  9*   GLUCOSE RANDOM mg/dL 109   < > 223*    < > = values in this interval not displayed  Results from last 7 days   Lab Units 11/14/22  1114 11/14/22  0704 11/13/22  2201 11/13/22  1556 11/13/22  1128 11/13/22  0917 11/13/22  0753 11/12/22  2252 11/12/22  1547 11/12/22  1115 11/12/22  0748 11/12/22  0233   POC GLUCOSE mg/dl 155* 95 110 117 146* 141* 41* 70 99 168* 110 184*               Imaging: No pertinent imaging reviewed      Recent Cultures (last 7 days):           Lines/Drains:  Invasive Devices  Report    Peripheral Intravenous Line  Duration           Peripheral IV 10/08/22 Right Antecubital 37 days    Peripheral IV 11/11/22 Right Antecubital 2 days          Drain  Duration           Urethral Catheter Latex 16 Fr  138 days                Telemetry:        Last 24 Hours Medication List:   Current Facility-Administered Medications   Medication Dose Route Frequency Provider Last Rate   • acetaminophen  975 mg Oral [facility] TENNILLE Neff     • [START ON 11/15/2022] amLODIPine  5 mg Oral Daily Abby Jules MD     • aspirin  81 mg Oral Daily TENNILLE Neff     • atorvastatin  40 mg Oral QPM TENNILLE Neff     • bumetanide  1 mg Oral BID Leigha Swann MD     • carvedilol  25 mg Oral Q12H [address] TENNILLE Neff     • cholecalciferol  2,000 Units Oral Daily TENNILLE Neff     • divalproex sodium  250 mg Oral Q12H [address] TENNILLE Amaral     • fluticasone  1 spray Nasal Daily TENNILLE Amaral     • folic acid  674 mcg Oral Daily TENNILLE Amaral     • heparin (porcine)  5,000 Units Subcutaneous [facility] TENNILLE Amaral     • insulin glargine  8 Units Subcutaneous HS Jevon Roque MD     • insulin lispro  1-5 Units Subcutaneous HS TENNILLE Amaral     • insulin lispro  1-6 Units Subcutaneous TID [facility] TENNILLE Amaral     • polyethylene glycol  17 g Oral Daily TENNILLE Amaral     • ranolazine  500 mg Oral Daily TENNILLE Amaral          Today, Patient Was Seen By: Loni Wheeler MD    ** Please Note: This note has been constructed using a voice recognition system       Nutrition Assessment and Intervention:     Reviewed food recall journal      Physical Activity Assessment and Intervention:    Activity journal reviewed      Emotional and Mental Well-being, Sleep, Connectedness Assessment and Intervention:    Sleep/stress assessment performed      Tobacco and Toxic Substance Assessment and Intervention:     Tobacco use screening performed    Alcohol and drug use screening performed

## 2022-11-14 NOTE — ASSESSMENT & PLAN NOTE
· Evidenced by JORGE LUIS + JOSE L  · Echo 10/10/2022 EF 65% with normal diastolic function     • Previous eval in aug for similar presentation unresponsive to diuretic therapy, thought process of a possible pulm etiology , pfts recommended , unfortunately wasn't performed   • UA : bland , spep : no monoclonal gammopathy   • Nephrology on board , recommendations : Continue Bumex 1 mg b i d   •  continue to monitor I&Os weights

## 2022-11-14 NOTE — CASE COMMUNICATION
OT not in compliance with visit frequency due to patient cancellation this date with patient rehospitalized

## 2022-11-14 NOTE — PROGRESS NOTES
Progress Note - Nephrology   Anali Lipoma 68 y o  female MRN: [id number]  Unit/Bed#: S -01 Encounter: [id number]    ASSESSMENT AND PLAN:  68 y o   Woman PMH of AFib,  CAD s/p pacemaker, CKD 4 (bl creatinine 1 8-2 5 mg/dL), HTN, hyperlipidemia, DM, cerebral amyloid angiopathy, blindness p/w SOB and LE edema   Nephrology is consulted for management of CKD       1  CKD 3B:  · Etiology:  Diabetic kidney disease/hypertensive nephrosclerosis/arteriolar nephrosclerosis/cardiorenal syndrome/episodes of SAHIL  · Baseline creatinine 1 8-2 5  · Current creatinine:  2 31 at baseline  · Urine protein creatinine ratio 0 4 g  · I would obtain a renal ultrasound at this time  · Bladder scans with PVR    Treatment  · Restart diuretics 1 mg b i d  As patient is at baseline  · Avoid nephrotoxic agents such as NSAIDs and contrast if possible  · Avoid hypoperfusion/hypotension    2  Volume/hypertension:  · Volume:  Appears mildly volume overloaded initiate diuretics Bumex 1 mg b i d  Echo 10/10/2022 with an EF of 51% normal diastolic dysfunction  · Blood pressure low normal:  · Off hydralazine  · I will decrease amlodipine to 5 mg of strong hold parameters  · Carvedilol with hold parameters 25 mg b i d     3  Electrolytes: All acceptable    4  MBD of CKD:  Check magnesium    5  Anemia:  · Hemoglobin stable at 8 0  · Check iron studies  · Check stool for occult blood  · Rule out multiple myeloma given CKD  · Transfuse per primary service for hemoglobin less than 7 0    6  Major problem is volume overload in the setting CKD please see above    7  Other medical problems:  · Diabetes mellitus per primary service  · CAD  · Atrial fibrillation/permanent pacemaker  · Cerebral amyloid angiopathy     Case discussed with primary service//regarding diuretics to be initiated and monitoring renal function      Subjective:   Patient is overall feeling better urinating better  No chest pain or shortness of breath  No nausea vomiting or diarrhea    Objective:     Vitals: Blood pressure 128/58, pulse 60, temperature 97 8 °F (36 6 °C), resp  rate 18, weight 87 2 kg (192 lb 3 9 oz), SpO2 94 %  ,Body mass index is 34 05 kg/m²  Weight (last 2 days)     Date/Time Weight    11/13/22 0537 87 2 (192 24)    11/12/22 0538 88 5 (195)    11/12/22 0506 88 8 (195 8)            Intake/Output Summary (Last 24 hours) at 11/14/2022 1022  Last data filed at 11/14/2022 0900  Gross per 24 hour   Intake 560 ml   Output 1000 ml   Net -440 ml       Urethral Catheter Latex 16 Fr   (Active)       Physical Exam: General:  No acute distress/obese  Skin:  No acute rash  Eyes:  No scleral icterus and noninjected  ENT:  Moist mucous membranes  Neck:  Supple, no jugular venous distention, trachea midline, overall appearance is normal  Chest:  Slight decreased breath sounds at the left base perhaps slight crackles and slight dullness to percussion at the left base greater than the right base  CVS:  Regular rate and rhythm, without a rub or gallops  Abdomen:  Normal bowel sounds, soft and nontender and nondistended  Extremities:  1-2 +lower extremity edema to the knees bilaterally, and no cyanosis, no significant arthritic changes  Neuro:  No gross focality  Psych:  Alert and oriented and appropriate                Medications:    Scheduled Meds:  Current Facility-Administered Medications   Medication Dose Route Frequency Provider Last Rate   • acetaminophen  975 mg Oral [facility] TENNILLE Reza     • amLODIPine  10 mg Oral Daily TENNILLE Reza     • aspirin  81 mg Oral Daily TENNILLE Reza     • atorvastatin  40 mg Oral QPM TENNILLE Reza     • bumetanide  1 mg Oral BID Nicolasa Harrington MD     • carvedilol  25 mg Oral Q12H [address] TENNIL[address] Bell     • cholecalciferol  2,000 Units Oral Daily TENNILLE Reza     • divalproex sodium  250 mg Oral Q12H [address] TENNILLE Solomon     • fluticasone  1 spray Nasal Daily TENNILLE Reza     • folic acid  821 mcg Oral Daily Ezell Paget TENNILLE Montes     • heparin (porcine)  5,000 Units Subcutaneous [facility] Oletha Riser, CRNP     • insulin glargine  8 Units Subcutaneous HS Jevon Roque MD     • insulin lispro  1-5 Units Subcutaneous HS Oletha Riser, CRNP     • insulin lispro  1-6 Units Subcutaneous TID [facility] Oletha Riser, CRNP     • polyethylene glycol  17 g Oral Daily Oletha Riser, CRNP     • ranolazine  500 mg Oral Daily Oletha Riser, CRNP         PRN Meds:     Continuous Infusions:     Lab, Imaging and other studies: I have personally reviewed pertinent labs  Laboratory Results:  Results from last 7 days   Lab Units 11/14/22  0529 11/12/22  0501 11/11/22  2200   WBC Thousand/uL 3 63* 3 97* 5 92   HEMOGLOBIN g/dL 8 0* 7 5* 9 0*   HEMATOCRIT % 25 4* 24 4* 28 4*   PLATELETS Thousands/uL 152 121* 167   POTASSIUM mmol/L 4 5 4 4 4 7   CHLORIDE mmol/L 105 106 104   CO2 mmol/L 28 25 27   BUN mg/dL 32* 34* 34*   CREATININE mg/dL 2 31* 2 35* 2 34*   CALCIUM mg/dL 7 7* 7 7* 8 4     Urinalysis:   Lab Results   Component Value Date    COLORU Yellow 11/12/2022    CLARITYU Clear 11/12/2022    SPECGRAV 1 014 11/12/2022    PHUR 5 0 11/12/2022    LEUKOCYTESUR Trace (A) 11/12/2022    NITRITE Negative 11/12/2022    GLUCOSEU Negative 11/12/2022    KETONESU Negative 11/12/2022    BILIRUBINUR Negative 11/12/2022    BLOODU Negative 11/12/2022     ABGs: No results found for: [facility]  Radiology review:     Portions of the record may have been created with voice recognition software  Occasional wrong word or "sound a like" substitutions may have occurred due to the inherent limitations of voice recognition software  Read the chart carefully and recognize, using context, where substitutions have occurred

## 2022-11-14 NOTE — ASSESSMENT & PLAN NOTE
· Creatinine on admission 2 34     · Previous baseline creatinine 1 6 to 1 9    Last hospital admission  10/8-10/14 with creatinine peaking at 2 42 despite IV fluids  · Possible etiology LOVE  · Volume status: overload , no acidotic , no electrolyte imbalances  · Current therapy : bumex 1 mg bid, PVR  520ml unable to void further   Plan  Straight cath  Renal US as recommended per nephro  Monitor kidney indices  Avoid hypotension  Avoid nephrotoxins

## 2022-11-14 NOTE — PLAN OF CARE
Problem: PHYSICAL THERAPY ADULT  Goal: Performs mobility at highest level of function for planned discharge setting  See evaluation for individualized goals  Description: Treatment/Interventions: Functional transfer training, LE strengthening/ROM, Therapeutic exercise, Endurance training, Patient/family training, Equipment eval/education, Elevations, Bed mobility, Gait training          See flowsheet documentation for full assessment, interventions and recommendations  Note:    Problem List: Decreased strength, Decreased endurance, Impaired balance, Decreased mobility, Decreased range of motion, Decreased safety awareness, Impaired vision  Assessment: Pt presents with 1 and 1/2 days of shortness of breath and swelling to bilateral lower extremities and left upper extremity  Dx: SAHIL on CKD, DM, anemia, CAD, cerebral amyloid angiopathy, a-fib, and HTN  order placed for PT eval and tx  pt presents w/ comorbidities of atrial fibrillation, CAD, SSS s/p pacemaker, CKD, HTN, HLD, IDDM, anemia, dyslipidemia, right femur fx repair, and cerebral amyloid angiopathy and personal factors of advanced age, mobilizing w/ assistive device, stair(s) to enter home, visual impairments, inability to perform IADLs and inability to perform ADLs  pt presents w/ weakness, decreased ROM, decreased endurance, impaired balance, gait deviations, visual impairment, decreased safety awareness and LE edema  these impairments are evident in findings from physical examination (weakness and decreased ROM), mobility assessment (need for mod assist w/ all phases of mobility when usually mobilizing independently, tolerance to only 10 feet of ambulation and need for cueing for mobility technique), and Barthel Index: 30/100  pt needed input for task focus and mobility technique  pt is at risk for falls due to physical and safety awareness deficits   pt's clinical presentation is unstable/unpredictable (evident in poor blood pressure control, need for assist w/ all phases of mobility when usually mobilizing independently, tolerance to only 10 feet of ambulation and need for input for task focus and mobility technique)  pt needs inpatient PT tx to improve mobility deficits and progress mobility training as appropriate  discharge recommendation is for inpatient rehab to reduce fall risk and maximize level of functional independence  PT Discharge Recommendation: Post acute rehabilitation services    See flowsheet documentation for full assessment

## 2022-11-14 NOTE — CASE MANAGEMENT
Case Management Assessment & Discharge Planning Note    Patient name Marino Meza S /S [address] [id number] MRN [id number]  : 1945 Date 2022       Current Admission Date: 2022  Current Admission Diagnosis:Acute kidney injury superimposed on chronic kidney disease 3/4   Patient Active Problem List    Diagnosis Date Noted   • Volume overload 2022   • Cerebral amyloid angiopathy ([facility] ) 2022   • Encephalopathy acute 10/12/2022   • Stroke-like symptoms 10/08/2022   • Acute kidney injury superimposed on chronic kidney disease 3/4 10/08/2022   • History of fracture of left hip 10/08/2022   • Left thyroid nodule 10/08/2022   • Stroke-like symptoms 10/08/2022   • CHF (congestive heart failure) ([address] ) 2022   • Elevated d-dimer 2022   • Atrial flutter, paroxysmal ([address] ) 2022   • Full code status 2022   • Lower obstructive uropathy 2022   • Acute blood loss anemia (ABLA) 2022   • MI (myocardial infarction) ([address] ) 2022   • Cardiac pacemaker in situ 2022   • H/O heart artery stent 2022   • Closed left hip fracture ([address] ) 2022   • Blind 2022   • Diabetes mellitus type 2, insulin dependent ([address] ) 2022   • Primary hypertension 2022   • CAD (coronary artery disease) 2022   • Atrial fibrillation ([address] ) 2022   • Anemia of chronic kidney failure, stage 4 (severe) ([facility] ) 2022   • Vertigo 2022   • Sick sinus syndrome ([address] ) 2021   • Obesity, morbid ([address] ) 2021   • 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[facility] ) 2021   • Multiple thyroid nodules 2020   • Hypokalemia 2020   • Closed head injury 2020   • Heterozygous MTHFR mutation [id number] 2019   • Stage 3 chronic kidney disease ([facility] ) 2019   • Stage 3b chronic kidney disease ([facility] ) 2019      LOS (days): 3  Geometric Mean LOS (GMLOS) (days):   Days to GMLOS:     OBJECTIVE:  PATIENT READMITTED TO HOSPITAL  Risk of Unplanned Readmission Score: 33 39         Current admission status: Inpatient       Preferred Pharmacy:   [facility] [address] [place] [address], [address] [place] [id number]  Phone: [phone] Fax: [phone]    Primary Care Provider: Mary Robbins MD    Primary Insurance:   Secondary Insurance:     ASSESSMENT:  [id number], [address] Representative - Daughter In-Law   Primary Phone: [phone] (Mobile)               Advance Directives  Does patient have a [address]?: Yes         Readmission Root Cause  30 Day Readmission: Yes  Who directed you to return to the hospital?: Family  Did you understand whom to contact if you had questions or problems?: Yes  Did you get your prescriptions before you left the hospital?: Yes  Were you able to get your prescriptions filled when you left the hospital?: Yes  Did you take your medications as prescribed?: Yes  Were you able to get to your follow-up appointments?: Yes  During previous admission, was a post-acute recommendation made?: No  Patient was readmitted due to: SOB, palpitations, generalized weakness  Action Plan: Diuresis, MRI    Patient Information  Admitted from[de-identified] Home  Mental Status: Alert  During Assessment patient was accompanied by: Not accompanied during assessment  Assessment information provided by[de-identified] Son  Primary Caregiver: Family  Caregiver's Name[de-identified] Sneha Steele Relationship to Patient[de-identified] Family Member  Caregiver's Telephone Number[de-identified] [phone]  Support Systems: Self, Family members  [place] of Residence: [address] do you live in?: [place] entry access options  Select all that apply : Stairs  Number of steps to enter home  : 1  Do the steps have railings?: No  Type of Current Residence: [place]  In the last 12 months, was there a time when you were not able to pay the mortgage or rent on time?: No  In the last 12 months, how many places have you lived?: 1  In the last 12 months, was there a time when you did not have a steady place to sleep or slept in a shelter (including now)?: No  Homeless/housing insecurity resource given?: N/A  Living Arrangements: Lives w/ Son  Is patient a ?: No    Activities of Daily Living Prior to Admission  Functional Status: Assistance (daughter-n-law assist with bathing and dressing)  Completes ADLs independently?: No  Level of ADL dependence: Assistance  Ambulates independently?: Yes  Does patient use assisted devices?: Yes  Assisted Devices (DME) used: [facility]  Does patient currently own DME?: Yes  What DME does the patient currently own?: Antonio Washington  Does patient have a history of Outpatient Therapy (PT/OT)?: Yes  Does the patient have a history of Short-Term Rehab?: Yes (Doesn't remember the name of facility)  Does patient have a history of HHC?: Yes ([facility])  Does patient currently have [facility]?: Yes    Current Home Health Care  Type of Current Home Care Services: Home PT, [address][de-identified] [address] [place] [address] Provider[de-identified] PCP    Patient Information Continued  Income Source: Unemployed  Does patient have prescription coverage?: Yes  Within the past 12 months, you worried that your food would run out before you got the money to buy more : Never true  Within the past 12 months, the food you bought just didn't last and you didn't have money to get more : Never true  Food insecurity resource given?: N/A  Does patient receive dialysis treatments?: No  Does patient have a history of substance abuse?: No  Does patient have a history of Mental Health Diagnosis?: No    PHQ 2/9 Screening   Reviewed PHQ 2/9 Depression Screening Score?: No    Means of Transportation  Means of Transport to Appts[de-identified] Family transport  In the past 12 months, has lack of transportation kept you from medical appointments or from getting medications?: No  In the past 12 months, has lack of transportation kept you from meetings, work, or from getting things needed for daily living?: No  Was application for public transport provided?: N/A        DISCHARGE DETAILS:    Discharge planning discussed with[de-identified] Melba-daughter-n-law  Freedom of Choice: Yes  Comments - Freedom of Choice: Melba confirmed the Pt was recently open to [employer] which the family might want to resume, depending on the therapy evaluation    CM contacted family/caregiver?: Yes             Contacts  Patient Contacts: Dl Buchanan  Relationship to Patient[de-identified] Family  Contact Method: Phone  Phone Number: [phone]  Reason/Outcome: Continuity of Care, Referral, Discharge Planning         DME Referral Provided  Referral made for DME?: No    Other Referral/Resources/Interventions Provided:  Interventions:  (TBD)         Treatment Team Recommendation:  (TBD)  Discharge Destination Plan[de-identified]  (TBD)

## 2022-11-14 NOTE — ASSESSMENT & PLAN NOTE
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

## 2022-11-15 ENCOUNTER — HOME HEALTH ADMISSION (OUTPATIENT)
Dept: HOME HEALTH SERVICES | Facility: HOME HEALTHCARE | Age: 77
End: 2022-11-15

## 2022-11-15 ENCOUNTER — TELEPHONE (OUTPATIENT)
Dept: NEPHROLOGY | Facility: CLINIC | Age: 77
End: 2022-11-15

## 2022-11-15 VITALS
HEART RATE: 60 BPM | DIASTOLIC BLOOD PRESSURE: 67 MMHG | SYSTOLIC BLOOD PRESSURE: 153 MMHG | RESPIRATION RATE: 18 BRPM | OXYGEN SATURATION: 92 % | BODY MASS INDEX: 33.04 KG/M2 | TEMPERATURE: 97.6 F | WEIGHT: 186.51 LBS

## 2022-11-15 DIAGNOSIS — I10 PRIMARY HYPERTENSION: ICD-10-CM

## 2022-11-15 DIAGNOSIS — N18.32 STAGE 3B CHRONIC KIDNEY DISEASE (HCC): Primary | ICD-10-CM

## 2022-11-15 LAB
ANION GAP SERPL CALCULATED.3IONS-SCNC: 5 MMOL/L (ref 4–13)
BUN SERPL-MCNC: 28 MG/DL (ref 5–25)
CALCIUM SERPL-MCNC: 8 MG/DL (ref 8.4–10.2)
CHLORIDE SERPL-SCNC: 103 MMOL/L (ref 96–108)
CO2 SERPL-SCNC: 30 MMOL/L (ref 21–32)
CREAT SERPL-MCNC: 2.19 MG/DL (ref 0.6–1.3)
FERRITIN SERPL-MCNC: 63 NG/ML (ref 8–388)
GFR SERPL CREATININE-BSD FRML MDRD: 21 ML/MIN/1.73SQ M
GLUCOSE SERPL-MCNC: 133 MG/DL (ref 65–140)
GLUCOSE SERPL-MCNC: 159 MG/DL (ref 65–140)
GLUCOSE SERPL-MCNC: 35 MG/DL (ref 65–140)
GLUCOSE SERPL-MCNC: 46 MG/DL (ref 65–140)
IRON SATN MFR SERPL: 35 % (ref 15–50)
IRON SERPL-MCNC: 87 UG/DL (ref 50–170)
MAGNESIUM SERPL-MCNC: 2.2 MG/DL (ref 1.9–2.7)
POTASSIUM SERPL-SCNC: 4.6 MMOL/L (ref 3.5–5.3)
SODIUM SERPL-SCNC: 138 MMOL/L (ref 135–147)
TIBC SERPL-MCNC: 252 UG/DL (ref 250–450)

## 2022-11-15 RX ORDER — DEXTROSE MONOHYDRATE 25 G/50ML
INJECTION, SOLUTION INTRAVENOUS
Status: COMPLETED
Start: 2022-11-15 | End: 2022-11-15

## 2022-11-15 RX ORDER — AMLODIPINE BESYLATE 5 MG/1
5 TABLET ORAL DAILY
Qty: 30 TABLET | Refills: 0 | Status: SHIPPED | OUTPATIENT
Start: 2022-11-15 | End: 2022-12-15

## 2022-11-15 RX ORDER — BUMETANIDE 1 MG/1
1 TABLET ORAL 2 TIMES DAILY
Qty: 60 TABLET | Refills: 0 | Status: SHIPPED | OUTPATIENT
Start: 2022-11-15 | End: 2022-11-21 | Stop reason: SDUPTHER

## 2022-11-15 RX ORDER — LANOLIN ALCOHOL/MO/W.PET/CERES
400 CREAM (GRAM) TOPICAL DAILY
Qty: 30 TABLET | Refills: 0 | Status: CANCELLED | OUTPATIENT
Start: 2022-11-15 | End: 2022-12-15

## 2022-11-15 RX ADMIN — Medication 16 G: at 08:39

## 2022-11-15 RX ADMIN — IRON SUCROSE 300 MG: 20 INJECTION, SOLUTION INTRAVENOUS at 14:24

## 2022-11-15 RX ADMIN — DEXTROSE MONOHYDRATE 10 ML: 25 INJECTION, SOLUTION INTRAVENOUS at 08:29

## 2022-11-15 RX ADMIN — ASPIRIN 81 MG CHEWABLE TABLET 81 MG: 81 TABLET CHEWABLE at 11:01

## 2022-11-15 RX ADMIN — CARVEDILOL 25 MG: 12.5 TABLET, FILM COATED ORAL at 11:01

## 2022-11-15 RX ADMIN — ACETAMINOPHEN 975 MG: 325 TABLET, FILM COATED ORAL at 06:16

## 2022-11-15 RX ADMIN — FOLIC ACID TAB 400 MCG 400 MCG: 400 TAB at 11:03

## 2022-11-15 RX ADMIN — CHOLECALCIFEROL TAB 25 MCG (1000 UNIT) 2000 UNITS: 25 TAB at 11:01

## 2022-11-15 RX ADMIN — FLUTICASONE PROPIONATE 1 SPRAY: 50 SPRAY, METERED NASAL at 11:04

## 2022-11-15 RX ADMIN — RANOLAZINE 500 MG: 500 TABLET, FILM COATED, EXTENDED RELEASE ORAL at 11:01

## 2022-11-15 RX ADMIN — HEPARIN SODIUM 5000 UNITS: 5000 INJECTION INTRAVENOUS; SUBCUTANEOUS at 06:16

## 2022-11-15 RX ADMIN — BUMETANIDE 1 MG: 1 TABLET ORAL at 11:01

## 2022-11-15 RX ADMIN — INSULIN LISPRO 1 UNITS: 100 INJECTION, SOLUTION INTRAVENOUS; SUBCUTANEOUS at 12:50

## 2022-11-15 RX ADMIN — AMLODIPINE BESYLATE 5 MG: 5 TABLET ORAL at 11:01

## 2022-11-15 RX ADMIN — DIVALPROEX SODIUM 250 MG: 250 TABLET, DELAYED RELEASE ORAL at 11:03

## 2022-11-15 NOTE — ASSESSMENT & PLAN NOTE
· Creatinine on admission 2 34     · Previous baseline creatinine 1 8 -2 5      · US renal : no hydronephrosis, left renal atrophy   · Possible etiology LOVE  · Volume status: overload , no acidotic , no electrolyte imbalances, Cr 2 1   · Current therapy : bumex 1 mg bid, UO 3,1 pat -4 1  · Stable from medicine and nephrology standpoint for discharge with below recs  Plan  Continue bumex 1 mg bid  Basic metabolic panel in 1 week  Avoid nsaids and contrast   Continue follow up with primary care physician

## 2022-11-15 NOTE — ASSESSMENT & PLAN NOTE
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

## 2022-11-15 NOTE — DISCHARGE INSTR - AVS FIRST PAGE
Dear Isma Vickers,     It was our pleasure to care for you here at [facility]  It is our hope that we were always able to exceed the expected standards for your care during your stay  You were hospitalized due to increased swelling, shortness of breath and decreased urination  You were cared for on the 3rd floor by Jodee Lo MD under the service of Girma Dominguez MD with the [facility] who covers for your primary care physician (PCP), Palmer Atkins MD, while you were hospitalized  If you have any questions or concerns related to this hospitalization, you may contact us at [phone]  For follow up as well as any medication refills, we recommend that you follow up with your primary care physician  A registered nurse will reach out to you by phone within a few days after your discharge to answer any additional questions that you may have after going home    However, at this time we provide for you here, the most important instructions / recommendations at discharge:     Notable Medication Adjustments -   Bumetanide (Bumex) was initiated to help to get rid of fluid  Amlodipine dose was decreased to 5 mg daily to help to control your blood pressure  Testing Required after Discharge -   Basic metabolic panel in 1 week  Important follow up information -   Please follow-up as instructed below  Other Instructions -   Please continue bumetanide (Bumex) 1 tablet every 12 hours  At basic metabolic panel has been ordered please have it done in the next week  Please continue folic acid 1 tablet daily  Please continue the rest of your home medications  Please continue amlodipine 5 mg daily  Hydralazine has been discontinued by Nephrology recommendations  Please continue follow-up with your primary care physician  Please review this entire after visit summary as additional general instructions including medication list, appointments, activity, diet, any pertinent wound care, and other additional recommendations from your care team that may be provided for you        Sincerely,     Cherrie Ivan MD

## 2022-11-15 NOTE — ASSESSMENT & PLAN NOTE
· BP stable, amlodipine dose decreased by nephro to 5 mg QD continue carvedilol, hydralazine dc'd   · Continue follow-up with PCP

## 2022-11-15 NOTE — CASE COMMUNICATION
OT out of compliance with visit frequency week of 10/30 due to scheduling conflicts and inability to schedule second visit for week

## 2022-11-15 NOTE — DISCHARGE INSTRUCTIONS
Edema   WHAT YOU NEED TO KNOW:   Edema is swelling throughout your body  Edema is usually a sign that you are retaining fluid  The swelling may be caused by heart failure or kidney, thyroid, or liver disease  It may also be caused by medicines such as antidepressants, blood pressure medicines, or hormones  Sudden swelling around the lips or face may be a sign of a severe allergic reaction  Swelling of an arm or leg may be caused by blockage of your veins  DISCHARGE INSTRUCTIONS:   Return to the emergency department if:   You have shortness of breath at rest, especially when you lie down  You cough up pink, foamy sputum  You have chest pain  Your heartbeat is fast or uneven  Call your doctor if:   The swollen area feels cold and is pale or blue in color  The swollen area feels warm, painful, and is red in color  You have increased swelling or swelling in other parts of your body  You have questions or concerns about your condition or care  Medicines:   Medicines  help to get rid of extra body fluid  Take your medicine as directed  Contact your healthcare provider if you think your medicine is not helping or if you have side effects  Tell him or her if you are allergic to any medicine  Keep a list of the medicines, vitamins, and herbs you take  Include the amounts, and when and why you take them  Bring the list or the pill bottles to follow-up visits  Carry your medicine list with you in case of an emergency  Manage edema:   Elevate  your arms or legs as directed  Raise them above the level of your heart as often as you can  This will help decrease swelling and pain  Prop them on pillows or blankets to keep them elevated comfortably  Wear pressure stockings as directed  The stockings are tight and put pressure on your legs  This helps to keep fluid from collecting in your legs or ankles  Limit your salt intake  Salt causes your body to hold water   Ask about any other changes to your diet  Stay active  Do not stand or sit for long periods of time  Ask your healthcare provider about the best exercise plan for you  Keep your skin moist  using lotion, cream, or ointment  Ask your healthcare provider what to use and how often to use it  Follow up with your doctor as directed:  Write down your questions so you remember to ask them during your visits  © Copyright [employer] 2022 Information is for End User's use only and may not be sold, redistributed or otherwise used for commercial purposes  All illustrations and images included in CareNotes® are the copyrighted property of [employer]  or [facility] [address]   The above information is an  only  It is not intended as medical advice for individual conditions or treatments  Talk to your doctor, nurse or pharmacist before following any medical regimen to see if it is safe and effective for you

## 2022-11-15 NOTE — DISCHARGE SUMMARY
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  Assessment & Plan  · Creatinine on admission 2 34     · Previous baseline creatinine 1 8 -2 5  · US renal : no hydronephrosis, left renal atrophy   · Possible etiology LOVE  · Volume status: overload , no acidotic , no electrolyte imbalances, Cr 2 1   · Current therapy : bumex 1 mg bid, UO 3,1 pat -4 1  · Stable from medicine and nephrology standpoint for discharge with below recs  Plan  Continue bumex 1 mg bid  Basic metabolic panel in 1 week  Avoid nsaids and contrast   Continue follow up with primary care physician     Volume overload  Assessment & Plan  · Evidenced by JORGE LUIS + DOMINGO, echo with preserved EF  ·  workup neg for MM  • Currently on Bumex 1 mg b i d , positive diuretic response, affected by weight changes approximate 9 lb, as well as diuresing 3 1 L  • Stable to discharge  • Nephrology recommendations : Continue Bumex 1 mg b i d  + BMP 1 week + follow-up PCP      Primary hypertension  Assessment & Plan  · BP stable, amlodipine dose decreased by nephro to 5 mg QD continue carvedilol, hydralazine dc'd   · Continue follow-up with PCP      Diabetes mellitus type 2, insulin dependent (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  · Continue home regimen lantus 12 units HS  · Follow-up with PCP    Anemia of chronic kidney failure, stage 4 (severe) ([facility])  Assessment & Plan  · Hgb stable, no bleeding  Baseline 7 to 9   · No overt signs of bleeding   Patient reports no bleeding per rectum, or black stools  · Workup neg for multiple myeloma  plan  · Iron panel low ferretinemia , would initiate Venofer, continue Iron daily as per nephrology recommendations  · Continue follow up with pcp    Cerebral amyloid angiopathy ([facility] )  Assessment & Plan  · Previously noted memory problems, hallucinations  Recent MRI brain showed likely CAA  Evaluated by neurology during prior hospitalization as part of stroke work up with the following recommendations  · Stopped AC due to bleeding risk  Neurology previously okay with DVT ppx with subq heparin  · 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 for hallucinations / lewy body dementia  · Continue follow-up with PCP    Atrial fibrillation ([facility] )  Assessment & Plan  · Maintained on beta blocker, aspirin  Pacemaker in place  · No longer on a/c due to high risk of bleeding  MRI brain consistent with chronic amyloid angiopathy  · Continue follow-up with PCP    CAD (coronary artery disease)  Assessment & Plan  · S/p stent RCA  Without complaints of chest pain or shortness of breath  · Continue home regimen  · Continue follow-up with PCP    Discharging Resident Physician: Delmis Ellsworth MD  Attending: Gordon Bruno MD  PCP: Shanthi Mazariegos MD  Admission Date: 11/11/2022  Discharge Date: 11/15/22    Disposition:     Home    Reason for Admission: Acute kidney Injury, Volume Overload    Consultations During Hospital Stay:  · Nephrology    Procedures Performed:     · none    Significant Findings / Test Results:     · none    Incidental Findings:   · none    Test Results Pending at Discharge (will require follow up):   · none     Outpatient Tests Requested:  · Basic metabolic panel in 1 week     Complications:  none    Hospital Course:     Sergio Santo is a 68 y o  female patient who originally presented to the hospital on 11/11/2022 due to upper and lower extremities swelling, shortness of breath and decreased urine output  Patient has a history of chronic kidney disease, she was not any diuretic therapy is baseline    On admission she was noted with significant bilateral and bilateral upper and lower extremities edema, lung sounds were noted decreased bilaterally abdomen was distended, echo showed a preserved ejection fraction, checks x-ray showed some small pleural effusions bilaterally  Patient was evaluated by the nephrology team, patient was initiated on Bumex 1 mg b i d , with positive clinical response, iron panel showed hypoferretinemia, workup for multiple myeloma was negative, after diuresis therapy patient's kidney function back on baseline pain  Patient is stable from Nephrology in medicine standpoint for discharge with above recommendations  Condition at Discharge: good     Discharge Day Visit / Exam:     Subjective:  Early this am , blood sugars noted 35 range, however patient asymptomatic , responsive to food consumption  Vitals: Blood Pressure: 146/62 (11/15/22 1058)  Pulse: 60 (11/15/22 1058)  Temperature: 97 5 °F (36 4 °C) (11/15/22 0833)  Temp Source: Oral (11/13/22 1500)  Respirations: 20 (11/14/22 1941)  Weight - Scale: 84 6 kg (186 lb 8 2 oz) (11/15/22 0616)  SpO2: 94 % (11/15/22 1058)     Exam:   Physical Exam  Vitals and nursing note reviewed  Constitutional:       General: She is not in acute distress  Appearance: Normal appearance  She is not toxic-appearing  HENT:      Head: Normocephalic and atraumatic  Right Ear: Tympanic membrane normal       Left Ear: Tympanic membrane normal       Nose: Nose normal       Mouth/Throat:      Mouth: Mucous membranes are moist    Eyes:      Extraocular Movements: Extraocular movements intact  Conjunctiva/sclera: Conjunctivae normal       Pupils: Pupils are equal, round, and reactive to light  Cardiovascular:      Rate and Rhythm: Normal rate  Pulses: Normal pulses  Pulmonary:      Effort: Pulmonary effort is normal       Comments: Decreased breath sounds bilaterally  Abdominal:      General: Bowel sounds are normal  There is no distension  Palpations: Abdomen is soft  Tenderness:  There is no abdominal tenderness  Musculoskeletal:      Cervical back: Normal range of motion  Right lower leg: Edema present  Left lower leg: Edema present  Skin:     General: Skin is warm  Capillary Refill: Capillary refill takes 2 to 3 seconds  Neurological:      Mental Status: She is alert and oriented to person, place, and time  Psychiatric:         Mood and Affect: Mood normal          Behavior: Behavior normal          Thought Content: Thought content normal          Judgment: Judgment normal          Discussion with Family:  Patient's case discussed with son Diandra Walden all questions answered    Discharge instructions/Information to patient and family:   See after visit summary for information provided to patient and family  Provisions for Follow-Up Care:  See after visit summary for information related to follow-up care and any pertinent home health orders  Planned Readmission: no     Discharge Medications:  See after visit summary for reconciled discharge medications provided to patient and family        ** Please Note: This note has been constructed using a voice recognition system     Nutrition Assessment and Intervention:     Reviewed food recall journal      Physical Activity Assessment and Intervention:    Activity journal reviewed      Emotional and Mental Well-being, Sleep, Connectedness Assessment and Intervention:    Sleep/stress assessment performed      Tobacco and Toxic Substance Assessment and Intervention:     Tobacco use screening performed    Alcohol and drug use screening performed

## 2022-11-15 NOTE — TELEPHONE ENCOUNTER
Spoke with Pt's son about canceling her appt due to her being in the hospital  I informed him we will keep an eye out for when she gets discharged to reschedule

## 2022-11-15 NOTE — ASSESSMENT & PLAN NOTE
· Hgb stable, no bleeding  Baseline 7 to 9   · No overt signs of bleeding   Patient reports no bleeding per rectum, or black stools  · Workup neg for multiple myeloma  plan  · Iron panel low ferretinemia , would initiate Venofer, continue Iron daily as per nephrology recommendations  · Continue follow up with pcp

## 2022-11-15 NOTE — PLAN OF CARE
Problem: PAIN - ADULT  Goal: Verbalizes/displays adequate comfort level or baseline comfort level  Description: Interventions:  - Encourage patient to monitor pain and request assistance  - Assess pain using appropriate pain scale  - Administer analgesics based on type and severity of pain and evaluate response  - Implement non-pharmacological measures as appropriate and evaluate response  - Consider cultural and social influences on pain and pain management  - Notify physician/advanced practitioner if interventions unsuccessful or patient reports new pain  11/14/2022 2318 by Nereida Lazaro RN  Outcome: Progressing  11/14/2022 2318 by Nereida Lazaro RN  Outcome: Progressing     Problem: SAFETY ADULT  Goal: Patient will remain free of falls  Description: INTERVENTIONS:  - Educate patient/family on patient safety including physical limitations  - Instruct patient to call for assistance with activity   - Consult OT/PT to assist with strengthening/mobility   - Keep Call bell within reach  - Keep bed low and locked with side rails adjusted as appropriate  - Keep care items and personal belongings within reach  - Initiate and maintain comfort rounds  - Make Fall Risk Sign visible to staff  - Offer Toileting every  Hours, in advance of need  - Initiate/Maintain alarm  - Obtain necessary fall risk management equipment:   - Apply yellow socks and bracelet for high fall risk patients  - Consider moving patient to room near nurses station  11/14/2022 2318 by Nereida Lazaro RN  Outcome: Progressing  11/14/2022 2318 by Nereida Lazaro RN  Outcome: Progressing  Goal: Maintain or return to baseline ADL function  Description: INTERVENTIONS:  -  Assess patient's ability to carry out ADLs; assess patient's baseline for ADL function and identify physical deficits which impact ability to perform ADLs (bathing, care of mouth/teeth, toileting, grooming, dressing, etc )  - Assess/evaluate cause of self-care deficits   - Assess range of motion  - Assess patient's mobility; develop plan if impaired  - Assess patient's need for assistive devices and provide as appropriate  - Encourage maximum independence but intervene and supervise when necessary  - Involve family in performance of ADLs  - Assess for home care needs following discharge   - Consider OT consult to assist with ADL evaluation and planning for discharge  - Provide patient education as appropriate  11/14/2022 2318 by Liliana Moy RN  Outcome: Progressing  11/14/2022 2318 by Liliana Moy RN  Outcome: Progressing  Goal: Maintains/Returns to pre admission functional level  Description: INTERVENTIONS:  - Perform BMAT or MOVE assessment daily    - Set and communicate daily mobility goal to care team and patient/family/caregiver  - Collaborate with rehabilitation services on mobility goals if consulted  - Perform Range of Motion  times a day  - Reposition patient every  hours    - Dangle patient  times a day  - Stand patient  times a day  - Ambulate patient  times a day  - Out of bed to chair  times a day   - Out of bed for meals  times a day  - Out of bed for toileting  - Record patient progress and toleration of activity level   11/14/2022 2318 by Liliana Moy RN  Outcome: Progressing  11/14/2022 2318 by Liliana Moy RN  Outcome: Progressing     Problem: Prexisting or High Potential for Compromised Skin Integrity  Goal: Skin integrity is maintained or improved  Description: INTERVENTIONS:  - Identify patients at risk for skin breakdown  - Assess and monitor skin integrity  - Assess and monitor nutrition and hydration status  - Monitor labs   - Assess for incontinence   - Turn and reposition patient  - Assist with mobility/ambulation  - Relieve pressure over bony prominences  - Avoid friction and shearing  - Provide appropriate hygiene as needed including keeping skin clean and dry  - Evaluate need for skin moisturizer/barrier cream  - Collaborate with interdisciplinary team   - Patient/family teaching  - Consider wound care consult   Outcome: Progressing     Problem: MOBILITY - ADULT  Goal: Maintain or return to baseline ADL function  Description: INTERVENTIONS:  -  Assess patient's ability to carry out ADLs; assess patient's baseline for ADL function and identify physical deficits which impact ability to perform ADLs (bathing, care of mouth/teeth, toileting, grooming, dressing, etc )  - Assess/evaluate cause of self-care deficits   - Assess range of motion  - Assess patient's mobility; develop plan if impaired  - Assess patient's need for assistive devices and provide as appropriate  - Encourage maximum independence but intervene and supervise when necessary  - Involve family in performance of ADLs  - Assess for home care needs following discharge   - Consider OT consult to assist with ADL evaluation and planning for discharge  - Provide patient education as appropriate  11/14/2022 2318 by Spenser Bartlett RN  Outcome: Progressing  11/14/2022 2318 by Spenser Bartlett RN  Outcome: Progressing  Goal: Maintains/Returns to pre admission functional level  Description: INTERVENTIONS:  - Perform BMAT or MOVE assessment daily    - Set and communicate daily mobility goal to care team and patient/family/caregiver  - Collaborate with rehabilitation services on mobility goals if consulted  - Perform Range of Motion  times a day  - Reposition patient every  hours    - Dangle patient  times a day  - Stand patient  times a day  - Ambulate patient  times a day  - Out of bed to chair  times a day   - Out of bed for meals  times a day  - Out of bed for toileting  - Record patient progress and toleration of activity level   11/14/2022 2318 by Spenser Bartlett RN  Outcome: Progressing  11/14/2022 2318 by Spenser Bartlett RN  Outcome: Progressing

## 2022-11-15 NOTE — ASSESSMENT & PLAN NOTE
· Previously noted memory problems, hallucinations  Recent MRI brain showed likely CAA  Evaluated by neurology during prior hospitalization as part of stroke work up with the following recommendations  · Stopped AC due to bleeding risk    Neurology previously okay with DVT ppx with subq heparin  · 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 for hallucinations / lewy body dementia  · Continue follow-up with PCP

## 2022-11-15 NOTE — OCCUPATIONAL THERAPY NOTE
Occupational Therapy Cancellation       11/15/22 4414   OT Last Visit   OT Visit Date 11/15/22   Note Type   Note type Cancelled Session   Additional Comments OT consult received  Chart reviewed and noted d/c order and d/c summary written  This writer spoke with RN who confirms pt is awaiting d/c  Will hold OT evaluation at this time and f/u should d/c be cancelled for any reason and to assist with d/c planning  Thank you         LESA Resendez/RAQUEL

## 2022-11-15 NOTE — ASSESSMENT & PLAN NOTE
· Evidenced by JORGE LUIS + DOMINGO, echo with preserved EF  ·  workup neg for MM  • Currently on Bumex 1 mg b i d , positive diuretic response, affected by weight changes approximate 9 lb, as well as diuresing 3 1 L  • Stable to discharge  • Nephrology recommendations : Continue Bumex 1 mg b i d  + BMP 1 week + follow-up PCP

## 2022-11-15 NOTE — ASSESSMENT & PLAN NOTE
· S/p stent RCA    Without complaints of chest pain or shortness of breath  · Continue home regimen  · Continue follow-up with PCP

## 2022-11-15 NOTE — TELEPHONE ENCOUNTER
BMP in system    ----- Message from Blanco Moreno MD sent at 11/15/2022 10:41 AM EST -----  Patient is being discharged from [facility] today  She needs a follow-up in the office in about 2-4 weeks depending upon follow-up labs  She needs a basic metabolic profile 1 week  She is being sent home on Bumex 1 mg b i d    Light chain ratio is pending  She will need oral iron and possibly intravenous iron as an outpatient she will receive 1 dose in the hospital 300 mg of Venofer    Thank you

## 2022-11-15 NOTE — PROGRESS NOTES
Progress Note - Nephrology   Carrol Martinez 68 y o  female MRN: [id number]  Unit/Bed#: S -01 Encounter: [id number]    ASSESSMENT AND PLAN:  68 y o   Woman PMH of AFib,  CAD s/p pacemaker, CKD 4 (bl creatinine 1 8-2 5 mg/dL), HTN, hyperlipidemia, DM, cerebral amyloid angiopathy, blindness p/w SOB and LE edema   Nephrology is consulted for management of CKD        1  CKD 3B:  · Etiology:  Diabetic kidney disease/hypertensive nephrosclerosis/arteriolar nephrosclerosis/cardiorenal syndrome/episodes of SAHIL  · Baseline creatinine 1 8-2 5  · Current creatinine:  2 19 at baseline and actually improved despite diuresis  · Urine protein creatinine ratio 0 4 g  · Would renal ultrasound:  No hydronephrosis; left renal atrophy  · Bladder scans with PVR     Treatment  · Continue Bumex 1 mg b i d  Seems to be working quite well -2 0 5 L! · Avoid nephrotoxic agents such as NSAIDs and contrast if possible  · Avoid hypoperfusion/hypotension     2  Volume/hypertension:  · Volume:  Appears mildly volume overloaded initiate diuretics Bumex 1 mg b i d  Echo 10/10/2022 with an EF of 77% normal diastolic dysfunction  · Blood pressure low normal:  · Off hydralazine  · I decreased amlodipine to 5 mg of strong hold parameters  · Carvedilol with hold parameters 25 mg b i d   · I WOULD RECOMMEND OUTPATIENT RENAL ARTERY DUPLEX GIVEN LEFT RENAL ATROPHY     3  Electrolytes: All acceptable     4  MBD of CKD:  Magnesium acceptable 2 2     5  Anemia:  · Hemoglobin stable at 8 0  · Low ferritin I would recommend intravenous iron x1 and then oral iron  · Check stool for occult blood  · Rule out multiple myeloma given CKD:  Immunofixation negative, UPEP negative awaiting light chain ratio  · Transfuse per primary service for hemoglobin less than 7 0     6   Major problem is volume overload in the setting CKD please see above     7  Other medical problems:  · Diabetes mellitus per primary service  · CAD  · Atrial fibrillation/permanent pacemaker  · Cerebral amyloid angiopathy      Case discussed with primary service:  Okay for discharge with outpatient follow-up maintain current Bumex 1 mg b i d  Repeat a basic metabolic profile 1 week after discharge  Also oral iron           Subjective:   Patient overall feels better  No shortness of breath or chest pain  No nausea vomiting or diarrhea  Significant increase in urine output    Objective:     Vitals: Blood pressure 152/62, pulse 60, temperature 97 5 °F (36 4 °C), resp  rate 20, weight 84 6 kg (186 lb 8 2 oz), SpO2 95 %  ,Body mass index is 33 04 kg/m²  Weight (last 2 days)     Date/Time Weight    11/15/22 0616 84 6 (186 51)    11/13/22 0537 87 2 (192 24)            Intake/Output Summary (Last 24 hours) at 11/15/2022 1034  Last data filed at 11/15/2022 0601  Gross per 24 hour   Intake 500 ml   Output 3145 ml   Net -2645 ml       Urethral Catheter Latex 16 Fr   (Active)       Physical Exam: General:  No acute distress/obese  Skin:  No acute rash  Eyes:  No scleral icterus and noninjected  ENT:  Moist mucous membranes  Neck:  Supple, no jugular venous distention, trachea midline, overall appearance is normal  Chest:  Clear to auscultation however decreased breath sounds at both bases  CVS:  Regular rate and rhythm, without a rub or gallops  Abdomen:  Normal bowel sounds, soft and nontender and nondistended  Extremities:  Upper and lower extremity edema, lower extremity edema definitely has improved , and no cyanosis, no significant arthritic changes  Neuro:  No gross focality  Psych:  Alert and oriented and appropriate                Medications:    Scheduled Meds:  Current Facility-Administered Medications   Medication Dose Route Frequency Provider Last Rate   • acetaminophen  975 mg Oral [facility] TENNILLE Reynolds     • amLODIPine  5 mg Oral Daily Jewell Garza MD     • aspirin  81 mg Oral Daily TENNILLE Reynolds     • atorvastatin  40 mg Oral QPM TENNILLE Reynolds     • bumetanide  1 mg Oral BID Sissy Amos MD     • carvedilol  25 mg Oral Q12H [address] TENNILLE Solomon     • cholecalciferol  2,000 Units Oral Daily TENNILLE Garcia     • divalproex sodium  250 mg Oral Q12H [facility] TENNILLE Garcia     • fluticasone  1 spray Nasal Daily TENNILLE Garcia     • folic acid  886 mcg Oral Daily TENNILLE Garcia     • heparin (porcine)  5,000 Units Subcutaneous [facility] TENNILLE Garcia     • insulin lispro  1-5 Units Subcutaneous HS TENNILLE Garcia     • insulin lispro  1-6 Units Subcutaneous TID [facility] TENNILLE Garcia     • polyethylene glycol  17 g Oral Daily TENNILLE Garcia     • ranolazine  500 mg Oral Daily TENNILLE Garcia         PRN Meds:     Continuous Infusions:     Lab, Imaging and other studies: I have personally reviewed pertinent labs  Laboratory Results:  Results from last 7 days   Lab Units 11/15/22  0639 11/14/22  0529 11/12/22  0501 11/11/22  2200   WBC Thousand/uL  --  3 63* 3 97* 5 92   HEMOGLOBIN g/dL  --  8 0* 7 5* 9 0*   HEMATOCRIT %  --  25 4* 24 4* 28 4*   PLATELETS Thousands/uL  --  152 121* 167   POTASSIUM mmol/L 4 6 4 5 4 4 4 7   CHLORIDE mmol/L 103 105 106 104   CO2 mmol/L 30 28 25 27   BUN mg/dL 28* 32* 34* 34*   CREATININE mg/dL 2 19* 2 31* 2 35* 2 34*   CALCIUM mg/dL 8 0* 7 7* 7 7* 8 4   MAGNESIUM mg/dL 2 2  --   --   --      Urinalysis:   Lab Results   Component Value Date    COLORU Yellow 11/12/2022    CLARITYU Clear 11/12/2022    SPECGRAV 1 014 11/12/2022    PHUR 5 0 11/12/2022    LEUKOCYTESUR Trace (A) 11/12/2022    NITRITE Negative 11/12/2022    GLUCOSEU Negative 11/12/2022    KETONESU Negative 11/12/2022    BILIRUBINUR Negative 11/12/2022    BLOODU Negative 11/12/2022     ABGs: No results found for: [facility]  Radiology review:     Portions of the record may have been created with voice recognition software  Occasional wrong word or "sound a like" substitutions may have occurred due to the inherent limitations of voice recognition software    Read the chart carefully and recognize, using context, where substitutions have occurred

## 2022-11-15 NOTE — CASE COMMUNICATION
OT out of compliance with visit frequency this week due to cancelled appointment and scheduling conflicts did not allow for 2nd visit to be scheduled

## 2022-11-15 NOTE — ASSESSMENT & PLAN NOTE
· Maintained on beta blocker, aspirin  Pacemaker in place  · No longer on a/c due to high risk of bleeding  MRI brain consistent with chronic amyloid angiopathy    · Continue follow-up with PCP

## 2022-11-15 NOTE — CASE MANAGEMENT
Case Management Discharge Planning Note    Patient name Yandel FLORES /[address] [id number] MRN [id number]  : 1945 Date 11/15/2022       Current Admission Date: 2022  Current Admission Diagnosis:Acute kidney injury superimposed on chronic kidney disease 3/   Patient Active Problem List    Diagnosis Date Noted   • Volume overload 2022   • Cerebral amyloid angiopathy ([address] ) 2022   • Encephalopathy acute 10/12/2022   • Stroke-like symptoms 10/08/2022   • Acute kidney injury superimposed on chronic kidney disease 3/4 10/08/2022   • History of fracture of left hip 10/08/2022   • Left thyroid nodule 10/08/2022   • Stroke-like symptoms 10/08/2022   • CHF (congestive heart failure) ([address] ) 2022   • Elevated d-dimer 2022   • Atrial flutter, paroxysmal ([facility] ) 2022   • Full code status 2022   • Lower obstructive uropathy 2022   • Acute blood loss anemia (ABLA) 2022   • MI (myocardial infarction) ([address] ) 2022   • Cardiac pacemaker in situ 2022   • H/O heart artery stent 2022   • Closed left hip fracture ([address] ) 2022   • Blind 2022   • Diabetes mellitus type 2, insulin dependent ([facility] ) 2022   • Primary hypertension 2022   • CAD (coronary artery disease) 2022   • Atrial fibrillation ([address] ) 2022   • Anemia of chronic kidney failure, stage 4 (severe) ([facility] ) 2022   • Vertigo 2022   • Sick sinus syndrome ([address] ) 2021   • Obesity, morbid ([address] ) 2021   • 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[facility] ) 2021   • Multiple thyroid nodules 2020   • Hypokalemia 2020   • Closed head injury 2020   • Heterozygous MTHFR mutation [id number] 2019   • Stage 3 chronic kidney disease ([facility] ) 2019   • Stage 3b chronic kidney disease ([facility] ) 2019      LOS (days): 4  Geometric Mean LOS (GMLOS) (days):   Days to GMLOS:     OBJECTIVE:  Risk of Unplanned Readmission Score: 33 84 Current admission status: Inpatient   Preferred Pharmacy:   [id number] - [id number], [address] [address]  Phone: [phone] Fax: [phone]    Primary Care Provider: Christina Cheung MD    Primary Insurance: [facility] [place] DUAL COMPLETE [facility]  Secondary Insurance:     DISCHARGE DETAILS:    Discharge planning discussed with[de-identified] sonLoan, at bedside  [place] of Choice: Yes (re: rehab/home care)  Comments - Freedom of Choice: son continues to decline rehab; confirmed preferred home care is through [address] contacted family/caregiver?: Yes (sonLoan, at bedside)  Were Treatment Team discharge recommendations reviewed with patient/caregiver?: Yes  Did patient/caregiver verbalize understanding of patient care needs?: Yes  Were patient/caregiver advised of the risks associated with not following Treatment Team discharge recommendations?: Yes    Contacts  Patient Contacts: simona Smith  Relationship to Patient[de-identified] Family  Contact Method: In Person  Reason/Outcome: Continuity of Care, Emergency Contact, Referral, [facility] [address]         Is the patient interested in [address] at discharge?: Yes  [address] requested[de-identified] Nursing, Occupational Therapy, Physical [address] Name[de-identified] [facility] Provider[de-identified] PCP  Home Health Services Needed[de-identified] Evaluate Functional Status and Safety, Gait/ADL Training, Strengthening/Theraputic Exercises to Improve Function  Homebound Criteria Met[de-identified] Requires the Assistance of Another Person for Safe Ambulation or to Leave the Home, Uses an Assist Device (i e  cane, walker, etc)  Supporting Clincal Findings[de-identified] Fatigues Easliy in [employer], Limited Endurance         Other Referral/Resources/Interventions Provided:  Interventions: [facility]  Referral Comments: Per MD, patient stable for d/c today  Met with patient's son, Loan Perez, at bedside to review same  Son does relay concerns about patient being ready for d/c, as he reports she's in the same condition now as when she brought him in  Reviewed recommendation for rehab, however son reports patient has been to rehab before and only gets limited treatments; feels as though services are more productive at home  Son aware that home health has been arranged with [facility], who he reports had just finished therapy prior to admission; son in agreement for them to restart services at discharge  IMM reviewed along with right to appeal d/c if he continues to want patient to remain in the hospital; son signed copy and relayed understanding of same  Anticipate d/c home with home care services at this time; son may appeal hospital d/c      Would you like to participate in our [address] service program?  : No - Declined    Treatment Team Recommendation: SNF, Short Term Rehab  Discharge Destination Plan[de-identified] Home with [address] [employer] [facility] [facility])  Transport at Discharge : Family                             IMM Given (Date):: 11/15/22

## 2022-11-16 ENCOUNTER — HOME CARE VISIT (OUTPATIENT)
Dept: HOME HEALTH SERVICES | Facility: HOME HEALTHCARE | Age: 77
End: 2022-11-16

## 2022-11-16 VITALS
HEART RATE: 63 BPM | DIASTOLIC BLOOD PRESSURE: 80 MMHG | SYSTOLIC BLOOD PRESSURE: 132 MMHG | RESPIRATION RATE: 20 BRPM | TEMPERATURE: 97.1 F | OXYGEN SATURATION: 92 %

## 2022-11-16 LAB
KAPPA LC FREE SER-MCNC: 100.3 MG/L (ref 3.3–19.4)
KAPPA LC FREE/LAMBDA FREE SER: 2.52 {RATIO} (ref 0.26–1.65)
LAMBDA LC FREE SERPL-MCNC: 39.8 MG/L (ref 5.7–26.3)

## 2022-11-16 NOTE — PLAN OF CARE
Problem: PAIN - ADULT  Goal: Verbalizes/displays adequate comfort level or baseline comfort level  Description: Interventions:  - Encourage patient to monitor pain and request assistance  - Assess pain using appropriate pain scale  - Administer analgesics based on type and severity of pain and evaluate response  - Implement non-pharmacological measures as appropriate and evaluate response  - Consider cultural and social influences on pain and pain management  - Notify physician/advanced practitioner if interventions unsuccessful or patient reports new pain  Outcome: Progressing     Problem: SAFETY ADULT  Goal: Patient will remain free of falls  Description: INTERVENTIONS:  - Educate patient/family on patient safety including physical limitations  - Instruct patient to call for assistance with activity   - Consult OT/PT to assist with strengthening/mobility   - Keep Call bell within reach  - Keep bed low and locked with side rails adjusted as appropriate  - Keep care items and personal belongings within reach  - Initiate and maintain comfort rounds  - Make Fall Risk Sign visible to staff  - Offer Toileting every  Hours, in advance of need  - Initiate/Maintain alarm  - Obtain necessary fall risk management equipment:   - Apply yellow socks and bracelet for high fall risk patients  - Consider moving patient to room near nurses station  Outcome: Progressing  Goal: Maintain or return to baseline ADL function  Description: INTERVENTIONS:  -  Assess patient's ability to carry out ADLs; assess patient's baseline for ADL function and identify physical deficits which impact ability to perform ADLs (bathing, care of mouth/teeth, toileting, grooming, dressing, etc )  - Assess/evaluate cause of self-care deficits   - Assess range of motion  - Assess patient's mobility; develop plan if impaired  - Assess patient's need for assistive devices and provide as appropriate  - Encourage maximum independence but intervene and supervise when necessary  - Involve family in performance of ADLs  - Assess for home care needs following discharge   - Consider OT consult to assist with ADL evaluation and planning for discharge  - Provide patient education as appropriate  Outcome: Progressing  Goal: Maintains/Returns to pre admission functional level  Description: INTERVENTIONS:  - Perform BMAT or MOVE assessment daily    - Set and communicate daily mobility goal to care team and patient/family/caregiver  - Collaborate with rehabilitation services on mobility goals if consulted  - Perform Range of Motion  times a day  - Reposition patient every  hours    - Dangle patient  times a day  - Stand patient  times a day  - Ambulate patient  times a day  - Out of bed to chair  times a day   - Out of bed for meals  times a day  - Out of bed for toileting  - Record patient progress and toleration of activity level   Outcome: Progressing     Problem: Prexisting or High Potential for Compromised Skin Integrity  Goal: Skin integrity is maintained or improved  Description: INTERVENTIONS:  - Identify patients at risk for skin breakdown  - Assess and monitor skin integrity  - Assess and monitor nutrition and hydration status  - Monitor labs   - Assess for incontinence   - Turn and reposition patient  - Assist with mobility/ambulation  - Relieve pressure over bony prominences  - Avoid friction and shearing  - Provide appropriate hygiene as needed including keeping skin clean and dry  - Evaluate need for skin moisturizer/barrier cream  - Collaborate with interdisciplinary team   - Patient/family teaching  - Consider wound care consult   Outcome: Progressing     Problem: MOBILITY - ADULT  Goal: Maintain or return to baseline ADL function  Description: INTERVENTIONS:  -  Assess patient's ability to carry out ADLs; assess patient's baseline for ADL function and identify physical deficits which impact ability to perform ADLs (bathing, care of mouth/teeth, toileting, grooming, dressing, etc )  - Assess/evaluate cause of self-care deficits   - Assess range of motion  - Assess patient's mobility; develop plan if impaired  - Assess patient's need for assistive devices and provide as appropriate  - Encourage maximum independence but intervene and supervise when necessary  - Involve family in performance of ADLs  - Assess for home care needs following discharge   - Consider OT consult to assist with ADL evaluation and planning for discharge  - Provide patient education as appropriate  Outcome: Progressing  Goal: Maintains/Returns to pre admission functional level  Description: INTERVENTIONS:  - Perform BMAT or MOVE assessment daily    - Set and communicate daily mobility goal to care team and patient/family/caregiver  - Collaborate with rehabilitation services on mobility goals if consulted  - Perform Range of Motion  times a day  - Reposition patient every  hours    - Dangle patient  times a day  - Stand patient  times a day  - Ambulate patient  times a day  - Out of bed to chair  times a day   - Out of bed for meals  times a day  - Out of bed for toileting  - Record patient progress and toleration of activity level   Outcome: Progressing     Problem: METABOLIC, FLUID AND ELECTROLYTES - ADULT  Goal: Electrolytes maintained within normal limits  Description: INTERVENTIONS:  - Monitor labs and assess patient for signs and symptoms of electrolyte imbalances  - Administer electrolyte replacement as ordered  - Monitor response to electrolyte replacements, including repeat lab results as appropriate  - Instruct patient on fluid and nutrition as appropriate  Outcome: Progressing  Goal: Fluid balance maintained  Description: INTERVENTIONS:  - Monitor labs   - Monitor I/O and WT  - Instruct patient on fluid and nutrition as appropriate  - Assess for signs & symptoms of volume excess or deficit  Outcome: Progressing  Goal: Glucose maintained within target range  Description: INTERVENTIONS:  - Monitor Blood Glucose as ordered  - Assess for signs and symptoms of hyperglycemia and hypoglycemia  - Administer ordered medications to maintain glucose within target range  - Assess nutritional intake and initiate nutrition service referral as needed  Outcome: Progressing     Problem: Nutrition/Hydration-ADULT  Goal: Nutrient/Hydration intake appropriate for improving, restoring or maintaining nutritional needs  Description: Monitor and assess patient's nutrition/hydration status for malnutrition  Collaborate with interdisciplinary team and initiate plan and interventions as ordered  Monitor patient's weight and dietary intake as ordered or per policy  Utilize nutrition screening tool and intervene as necessary  Determine patient's food preferences and provide high-protein, high-caloric foods as appropriate       INTERVENTIONS:  - Monitor oral intake, urinary output, labs, and treatment plans  - Assess nutrition and hydration status and recommend course of action  - Evaluate amount of meals eaten  - Assist patient with eating if necessary   - Allow adequate time for meals  - Recommend/ encourage appropriate diets, oral nutritional supplements, and vitamin/mineral supplements  - Order, calculate, and assess calorie counts as needed  - Recommend, monitor, and adjust tube feedings and TPN/PPN based on assessed needs  - Assess need for intravenous fluids  - Provide specific nutrition/hydration education as appropriate  - Include patient/family/caregiver in decisions related to nutrition  Outcome: Progressing     Problem: Potential for Falls  Goal: Patient will remain free of falls  Description: INTERVENTIONS:  - Educate patient/family on patient safety including physical limitations  - Instruct patient to call for assistance with activity   - Consult OT/PT to assist with strengthening/mobility   - Keep Call bell within reach  - Keep bed low and locked with side rails adjusted as appropriate  - Keep care items and personal belongings within reach  - Initiate and maintain comfort rounds  - Make Fall Risk Sign visible to staff  - Offer Toileting every  Hours, in advance of need  - Initiate/Maintain alarm  - Obtain necessary fall risk management equipment:   - Apply yellow socks and bracelet for high fall risk patients  - Consider moving patient to room near nurses station  Outcome: Progressing

## 2022-11-16 NOTE — UTILIZATION REVIEW
NOTIFICATION OF ADMISSION DISCHARGE   This is a Notification of Discharge from [facility]  Please be advised that this patient has been discharge from our facility  Below you will find the admission and discharge date and time including the patient’s disposition  UTILIZATION REVIEW CONTACT:  Carroll Fisher  Utilization   Network Utilization Review Department  Phone: [phone] x carefully listen to the prompts  All voicemails are confidential   Email: [email or web address]     ADMISSION INFORMATION  PRESENTATION DATE: 11/11/2022  9:40 PM  OBERVATION ADMISSION DATE:   INPATIENT ADMISSION DATE: 11/11/22 11:27 PM   DISCHARGE DATE: 11/15/2022  5:09 PM  DISPOSITION: Home/Self Care Home/Self Care      IMPORTANT INFORMATION:  Send all requests for admission clinical reviews, approved or denied determinations and any other requests to dedicated fax number below belonging to the campus where the patient is receiving treatment   List of dedicated fax numbers:  [address] DENIALS (Administrative/Medical Necessity) [phone]   [address] (Maternity/NICU/Pediatrics) [phone]   [facility] [phone]   [facility] [phone]   [address] [phone]   220 [facility] [phone] 90 [facility] [phone]   [address] [place] [address] 119 [phone]   [facility]  [phone]   4052 [facility] [phone]   412 [facility] [address] [facility] [phone]

## 2022-11-17 ENCOUNTER — TRANSITIONAL CARE MANAGEMENT (OUTPATIENT)
Dept: FAMILY MEDICINE CLINIC | Facility: CLINIC | Age: 77
End: 2022-11-17

## 2022-11-17 ENCOUNTER — TELEPHONE (OUTPATIENT)
Dept: FAMILY MEDICINE CLINIC | Facility: CLINIC | Age: 77
End: 2022-11-17

## 2022-11-17 ENCOUNTER — NURSE TRIAGE (OUTPATIENT)
Dept: OTHER | Facility: OTHER | Age: 77
End: 2022-11-17

## 2022-11-17 RX ORDER — RIVAROXABAN 15 MG/1
TABLET, FILM COATED ORAL
COMMUNITY
Start: 2022-10-26 | End: 2022-11-21

## 2022-11-17 NOTE — TELEPHONE ENCOUNTER
Reason for Disposition  • Very strange or paranoid behavior    Answer Assessment - Initial Assessment Questions  1  LEVEL OF CONSCIOUSNESS: "How is he (she, the patient) acting right now?" (e g , alert-oriented, confused, lethargic, stuporous, comatose)      Confused, hallucinating  2  ONSET: "When did the confusion start?"  (minutes, hours, days)      Last night  3  PATTERN "Does this come and go, or has it been constant since it started?"  "Is it present now?"      constant  4  ALCOHOL or DRUGS: "Has he been drinking alcohol or taking any drugs?"       Denies  5  NARCOTIC MEDICATIONS: "Has he been receiving any narcotic medications?" (e g , morphine, Vicodin)      Denies  6  CAUSE: "What do you think is causing the confusion?"       Unsure, recent hospitalization  7   OTHER SYMPTOMS: "Are there any other symptoms?" (e g , difficulty breathing, headache, fever, weakness)      Found down    Protocols used: CONFUSION - DELIRIUM-ADULT-

## 2022-11-17 NOTE — TELEPHONE ENCOUNTER
Regarding: dementia episode  ----- Message from Leela Myers sent at 11/17/2022 12:04 AM EST -----  Pt's son called, " my mom is having her firt episode of dementia and I don't know what to do "

## 2022-11-17 NOTE — TELEPHONE ENCOUNTER
Patient's son Diandra Walden called regarding his mom  He states that last month when she was in to see the doctor he told him that if she got any worse with her dementia to call him and he could prescribe something  Diandra Walden states that at the visit the doctor told him that she sometimes go through the day with nothing happening and then other times she could have hallucinations and become violent  He said yesterday she became agitated and did not sleep all day  He said his wife went to check on her and she had fallen off the chair onto the floor  He says two days ago she wasn't like this  He asks if the doctor could prescribe something for her as he mentioned in the visit    Please advise

## 2022-11-18 ENCOUNTER — HOME CARE VISIT (OUTPATIENT)
Dept: HOME HEALTH SERVICES | Facility: HOME HEALTHCARE | Age: 77
End: 2022-11-18

## 2022-11-18 ENCOUNTER — DOCUMENTATION (OUTPATIENT)
Dept: HEMATOLOGY ONCOLOGY | Facility: CLINIC | Age: 77
End: 2022-11-18

## 2022-11-18 ENCOUNTER — TELEPHONE (OUTPATIENT)
Dept: NEPHROLOGY | Facility: CLINIC | Age: 77
End: 2022-11-18

## 2022-11-18 VITALS — DIASTOLIC BLOOD PRESSURE: 62 MMHG | HEART RATE: 60 BPM | SYSTOLIC BLOOD PRESSURE: 122 MMHG | OXYGEN SATURATION: 95 %

## 2022-11-18 VITALS
RESPIRATION RATE: 18 BRPM | HEART RATE: 72 BPM | DIASTOLIC BLOOD PRESSURE: 70 MMHG | SYSTOLIC BLOOD PRESSURE: 132 MMHG | TEMPERATURE: 97.8 F | OXYGEN SATURATION: 94 %

## 2022-11-18 DIAGNOSIS — R44.3 HALLUCINATIONS: Primary | ICD-10-CM

## 2022-11-18 DIAGNOSIS — R53.1 WEAKNESS: ICD-10-CM

## 2022-11-18 DIAGNOSIS — N18.32 STAGE 3B CHRONIC KIDNEY DISEASE (HCC): Primary | ICD-10-CM

## 2022-11-18 NOTE — TELEPHONE ENCOUNTER
Spoke with patient's son, Jenny Mckeon, about the following, he expressed understanding and thanked us for the call  Patient scheduled with Dr Phuong Vivas on 12/19 @ 9:40a at [facility]    Son aware of date, time and location:      ----- Message from Jaleesa Chand MD sent at 11/17/2022  8:06 AM EST -----  The patient's light chain ratio slightly elevated  I do not know who she will be seeing she does need a follow-up appointment I think it was canceled for Dr Jeniffer Eden  She does need a hematology consultation regarding elevated light chain please determine who will be seeing the patient's that we can make the appropriate arrangements  Thank you  ----- Message -----  From: Lab, Background User  Sent: 11/16/2022   8:06 PM EST  To: Jaleesa Chand MD

## 2022-11-18 NOTE — TELEPHONE ENCOUNTER
S/w patient's son and he stated he will keep an eye on her but he thinks from the lack of sleep she's had and being in the hospital she was just very stressed  Stated she has been sleeping and taking her medications and he made an appointment for her early next week

## 2022-11-18 NOTE — PROGRESS NOTES
Patient was referred to Hem Onc by Nephrology for stage 3b chronic kidney disease  Patient being seen for abnormal CBC and elevated light chain ratio  Due to Dx on patients referral, no further action needed by Oncology Care Coordination

## 2022-11-20 ENCOUNTER — TELEPHONE (OUTPATIENT)
Dept: SURGICAL ONCOLOGY | Facility: CLINIC | Age: 77
End: 2022-11-20

## 2022-11-21 ENCOUNTER — OFFICE VISIT (OUTPATIENT)
Dept: FAMILY MEDICINE CLINIC | Facility: CLINIC | Age: 77
End: 2022-11-21

## 2022-11-21 ENCOUNTER — HOME CARE VISIT (OUTPATIENT)
Dept: HOME HEALTH SERVICES | Facility: HOME HEALTHCARE | Age: 77
End: 2022-11-21

## 2022-11-21 VITALS
SYSTOLIC BLOOD PRESSURE: 120 MMHG | BODY MASS INDEX: 32.6 KG/M2 | WEIGHT: 184 LBS | HEIGHT: 63 IN | DIASTOLIC BLOOD PRESSURE: 78 MMHG | RESPIRATION RATE: 16 BRPM | HEART RATE: 77 BPM | OXYGEN SATURATION: 93 %

## 2022-11-21 VITALS — DIASTOLIC BLOOD PRESSURE: 60 MMHG | OXYGEN SATURATION: 93 % | SYSTOLIC BLOOD PRESSURE: 104 MMHG | HEART RATE: 66 BPM

## 2022-11-21 DIAGNOSIS — E87.70 VOLUME OVERLOAD: ICD-10-CM

## 2022-11-21 DIAGNOSIS — N18.9 ACUTE KIDNEY INJURY SUPERIMPOSED ON CHRONIC KIDNEY DISEASE (HCC): ICD-10-CM

## 2022-11-21 DIAGNOSIS — N17.9 ACUTE KIDNEY INJURY SUPERIMPOSED ON CHRONIC KIDNEY DISEASE (HCC): ICD-10-CM

## 2022-11-21 RX ORDER — BUMETANIDE 1 MG/1
1 TABLET ORAL 2 TIMES DAILY
Qty: 60 TABLET | Refills: 0 | Status: SHIPPED | OUTPATIENT
Start: 2022-11-21 | End: 2022-12-21

## 2022-11-22 ENCOUNTER — APPOINTMENT (OUTPATIENT)
Dept: LAB | Facility: CLINIC | Age: 77
End: 2022-11-22

## 2022-11-22 ENCOUNTER — HOME CARE VISIT (OUTPATIENT)
Dept: HOME HEALTH SERVICES | Facility: HOME HEALTHCARE | Age: 77
End: 2022-11-22

## 2022-11-22 ENCOUNTER — APPOINTMENT (OUTPATIENT)
Dept: LAB | Facility: HOSPITAL | Age: 77
End: 2022-11-22
Attending: INTERNAL MEDICINE

## 2022-11-22 DIAGNOSIS — N18.32 STAGE 3B CHRONIC KIDNEY DISEASE (HCC): ICD-10-CM

## 2022-11-22 DIAGNOSIS — I10 PRIMARY HYPERTENSION: ICD-10-CM

## 2022-11-22 DIAGNOSIS — R44.3 HALLUCINATIONS: ICD-10-CM

## 2022-11-22 DIAGNOSIS — R53.1 WEAKNESS: ICD-10-CM

## 2022-11-22 LAB
ANION GAP SERPL CALCULATED.3IONS-SCNC: 15 MMOL/L (ref 4–13)
BUN SERPL-MCNC: 27 MG/DL (ref 5–25)
CALCIUM SERPL-MCNC: 8.6 MG/DL (ref 8.4–10.2)
CHLORIDE SERPL-SCNC: 98 MMOL/L (ref 96–108)
CO2 SERPL-SCNC: 29 MMOL/L (ref 21–32)
CREAT SERPL-MCNC: 2.15 MG/DL (ref 0.6–1.3)
CREAT UR-MCNC: 69 MG/DL
GFR SERPL CREATININE-BSD FRML MDRD: 21 ML/MIN/1.73SQ M
GLUCOSE SERPL-MCNC: 118 MG/DL (ref 65–140)
MICROALBUMIN UR-MCNC: 449 MG/L (ref 0–20)
MICROALBUMIN/CREAT 24H UR: 651 MG/G CREATININE (ref 0–30)
POTASSIUM SERPL-SCNC: 4 MMOL/L (ref 3.5–5.3)
SODIUM SERPL-SCNC: 142 MMOL/L (ref 135–147)

## 2022-11-23 ENCOUNTER — HOME CARE VISIT (OUTPATIENT)
Dept: HOME HEALTH SERVICES | Facility: HOME HEALTHCARE | Age: 77
End: 2022-11-23

## 2022-11-23 VITALS
TEMPERATURE: 97.6 F | HEART RATE: 62 BPM | SYSTOLIC BLOOD PRESSURE: 108 MMHG | DIASTOLIC BLOOD PRESSURE: 58 MMHG | RESPIRATION RATE: 16 BRPM | OXYGEN SATURATION: 91 %

## 2022-11-23 VITALS — DIASTOLIC BLOOD PRESSURE: 62 MMHG | HEART RATE: 77 BPM | OXYGEN SATURATION: 91 % | SYSTOLIC BLOOD PRESSURE: 138 MMHG

## 2022-11-24 LAB — BACTERIA UR CULT: ABNORMAL

## 2022-11-25 ENCOUNTER — HOME CARE VISIT (OUTPATIENT)
Dept: HOME HEALTH SERVICES | Facility: HOME HEALTHCARE | Age: 77
End: 2022-11-25

## 2022-11-25 ENCOUNTER — TELEPHONE (OUTPATIENT)
Dept: FAMILY MEDICINE CLINIC | Facility: CLINIC | Age: 77
End: 2022-11-25

## 2022-11-25 VITALS — OXYGEN SATURATION: 92 % | HEART RATE: 66 BPM | DIASTOLIC BLOOD PRESSURE: 60 MMHG | SYSTOLIC BLOOD PRESSURE: 130 MMHG

## 2022-11-25 DIAGNOSIS — N30.00 ACUTE CYSTITIS WITHOUT HEMATURIA: Primary | ICD-10-CM

## 2022-11-25 RX ORDER — CEPHALEXIN 500 MG/1
500 CAPSULE ORAL EVERY 12 HOURS SCHEDULED
Qty: 14 CAPSULE | Refills: 0 | Status: SHIPPED | OUTPATIENT
Start: 2022-11-25 | End: 2022-12-02

## 2022-11-25 NOTE — TELEPHONE ENCOUNTER
----- Message from Mayco Tavarez MD sent at 11/25/2022  3:38 PM EST -----  Il send over keflex for you

## 2022-11-28 ENCOUNTER — HOME CARE VISIT (OUTPATIENT)
Dept: HOME HEALTH SERVICES | Facility: HOME HEALTHCARE | Age: 77
End: 2022-11-28

## 2022-11-28 VITALS — DIASTOLIC BLOOD PRESSURE: 58 MMHG | SYSTOLIC BLOOD PRESSURE: 118 MMHG | OXYGEN SATURATION: 93 % | HEART RATE: 79 BPM

## 2022-11-29 ENCOUNTER — TELEPHONE (OUTPATIENT)
Dept: FAMILY MEDICINE CLINIC | Facility: CLINIC | Age: 77
End: 2022-11-29

## 2022-11-29 ENCOUNTER — HOME CARE VISIT (OUTPATIENT)
Dept: HOME HEALTH SERVICES | Facility: HOME HEALTHCARE | Age: 77
End: 2022-11-29

## 2022-11-29 VITALS — SYSTOLIC BLOOD PRESSURE: 145 MMHG | OXYGEN SATURATION: 98 % | DIASTOLIC BLOOD PRESSURE: 70 MMHG | HEART RATE: 66 BPM

## 2022-11-29 NOTE — TELEPHONE ENCOUNTER
Pt's son called and stated pt was here on 11/21/22 and they discussed with Dr Homero Beck the possibility of being prescribed medication that would help pt calm down if she was having an episode due to her Dementia  Pt's son mentioned that Dr Homero Beck was going to reach out to the Neurologist she saw in the hospital  Please advise son if medication can be prescribed

## 2022-11-30 ENCOUNTER — HOME CARE VISIT (OUTPATIENT)
Dept: HOME HEALTH SERVICES | Facility: HOME HEALTHCARE | Age: 77
End: 2022-11-30

## 2022-12-01 ENCOUNTER — HOME CARE VISIT (OUTPATIENT)
Dept: HOME HEALTH SERVICES | Facility: HOME HEALTHCARE | Age: 77
End: 2022-12-01

## 2022-12-01 VITALS
DIASTOLIC BLOOD PRESSURE: 78 MMHG | HEART RATE: 61 BPM | SYSTOLIC BLOOD PRESSURE: 122 MMHG | RESPIRATION RATE: 16 BRPM | TEMPERATURE: 97.8 F

## 2022-12-02 ENCOUNTER — HOME CARE VISIT (OUTPATIENT)
Dept: HOME HEALTH SERVICES | Facility: HOME HEALTHCARE | Age: 77
End: 2022-12-02

## 2022-12-02 VITALS — HEART RATE: 67 BPM | SYSTOLIC BLOOD PRESSURE: 120 MMHG | DIASTOLIC BLOOD PRESSURE: 60 MMHG | OXYGEN SATURATION: 95 %

## 2022-12-02 VITALS — DIASTOLIC BLOOD PRESSURE: 60 MMHG | SYSTOLIC BLOOD PRESSURE: 108 MMHG | HEART RATE: 68 BPM

## 2022-12-02 DIAGNOSIS — I68.0 CEREBRAL AMYLOID ANGIOPATHY (HCC): Primary | ICD-10-CM

## 2022-12-02 DIAGNOSIS — E85.4 CEREBRAL AMYLOID ANGIOPATHY (HCC): Primary | ICD-10-CM

## 2022-12-02 RX ORDER — LORAZEPAM 0.5 MG/1
0.5 TABLET ORAL DAILY PRN
Qty: 30 TABLET | Refills: 0 | Status: SHIPPED | OUTPATIENT
Start: 2022-12-02

## 2022-12-02 NOTE — TELEPHONE ENCOUNTER
Yeah I did reach out to them but they never replied back so at this time I am unsure what would be ok with them     I would still say that if she is very agitated then ativan would be helpful   Ill send some now

## 2022-12-05 ENCOUNTER — HOME CARE VISIT (OUTPATIENT)
Dept: HOME HEALTH SERVICES | Facility: HOME HEALTHCARE | Age: 77
End: 2022-12-05

## 2022-12-06 ENCOUNTER — HOME CARE VISIT (OUTPATIENT)
Dept: HOME HEALTH SERVICES | Facility: HOME HEALTHCARE | Age: 77
End: 2022-12-06

## 2022-12-06 ENCOUNTER — TELEPHONE (OUTPATIENT)
Dept: FAMILY MEDICINE CLINIC | Facility: CLINIC | Age: 77
End: 2022-12-06

## 2022-12-06 VITALS
HEART RATE: 72 BPM | SYSTOLIC BLOOD PRESSURE: 142 MMHG | TEMPERATURE: 97.6 F | DIASTOLIC BLOOD PRESSURE: 64 MMHG | RESPIRATION RATE: 18 BRPM | OXYGEN SATURATION: 95 %

## 2022-12-06 VITALS — SYSTOLIC BLOOD PRESSURE: 102 MMHG | DIASTOLIC BLOOD PRESSURE: 50 MMHG

## 2022-12-06 NOTE — TELEPHONE ENCOUNTER
Zenobia from [facility]  called regarding pt  She said pt has two new stage two pressure ulcers - One on her right buttock and the other Sacrum  Nurse advised daughter to get calmsettin cream and she will apply today  They also want to increase her visits 2 x week for 3 weeks

## 2022-12-07 ENCOUNTER — HOME CARE VISIT (OUTPATIENT)
Dept: HOME HEALTH SERVICES | Facility: HOME HEALTHCARE | Age: 77
End: 2022-12-07

## 2022-12-07 VITALS — DIASTOLIC BLOOD PRESSURE: 60 MMHG | SYSTOLIC BLOOD PRESSURE: 140 MMHG | HEART RATE: 75 BPM | OXYGEN SATURATION: 97 %

## 2022-12-08 ENCOUNTER — HOME CARE VISIT (OUTPATIENT)
Dept: HOME HEALTH SERVICES | Facility: HOME HEALTHCARE | Age: 77
End: 2022-12-08

## 2022-12-08 VITALS — DIASTOLIC BLOOD PRESSURE: 48 MMHG | SYSTOLIC BLOOD PRESSURE: 108 MMHG | OXYGEN SATURATION: 93 % | HEART RATE: 66 BPM

## 2022-12-08 DIAGNOSIS — I10 HYPERTENSION: ICD-10-CM

## 2022-12-08 NOTE — TELEPHONE ENCOUNTER
Sandy Monie is not a patient of the \[facility]\"" office  No longer under the care of Dr Tamiko Higgins  Will route to PCP

## 2022-12-09 ENCOUNTER — HOME CARE VISIT (OUTPATIENT)
Dept: HOME HEALTH SERVICES | Facility: HOME HEALTHCARE | Age: 77
End: 2022-12-09

## 2022-12-09 VITALS — OXYGEN SATURATION: 98 % | HEART RATE: 70 BPM

## 2022-12-09 RX ORDER — AMLODIPINE BESYLATE 5 MG/1
TABLET ORAL
Qty: 90 TABLET | Refills: 1 | Status: SHIPPED | OUTPATIENT
Start: 2022-12-09

## 2022-12-12 ENCOUNTER — HOME CARE VISIT (OUTPATIENT)
Dept: HOME HEALTH SERVICES | Facility: HOME HEALTHCARE | Age: 77
End: 2022-12-12

## 2022-12-12 VITALS — SYSTOLIC BLOOD PRESSURE: 112 MMHG | DIASTOLIC BLOOD PRESSURE: 50 MMHG

## 2022-12-12 VITALS
SYSTOLIC BLOOD PRESSURE: 120 MMHG | OXYGEN SATURATION: 97 % | RESPIRATION RATE: 16 BRPM | TEMPERATURE: 97.8 F | HEART RATE: 64 BPM | DIASTOLIC BLOOD PRESSURE: 60 MMHG

## 2022-12-13 ENCOUNTER — HOME CARE VISIT (OUTPATIENT)
Dept: HOME HEALTH SERVICES | Facility: HOME HEALTHCARE | Age: 77
End: 2022-12-13

## 2022-12-13 VITALS
OXYGEN SATURATION: 93 % | RESPIRATION RATE: 18 BRPM | DIASTOLIC BLOOD PRESSURE: 62 MMHG | SYSTOLIC BLOOD PRESSURE: 118 MMHG | TEMPERATURE: 97.2 F | HEART RATE: 64 BPM

## 2022-12-14 ENCOUNTER — HOME CARE VISIT (OUTPATIENT)
Dept: HOME HEALTH SERVICES | Facility: HOME HEALTHCARE | Age: 77
End: 2022-12-14

## 2022-12-14 VITALS — DIASTOLIC BLOOD PRESSURE: 52 MMHG | OXYGEN SATURATION: 92 % | HEART RATE: 88 BPM | SYSTOLIC BLOOD PRESSURE: 118 MMHG

## 2022-12-16 ENCOUNTER — HOME CARE VISIT (OUTPATIENT)
Dept: HOME HEALTH SERVICES | Facility: HOME HEALTHCARE | Age: 77
End: 2022-12-16

## 2022-12-16 ENCOUNTER — TELEPHONE (OUTPATIENT)
Dept: NEPHROLOGY | Facility: CLINIC | Age: 77
End: 2022-12-16

## 2022-12-16 VITALS
DIASTOLIC BLOOD PRESSURE: 57 MMHG | HEART RATE: 62 BPM | RESPIRATION RATE: 16 BRPM | SYSTOLIC BLOOD PRESSURE: 127 MMHG | TEMPERATURE: 98 F | OXYGEN SATURATION: 93 %

## 2022-12-16 NOTE — PROGRESS NOTES
OFFICE FOLLOW UP - Nephrology   Mark Lambert 68 y o  female MRN: [id number]       ASSESSMENT/PLAN:  1  CKD IV: baseline creatinine 1 8-2 5   · Repeat labs 11/22:   · Creatinine stable at 2 15 with eGFR 21   · microalbumin creatinine ratio 651mg  · Previously on Ace-I but stopped during prior episode of acute kidney injury in February 2022  2  Volume overload: started on bumex 1mg bid in the hospital  Currently appears euvolemic   · Continue to follow daily weights at home and call if losing more than 3 pounds in a day or 5 pounds in a week so we can decrease diuretic  · ECHO: EF 43%, normal diastolic dysfunction   3  Hypertension: Goal blood pressure a little low in our office  Typically 115-120s at home  · Decrease amlodipine to 2 5 mg daily  4  L renal atrophy: seen on renal ultrasound   5  Anemia: last hgb 8   · No monoclonal gammopathy   · FLC ratio 2 5--suspect this is elevated due to CKD but previously referred to heme/onc   6  DM II     Follow up in 3-4 months with Dr Garg Apt:  Patient Instructions   • Decrease amlodipine to 2 5mg daily   • Avoid all NSAIDs (ibuprofen, Motrin, Aleve, Advil, Naproxen   )   • Call if blood pressure consistently more than 140/90 or less than 110/60s  • Low sodium diet   • Please call if you gain or lose more than 3lbs in a day or 5lbs in a week  • Follow up in 3-4 months with repeat labs prior       HPI: Mark Lambert is a 68 y o  female who is here for hospital follow up  Patient admitted from 11/11-11/15 with LE edema, SOB and decreased urine output  Chest x-ray on admission showed bilateral pleural effusions and appeared volume overloaded so she was started on Bumex  Volume status improved and discharged on oral diuretics  Since discharge patient has been feeling well  She is working with therapy  She denies any more edema or shortness of breath  Weight has been trending down a bit as she is not eating very well    Blood pressure running on the lower side at home, typically 115-120 when therapy is there  ROS:   A complete review of systems was done  Pertinent positives and negatives as noted in the HPI, otherwise the review of systems is negative  Allergies: Patient has no known allergies      Medications:   Current Outpatient Medications:   •  acetaminophen (TYLENOL) 325 mg tablet, Take 3 tablets (975 mg total) by mouth every 8 (eight) hours, Disp: , Rfl: 0  •  amLODIPine (NORVASC) 2 5 mg tablet, Take 1 tablet (2 5 mg total) by mouth daily, Disp: 30 tablet, Rfl: 3  •  aspirin 81 mg chewable tablet, Chew 81 mg daily, Disp: , Rfl:   •  atorvastatin (LIPITOR) 40 mg tablet, Take 1 tablet (40 mg total) by mouth every evening, Disp: 90 tablet, Rfl: 1  •  B-D UF III MINI PEN NEEDLES 31G X 5 MM MISC, Use to test qd, Disp: 100 each, Rfl: 11  •  bumetanide (BUMEX) 1 mg tablet, Take 1 tablet (1 mg total) by mouth 2 (two) times a day, Disp: 60 tablet, Rfl: 0  •  carvedilol (COREG) 25 mg tablet, Take 1 tablet (25 mg total) by mouth every 12 (twelve) hours, Disp: 180 tablet, Rfl: 1  •  Cholecalciferol (Vitamin D) 50 MCG (2000 UT) tablet, Take 1 tablet (2,000 Units total) by mouth daily, Disp: 90 tablet, Rfl: 1  •  divalproex sodium (DEPAKOTE) 250 mg EC tablet, Take 1 tablet (250 mg total) by mouth every 12 (twelve) hours, Disp: 180 tablet, Rfl: 1  •  fluticasone (FLONASE) 50 mcg/act nasal spray, 1 spray into each nostril daily, Disp: 9 9 mL, Rfl: 0  •  folic acid (FOLVITE) 794 mcg tablet, Take 400 mcg by mouth daily, Disp: , Rfl:   •  Lantus SoloStar 100 units/mL SOPN, Inject 0 12 mL (12 Units total) under the skin daily at bedtime, Disp: 12 mL, Rfl: 1  •  meclizine (ANTIVERT) 12 5 MG tablet, Take 1 tablet (12 5 mg total) by mouth every 8 (eight) hours as needed for dizziness, Disp: 30 tablet, Rfl: 0  •  ranolazine (RANEXA) 500 mg 12 hr tablet, Take 1 tablet (500 mg total) by mouth daily, Disp: 90 tablet, Rfl: 1  •  LORazepam (Ativan) 0 5 mg tablet, Take 1 tablet (0 5 mg total) by mouth daily as needed (agitation) (Patient not taking: Reported on 2022), Disp: 30 tablet, Rfl: 0    Past Medical History:   Diagnosis Date   • [facility]    • Chronic anemia    • Chronic kidney disease    • Colonoscopy refused 2019    pt declines   • Dyslipidemia    • Hypertension    • Mammogram declined 2017   • Proteinuria    • 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     Past Surgical History:   Procedure Laterality Date   • CARDIAC PACEMAKER PLACEMENT     • CATARACT EXTRACTION     •  SECTION     • CHOLECYSTECTOMY     • CORONARY ANGIOPLASTY WITH STENT PLACEMENT     • EYE SURGERY     • MAMMO (HISTORICAL)      Pt states she will not ever have again-    • MT OPEN RX FEMUR FX+INTRAMED RIMA Left 2022    Procedure: INSERTION NAIL IM FEMUR ANTEGRADE (TROCHANTERIC); Surgeon: Young Gutierrez DO;  [facility];  Service: Orthopedics   • TUBAL LIGATION       Family History   Problem Relation Age of Onset   • Diabetes Father    • Hypertension Father    • Heart disease Father    • Cancer Brother         smoker   • Diabetes Other    • Heart attack Other    • Hypertension Other    • Asthma Other       reports that she has never smoked  She has never used smokeless tobacco  She reports that she does not drink alcohol and does not use drugs  Physical Exam:   Vitals:    22 0830   BP: 110/50   BP Location: Left arm   Patient Position: Sitting   Cuff Size: Large   Pulse: 76   SpO2: 94%   Weight: 71 2 kg (157 lb)   Height: 5' 3" (1 6 m)     Body mass index is 27 81 kg/m²      General: no acute distress, in wheelchair   Eyes: conjunctivae pink, anicteric sclerae  ENT: mucous membranes moist  Neck: supple, no JVD  Chest: clear to auscultation bilaterally with no wheezes, rale or rhochi  CVS: regular rate and rhythm   Abdomen: soft, non-tender, non-distended  Extremities: no lower extremity edema   Skin: no rash  Neuro: awake and alert Lab Results:  Results for orders placed or performed in visit on 44/70/44   Basic metabolic panel   Result Value Ref Range    Sodium 142 135 - 147 mmol/L    Potassium 4 0 3 5 - 5 3 mmol/L    Chloride 98 96 - 108 mmol/L    CO2 29 21 - 32 mmol/L    ANION GAP 15 (H) 4 - 13 mmol/L    BUN 27 (H) 5 - 25 mg/dL    Creatinine 2 15 (H) 0 60 - 1 30 mg/dL    Glucose 118 65 - 140 mg/dL    Calcium 8 6 8 4 - 10 2 mg/dL    eGFR 21 ml/min/1 73sq m             Invalid input(s): ALBUMIN      Portions of the record may have been created with voice recognition software  Occasional wrong word or "sound a like" substitutions may have occurred due to the inherent limitations of voice recognition software  Read the chart carefully and recognize, using context, where substitutions have occurred  If you have any questions, please contact the dictating provider

## 2022-12-16 NOTE — TELEPHONE ENCOUNTER
Appointment Confirmation   Person confirmed appointment with  If not patient, name of the person Brenton Pepe patients son    Date and time of appointment 12/19   Patient acknowledged and will be at appointment?  yes   Did you advise the patient that they will need a urine sample if they are a new patient? no   Did you advise the patient to bring their current medications for verification? (including any OTC) yes   Additional Information

## 2022-12-16 NOTE — TELEPHONE ENCOUNTER
Appointment Confirmation   Person confirmed appointment with  If not patient, name of the person Patient    simona Thompson   Date and time of appointment 12/19/22  8:20 am   Patient acknowledged and will be at appointment? yes    Did you advise the patient that they will need a urine sample if they are a new patient?  N/A    Did you advise the patient to bring their current medications for verification? (including any OTC) Yes    Additional Information

## 2022-12-19 ENCOUNTER — CONSULT (OUTPATIENT)
Dept: HEMATOLOGY ONCOLOGY | Facility: CLINIC | Age: 77
End: 2022-12-19

## 2022-12-19 ENCOUNTER — TELEPHONE (OUTPATIENT)
Dept: HEMATOLOGY ONCOLOGY | Facility: CLINIC | Age: 77
End: 2022-12-19

## 2022-12-19 ENCOUNTER — OFFICE VISIT (OUTPATIENT)
Dept: NEPHROLOGY | Facility: CLINIC | Age: 77
End: 2022-12-19

## 2022-12-19 VITALS
BODY MASS INDEX: 27.82 KG/M2 | RESPIRATION RATE: 16 BRPM | HEIGHT: 63 IN | HEART RATE: 76 BPM | OXYGEN SATURATION: 94 % | WEIGHT: 157 LBS | SYSTOLIC BLOOD PRESSURE: 110 MMHG | DIASTOLIC BLOOD PRESSURE: 50 MMHG

## 2022-12-19 VITALS
DIASTOLIC BLOOD PRESSURE: 50 MMHG | BODY MASS INDEX: 27.82 KG/M2 | WEIGHT: 157 LBS | HEIGHT: 63 IN | OXYGEN SATURATION: 94 % | SYSTOLIC BLOOD PRESSURE: 110 MMHG | HEART RATE: 76 BPM

## 2022-12-19 DIAGNOSIS — N18.32 STAGE 3B CHRONIC KIDNEY DISEASE (HCC): ICD-10-CM

## 2022-12-19 DIAGNOSIS — D50.0 IRON DEFICIENCY ANEMIA DUE TO CHRONIC BLOOD LOSS: Primary | ICD-10-CM

## 2022-12-19 DIAGNOSIS — I10 HYPERTENSION: ICD-10-CM

## 2022-12-19 DIAGNOSIS — N18.4 ANEMIA OF CHRONIC KIDNEY FAILURE, STAGE 4 (SEVERE) (HCC): ICD-10-CM

## 2022-12-19 DIAGNOSIS — I10 PRIMARY HYPERTENSION: ICD-10-CM

## 2022-12-19 DIAGNOSIS — D63.1 ANEMIA OF CHRONIC KIDNEY FAILURE, STAGE 4 (SEVERE) (HCC): ICD-10-CM

## 2022-12-19 DIAGNOSIS — N17.9 ACUTE KIDNEY INJURY SUPERIMPOSED ON CHRONIC KIDNEY DISEASE (HCC): ICD-10-CM

## 2022-12-19 DIAGNOSIS — N18.9 ACUTE KIDNEY INJURY SUPERIMPOSED ON CHRONIC KIDNEY DISEASE (HCC): ICD-10-CM

## 2022-12-19 DIAGNOSIS — N18.4 CKD (CHRONIC KIDNEY DISEASE), STAGE IV (HCC): Primary | ICD-10-CM

## 2022-12-19 DIAGNOSIS — E87.70 HYPERVOLEMIA, UNSPECIFIED HYPERVOLEMIA TYPE: ICD-10-CM

## 2022-12-19 RX ORDER — SODIUM CHLORIDE 9 MG/ML
20 INJECTION, SOLUTION INTRAVENOUS ONCE
OUTPATIENT
Start: 2023-01-03

## 2022-12-19 RX ORDER — AMLODIPINE BESYLATE 2.5 MG/1
2.5 TABLET ORAL DAILY
Qty: 30 TABLET | Refills: 3 | Status: SHIPPED | OUTPATIENT
Start: 2022-12-19

## 2022-12-19 NOTE — TELEPHONE ENCOUNTER
Patient was scheduled at [facility] due to availability  Follow up with Cheri Painter was scheduled  Please call patient and confirm         Thanks!

## 2022-12-19 NOTE — TELEPHONE ENCOUNTER
While we try to accommodate patient requests, our priority is to schedule treatment according to Doctor's orders and site availability  Does the Provider use the intake sheet or checkout note? What would be a preferred day of the week that would work best for your infusion appointment? Friday  Do you prefer mornings or afternoons for your appointments? Morning   Are there any days or dates that do not work for your schedule, including any upcoming vacations? N/A   We are going to try our best to schedule you at the infusion center closest to your home  In the event that we are unable to what would be your next preferred infusion site or sites? 1  [facility]  2  [facility]       Do you have transportation to take you to all of your appointments?  Yes  Would you like the infusion center to draw labs from your port? (disregard if patient doesn't have a port or need labs for infusion appointment)

## 2022-12-19 NOTE — TELEPHONE ENCOUNTER
Di Centeno (on SL Communication Consent 08/05/22) calling to confirm location of patients appointment today with Dr Anastacia Euceda at the [facility]  Confirmed address with Mustapha Mcleod who voiced understanding

## 2022-12-19 NOTE — TELEPHONE ENCOUNTER
Follow-up disposition: Return in about 3 months (around 3/19/2023) for with pa  Patient needs to be scheduled for treatment and f/u

## 2022-12-19 NOTE — PROGRESS NOTES
Oncology Consult Note  Geoffrey Shanks 68 y o  female MRN: [id number]  Unit/Bed#:  Encounter: [id number]      Presenting Complaint: Multifactorial anemia    History of Presenting Illness: Geoffrey Shanks is seen for initial consultation 12/19/2022 at the referral of Filiberto Nur MD   49-year-old  female with history of congestive heart failure, diabetes mellitus type 2, status post pacer, dementia, chronic kidney disease from at least 2017, she had been with anemia since at least 2017 with a hemoglobin of 9 9, the patient admitted to the hospital in June 2022 with fracture of the left hip status post ORIF, subsequently she received blood transfusion, she was admitted again in November 2022 with exacerbation of congestive heart failure, fluid overload status post IV diuresis    As work-up by nephrology she was found to have abnormal kappa and lambda light chain with kappa light chain 100, lambda light chain of 43 with a ratio of 2 4, CMP showed chronic kidney disease, normal albumin, total protein    She has a family history of anemia    She does not smoke or drink    She has a sedentary lifestyle    After IV diuresis she feels much      Review of Systems - As stated in the HPI otherwise the fourteen point review of systems was negative  Past Medical History:   Diagnosis Date   • Atrial fibrillation [facility])    • Chronic anemia    • Chronic kidney disease    • Colonoscopy refused 11/2019    pt declines   • Dyslipidemia    • Hypertension    • Mammogram declined 09/2017   • Proteinuria    • Tachy-sarah beth syndrome ([facility] [address] ) 6/24/2022       Social History     Socioeconomic History   • Marital status:       Spouse name: None   • Number of children: None   • Years of education: None   • Highest education level: None   Occupational History   • Occupation: retired   Tobacco Use   • Smoking status: Never   • Smokeless tobacco: Never   Vaping Use   • Vaping Use: Never used   Substance and Sexual Activity   • Alcohol use: Never   • Drug use: Never   • Sexual activity: Not Currently   Other Topics Concern   • None   Social History Narrative    ** Merged History Encounter **         Most recent tobacco use screenin2019  Do you currently or have you served in the [employer]: No  Were you activated, into active duty, as a member of the [employer] or as a Reservist: No  Marital status:   Exercise level: None  D    iet: Regular  General stress level: Low  Alcohol intake: None  Caffeine intake: Moderate  Chewing tobacco: none  Illicit drugs: none  Seat belts used routinely: Yes  Sunscreen used routinely: Yes  Smoke alarm in home: Yes  Advance directive: No  Salt Int    juan antonio: minimal  Has the Patient had a mammogram to screen for breast cancer within 24 months: No  2019 - declines mammogram   Is the patient interested in a colorectal cancer screening: No  2019 - patient declines colonoscopy's     Social Determinants of Health     Financial Resource Strain: Low Risk    • Difficulty of Paying Living Expenses: Not hard at all   Food Insecurity: No Food Insecurity   • Worried About [email or web address] in the Last Year: Never true   • Ran Out of Food in the Last Year: Never true   Transportation Needs: No Transportation Needs   • Lack of Transportation (Medical): No   • Lack of Transportation (Non-Medical):  No   Physical Activity: Not on file   Stress: Not on file   Social Connections: Not on file   Intimate Partner Violence: Not on file   Housing Stability: Low Risk    • Unable to Pay for Housing in the Last Year: No   • Number of Places Lived in the Last Year: 1   • Unstable Housing in the Last Year: No       Family History   Problem Relation Age of Onset   • Diabetes Father    • Hypertension Father    • Heart disease Father    • Cancer Brother         smoker   • Diabetes Other    • Heart attack Other    • Hypertension Other    • Asthma Other        No Known Allergies      Current Outpatient Medications:   •  acetaminophen (TYLENOL) 325 mg tablet, Take 3 tablets (975 mg total) by mouth every 8 (eight) hours, Disp: , Rfl: 0  •  amLODIPine (NORVASC) 2 5 mg tablet, Take 1 tablet (2 5 mg total) by mouth daily, Disp: 30 tablet, Rfl: 3  •  aspirin 81 mg chewable tablet, Chew 81 mg daily, Disp: , Rfl:   •  atorvastatin (LIPITOR) 40 mg tablet, Take 1 tablet (40 mg total) by mouth every evening, Disp: 90 tablet, Rfl: 1  •  B-D UF III MINI PEN NEEDLES 31G X 5 MM MISC, Use to test qd, Disp: 100 each, Rfl: 11  •  bumetanide (BUMEX) 1 mg tablet, Take 1 tablet (1 mg total) by mouth 2 (two) times a day, Disp: 60 tablet, Rfl: 0  •  carvedilol (COREG) 25 mg tablet, Take 1 tablet (25 mg total) by mouth every 12 (twelve) hours, Disp: 180 tablet, Rfl: 1  •  Cholecalciferol (Vitamin D) 50 MCG (2000 UT) tablet, Take 1 tablet (2,000 Units total) by mouth daily, Disp: 90 tablet, Rfl: 1  •  divalproex sodium (DEPAKOTE) 250 mg EC tablet, Take 1 tablet (250 mg total) by mouth every 12 (twelve) hours, Disp: 180 tablet, Rfl: 1  •  fluticasone (FLONASE) 50 mcg/act nasal spray, 1 spray into each nostril daily, Disp: 9 9 mL, Rfl: 0  •  folic acid (FOLVITE) 509 mcg tablet, Take 400 mcg by mouth daily, Disp: , Rfl:   •  Lantus SoloStar 100 units/mL SOPN, Inject 0 12 mL (12 Units total) under the skin daily at bedtime, Disp: 12 mL, Rfl: 1  •  meclizine (ANTIVERT) 12 5 MG tablet, Take 1 tablet (12 5 mg total) by mouth every 8 (eight) hours as needed for dizziness, Disp: 30 tablet, Rfl: 0  •  ranolazine (RANEXA) 500 mg 12 hr tablet, Take 1 tablet (500 mg total) by mouth daily, Disp: 90 tablet, Rfl: 1      /50   Pulse 76   Resp 16   Ht 5' 3" (1 6 m) Comment: wheelchair  Wt 71 2 kg (157 lb) Comment: wheelchair  SpO2 94%   BMI 27 81 kg/m²       General Appearance:   She is blind, she is on wheelchair       Eyes:    PERRL   Ears:    Normal external ear canals, both ears   Nose:   Nares normal, septum midline   Throat:   Mucosa moist  Pharynx without injection  Neck:   Supple       Lungs:     Clear to auscultation bilaterally   Chest Wall:   Distant heart sound    Heart:    Regular rate and rhythm       Abdomen:     Soft, non-tender, bowel sounds +, no organomegaly           Extremities:   Extremities no cyanosis, +1 edema       Skin:   no rash or icterus  Lymph nodes:   Cervical, supraclavicular, and axillary nodes normal   Neurologic:                  No results found for this or any previous visit (from the past 50 hour(s))  No results found    ECOG :3      Assessment and plan:  59-year-old  female with multiple medical history including congestive heart failure, diabetes mellitus type 2, hypertension, dementia, left hip fracture status post ORIF in June 2022 with subsequent transfusion    Congestive heart failure exacerbation in November 2022, she was found to have normocytic anemia however ferritin below 100, chronic kidney disease grade 4    At this time we will try to give Venofer 300 g IV x3 doses hoping to improve the hemoglobin, no need for bone marrow biopsy because of elevation of kappa and lambda light chain and elevation of ratio with performance status of 3    Venofer 300 g IV x3 doses and follow-up in 3-month with CBC, CMP, free light chain, quantitative immunoglobulins

## 2022-12-19 NOTE — PATIENT INSTRUCTIONS
Decrease amlodipine to 2 5mg daily   Avoid all NSAIDs (ibuprofen, Motrin, Aleve, Advil, Naproxen   )   Call if blood pressure consistently more than 140/90 or less than 110/60s  Low sodium diet   Please call if you gain or lose more than 3lbs in a day or 5lbs in a week     Follow up in 3-4 months with repeat labs prior

## 2022-12-21 ENCOUNTER — HOME CARE VISIT (OUTPATIENT)
Dept: HOME HEALTH SERVICES | Facility: HOME HEALTHCARE | Age: 77
End: 2022-12-21

## 2022-12-21 VITALS
DIASTOLIC BLOOD PRESSURE: 68 MMHG | HEART RATE: 65 BPM | RESPIRATION RATE: 16 BRPM | SYSTOLIC BLOOD PRESSURE: 118 MMHG | TEMPERATURE: 98.1 F | OXYGEN SATURATION: 97 %

## 2022-12-28 ENCOUNTER — HOME CARE VISIT (OUTPATIENT)
Dept: HOME HEALTH SERVICES | Facility: HOME HEALTHCARE | Age: 77
End: 2022-12-28

## 2022-12-28 VITALS
DIASTOLIC BLOOD PRESSURE: 68 MMHG | HEART RATE: 63 BPM | RESPIRATION RATE: 16 BRPM | SYSTOLIC BLOOD PRESSURE: 125 MMHG | OXYGEN SATURATION: 94 % | TEMPERATURE: 97.8 F

## 2023-01-04 ENCOUNTER — HOME CARE VISIT (OUTPATIENT)
Dept: HOME HEALTH SERVICES | Facility: HOME HEALTHCARE | Age: 78
End: 2023-01-04

## 2023-01-05 VITALS — OXYGEN SATURATION: 96 % | TEMPERATURE: 98.2 F | HEART RATE: 65 BPM | RESPIRATION RATE: 16 BRPM

## 2023-01-06 ENCOUNTER — HOSPITAL ENCOUNTER (OUTPATIENT)
Dept: INFUSION CENTER | Facility: HOSPITAL | Age: 78
End: 2023-01-06

## 2023-01-06 ENCOUNTER — TELEPHONE (OUTPATIENT)
Dept: NEUROLOGY | Facility: CLINIC | Age: 78
End: 2023-01-06

## 2023-01-06 VITALS
TEMPERATURE: 97 F | DIASTOLIC BLOOD PRESSURE: 53 MMHG | SYSTOLIC BLOOD PRESSURE: 105 MMHG | RESPIRATION RATE: 18 BRPM | OXYGEN SATURATION: 98 % | HEART RATE: 73 BPM

## 2023-01-06 DIAGNOSIS — E87.70 HYPERVOLEMIA, UNSPECIFIED HYPERVOLEMIA TYPE: ICD-10-CM

## 2023-01-06 DIAGNOSIS — D50.0 IRON DEFICIENCY ANEMIA DUE TO CHRONIC BLOOD LOSS: Primary | ICD-10-CM

## 2023-01-06 RX ORDER — SODIUM CHLORIDE 9 MG/ML
20 INJECTION, SOLUTION INTRAVENOUS ONCE
Status: COMPLETED | OUTPATIENT
Start: 2023-01-06 | End: 2023-01-06

## 2023-01-06 RX ORDER — SODIUM CHLORIDE 9 MG/ML
20 INJECTION, SOLUTION INTRAVENOUS ONCE
Status: CANCELLED | OUTPATIENT
Start: 2023-01-13

## 2023-01-06 RX ADMIN — SODIUM CHLORIDE 20 ML/HR: 0.9 INJECTION, SOLUTION INTRAVENOUS at 11:04

## 2023-01-06 RX ADMIN — IRON SUCROSE 300 MG: 20 INJECTION, SOLUTION INTRAVENOUS at 11:03

## 2023-01-06 NOTE — TELEPHONE ENCOUNTER
Spoke with patient's son and confirmed patient's 1/10/2023 appointment with Dr Sheela Tran at the [facility]

## 2023-01-10 ENCOUNTER — OFFICE VISIT (OUTPATIENT)
Dept: NEUROLOGY | Facility: CLINIC | Age: 78
End: 2023-01-10

## 2023-01-10 VITALS
HEART RATE: 68 BPM | SYSTOLIC BLOOD PRESSURE: 124 MMHG | WEIGHT: 157 LBS | HEIGHT: 63 IN | TEMPERATURE: 97.8 F | DIASTOLIC BLOOD PRESSURE: 80 MMHG | BODY MASS INDEX: 27.82 KG/M2

## 2023-01-10 DIAGNOSIS — Z86.73 HISTORY OF STROKE: ICD-10-CM

## 2023-01-10 DIAGNOSIS — G31.83 LEWY BODY DEMENTIA (HCC): ICD-10-CM

## 2023-01-10 DIAGNOSIS — I48.92 ATRIAL FLUTTER, PAROXYSMAL (HCC): ICD-10-CM

## 2023-01-10 DIAGNOSIS — I68.0 CEREBRAL AMYLOID ANGIOPATHY (HCC): Primary | ICD-10-CM

## 2023-01-10 DIAGNOSIS — E85.4 CEREBRAL AMYLOID ANGIOPATHY (HCC): Primary | ICD-10-CM

## 2023-01-10 DIAGNOSIS — F02.80 LEWY BODY DEMENTIA (HCC): ICD-10-CM

## 2023-01-10 DIAGNOSIS — R26.2 AMBULATORY DYSFUNCTION: ICD-10-CM

## 2023-01-10 PROBLEM — R29.90 STROKE-LIKE SYMPTOMS: Status: RESOLVED | Noted: 2022-10-08 | Resolved: 2023-01-10

## 2023-01-10 RX ORDER — LORAZEPAM 0.5 MG/1
0.5 TABLET ORAL AS NEEDED
COMMUNITY

## 2023-01-10 RX ORDER — DONEPEZIL HYDROCHLORIDE 5 MG/1
5 TABLET, FILM COATED ORAL
Qty: 30 TABLET | Refills: 3 | Status: SHIPPED | OUTPATIENT
Start: 2023-01-10

## 2023-01-10 NOTE — PROGRESS NOTES
[address] [facility]       Name:  Radha Govea  MRN: [id number]  : 1945  Date: 1/10/2023    ASSESSMENT & PLAN     Cerebral amyloid angiopathy ([facility] )  This is a 68 y o  female with significant vascular risk factors (diabetes, HTN, hyperlipidemia, CAD, A-fib) who was found to have innumerable cerebral microbleeds on MRI along with white matter changes  This is consistent with probable cerebral amyloid angiopathy based off of the Buck Criteria 2 0  Definitive diagnosis requires supportive pathology  Additionally she has evidence of bilateral pontine ischemic infarcts that appear to be small vessel in origin  Her recent admission for stroke-like symptoms may represent TIA versus transient neurological symptoms associated with CAA  It is difficult to delineate the exact cause of her symptoms but she clearly has increased risk for both ischemic and hemorrhagic events  She was taken off of her anticoagulation during her hospitalization but remains on aspirin due to cardiovascular disease  We again discussed that even aspirin can increase risk of intracranial hemorrhage in the setting of CAA and family understands this risk  She is also at risk for falls and close supervision is necessary  Continue strict BP control with BP < 130/<90  Continue statin  She was noted to have low LDL during hospitalization and is due for labs soon  If LDL is still relatively low, can consider decreasing atorvastatin to 20 mg as too low of an LDL can also lead to increased risk of bleeds  Suspected Lewy body dementia ([facility] )  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  Vivid hallucinations can be secondary to LBD versus Peter-Bonnet syndrome   Would not expect to see cognitive changes with Peter-Bonnet Syndrome  Family also mentioned that she will have times of agitation which also supports LBD  We discussed the disease process, prognosis, and treatment options today  Family is interested in trying a medication to see if she has any benefit  Her PCP recently prescribed Ativan but this caused excessive sleepiness for 3 days  In general, benzodiazepines should be avoided in LBD as paradoxical responses have been documented  We will proceed with a trial of Donepezil 5 mg daily at bedtime  If she tolerates this well we can consider increasing to 10 mg  She can continue valproic acid 250 mg bid as well  History of stroke  Evidence of bilateral pontine ischemic strokes that appear to be secondary to small vessel disease  Continue aspirin with the caution that this can increase bleeding risk  Should continue statin as well  Will defer management of BP and diabetes to her PCP  Atrial flutter, paroxysmal (HCC)  Currently off of anticoagulation due to cerebral amyloid angiopathy    Ambulatory dysfunction  Limited mobility and at high risk of falls  Requires constant supervision as any fall can cause intracranial hemorrhage in the setting of CAA and aspirin use  Have placed referrals for home PT and OT gait training  Diagnoses and all orders for this visit:    Cerebral amyloid angiopathy ([facility] )  -     Ambulatory Referral to Physical Therapy; Future  -     Ambulatory Referral to Occupational Therapy; Future    Suspected Lewy body dementia ([facility] )  -     donepezil (ARICEPT) 5 mg tablet; Take 1 tablet (5 mg total) by mouth daily at bedtime  -     Ambulatory Referral to Physical Therapy; Future  -     Ambulatory Referral to Occupational Therapy; Future    Atrial flutter, paroxysmal [facility])    Ambulatory dysfunction  -     Ambulatory Referral to Physical Therapy; Future  -     Ambulatory Referral to Occupational Therapy;  Future    History of stroke    Other orders  -     LORazepam (ATIVAN) 0 5 mg tablet; Take 0 5 mg by mouth if needed for anxiety       HISTORY OF PRESENT ILLNESS     I had the pleasure of seeing your patient, Radha Govea, today in the Neurology clinic for follow up after recent hospitalization  Cornell Camara is a 68 y o  right-handed female with PMHx including 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, A-fib now off of AC due to MRI concerning for CAA, HTN, and MI  She is here today with her son and daughter-in-law  To review, patient presented to [facility] as a stroke alert on the morning of 10/8/22, in the setting of missing approximately 1 week of Xarelto  She awoke at her baseline at around 0530 and developed acute onset dysarthria, dysphagia, right facial droop, and RUE weakness at approximately 4765-0747 that morning  She presented to the ED at 0646  At initial presentation, NIHSS was 3-4 for dysarthria and her baseline blindness  CT/CTA were both negative  She was back to baseline by the time CT imaging was completed  She was therefore not a candidate for thrombolytics or endovascular intervention  Due to high-risk TIA patient was admitted for further evaluation  MRI was performed on 10/10/22  While there was no evidence of acute or subacute stroke, there were innumerable chronic microhemorrhages consistent with the diagnosis cerebral amyloid angiopathy  As such, her home Xarelto was discontinued  She was advised to continue taking 81 mg ASA daily due to vascular risk factors  TTE found LVEF of 65%, no regional wall motion abnormality, no PFO, and mildly increased pulmonary artery systolic pressure  Additionally diagnosed with possible LBD due to cognitive changes, vivid dreams/hallucinations  There was also question of Bettejane Mote syndrome due to her history of blindness but this diagnosis was disfavored  She was started on Depakote 250 mg BID for suspected LBD   An EEG was ordered during hospital stay but it was not completed and has been discontinued  Since hospitalization, Harpreet Graham has continued to have both cognitive and behavioral concerns  Family notes that she is having hallucinations - sees people in her room or children  Often will see hallucinations related to a recent conversation  She tells the hallucinations to get out  Does not see animals  She has been blind for approximately 10-years  Intermittent episodes of agitation, particularly when she is confused about something  For example, one day out of the blue started screaming that someone in the "store" that they were in was injured and needed an ambulance  In actuality she was at home in her chair and there was nobody injured  Family called her PCP for recommendations and was prescribed ativan for her agitation  Tried it one time and will not take it again -- slept for 3 days straight from the ativan  Does not seem to understand that she has memory loss  Very limited with her mobility  Needs help standing and walking using a walker  No recent falls to report  Family puts her chair directly in front of her bed at night so that she cannot get up and out of the room  Does not wander due to her mobility issues  Needs help with all ADLs including bathing, toileting, changing clothes, etc  Stopped performing IADL's years ago primarily due to her decline in vision  Does not use the stove or cook  Continues to have difficulties with sleep  Using Tylenol PM  Denies vivid dreams  Summary of Recent Hospitalization:  - Admit date: 10/8/2022  - D/c date: 10/14/2022  - Initial NIHSS: 3-4  - tPA candidate?: no  - thrombectomy?: no  - Please see significant findings as outlined below  - D/c medications: ASA 81 mg daily, pravastatin 40 mg daily changed to atorvastatin 40 mg daily      Workup:  Labs:  - Labs 10/5/22: HgbA1c 7 1%, LDL 49    Imaging:  - 10/8/22 CT head: No acute intracranial abnormality  Chronic microangiopathic changes  Chronic lacunar infarct in the ronen    - 10/8/22 CTA: No significant carotid or vertebral artery stenosis  No focal intracranial stenosis or aneurysm  - 10/10/22 MRI brain: 1  Innumerable foci of blooming artifact most suggestive of amyloid angiopathy  Differential diagnosis could include sequela of chronic hypertension/chronic hypertensive encephalopathy  No acute intracranial hemorrhage  2   No acute infarction  3   Moderate, chronic microangiopathy  4   Chronic appearing bilateral pontine lacunar infarctions increased from the prior study  - 10/10/2022 TTE:   •  Left Ventricle: Left ventricular cavity size is normal  Wall thickness is normal  The left ventricular ejection fraction is 65%  Systolic function is normal  Although no diagnostic regional wall motion abnormality was identified, this possibility cannot be completely excluded on the basis of this study  Diastolic function is normal   •  Right Ventricle: Right ventricular cavity size is normal  Systolic function is normal   •  Atrial Septum: No patent foramen ovale detected by saline bubble injection, at rest or with provocation by Valsalva  •  Pulmonary Artery: The estimated pulmonary artery systolic pressure is 70 8 mmHg  The pulmonary artery systolic pressure is mildly increased        STROKE RISK FACTORS:    [] Current or chronic hx of tobacco use  [x] Elevated cholesterol  [x] History of elevated BP  [x] History of diabetes  [x] Atrial fibrillation  [] History of IVDU  [] History of endocarditis  [] History of cancer  [x] 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)      The following portions of the patient's history were reviewed and updated as appropriate: allergies, current medications, past family history, past medical history, past social history, past surgical history and problem list     PAST MEDICAL HISTORY     Past Medical History:   Diagnosis Date   • Atrial fibrillation ([address] )    • Chronic anemia    • Chronic kidney disease    • Colonoscopy refused 11/2019 pt declines   • Dyslipidemia    • Hypertension    • Mammogram declined 09/2017   • Proteinuria    • 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/2022       Patient Active Problem List   Diagnosis   • Heterozygous MTHFR mutation [id number]   • Stage 3 chronic kidney disease ([place] [id number] )   • Stage 3b chronic kidney disease ([place] [address] )   • Hypokalemia   • Closed head injury   • Multiple thyroid nodules   • Sick sinus syndrome (HCC)   • Obesity, morbid ([facility])   • 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[facility])   • Anemia of chronic kidney failure, stage 4 (severe) ([facility])   • Vertigo   • Closed left hip fracture ([facility])   • Blind   • Diabetes mellitus type 2, insulin dependent ([place] [id number] )   • Primary hypertension   • CAD (coronary artery disease)   • Atrial fibrillation ([facility])   • MI (myocardial infarction) ([place] [address] )   • Cardiac pacemaker in situ   • H/O heart artery stent   • Acute blood loss anemia ([facility])   • Lower obstructive uropathy   • Atrial flutter, paroxysmal ([facility])   • Full code status   • Elevated d-dimer   • CHF (congestive heart failure) ([facility])   • Acute kidney injury superimposed on chronic kidney disease 3/4   • History of fracture of left hip   • Left thyroid nodule   • Encephalopathy acute   • Cerebral amyloid angiopathy ([facility])   • Volume overload   • Iron deficiency anemia due to chronic blood loss   • Ambulatory dysfunction   • Suspected Lewy body dementia ([place] [address] )   • History of stroke       MEDICATIONS        Current Outpatient Medications   Medication Sig Dispense Refill   • acetaminophen (TYLENOL) 325 mg tablet Take 3 tablets (975 mg total) by mouth every 8 (eight) hours  0   • amLODIPine (NORVASC) 2 5 mg tablet Take 1 tablet (2 5 mg total) by mouth daily 30 tablet 3   • aspirin 81 mg chewable tablet Chew 81 mg daily     • atorvastatin (LIPITOR) 40 mg tablet Take 1 tablet (40 mg total) by mouth every evening 90 tablet 1   • B-D UF III MINI PEN NEEDLES 31G X 5 MM MISC Use to test qd 100 each 11   • bumetanide (BUMEX) 1 mg tablet Take 1 tablet (1 mg total) by mouth 2 (two) times a day 60 tablet 0   • carvedilol (COREG) 25 mg tablet Take 1 tablet (25 mg total) by mouth every 12 (twelve) hours 180 tablet 1   • Cholecalciferol (Vitamin D) 50 MCG (2000 UT) tablet Take 1 tablet (2,000 Units total) by mouth daily 90 tablet 1   • divalproex sodium (DEPAKOTE) 250 mg EC tablet Take 1 tablet (250 mg total) by mouth every 12 (twelve) hours 180 tablet 1   • donepezil (ARICEPT) 5 mg tablet Take 1 tablet (5 mg total) by mouth daily at bedtime 30 tablet 3   • fluticasone (FLONASE) 50 mcg/act nasal spray 1 spray into each nostril daily 9 9 mL 0   • folic acid (FOLVITE) 061 mcg tablet Take 400 mcg by mouth daily     • Lantus SoloStar 100 units/mL SOPN Inject 0 12 mL (12 Units total) under the skin daily at bedtime 12 mL 1   • LORazepam (ATIVAN) 0 5 mg tablet Take 0 5 mg by mouth if needed for anxiety     • meclizine (ANTIVERT) 12 5 MG tablet Take 1 tablet (12 5 mg total) by mouth every 8 (eight) hours as needed for dizziness 30 tablet 0   • ranolazine (RANEXA) 500 mg 12 hr tablet Take 1 tablet (500 mg total) by mouth daily 90 tablet 1   • bumetanide (BUMEX) 1 mg tablet Take 1 tablet (1 mg total) by mouth 2 (two) times a day for 8 doses 8 tablet 0     No current facility-administered medications for this visit  ALLERGIES      No Known Allergies    OBJECTIVE     Patient ID: Trung Riojas is a 68 y o  female    Blood pressure 124/80, pulse 68, temperature 97 8 °F (36 6 °C), height 5' 3" (1 6 m), weight 71 2 kg (157 lb)  GENERAL EXAM:    CONSTITUTIONAL: Well developed, well nourished, well groomed  Appears chronically ill     Eyes:  Chronic blindness  Able to perceive motion bilaterally  Neck:  Normal ROM, neck supple  HEENT:  Normocephalic atraumatic  Oropharynx is clear and moist    Chest:  Respirations regular and unlabored  Cardiovascular:  Well perfused   Musculoskeletal:  Sitting in wheelchair   Appropriate strength   Skin:  warm and dry Psychiatric:  Normal behavior and appropriate affect      NEUROLOGICAL EXAM:    Neurologic Exam     Mental Status   Oriented to person  Disoriented to place  Disoriented to time  Speech: speech is normal   Level of consciousness: alert  Able to name object  Cranial Nerves     CN V   Facial sensation intact  CN VII   Facial expression full, symmetric  CN VIII   Hearing: intact    CN IX, X   Palate: symmetric    CN XI   Right sternocleidomastoid strength: normal  Left sternocleidomastoid strength: normal    CN XII   CN XII normal    Chronic blindness  EOM intact  Gaze is conjugate  Motor Exam   Muscle bulk: normal  Overall muscle tone: normal  Mildly reduced strength in all four extremities secondary to deconditioning     Sensory Exam   Light touch normal      Gait, Coordination, and Reflexes     Reflexes   Right brachioradialis: 1+  Left brachioradialis: 1+  Right biceps: 1+  Left biceps: 1+  Right patellar: 1+  Left patellar: 1+  Right achilles: 1+  Left achilles: 1+  Able to extend arms and then point to her nose without evidence of dysmetria  Finger tapping normal          ROS       Review of Systems   Unable to perform ROS: Dementia       ======    I have discussed the patient's history, physical exam findings, assessment, and plan in detail with attending, Dr Mellisa Anderson    Thank you for allowing me to participate in the care of your patient, Arely Rivas      Naomi García,   [facility]'s Neurology Residency, PGY-3

## 2023-01-10 NOTE — PATIENT INSTRUCTIONS
For the cerebral amyloid angiopathy (CAA) - this puts her at a higher risk of having bleeding in the brain  She should not be on any blood thinners  Being on aspirin can put her at a higher risk for bleeding but she needs to be on it due to her cardiac history    Keep valproate the same for now  We can discuss changing this dose in the future  Start low dose Donepezil 5 mg daily at bedtime  This should help with hallucinations and sleep  Avoid ativan and other benzodiazepines     Referrals to PT and OT for in home therapy    Call when her next blood work is done so we can look at her atorvastatin medication dose  If her LDL cholesterol is still on the low side, then we can consider reducing the atorvastatin       If you need anything in the meantime, please call or send a message via [employer]

## 2023-01-11 ENCOUNTER — HOME CARE VISIT (OUTPATIENT)
Dept: HOME HEALTH SERVICES | Facility: HOME HEALTHCARE | Age: 78
End: 2023-01-11

## 2023-01-11 PROBLEM — Z86.73 HISTORY OF STROKE: Status: ACTIVE | Noted: 2023-01-11

## 2023-01-11 NOTE — ASSESSMENT & PLAN NOTE
Limited mobility and at high risk of falls  Requires constant supervision as any fall can cause intracranial hemorrhage in the setting of CAA and aspirin use  Have placed referrals for home PT and OT gait training

## 2023-01-11 NOTE — ASSESSMENT & PLAN NOTE
This is a 68 y o  female with significant vascular risk factors (diabetes, HTN, hyperlipidemia, CAD, A-fib) who was found to have innumerable cerebral microbleeds on MRI along with white matter changes  This is consistent with probable cerebral amyloid angiopathy based off of the [facility] Criteria 2 0  Definitive diagnosis requires supportive pathology  Additionally she has evidence of bilateral pontine ischemic infarcts that appear to be small vessel in origin  Her recent admission for stroke-like symptoms may represent TIA versus transient neurological symptoms associated with CAA  It is difficult to delineate the exact cause of her symptoms but she clearly has increased risk for both ischemic and hemorrhagic events  She was taken off of her anticoagulation during her hospitalization but remains on aspirin due to cardiovascular disease  We again discussed that even aspirin can increase risk of intracranial hemorrhage in the setting of CAA and family understands this risk  She is also at risk for falls and close supervision is necessary  Continue strict BP control with BP < 130/<90  Continue statin  She was noted to have low LDL during hospitalization and is due for labs soon  If LDL is still relatively low, can consider decreasing atorvastatin to 20 mg as too low of an LDL can also lead to increased risk of bleeds

## 2023-01-11 NOTE — ASSESSMENT & PLAN NOTE
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  Vivid hallucinations can be secondary to LBD versus Peter-Bonnet syndrome  Would not expect to see cognitive changes with Peter-Bonnet Syndrome  Family also mentioned that she will have times of agitation which also supports LBD  We discussed the disease process, prognosis, and treatment options today  Family is interested in trying a medication to see if she has any benefit  Her PCP recently prescribed Ativan but this caused excessive sleepiness for 3 days  In general, benzodiazepines should be avoided in LBD as paradoxical responses have been documented  We will proceed with a trial of Donepezil 5 mg daily at bedtime  If she tolerates this well we can consider increasing to 10 mg  She can continue valproic acid 250 mg bid as well

## 2023-01-11 NOTE — ASSESSMENT & PLAN NOTE
Evidence of bilateral pontine ischemic strokes that appear to be secondary to small vessel disease  Continue aspirin with the caution that this can increase bleeding risk  Should continue statin as well  Will defer management of BP and diabetes to her PCP

## 2023-01-11 NOTE — ASSESSMENT & PLAN NOTE
Currently off of anticoagulation due to cerebral amyloid angiopathy negative Alert & oriented; no sensory, motor or coordination deficits, normal reflexes

## 2023-01-12 VITALS — RESPIRATION RATE: 16 BRPM | OXYGEN SATURATION: 95 % | TEMPERATURE: 97.7 F | HEART RATE: 62 BPM

## 2023-01-12 DIAGNOSIS — E85.4 CEREBRAL AMYLOID ANGIOPATHY (HCC): Primary | ICD-10-CM

## 2023-01-12 DIAGNOSIS — E11.9 DIABETES MELLITUS TYPE 2, INSULIN DEPENDENT (HCC): ICD-10-CM

## 2023-01-12 DIAGNOSIS — E85.4 CEREBRAL AMYLOID ANGIOPATHY: Primary | ICD-10-CM

## 2023-01-12 DIAGNOSIS — I68.0 CEREBRAL AMYLOID ANGIOPATHY: Primary | ICD-10-CM

## 2023-01-12 DIAGNOSIS — Z79.4 DIABETES MELLITUS TYPE 2, INSULIN DEPENDENT (HCC): ICD-10-CM

## 2023-01-12 DIAGNOSIS — G31.83 LEWY BODY DEMENTIA (HCC): ICD-10-CM

## 2023-01-12 DIAGNOSIS — F02.80 LEWY BODY DEMENTIA (HCC): ICD-10-CM

## 2023-01-12 DIAGNOSIS — H54.3 BLINDNESS OF BOTH EYES: ICD-10-CM

## 2023-01-12 DIAGNOSIS — I68.0 CEREBRAL AMYLOID ANGIOPATHY (HCC): Primary | ICD-10-CM

## 2023-01-12 DIAGNOSIS — Z86.73 HISTORY OF STROKE: ICD-10-CM

## 2023-01-13 ENCOUNTER — HOSPITAL ENCOUNTER (OUTPATIENT)
Dept: INFUSION CENTER | Facility: HOSPITAL | Age: 78
End: 2023-01-13

## 2023-01-13 VITALS
HEART RATE: 70 BPM | SYSTOLIC BLOOD PRESSURE: 111 MMHG | DIASTOLIC BLOOD PRESSURE: 53 MMHG | OXYGEN SATURATION: 97 % | RESPIRATION RATE: 20 BRPM | TEMPERATURE: 97.8 F

## 2023-01-13 DIAGNOSIS — D50.0 IRON DEFICIENCY ANEMIA DUE TO CHRONIC BLOOD LOSS: Primary | ICD-10-CM

## 2023-01-13 DIAGNOSIS — E87.70 HYPERVOLEMIA, UNSPECIFIED HYPERVOLEMIA TYPE: ICD-10-CM

## 2023-01-13 RX ORDER — SODIUM CHLORIDE 9 MG/ML
20 INJECTION, SOLUTION INTRAVENOUS ONCE
Status: COMPLETED | OUTPATIENT
Start: 2023-01-13 | End: 2023-01-13

## 2023-01-13 RX ORDER — SODIUM CHLORIDE 9 MG/ML
20 INJECTION, SOLUTION INTRAVENOUS ONCE
Status: CANCELLED | OUTPATIENT
Start: 2023-01-20

## 2023-01-13 RX ADMIN — SODIUM CHLORIDE 20 ML/HR: 9 INJECTION, SOLUTION INTRAVENOUS at 10:28

## 2023-01-13 RX ADMIN — IRON SUCROSE 300 MG: 20 INJECTION, SOLUTION INTRAVENOUS at 10:28

## 2023-01-13 NOTE — PLAN OF CARE
Problem: HEMATOLOGIC - ADULT  Goal: Maintains hematologic stability  Description:   - Monitor labs  - Administer Venofer as ordered  1/13/2023 1029 by Edwina Chacko RN  Outcome: Progressing  1/13/2023 1029 by Edwina Chacko RN  Outcome: Progressing

## 2023-01-13 NOTE — PROGRESS NOTES
Venofer tolerated well without complications  No complaints offered  AVS provided  Left unit in stable condition

## 2023-01-15 ENCOUNTER — NURSE TRIAGE (OUTPATIENT)
Dept: OTHER | Facility: OTHER | Age: 78
End: 2023-01-15

## 2023-01-15 DIAGNOSIS — N17.9 ACUTE KIDNEY INJURY SUPERIMPOSED ON CHRONIC KIDNEY DISEASE (HCC): ICD-10-CM

## 2023-01-15 DIAGNOSIS — N18.9 ACUTE KIDNEY INJURY SUPERIMPOSED ON CHRONIC KIDNEY DISEASE (HCC): ICD-10-CM

## 2023-01-15 DIAGNOSIS — E87.70 VOLUME OVERLOAD: ICD-10-CM

## 2023-01-15 DIAGNOSIS — E87.70 HYPERVOLEMIA, UNSPECIFIED HYPERVOLEMIA TYPE: Primary | ICD-10-CM

## 2023-01-15 RX ORDER — BUMETANIDE 1 MG/1
1 TABLET ORAL 2 TIMES DAILY
Qty: 8 TABLET | Refills: 0 | Status: SHIPPED | OUTPATIENT
Start: 2023-01-15 | End: 2023-01-17 | Stop reason: SDUPTHER

## 2023-01-15 NOTE — TELEPHONE ENCOUNTER
Regarding: out of medication  ----- Message from [facility] LYSSA HURTADO sent at 1/15/2023  8:42 AM EST -----  "My mom is completely out of her bumex and I was wondering if it can be sent in today for her?"

## 2023-01-15 NOTE — TELEPHONE ENCOUNTER
Medication Refill Request     Name Bumex   Dose/Frequency 1 mg 2x daily   Quantity 60  Verified pharmacy   [x]  Verified ordering Provider   [x]  Does patient have enough for the next 3 days?  Yes [] No [x]

## 2023-01-15 NOTE — TELEPHONE ENCOUNTER
Medication Refill Request     Name Bumex   Dose/Frequency 1mg BID  Quantity   Verified pharmacy   [x]  Verified ordering Provider   [x]  Does patient have enough for the next 3 days?  Yes [] No [x]        Reason for Disposition  • [1] Prescription refill request for ESSENTIAL medicine (i e , likelihood of harm to patient if not taken) AND [2] triager unable to refill per department policy    Protocols used: MEDICATION QUESTION CALL-ADULT-

## 2023-01-16 RX ORDER — BUMETANIDE 1 MG/1
1 TABLET ORAL 2 TIMES DAILY
Qty: 60 TABLET | Refills: 0 | OUTPATIENT
Start: 2023-01-16 | End: 2023-02-15

## 2023-01-17 DIAGNOSIS — E87.70 HYPERVOLEMIA, UNSPECIFIED HYPERVOLEMIA TYPE: ICD-10-CM

## 2023-01-18 ENCOUNTER — HOME CARE VISIT (OUTPATIENT)
Dept: HOME HEALTH SERVICES | Facility: HOME HEALTHCARE | Age: 78
End: 2023-01-18

## 2023-01-18 VITALS — SYSTOLIC BLOOD PRESSURE: 100 MMHG | DIASTOLIC BLOOD PRESSURE: 40 MMHG | HEART RATE: 64 BPM

## 2023-01-19 ENCOUNTER — HOME CARE VISIT (OUTPATIENT)
Dept: HOME HEALTH SERVICES | Facility: HOME HEALTHCARE | Age: 78
End: 2023-01-19

## 2023-01-19 VITALS
HEART RATE: 71 BPM | DIASTOLIC BLOOD PRESSURE: 52 MMHG | SYSTOLIC BLOOD PRESSURE: 106 MMHG | TEMPERATURE: 98 F | OXYGEN SATURATION: 95 % | RESPIRATION RATE: 16 BRPM

## 2023-01-19 DIAGNOSIS — Z79.4 DIABETES MELLITUS TYPE 2, INSULIN DEPENDENT (HCC): ICD-10-CM

## 2023-01-19 DIAGNOSIS — E11.9 DIABETES MELLITUS TYPE 2, INSULIN DEPENDENT (HCC): ICD-10-CM

## 2023-01-19 DIAGNOSIS — E87.70 HYPERVOLEMIA, UNSPECIFIED HYPERVOLEMIA TYPE: Primary | ICD-10-CM

## 2023-01-19 RX ORDER — BUMETANIDE 1 MG/1
1 TABLET ORAL 2 TIMES DAILY
Qty: 180 TABLET | Refills: 1 | Status: SHIPPED | OUTPATIENT
Start: 2023-01-19

## 2023-01-19 NOTE — CASE COMMUNICATION
With next nursing visit    BMP    Hgb A1C w/EAG Estimate      Dx E11 9   E87 70   Z79 4       Results to Dr Eliazar Townsend

## 2023-01-19 NOTE — TELEPHONE ENCOUNTER
Bumex will be a long term diuretic for her   I wrote her back for it   Bmp check and a1c check this week or next week would be fine

## 2023-01-20 ENCOUNTER — TELEPHONE (OUTPATIENT)
Dept: INFUSION CENTER | Facility: HOSPITAL | Age: 78
End: 2023-01-20

## 2023-01-20 ENCOUNTER — HOSPITAL ENCOUNTER (OUTPATIENT)
Dept: INFUSION CENTER | Facility: HOSPITAL | Age: 78
Discharge: HOME | End: 2023-01-20

## 2023-01-20 ENCOUNTER — HOME CARE VISIT (OUTPATIENT)
Dept: HOME HEALTH SERVICES | Facility: HOME HEALTHCARE | Age: 78
End: 2023-01-20

## 2023-01-20 DIAGNOSIS — E87.70 HYPERVOLEMIA, UNSPECIFIED HYPERVOLEMIA TYPE: ICD-10-CM

## 2023-01-20 DIAGNOSIS — D50.0 IRON DEFICIENCY ANEMIA DUE TO CHRONIC BLOOD LOSS: Primary | ICD-10-CM

## 2023-01-20 RX ORDER — SODIUM CHLORIDE 9 MG/ML
20 INJECTION, SOLUTION INTRAVENOUS ONCE
Status: CANCELLED | OUTPATIENT
Start: 2023-01-27

## 2023-01-20 NOTE — TELEPHONE ENCOUNTER
Spoke to Son - who was to bring pt today to infusion appt   - had to take his daughter to the hospital - rescheduled appointment to 1/27

## 2023-01-24 ENCOUNTER — HOME CARE VISIT (OUTPATIENT)
Dept: HOME HEALTH SERVICES | Facility: HOME HEALTHCARE | Age: 78
End: 2023-01-24

## 2023-01-24 VITALS — DIASTOLIC BLOOD PRESSURE: 60 MMHG | OXYGEN SATURATION: 98 % | HEART RATE: 67 BPM | SYSTOLIC BLOOD PRESSURE: 110 MMHG

## 2023-01-25 ENCOUNTER — HOME CARE VISIT (OUTPATIENT)
Dept: HOME HEALTH SERVICES | Facility: HOME HEALTHCARE | Age: 78
End: 2023-01-25

## 2023-01-26 ENCOUNTER — HOME CARE VISIT (OUTPATIENT)
Dept: HOME HEALTH SERVICES | Facility: HOME HEALTHCARE | Age: 78
End: 2023-01-26

## 2023-01-26 VITALS — DIASTOLIC BLOOD PRESSURE: 70 MMHG | SYSTOLIC BLOOD PRESSURE: 110 MMHG | OXYGEN SATURATION: 92 % | HEART RATE: 78 BPM

## 2023-01-26 VITALS
HEART RATE: 64 BPM | TEMPERATURE: 97.8 F | DIASTOLIC BLOOD PRESSURE: 58 MMHG | SYSTOLIC BLOOD PRESSURE: 116 MMHG | RESPIRATION RATE: 16 BRPM | OXYGEN SATURATION: 97 %

## 2023-01-27 ENCOUNTER — HOSPITAL ENCOUNTER (OUTPATIENT)
Dept: INFUSION CENTER | Facility: HOSPITAL | Age: 78
End: 2023-01-27

## 2023-01-27 ENCOUNTER — HOME CARE VISIT (OUTPATIENT)
Dept: HOME HEALTH SERVICES | Facility: HOME HEALTHCARE | Age: 78
End: 2023-01-27

## 2023-01-27 VITALS
HEART RATE: 78 BPM | SYSTOLIC BLOOD PRESSURE: 120 MMHG | OXYGEN SATURATION: 98 % | DIASTOLIC BLOOD PRESSURE: 56 MMHG | RESPIRATION RATE: 16 BRPM | TEMPERATURE: 97.8 F

## 2023-01-27 VITALS — DIASTOLIC BLOOD PRESSURE: 58 MMHG | SYSTOLIC BLOOD PRESSURE: 102 MMHG

## 2023-01-27 DIAGNOSIS — E87.70 HYPERVOLEMIA, UNSPECIFIED HYPERVOLEMIA TYPE: ICD-10-CM

## 2023-01-27 DIAGNOSIS — D50.0 IRON DEFICIENCY ANEMIA DUE TO CHRONIC BLOOD LOSS: Primary | ICD-10-CM

## 2023-01-27 RX ORDER — SODIUM CHLORIDE 9 MG/ML
20 INJECTION, SOLUTION INTRAVENOUS ONCE
Status: CANCELLED | OUTPATIENT
Start: 2023-01-27

## 2023-01-27 RX ORDER — SODIUM CHLORIDE 9 MG/ML
20 INJECTION, SOLUTION INTRAVENOUS ONCE
Status: COMPLETED | OUTPATIENT
Start: 2023-01-27 | End: 2023-01-27

## 2023-01-27 RX ADMIN — SODIUM CHLORIDE 20 ML/HR: 0.9 INJECTION, SOLUTION INTRAVENOUS at 07:58

## 2023-01-27 RX ADMIN — IRON SUCROSE 300 MG: 20 INJECTION, SOLUTION INTRAVENOUS at 07:58

## 2023-01-27 NOTE — PLAN OF CARE
Problem: Potential for Falls  Goal: Patient will remain free of falls  Description: INTERVENTIONS:  - Educate patient/family on patient safety including physical limitations  - Instruct patient to call for assistance with activity   - Keep Call bell within reach  Outcome: Progressing     Problem: HEMATOLOGIC - ADULT  Goal: Maintains hematologic stability  Description:   - Monitor labs  - Administer Venofer as ordered  Outcome: Progressing

## 2023-01-30 ENCOUNTER — HOME CARE VISIT (OUTPATIENT)
Dept: HOME HEALTH SERVICES | Facility: HOME HEALTHCARE | Age: 78
End: 2023-01-30

## 2023-01-30 VITALS — DIASTOLIC BLOOD PRESSURE: 52 MMHG | SYSTOLIC BLOOD PRESSURE: 100 MMHG

## 2023-01-31 ENCOUNTER — HOME CARE VISIT (OUTPATIENT)
Dept: HOME HEALTH SERVICES | Facility: HOME HEALTHCARE | Age: 78
End: 2023-01-31

## 2023-01-31 NOTE — CASE COMMUNICATION
pt son called to cancel OT visit for today   pt agreeable for visit on 2/2 please make visit "missed" and notify MD

## 2023-02-01 ENCOUNTER — HOME CARE VISIT (OUTPATIENT)
Dept: HOME HEALTH SERVICES | Facility: HOME HEALTHCARE | Age: 78
End: 2023-02-01

## 2023-02-01 ENCOUNTER — APPOINTMENT (OUTPATIENT)
Dept: LAB | Facility: HOSPITAL | Age: 78
End: 2023-02-01

## 2023-02-01 DIAGNOSIS — E11.9 DIABETES MELLITUS TYPE 2, INSULIN DEPENDENT (HCC): ICD-10-CM

## 2023-02-01 DIAGNOSIS — Z79.4 DIABETES MELLITUS TYPE 2, INSULIN DEPENDENT (HCC): ICD-10-CM

## 2023-02-01 DIAGNOSIS — E87.70 HYPERVOLEMIA, UNSPECIFIED HYPERVOLEMIA TYPE: ICD-10-CM

## 2023-02-01 LAB
ANION GAP SERPL CALCULATED.3IONS-SCNC: 11 MMOL/L (ref 4–13)
BUN SERPL-MCNC: 52 MG/DL (ref 5–25)
CALCIUM SERPL-MCNC: 8.2 MG/DL (ref 8.4–10.2)
CHLORIDE SERPL-SCNC: 97 MMOL/L (ref 96–108)
CO2 SERPL-SCNC: 28 MMOL/L (ref 21–32)
CREAT SERPL-MCNC: 3.32 MG/DL (ref 0.6–1.3)
EST. AVERAGE GLUCOSE BLD GHB EST-MCNC: 114 MG/DL
GFR SERPL CREATININE-BSD FRML MDRD: 12 ML/MIN/1.73SQ M
GLUCOSE SERPL-MCNC: 87 MG/DL (ref 65–140)
HBA1C MFR BLD: 5.6 %
POTASSIUM SERPL-SCNC: 4.6 MMOL/L (ref 3.5–5.3)
SODIUM SERPL-SCNC: 136 MMOL/L (ref 135–147)

## 2023-02-02 ENCOUNTER — HOME CARE VISIT (OUTPATIENT)
Dept: HOME HEALTH SERVICES | Facility: HOME HEALTHCARE | Age: 78
End: 2023-02-02

## 2023-02-02 VITALS — DIASTOLIC BLOOD PRESSURE: 70 MMHG | HEART RATE: 62 BPM | OXYGEN SATURATION: 97 % | SYSTOLIC BLOOD PRESSURE: 115 MMHG

## 2023-02-02 VITALS
SYSTOLIC BLOOD PRESSURE: 108 MMHG | HEART RATE: 72 BPM | RESPIRATION RATE: 16 BRPM | DIASTOLIC BLOOD PRESSURE: 68 MMHG | TEMPERATURE: 98.2 F | OXYGEN SATURATION: 95 %

## 2023-02-03 ENCOUNTER — HOME CARE VISIT (OUTPATIENT)
Dept: HOME HEALTH SERVICES | Facility: HOME HEALTHCARE | Age: 78
End: 2023-02-03

## 2023-02-03 VITALS — SYSTOLIC BLOOD PRESSURE: 104 MMHG | DIASTOLIC BLOOD PRESSURE: 68 MMHG

## 2023-02-06 ENCOUNTER — TELEPHONE (OUTPATIENT)
Dept: FAMILY MEDICINE CLINIC | Facility: CLINIC | Age: 78
End: 2023-02-06

## 2023-02-06 ENCOUNTER — HOME CARE VISIT (OUTPATIENT)
Dept: HOME HEALTH SERVICES | Facility: HOME HEALTHCARE | Age: 78
End: 2023-02-06

## 2023-02-06 VITALS — SYSTOLIC BLOOD PRESSURE: 102 MMHG | DIASTOLIC BLOOD PRESSURE: 54 MMHG

## 2023-02-06 NOTE — TELEPHONE ENCOUNTER
----- Message from Kerrie Chan MD sent at 2/4/2023  4:24 PM EST -----  Is she testing BG daily   I think she should cut down the lantus to 10u   And her kidneys are not good   They really need my hydration   And nephro is not until 3/23?      Ill reach out to dr Mesfin Payne and see if she can be seen sooner

## 2023-02-07 ENCOUNTER — EVALUATION (OUTPATIENT)
Dept: OCCUPATIONAL THERAPY | Facility: CLINIC | Age: 78
End: 2023-02-07

## 2023-02-07 DIAGNOSIS — I68.0 CEREBRAL AMYLOID ANGIOPATHY (HCC): ICD-10-CM

## 2023-02-07 DIAGNOSIS — G31.83 LEWY BODY DEMENTIA (HCC): ICD-10-CM

## 2023-02-07 DIAGNOSIS — F02.80 LEWY BODY DEMENTIA (HCC): ICD-10-CM

## 2023-02-07 DIAGNOSIS — Z78.9 SELF-CARE DEFICIT: ICD-10-CM

## 2023-02-07 DIAGNOSIS — E85.4 CEREBRAL AMYLOID ANGIOPATHY (HCC): ICD-10-CM

## 2023-02-07 DIAGNOSIS — R41.89 COGNITIVE DEFICITS: Primary | ICD-10-CM

## 2023-02-07 NOTE — PROGRESS NOTES
Today's Date: 2023  Patient Name: Renetta Simpson  : 1945  MRN: [id number]  Referring Provider: Arnel Rogers DO  Dx: Cognitive deficits [R41 89]      SKILLED ANALYSIS:  Pt is a right hand dominant 68year old female presenting to OP neuro OT with c/o cognitive and self care deficits that are progressively worsening  Pt is a questionable historian due to moderate cognitive deficits, but son is present to provide supplemental info  Pt's deficits are limiting her successful participation with ADLs, IADLs, and functional mobility  Pt is demonstrating deficits in the following areas: generalized weakness,  strength, immediate and delayed recall, sustained and divided attention, abstraction, endurance, balance, and standing tolerance  Deficits are noted based on the following assessments: blind MoCA, GENNARO, MMT, and skilled clinical observation  Recommend OP neuro OT 1-2x/wk focusing on aforementioned deficits to maximize functional performance and decrease caregiver burden  PLAN OF CARE START: 2023  PLAN OF CARE END: 2023  FREQUENCY: 1-2x per week for 8-12 weeks  PRECAUTIONS: Fall risk, legally blind, hallucinations    Subjective    Mechanism of Injury  "Suspected to be either Lewy body dementia versus Mallissa Doctor syndrome due to significant visual issues and patient reportedly being blind for the last 10 years", suspected amyloid angiopathy, chronic b/l pontine infarcts  H/o fall with hip fracture in 2022 with IM nail placement  Occupational Profile  Pt is legally blind and lives with her son and his family  They live in a multi level house with a first floor set-up for the patient  She has 24/7 supervision/assist from her family, primarily cared for by his DIL  Pt requires assistance with bathing, toileting, dressing, functional transfers, ambulation with use of rw vs totalA in transport chair, and medication management   Pt does not prep food but is able to eat with set-up assist  Primary use of transport wc, but able to ambulate short distances with assist and use of rw  Pt is able to stand for short amounts of time  Pt's son reports pt suffered a hip fracture from a fall last June (subsequent STR placement then transitioned to home health OT) and noticed a significant decline in functional cognition since  Pt was discharged from home health OT but is still receiving home health PT for LE weakness and balance deficits  OTR discussed possible transition to OP PT now that pt is no longer home bound  PATIENT GOAL: "Work on standing"    HISTORY OF PRESENT ILLNESS:     Pt is a 68 y o  female who was referred to Occupational Therapy with family and pt c/o cognitive and physical deficits limiting her successful participation with daily activities  S/p hip fracture in June 2022 and underwent IM nail 6/26/22  Pt was hospitalized in October 2022 with stroke-like symptoms, however MRI brain (-) for acute abnormalities and demonstrated "innumerable foci of blooming artifact most suggestive of amyloid angiopathy  Differential diagnosis could include sequela of chronic hypertension/chronic hypertensive encephalopathy, moderate chronic microangiopathy, chronic appearing bilateral pontine lacunar infarctions increased from the prior study    Per most recent neurology visit documentation "Suspected to be either Lewy body dementia versus Sasha Hockey syndrome due to significant visual issues and patient reportedly being blind for the last 10 years"        PMH:   Past Medical History:   Diagnosis Date   • Atrial fibrillation ([facility] [address] )    • Chronic anemia    • Chronic kidney disease    • Colonoscopy refused 11/2019    pt declines   • Dyslipidemia    • Hypertension    • Mammogram declined 09/2017   • Proteinuria    • Tachy-sarah beth syndrome ([address] ) 6/24/2022       Past Surgical Hx:   Past Surgical History:   Procedure Laterality Date   • CARDIAC PACEMAKER PLACEMENT     • CATARACT EXTRACTION     •  SECTION     • CHOLECYSTECTOMY     • CORONARY ANGIOPLASTY WITH STENT PLACEMENT     • EYE SURGERY     • MAMMO (HISTORICAL)  2015    Pt states she will not ever have again- 2017   • NM OPTX FEM SHFT FX W/INSJ IMED IMPLT W/WO SCREW Left 2022    Procedure: INSERTION NAIL IM FEMUR ANTEGRADE (TROCHANTERIC); Surgeon: Kaia Sullivan DO;  Location: [facility];  Service: Orthopedics   • TUBAL LIGATION              Objective    Impairments Section:  UE Strength:              GENNARO: RUE: 200 LUE: 200  The age norm is approximately RUE: 42 lbs and LUE: 38 lbs indicating decreased  strength     Range of Motion: WNL b/l UEs    MMT:  R UE:   -  shoulder flexion: 3+/5  - elbow flexion: 3+/5  -  elbow extension 3+/5  -   wrist flexion: 4/5  -  wrist extension : 4/5    L UE:  -  shoulder flexion: 3+/5  - elbow flexion: 3+/5  -  elbow extension: 3+/5  -   wrist flexion: 4/5  -  wrist extension: 4/5    Pt requires Castro for STS from transport chair  Castro to ambulate ~5 feet forwards and backwards with use of rw  Will require inc assist to navigate the environment 2* visual impairments  Pt is right hand dominant    [place] Cognitive Assessment Version 8 1 (MoCA V8 1) BLIND VERSION  Memory: 1st trial:  0/5, 2nd trial:  2/5  Attention/concentration: 0/2  List of letters: 1/1  Serial Seven Subtraction:  0/3  Language/sentence repetition:  1/2  Language Fluency:  0/1  Abstract/Correlational Thinkin/2  Delayed Recall:  0/5  Orientation:  5/6   Memory Index Score: 2/15  MoCA V1 8 1 Raw Score:  (modified ), 10 54/30 (including 1 additional point for education level) MIS:  2/15, indicative of moderate neurocognitive impairments      MOCA Scoring        Normal: 26+        Mild Cognitive Impairment: 18-25         Moderate Cognitive Impairment: 10-17        Severe Cognitive Impairment: <10    Vision Screen: Pt is legally blind    GOALS:   Short Term Goals:  Pt will demonstrate improved standing tolerance by maintaining stance with close supervision for at least 7 minutes while completing a functional activity  Pt will demonstrate improved functional cognition by scoring at least 12 on blind MoCA (converted score) to increase successful participation with daily activities  ll complete functional transfers on/off all surfaces with close supervision with use of DME as appropriate and with good safety/balance  Pt will demo improved delayed recall by scoring 5/15 on the MIS portion of the MoCA  Pt will increase b/l UE strength to at least 4-/5 in all planes for carry over with ADL participation  Pt will increase b/l grasp strength by at least 3lb b/l UEs for carry over with ADLs    Long Term Goals: 8-12 weeks  Pt will demo improved b/l  strength by 8lb to participate more independently in ADLs and IADLs  Pt will demo improved cognitive functioning by scoring at least 14/30 on the blind MoCA (converted score) to participate in ADLs and IADLs  Pt will demonstrate improved standing tolerance by maintaining stance with distant supervision for at least 12 minutes while completing a functional activity  Pt will complete functional transfers on/off all surfaces with distant supervision with use of DME as appropriate and with good safety/balance  Pt will demo improved delayed recall by scoring 8/15 on the MIS portion of the MoCA    Pt will increase b/l UE strength to at least 4/5 in all planes for carry over with ADL participation    OTHER PLANNED THERAPY INTERVENTIONS:   Supine, seated, and in stance neuro re-ed  Tricep AG  NMES/FES  FMC/prehension  Timed Trials  Manual tx  Hand to target  Sensory re-ed  Seated functional reach: crossing midline  Supine place and hold  WBearing strategies   Closed chain activities  Open chain activities   Internal/external memory aids  Multi-modal environment  Memory and mental manipulation

## 2023-02-08 ENCOUNTER — HOME CARE VISIT (OUTPATIENT)
Dept: HOME HEALTH SERVICES | Facility: HOME HEALTHCARE | Age: 78
End: 2023-02-08

## 2023-02-08 VITALS
RESPIRATION RATE: 18 BRPM | TEMPERATURE: 98.7 F | HEART RATE: 76 BPM | OXYGEN SATURATION: 96 % | SYSTOLIC BLOOD PRESSURE: 118 MMHG | DIASTOLIC BLOOD PRESSURE: 62 MMHG

## 2023-02-09 ENCOUNTER — HOME CARE VISIT (OUTPATIENT)
Dept: HOME HEALTH SERVICES | Facility: HOME HEALTHCARE | Age: 78
End: 2023-02-09

## 2023-02-09 VITALS — DIASTOLIC BLOOD PRESSURE: 60 MMHG | SYSTOLIC BLOOD PRESSURE: 92 MMHG

## 2023-02-11 DIAGNOSIS — F02.80 LEWY BODY DEMENTIA (HCC): ICD-10-CM

## 2023-02-11 DIAGNOSIS — G31.83 LEWY BODY DEMENTIA (HCC): ICD-10-CM

## 2023-02-13 ENCOUNTER — OFFICE VISIT (OUTPATIENT)
Dept: OCCUPATIONAL THERAPY | Facility: CLINIC | Age: 78
End: 2023-02-13

## 2023-02-13 DIAGNOSIS — E85.4 CEREBRAL AMYLOID ANGIOPATHY (HCC): Primary | ICD-10-CM

## 2023-02-13 DIAGNOSIS — G31.83 MODERATE LEWY BODY DEMENTIA, UNSPECIFIED WHETHER BEHAVIORAL, PSYCHOTIC, OR MOOD DISTURBANCE OR ANXIETY (HCC): ICD-10-CM

## 2023-02-13 DIAGNOSIS — R41.89 COGNITIVE DEFICITS: ICD-10-CM

## 2023-02-13 DIAGNOSIS — Z78.9 SELF-CARE DEFICIT: ICD-10-CM

## 2023-02-13 DIAGNOSIS — I68.0 CEREBRAL AMYLOID ANGIOPATHY (HCC): Primary | ICD-10-CM

## 2023-02-13 DIAGNOSIS — F02.B0 MODERATE LEWY BODY DEMENTIA, UNSPECIFIED WHETHER BEHAVIORAL, PSYCHOTIC, OR MOOD DISTURBANCE OR ANXIETY (HCC): ICD-10-CM

## 2023-02-13 RX ORDER — DONEPEZIL HYDROCHLORIDE 5 MG/1
TABLET, FILM COATED ORAL
Qty: 90 TABLET | Refills: 2 | Status: SHIPPED | OUTPATIENT
Start: 2023-02-13

## 2023-02-13 NOTE — PROGRESS NOTES
Today's Date: 2023  Patient Name: Galilea Santos  : 1945  MRN: [id number]  Referring Provider: Sidra Garsia DO  Dx: Cerebral amyloid angiopathy ([facility] [address] ) [E85 4, I68 0]    Subjective: Pt reports no changes  Objective:  NMR:  -Attempted Blaze pod activity to trail bright lights and colors but discontinued due to pt not able to see pods  -Standing endurance task with focus on standing balance, standing tolerance, motor planning, endurence and weight bearing  Pt required Castro to initiate stance then required stand by  Pt stood for 1:00 minute then took break  Pt stood for 1 minute and 30 seconds during the next trail and 51 seconds during the third trial     -Pt completed sit to stand task with 10 repetitions and 5 second isometric holds with focus on motor planning, weight bearing of LE, strengthening and standing tolerance and endurance  Pt completed sit to stands with Castro from wheelchair to stance  Task modified with pt standing without holding onto wheelchair  Last rep of task pt stood for as long as they could (33 seconds)  Pt took 15-20 second break between reps  -Shoulder flexion using 2lb db with focus on UE strength, motor planning, kinesthesia, elbow extension and shoulder flexion  Pt completed 10 reps of shoulder flexion on b/l sides with one minute breaks in-between and a drink of water  Pt completed task sitting then task was upgraded to complete standing to inc standing tolerance and endurance     -Elbow flexion using 2lb dumb bell with focus on UE strength, motor planning, kinesthesia, elbow flexion and extension  Pt completed 10 reps of elbow flexion ob b/l UE with one minute breaks in-between  Pt completed task sitting then task on upgraded to complete standing to inc standing tolerance and endurance     -Shoulder abduction with 1lb dumb bell with focus on UE strength, motor planning, and kinesthesia   Pt completed 10 reps of shoulder abduction on b/l UE with a one minute break       Assessment: Pt tolerated treatment well  Pt demo difficulty with standing tolerance  Pt was unable to see blaze pods but was able to reach out and attempt to touch pods with verbal cues  Plan: Continue POC

## 2023-02-15 ENCOUNTER — OFFICE VISIT (OUTPATIENT)
Dept: OCCUPATIONAL THERAPY | Facility: CLINIC | Age: 78
End: 2023-02-15

## 2023-02-15 DIAGNOSIS — F02.B0 MODERATE LEWY BODY DEMENTIA, UNSPECIFIED WHETHER BEHAVIORAL, PSYCHOTIC, OR MOOD DISTURBANCE OR ANXIETY (HCC): ICD-10-CM

## 2023-02-15 DIAGNOSIS — I68.0 CEREBRAL AMYLOID ANGIOPATHY (HCC): Primary | ICD-10-CM

## 2023-02-15 DIAGNOSIS — Z78.9 SELF-CARE DEFICIT: ICD-10-CM

## 2023-02-15 DIAGNOSIS — R41.89 COGNITIVE DEFICITS: ICD-10-CM

## 2023-02-15 DIAGNOSIS — E85.4 CEREBRAL AMYLOID ANGIOPATHY (HCC): Primary | ICD-10-CM

## 2023-02-15 DIAGNOSIS — G31.83 MODERATE LEWY BODY DEMENTIA, UNSPECIFIED WHETHER BEHAVIORAL, PSYCHOTIC, OR MOOD DISTURBANCE OR ANXIETY (HCC): ICD-10-CM

## 2023-02-15 NOTE — PROGRESS NOTES
Today's Date: 2/15/2023  Patient Name: Juliano Gudino  : 1945  MRN: [id number]  Referring Provider: Parish Crabtree DO  Dx: Cerebral amyloid angiopathy ([facility] [address] ) [E85 4, I68 0]    Subjective: Pt reports "legs feel shaky "  Objective:  -TE:  -Shoulder raise using 2lb weighted bar x2 sets of 10 reps to address shoulder flexion strengthening and UE motor control  Upgraded to pt using 3lb weighted bar, but pt fatigued after 3 reps and discontinued  Pt completed activity standing during second set for five reps before needing rest break to address standing endurance and static balance  -Bicep curl using 2lb weighted bar x2 sets of 10 reps to address elbow flexion strengthening and UE motor control  Upgraded to pt completing activity standing during second set five reps before needing rest break to address standing endurance and static balance  TA:  -Pt completed folding socks x10 with 1lb wrist weights to address UE strength and endurance  -Pt educated on theraputty HEP using yellow theraputty to complete general gripping x25-30 reps to address gross grasp strength, putty roll pinching to address pinch strength, and twirling to address dexterity    Assessment: Pt tolerated treatment well  Pt completing half of exercises using weighted bar standing, demonstrating difficulty with standing tolerance and static balance  Discussed recommendation for OP PT with pt and her son  Will follow up in future sessions  Plan: Continue POC

## 2023-02-15 NOTE — PROGRESS NOTES
Irene  Progress Note - Chryl Stake 1945, 68 y o  female MRN: [id number]  Unit/Bed#: W -01 Encounter: [id number]  Primary Care Provider: Doug Pat MD   Date and time admitted to hospital: 6/24/2022  8:30 AM    Fall  Assessment & Plan  - Status post fall with the below noted injuries  - Fall precautions  - Geriatric Medicine consultation for evaluation, medication review and recommendations   - PT and OT evaluation and treatment as indicated  - Case Management consultation for disposition planning  * Closed left hip fracture [facility])  Assessment & Plan  - Left hip fractures, present on admission   - Status post IM nail on 6/26  - Appreciate Orthopedic surgery evaluation, recommendations and interventions as noted  - Maintain WBAT status on the left extremity   - Monitor left lower extremity extremity neurovascular exam   - Continue multimodal analgesic regimen   - Continue DVT prophylaxis - will restart Xarelto in patient's hemoglobin trend is stable  - PT and OT evaluation and treatment as indicated  - Outpatient follow up with Orthopedic surgery for re-evaluation  Urinary retention  Assessment & Plan  - patient with 4 straight catheterizations now requiring indwelling head catheter  - Urology consultation  - Likely secondary to ambulatory dysfunction - patient is currently TTWB and has not been out of bed for the past 2 days  Anemia  Assessment & Plan  - Acute blood loss in the setting of chronic anemia  - History of chronic anemia hemoglobin is typically around 10  - Patient was 10 9 on admission  - Patient loss approximately 300 mL blood during her surgery  - 6/26 1 unit PRBCs, 6/28 1 unit PRBCs  - Repeat hemoglobin responded to transfusion appropriately  - Continue to monitor for signs of symptomatic anemia though in the setting beta-blocker use patient will not likely be tachycardic      Atrial fibrillation [facility])  Assessment & Plan  - Medical records request ([place] [facility]) was processed 2/15/23  Continue home Coreg  - Patient anemic--6 8  Status post 1 unit PRBC 6/28 with improvement in hemoglobin to 8 5  Continue to hold Xarelto  Continue heparin for DVT prophylaxis  - will restart Xarelto and aspirin when hemoglobin trend is stable    CAD (coronary artery disease)  Assessment & Plan  - History of PCI in 2011  - Continue home Ranexa and Coreg    Tachy-sarah beth syndrome ([address] )  Assessment & Plan  - Followed by [facility] cardiology  - Continue Coreg  - S/p pacemaker    Hypertension  Assessment & Plan  - Continue home carvedilol, nifedipine currently on hold  - Continue to hold home lisinopril due to normal BP s/p orthopedic intervention and concern for SAHIL  - Continue to monitor BP and kidney functioning    Acute renal failure superimposed on stage 3 chronic kidney disease [facility])  Assessment & Plan  Lab Results   Component Value Date    EGFR 18 06/28/2022    EGFR 15 06/27/2022    EGFR 18 06/26/2022    CREATININE 2 51 (H) 06/28/2022    CREATININE 2 82 (H) 06/27/2022    CREATININE 2 41 (H) 06/26/2022     - Associated with diabetes and hypertension, baseline Cr appears to be between 1 6-2 0  - 1 83 on admission  - Creatinine worsened postoperatively to peak of 2 82 on 6/27  Now downtrending to 2 2 this am  - acute blood loss anemia, urinary retention, and intermittent hypotension all likely contributing to SAHIL status post 1 unit PRBC 6/28   - Nephrology consult appreciated - holding nifedipine  - Continue to monitor Cr for improvement    Diabetes mellitus type 2, insulin dependent [facility])  Assessment & Plan  No results found for: HGBA1C    Recent Labs     06/28/22  0752 06/28/22  1113 06/28/22  1551 06/28/22  2103   POCGLU 99 185* 186* 186*       Blood Sugar Average: Last 72 hrs:  (P) 680 [id number]  - 2/22 hemoglobin A1c was 7 1  - Hold home oral anti diabetics  - Restarted Lantus   - Continue to monitor blood sugars throughout the day with use of sliding scale insulin    - Rickie controlled diet    Blind  Assessment & Plan  - Family assists at bedside  - Additional assistance will be provided as needed to accommodate patient  Disposition:  Continue med surg status with ongoing medical optimization    SUBJECTIVE:  Chief Complaint: Cold    Subjective: Patient reports she is cold  She denies pain  She reports she has not been walking since her surgery  She denies chest pain, shortness of breath, n/v      OBJECTIVE:   Vitals:   Temp:  [98 °F (36 7 °C)-98 7 °F (37 1 °C)] 98 °F (36 7 °C)  HR:  [60-65] 60  Resp:  [14-18] 14  BP: (108-143)/(40-57) 128/57    Intake/Output:  I/O       06/27 0701  06/28 0700 06/28 0701  06/29 0700    P  O  360 240    I V  (mL/kg) 1891 7 (22 5)     Blood  440    Total Intake(mL/kg) 2251 7 (26 8) 680 (8 1)    Urine (mL/kg/hr) 2300 (1 1) 1040 (0 8)    Total Output 2300 1040    Net -48 3 -360               Nutrition: Diet Rickie/CHO Controlled; Consistent Carbohydrate Diet Level 2 (5 carb servings/75 grams CHO/meal)  GI Proph/Bowel Reg:  Senokot S  VTE Prophylaxis:Heparin     Physical Exam:   GENERAL APPEARANCE:  No acute distress, resting supine in bed  NEURO:  AAO x3, GCS 15, no focal neuro deficit  HEENT: EOMI, Mucus membranes moist  CV:  RRR, S1, S2 without murmur rub or gallop   LUNGS:  Clear to auscultation bilaterally without wheezes rales or rhonchi  GI:  Soft, nontender, nondistended  :  Urinary retention with straight catheterization x3 now with indwelling head catheter     MSK:  Nontender without deformity DVNI  SKIN:  Warm, dry, intact, surgical site dressing intact without strike through    Invasive Devices  Report    Peripheral Intravenous Line  Duration           Peripheral IV 06/27/22 Proximal;Right;Ventral (anterior) Antecubital 1 day          Drain  Duration           Urethral Catheter Latex 16 Fr  <1 day                      Lab Results:   BMP/CMP:   Lab Results   Component Value Date    SODIUM 137 06/29/2022    K 5 4 (H) 06/29/2022     06/29/2022    CO2 25 06/29/2022    BUN 50 (H) 06/29/2022    CREATININE 2 24 (H) 06/29/2022    CALCIUM 7 6 (L) 06/29/2022    EGFR 20 06/29/2022    and CBC:   Lab Results   Component Value Date    WBC 7 81 06/29/2022    HGB 8 1 (L) 06/29/2022    HCT 25 1 (L) 06/29/2022    MCV 88 06/29/2022     06/29/2022    MCH 28 4 06/29/2022    MCHC 32 3 06/29/2022    RDW 14 6 06/29/2022    MPV 11 2 06/29/2022    NRBC 1 06/29/2022     Imaging/EKG Studies: I have personally reviewed pertinent reports       Other Studies:  None

## 2023-02-16 ENCOUNTER — OFFICE VISIT (OUTPATIENT)
Dept: NEPHROLOGY | Facility: CLINIC | Age: 78
End: 2023-02-16

## 2023-02-16 VITALS
BODY MASS INDEX: 27.11 KG/M2 | HEIGHT: 63 IN | DIASTOLIC BLOOD PRESSURE: 64 MMHG | WEIGHT: 153 LBS | SYSTOLIC BLOOD PRESSURE: 116 MMHG | HEART RATE: 78 BPM

## 2023-02-16 DIAGNOSIS — E66.01 OBESITY, MORBID (HCC): ICD-10-CM

## 2023-02-16 DIAGNOSIS — E66.3 OVERWEIGHT (BMI 25.0-29.9): ICD-10-CM

## 2023-02-16 DIAGNOSIS — D63.1 ANEMIA OF CHRONIC KIDNEY FAILURE, STAGE 4 (SEVERE) (HCC): Primary | ICD-10-CM

## 2023-02-16 DIAGNOSIS — N18.4 ANEMIA OF CHRONIC KIDNEY FAILURE, STAGE 4 (SEVERE) (HCC): Primary | ICD-10-CM

## 2023-02-16 DIAGNOSIS — N18.4 CKD (CHRONIC KIDNEY DISEASE) STAGE 4, GFR 15-29 ML/MIN (HCC): ICD-10-CM

## 2023-02-16 DIAGNOSIS — E87.70 HYPERVOLEMIA, UNSPECIFIED HYPERVOLEMIA TYPE: ICD-10-CM

## 2023-02-16 DIAGNOSIS — N17.9 AKI (ACUTE KIDNEY INJURY) (HCC): ICD-10-CM

## 2023-02-16 PROBLEM — N18.9 ACUTE KIDNEY INJURY SUPERIMPOSED ON CHRONIC KIDNEY DISEASE (HCC): Status: RESOLVED | Noted: 2022-10-08 | Resolved: 2023-02-16

## 2023-02-16 PROBLEM — I12.9 BENIGN HYPERTENSION WITH CKD (CHRONIC KIDNEY DISEASE) STAGE IV (HCC): Status: ACTIVE | Noted: 2023-02-16

## 2023-02-16 PROBLEM — N18.32 STAGE 3B CHRONIC KIDNEY DISEASE (HCC): Status: RESOLVED | Noted: 2019-11-29 | Resolved: 2023-02-16

## 2023-02-16 PROBLEM — N18.30 STAGE 3 CHRONIC KIDNEY DISEASE (HCC): Status: RESOLVED | Noted: 2019-11-29 | Resolved: 2023-02-16

## 2023-02-16 RX ORDER — BUMETANIDE 1 MG/1
1 TABLET ORAL DAILY
Qty: 180 TABLET | Refills: 1
Start: 2023-02-16

## 2023-02-16 NOTE — PROGRESS NOTES
NEPHROLOGY OFFICE VISIT   Clayton Velez 68 y o  female MRN: [id number]  2/16/2023    Reason for Visit: Worsening renal function    History of Present Illness (HPI):    I had the pleasure of seeing Jamie George today in the renal clinic for the continued management of worsening renal function/acute kidney injury  Since her last nephrology visit back in December she has had no ER visits or hospitalizations  Her blood pressure has been running on the soft side but no hypotension at the last visit  He is lost 4 pounds since her last visit  Her BMI is down to 27  He has decreased oral intake with some occasional nausea  No vomiting or diarrhea  She has no edema or shortness of breath  She is currently on Bumex 1 mg twice a day  No dizziness or lightheadedness  No urinary symptoms  We have reviewed her medications and complete detail  ASSESSMENT and PLAN:    Acute kidney injury  -- I suspect that this is likely related to prerenal azotemia due to decreased oral intake while being maintained on diuretics with Bumex 1 mg twice a day  Her blood pressure is normal at this time but it could be soft at home  -- Reduce Bumex to 1 mg daily  -- Repeat BMP, check urine analysis and a fractional secretion of urea  -- Check a kidney ultrasound  -- Follow-up in 4 weeks  --History of obstructive uropathy in the past that required Key catheter placement    -- Avoid NSAIDs  -- Most recent creatinine 3 3 mg/dL    Chronic kidney disease stage Iv with nonnephrotic range proteinuria (G4A2)  -- Baseline creatinine now fluctuating between 1 8 to 2 5 mg/dL based on the volume status  -- Presumed to be secondary to multiple episodes of acute kidney injury, diabetic kidney disease and hypertension, left renal atrophy  -- Please see above for acute kidney injury    Diabetes mellitus type 2  -- Currently on Lantus    Hypertension  -- Examined euvolemic at this time  -- Weights have been trending down  -- Reduce Bumex to 1 mg daily  -- Currently normotensive  -- Avoid hypotension    Anemia of chronic disease  -- Was evaluated by hematology  -- No evidence of monoclonal gammopathy  Elevated free light chain assay ratio likely elevated in the setting of kidney disease    Overweight  -- BMI 27 1  --Recommend decreasing portion sizes, encouraging healthy choices of fruits and vegetables, consuming healthier snacks and moderation in carbohydrate intake  Exercise recommendations; 3-5 times a week, the American Heart Association recommends 150 minutes a week moderate exercise  --Strongly recommend evaluation by nutritionist  --Overweight and obesity have been associated with increased mortality and morbidity  Associated with a reduction in life expectancy, associated with increased all cause and cardiovascular mortality  --Metabolic risks, including increased risk of diabetes type 2, insulin resistance with hyperinsulinemia (weight loss of 5 to 7% associated with a decreased risk of type 2 diabetes among individuals with prediabetes and weight loss of 15% can lead to dramatic improvement of diabetes in nearly half of people)  Dyslipidemia, hypertension and heart disease are all increased in patients with obesity  Along with increased risk of stroke increased risk of multiple cancer types  Can also be associated with increased renal dysfunction, strongest risk factor for new onset chronic kidney disease  There is also a degree of compensatory hyperfiltration to meet high in the metabolic demands of increased body weight  This can also result in increased increased risk for nephrolithiasis      Cerebral amyloid angiography  -- Following with neurology  -- Her risk factors include hypertension diabetes hyperlipidemia coronary artery disease and atrial fibrillation  -- MRI revealed innumerable cerebral microbleeds with some white matter changes consistent with probable cerebral amyloid angiopathy    Memory decline  -- Suspected Lewy body dementia    History of CVA  -- Bilateral pontine ischemic strokes  -- Currently on aspirin and statin  -- Blood pressure control    Atrial flutter  -- Off anticoagulation due to cerebral amyloid angiopathy    PATIENT INSTRUCTIONS:    Patient Instructions   1 )  Low 2 g sodium diet    2 )  Monitor weights at home    3 )  Avoid NSAIDs (ibuprofen, Motrin, Advil, Aleve, naproxen)    4 )  Monitor blood pressure at home, call if blood pressure greater than 150/90 persistently    5 ) I will plan to discuss all results including blood work, and/or imaging at our next visit, unless there is an urgent indication, in which case I will call you earlier  If you have any questions or concerns about your results, please feel free to call our office  6 ) Reduce Bumex to once a day  7 ) Check Kidney US, repeat blood work and urine tests          Orders Placed This Encounter   Procedures   • US kidney and bladder     SAHIL     Standing Status:   Future     Standing Expiration Date:   2/16/2027     Scheduling Instructions:      "Prep required if being scheduled in conjunction with other studies, refer to those examination's Preps first before scheduling  All patients for US Kidney and Bladder they must drink 24 oz of water 60 minutes before your scheduled appointment time  This test requires you to have a FULL bladder  Please do not urinate before your test             If you have difficulty holding your urine please arrive 30 minutes early to complete your drinking prep  You may take all of your medications for this test             Pediatric Preps-birth to 12 years             Infants and toddlers to 1 year of age- no drinking prep or restrictions             Toddlers (33 year old)- just give liquids , any clear liquid or juice, and hour prior to the exam             3years old and older- drink liquids (approx   16 to 24 oz and no soda) 30 minutes before, try to not let the child void until the test is completed            Please bring your insurance cards, a form of photo ID and a list of yourmedications with you  Arrive 15 minutes prior to your appointment time in order to register  If you need to have lab work or a urinalysis, please do this AFTER your ultrasound  "            To schedule this appointment, please contact Central Scheduling at [phone]  Order Specific Question:   Is a Renal Artery Doppler also being requested in addition to the Kidney/Renal ultrasound ? Answer:   No   • Basic metabolic panel     This is a patient instruction: Patient fasting for 8 hours or longer recommended  Standing Status:   Future     Standing Expiration Date:   2/16/2024   • Urinalysis with microscopic   • Urea nitrogen, urine   • Creatinine, urine, random       OBJECTIVE:  Current Weight: Weight - Scale: 69 4 kg (153 lb)  Vitals:    02/16/23 1234   BP: 116/64   BP Location: Left arm   Patient Position: Sitting   Cuff Size: Standard   Pulse: 78   Weight: 69 4 kg (153 lb)   Height: 5' 3" (1 6 m)    Body mass index is 27 1 kg/m²  REVIEW OF SYSTEMS:    Review of Systems   Constitutional: Positive for appetite change and fatigue  Negative for activity change and fever  Respiratory: Negative for cough, chest tightness, shortness of breath and wheezing  Cardiovascular: Negative for chest pain and leg swelling  Gastrointestinal: Negative for abdominal pain, diarrhea, nausea and vomiting  Endocrine: Negative for polyuria  Genitourinary: Negative for difficulty urinating, dysuria, flank pain, frequency and urgency  Skin: Negative for rash  Neurological: Negative for dizziness, syncope, light-headedness and headaches  PHYSICAL EXAM:      Physical Exam  Vitals and nursing note reviewed  Exam conducted with a chaperone present  Constitutional:       General: She is not in acute distress  Appearance: She is well-developed  She is ill-appearing     HENT:      Head: Normocephalic and atraumatic  Eyes:      General: No scleral icterus  Conjunctiva/sclera: Conjunctivae normal       Pupils: Pupils are equal, round, and reactive to light  Cardiovascular:      Rate and Rhythm: Normal rate and regular rhythm  Heart sounds: S1 normal and S2 normal  No murmur heard  No friction rub  No gallop  Pulmonary:      Effort: Pulmonary effort is normal  No respiratory distress  Breath sounds: Normal breath sounds  No wheezing or rales  Abdominal:      General: Bowel sounds are normal       Palpations: Abdomen is soft  Tenderness: There is no abdominal tenderness  There is no rebound  Musculoskeletal:         General: Normal range of motion  Cervical back: Normal range of motion and neck supple  Skin:     Findings: No rash  Neurological:      Mental Status: She is alert and oriented to person, place, and time     Psychiatric:         Behavior: Behavior normal          Medications:    Current Outpatient Medications:   •  acetaminophen (TYLENOL) 325 mg tablet, Take 3 tablets (975 mg total) by mouth every 8 (eight) hours, Disp: , Rfl: 0  •  amLODIPine (NORVASC) 2 5 mg tablet, Take 1 tablet (2 5 mg total) by mouth daily, Disp: 30 tablet, Rfl: 3  •  aspirin 81 mg chewable tablet, Chew 81 mg daily, Disp: , Rfl:   •  atorvastatin (LIPITOR) 40 mg tablet, Take 1 tablet (40 mg total) by mouth every evening, Disp: 90 tablet, Rfl: 1  •  bumetanide (BUMEX) 1 mg tablet, Take 1 tablet (1 mg total) by mouth daily, Disp: 180 tablet, Rfl: 1  •  carvedilol (COREG) 25 mg tablet, Take 1 tablet (25 mg total) by mouth every 12 (twelve) hours, Disp: 180 tablet, Rfl: 1  •  Cholecalciferol (Vitamin D) 50 MCG (2000 UT) tablet, Take 1 tablet (2,000 Units total) by mouth daily, Disp: 90 tablet, Rfl: 1  •  divalproex sodium (DEPAKOTE) 250 mg EC tablet, Take 1 tablet (250 mg total) by mouth every 12 (twelve) hours, Disp: 180 tablet, Rfl: 1  •  donepezil (ARICEPT) 5 mg tablet, TAKE 1 TABLET(5 MG) BY MOUTH DAILY AT BEDTIME, Disp: 90 tablet, Rfl: 2  •  fluticasone (FLONASE) 50 mcg/act nasal spray, 1 spray into each nostril daily, Disp: 9 9 mL, Rfl: 0  •  folic acid (FOLVITE) 245 mcg tablet, Take 400 mcg by mouth daily, Disp: , Rfl:   •  Lantus SoloStar 100 units/mL SOPN, Inject 0 12 mL (12 Units total) under the skin daily at bedtime (Patient taking differently: Inject 10 Units under the skin daily at bedtime), Disp: 12 mL, Rfl: 1  •  LORazepam (ATIVAN) 0 5 mg tablet, Take 0 5 mg by mouth if needed for anxiety, Disp: , Rfl:   •  meclizine (ANTIVERT) 12 5 MG tablet, Take 1 tablet (12 5 mg total) by mouth every 8 (eight) hours as needed for dizziness, Disp: 30 tablet, Rfl: 0  •  ranolazine (RANEXA) 500 mg 12 hr tablet, Take 1 tablet (500 mg total) by mouth daily, Disp: 90 tablet, Rfl: 1  •  B-D UF III MINI PEN NEEDLES 31G X 5 MM MISC, Use to test qd, Disp: 100 each, Rfl: 11    Laboratory Results:        Invalid input(s): ALBUMIN    Results for orders placed or performed in visit on 81/37/59   Basic metabolic panel   Result Value Ref Range    Sodium 136 135 - 147 mmol/L    Potassium 4 6 3 5 - 5 3 mmol/L    Chloride 97 96 - 108 mmol/L    CO2 28 21 - 32 mmol/L    ANION GAP 11 4 - 13 mmol/L    BUN 52 (H) 5 - 25 mg/dL    Creatinine 3 32 (H) 0 60 - 1 30 mg/dL    Glucose 87 65 - 140 mg/dL    Calcium 8 2 (L) 8 4 - 10 2 mg/dL    eGFR 12 ml/min/1 73sq m   HEMOGLOBIN A1C W/ EAG ESTIMATION   Result Value Ref Range    Hemoglobin A1C 5 6 Normal 3 8-5 6%; PreDiabetic 5 7-6 4%;  Diabetic >=6 5%; Glycemic control for adults with diabetes <7 0% %     mg/dl

## 2023-02-16 NOTE — PATIENT INSTRUCTIONS
1  )  Low 2 g sodium diet    2 )  Monitor weights at home    3 )  Avoid NSAIDs (ibuprofen, Motrin, Advil, Aleve, naproxen)    4 )  Monitor blood pressure at home, call if blood pressure greater than 150/90 persistently    5 ) I will plan to discuss all results including blood work, and/or imaging at our next visit, unless there is an urgent indication, in which case I will call you earlier  If you have any questions or concerns about your results, please feel free to call our office  6 ) Reduce Bumex to once a day      7 ) Check Kidney US, repeat blood work and urine tests

## 2023-02-20 ENCOUNTER — OFFICE VISIT (OUTPATIENT)
Dept: OCCUPATIONAL THERAPY | Facility: CLINIC | Age: 78
End: 2023-02-20

## 2023-02-20 ENCOUNTER — RA CDI HCC (OUTPATIENT)
Dept: OTHER | Facility: HOSPITAL | Age: 78
End: 2023-02-20

## 2023-02-20 DIAGNOSIS — Z78.9 SELF-CARE DEFICIT: ICD-10-CM

## 2023-02-20 DIAGNOSIS — R41.89 COGNITIVE DEFICITS: ICD-10-CM

## 2023-02-20 DIAGNOSIS — F02.B0 MODERATE LEWY BODY DEMENTIA, UNSPECIFIED WHETHER BEHAVIORAL, PSYCHOTIC, OR MOOD DISTURBANCE OR ANXIETY (HCC): ICD-10-CM

## 2023-02-20 DIAGNOSIS — E85.4 CEREBRAL AMYLOID ANGIOPATHY (HCC): Primary | ICD-10-CM

## 2023-02-20 DIAGNOSIS — G31.83 MODERATE LEWY BODY DEMENTIA, UNSPECIFIED WHETHER BEHAVIORAL, PSYCHOTIC, OR MOOD DISTURBANCE OR ANXIETY (HCC): ICD-10-CM

## 2023-02-20 DIAGNOSIS — I68.0 CEREBRAL AMYLOID ANGIOPATHY (HCC): Primary | ICD-10-CM

## 2023-02-20 NOTE — PROGRESS NOTES
Today's Date: 2023  Patient Name: Sergio Santo  : 1945  MRN: [id number]  Referring Provider: Viola Apple DO  Dx: Cerebral amyloid angiopathy ([facility] [address] ) [E85 4, I68 0]    Subjective: Pt reports no changes since last session  Objective:  NMRE:  -Massed practice STS from EOM with stool under feet to promote 90-90-90 positioning  Noted pt consistently leaning to L despite verbal tactile cues  Placed 1" box under L foot to promote RLE WB  Completed x20 reps total  Reports 7/10 rate of perceived exertion  Took intermittent rest breaks as needed d/t fatigue  Required modA consistently, at best Castro, retropulsive throughout   -Standing in // bars pt completed static standing balance/standing endurance activity with transferring rings from L<>R  Completed 2x6 reps with ~3 minute seated rest between attempts d/t fatigue  TE:  -Rows with green theraband 3x15 while sitting unsupported at EOM to improve UB strength for ADLs and mobility tasks  Assessment: Pt tolerated treatment well  Followed up regarding recommendation for OP PT and son stated pt is going to see her PCP later this week and they will ask for referral      Plan: Continue POC

## 2023-02-20 NOTE — PROGRESS NOTES
[facility]  coding opportunities        I49 5 and I13 0  Chart Reviewed number of suggestions sent to Provider: 2     Patients Insurance     Medicare Insurance: [address] [place] [address]

## 2023-02-22 ENCOUNTER — OFFICE VISIT (OUTPATIENT)
Dept: OCCUPATIONAL THERAPY | Facility: CLINIC | Age: 78
End: 2023-02-22

## 2023-02-22 DIAGNOSIS — E85.4 CEREBRAL AMYLOID ANGIOPATHY (HCC): Primary | ICD-10-CM

## 2023-02-22 DIAGNOSIS — F02.B0 MODERATE LEWY BODY DEMENTIA, UNSPECIFIED WHETHER BEHAVIORAL, PSYCHOTIC, OR MOOD DISTURBANCE OR ANXIETY (HCC): ICD-10-CM

## 2023-02-22 DIAGNOSIS — G31.83 MODERATE LEWY BODY DEMENTIA, UNSPECIFIED WHETHER BEHAVIORAL, PSYCHOTIC, OR MOOD DISTURBANCE OR ANXIETY (HCC): ICD-10-CM

## 2023-02-22 DIAGNOSIS — I68.0 CEREBRAL AMYLOID ANGIOPATHY (HCC): Primary | ICD-10-CM

## 2023-02-22 DIAGNOSIS — Z78.9 SELF-CARE DEFICIT: ICD-10-CM

## 2023-02-22 NOTE — PROGRESS NOTES
Today's Date: 2023  Patient Name: Cara Pink  : 1945  MRN: [id number]  Referring Provider: Caterina Bateman DO  Dx: Cerebral amyloid angiopathy ([facility] [address] ) [E85 4, I68 0]    Subjective: Pt reports no changes since last session  Objective:  NMRE:  -STS x6 from EOM with wooden box under LLE to promote RLE WB d/t pt leaning to L side  Noted pt required verbal cues for weight shifting anteriorly  Pt required mod assistance initially and progressed to min assistance with transferring from STS  -Standing with RW completing static balance and standing endurance activity, pt transferring colored rings anteriorly from LUE to RUE  Upgraded to pt transferring colored ring from LUE to RUE posteriorly  Pt tolerated 2 minutes of standing before requiring rest break d/t fatgiue for x2 rounds  TE:  -Triceps with green theraband 1x10 while sitting unsupprted at EOM to address core strength, trunk control, and elbow extension strengthening      Assessment: Pt tolerated treatment well  Pt demonstrating increased standing tolerance during static balance activities  Plan: Continue POC

## 2023-02-24 ENCOUNTER — OFFICE VISIT (OUTPATIENT)
Dept: FAMILY MEDICINE CLINIC | Facility: CLINIC | Age: 78
End: 2023-02-24

## 2023-02-24 VITALS
RESPIRATION RATE: 16 BRPM | BODY MASS INDEX: 26.58 KG/M2 | WEIGHT: 150 LBS | SYSTOLIC BLOOD PRESSURE: 118 MMHG | HEIGHT: 63 IN | HEART RATE: 74 BPM | DIASTOLIC BLOOD PRESSURE: 80 MMHG | OXYGEN SATURATION: 98 %

## 2023-02-24 DIAGNOSIS — Z78.0 POST-MENOPAUSAL: ICD-10-CM

## 2023-02-24 DIAGNOSIS — I68.0 CEREBRAL AMYLOID ANGIOPATHY (HCC): ICD-10-CM

## 2023-02-24 DIAGNOSIS — E85.4 CEREBRAL AMYLOID ANGIOPATHY (HCC): ICD-10-CM

## 2023-02-24 DIAGNOSIS — E11.9 DIABETES MELLITUS TYPE 2, INSULIN DEPENDENT (HCC): Primary | ICD-10-CM

## 2023-02-24 DIAGNOSIS — Z79.4 DIABETES MELLITUS TYPE 2, INSULIN DEPENDENT (HCC): Primary | ICD-10-CM

## 2023-02-24 DIAGNOSIS — I48.92 ATRIAL FLUTTER, PAROXYSMAL (HCC): ICD-10-CM

## 2023-02-24 DIAGNOSIS — E11.22 TYPE 2 DIABETES MELLITUS WITH STAGE 3B CHRONIC KIDNEY DISEASE, WITH LONG-TERM CURRENT USE OF INSULIN (HCC): ICD-10-CM

## 2023-02-24 DIAGNOSIS — I50.9 CHRONIC CONGESTIVE HEART FAILURE, UNSPECIFIED HEART FAILURE TYPE (HCC): ICD-10-CM

## 2023-02-24 DIAGNOSIS — N18.32 TYPE 2 DIABETES MELLITUS WITH STAGE 3B CHRONIC KIDNEY DISEASE, WITH LONG-TERM CURRENT USE OF INSULIN (HCC): ICD-10-CM

## 2023-02-24 DIAGNOSIS — R09.89 ABSENT PULSE IN LOWER EXTREMITY: ICD-10-CM

## 2023-02-24 DIAGNOSIS — Z79.4 TYPE 2 DIABETES MELLITUS WITH STAGE 3B CHRONIC KIDNEY DISEASE, WITH LONG-TERM CURRENT USE OF INSULIN (HCC): ICD-10-CM

## 2023-02-24 RX ORDER — INSULIN GLARGINE 100 [IU]/ML
6 INJECTION, SOLUTION SUBCUTANEOUS
Qty: 6 ML | Refills: 0 | Status: SHIPPED | OUTPATIENT
Start: 2023-02-24 | End: 2023-05-25

## 2023-02-27 ENCOUNTER — APPOINTMENT (OUTPATIENT)
Dept: OCCUPATIONAL THERAPY | Facility: CLINIC | Age: 78
End: 2023-02-27

## 2023-03-20 ENCOUNTER — TELEPHONE (OUTPATIENT)
Dept: LAB | Facility: HOSPITAL | Age: 78
End: 2023-03-20

## 2023-03-24 ENCOUNTER — APPOINTMENT (OUTPATIENT)
Dept: LAB | Facility: CLINIC | Age: 78
End: 2023-03-24

## 2023-03-24 DIAGNOSIS — D63.1 ANEMIA OF CHRONIC KIDNEY FAILURE, STAGE 4 (SEVERE) (HCC): ICD-10-CM

## 2023-03-24 DIAGNOSIS — N18.4 CKD (CHRONIC KIDNEY DISEASE), STAGE IV (HCC): ICD-10-CM

## 2023-03-24 DIAGNOSIS — E11.9 DIABETES MELLITUS TYPE 2, INSULIN DEPENDENT (HCC): ICD-10-CM

## 2023-03-24 DIAGNOSIS — N18.32 STAGE 3B CHRONIC KIDNEY DISEASE (HCC): ICD-10-CM

## 2023-03-24 DIAGNOSIS — N18.4 ANEMIA OF CHRONIC KIDNEY FAILURE, STAGE 4 (SEVERE) (HCC): ICD-10-CM

## 2023-03-24 DIAGNOSIS — Z79.4 DIABETES MELLITUS TYPE 2, INSULIN DEPENDENT (HCC): ICD-10-CM

## 2023-03-24 DIAGNOSIS — N18.4 CKD (CHRONIC KIDNEY DISEASE) STAGE 4, GFR 15-29 ML/MIN (HCC): ICD-10-CM

## 2023-03-24 DIAGNOSIS — N17.9 AKI (ACUTE KIDNEY INJURY) (HCC): ICD-10-CM

## 2023-03-24 LAB
25(OH)D3 SERPL-MCNC: 73.4 NG/ML (ref 30–100)
ALBUMIN SERPL BCP-MCNC: 2.5 G/DL (ref 3.5–5)
ALP SERPL-CCNC: 47 U/L (ref 46–116)
ALT SERPL W P-5'-P-CCNC: 11 U/L (ref 12–78)
ANION GAP SERPL CALCULATED.3IONS-SCNC: 2 MMOL/L (ref 4–13)
AST SERPL W P-5'-P-CCNC: 16 U/L (ref 5–45)
BASOPHILS # BLD AUTO: 0.02 THOUSANDS/ÂΜL (ref 0–0.1)
BASOPHILS NFR BLD AUTO: 0 % (ref 0–1)
BILIRUB SERPL-MCNC: 0.61 MG/DL (ref 0.2–1)
BUN SERPL-MCNC: 32 MG/DL (ref 5–25)
CALCIUM ALBUM COR SERPL-MCNC: 10.1 MG/DL (ref 8.3–10.1)
CALCIUM SERPL-MCNC: 8.9 MG/DL (ref 8.3–10.1)
CHLORIDE SERPL-SCNC: 99 MMOL/L (ref 96–108)
CHOLEST SERPL-MCNC: 94 MG/DL
CO2 SERPL-SCNC: 33 MMOL/L (ref 21–32)
CREAT SERPL-MCNC: 2.37 MG/DL (ref 0.6–1.3)
EOSINOPHIL # BLD AUTO: 0 THOUSAND/ÂΜL (ref 0–0.61)
EOSINOPHIL NFR BLD AUTO: 0 % (ref 0–6)
ERYTHROCYTE [DISTWIDTH] IN BLOOD BY AUTOMATED COUNT: 14.5 % (ref 11.6–15.1)
GFR SERPL CREATININE-BSD FRML MDRD: 19 ML/MIN/1.73SQ M
GLUCOSE P FAST SERPL-MCNC: 108 MG/DL (ref 65–99)
HCT VFR BLD AUTO: 26.3 % (ref 34.8–46.1)
HDLC SERPL-MCNC: 45 MG/DL
HGB BLD-MCNC: 8.3 G/DL (ref 11.5–15.4)
IGA SERPL-MCNC: 278 MG/DL (ref 70–400)
IGG SERPL-MCNC: 1540 MG/DL (ref 700–1600)
IGM SERPL-MCNC: 30 MG/DL (ref 40–230)
IMM GRANULOCYTES # BLD AUTO: 0.03 THOUSAND/UL (ref 0–0.2)
IMM GRANULOCYTES NFR BLD AUTO: 1 % (ref 0–2)
LDLC SERPL CALC-MCNC: 35 MG/DL (ref 0–100)
LYMPHOCYTES # BLD AUTO: 1.58 THOUSANDS/ÂΜL (ref 0.6–4.47)
LYMPHOCYTES NFR BLD AUTO: 31 % (ref 14–44)
MCH RBC QN AUTO: 30.3 PG (ref 26.8–34.3)
MCHC RBC AUTO-ENTMCNC: 31.6 G/DL (ref 31.4–37.4)
MCV RBC AUTO: 96 FL (ref 82–98)
MONOCYTES # BLD AUTO: 0.57 THOUSAND/ÂΜL (ref 0.17–1.22)
MONOCYTES NFR BLD AUTO: 11 % (ref 4–12)
NEUTROPHILS # BLD AUTO: 2.86 THOUSANDS/ÂΜL (ref 1.85–7.62)
NEUTS SEG NFR BLD AUTO: 57 % (ref 43–75)
NRBC BLD AUTO-RTO: 0 /100 WBCS
PHOSPHATE SERPL-MCNC: 3.1 MG/DL (ref 2.3–4.1)
PLATELET # BLD AUTO: 172 THOUSANDS/UL (ref 149–390)
PMV BLD AUTO: 10.2 FL (ref 8.9–12.7)
POTASSIUM SERPL-SCNC: 3.8 MMOL/L (ref 3.5–5.3)
PROT SERPL-MCNC: 6.4 G/DL (ref 6.4–8.4)
PTH-INTACT SERPL-MCNC: 64.1 PG/ML (ref 18.4–80.1)
RBC # BLD AUTO: 2.74 MILLION/UL (ref 3.81–5.12)
SODIUM SERPL-SCNC: 134 MMOL/L (ref 135–147)
TRIGL SERPL-MCNC: 68 MG/DL
WBC # BLD AUTO: 5.06 THOUSAND/UL (ref 4.31–10.16)

## 2023-03-25 LAB
KAPPA LC FREE SER-MCNC: 126.1 MG/L (ref 3.3–19.4)
KAPPA LC FREE/LAMBDA FREE SER: 1.85 {RATIO} (ref 0.26–1.65)
LAMBDA LC FREE SERPL-MCNC: 68.2 MG/L (ref 5.7–26.3)

## 2023-03-27 ENCOUNTER — OFFICE VISIT (OUTPATIENT)
Dept: HEMATOLOGY ONCOLOGY | Facility: CLINIC | Age: 78
End: 2023-03-27

## 2023-03-27 ENCOUNTER — HOSPITAL ENCOUNTER (OUTPATIENT)
Dept: VASCULAR ULTRASOUND | Facility: HOSPITAL | Age: 78
Discharge: HOME | End: 2023-03-27

## 2023-03-27 VITALS
HEIGHT: 63 IN | SYSTOLIC BLOOD PRESSURE: 100 MMHG | TEMPERATURE: 97.3 F | BODY MASS INDEX: 26.75 KG/M2 | HEART RATE: 88 BPM | OXYGEN SATURATION: 93 % | DIASTOLIC BLOOD PRESSURE: 50 MMHG | RESPIRATION RATE: 16 BRPM | WEIGHT: 151 LBS

## 2023-03-27 DIAGNOSIS — D62 ACUTE BLOOD LOSS ANEMIA (ABLA): Primary | ICD-10-CM

## 2023-03-27 DIAGNOSIS — R09.89 ABSENT PULSE IN LOWER EXTREMITY: ICD-10-CM

## 2023-03-27 NOTE — PROGRESS NOTES
Hematology Outpatient Follow - Up Note  Sallie Apley 68 y o  female MRN: @ Encounter: [id number]        Date:  3/27/2023        Assessment/ Plan:    Anemia secondary to chronic kidney disease grade 4, she received IV iron, hemoglobin improved from 7 9-8 3, the patient has sedentary lifestyle, we talked about retroperitoneal replacement shots every month however she is in her normal condition and she is sedentary so we decided not to proceed with erythropoietin    Follow-up on as-needed basis        Labs and imaging studies are reviewed by ordering provider once results are available  If there are findings that need immediate attention, you will be contacted when results available  Discussing results and the implication on your healthcare is best discussed in person at your follow-up visit  HPI:    66-year-old  female with history of congestive heart failure, diabetes mellitus type 2, status post pacer, dementia, chronic kidney disease from at least 2017, she had been with anemia since at least 2017 with a hemoglobin of 9 9, the patient admitted to the hospital in June 2022 with fracture of the left hip status post ORIF, subsequently she received blood transfusion, she was admitted again in November 2022 with exacerbation of congestive heart failure, fluid overload status post IV diuresis     As work-up by nephrology she was found to have abnormal kappa and lambda light chain with kappa light chain 100, lambda light chain of 43 with a ratio of 2 4, CMP showed chronic kidney disease, normal albumin, total protein     She has a family history of anemia     She does not smoke or drink     She has a sedentary lifestyle     After IV diuresis she feels much    She received IV iron, hemoglobin improved to 8 3 and from 7 9  Interval History:        Previous Treatment:         Test Results:    Imaging: No results found      Labs:   Lab Results   Component Value Date    WBC 5 06 03/24/2023    HGB 8 3 (L) 03/24/2023    HCT 26 3 (L) 03/24/2023    MCV 96 03/24/2023     03/24/2023     Lab Results   Component Value Date    K 3 8 03/24/2023    CL 99 03/24/2023    CO2 33 (H) 03/24/2023    BUN 32 (H) 03/24/2023    CREATININE 2 37 (H) 03/24/2023    GLUCOSE 78 06/24/2022    GLUF 108 (H) 03/24/2023    CALCIUM 8 9 03/24/2023    CORRECTEDCA 10 1 03/24/2023    AST 16 03/24/2023    ALT 11 (L) 03/24/2023    ALKPHOS 47 03/24/2023    EGFR 19 03/24/2023       Lab Results   Component Value Date    IRON 87 11/15/2022    TIBC 252 11/15/2022    FERRITIN 63 11/15/2022       Lab Results   Component Value Date    AYYSGJGF86 1,244 (H) 08/03/2022         ROS: Review of Systems   Constitutional: Negative for appetite change, chills, diaphoresis, fatigue and unexpected weight change  HENT:   Negative for mouth sores, nosebleeds, sore throat, trouble swallowing and voice change  Eyes: Negative for eye problems and icterus  Respiratory: Negative for chest tightness, cough, hemoptysis and wheezing  Cardiovascular: Negative for chest pain, leg swelling and palpitations  Gastrointestinal: Positive for blood in stool  Negative for abdominal distention, abdominal pain, constipation, diarrhea, nausea and vomiting  Endocrine: Negative for hot flashes  Genitourinary: Negative for bladder incontinence, difficulty urinating, dyspareunia, dysuria and frequency  Musculoskeletal: Positive for gait problem  Negative for arthralgias, back pain, neck pain and neck stiffness  Skin: Negative for itching and rash  Neurological: Positive for gait problem and numbness  Negative for dizziness, headaches, seizures and speech difficulty  Hematological: Negative for adenopathy  Does not bruise/bleed easily  Psychiatric/Behavioral: Negative for decreased concentration, depression, sleep disturbance and suicidal ideas  The patient is not nervous/anxious             Current Medications: Reviewed  Allergies: Reviewed  PMH/FH/SH: Reviewed      Physical Exam:    Body surface area is 1 72 meters squared  Wt Readings from Last 3 Encounters:   03/27/23 68 5 kg (151 lb)   02/24/23 68 kg (150 lb)   02/16/23 69 4 kg (153 lb)        Temp Readings from Last 3 Encounters:   03/27/23 (!) 97 3 °F (36 3 °C) (Temporal)   02/08/23 98 7 °F (37 1 °C) (Temporal)   02/01/23 98 2 °F (36 8 °C)        BP Readings from Last 3 Encounters:   03/27/23 100/50   02/24/23 118/80   02/16/23 116/64         Pulse Readings from Last 3 Encounters:   03/27/23 88   02/24/23 74   02/16/23 78        Physical Exam  Constitutional:       Appearance: Normal appearance  Cardiovascular:      Heart sounds: Murmur heard  Musculoskeletal:      Cervical back: Normal range of motion  Neurological:      General: No focal deficit present  Mental Status: She is alert and oriented to person, place, and time  Psychiatric:         Mood and Affect: Mood normal          Thought Content: Thought content normal          ECOG:3    Goals and Barriers:  Current Goal: Minimize effects of disease  Barriers: None  Patient's Capacity to Self Care:  Patient is able to self care      Code Status: [unfilled]

## 2023-03-28 ENCOUNTER — TELEPHONE (OUTPATIENT)
Dept: FAMILY MEDICINE CLINIC | Facility: CLINIC | Age: 78
End: 2023-03-28

## 2023-03-28 NOTE — TELEPHONE ENCOUNTER
----- Message from Trini Muniz MD sent at 3/26/2023  7:23 PM EDT -----  Other than the anemia this is fine   Cont with heme/ onc for that please

## 2023-03-31 ENCOUNTER — TELEPHONE (OUTPATIENT)
Dept: NEPHROLOGY | Facility: CLINIC | Age: 78
End: 2023-03-31

## 2023-03-31 PROBLEM — E87.6 HYPOKALEMIA: Status: RESOLVED | Noted: 2020-07-12 | Resolved: 2023-03-31

## 2023-03-31 PROBLEM — N17.9 AKI (ACUTE KIDNEY INJURY) (HCC): Status: RESOLVED | Noted: 2023-02-16 | Resolved: 2023-03-31

## 2023-04-04 ENCOUNTER — TELEPHONE (OUTPATIENT)
Dept: FAMILY MEDICINE CLINIC | Facility: CLINIC | Age: 78
End: 2023-04-04

## 2023-04-04 NOTE — TELEPHONE ENCOUNTER
----- Message from Parag Blackwell MD sent at 3/31/2023  4:36 PM EDT -----  Mild PAD noted   The treatment would be Aspirin and atorvastatin   But I seam to recall she is already on aspirin and atorva

## 2023-04-05 DIAGNOSIS — I25.10 CORONARY ARTERY DISEASE INVOLVING NATIVE CORONARY ARTERY OF NATIVE HEART WITHOUT ANGINA PECTORIS: ICD-10-CM

## 2023-04-05 RX ORDER — RANOLAZINE 500 MG/1
500 TABLET, EXTENDED RELEASE ORAL DAILY
Qty: 90 TABLET | Refills: 0 | Status: SHIPPED | OUTPATIENT
Start: 2023-04-05 | End: 2023-05-05

## 2023-05-05 DIAGNOSIS — G31.83 LEWY BODY DEMENTIA (HCC): ICD-10-CM

## 2023-05-05 DIAGNOSIS — F02.80 LEWY BODY DEMENTIA (HCC): ICD-10-CM

## 2023-05-05 DIAGNOSIS — I10 HYPERTENSION: ICD-10-CM

## 2023-05-07 RX ORDER — DONEPEZIL HYDROCHLORIDE 5 MG/1
TABLET, FILM COATED ORAL
Qty: 90 TABLET | Refills: 2 | Status: SHIPPED | OUTPATIENT
Start: 2023-05-07

## 2023-05-08 ENCOUNTER — TELEPHONE (OUTPATIENT)
Dept: NEUROLOGY | Facility: CLINIC | Age: 78
End: 2023-05-08

## 2023-05-08 RX ORDER — AMLODIPINE BESYLATE 2.5 MG/1
2.5 TABLET ORAL DAILY
Qty: 30 TABLET | Refills: 0 | Status: SHIPPED | OUTPATIENT
Start: 2023-05-08

## 2023-05-08 RX ORDER — AMLODIPINE BESYLATE 2.5 MG/1
2.5 TABLET ORAL DAILY
Qty: 90 TABLET | Refills: 3 | Status: SHIPPED | OUTPATIENT
Start: 2023-05-08

## 2023-05-09 ENCOUNTER — OFFICE VISIT (OUTPATIENT)
Dept: NEUROLOGY | Facility: CLINIC | Age: 78
End: 2023-05-09

## 2023-05-09 VITALS
WEIGHT: 150 LBS | DIASTOLIC BLOOD PRESSURE: 70 MMHG | SYSTOLIC BLOOD PRESSURE: 122 MMHG | HEART RATE: 72 BPM | BODY MASS INDEX: 26.58 KG/M2 | HEIGHT: 63 IN

## 2023-05-09 DIAGNOSIS — F02.80 LEWY BODY DEMENTIA (HCC): Primary | ICD-10-CM

## 2023-05-09 DIAGNOSIS — E85.4 CEREBRAL AMYLOID ANGIOPATHY (HCC): ICD-10-CM

## 2023-05-09 DIAGNOSIS — R29.90 STROKE-LIKE SYMPTOMS: ICD-10-CM

## 2023-05-09 DIAGNOSIS — Z86.73 HISTORY OF STROKE: ICD-10-CM

## 2023-05-09 DIAGNOSIS — G47.9 SLEEP DISTURBANCE: ICD-10-CM

## 2023-05-09 DIAGNOSIS — I25.10 CORONARY ARTERY DISEASE INVOLVING NATIVE CORONARY ARTERY OF NATIVE HEART WITHOUT ANGINA PECTORIS: ICD-10-CM

## 2023-05-09 DIAGNOSIS — I68.0 CEREBRAL AMYLOID ANGIOPATHY (HCC): ICD-10-CM

## 2023-05-09 DIAGNOSIS — G31.83 LEWY BODY DEMENTIA (HCC): Primary | ICD-10-CM

## 2023-05-09 RX ORDER — ATORVASTATIN CALCIUM 20 MG/1
20 TABLET, FILM COATED ORAL EVERY EVENING
Qty: 90 TABLET | Refills: 1 | Status: SHIPPED | OUTPATIENT
Start: 2023-05-09

## 2023-05-09 NOTE — PROGRESS NOTES
NEUROLOGY RESIDENCY CLINIC     PROGRESS NOTE         Patient ID: Isidro Mejia is a 68 y o  female  Assessment/Plan:    Suspected Lewy body dementia ([facility] [address] )  Patient here in follow up for suspected LBD  Continues to have slow progression of memory difficulties and requires assistance with all ADL's and IADL's  Since last visit she is now completely blind which limits her ability to participate in activities  Limited walking due to vision  Spends her day listening to the TV  Since starting Donepezil hallucinations have resolved  Family also have noticed that her mood is better as well, less irritable  Very happy with this medication and would like to continue at 5 mg daily  Continue to avoid benzodiazepines as they can cause a paradoxical response in patient's with LBD  Continue her home VPA  Will also refer to Geriatrics as they may have additional resources for Gilda or her family such as day programs, transportation arrangements, family support, etc  Encouraged Gilda to remain as physically, mentally, and socially engaged as possible  Follow up in approximately 1 year  Sleep disturbance  Difficulty with sleep-wake cycle, likely as a result of blindness as well as dementia  Tends to sleep during the day and is up at night  Recommended starting low-dose melatonin, 1 to 3 mg, daily at sundown to help regulate sleep-wake cycle  Cerebral amyloid angiopathy (HCC)  Probable CAA based on the [facility] criteria 2 0  On prior MRI noted ot have innumerable microbleeds and white matter changes  Additionally has had ischemic appearing infarcts that are likely due to small vessel disease  We again discussed the risks/benefits of anticoagulation in the setting of concurrent A-fib and CAA  Would continue to avoid all AC due to the heightened risk of bleeding  Family is agreeable and understands the risks  History of stroke  Small vessel appearing ischemic strokes in the bilateral ronen   Continue ASA 81 mg daily with the understanding that this slightly increases bleeding risk  Most recent LDL was 35  Recommend reducing atorvastatin from 40 mg to 20 mg daily as a too low LDL can also increase the risk of cerebral hemorrhage  Will defer management of other vascular risk factors to the care of her PCP  Diagnoses and all orders for this visit:    Lewy body dementia [facility])  -     Ambulatory Referral to Senior Care; Future    C[facility] ([facility] )  -     Ambulatory Referral to Senior Care; Future    History of stroke    Stroke-like symptoms  -     atorvastatin (LIPITOR) 20 mg tablet; Take 1 tablet (20 mg total) by mouth every evening    Coronary artery disease involving native coronary artery of native heart without angina pectoris  -     atorvastatin (LIPITOR) 20 mg tablet; Take 1 tablet (20 mg total) by mouth every evening    Sleep disturbance         Subjective:    HPI    I had the pleasure of seeing your patient, Alton Cortez, today in the Neurology clinic for follow up  Chance Forte is a 68 y o  female with history of Q7IS complicated by blindness, A-fib now off of AC due to MRI concerning for CAA, HTN, and MI, who presents today for follow up regarding CAA, suspected LBD, and history of stroke  She is accompanied today by her son  Son reports that Chance Forte is doing much better since last visit  They have noticed a dramatic change since starting Donepezil  After about 5 days of taking it, she stopped having hallucinations  Additionally she is less irritable and mood is slightly improved  They have continued with the 5 mg dose (did not increase to 10 mg) and would like to continue at this dose today  Son also notes that she is having difficulty with sleeping  Tends to nap on and off throughout the day and is then up all night  She is also now completely blind and cannot really perceive light  Suspect that sleep-wake cycle is affected   Have not tried melatonin in the past     As for stroke, continues to do well  Remains on ASA 81 mg daily and statin  Reviewed recent labs and most recent LDL was low     Primarily stays in wheelchair  Can get up and walk short distances but is unsteady  On a few occurrences, they have noticed that her legs will start shaking and she will become pre-syncopal within a few seconds to a minute after standing  She is unable to describe the experiences herself  She is on several antihypertensives and BP is normal while sitting  Suspect possible component of orthostasis  Otherwise doing well  No recent illnesses, injuries, hospitalizations, or falls  Intake History 1/10/23: To review, patient presented to [address] [place] [address] as a stroke alert on the morning of 10/8/22, in the setting of missing approximately 1 week of Xarelto  She awoke at her baseline at around 0530 and developed acute onset dysarthria, dysphagia, right facial droop, and RUE weakness at approximately 5912-7982 that morning  She presented to the ED at 0646  At initial presentation, NIHSS was 3-4 for dysarthria and her baseline blindness  CT/CTA were both negative  She was back to baseline by the time CT imaging was completed  She was therefore not a candidate for thrombolytics or endovascular intervention  Due to high-risk TIA patient was admitted for further evaluation  MRI was performed on 10/10/22  While there was no evidence of acute or subacute stroke, there were innumerable chronic microhemorrhages consistent with the diagnosis cerebral amyloid angiopathy  As such, her home Xarelto was discontinued  She was advised to continue taking 81 mg ASA daily due to vascular risk factors  TTE found LVEF of 65%, no regional wall motion abnormality, no PFO, and mildly increased pulmonary artery systolic pressure  Additionally diagnosed with possible LBD due to cognitive changes, vivid dreams/hallucinations   There was also question of Cynda Deal syndrome due to her history of blindness but this diagnosis "was disfavored  She was started on Depakote 250 mg BID for suspected LBD  An EEG was ordered during hospital stay but it was not completed and has been discontinued      Since hospitalization, Abilio Mercedes has continued to have both cognitive and behavioral concerns  Family notes that she is having hallucinations - sees people in her room or children  Often will see hallucinations related to a recent conversation  She tells the hallucinations to get out  Does not see animals  She has been blind for approximately 10-years  Intermittent episodes of agitation, particularly when she is confused about something  For example, one day out of the blue started screaming that someone in the \"store\" that they were in was injured and needed an ambulance  In actuality she was at home in her chair and there was nobody injured  Family called her PCP for recommendations and was prescribed ativan for her agitation  Tried it one time and will not take it again -- slept for 3 days straight from the ativan  Does not seem to understand that she has memory loss  Very limited with her mobility  Needs help standing and walking using a walker  No recent falls to report  Family puts her chair directly in front of her bed at night so that she cannot get up and out of the room  Does not wander due to her mobility issues  Needs help with all ADLs including bathing, toileting, changing clothes, etc  Stopped performing IADL's years ago primarily due to her decline in vision  Does not use the stove or cook  Continues to have difficulties with sleep   Using Tylenol PM  Denies vivid dreams           Summary of Recent Hospitalization:  - Admit date: 10/8/2022  - D/c date: 10/14/2022  - Initial NIHSS: 3-4  - tPA candidate?: no  - thrombectomy?: no  - Please see significant findings as outlined below  - D/c medications: ASA 81 mg daily, pravastatin 40 mg daily changed to atorvastatin 40 mg daily        Workup:  Labs:  - Labs 10/5/22: HgbA1c 7 1%, LDL " 49     Imaging:  - 10/8/22 CT head: No acute intracranial abnormality   Chronic microangiopathic changes   Chronic lacunar infarct in the ronen  - 10/8/22 CTA: No significant carotid or vertebral artery stenosis  No focal intracranial stenosis or aneurysm  - 10/10/22 MRI brain: 1  Innumerable foci of blooming artifact most suggestive of amyloid angiopathy   Differential diagnosis could include sequela of chronic hypertension/chronic hypertensive encephalopathy   No acute intracranial hemorrhage  2   No acute infarction  3   Moderate, chronic microangiopathy  4   Chronic appearing bilateral pontine lacunar infarctions increased from the prior study  - 10/10/2022 TTE:   •  Left Ventricle: Left ventricular cavity size is normal  Wall thickness is normal  The left ventricular ejection fraction is 65%  Systolic function is normal  Although no diagnostic regional wall motion abnormality was identified, this possibility cannot be completely excluded on the basis of this study  Diastolic function is normal   •  Right Ventricle: Right ventricular cavity size is normal  Systolic function is normal   •  Atrial Septum: No patent foramen ovale detected by saline bubble injection, at rest or with provocation by Valsalva  •  Pulmonary Artery: The estimated pulmonary artery systolic pressure is 93 2 mmHg  The pulmonary artery systolic pressure is mildly increased         STROKE RISK FACTORS:     []? Current or chronic hx of tobacco use  [x]? Elevated cholesterol  [x]? History of elevated BP  [x]? History of diabetes  [x]? Atrial fibrillation  []? History of IVDU  []? History of endocarditis  []? History of cancer  [x]?  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)      The following portions of the patient's history were reviewed and updated as appropriate: allergies, current medications, past family history, past medical history, past social history, past surgical history, and problem list  "  Objective:    Blood pressure 122/70, pulse 72, height 5' 3\" (1 6 m), weight 68 kg (150 lb)  Physical Exam  Vitals and nursing note reviewed  Constitutional:       General: She is awake  She is not in acute distress  Appearance: Normal appearance  She is ill-appearing (chronically)  HENT:      Head: Normocephalic and atraumatic  Nose: Nose normal       Mouth/Throat:      Mouth: Mucous membranes are moist       Pharynx: Oropharynx is clear  Eyes:      Conjunctiva/sclera: Conjunctivae normal    Cardiovascular:      Rate and Rhythm: Normal rate  Pulses: Normal pulses  Pulmonary:      Effort: Pulmonary effort is normal  No respiratory distress  Abdominal:      General: Abdomen is flat  There is no distension  Musculoskeletal:      Cervical back: Normal range of motion and neck supple  Right lower leg: No edema  Left lower leg: No edema  Skin:     General: Skin is warm and dry  Capillary Refill: Capillary refill takes less than 2 seconds  Neurological:      Mental Status: She is alert  Deep Tendon Reflexes:      Reflex Scores:       Bicep reflexes are 1+ on the right side and 1+ on the left side  Brachioradialis reflexes are 1+ on the right side and 1+ on the left side  Patellar reflexes are 1+ on the right side and 1+ on the left side  Achilles reflexes are 1+ on the right side and 1+ on the left side  Psychiatric:         Mood and Affect: Mood normal          Speech: Speech normal          Behavior: Behavior normal          Neurological Exam  Mental Status  Awake and alert  Oriented only to person  Speech is normal  Language: Paucity of meaningful spontaneous speech  Cranial Nerves  Bilateral blindness  Cannot detect light on exam  EOM full in all directions  Face symmetric with intact sensation  Shoulder shrug equal  Tongue protrudes midline  Uvula and soft palate elevate symmetrically       Motor  Normal muscle bulk throughout   Normal muscle " tone                                              Right                     Left  Neck extension                                              5-   Shoulder abduction              5-                           Elbow flexion                         5                          5  Elbow extension                    5                          5  Finger flexion                         5-                          5  Hip flexion                              4+                          4+  Knee flexion                           4+                          4+  Knee extension                      4+                          4+  Plantarflexion                         5-                          5-  Dorsiflexion                            5-                          5-    Sensory  Light touch is normal in upper and lower extremities  Reflexes                                            Right                      Left  Brachioradialis                    1+                         1+  Biceps                                 1+                         1+  Patellar                                1+                         1+  Achilles                                1+                         1+    Coordination    With arms starting outstretched, she is able to touch her nose without dysmetria  Able to tap her fingers without interruptions or decrement in amplitude       Gait    In wheelchair  ROS:    Review of Systems   Unable to perform ROS: Dementia     ======    I have discussed the patient's history, physical exam findings, assessment, and plan in detail with attending, Dr Bernadette King    Thank you for allowing me to participate in the care of your patient, Bradan Najjar Letty Sites, DO  [place]'s Neurology Res[facility], PGY-3

## 2023-05-09 NOTE — PATIENT INSTRUCTIONS
Lower atorvastatin to 20 mg daily  You can cut the tablets that you currently have in half and use them up before switching to the 20 mg pills  Continue Donepezil 5 mg daily  For sleep - can try melatonin 1 to 3 mg daily at night time (about 1 hour before bed or at sun down)  This can help regulate sleep-wake cycle particularly in people that are blind     Referral to geriatrics - they can connect you with resources including senior centers, activities, transportation, etc   Continue during exercises  Try to stay hydrated, compression stockings  Follow up in 6 months

## 2023-05-10 PROBLEM — G47.9 SLEEP DISTURBANCE: Status: ACTIVE | Noted: 2023-05-10

## 2023-05-10 NOTE — ASSESSMENT & PLAN NOTE
Patient here in follow up for suspected LBD  Continues to have slow progression of memory difficulties and requires assistance with all ADL's and IADL's  Since last visit she is now completely blind which limits her ability to participate in activities  Limited walking due to vision  Spends her day listening to the TV  Since starting Donepezil hallucinations have resolved  Family also have noticed that her mood is better as well, less irritable  Very happy with this medication and would like to continue at 5 mg daily  Continue to avoid benzodiazepines as they can cause a paradoxical response in patient's with LBD  Continue her home VPA  Will also refer to Geriatrics as they may have additional resources for Gilda or her family such as day programs, transportation arrangements, family support, etc  Encouraged Gilda to remain as physically, mentally, and socially engaged as possible  Follow up in approximately 1 year

## 2023-05-10 NOTE — ASSESSMENT & PLAN NOTE
Small vessel appearing ischemic strokes in the bilateral ronen  Continue ASA 81 mg daily with the understanding that this slightly increases bleeding risk  Most recent LDL was 35  Recommend reducing atorvastatin from 40 mg to 20 mg daily as a too low LDL can also increase the risk of cerebral hemorrhage  Will defer management of other vascular risk factors to the care of her PCP

## 2023-05-10 NOTE — ASSESSMENT & PLAN NOTE
Probable CAA based on the [facility] criteria 2 0  On prior MRI noted ot have innumerable microbleeds and white matter changes  Additionally has had ischemic appearing infarcts that are likely due to small vessel disease  We again discussed the risks/benefits of anticoagulation in the setting of concurrent A-fib and CAA  Would continue to avoid all AC due to the heightened risk of bleeding  Family is agreeable and understands the risks

## 2023-05-10 NOTE — ASSESSMENT & PLAN NOTE
Difficulty with sleep-wake cycle, likely as a result of blindness as well as dementia  Tends to sleep during the day and is up at night  Recommended starting low-dose melatonin, 1 to 3 mg, daily at sundown to help regulate sleep-wake cycle

## 2023-05-17 ENCOUNTER — HOSPITAL ENCOUNTER (OUTPATIENT)
Dept: RADIOLOGY | Facility: HOSPITAL | Age: 78
Discharge: HOME | End: 2023-05-17

## 2023-05-17 DIAGNOSIS — Z78.0 POST-MENOPAUSAL: ICD-10-CM

## 2023-05-18 ENCOUNTER — TELEPHONE (OUTPATIENT)
Dept: FAMILY MEDICINE CLINIC | Facility: CLINIC | Age: 78
End: 2023-05-18

## 2023-05-18 NOTE — TELEPHONE ENCOUNTER
----- Message from Farnaz Tinajero MD sent at 5/18/2023  3:42 PM EDT -----  Zach Martinez so osteopenia (weak bones)   Take caltrate daily please

## 2023-05-23 ENCOUNTER — TELEPHONE (OUTPATIENT)
Age: 78
End: 2023-05-23

## 2023-05-23 NOTE — TELEPHONE ENCOUNTER
Pat Velazquez [facility]  [address], [address] [place] [address], [address] [place] [address]    [phone]    Telephone Intake: Geriatric Assessment     - Chart Review  1  Has this patient been seen by our department in the last 3 years? Yes   2  Please route to provider for chart review prior to scheduling and let the caller know that this phone intake will be reviewed IF -  • Pt was recently hospitalized  • Pt is prescribed medications for behavior management or has a history of psychiatric hospitalization  • Pt plans to attend alone    Referral source: Sonali Lamar, Neurology    Caller who is scheduling/relationship to pt: Car jarvis phone number: [phone]    Reason for referral: Patient concerns , Family member concerns and Provider concerns regarding memory concerns  If there are behavioral concerns, is the pt prescribed medications to manage these? no   If so, how many? none   Has the patient ever had an inpatient psychiatric hospitalization? No,   What is the goal of the visit? initial assessment and diagnosis; Has the patient been seen by a Neurologist or Geriatrician? Yes, Neurology referred to this office    If yes, is this appointment for a second opinion? No  Has the patient ever been diagnosed with dementia? Yes   Has the patient had an MRI NeuroQuant within the last 1 year? Yes, Oct  2022   (If so, please route to provider to determine if assessment + conference are needed or if only assessment should be scheduled)      Preferred language? English  Highest education level? High School Graduate  Does the patient wear glasses? No, patient blind  Does the patient use hearing aids? No     Is there a living will/healthcare POA in place/If so, who? No,     Does the pt/caregiver have access for a virtual visit (computer/smart phone with audio/video)?  Yes     Caller was informed:   • Please make sure the pt is accompanied by someone who knows them well / caregiver / family member to participate in this appointment  Who will accompany the pt (name and relationship)? Harjitsimona Wellington  Phone number of person accompanying pt: [phone]  • Please make sure the pt attends all appointments, including the assessment, care conference, follow-up, whether in-person or virtual   • For virtual visits, pt must be physically present in [facility]  Office packet mailed out to: lizabeth [address] [place] [phone]  Added to wait list for sooner appointments/notified that calls can be short notice (same day/day prior)?  Yes

## 2023-05-25 NOTE — PROGRESS NOTES
[facility]    CHIEF COMPLAINT:   Chief Complaint   Patient presents with   • New Patient Visit     Referred by Dr Trudi Dakin: Betsy Steiner- previously seen [facility]   • Leg Swelling     BL    • Ankle Swelling     BL       HPI:  Clayton Milton is a 68 y o  female with a past medical history of hypertension, diabetes mellitus, coronary artery disease, atrial fibrillation/flutter, tachycardia-bradycardia s/p PPM     She has recently been following with neurology for suspected Lewy body dementia  She has been having slow progression of memory difficulties and requiring assistance with her ADLs and IADLs  She was started on donepezil with improvement in hallucinations  Her anticoagulation had been discontinued due to high suspicion for cerebral amyloid angiopathy during admission in Nov 2022  She does have small ischemic strokes in the bilateral ronen  She is on aspirin 81 mg for this  Her Lipitor had been reduced to 20 mg to avoid to low LDL  Today, she has no new complaints  She was tried on once daily bumex but did developed leg swelling and was switching back to twice per day  Swelling is usually better by morning time but begins as soon as she sits up  She has compression stockings being delivered soon  She does not walk much  Uses a walker at times but has leg weakness  She has no lightheadedness, chest pain, palpitations, shortness of breath, orthopnea, PND  Uses 1 pillow  Appetite isn't great and son states she doesn't eat much  Weight 145 lb today      The following portions of the patient's history were reviewed and updated as appropriate: allergies, current medications, past family history, past medical history, past social history, past surgical history, and problem list     SINCE LAST OV I REVIEWED WITH THE PATIENT THE INTERIM LABS, TEST RESULTS, CONSULTANT(S) NOTES AND PERFORMED AN INTERIM REVIEW OF HISTORY    Past Medical History:   Diagnosis Date   • Atrial fibrillation ([facility] [address] )    • Chronic anemia    • Chronic kidney disease    • Colonoscopy refused 2019    pt declines   • Dyslipidemia    • Hypertension    • Mammogram declined 2017   • Proteinuria    • Tachy-sarah beth syndrome ([facility] [address] ) 2022       Past Surgical History:   Procedure Laterality Date   • CARDIAC PACEMAKER PLACEMENT     • CATARACT EXTRACTION     •  SECTION     • CHOLECYSTECTOMY     • CORONARY ANGIOPLASTY WITH STENT PLACEMENT     • EYE SURGERY     • MAMMO (HISTORICAL)      Pt states she will not ever have again-    • MA OPTX FEM SHFT FX W/INSJ IMED IMPLT W/WO SCREW Left 2022    Procedure: INSERTION NAIL IM FEMUR ANTEGRADE (TROCHANTERIC); Surgeon: Melanie Gtz DO;  Location: [facility];  Service: Orthopedics   • TUBAL LIGATION         Social History     Socioeconomic History   • Marital status:      Spouse name: Not on file   • Number of children: Not on file   • Years of education: Not on file   • Highest education level: Not on file   Occupational History   • Occupation: retired   Tobacco Use   • Smoking status: Never   • Smokeless tobacco: Never   Vaping Use   • Vaping Use: Never used   Substance and Sexual Activity   • Alcohol use: Never   • Drug use: Never   • Sexual activity: Not Currently   Other Topics Concern   • Not on file   Social History Narrative    ** Merged History Encounter **         Most recent tobacco use screenin2019  Do you currently or have you served in the [employer]: No  Were you activated, into active duty, as a member of the [employer] or as a Reservist: No  Marital status:   Exercise level: None  D    iet: Regular  General stress level: Low  Alcohol intake: None  Caffeine intake:  Moderate  Chewing tobacco: none  Illicit drugs: none  Seat belts used routinely: Yes  Sunscreen used routinely: Yes  Smoke alarm in home: Yes  Advance directive: No  Salt Int    juan antonio: minimal  Has the Patient had a mammogram to screen for breast cancer within 24 months: No  11/1/2019 - declines mammogram   Is the patient interested in a colorectal cancer screening: No  11/1/2019 - patient declines colonoscopy's     Social Determinants of Health     Financial Resource Strain: Low Risk  (9/7/2022)    Overall Financial Resource Strain (CARDIA)    • Difficulty of Paying Living Expenses: Not hard at all   Food Insecurity: No Food Insecurity (11/14/2022)    Hunger Vital Sign    • Worried About Running Out of Food in the Last Year: Never true    • Ran Out of Food in the Last Year: Never true   Transportation Needs: No Transportation Needs (11/14/2022)    PRAPARE - Transportation    • Lack of Transportation (Medical): No    • Lack of Transportation (Non-Medical):  No   Physical Activity: Not on file   Stress: Not on file   Social Connections: Not on file   Intimate Partner Violence: Not on file   Housing Stability: Low Risk  (11/14/2022)    Housing Stability Vital Sign    • Unable to Pay for Housing in the Last Year: No    • Number of Places Lived in the Last Year: 1    • Unstable Housing in the Last Year: No       Family History   Problem Relation Age of Onset   • Diabetes Father    • Hypertension Father    • Heart disease Father    • Cancer Brother         smoker   • Diabetes Other    • Heart attack Other    • Hypertension Other    • Asthma Other        No Known Allergies    Current Outpatient Medications   Medication Sig Dispense Refill   • acetaminophen (TYLENOL) 325 mg tablet Take 3 tablets (975 mg total) by mouth every 8 (eight) hours  0   • amLODIPine (NORVASC) 2 5 mg tablet Take 1 tablet (2 5 mg total) by mouth daily 90 tablet 3   • aspirin 81 mg chewable tablet Chew 81 mg daily     • atorvastatin (LIPITOR) 20 mg tablet Take 1 tablet (20 mg total) by mouth every evening 90 tablet 1   • B-D UF III MINI PEN NEEDLES 31G X 5 MM MISC Use to test qd 100 each 5   • bumetanide (BUMEX) 1 mg tablet Take 1 tablet (1 mg total) by mouth daily 180 tablet 1   • carvedilol (COREG) 25 mg tablet Take "1 tablet (25 mg total) by mouth every 12 (twelve) hours 180 tablet 1   • Cholecalciferol (Vitamin D) 50 MCG (2000 UT) tablet Take 1 tablet (2,000 Units total) by mouth daily 90 tablet 1   • divalproex sodium (DEPAKOTE) 250 mg DR tablet Take 1 tablet (250 mg total) by mouth every 12 (twelve) hours 180 tablet 1   • donepezil (ARICEPT) 5 mg tablet TAKE 1 TABLET(5 MG) BY MOUTH DAILY AT BEDTIME 90 tablet 2   • fluticasone (FLONASE) 50 mcg/act nasal spray 1 spray into each nostril daily 9 9 mL 0   • folic acid (FOLVITE) 008 mcg tablet Take 400 mcg by mouth daily     • Lantus SoloStar 100 units/mL SOPN Inject 0 06 mL (6 Units total) under the skin daily at bedtime 6 mL 0   • ranolazine (RANEXA) 500 mg 12 hr tablet Take 1 tablet (500 mg total) by mouth daily 90 tablet 1   • amLODIPine (NORVASC) 2 5 mg tablet Take 1 tablet (2 5 mg total) by mouth daily (Patient not taking: Reported on 5/9/2023) 30 tablet 0   • LORazepam (ATIVAN) 0 5 mg tablet Take 0 5 mg by mouth if needed for anxiety (Patient not taking: Reported on 5/9/2023)     • meclizine (ANTIVERT) 12 5 MG tablet Take 1 tablet (12 5 mg total) by mouth every 8 (eight) hours as needed for dizziness (Patient not taking: Reported on 5/26/2023) 30 tablet 0     No current facility-administered medications for this visit  /62 (BP Location: Left arm, Patient Position: Sitting, Cuff Size: Adult)   Pulse 71   Ht 5' 3\" (1 6 m)   Wt 65 9 kg (145 lb 4 8 oz)   SpO2 95%   BMI 25 74 kg/m²     Review of Systems   All other systems reviewed and are negative  Physical Exam  Vitals reviewed  Constitutional:       General: She is not in acute distress  Appearance: She is well-developed  She is not toxic-appearing  HENT:      Head: Normocephalic and atraumatic  Eyes:      General: No scleral icterus  Extraocular Movements: Extraocular movements intact        Conjunctiva/sclera: Conjunctivae normal    Cardiovascular:      Rate and Rhythm: Normal rate and " regular rhythm  Heart sounds: Normal heart sounds  No murmur heard  No gallop  Pulmonary:      Effort: Pulmonary effort is normal  No respiratory distress  Breath sounds: Normal breath sounds  No wheezing or rales  Abdominal:      General: Abdomen is flat  Bowel sounds are normal  There is no distension  Palpations: Abdomen is soft  Tenderness: There is no abdominal tenderness  Musculoskeletal:         General: No swelling  Cervical back: Neck supple  Right lower leg: Edema (1+) present  Left lower leg: Edema (1+) present  Skin:     General: Skin is warm and dry  Capillary Refill: Capillary refill takes less than 2 seconds  Coloration: Skin is not jaundiced  Neurological:      Mental Status: She is alert  Psychiatric:         Mood and Affect: Mood normal           Lab Results   Component Value Date    ALT 11 (L) 03/24/2023    AST 16 03/24/2023    BUN 32 (H) 03/24/2023    CALCIUM 8 9 03/24/2023    CL 99 03/24/2023    CO2 33 (H) 03/24/2023    CREATININE 2 37 (H) 03/24/2023    GLUCOSE 78 06/24/2022    INR 1 06 10/08/2022    K 3 8 03/24/2023       Lab Results   Component Value Date    HDL 45 (L) 03/24/2023    LDLCALC 35 03/24/2023    TRIG 68 03/24/2023       Lab Results   Component Value Date    HCT 26 3 (L) 03/24/2023    HGB 8 3 (L) 03/24/2023     03/24/2023    WBC 5 06 03/24/2023       Lab Results   Component Value Date     02/01/2023    HGBA1C 5 6 02/01/2023       Cardiac studies:   TTE - 10/10/2022:  •  Left Ventricle: Left ventricular cavity size is normal  Wall thickness is normal  The left ventricular ejection fraction is 65%  Systolic function is normal  Although no diagnostic regional wall motion abnormality was identified, this possibility cannot be completely excluded on the basis of this study   Diastolic function is normal   •  Right Ventricle: Right ventricular cavity size is normal  Systolic function is normal   •  Atrial Septum: No patent foramen ovale detected by saline bubble injection, at rest or with provocation by Valsalva  •  Pulmonary Artery: The estimated pulmonary artery systolic pressure is 58 2 mmHg  The pulmonary artery systolic pressure is mildly increased  Pharm NM stress - 7/17/21:  1   Abnormal nuclear SPECT tomographic images   There is moderate size, moderate intensity, fixed perfusion defect in the inferior/inferolateral wall, suggestive of old scar formation in the right coronary artery distribution  There is no significant reperfusion suggestive of myocardial ischemia     2  Cine gated images revealed an nondilated LV cavity   There was mild hypokinesis of the inferolateral wall and no other significant regional wall motion abnormalities   Left ventricular systolic function was well-preserved   The calculated left ventricular ejection fraction was 73%  3  There is no prior study for comparison at this time  Cardiac cath/PCI: Status post PCI with stent deployment in RCA (10yrs ago-7/26/2011)  [address] [place] [address]    ASSESSMENT AND PLAN:  May Castellanos was seen today for new patient visit, leg swelling and ankle swelling  Diagnoses and all orders for this visit:    #  Benign hypertension with CKD (chronic kidney disease) stage IV [facility]): Blood pressure is adequately controlled today on amlodipine 2 5 mg, carvedilol 25 mg BID, bumetanide 1 mg BID  #  Atrial flutter, paroxysmal ([address] )  #  Tachycardia-bradycardia [facility])  #  Cardiac pacemaker in situ  Tachycardia-bradycardia syndrome status post PPM for symptomatic bradycardia and near syncope in May 2019, 7 second pause   -Previously on rivaroxaban which was discontinued after MRI brain imaging suggestive of CAA  -Continue BB  -Set up in device clinic - [employer]    #  Coronary artery disease involving native coronary artery of native heart without angina pectoris: She has a history of RCA stent in 2011  Stable on current regimen with no complaints  Statin recently decreased to atorvastatin 20 mg  Lipid panel-3/20/2023: Total cholesterol 94, triglycerides 68, HDL 45, LDL 35   -Would favor decreasing statin further giving CAA and progressive memory decline --> atorvastatin 10 mg daily  -Continue aspirin, BB, ranolazine   -     atorvastatin (LIPITOR) 10 mg tablet;  Take 1 tablet (10 mg total) by mouth daily    Ishan Kraft MD

## 2023-05-26 ENCOUNTER — OFFICE VISIT (OUTPATIENT)
Dept: CARDIOLOGY CLINIC | Facility: CLINIC | Age: 78
End: 2023-05-26

## 2023-05-26 VITALS
WEIGHT: 145.3 LBS | OXYGEN SATURATION: 95 % | SYSTOLIC BLOOD PRESSURE: 110 MMHG | HEIGHT: 63 IN | HEART RATE: 71 BPM | DIASTOLIC BLOOD PRESSURE: 62 MMHG | BODY MASS INDEX: 25.75 KG/M2

## 2023-05-26 DIAGNOSIS — I12.9 BENIGN HYPERTENSION WITH CKD (CHRONIC KIDNEY DISEASE) STAGE IV (HCC): ICD-10-CM

## 2023-05-26 DIAGNOSIS — Z95.0 CARDIAC PACEMAKER IN SITU: ICD-10-CM

## 2023-05-26 DIAGNOSIS — I49.5 TACHYCARDIA-BRADYCARDIA (HCC): ICD-10-CM

## 2023-05-26 DIAGNOSIS — I25.10 CORONARY ARTERY DISEASE INVOLVING NATIVE CORONARY ARTERY OF NATIVE HEART WITHOUT ANGINA PECTORIS: ICD-10-CM

## 2023-05-26 DIAGNOSIS — N18.4 BENIGN HYPERTENSION WITH CKD (CHRONIC KIDNEY DISEASE) STAGE IV (HCC): ICD-10-CM

## 2023-05-26 DIAGNOSIS — I48.92 ATRIAL FLUTTER, PAROXYSMAL (HCC): Primary | ICD-10-CM

## 2023-05-26 RX ORDER — ATORVASTATIN CALCIUM 10 MG/1
10 TABLET, FILM COATED ORAL DAILY
Qty: 90 TABLET | Refills: 1 | Status: SHIPPED | OUTPATIENT
Start: 2023-05-26

## 2023-05-26 NOTE — PATIENT INSTRUCTIONS
You were seen today in the Cardiology office for follow up  Please decrease your atorvastatin dose to 10 mg daily  A new prescription was sent to the pharmacy  Please continue all your other medications as prescribed  Thank you for choosing [address]  Please call our office or use [employer] with any questions

## 2023-05-31 ENCOUNTER — TELEPHONE (OUTPATIENT)
Dept: OTHER | Facility: OTHER | Age: 78
End: 2023-05-31

## 2023-05-31 NOTE — TELEPHONE ENCOUNTER
Patient is calling regarding cancelling an appointment  Date/Time:06/01/2023 9:00 a m      Patient was rescheduled: YES [] NO [x]    Patient requesting call back to reschedule: YES [x] NO []

## 2023-06-19 ENCOUNTER — TELEPHONE (OUTPATIENT)
Dept: LAB | Facility: HOSPITAL | Age: 78
End: 2023-06-19

## 2023-06-23 ENCOUNTER — APPOINTMENT (OUTPATIENT)
Dept: LAB | Facility: HOSPITAL | Age: 78
End: 2023-06-23
Attending: INTERNAL MEDICINE
Payer: COMMERCIAL

## 2023-06-23 DIAGNOSIS — N17.9 ACUTE RENAL FAILURE SUPERIMPOSED ON STAGE 3 CHRONIC KIDNEY DISEASE, UNSPECIFIED ACUTE RENAL FAILURE TYPE, UNSPECIFIED WHETHER STAGE 3A OR 3B CKD (HCC): ICD-10-CM

## 2023-06-23 DIAGNOSIS — N18.30 ACUTE RENAL FAILURE SUPERIMPOSED ON STAGE 3 CHRONIC KIDNEY DISEASE, UNSPECIFIED ACUTE RENAL FAILURE TYPE, UNSPECIFIED WHETHER STAGE 3A OR 3B CKD (HCC): ICD-10-CM

## 2023-06-23 LAB
ANION GAP SERPL CALCULATED.3IONS-SCNC: 5 MMOL/L
BUN SERPL-MCNC: 33 MG/DL (ref 5–25)
CALCIUM SERPL-MCNC: 8.4 MG/DL (ref 8.4–10.2)
CHLORIDE SERPL-SCNC: 96 MMOL/L (ref 96–108)
CO2 SERPL-SCNC: 35 MMOL/L (ref 21–32)
CREAT SERPL-MCNC: 2.46 MG/DL (ref 0.6–1.3)
GFR SERPL CREATININE-BSD FRML MDRD: 18 ML/MIN/1.73SQ M
GLUCOSE P FAST SERPL-MCNC: 49 MG/DL (ref 65–99)
POTASSIUM SERPL-SCNC: 3.8 MMOL/L (ref 3.5–5.3)
SODIUM SERPL-SCNC: 136 MMOL/L (ref 135–147)

## 2023-06-23 PROCEDURE — 36415 COLL VENOUS BLD VENIPUNCTURE: CPT

## 2023-06-23 PROCEDURE — 80048 BASIC METABOLIC PNL TOTAL CA: CPT

## 2023-06-26 ENCOUNTER — IN-CLINIC DEVICE VISIT (OUTPATIENT)
Dept: CARDIOLOGY CLINIC | Facility: CLINIC | Age: 78
End: 2023-06-26
Payer: COMMERCIAL

## 2023-06-26 DIAGNOSIS — Z95.0 CARDIAC PACEMAKER IN SITU: Primary | ICD-10-CM

## 2023-06-26 PROCEDURE — 93280 PM DEVICE PROGR EVAL DUAL: CPT | Performed by: INTERNAL MEDICINE

## 2023-06-26 NOTE — PROGRESS NOTES
Results for orders placed or performed in visit on 06/26/23   Cardiac EP device report    Narrative    MDT DC PPM - ACTIVE SYSTEM IS MRI CONDITIONAL  DEVICE INTERROGATED IN THE [facility] OFFICE: NP TRANSFER A/P DR Maureen Gottron; BATTERY VOLTAGE ADEQUATE (9 8 YRS)  AP 94 5%  0 7% (AAIR-DDDR 60PPM); ALL LEAD PARAMETERS WITHIN NORMAL LIMITS  99 AT/AF EPISODES NOTED WITH AVAILABLE EGM'S SHOWING AFLUTTER; MULTIPLE EPISODES SAME DAY WITH MOST RECENT EPISODE DURATION 47 SECS; NO HIGH RATE EPISODES  EF 65% (10/10/22 ECHO); PATIENT TAKES ASA 81, CARVEDILOL - NOT AN ANTICOAGULATION CANDIDATE PER DR Maureen Gottron NOTE 5/26/23 (high suspicion for cerebral amyloid angiopathy during admission in Nov 2022); AT/AF BURDEN 0 2%; DECREASE TO AT/AF DAILY BURDEN TIME & AVG V RATE DURING AT/AF; CARELINK TRANSFER STILL PENDING; PACEMAKER FUNCTIONING APPROPRIATELY    ES

## 2023-07-14 DIAGNOSIS — Z79.4 TYPE 2 DIABETES MELLITUS WITH STAGE 3A CHRONIC KIDNEY DISEASE, WITH LONG-TERM CURRENT USE OF INSULIN (HCC): Primary | ICD-10-CM

## 2023-07-14 DIAGNOSIS — N18.31 TYPE 2 DIABETES MELLITUS WITH STAGE 3A CHRONIC KIDNEY DISEASE, WITH LONG-TERM CURRENT USE OF INSULIN (HCC): Primary | ICD-10-CM

## 2023-07-14 DIAGNOSIS — E85.4 CEREBRAL AMYLOID ANGIOPATHY (HCC): ICD-10-CM

## 2023-07-14 DIAGNOSIS — I50.9 CHRONIC CONGESTIVE HEART FAILURE, UNSPECIFIED HEART FAILURE TYPE (HCC): ICD-10-CM

## 2023-07-14 DIAGNOSIS — E11.22 TYPE 2 DIABETES MELLITUS WITH STAGE 3A CHRONIC KIDNEY DISEASE, WITH LONG-TERM CURRENT USE OF INSULIN (HCC): Primary | ICD-10-CM

## 2023-07-14 DIAGNOSIS — I68.0 CEREBRAL AMYLOID ANGIOPATHY (HCC): ICD-10-CM

## 2023-07-27 ENCOUNTER — CONSULT (OUTPATIENT)
Age: 78
End: 2023-07-27
Payer: COMMERCIAL

## 2023-07-27 VITALS
TEMPERATURE: 97.4 F | BODY MASS INDEX: 25.69 KG/M2 | WEIGHT: 145 LBS | HEIGHT: 63 IN | OXYGEN SATURATION: 98 % | SYSTOLIC BLOOD PRESSURE: 110 MMHG | HEART RATE: 67 BPM | DIASTOLIC BLOOD PRESSURE: 74 MMHG

## 2023-07-27 DIAGNOSIS — N18.4 CKD (CHRONIC KIDNEY DISEASE) STAGE 4, GFR 15-29 ML/MIN (HCC): ICD-10-CM

## 2023-07-27 DIAGNOSIS — G31.83 LEWY BODY DEMENTIA (HCC): Primary | ICD-10-CM

## 2023-07-27 DIAGNOSIS — E85.4 CEREBRAL AMYLOID ANGIOPATHY (HCC): ICD-10-CM

## 2023-07-27 DIAGNOSIS — I48.91 ATRIAL FIBRILLATION, UNSPECIFIED TYPE (HCC): ICD-10-CM

## 2023-07-27 DIAGNOSIS — F02.80 LEWY BODY DEMENTIA (HCC): Primary | ICD-10-CM

## 2023-07-27 DIAGNOSIS — I25.10 CORONARY ARTERY DISEASE INVOLVING NATIVE CORONARY ARTERY OF NATIVE HEART WITHOUT ANGINA PECTORIS: ICD-10-CM

## 2023-07-27 DIAGNOSIS — H54.3 BLINDNESS OF BOTH EYES: ICD-10-CM

## 2023-07-27 DIAGNOSIS — I68.0 CEREBRAL AMYLOID ANGIOPATHY (HCC): ICD-10-CM

## 2023-07-27 PROCEDURE — 99483 ASSMT & CARE PLN PT COG IMP: CPT | Performed by: FAMILY MEDICINE

## 2023-07-27 RX ORDER — PHENOL 1.4 %
600 AEROSOL, SPRAY (ML) MUCOUS MEMBRANE 2 TIMES DAILY WITH MEALS
COMMUNITY

## 2023-07-27 NOTE — PROGRESS NOTES
ASSESSMENT AND PLAN:  1. Atrial fibrillation, unspecified type ([facility])    2. Lewy body dementia [facility])  -     Ambulatory Referral to [facility]    3. Cerebral amyloid angiopathy ([facility])  -     Ambulatory Referral to Senior Care    4. Blindness of both eyes    5. Coronary artery disease involving native coronary artery of native heart without angina pectoris  - stable. No sytmpoms  6. CKD (chronic kidney disease) stage 4, GFR 15-29 ml/min ([facility])  - stable. - avoid any nephrotoxic agents  -monitor. Rosario Santos has known Dementia. At this time the main concern is suspected LBD and or CAA. Currently doing well with current medication regimen. Has had improvement with use of Aricept . Has had good control of agitation and hallucinations with the addition of depakote. She has a good support system and care is provided by her son and his wife in their home. They will continue to follow up with Dr. Tab Stern, her PCP , and Neurology. We will followup as needed. They will reach out if any new concerns or if they would like to continue care with us here. Decision-making capacity: no capacity. Staging: moderate to severe    Medications Review: reviewed. HPI:    We had the pleasure of evaluating Shauna Stanton who is a 66 y.o. female in Geriatric consultation today. Ms. Candance Agent is in the office with her son. She lives with son. Patient is a 80-year-old female presenting today for a geriatric assessment. Chart was reviewed. She has known suspected Lewy body disease. Concern for CAA as well. Son is with her today providing the history. She was diagnosed with dementia in October 2022. In retrospect she has had some memory loss for a couple of years. She has had some difficulty with balance. Her balance seem to be related to low muscle weakness and blindness. She has had difficulty with walking. She is also had hallucinations.   Again in retrospect it did seem to procee her memory impairment. She has been seeing neurology. There is concern for Lewy body dementia. She was placed on Aricept and the son had noticed that significant improvement of her cognition. With ongoing agitation and hallucinations that became a safety issue she was also placed on Depakote which also provided significant improvement. .    Son is not quite sure for the reason of referral to our office. On review it seems that to be more of assisting with services or programs that may be available to her to help in her care. At this time her care is provided by her son and his wife at their home. They do request a handicap placard. They help her with all her ADLs. They assist her with all her instrumental activities of daily living. They have accepted her diagnosis and understand the progression of her disease. HISTORY AS PER Son:    COGNITION:  Memory Issues noticed since 2 year(s)  . Memory affected: short term memory loss and long term memory loss    Symptoms started: gradual  Over time the memory has:  worsened  Memory issue(s) were noted by: family   Difficulty finding the right word while speaking: No  Requires repeat information or asking the same question repeatedly: Yes  Fluctuation in alertness: Yes  Changes in mood or personality:No  Current or previous treatment for depression or anxiety: No    Family member with dementia and what type? no  History of head trauma: No  History of stroke: No  History of alcohol or substance abuse: No    FUNCTIONAL STATUS:  BADLs  Does patient require assistance with:  Bathing: Yes  Dressing: Yes  Transferring: Yes  Continence: Yes  Toileting: Yes  Feeding: Yes    IADLs  Dose patient require assistance with:  Telephone: Yes  Shopping: Yes  Food Preparation: Yes  Housekeeping: Yes  Laundry: Yes  Transportation: Yes  Medications: Yes  Finances: Yes    [address]:  Does patient get angry or hostile? Resist care from others?  No  Does patient see or hear things that no one else can see or hear? Yes  Does patient act impatient and cranky? Does mood frequently change for no apparent reason? Yes  Does patient act suspicious without good reason (example: believes that others are stealing from him or her, or planning to harm him or her in some way)? No  Does patient less interested in his or her usual activities or in the activities and plans of others? No  Does patient have trouble sleeping at night? No  Dose patient have abnormal movements while asleep? No    SAFETY:  Hearing and vision issue: Yes  Any gait or balance disorder: Yes  Uses:    Any falls in the last year: No  Any history of wandering: No  Are there firearms or guns in the home: No  Does patient drive: No  Any driving accidents or citations in the last year: No  Any concerns about patient's ability to drive: Yes    ACP REVIEW:  Does patient have POA: No  Does patient have a Living will: No  Any legal assistance needed for healthcare planning?: No    No Known Allergies    Medications:    Current Outpatient Medications on File Prior to Visit   Medication Sig Dispense Refill   • acetaminophen (TYLENOL) 325 mg tablet Take 3 tablets (975 mg total) by mouth every 8 (eight) hours  0   • amLODIPine (NORVASC) 2.5 mg tablet Take 1 tablet (2.5 mg total) by mouth daily 90 tablet 3   • aspirin 81 mg chewable tablet Chew 81 mg daily     • atorvastatin (LIPITOR) 10 mg tablet Take 1 tablet (10 mg total) by mouth daily 90 tablet 1   • B-D UF III MINI PEN NEEDLES 31G X 5 MM MISC Use to test qd 100 each 5   • bumetanide (BUMEX) 1 mg tablet Take 1 tablet (1 mg total) by mouth daily 180 tablet 1   • calcium carbonate (OS-LULÚ) 600 MG tablet Take 600 mg by mouth 2 (two) times a day with meals     • carvedilol (COREG) 25 mg tablet Take 1 tablet (25 mg total) by mouth every 12 (twelve) hours 180 tablet 1   • Cholecalciferol (Vitamin D) 50 MCG (2000 UT) tablet Take 1 tablet (2,000 Units total) by mouth daily 90 tablet 1   • divalproex sodium (DEPAKOTE) 250 mg DR tablet Take 1 tablet (250 mg total) by mouth every 12 (twelve) hours 180 tablet 1   • donepezil (ARICEPT) 5 mg tablet TAKE 1 TABLET(5 MG) BY MOUTH DAILY AT BEDTIME 90 tablet 2   • fluticasone (FLONASE) 50 mcg/act nasal spray 1 spray into each nostril daily 9.9 mL 0   • folic acid (FOLVITE) 106 mcg tablet Take 400 mcg by mouth daily     • Lantus SoloStar 100 units/mL SOPN Inject 0.06 mL (6 Units total) under the skin daily at bedtime 6 mL 0   • meclizine (ANTIVERT) 12.5 MG tablet Take 1 tablet (12.5 mg total) by mouth every 8 (eight) hours as needed for dizziness 30 tablet 0   • ranolazine (RANEXA) 500 mg 12 hr tablet Take 1 tablet (500 mg total) by mouth daily 90 tablet 1   • LORazepam (ATIVAN) 0.5 mg tablet Take 0.5 mg by mouth if needed for anxiety (Patient not taking: Reported on 2023)     • [DISCONTINUED] apixaban (Eliquis) 5 mg Take 1 tablet (5 mg total) by mouth 2 (two) times a day 60 tablet 0   • [DISCONTINUED] bisacodyl (DULCOLAX) 10 mg suppository Insert 1 suppository (10 mg total) into the rectum daily as needed for constipation 12 suppository 0   • [DISCONTINUED] dabigatran etexilate (PRADAXA) 75 mg capsule Take 1 capsule (75 mg total) by mouth 2 (two) times a day 60 capsule 0     No current facility-administered medications on file prior to visit.        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     Past Surgical History:   Procedure Laterality Date   • CARDIAC PACEMAKER PLACEMENT     • CATARACT EXTRACTION     •  SECTION     • CHOLECYSTECTOMY     • CORONARY ANGIOPLASTY WITH STENT PLACEMENT     • EYE SURGERY     • MAMMO (HISTORICAL)      Pt states she will not ever have again- 2017   • [place] OPTX FEM SHFT FX W/INSJ IMED IMPLT W/WO SCREW Left 2022    Procedure: INSERTION NAIL IM FEMUR ANTEGRADE (TROCHANTERIC); Surgeon: Demetria Powell DO;  [facility];  Service: Orthopedics   • TUBAL LIGATION       Family History   Problem Relation Age of Onset   • Diabetes Father    • Hypertension Father    • Heart disease Father    • Cancer Brother         smoker   • Diabetes Other    • Heart attack Other    • Hypertension Other    • Asthma Other      Social History     Socioeconomic History   • Marital status:      Spouse name: Not on file   • Number of children: Not on file   • Years of education: Not on file   • Highest education level: Not on file   Occupational History   • Occupation: retired   Tobacco Use   • Smoking status: Never   • Smokeless tobacco: Never   Vaping Use   • Vaping Use: Never used   Substance and Sexual Activity   • Alcohol use: Never   • Drug use: Never   • Sexual activity: Not Currently   Other Topics Concern   • Not on file   Social History Narrative    ** Merged History Encounter **         Most recent tobacco use screenin2019  Do you currently or have you served in the [address] [place] [address] [employer]: No  Were you activated, into active duty, as a member of the [employer] or as a Reservist: No  Marital status:   Exercise level: None  D    iet: Regular  General stress level: Low  Alcohol intake: None  Caffeine intake:  Moderate  Chewing tobacco: none  Illicit drugs: none  Seat belts used routinely: Yes  Sunscreen used routinely: Yes  Smoke alarm in home: Yes  Advance directive: No  Salt Int    juan antonio: minimal  Has the Patient had a mammogram to screen for breast cancer within 24 months: No  2019 - declines mammogram.  Is the patient interested in a colorectal cancer screening: No  2019 - patient declines colonoscopy's     Social Determinants of Health     Financial Resource Strain: Low Risk  (2022)    Overall Financial Resource Strain (CARDIA)    • Difficulty of Paying Living Expenses: Not hard at all   Food Insecurity: No Food Insecurity (2022)    Hunger Vital Sign    • Worried About Running Out of Food in the Last Year: Never true    • Ran Out of Food in the Last Year: Never true   Transportation Needs: No Transportation Needs (2022)    PRAPARE - Transportation    • Lack of Transportation (Medical): No    • Lack of Transportation (Non-Medical): No   Physical Activity: Not on file   Stress: Not on file   Social Connections: Not on file   Intimate Partner Violence: Not on file   Housing Stability: Low Risk  (2022)    Housing Stability Vital Sign    • Unable to Pay for Housing in the Last Year: No    • Number of Places Lived in the Last Year: 1    • Unstable Housing in the Last Year: No     Past Surgical History:   Procedure Laterality Date   • CARDIAC PACEMAKER PLACEMENT     • CATARACT EXTRACTION     •  SECTION     • CHOLECYSTECTOMY     • CORONARY ANGIOPLASTY WITH STENT PLACEMENT     • EYE SURGERY     • [facility] (HISTORICAL)      Pt states she will not ever have again- 2017   • [place] OPTX FEM SHFT FX W/INSJ IMED IMPLT W/WO SCREW Left 2022    Procedure: INSERTION NAIL IM FEMUR ANTEGRADE (TROCHANTERIC); Surgeon: Stephanie Villalba DO;  [facility];  Service: Orthopedics   • TUBAL LIGATION         OBJECTIVE:  Vitals:    23 1057   BP: 110/74   BP Location: Left arm   Patient Position: Sitting   Cuff Size: Standard   Pulse: 67   Temp: (!) 97.4 °F (36.3 °C)   TempSrc: Temporal   SpO2: 98%   Weight: 65.8 kg (145 lb)   Height: 5' 3" (1.6 m)     Body mass index is 25.69 kg/m². Physical Exam  Vitals and nursing note reviewed. Constitutional:       Appearance: Normal appearance. HENT:      Head: Normocephalic.       Right Ear: Tympanic membrane, ear canal and external ear normal.      Left Ear: Tympanic membrane, ear canal and external ear normal.      Nose: Nose normal.      Mouth/Throat:      Mouth: Mucous membranes are moist.   Eyes: Extraocular Movements: Extraocular movements intact. Pupils: Pupils are equal, round, and reactive to light. Cardiovascular:      Rate and Rhythm: Regular rhythm. Pulses: Normal pulses. Heart sounds: Normal heart sounds. Pulmonary:      Effort: Pulmonary effort is normal.      Breath sounds: Normal breath sounds. Abdominal:      General: Bowel sounds are normal.      Palpations: Abdomen is soft. Musculoskeletal:      Cervical back: Normal range of motion and neck supple. Skin:     General: Skin is warm. Capillary Refill: Capillary refill takes less than 2 seconds. Neurological:      General: No focal deficit present. Mental Status: She is alert and oriented to person, place, and time.    Psychiatric:         Mood and Affect: Mood normal.         Behavior: Behavior normal.         MoCA: 6/30      Labs & Imaging:  Lab Results   Component Value Date    WBC 5.06 03/24/2023    HGB 8.3 (L) 03/24/2023    HCT 26.3 (L) 03/24/2023    MCV 96 03/24/2023     03/24/2023     Lab Results   Component Value Date    SODIUM 136 06/23/2023    K 3.8 06/23/2023    CL 96 06/23/2023    CO2 35 (H) 06/23/2023    AGAP 5 06/23/2023    BUN 33 (H) 06/23/2023    CREATININE 2.46 (H) 06/23/2023    GLUC 87 02/01/2023    GLUF 49 (L) 06/23/2023    CALCIUM 8.4 06/23/2023    AST 16 03/24/2023    ALT 11 (L) 03/24/2023    ALKPHOS 47 03/24/2023    TP 6.4 03/24/2023    TBILI 0.61 03/24/2023    EGFR 18 06/23/2023     Lab Results   Component Value Date    HGBA1C 5.6 02/01/2023     Lab Results   Component Value Date    CHOLESTEROL 94 03/24/2023    CHOLESTEROL 84 11/12/2022    CHOLESTEROL 108 10/09/2022     Lab Results   Component Value Date    HDL 45 (L) 03/24/2023    HDL 48 (L) 11/12/2022    HDL 49 (L) 10/09/2022     Lab Results   Component Value Date    TRIG 68 03/24/2023    TRIG 33 11/12/2022    TRIG 52 10/09/2022     Lab Results   Component Value Date    NONHDLC 36 11/12/2022    [address] [address] 02/18/2022     [facility] Results   Component Value Date    LDLCALC 35 03/24/2023    [facility] 29 11/12/2022     Lab Results   Component Value Date    ZFTGWCJH42 1,244 (H) 08/03/2022     Lab Results   Component Value Date    CEM1EKGEWDGB 1.377 10/09/2022     No results found for: "SYPHILISAB"  No results found for: "RPR"      Lab Results   Component Value Date    [id number] 73.4 03/24/2023      Results for orders placed during the hospital encounter of 10/08/22    CT head wo contrast    Narrative  CT BRAIN - WITHOUT CONTRAST    INDICATION:   Delirium  acute delirium. COMPARISON:  CT 6/24/2022; MRI 10/10/2022    TECHNIQUE:  CT examination of the brain was performed. In addition to axial images, sagittal and coronal 2D reformatted images were created and submitted for interpretation. Radiation dose length product (DLP) for this visit:  812 mGy-cm . This examination, like all CT scans performed in the [facility], was performed utilizing techniques to minimize radiation dose exposure, including the use of iterative  reconstruction and automated exposure control. IMAGE QUALITY:  Diagnostic. FINDINGS:    PARENCHYMA: Decreased attenuation is noted is again noted in periventricular and subcortical white matter demonstrating an appearance that is statistically most likely to represent moderate microangiopathic change. Chronic lacunar infarct identified in  the left posterior and central paramedian ronen likely involving the left medial longitudinal fasciculus as well as in the right anterior ronen possibly involving the corticospinal tract. No CT signs of acute infarction. No intracranial mass, mass effect or midline shift. No acute parenchymal hemorrhage. Atherosclerotic vascular calcifications in carotid and vertebral arteries are more advanced than would be expected for the patient's  age.     VENTRICLES AND EXTRA-AXIAL SPACES:  Ventricles and extra-axial CSF spaces are prominent commensurate with the degree of volume loss. No hydrocephalus. No acute extra-axial hemorrhage. VISUALIZED ORBITS AND PARANASAL SINUSES:  Right ocular lens implant is noted. The orbits appear otherwise unremarkable. CALVARIUM AND EXTRACRANIAL SOFT TISSUES:  Normal.    Impression  No acute intracranial abnormality. Chronic microangiopathic changes. Workstation performed: [id number]    No results found for this or any previous visit. No results found for this or any previous visit. Results for orders placed during the hospital encounter of 10/08/22    MRI brain wo contrast    Narrative  MRI BRAIN WITHOUT CONTRAST    INDICATION: Brief right facial droop and slurring of speech; probable TIA. Conchetta Savory COMPARISON:   7/6/2018    TECHNIQUE:  Sagittal T1, axial T2, axial FLAIR, axial T1, axial [place] and axial diffusion imaging. IMAGE QUALITY:  Mild motion artifact limits evaluation. Some diagnostic information is obtained. Diffusion imaging is of diagnostic quality and therefore acute infarction can't be excluded. FINDINGS:    BRAIN PARENCHYMA:  There are innumerable punctate foci of blooming artifact throughout the supra and infratentorial brain on susceptibility weighted imaging. Compared to the prior study these are significantly increased although the prior study use to  different technique on the current susceptibility weighted imaging sequence is more sensitive for detecting these foci of blooming artifact/chronic hemosiderin staining. Small scattered hyperintensities on T2/FLAIR imaging are noted in the  periventricular and subcortical white matter demonstrating an appearance that is statistically most likely to represent moderate microangiopathic change. Chronic bilateral pontine lacunar infarctions are increased from the prior study. Cerebellar tonsils are normally positioned. No extra-axial fluid collection. No acute intracranial hemorrhage.     VENTRICLES:  Enlargement of ventricles and extra-axial CSF spaces, out of proportion to the patient's age most consistent with cerebral and cerebellar atrophy. SELLA AND PITUITARY GLAND:  Normal.    ORBITS:  Right lens implant noted    PARANASAL SINUSES:  Normal.    VASCULATURE:  Evaluation of the major intracranial vasculature demonstrates appropriate flow voids. CALVARIUM AND SKULL BASE:  Normal.    EXTRACRANIAL SOFT TISSUES:  Normal.    Impression  1. Innumerable foci of blooming artifact most suggestive of amyloid angiopathy. Differential diagnosis could include sequela of chronic hypertension/chronic hypertensive encephalopathy. No acute intracranial hemorrhage. 2.  No acute infarction. 3.  Moderate, chronic microangiopathy. 4.  Chronic appearing bilateral pontine lacunar infarctions increased from the prior study. Workstation performed: [id number]    No results found for this or any previous visit. No results found for this or any previous visit. I have spent 60 minutes with Patient and family today including taking history from family, patient, review of records, charting, and counseling regarding diagnosis and management of conditions.

## 2023-07-27 NOTE — PROGRESS NOTES
[facility]  [address] [facility], [address] [facility]  [phone]    Social Work [address] [place] presents today for a geriatric assessment, accompanied by son-Filiberto. LSW met with son-Filiberto. Son & dgt-in-law are patient's 24/7 caregivers. Son works night shift, and dgt-in-law with patient during the day. Son was interested in obtaining a handicap placecard, provider completed. LSW stated that son would have to have it notarized. LSW also discussed care resources such as homecare, and adult day centers. LSW provided Lewy Body Booklet, as well as information on [facility] as patient is blind and may be able to get additional resources in the home. LSW also discussed contact [facility] to see if patient is eligible for any programs/services (family becoming paid caregivers, assistance with incontinent supplies, etc.). Son was appreciative of information.     Anderson Cognitive Assessment (MoCA) Version: Blind  Education: High School +1    Points Earned POSSIBLE Points   Attention   Digit Span 0 2   Vigilance (letters) 0 1   Serial 7 subtraction 0 3   Language   Sentence Repetition 1 2   Verbal fluency 0 1   Abstraction   Abstraction (word pairings) 0 2   Delayed recall   Delayed recall 1 5   Memory index score: /15   Orientation   Orientation 3 6   TOTAL SCORE: 6/22  (Normal 19/22)   Additional notes: Previous MoCA completed 02/07/23- 7/22

## 2023-08-11 ENCOUNTER — OFFICE VISIT (OUTPATIENT)
Dept: FAMILY MEDICINE CLINIC | Facility: CLINIC | Age: 78
End: 2023-08-11
Payer: COMMERCIAL

## 2023-08-11 VITALS
SYSTOLIC BLOOD PRESSURE: 100 MMHG | WEIGHT: 150 LBS | BODY MASS INDEX: 26.58 KG/M2 | OXYGEN SATURATION: 98 % | HEART RATE: 72 BPM | HEIGHT: 63 IN | DIASTOLIC BLOOD PRESSURE: 70 MMHG

## 2023-08-11 DIAGNOSIS — E11.9 DIABETES MELLITUS TYPE 2, INSULIN DEPENDENT (HCC): ICD-10-CM

## 2023-08-11 DIAGNOSIS — R26.2 AMBULATORY DYSFUNCTION: ICD-10-CM

## 2023-08-11 DIAGNOSIS — N18.4 ANEMIA OF CHRONIC KIDNEY FAILURE, STAGE 4 (SEVERE) (HCC): Primary | ICD-10-CM

## 2023-08-11 DIAGNOSIS — E85.4 CEREBRAL AMYLOID ANGIOPATHY (HCC): ICD-10-CM

## 2023-08-11 DIAGNOSIS — I25.10 CORONARY ARTERY DISEASE INVOLVING NATIVE CORONARY ARTERY OF NATIVE HEART WITHOUT ANGINA PECTORIS: ICD-10-CM

## 2023-08-11 DIAGNOSIS — G31.83 MODERATE LEWY BODY DEMENTIA, UNSPECIFIED WHETHER BEHAVIORAL, PSYCHOTIC, OR MOOD DISTURBANCE OR ANXIETY (HCC): ICD-10-CM

## 2023-08-11 DIAGNOSIS — Z79.4 LONG-TERM INSULIN USE (HCC): ICD-10-CM

## 2023-08-11 DIAGNOSIS — Z79.4 DIABETES MELLITUS TYPE 2, INSULIN DEPENDENT (HCC): ICD-10-CM

## 2023-08-11 DIAGNOSIS — D63.1 ANEMIA OF CHRONIC KIDNEY FAILURE, STAGE 4 (SEVERE) (HCC): Primary | ICD-10-CM

## 2023-08-11 DIAGNOSIS — Z95.0 CARDIAC PACEMAKER IN SITU: ICD-10-CM

## 2023-08-11 DIAGNOSIS — F02.B0 MODERATE LEWY BODY DEMENTIA, UNSPECIFIED WHETHER BEHAVIORAL, PSYCHOTIC, OR MOOD DISTURBANCE OR ANXIETY (HCC): ICD-10-CM

## 2023-08-11 DIAGNOSIS — R06.2 WHEEZING: ICD-10-CM

## 2023-08-11 DIAGNOSIS — I50.9 CHRONIC CONGESTIVE HEART FAILURE, UNSPECIFIED HEART FAILURE TYPE (HCC): ICD-10-CM

## 2023-08-11 DIAGNOSIS — I68.0 CEREBRAL AMYLOID ANGIOPATHY (HCC): ICD-10-CM

## 2023-08-11 DIAGNOSIS — I73.9 PAD (PERIPHERAL ARTERY DISEASE) (HCC): ICD-10-CM

## 2023-08-11 PROBLEM — D62 ACUTE BLOOD LOSS ANEMIA (ABLA): Status: RESOLVED | Noted: 2022-06-26 | Resolved: 2023-08-11

## 2023-08-11 PROBLEM — E04.1 LEFT THYROID NODULE: Status: RESOLVED | Noted: 2022-10-08 | Resolved: 2023-08-11

## 2023-08-11 PROBLEM — I21.9 MI (MYOCARDIAL INFARCTION) (HCC): Status: RESOLVED | Noted: 2022-06-25 | Resolved: 2023-08-11

## 2023-08-11 PROBLEM — N13.9 LOWER OBSTRUCTIVE UROPATHY: Status: RESOLVED | Noted: 2022-06-29 | Resolved: 2023-08-11

## 2023-08-11 PROBLEM — D50.0 IRON DEFICIENCY ANEMIA DUE TO CHRONIC BLOOD LOSS: Status: RESOLVED | Noted: 2022-12-19 | Resolved: 2023-08-11

## 2023-08-11 PROBLEM — I48.91 ATRIAL FIBRILLATION (HCC): Status: RESOLVED | Noted: 2022-06-24 | Resolved: 2023-08-11

## 2023-08-11 PROBLEM — G93.40 ENCEPHALOPATHY ACUTE: Status: RESOLVED | Noted: 2022-10-12 | Resolved: 2023-08-11

## 2023-08-11 PROBLEM — R79.89 ELEVATED D-DIMER: Status: RESOLVED | Noted: 2022-08-01 | Resolved: 2023-08-11

## 2023-08-11 PROCEDURE — 99214 OFFICE O/P EST MOD 30 MIN: CPT | Performed by: FAMILY MEDICINE

## 2023-08-11 RX ORDER — CARVEDILOL 25 MG/1
25 TABLET ORAL EVERY 12 HOURS SCHEDULED
Qty: 180 TABLET | Refills: 1 | Status: SHIPPED | OUTPATIENT
Start: 2023-08-11

## 2023-08-11 NOTE — PROGRESS NOTES
Assessment/Plan:  No problem-specific Assessment & Plan notes found for this encounter. Adenike Weir was seen today for follow-up. Diagnoses and all orders for this visit:    Anemia of chronic kidney failure, stage 4 (severe) ([facility])  -     CBC and differential; Future  -     Iron Panel (Includes Ferritin, Iron Sat%, Iron, and TIBC); Future  -     Ambulatory Referral to Physical Therapy; Future    Ambulatory dysfunction  -     Ambulatory Referral to Physical Therapy; Future    Moderate Lewy body dementia, unspecified whether behavioral, psychotic, or mood disturbance or anxiety ([address])  -     Ambulatory Referral to Physical Therapy; Future    Cerebral amyloid angiopathy ([address])  -     Ambulatory Referral to Physical Therapy; Future    Chronic congestive heart failure, unspecified heart failure type [facility])  -     Ambulatory Referral to Physical Therapy; Future  -     B-Type Natriuretic Peptide(BNP); Future    Coronary artery disease involving native coronary artery of native heart without angina pectoris  -     Basic metabolic panel; Future  -     Lipid Panel with Direct LDL reflex; Future  -     Ambulatory Referral to Physical Therapy; Future  -     TSH, 3rd generation with Free T4 reflex; Future  -     carvedilol (COREG) 25 mg tablet; Take 1 tablet (25 mg total) by mouth every 12 (twelve) hours    Cardiac pacemaker in situ  -     Ambulatory Referral to Physical Therapy; Future    PAD (peripheral artery disease) ([address])  -     Ambulatory Referral to Physical Therapy; Future    Long-term insulin use ([address])  -     Ambulatory Referral to Physical Therapy; Future  -     HEMOGLOBIN A1C W/ EAG ESTIMATION; Future    Diabetes mellitus type 2, insulin dependent (HCC)  -     TSH, 3rd generation with Free T4 reflex; Future    Wheezing    BMI 26.0-26.9,adult      Chronic kidney disease. We will order another blood test to check her kidney function prior to 09/2023. Anemia of chronic disease.   We will repeat CBC and iron levels. Continue follow-up with hematology/oncology. Atrial flutter. Continue follow-up with cardiology for scheduled device checks. Hyperlipidemia. Last LDL 35 mg/dL. Continue atorvastatin 10 mg.    Bilateral lower extremity weakness. I recommended her to undergo home physical therapy for overall strengthening and fall prevention. Diabetes. I recommended to trial a discontinuation of Lantus and diligently monitor blood glucose 3 times a day for 1 week. Postprandial blood glucose goal of 160 to 180 mg/dL. Osteopenia. Continue Caltrate. Peripheral artery disease. Continue aspirin and atorvastatin 10 mg. Thyroid nodules. Will order thyroid function. Congestive heart failure. Will order BNP. Bilateral lower extremity edema. Continue Bumex as prescribed. Continue wearing compression socks. Constipation. Advised balanced diet with protein and vegetables and fruits with a low glycemic index. Hypertension. Continue carvedilol. Advised to check home blood pressure 3 times a week for 1 month and report the readings back to me. We will see her back in 3 months. Subjective:      Patient ID: Xena Syed is a 66 y.o. female. HPI  The patient consents to the use of [facility] services today. Xena Syed is a 70-year-old female who presents today for a follow-up. She is accompanied by her son. Senior care  Patient recently visited senior care for Lewy body dementia and cerebral amyloid angiopathy for follow-up evaluation. She is doing well with the current medication regimen. Patient showed improvement with the use of Aricept and had good control of agitations and hallucinations with Depakote. She was told to follow up as needed and reach out if there is any health-related concern. Cardiology  She was seen by Dr. Libia Banks and was told her hypertension, stage 4 CKD, and blood pressure was adequately controlled.    She has been taking amlodipine 2.5 mg, carvedilol 25 mg, and bumetanide 1 mg twice. She continues taking the beta-blocker. Statin was decreased and decreased further as LDL was 35 mg/dL. She is on atorvastatin 10 mg and continued with aspirin, beta blocker, and ranolazine. She does not routinely check her blood pressure at home. Arrhythmia and tachy-sarah beth syndrome   She got a permanent pacemaker back in 2019 and had a 7-second pause. She was started on Xarelto but is off this at the moment. She will be having remote device checks on 09/27/2023, 12/2023, 03/2024 and in person in 06/2024. She denies heart arrhythmia. Anemia secondary to chronic kidney disease  Her son states there was no source of bleeding found other than in her brain. She received IV iron and hemoglobin improved from 7.9 to 8.3 g/dL. It was decided not to proceed with erythropoietin. She is still on follow-up with hematology/oncology as needed. Chronic kidney disease  She was noted to have kidney issues around 02/2023 and is still seeing a cardiologist.  She completed blood test last 06/2023. Bilateral lower extremity weakness  She reports difficulty in walking due to lower extremity weakness. She is able to lift her legs with some difficulty. The patient did have physical therapy at home twice with short relief. She stopped receiving physical therapy due to the absence of a therapist.   She complains of having a tremor recently. Diabetes  She has been taking Lantus insulin consistently. Her son stated that Lantus usage was dropped down to 6 units at bedtime. Her blood glucose level ranges from 120 mg/dL to 140 mg/dL depending on the time of day. The lowest reading was around 80 mg/dL after she was discharged from the hospital.     Osteopenia  She has been taking Caltrate.     Peripheral artery disease  She has diffuse peripheral artery disease noted throughout the common femoral, superficial femoral, proximal femoral, and popliteal arteries bilaterally. No significant stenosis was noted. Code status  The patient is a FULL CODE. Urinary incontinence   She denies having dysuria. She is consistently wearing pads and most of the time makes it to the restroom on time. She calls for assistance when using the restroom. Bilateral lower extremity edema  She is currently taking Bumex once a day with relief and was told by her doctor that she was "a little bit too dehydrated."  She reports that her feet are swollen. Her son mentioned the patient's condition was improved in the hospital, stating "before it was both legs but right now, only the ankle."  Son reports she has been wearing compression socks. Constipation  She suffers from constipation. Patient wishes to refill her medications. The following portions of the patient's history were reviewed and updated as appropriate: allergies, current medications, past family history, past medical history, past social history, past surgical history and problem list.    Review of Systems   Constitutional: Negative for fever and unexpected weight change. HENT: Negative for nosebleeds and trouble swallowing. Eyes: Positive for visual disturbance. Respiratory: Negative for chest tightness and shortness of breath. Cardiovascular: Negative for chest pain, palpitations and leg swelling. Gastrointestinal: Negative for abdominal pain, constipation, diarrhea and nausea. Endocrine: Negative for cold intolerance. Genitourinary: Negative for dysuria and urgency. Musculoskeletal: Positive for arthralgias and gait problem. Negative for joint swelling and myalgias. Skin: Negative for rash. Neurological: Positive for weakness. Negative for tremors, seizures and syncope. Hematological: Does not bruise/bleed easily. Psychiatric/Behavioral: Negative for hallucinations and suicidal ideas.            Objective:      /70   Pulse 72   Ht 5' 3" (1.6 m)   Wt 68 kg (150 lb)   SpO2 98% BMI 26.57 kg/m²     No visits with results within 2 Week(s) from this visit. Latest known visit with results is:   Appointment on 06/23/2023   Component Date Value   • Sodium 06/23/2023 136    • Potassium 06/23/2023 3.8    • Chloride 06/23/2023 96    • CO2 06/23/2023 35 (H)    • ANION GAP 06/23/2023 5    • BUN 06/23/2023 33 (H)    • Creatinine 06/23/2023 2.46 (H)    • Glucose, Fasting 06/23/2023 49 (L)    • Calcium 06/23/2023 8.4    • eGFR 06/23/2023 18      I have personally reviewed results with the patient. Physical Exam  Vitals and nursing note reviewed. Constitutional:       Appearance: She is well-developed. She is obese. Comments: wheelchair   HENT:      Head: Normocephalic and atraumatic. Eyes:      Comments: Dysconjugate   Cardiovascular:      Rate and Rhythm: Normal rate and regular rhythm. Pulses:           Dorsalis pedis pulses are 0 on the right side and 0 on the left side. Heart sounds: Normal heart sounds. No murmur heard. Pulmonary:      Effort: Pulmonary effort is normal.      Breath sounds: Normal breath sounds. No wheezing or rales. Abdominal:      General: Bowel sounds are normal. There is no distension. Palpations: Abdomen is soft. Tenderness: There is no abdominal tenderness. Musculoskeletal:         General: No tenderness. Cervical back: Normal range of motion and neck supple. Feet:      Right foot:      Skin integrity: No ulcer, skin breakdown, erythema, warmth, callus or dry skin. Left foot:      Skin integrity: No ulcer, skin breakdown, erythema, warmth, callus or dry skin. Lymphadenopathy:      Cervical: No cervical adenopathy. Skin:     General: Skin is warm and dry. Capillary Refill: Capillary refill takes less than 2 seconds. Findings: No rash. Neurological:      Mental Status: She is alert and oriented to person, place, and time. Cranial Nerves: No cranial nerve deficit. Sensory: No sensory deficit. Motor: Weakness present. No abnormal muscle tone. Gait: Gait abnormal.   Psychiatric:         Behavior: Behavior normal.         Thought Content: Thought content normal.         Judgment: Judgment normal.           Vitals: Blood pressure 100/70 mmHg. Cardiovascular: Regular rate and rhythm. Extremities: Mild edema noted in bilateral lower extremities. Her LDL was 35 mg/dL. Her creatinine level was 2.4 mg/dL in 06/2023. Vitamin D level was at 73 ng/mL. MCV is normal at 96 fL. Hemoglobin A1c was 5.6%. Iron levels, lipid panel and BMP are within normal limits. DEXA bone density from 05/2023 revealed osteopenia. BMI Counseling: Body mass index is 26.57 kg/m². The BMI is above normal. Nutrition recommendations include decreasing portion sizes, encouraging healthy choices of fruits and vegetables, decreasing fast food intake, consuming healthier snacks and limiting drinks that contain sugar. Exercise recommendations include exercising 3-5 times per week. No pharmacotherapy was ordered. Rationale for BMI follow-up plan is due to patient being overweight or obese. Depression Screening and Follow-up Plan: Patient was screened for depression during today's encounter. They screened negative with a PHQ-2 score of 0. Falls Plan of Care: balance, strength, and gait training instructions were provided. Medications that increase falls were reviewed. Trung Capps MD   [address] [facility]     Transcribed for Trung Capps MD, by Maurisio Hirsch on 08/13/23 at 11:48 AM. Powered by [employer].

## 2023-08-16 ENCOUNTER — TELEPHONE (OUTPATIENT)
Age: 78
End: 2023-08-16

## 2023-08-16 NOTE — TELEPHONE ENCOUNTER
LSW received a message last evening from patient's son. LSW returned call, left message with LSW direct number to discuss placement options for patient.

## 2023-08-21 NOTE — PROGRESS NOTES
[facility]  [address] [place] [address], [address] [place] [address] Loop  [phone]    Care Conference    NAME: Tram Johnson  AGE: 66 y.o. SEX: female  YOB: 1945  DATE OF ASSESSMENT: 07/27/23  DATE OF CONFERENCE: 08/31/23    Family Present: Son  Staff Present: Tanya Motta MD    Patient / Family Goals of Care: Review cognitive decline and associated symptoms, discuss treatment options and care planning. Medical Concerns (Current/Historical): Atrial fibrillation, unspecified type, cerebral amyloid angiopathy, coronary artery disease involving native coronary artery of native heart without angina pectoris, CKD (chronic kidney disease) stage 4, GFR 15-29 ml/min    Geriatric Syndromes/Age Related Syndromes: Lewy body dementia, blindness of both eyes    Neuropsychological  Lewy body dementia  [place] Cognitive Assessment: 6/30    Decision-making capacity: no capacity  Staging: moderate to severe    Cognitively Gilda is stable. She will cotninue with Aricept and current dosing. Physically her mobility/movement has declined. Likely due to progression of LBD. She is currently in Physical therapy and reports doing well. She is currently at home under her sons care. May be looking into placement to a facility if she continues to decline.    Engage in cognitively challenging exercises such as crosswords and puzzles  Maintain chronic conditions under control  Repeat cognitive assessment in 6  months    Diagnostic Studies  Review of bloodwork  Review of previous imaging    Physical Finding Impacting Function   Fall Risk: high   Activities of Daily Living: Dependent   Instrumental Activities of Daily Living: Dependent    Encourage appropriate footwear at all times  Review fall risk prevention tips and adjust within the home environment as needed    Medications Reviewed   Medications seem appropriate for present conditions  Check with PCP before using over the counter medications  Avoid over the counter medications that can affect cognition (e.g., Benadryl, Tylenol PM)   Avoid NSAIDs due to risk of GI bleed and renal impairment    Other Findings   Overall health  BMI: 26.57 kg/m2  Maintain well-balanced diet  Continue following with primary care physician regularly  Atrial fibrillation, unspecified type  Stable  Cerebral amyloid angiopathy  Patient diagnosis is LBD vs CAA. No change in management at this time. Blindness of both eyes  Stable. Continue with supportive treatment w family care. Coronary artery disease involving native coronary artery of native heart without angina pectoris  Stable, no symptoms  CKD (chronic kidney disease) stage 4, GFR 15-29 ml/min  Stable  Avoid any nephrotoxic agents  Monitor    Recommended Health Maintenance   Immunizations, if not contraindicated:    Influenza vaccine yearly  Pneumo vaccine every 5 years (65 years and over)  Shingles vaccine  COVID-19 vaccine    Social / Safety Concerns  Consider an Adult Day Program  for positive socialization, physical exercise, cognitive stimulation and family respite  Consider a home care aide to assist with daily care needs or higher level of care  Stay in touch with family and friends  Plan self-care activities for your mental well-being each week  Recommend review of fall risk prevention tips  Recommend use of fall precautions including fall alert device  For wandering prevention: consider use of fall alert with GPS, Well.caeturn bracelet, Project LifeSaver bracelet, video surveillance, or alarm systems  Consider assistance for medication administration such as blister packaging or use of an automated pill dispenser  Recommend contacting your local [address] [place] [address] on Aging for possible eligible programs such as OPTIONS, Caregiver Support Program, or [address] [place] [address],[place] [address] updated advance directives and provide a copy to your primary care provider  Consider caregiver support groups and educational resources through the Alzheimer's Association; access Alzheimer's Association 24/7 Helpline at [address] [place] does offer a monthly caregiver support group. If interested, please speak with a . Utilize reorientation and redirection as needed (dependent on situation)  Educational information provided    Patient and family verbalized understanding of above care plan. For care coordination purposes, this care plan will be shared with your primary care provider. With any questions, please contact our office at [phone]. Telemedicine consent    Patient: Toshia Tijreinanasir  Provider: Brandon Casas MD  Provider located at [address] [place] [address]  [phone]    The patient was identified by name and date of birth. [employer] was informed that this is a telemedicine visit and that the visit is being conducted through the [facility]. She agrees to proceed. .  My office door was closed. No one else was in the room. She acknowledged consent and understanding of privacy and security of the video platform. The patient has agreed to participate and understands they can discontinue the visit at any time. Patient is aware this is a billable service. I spent 25 minutes with the patient during this visit.

## 2023-08-31 ENCOUNTER — TELEMEDICINE (OUTPATIENT)
Age: 78
End: 2023-08-31
Payer: COMMERCIAL

## 2023-08-31 DIAGNOSIS — F02.B0 MODERATE LEWY BODY DEMENTIA, UNSPECIFIED WHETHER BEHAVIORAL, PSYCHOTIC, OR MOOD DISTURBANCE OR ANXIETY (HCC): Primary | ICD-10-CM

## 2023-08-31 DIAGNOSIS — Z86.73 HISTORY OF STROKE: ICD-10-CM

## 2023-08-31 DIAGNOSIS — N18.4 CKD (CHRONIC KIDNEY DISEASE) STAGE 4, GFR 15-29 ML/MIN (HCC): ICD-10-CM

## 2023-08-31 DIAGNOSIS — G31.83 MODERATE LEWY BODY DEMENTIA, UNSPECIFIED WHETHER BEHAVIORAL, PSYCHOTIC, OR MOOD DISTURBANCE OR ANXIETY (HCC): Primary | ICD-10-CM

## 2023-08-31 DIAGNOSIS — I10 PRIMARY HYPERTENSION: ICD-10-CM

## 2023-08-31 PROCEDURE — 99214 OFFICE O/P EST MOD 30 MIN: CPT | Performed by: FAMILY MEDICINE

## 2023-08-31 NOTE — PROGRESS NOTES
Jennifer Arias [address], [address] [facility], [facility] [address]  [phone]    Care Conference: Resources Provided    LSW participated in today's conference and provided the following resources, along with a copy of care plan:  [address] [facility] [id number]    LSW spoke with son to discussed care options as patient has had a decline. LSW discussed waiver services for in home care, adult day centers, and nursing home that accept medicaid. LSW stated that LSW can send referral for waiver and that LSW will send information on how to apply. Son was appreciative of information & referral. Son stated that he has called [facility] but has not heard back yet. - Adult Day Center List  - Skilled Nursing List  - Information on Waiver Services (Steps to Apply for [employer] with [place] [employer] contact information and the [address] and Support Pending Documentation checklist)     Referral faxed to [address] [place] [address] ([phone]) with Physician Cert form.

## 2023-09-14 PROBLEM — F02.B0 MODERATE LEWY BODY DEMENTIA WITHOUT BEHAVIORAL DISTURBANCE, PSYCHOTIC DISTURBANCE, MOOD DISTURBANCE, OR ANXIETY (HCC): Status: ACTIVE | Noted: 2023-01-10

## 2023-09-28 ENCOUNTER — REMOTE DEVICE CLINIC VISIT (OUTPATIENT)
Dept: CARDIOLOGY CLINIC | Facility: CLINIC | Age: 78
End: 2023-09-28
Payer: COMMERCIAL

## 2023-09-28 DIAGNOSIS — Z95.0 CARDIAC PACEMAKER IN SITU: Primary | ICD-10-CM

## 2023-09-28 PROCEDURE — 93296 REM INTERROG EVL PM/IDS: CPT | Performed by: INTERNAL MEDICINE

## 2023-09-28 PROCEDURE — 93294 REM INTERROG EVL PM/LDLS PM: CPT | Performed by: INTERNAL MEDICINE

## 2023-09-28 NOTE — PROGRESS NOTES
Results for orders placed or performed in visit on 09/28/23   Cardiac EP device report    Narrative    MDT DC PPM - ACTIVE SYSTEM IS MRI CONDITIONAL  CARELINK TRANSMISSION: BATTERY STATUS "9 YRS." AP 98%  0%. ALL AVAILABLE LEAD PARAMETERS WITHIN NORMAL LIMITS. 90 AT/AF NOTED; 0% BURDEN; HX OF AF. PT ON ASA, NO AC DUE TO CAA. NORMAL DEVICE FUNCTION.  NC

## 2023-09-29 DIAGNOSIS — Z79.4 DIABETES MELLITUS TYPE 2, INSULIN DEPENDENT (HCC): ICD-10-CM

## 2023-09-29 DIAGNOSIS — E11.9 DIABETES MELLITUS TYPE 2, INSULIN DEPENDENT (HCC): ICD-10-CM

## 2023-09-29 RX ORDER — INSULIN GLARGINE 100 [IU]/ML
10 INJECTION, SOLUTION SUBCUTANEOUS
Qty: 3 ML | Refills: 5 | Status: SHIPPED | OUTPATIENT
Start: 2023-09-29 | End: 2023-10-29

## 2023-09-29 RX ORDER — INSULIN GLARGINE 100 [IU]/ML
INJECTION, SOLUTION SUBCUTANEOUS
Qty: 6 ML | Refills: 0 | Status: SHIPPED | OUTPATIENT
Start: 2023-09-29 | End: 2023-09-29 | Stop reason: SDUPTHER

## 2023-10-01 DIAGNOSIS — E87.70 HYPERVOLEMIA, UNSPECIFIED HYPERVOLEMIA TYPE: ICD-10-CM

## 2023-10-02 ENCOUNTER — OFFICE VISIT (OUTPATIENT)
Dept: PODIATRY | Facility: CLINIC | Age: 78
End: 2023-10-02
Payer: COMMERCIAL

## 2023-10-02 VITALS
BODY MASS INDEX: 26.57 KG/M2 | HEIGHT: 63 IN | OXYGEN SATURATION: 99 % | SYSTOLIC BLOOD PRESSURE: 105 MMHG | HEART RATE: 66 BPM | DIASTOLIC BLOOD PRESSURE: 72 MMHG

## 2023-10-02 DIAGNOSIS — I73.9 PAD (PERIPHERAL ARTERY DISEASE) (HCC): ICD-10-CM

## 2023-10-02 DIAGNOSIS — Z79.4 TYPE 2 DIABETES MELLITUS WITH STAGE 3A CHRONIC KIDNEY DISEASE, WITH LONG-TERM CURRENT USE OF INSULIN (HCC): ICD-10-CM

## 2023-10-02 DIAGNOSIS — N18.31 TYPE 2 DIABETES MELLITUS WITH STAGE 3A CHRONIC KIDNEY DISEASE, WITH LONG-TERM CURRENT USE OF INSULIN (HCC): ICD-10-CM

## 2023-10-02 DIAGNOSIS — I68.0 CEREBRAL AMYLOID ANGIOPATHY: ICD-10-CM

## 2023-10-02 DIAGNOSIS — E11.22 TYPE 2 DIABETES MELLITUS WITH STAGE 3A CHRONIC KIDNEY DISEASE, WITH LONG-TERM CURRENT USE OF INSULIN (HCC): ICD-10-CM

## 2023-10-02 DIAGNOSIS — B35.1 ONYCHOMYCOSIS: Primary | ICD-10-CM

## 2023-10-02 DIAGNOSIS — E85.4 CEREBRAL AMYLOID ANGIOPATHY: ICD-10-CM

## 2023-10-02 DIAGNOSIS — S90.414A TOE ABRASION, RIGHT, INITIAL ENCOUNTER: ICD-10-CM

## 2023-10-02 DIAGNOSIS — I50.9 CHRONIC CONGESTIVE HEART FAILURE, UNSPECIFIED HEART FAILURE TYPE (HCC): ICD-10-CM

## 2023-10-02 PROCEDURE — 11721 DEBRIDE NAIL 6 OR MORE: CPT | Performed by: PODIATRIST

## 2023-10-02 PROCEDURE — 99203 OFFICE O/P NEW LOW 30 MIN: CPT | Performed by: PODIATRIST

## 2023-10-02 RX ORDER — BUMETANIDE 1 MG/1
1 TABLET ORAL DAILY
Qty: 180 TABLET | Refills: 0 | Status: SHIPPED | OUTPATIENT
Start: 2023-10-02

## 2023-10-02 NOTE — PROGRESS NOTES
Assessment/Plan:     The patient's clinical examination today significant for a healing abrasion to the dorsal aspect of the right second digit. A noninfected partial-thickness eschar has formed and appears stable. The pedal nail plates are thickened and dystrophic and elongated with discoloration and subungual debris consistent with onychomycosis x 10. Pedal pulses are diminished bilaterally, PT pulses are nonpalpable. There are no open lesions nor atrophic changes noted to either lower extremity. The pedal nail plates are sharply debrided with a sterile nail clipper Z20 without complication. The nails with a mechanically reduced in thickness and girth utilizing rotary bur. The partial-thickness eschar to the right second toe can be left open to air and allowed to autolyse. Recommend follow-up in 2 to 3 months for continued at risk diabetic foot care. Diagnoses and all orders for this visit:    Onychomycosis    Type 2 diabetes mellitus with stage 3a chronic kidney disease, with long-term current use of insulin ([address] [facility])  -     Ambulatory Referral to Podiatry    Chronic congestive heart failure, unspecified heart failure type [facility])  -     Ambulatory Referral to Podiatry    Cerebral amyloid angiopathy   -     Ambulatory Referral to Podiatry    Toe abrasion, right, initial encounter    PAD (peripheral artery disease) ([address] [facility])          Subjective:     Patient ID: Irene Miller is a 66 y.o. female. The patient presents today for her initial consultation with [facility] podiatry group with a chief complaint of an abrasion to the dorsal aspect of her right second toe sustained after stubbing it about 3 to 4 weeks ago. Her son has been taking care of the wound and is concerned that there is dry skin around the area. He also notes that her pedal nail plates are very long and thick and are difficult to trim. The patient is seen sitting in the wheelchair resting in [facility].       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     MEDICATIONS:  Current Outpatient Medications   Medication Sig Dispense Refill   • acetaminophen (TYLENOL) 325 mg tablet Take 3 tablets (975 mg total) by mouth every 8 (eight) hours  0   • amLODIPine (NORVASC) 2.5 mg tablet Take 1 tablet (2.5 mg total) by mouth daily 90 tablet 3   • aspirin 81 mg chewable tablet Chew 81 mg daily     • atorvastatin (LIPITOR) 10 mg tablet Take 1 tablet (10 mg total) by mouth daily 90 tablet 1   • B-D UF III MINI PEN NEEDLES 31G X 5 MM MISC Use to test qd 100 each 5   • bumetanide (BUMEX) 1 mg tablet Take 1 tablet (1 mg total) by mouth daily 180 tablet 0   • calcium carbonate (OS-LULÚ) 600 MG tablet Take 600 mg by mouth 2 (two) times a day with meals     • carvedilol (COREG) 25 mg tablet Take 1 tablet (25 mg total) by mouth every 12 (twelve) hours 180 tablet 1   • Cholecalciferol (Vitamin D) 50 MCG (2000 UT) tablet Take 1 tablet (2,000 Units total) by mouth daily 90 tablet 1   • divalproex sodium (DEPAKOTE) 250 mg DR tablet Take 1 tablet (250 mg total) by mouth every 12 (twelve) hours 180 tablet 1   • donepezil (ARICEPT) 5 mg tablet TAKE 1 TABLET(5 MG) BY MOUTH DAILY AT BEDTIME 90 tablet 2   • fluticasone (FLONASE) 50 mcg/act nasal spray 1 spray into each nostril daily 9.9 mL 0   • folic acid (FOLVITE) 115 mcg tablet Take 400 mcg by mouth daily     • Lantus SoloStar 100 units/mL SOPN Inject 0.1 mL (10 Units total) under the skin daily at bedtime 3 mL 5   • ranolazine (RANEXA) 500 mg 12 hr tablet Take 1 tablet (500 mg total) by mouth daily 90 tablet 1     No current facility-administered medications for this visit. SOCIAL HISTORY:  Social History     Socioeconomic History   • Marital status:      Spouse name: None   • Number of children: None   • Years of education: None   • Highest education level: None   Occupational History   • Occupation: retired   Tobacco Use   • Smoking status: Never   • Smokeless tobacco: Never   Vaping Use   • Vaping Use: Never used   Substance and Sexual Activity   • Alcohol use: Never   • Drug use: Never   • Sexual activity: Not Currently   Other Topics Concern   • None   Social History Narrative    ** Merged History Encounter **         Most recent tobacco use screenin2019  Do you currently or have you served in the [address] [place] [address] [employer]: No  Were you activated, into active duty, as a member of the "[employer]" or as a Reservist: No  Marital status:   Exercise level: None  D    iet: Regular  General stress level: Low  Alcohol intake: None  Caffeine intake:  Moderate  Chewing tobacco: none  Illicit drugs: none  Seat belts used routinely: Yes  Sunscreen used routinely: Yes  Smoke alarm in home: Yes  Advance directive: No  Salt Int    juan antonio: minimal  Has the Patient had a mammogram to screen for breast cancer within 24 months: No  2019 - declines mammogram.  Is the patient interested in a colorectal cancer screening: No  2019 - patient declines colonoscopy's     Social Determinants of Health     Financial Resource Strain: Low Risk  (2022)    Overall Financial Resource Strain (CARDIA)    • Difficulty of Paying Living Expenses: Not hard at all   Food Insecurity: No Food Insecurity (11/14/2022)    Hunger Vital Sign    • Worried About Running Out of Food in the Last Year: Never true    • Ran Out of Food in the Last Year: Never true   Transportation Needs: No Transportation Needs (11/14/2022)    PRAPARE - Transportation    • Lack of Transportation (Medical): No    • Lack of Transportation (Non-Medical): No   Physical Activity: Not on file   Stress: Not on file   Social Connections: Not on file   Intimate Partner Violence: Not on file   Housing Stability: Low Risk  (11/14/2022)    Housing Stability Vital Sign    • Unable to Pay for Housing in the Last Year: No    • Number of Places Lived in the Last Year: 1    • Unstable Housing in the Last Year: No        Review of Systems   Unable to perform ROS: Dementia   Constitutional: Negative. HENT: Negative. Eyes: Negative. Respiratory: Negative. Cardiovascular: Negative. Endocrine: Negative. Musculoskeletal: Negative. Neurological: Negative. Hematological: Negative. Psychiatric/Behavioral: Negative. Objective:     Physical Exam  Constitutional:       Appearance: Normal appearance. HENT:      Head: Normocephalic and atraumatic. Nose: Nose normal.   Cardiovascular:      Pulses:           Dorsalis pedis pulses are 1+ on the right side and 0 on the left side. Posterior tibial pulses are 0 on the right side and 0 on the left side. Pulmonary:      Effort: Pulmonary effort is normal.   Feet:      Right foot:      Skin integrity: Skin integrity normal.      Toenail Condition: Right toenails are abnormally thick and long. Fungal disease present. Left foot:      Skin integrity: Skin integrity normal.      Toenail Condition: Left toenails are abnormally thick and long. Fungal disease present. Comments:  The patient's clinical examination today significant for a healing abrasion to the dorsal aspect of the right second digit. A noninfected partial-thickness eschar has formed and appears stable. The pedal nail plates are thickened and dystrophic and elongated with discoloration and subungual debris consistent with onychomycosis x 10. Pedal pulses are diminished bilaterally, PT pulses are nonpalpable. There are no open lesions nor atrophic changes noted to either lower extremity. Skin:     General: Skin is warm. Capillary Refill: Capillary refill takes 2 to 3 seconds. Neurological:      General: No focal deficit present. Mental Status: She is alert and oriented to person, place, and time. Psychiatric:         Mood and Affect: Mood normal.         Behavior: Behavior normal.         Thought Content:  Thought content normal.

## 2023-10-10 DIAGNOSIS — R44.3 HALLUCINATIONS: ICD-10-CM

## 2023-10-10 DIAGNOSIS — I25.10 CORONARY ARTERY DISEASE INVOLVING NATIVE CORONARY ARTERY OF NATIVE HEART WITHOUT ANGINA PECTORIS: ICD-10-CM

## 2023-10-10 RX ORDER — DIVALPROEX SODIUM 250 MG/1
TABLET, DELAYED RELEASE ORAL
Qty: 180 TABLET | Refills: 1 | Status: SHIPPED | OUTPATIENT
Start: 2023-10-10

## 2023-10-10 RX ORDER — RANOLAZINE 500 MG/1
TABLET, EXTENDED RELEASE ORAL
Qty: 90 TABLET | Refills: 1 | Status: SHIPPED | OUTPATIENT
Start: 2023-10-10

## 2023-10-25 ENCOUNTER — APPOINTMENT (EMERGENCY)
Dept: CT IMAGING | Facility: HOSPITAL | Age: 78
End: 2023-10-25
Payer: COMMERCIAL

## 2023-10-25 ENCOUNTER — HOSPITAL ENCOUNTER (EMERGENCY)
Facility: HOSPITAL | Age: 78
Discharge: HOME | End: 2023-10-26
Attending: EMERGENCY MEDICINE
Payer: COMMERCIAL

## 2023-10-25 DIAGNOSIS — S00.83XA CONTUSION OF FACE, INITIAL ENCOUNTER: Primary | ICD-10-CM

## 2023-10-25 DIAGNOSIS — L89.159 SACRAL DECUBITUS ULCER: ICD-10-CM

## 2023-10-25 LAB
ABO GROUP BLD: NORMAL
ALBUMIN SERPL BCP-MCNC: 3.3 G/DL (ref 3.5–5)
ALP SERPL-CCNC: 44 U/L (ref 34–104)
ALT SERPL W P-5'-P-CCNC: 6 U/L (ref 7–52)
ANION GAP SERPL CALCULATED.3IONS-SCNC: 5 MMOL/L
APTT PPP: 29 SECONDS (ref 23–37)
AST SERPL W P-5'-P-CCNC: 10 U/L (ref 13–39)
BASOPHILS # BLD AUTO: 0.01 THOUSANDS/ÂΜL (ref 0–0.1)
BASOPHILS NFR BLD AUTO: 0 % (ref 0–1)
BILIRUB SERPL-MCNC: 0.39 MG/DL (ref 0.2–1)
BLD GP AB SCN SERPL QL: NEGATIVE
BUN SERPL-MCNC: 35 MG/DL (ref 5–25)
CALCIUM ALBUM COR SERPL-MCNC: 10.3 MG/DL (ref 8.3–10.1)
CALCIUM SERPL-MCNC: 9.7 MG/DL (ref 8.4–10.2)
CARDIAC TROPONIN I PNL SERPL HS: 10 NG/L
CHLORIDE SERPL-SCNC: 100 MMOL/L (ref 96–108)
CO2 SERPL-SCNC: 34 MMOL/L (ref 21–32)
CREAT SERPL-MCNC: 2.41 MG/DL (ref 0.6–1.3)
EOSINOPHIL # BLD AUTO: 0 THOUSAND/ÂΜL (ref 0–0.61)
EOSINOPHIL NFR BLD AUTO: 0 % (ref 0–6)
ERYTHROCYTE [DISTWIDTH] IN BLOOD BY AUTOMATED COUNT: 14.3 % (ref 11.6–15.1)
GFR SERPL CREATININE-BSD FRML MDRD: 18 ML/MIN/1.73SQ M
GLUCOSE SERPL-MCNC: 223 MG/DL (ref 65–140)
HCT VFR BLD AUTO: 31.3 % (ref 34.8–46.1)
HGB BLD-MCNC: 9.7 G/DL (ref 11.5–15.4)
IMM GRANULOCYTES # BLD AUTO: 0.06 THOUSAND/UL (ref 0–0.2)
IMM GRANULOCYTES NFR BLD AUTO: 1 % (ref 0–2)
INR PPP: 1.03 (ref 0.84–1.19)
LYMPHOCYTES # BLD AUTO: 1.73 THOUSANDS/ÂΜL (ref 0.6–4.47)
LYMPHOCYTES NFR BLD AUTO: 34 % (ref 14–44)
MCH RBC QN AUTO: 29.1 PG (ref 26.8–34.3)
MCHC RBC AUTO-ENTMCNC: 31 G/DL (ref 31.4–37.4)
MCV RBC AUTO: 94 FL (ref 82–98)
MONOCYTES # BLD AUTO: 0.54 THOUSAND/ÂΜL (ref 0.17–1.22)
MONOCYTES NFR BLD AUTO: 11 % (ref 4–12)
NEUTROPHILS # BLD AUTO: 2.74 THOUSANDS/ÂΜL (ref 1.85–7.62)
NEUTS SEG NFR BLD AUTO: 54 % (ref 43–75)
NRBC BLD AUTO-RTO: 0 /100 WBCS
PLATELET # BLD AUTO: 155 THOUSANDS/UL (ref 149–390)
PMV BLD AUTO: 10.1 FL (ref 8.9–12.7)
POTASSIUM SERPL-SCNC: 4.1 MMOL/L (ref 3.5–5.3)
PROT SERPL-MCNC: 6.5 G/DL (ref 6.4–8.4)
PROTHROMBIN TIME: 14.1 SECONDS (ref 11.6–14.5)
RBC # BLD AUTO: 3.33 MILLION/UL (ref 3.81–5.12)
RH BLD: POSITIVE
SODIUM SERPL-SCNC: 139 MMOL/L (ref 135–147)
SPECIMEN EXPIRATION DATE: NORMAL
WBC # BLD AUTO: 5.08 THOUSAND/UL (ref 4.31–10.16)

## 2023-10-25 PROCEDURE — 80053 COMPREHEN METABOLIC PANEL: CPT

## 2023-10-25 PROCEDURE — 99284 EMERGENCY DEPT VISIT MOD MDM: CPT | Performed by: EMERGENCY MEDICINE

## 2023-10-25 PROCEDURE — 72125 CT NECK SPINE W/O DYE: CPT

## 2023-10-25 PROCEDURE — 99284 EMERGENCY DEPT VISIT MOD MDM: CPT

## 2023-10-25 PROCEDURE — 86850 RBC ANTIBODY SCREEN: CPT

## 2023-10-25 PROCEDURE — 93005 ELECTROCARDIOGRAM TRACING: CPT

## 2023-10-25 PROCEDURE — 84484 ASSAY OF TROPONIN QUANT: CPT

## 2023-10-25 PROCEDURE — 70450 CT HEAD/BRAIN W/O DYE: CPT

## 2023-10-25 PROCEDURE — 86901 BLOOD TYPING SEROLOGIC RH(D): CPT

## 2023-10-25 PROCEDURE — 85610 PROTHROMBIN TIME: CPT

## 2023-10-25 PROCEDURE — 86900 BLOOD TYPING SEROLOGIC ABO: CPT

## 2023-10-25 PROCEDURE — 36415 COLL VENOUS BLD VENIPUNCTURE: CPT

## 2023-10-25 PROCEDURE — 85025 COMPLETE CBC W/AUTO DIFF WBC: CPT

## 2023-10-25 PROCEDURE — 85730 THROMBOPLASTIN TIME PARTIAL: CPT

## 2023-10-26 VITALS
OXYGEN SATURATION: 97 % | SYSTOLIC BLOOD PRESSURE: 144 MMHG | TEMPERATURE: 97.5 F | HEART RATE: 60 BPM | BODY MASS INDEX: 24.21 KG/M2 | RESPIRATION RATE: 18 BRPM | WEIGHT: 136.69 LBS | DIASTOLIC BLOOD PRESSURE: 64 MMHG

## 2023-10-26 LAB
2HR DELTA HS TROPONIN: -1 NG/L
ATRIAL RATE: 67 BPM
CARDIAC TROPONIN I PNL SERPL HS: 9 NG/L
QRS AXIS: -12 DEGREES
QRSD INTERVAL: 82 MS
QT INTERVAL: 418 MS
QTC INTERVAL: 427 MS
T WAVE AXIS: 27 DEGREES
VENTRICULAR RATE: 63 BPM

## 2023-10-26 PROCEDURE — 93010 ELECTROCARDIOGRAM REPORT: CPT | Performed by: INTERNAL MEDICINE

## 2023-10-26 NOTE — ED ATTENDING ATTESTATION
10/25/2023  I, Saundra Lea MD, saw and evaluated the patient. I have discussed the patient with the resident/non-physician practitioner and agree with the resident's/non-physician practitioner's findings, Plan of Care, and MDM as documented in the resident's/non-physician practitioner's note, except where noted. All available labs and Radiology studies were reviewed. I was present for key portions of any procedure(s) performed by the resident/non-physician practitioner and I was immediately available to provide assistance. At this point I agree with the current assessment done in the Emergency Department. I have conducted an independent evaluation of this patient a history and physical is as follows:    49-year-old female brought by EMS from home for evaluation after a fall with head strike. Takes aspirin. Baseline mental status per family. Blind in both eyes. CT head, C-spine, check labs, reevaluate. ED Course  ED Course as of 10/26/23 0043   Wed Oct 25, 2023   2206 BUN(!): 35   2206 Creatinine(!): 2.41   2221 CT scans without any acute traumatic findings. u Oct 26, 2023   0039 Delta 2hr hsTnI: -1   0039 hs TnI 2hr: 9   0039 Troponin stable. Plan discharge home.          Critical Care Time  Procedures

## 2023-10-26 NOTE — ED PROVIDER NOTES
Emergency Department Trauma Note  Fabi Burger 66 y.o. female MRN: [id number]  Unit/Bed#: ED-33/ED-33 Encounter: [id number]      Trauma Alert: Trauma Acuity: C  Model of Arrival:   via    Trauma Team: Current Providers  Attending Provider: Talia Yoder MD  ED Technician: Hiwot Reyez  ED Technician: Rochelle Ashraf  Registered Nurse: Verna Lundberg RN  Resident: Delma Tineo MD  ED Technician: Berenice Nelson  Consultants:     None      History of Present Illness     Chief Complaint:   Chief Complaint   Patient presents with    Fall     Pt arrives to ED via EMS from home. Pt reports falling out of chair. Fall was unwitnessed. Ecchymosis noted to L eye as well as contusion to upper forehead & nose. +head strike, +ASA. VSS. GCS 14 upon arrival. Hx of dementia. HPI:  Fabi Burger is a 66 y.o. female who presents with fall with head strike on aspirin. Mechanism:  Injury Date: 10/25/23        70-year-old female history of dementia, A-fib and stroke on aspirin, CKD brought in by ambulance after a fall with head strike on aspirin resulting in left periorbital ecchymosis with left subconjunctival bleeding. Patient is aware of name and location and that she fell but unaware of what year it is and does not recall the exact details of the injury. Per EMS report, patient is at baseline mental status. Patient otherwise denies any pain or other complaints.       Review of Systems   Unable to perform ROS: Dementia       Historical Information     Immunizations:   Immunization History   Administered Date(s) Administered    COVID-19 MODERNA VACC 0.25 ML IM BOOSTER 12/10/2021    COVID-19 MODERNA VACC 0.5 ML IM 02/12/2021, 03/12/2021, 12/10/2021    INFLUENZA 10/16/2019, 10/21/2022    Influenza Injectable, MDCK, Preservative Free, Quadrivalent, 0.5 mL 10/16/2019    Influenza, high dose seasonal 0.7 mL 10/21/2022    Pneumococcal Conjugate 13-Valent 10/16/2019    Pneumococcal Conjugate PCV 7 09/25/2016 Pneumococcal Conjugate Vaccine 20-valent (Pcv20), Polysace 2022    Pneumococcal Polysaccharide PPV23 2015    Tdap 2015    Zoster 2015       Past Medical History:   Diagnosis Date    Atrial fibrillation ([address])     Chronic anemia     Chronic kidney disease     Colonoscopy refused 2019    pt declines    Dyslipidemia     Hypertension     Mammogram declined 2017    Proteinuria     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       Family History   Problem Relation Age of Onset    Diabetes Father     Hypertension Father     Heart disease Father     Cancer Brother         smoker    Diabetes Other     Heart attack Other     Hypertension Other     Asthma Other      Past Surgical History:   Procedure Laterality Date    CARDIAC PACEMAKER PLACEMENT      CATARACT EXTRACTION       SECTION      CHOLECYSTECTOMY      CORONARY ANGIOPLASTY WITH STENT PLACEMENT      EYE SURGERY      MAMMO (HISTORICAL)      Pt states she will not ever have again-     DC OPTX FEM SHFT FX W/INSJ IMED IMPLT W/WO SCREW Left 2022    Procedure: INSERTION NAIL IM FEMUR ANTEGRADE (TROCHANTERIC); Surgeon: Laura Tapia DO;  Location: [facility];  Service: Orthopedics    TUBAL LIGATION       Social History     Tobacco Use    Smoking status: Never    Smokeless tobacco: Never   Vaping Use    Vaping Use: Never used   Substance Use Topics    Alcohol use: Never    Drug use: Never     E-Cigarette/Vaping    E-Cigarette Use Never User      E-Cigarette/Vaping Substances    Nicotine No     THC No     CBD No     Flavoring No     Other No     Unknown No        Family History: non-contributory    Meds/Allergies   Prior to Admission Medications   Prescriptions Last Dose Informant Patient Reported? Taking?    B-D UF III MINI PEN NEEDLES 31G X 5 MM MISC  Self No No   Sig: Use to test qd   Cholecalciferol (Vitamin D) 50 MCG (2000 UT) tablet  Self No No   Sig: Take 1 tablet (2,000 Units total) by mouth daily   Lantus SoloStar 100 units/mL SOPN  Self No No   Sig: Inject 0.1 mL (10 Units total) under the skin daily at bedtime   acetaminophen (TYLENOL) 325 mg tablet  Self No No   Sig: Take 3 tablets (975 mg total) by mouth every 8 (eight) hours   amLODIPine (NORVASC) 2.5 mg tablet  Self No No   Sig: Take 1 tablet (2.5 mg total) by mouth daily   aspirin 81 mg chewable tablet  Self Yes No   Sig: Chew 81 mg daily   atorvastatin (LIPITOR) 10 mg tablet  Self No No   Sig: Take 1 tablet (10 mg total) by mouth daily   bumetanide (BUMEX) 1 mg tablet  Self No No   Sig: Take 1 tablet (1 mg total) by mouth daily   calcium carbonate (OS-LULÚ) 600 MG tablet  Self Yes No   Sig: Take 600 mg by mouth 2 (two) times a day with meals   carvedilol (COREG) 25 mg tablet  Self No No   Sig: Take 1 tablet (25 mg total) by mouth every 12 (twelve) hours   divalproex sodium (DEPAKOTE) 250 mg DR tablet   No No   Sig: TAKE 1 TABLET(250 MG) BY MOUTH EVERY 12 HOURS   donepezil (ARICEPT) 5 mg tablet  Self No No   Sig: TAKE 1 TABLET(5 MG) BY MOUTH DAILY AT BEDTIME   fluticasone (FLONASE) 50 mcg/act nasal spray  Self No No   Si spray into each nostril daily   folic acid (FOLVITE) 658 mcg tablet  Self Yes No   Sig: Take 400 mcg by mouth daily   ranolazine (RANEXA) 500 mg 12 hr tablet   No No   Sig: TAKE 1 TABLET(500 MG) BY MOUTH DAILY      Facility-Administered Medications: None       No Known Allergies    PHYSICAL EXAM    PE limited by: Dementia    Objective   Vitals:   First set: Temperature: 97.5 °F (36.4 °C) (10/25/23 2057)  Pulse: 72 (10/25/23 2057)  Respirations: 18 (10/25/23 2057)  Blood Pressure: (!) 194/90 (10/25/23 2057)  SpO2: 96 % (10/25/23 2057)    Primary Survey:   Primary Survey:   (A) Airway: patent  (B) Breathing:  Clear to auscultation bilaterally  (C) Circulation: Pulses:   pedal  2/4 and radial  2/4  (D) Disabliity:  GCS Total: 14 for confusion  (E) Expose:  Completed    Secondary Survey: (Click on Physical Exam tab above)  Physical Exam  Vitals and nursing note reviewed. Constitutional:       General: She is not in acute distress. Appearance: She is not ill-appearing, toxic-appearing or diaphoretic. HENT:      Head: Normocephalic. Comments: Left superior periorbital ecchymosis with subconjunctival hemorrhage. Slight contusion with abrasion left forehead. No deformity or crepitus. Right Ear: External ear normal.      Left Ear: External ear normal.      Nose: Nose normal.      Mouth/Throat:      Mouth: Mucous membranes are moist.   Eyes:      General: No scleral icterus. Extraocular Movements: Extraocular movements intact. Conjunctiva/sclera: Conjunctivae normal.      Comments: No limited EOM. Pupils are bilaterally pinpoint and nonreactive, this is baseline per EMS report   Cardiovascular:      Rate and Rhythm: Normal rate and regular rhythm. Pulses: Normal pulses. Radial pulses are 2+ on the right side. Dorsalis pedis pulses are 2+ on the right side and 2+ on the left side. Heart sounds: Normal heart sounds, S1 normal and S2 normal. No murmur heard. Pulmonary:      Effort: Pulmonary effort is normal. No respiratory distress. Breath sounds: Normal breath sounds. No stridor. No wheezing. Abdominal:      Palpations: Abdomen is soft. Tenderness: There is no abdominal tenderness. Musculoskeletal:         General: Normal range of motion. Cervical back: Normal range of motion. Skin:     General: Skin is warm and dry. Comments: Stage II sacral pressure wound   Neurological:      General: No focal deficit present. Mental Status: She is alert and oriented to person, place, and time. Psychiatric:         Mood and Affect: Mood normal.         Cervical spine cleared by clinical criteria?  No (imaging required)      Invasive Devices       None                   Lab Results:   Results Reviewed       Procedure Component Value Units Date/Time    HS Troponin I 2hr [[id number]]  (Normal) Collected: 10/25/23 2344    Lab Status: Final result Specimen: Blood from Line, Venous Updated: 10/26/23 0012     hs TnI 2hr 9 ng/L      Delta 2hr hsTnI -1 ng/L     HS Troponin 0hr (reflex protocol) [[id number]]  (Normal) Collected: 10/25/23 2139    Lab Status: Final result Specimen: Blood from Arm, Right Updated: 10/25/23 2210     hs TnI 0hr 10 ng/L     Protime-INR [[id number]]  (Normal) Collected: 10/25/23 2139    Lab Status: Final result Specimen: Blood from Arm, Right Updated: 10/25/23 2206     Protime 14.1 seconds      INR 1.03    APTT [[id number]]  (Normal) Collected: 10/25/23 2139    Lab Status: Final result Specimen: Blood from Arm, Right Updated: 10/25/23 2206     PTT 29 seconds     Comprehensive metabolic panel [[id number]]  (Abnormal) Collected: 10/25/23 2139    Lab Status: Final result Specimen: Blood from Arm, Right Updated: 10/25/23 2203     Sodium 139 mmol/L      Potassium 4.1 mmol/L      Chloride 100 mmol/L      CO2 34 mmol/L      ANION GAP 5 mmol/L      BUN 35 mg/dL      Creatinine 2.41 mg/dL      Glucose 223 mg/dL      Calcium 9.7 mg/dL      Corrected Calcium 10.3 mg/dL      AST 10 U/L      ALT 6 U/L      Alkaline Phosphatase 44 U/L      Total Protein 6.5 g/dL      Albumin 3.3 g/dL      Total Bilirubin 0.39 mg/dL      eGFR 18 ml/min/1.73sq m     Narrative:      [facility] guidelines for Chronic Kidney Disease (CKD):     Stage 1 with normal or high GFR (GFR > 90 mL/min/1.73 square meters)    Stage 2 Mild CKD (GFR = 60-89 mL/min/1.73 square meters)    Stage 3A Moderate CKD (GFR = 45-59 mL/min/1.73 square meters)    Stage 3B Moderate CKD (GFR = 30-44 mL/min/1.73 square meters)    Stage 4 Severe CKD (GFR = 15-29 mL/min/1.73 square meters)    Stage 5 End Stage CKD (GFR <15 mL/min/1.73 square meters)  Note: GFR calculation is accurate only with a steady state creatinine    CBC and differential [[id number]]  (Abnormal) Collected: 10/25/23 8328    Lab Status: Final result Specimen: Blood from Arm, Right Updated: 10/25/23 2152     WBC 5.08 Thousand/uL      RBC 3.33 Million/uL      Hemoglobin 9.7 g/dL      Hematocrit 31.3 %      MCV 94 fL      MCH 29.1 pg      MCHC 31.0 g/dL      RDW 14.3 %      MPV 10.1 fL      Platelets 256 Thousands/uL      nRBC 0 /100 WBCs      Neutrophils Relative 54 %      Immat GRANS % 1 %      Lymphocytes Relative 34 %      Monocytes Relative 11 %      Eosinophils Relative 0 %      Basophils Relative 0 %      Neutrophils Absolute 2.74 Thousands/µL      Immature Grans Absolute 0.06 Thousand/uL      Lymphocytes Absolute 1.73 Thousands/µL      Monocytes Absolute 0.54 Thousand/µL      Eosinophils Absolute 0.00 Thousand/µL      Basophils Absolute 0.01 Thousands/µL                    Imaging Studies:   Direct to CT: Yes  TRAUMA - CT head wo contrast   Final Result by Jerod Trent MD (10/25 2209)      No acute intracranial abnormality. Chronic microangiopathic changes. I personally discussed this study with Mary Grace Bailey on 10/25/2023 10:08 PM.               Workstation performed: [id number]         TRAUMA - CT spine cervical wo contrast   Final Result by Jerod Trent MD (10/25 2210)      No cervical spine fracture or traumatic malalignment. Stable bilateral thyroid nodules. I personally discussed this study with Mary Grace Bailey on 10/25/2023 10:08 PM.         Workstation performed: [id number]               Procedures  ECG 12 Lead Documentation Only    Date/Time: 10/25/2023 9:07 PM    Performed by: Mary Grace Bailey MD  Authorized by: Mary Grace Bailey MD    Indications / Diagnosis:  Trauma ? syncope  ECG reviewed by me, the ED Provider: yes    Patient location:  ED  Previous ECG:     Previous ECG:  Compared to current    Comparison ECG info:  11/11/22    Similarity:  No change    Comparison to cardiac monitor: Yes    Interpretation:     Interpretation: non-specific    Quality:     Tracing quality:  Limited by artifact  Rate:     ECG rate:  63    ECG rate assessment: normal    Rhythm:     Rhythm: paced    Pacing:     Capture:  Complete    Type of pacing:  Atrial  Ectopy:     Ectopy: none    QRS:     QRS axis:  Left    QRS intervals:  Normal  Conduction:     Conduction: normal    ST segments:     ST segments:  Normal  T waves:     T waves: normal             ED Course  ED Course as of 10/27/23 2130   Wed Oct 25, 2023   2152 Hemoglobin(!): 9.7  Improved from previous   2214 hs TnI 0hr: 10   2214 Creatinine(!): 2.41  baseline   2214 TRAUMA - CT spine cervical wo contrast  No cervical spine fracture or traumatic malalignment. Stable bilateral thyroid nodules. 2214 TRAUMA - CT head wo contrast  No acute intracranial abnormality. Chronic microangiopathic changes. 2306 Patient care to be completed by attending, Dr. Maikol Mcelroy  Patient was evaluated for acute traumatic injuries with CT scans of head and C-spine, all these were negative. With normal EOM and negative CT scans, do not see any signs of injury to the orbit or extraocular muscles. No signs of acute infection. No ACS. Did incidentally find stage II sacral decubitus ulcer. With son at bedside, did go over her wound care treatment for this ulcer and recommend that he speak with PCP for further monitoring and treatment of this ulcer to make sure that does not progress any further. .  Patient was able to ambulate at baseline. Patient was discharged in good condition. Problems Addressed:  Contusion of face, initial encounter: acute illness or injury  Sacral decubitus ulcer: acute illness or injury    Amount and/or Complexity of Data Reviewed  Independent Historian: EMS  Labs: ordered. Decision-making details documented in ED Course. Radiology: ordered. Decision-making details documented in ED Course. ECG/medicine tests: ordered and independent interpretation performed.                 Disposition  [facility] Transfer: No  Final diagnoses:   Contusion of face, initial encounter   Sacral decubitus ulcer     Time reflects when diagnosis was documented in both MDM as applicable and the Disposition within this note       Time User Action Codes Description Comment    10/26/2023 12:41 AM Rozina MITTAL Add [S00.83XA] Contusion of face, initial encounter     10/26/2023 12:42 AM Newton Kandace Add [L89.159] Sacral decubitus ulcer           ED Disposition       ED Disposition   Discharge    Condition   Stable    Date/Time   Thu Oct 26, 2023 12:40 AM    Comment   [facility] discharge to home/self care.                    Follow-up Information       Follow up With Specialties Details Why Contact Info    Ruby Ngo MD Family Medicine   [address] [place] [address]  [address] [place] [address] Gay Darius  [phone]            Discharge Medication List as of 10/26/2023 12:41 AM        CONTINUE these medications which have NOT CHANGED    Details   acetaminophen (TYLENOL) 325 mg tablet Take 3 tablets (975 mg total) by mouth every 8 (eight) hours, Starting Thu 6/30/2022, No Print      amLODIPine (NORVASC) 2.5 mg tablet Take 1 tablet (2.5 mg total) by mouth daily, Starting Mon 5/8/2023, Normal      aspirin 81 mg chewable tablet Chew 81 mg daily, Historical Med      atorvastatin (LIPITOR) 10 mg tablet Take 1 tablet (10 mg total) by mouth daily, Starting Fri 5/26/2023, Normal      B-D UF III MINI PEN NEEDLES 31G X 5 MM MISC Use to test qd, Normal      bumetanide (BUMEX) 1 mg tablet Take 1 tablet (1 mg total) by mouth daily, Starting Mon 10/2/2023, Normal      calcium carbonate (OS-LULÚ) 600 MG tablet Take 600 mg by mouth 2 (two) times a day with meals, Historical Med      carvedilol (COREG) 25 mg tablet Take 1 tablet (25 mg total) by mouth every 12 (twelve) hours, Starting Fri 8/11/2023, Normal      Cholecalciferol (Vitamin D) 50 MCG (2000 UT) tablet Take 1 tablet (2,000 Units total) by mouth daily, Starting Tue 11/10/2020, Normal      divalproex sodium (DEPAKOTE) 250 mg DR tablet TAKE 1 TABLET(250 MG) BY MOUTH EVERY 12 HOURS, Normal      donepezil (ARICEPT) 5 mg tablet TAKE 1 TABLET(5 MG) BY MOUTH DAILY AT BEDTIME, Normal      fluticasone (FLONASE) 50 mcg/act nasal spray 1 spray into each nostril daily, Starting Wed 81/2/1976, Normal      folic acid (FOLVITE) 647 mcg tablet Take 400 mcg by mouth daily, Historical Med      Lantus SoloStar 100 units/mL SOPN Inject 0.1 mL (10 Units total) under the skin daily at bedtime, Starting Fri 9/29/2023, Until Sun 10/29/2023, Normal      ranolazine (RANEXA) 500 mg 12 hr tablet TAKE 1 TABLET(500 MG) BY MOUTH DAILY, Normal               PDMP Review         Value Time User    PDMP Reviewed  Yes 8/13/2023 11:46 AM Ramya Bland MD            ED Provider  Electronically Signed by           Janie Gore MD  10/27/23 1255

## 2023-10-27 ENCOUNTER — VBI (OUTPATIENT)
Dept: FAMILY MEDICINE CLINIC | Facility: CLINIC | Age: 78
End: 2023-10-27

## 2023-10-27 NOTE — TELEPHONE ENCOUNTER
10/27/23 11:15 AM    Patient contacted post ED visit, first outreach attempt made. Message was left for patient to return a call to the VBI Department at [facility]: Phone [phone]. Thank you. [facility]    10/30/23 10:01 AM    Patient contacted post ED visit, VBI department spoke with patient/caregiver and outreach was successful. Thank you.   [facility]

## 2023-11-05 ENCOUNTER — RA CDI HCC (OUTPATIENT)
Dept: OTHER | Facility: HOSPITAL | Age: 78
End: 2023-11-05

## 2023-11-05 NOTE — PROGRESS NOTES
[address] [facility] coding opportunities          Chart Reviewed number of suggestions sent to Provider: 3   I49.5, I13.0 and E11.51  Patients Insurance     Medicare Insurance: [facility] Complete

## 2023-11-10 ENCOUNTER — OFFICE VISIT (OUTPATIENT)
Dept: FAMILY MEDICINE CLINIC | Facility: CLINIC | Age: 78
End: 2023-11-10
Payer: COMMERCIAL

## 2023-11-10 VITALS
HEIGHT: 63 IN | BODY MASS INDEX: 21.62 KG/M2 | SYSTOLIC BLOOD PRESSURE: 120 MMHG | OXYGEN SATURATION: 95 % | WEIGHT: 122 LBS | RESPIRATION RATE: 16 BRPM | HEART RATE: 56 BPM | DIASTOLIC BLOOD PRESSURE: 82 MMHG

## 2023-11-10 DIAGNOSIS — Z86.73 HISTORY OF STROKE: ICD-10-CM

## 2023-11-10 DIAGNOSIS — I48.92 ATRIAL FLUTTER, PAROXYSMAL (HCC): ICD-10-CM

## 2023-11-10 DIAGNOSIS — I12.9 BENIGN HYPERTENSION WITH CKD (CHRONIC KIDNEY DISEASE) STAGE IV (HCC): ICD-10-CM

## 2023-11-10 DIAGNOSIS — Z00.00 WELL ADULT EXAM: ICD-10-CM

## 2023-11-10 DIAGNOSIS — Z23 ENCOUNTER FOR IMMUNIZATION: Primary | ICD-10-CM

## 2023-11-10 DIAGNOSIS — E85.4 CEREBRAL AMYLOID ANGIOPATHY: ICD-10-CM

## 2023-11-10 DIAGNOSIS — N18.4 BENIGN HYPERTENSION WITH CKD (CHRONIC KIDNEY DISEASE) STAGE IV (HCC): ICD-10-CM

## 2023-11-10 DIAGNOSIS — I68.0 CEREBRAL AMYLOID ANGIOPATHY: ICD-10-CM

## 2023-11-10 DIAGNOSIS — R26.2 AMBULATORY DYSFUNCTION: ICD-10-CM

## 2023-11-10 PROCEDURE — G0008 ADMIN INFLUENZA VIRUS VAC: HCPCS | Performed by: FAMILY MEDICINE

## 2023-11-10 PROCEDURE — 90662 IIV NO PRSV INCREASED AG IM: CPT | Performed by: FAMILY MEDICINE

## 2023-11-10 PROCEDURE — G0439 PPPS, SUBSEQ VISIT: HCPCS | Performed by: FAMILY MEDICINE

## 2023-11-10 PROCEDURE — 99214 OFFICE O/P EST MOD 30 MIN: CPT | Performed by: FAMILY MEDICINE

## 2023-11-10 NOTE — PROGRESS NOTES
Assessment and Plan:     Problem List Items Addressed This Visit     Atrial flutter, paroxysmal (HCC)    Cerebral amyloid angiopathy     Ambulatory dysfunction    History of stroke    Benign hypertension with CKD (chronic kidney disease) stage IV ([address])   Other Visit Diagnoses     Encounter for immunization    -  Primary    Relevant Orders    influenza vaccine, high-dose, PF 0.7 mL (FLUZONE HIGH-DOSE) (Completed)    Well adult exam            Wellness. Recommended that she include fruits in her daily diet and ensure adequate water intake. I will prescribe a new wheelchair. We will administer influenza vaccine. She can use Colace to help with her bowel movements. Depression Screening and Follow-up Plan: Patient was screened for depression during today's encounter. They screened negative with a PHQ-2 score of 0. Preventive health issues were discussed with patient, and age appropriate screening tests were ordered as noted in patient's After Visit Summary. Personalized health advice and appropriate referrals for health education or preventive services given if needed, as noted in patient's After Visit Summary. History of Present Illness:     Patient presents for a Medicare Wellness Visit    HPI   Darlene Ngo is a 77-year-old female presents for a Medicare Wellness Visit. The patient is accompanied by her son and they consent to the use of MICKY recording. History of CAD, cerebral amyloid angiopathy, history of cardiac pacemaker, CKD 4, anemia of chronic disease, ambulatory dysfunction, PAF, and history of stroke. Currently on aspirin 81 mg, Ranexa, Depakote 250 mg twice daily, atorvastatin 10 mg, Coreg 25 mg twice, amlodipine 2.5 mg, Bumex 1 mg, daily folic acid, and donepezil 5 mg. Current weight of 122 pounds which is way down as she was 192 pounds 1 year ago. Patient experienced a fall while attempting to reach for something while seated in a recliner.   Following the incident, she was taken to the emergency room. Her condition has improved. She is undergoing home physical therapy. Her son has observed an improvement in her strength during these sessions. Her final therapy session is scheduled for next week. Patient continues to use insulin due to her elevated glucose levels. The insulin dosage she takes ranges from 6 to 10 units. This morning, her blood glucose level measures 113 mg/dL. She saw podiatrist, Dr. Karlie Jones who advised her to follow-up in 2 or 3 months due to her risk of diabetic foot ulcers. Patient is selective in her dietary choices due to occasional stomach issues leading to vomiting. She consumes protein shakes and yogurt. Her water intake is limited to small amounts. Prior medical records state that the patient has coronary artery disease and chronic kidney disease. Cognitive assessment score of 6 out of 30 indicating possible Lewy body dementia and blindness. Recommendation is to continue with Aricept at the current dosage. Need for transport chair   She has weak UE   And family is able and willing to transport her   Need for for ADLs in the house and trasport outside of the house     She has increased risk of falls and cannout use a cane or walker safely   Due to weakness blindness and LB dementia       Patient Care Team:  Nader Anthony MD as PCP - General (Family Medicine)  Nader Anthony MD as PCP - [facility] ([facility])  Nader Anthony MD as PCP - [facility] ([facility])  MD Nader Joseph MD Elson Ripa, MD (Nephrology)  Nader Anthony MD     Review of Systems:     Review of Systems   Constitutional:  Negative for fever and unexpected weight change. HENT:  Negative for nosebleeds and trouble swallowing. Eyes:  Positive for visual disturbance. Respiratory:  Negative for chest tightness and shortness of breath. Cardiovascular:  Negative for chest pain, palpitations and leg swelling.    Gastrointestinal:  Negative for abdominal pain, constipation, diarrhea and nausea. Endocrine: Negative for cold intolerance. Genitourinary:  Negative for dysuria and urgency. Musculoskeletal:  Positive for arthralgias and gait problem. Negative for joint swelling and myalgias. Skin:  Negative for rash. Neurological:  Positive for weakness. Negative for tremors, seizures and syncope. Hematological:  Does not bruise/bleed easily. Psychiatric/Behavioral:  Negative for hallucinations and suicidal ideas. 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[facility])   • Anemia of chronic kidney failure, stage 4 (severe)    • Vertigo   • Closed left hip fracture ([facility])   • Blind   • Primary hypertension   • CAD (coronary artery disease)   • Cardiac pacemaker in situ   • H/O heart artery stent   • Atrial flutter, paroxysmal ([facility])   • Full code status   • Chronic congestive heart failure, unspecified heart failure type ([facility])   • History of fracture of left hip   • Cerebral amyloid angiopathy    • Ambulatory dysfunction   • Moderate Lewy body dementia without behavioral disturbance, psychotic disturbance, mood disturbance, or anxiety ([facility])   • History of stroke   • CKD (chronic kidney disease) stage 4, GFR 15-29 ml/min ([facility])   • Benign hypertension with CKD (chronic kidney disease) stage IV ([address])   • Overweight (BMI 25.0-29. 9)   • Sleep disturbance      Past Medical and Surgical History:     Past Medical History:   Diagnosis Date   • Atrial fibrillation ([address])    • Chronic anemia    • Chronic kidney disease    • Colonoscopy refused 11/2019    pt declines   • Dyslipidemia    • Hypertension    • Mammogram declined 09/2017   • Proteinuria    • Tachy-sarah beth syndrome ([address]) 6/24/2022     Past Surgical History:   Procedure Laterality Date   • CARDIAC PACEMAKER PLACEMENT     • CATARACT EXTRACTION     •  SECTION     • CHOLECYSTECTOMY     • CORONARY ANGIOPLASTY WITH STENT PLACEMENT     • EYE SURGERY     • MAMMO (HISTORICAL)  2015    Pt states she will not ever have again- 2017   • MI OPTX FEM SHFT FX W/INSJ IMED IMPLT W/WO SCREW Left 2022    Procedure: INSERTION NAIL IM FEMUR ANTEGRADE (TROCHANTERIC); Surgeon: Ryann Trevizo DO;  Location: [facility];  Service: Orthopedics   • TUBAL LIGATION        Family History:     Family History   Problem Relation Age of Onset   • Diabetes Father    • Hypertension Father    • Heart disease Father    • Cancer Brother         smoker   • Diabetes Other    • Heart attack Other    • Hypertension Other    • Asthma Other       Social History:     Social History     Socioeconomic History   • Marital status:      Spouse name: None   • Number of children: None   • Years of education: None   • Highest education level: None   Occupational History   • Occupation: retired   Tobacco Use   • Smoking status: Never   • Smokeless tobacco: Never   Vaping Use   • Vaping Use: Never used   Substance and Sexual Activity   • Alcohol use: Never   • Drug use: Never   • Sexual activity: Not Currently   Other Topics Concern   • None   Social History Narrative    ** Merged History Encounter **         Most recent tobacco use screenin2019  Do you currently or have you served in the [address] [place] [address] [employer]: No  Were you activated, into active duty, as a member of the [employer] or as a Reservist: No  Marital status:   Exercise level: None  D    iet: Regular  General stress level: Low  Alcohol intake: None  Caffeine intake:  Moderate  Chewing tobacco: none  Illicit drugs: none  Seat belts used routinely: Yes  Sunscreen used routinely: Yes  Smoke alarm in home: Yes  Advance directive: No  Salt Int    juan antonio: minimal  Has the Patient had a mammogram to screen for breast cancer within 24 months: No  2019 - declines mammogram.  Is the patient interested in a colorectal cancer screening: No  11/1/2019 - patient declines colonoscopy's     Social Determinants of Health     Financial Resource Strain: Low Risk  (11/10/2023)    Overall Financial Resource Strain (CARDIA)    • Difficulty of Paying Living Expenses: Not very hard   Food Insecurity: No Food Insecurity (11/14/2022)    Hunger Vital Sign    • Worried About Running Out of Food in the Last Year: Never true    • Ran Out of Food in the Last Year: Never true   Transportation Needs: No Transportation Needs (11/10/2023)    PRAPARE - Transportation    • Lack of Transportation (Medical): No    • Lack of Transportation (Non-Medical):  No   Physical Activity: Not on file   Stress: Not on file   Social Connections: Not on file   Intimate Partner Violence: Not on file   Housing Stability: Low Risk  (11/14/2022)    Housing Stability Vital Sign    • Unable to Pay for Housing in the Last Year: No    • Number of Places Lived in the Last Year: 1    • Unstable Housing in the Last Year: No      Medications and Allergies:     Current Outpatient Medications   Medication Sig Dispense Refill   • acetaminophen (TYLENOL) 325 mg tablet Take 3 tablets (975 mg total) by mouth every 8 (eight) hours  0   • amLODIPine (NORVASC) 2.5 mg tablet Take 1 tablet (2.5 mg total) by mouth daily 90 tablet 3   • aspirin 81 mg chewable tablet Chew 81 mg daily     • atorvastatin (LIPITOR) 10 mg tablet Take 1 tablet (10 mg total) by mouth daily 90 tablet 1   • B-D UF III MINI PEN NEEDLES 31G X 5 MM MISC Use to test qd 100 each 5   • bumetanide (BUMEX) 1 mg tablet Take 1 tablet (1 mg total) by mouth daily 180 tablet 0   • calcium carbonate (OS-LULÚ) 600 MG tablet Take 600 mg by mouth 2 (two) times a day with meals     • carvedilol (COREG) 25 mg tablet Take 1 tablet (25 mg total) by mouth every 12 (twelve) hours 180 tablet 1   • Cholecalciferol (Vitamin D) 50 MCG (2000 UT) tablet Take 1 tablet (2,000 Units total) by mouth daily 90 tablet 1   • divalproex sodium (DEPAKOTE) 250 mg  tablet TAKE 1 TABLET(250 MG) BY MOUTH EVERY 12 HOURS 180 tablet 1   • donepezil (ARICEPT) 5 mg tablet TAKE 1 TABLET(5 MG) BY MOUTH DAILY AT BEDTIME 90 tablet 2   • fluticasone (FLONASE) 50 mcg/act nasal spray 1 spray into each nostril daily 9.9 mL 0   • folic acid (FOLVITE) 587 mcg tablet Take 400 mcg by mouth daily     • Lantus SoloStar 100 units/mL SOPN Inject 0.1 mL (10 Units total) under the skin daily at bedtime 3 mL 5   • ranolazine (RANEXA) 500 mg 12 hr tablet TAKE 1 TABLET(500 MG) BY MOUTH DAILY 90 tablet 1     No current facility-administered medications for this visit. No Known Allergies   Immunizations:     Immunization History   Administered Date(s) Administered   • COVID-19 MODERNA VACC 0.25 ML IM BOOSTER 12/10/2021   • COVID-19 MODERNA VACC 0.5 ML IM 02/12/2021, 03/12/2021, 12/10/2021   • INFLUENZA 10/16/2019, 10/21/2022   • Influenza Injectable, MDCK, Preservative Free, Quadrivalent, 0.5 mL 10/16/2019   • Influenza, high dose seasonal 0.7 mL 10/21/2022, 11/10/2023   • Pneumococcal Conjugate 13-Valent 10/16/2019   • Pneumococcal Conjugate PCV 7 09/25/2016   • Pneumococcal Conjugate Vaccine 20-valent (Pcv20), Polysace 09/07/2022   • Pneumococcal Polysaccharide PPV23 09/25/2015   • Tdap 09/25/2015   • Zoster 09/25/2015      Health Maintenance:         Topic Date Due   • Hepatitis C Screening  Never done   • Breast Cancer Screening: Mammogram  08/27/2099 (Originally 7/26/1985)   • Colorectal Cancer Screening  Discontinued         Topic Date Due   • COVID-19 Vaccine (5 - Moderna series) 02/04/2022      Medicare Screening Tests and Risk Assessments:     Constance Parson is here for her Subsequent Wellness visit. Health Risk Assessment:   Patient rates overall health as good. Patient feels that their physical health rating is same. Patient is very satisfied with their life. Eyesight was rated as same. Hearing was rated as same. Patient feels that their emotional and mental health rating is same.  Patients states they are never, rarely angry. Patient states they are never, rarely unusually tired/fatigued. Pain experienced in the last 7 days has been none. Patient states that she has experienced no weight loss or gain in last 6 months. Depression Screening:   PHQ-2 Score: 0      Fall Risk Screening: In the past year, patient has experienced: history of falling in past year    Number of falls: 2 or more  Injured during fall?: Yes    Feels unsteady when standing or walking?: Yes    Worried about falling?: Yes      Urinary Incontinence Screening:   Patient has leaked urine accidently in the last six months. Home Safety:  Patient has trouble with stairs inside or outside of their home. Patient has working smoke alarms and has working carbon monoxide detector. Home safety hazards include: none. Nutrition:   Current diet is Regular. Medications:   Patient is currently taking over-the-counter supplements. OTC medications include: see medication list. Patient is not able to manage medications. Activities of Daily Living (ADLs)/Instrumental Activities of Daily Living (IADLs):   Walk and transfer into and out of bed and chair?: Yes  Dress and groom yourself?: Yes    Bathe or shower yourself?: No    Feed yourself?  Yes  Do your laundry/housekeeping?: No  Manage your money, pay your bills and track your expenses?: No  Make your own meals?: No    Do your own shopping?: No    Previous Hospitalizations:   Any hospitalizations or ED visits within the last 12 months?: Yes    How many hospitalizations have you had in the last year?: 3-4    Advance Care Planning:   Living will: No    Durable POA for healthcare: No    Advanced directive: No      PREVENTIVE SCREENINGS      Cardiovascular Screening:    General: Screening Current      Diabetes Screening:     General: Screening Not Indicated and History Diabetes      Cervical Cancer Screening:    General: Screening Not Indicated      Lung Cancer Screening:     General: Screening Not Indicated    Screening, Brief Intervention, and Referral to Treatment (SBIRT)    Screening  Typical number of drinks in a day: 0  Typical number of drinks in a week: 0  Interpretation: Low risk drinking behavior. Single Item Drug Screening:  How often have you used an illegal drug (including marijuana) or a prescription medication for non-medical reasons in the past year? never    Single Item Drug Screen Score: 0  Interpretation: Negative screen for possible drug use disorder    No results found. Physical Exam:     /82 (BP Location: Right arm, Patient Position: Sitting, Cuff Size: Standard)   Pulse 56   Resp 16   Ht 5' 3" (1.6 m)   Wt 55.3 kg (122 lb)   SpO2 95%   BMI 21.61 kg/m²     Physical Exam  Vitals reviewed. Constitutional:       General: She is not in acute distress. Appearance: She is well-developed. She is not toxic-appearing. HENT:      Head: Normocephalic and atraumatic. Eyes:      General: No scleral icterus. Extraocular Movements: Extraocular movements intact. Conjunctiva/sclera: Conjunctivae normal.   Cardiovascular:      Rate and Rhythm: Normal rate and regular rhythm. Heart sounds: Normal heart sounds. No murmur heard. No gallop. Pulmonary:      Effort: Pulmonary effort is normal. No respiratory distress. Breath sounds: Normal breath sounds. No wheezing or rales. Abdominal:      General: Abdomen is flat. Bowel sounds are normal. There is no distension. Palpations: Abdomen is soft. Tenderness: There is no abdominal tenderness. Musculoskeletal:         General: No swelling. Cervical back: Neck supple. Right lower leg: No edema (1+). Left lower leg: No edema (1+). Skin:     General: Skin is warm and dry. Capillary Refill: Capillary refill takes less than 2 seconds. Coloration: Skin is not jaundiced. Neurological:      Mental Status: She is alert.    Psychiatric:         Mood and Affect: Mood normal.            Will Tan MD      Transcribed by Ramón Rodriguez on 11/10/2023 at 12:36 PM EST.

## 2023-11-10 NOTE — PATIENT INSTRUCTIONS
Medicare Preventive Visit Patient Instructions  Thank you for completing your Welcome to Medicare Visit or Medicare Annual Wellness Visit today. Your next wellness visit will be due in one year (11/10/2024). The screening/preventive services that you may require over the next 5-10 years are detailed below. Some tests may not apply to you based off risk factors and/or age. Screening tests ordered at today's visit but not completed yet may show as past due. Also, please note that scanned in results may not display below. Preventive Screenings:  Service Recommendations Previous Testing/Comments   Colorectal Cancer Screening  * Colonoscopy    * Fecal Occult Blood Test (FOBT)/Fecal Immunochemical Test (FIT)  * Fecal DNA/Cologuard Test  * Flexible Sigmoidoscopy Age: 43-73 years old   Colonoscopy: every 10 years (may be performed more frequently if at higher risk)  OR  FOBT/FIT: every 1 year  OR  Cologuard: every 3 years  OR  Sigmoidoscopy: every 5 years  Screening may be recommended earlier than age 39 if at higher risk for colorectal cancer. Also, an individualized decision between you and your healthcare provider will decide whether screening between the ages of 77-80 would be appropriate. Colonoscopy: 11/28/2019  FOBT/FIT: 06/29/2022  Cologuard: Not on file  Sigmoidoscopy: Not on file          Breast Cancer Screening Age: 36 years old  Frequency: every 1-2 years  Not required if history of left and right mastectomy Mammogram: Not on file        Cervical Cancer Screening Between the ages of 21-29, pap smear recommended once every 3 years. Between the ages of 32-69, can perform pap smear with HPV co-testing every 5 years.    Recommendations may differ for women with a history of total hysterectomy, cervical cancer, or abnormal pap smears in past. Pap Smear: Not on file    Screening Not Indicated   Hepatitis C Screening Once for adults born between [address] [place] [address]  More frequently in patients at high risk for Hepatitis C Hep C Antibody: Not on file        Diabetes Screening 1-2 times per year if you're at risk for diabetes or have pre-diabetes Fasting glucose: 49 mg/dL (6/23/2023)  A1C: 5.6 % (2/1/2023)  Screening Not Indicated  History Diabetes   Cholesterol Screening Once every 5 years if you don't have a lipid disorder. May order more often based on risk factors. Lipid panel: 03/24/2023    Screening Current     Other Preventive Screenings Covered by Medicare:  Abdominal Aortic Aneurysm (AAA) Screening: covered once if your at risk. You're considered to be at risk if you have a family history of AAA. Lung Cancer Screening: covers low dose CT scan once per year if you meet all of the following conditions: (1) Age 48-67; (2) No signs or symptoms of lung cancer; (3) Current smoker or have quit smoking within the last 15 years; (4) You have a tobacco smoking history of at least 20 pack years (packs per day multiplied by number of years you smoked); (5) You get a written order from a healthcare provider. Glaucoma Screening: covered annually if you're considered high risk: (1) You have diabetes OR (2) Family history of glaucoma OR (3)  aged 48 and older OR (3)  American aged 72 and older  Osteoporosis Screening: covered every 2 years if you meet one of the following conditions: (1) You're estrogen deficient and at risk for osteoporosis based off medical history and other findings; (2) Have a vertebral abnormality; (3) On glucocorticoid therapy for more than 3 months; (4) Have primary hyperparathyroidism; (5) On osteoporosis medications and need to assess response to drug therapy. Last bone density test (DXA Scan): 05/17/2023. HIV Screening: covered annually if you're between the age of 14-79. Also covered annually if you are younger than 13 and older than 72 with risk factors for HIV infection. For pregnant patients, it is covered up to 3 times per pregnancy.     Immunizations:  Immunization Recommendations Influenza Vaccine Annual influenza vaccination during flu season is recommended for all persons aged >= 6 months who do not have contraindications   Pneumococcal Vaccine   * Pneumococcal conjugate vaccine = PCV13 (Prevnar 13), PCV15 (Vaxneuvance), PCV20 (Prevnar 20)  * Pneumococcal polysaccharide vaccine = PPSV23 (Pneumovax) Adults 33-85 yo with certain risk factors or if 69+ yo  If never received any pneumonia vaccine: recommend Prevnar 20 (PCV20)  Give PCV20 if previously received 1 dose of PCV13 or PPSV23   Hepatitis B Vaccine 3 dose series if at intermediate or high risk (ex: diabetes, end stage renal disease, liver disease)   Respiratory syncytial virus (RSV) Vaccine - COVERED BY MEDICARE PART D  * RSVPreF3 (Arexvy) CDC recommends that adults 61years of age and older may receive a single dose of RSV vaccine using shared clinical decision-making (SCDM)   Tetanus (Td) Vaccine - COST NOT COVERED BY MEDICARE PART B Following completion of primary series, a booster dose should be given every 10 years to maintain immunity against tetanus. Td may also be given as tetanus wound prophylaxis. Tdap Vaccine - COST NOT COVERED BY MEDICARE PART B Recommended at least once for all adults. For pregnant patients, recommended with each pregnancy. Shingles Vaccine (Shingrix) - COST NOT COVERED BY MEDICARE PART B  2 shot series recommended in those 19 years and older who have or will have weakened immune systems or those 50 years and older     Health Maintenance Due:      Topic Date Due   • Hepatitis C Screening  Never done   • Breast Cancer Screening: Mammogram  08/27/2099 (Originally 7/26/1985)   • Colorectal Cancer Screening  Discontinued     Immunizations Due:      Topic Date Due   • COVID-19 Vaccine (5 - Moderna series) 02/04/2022   • Influenza Vaccine (1) 09/01/2023     Advance Directives   What are advance directives? Advance directives are legal documents that state your wishes and plans for medical care.  These plans are made ahead of time in case you lose your ability to make decisions for yourself. Advance directives can apply to any medical decision, such as the treatments you want, and if you want to donate organs. What are the types of advance directives? There are many types of advance directives, and each state has rules about how to use them. You may choose a combination of any of the following:  Living will: This is a written record of the treatment you want. You can also choose which treatments you do not want, which to limit, and which to stop at a certain time. This includes surgery, medicine, IV fluid, and tube feedings. Durable power of  for healthcare [facility]): This is a written record that states who you want to make healthcare choices for you when you are unable to make them for yourself. This person, called a proxy, is usually a family member or a friend. You may choose more than 1 proxy. Do not resuscitate (DNR) order:  A DNR order is used in case your heart stops beating or you stop breathing. It is a request not to have certain forms of treatment, such as CPR. A DNR order may be included in other types of advance directives. Medical directive: This covers the care that you want if you are in a coma, near death, or unable to make decisions for yourself. You can list the treatments you want for each condition. Treatment may include pain medicine, surgery, blood transfusions, dialysis, IV or tube feedings, and a ventilator (breathing machine). Values history: This document has questions about your views, beliefs, and how you feel and think about life. This information can help others choose the care that you would choose. Why are advance directives important? An advance directive helps you control your care. Although spoken wishes may be used, it is better to have your wishes written down. Spoken wishes can be misunderstood, or not followed.  Treatments may be given even if you do not want them. An advance directive may make it easier for your family to make difficult choices about your care. Fall Prevention    Fall prevention  includes ways to make your home and other areas safer. It also includes ways you can move more carefully to prevent a fall. Health conditions that cause changes in your blood pressure, vision, or muscle strength and coordination may increase your risk for falls. Medicines may also increase your risk for falls if they make you dizzy, weak, or sleepy. Fall prevention tips:   Stand or sit up slowly. Use assistive devices as directed. Wear shoes that fit well and have soles that . Wear a personal alarm. Stay active. Manage your medical conditions. Home Safety Tips:  Add items to prevent falls in the bathroom. Keep paths clear. Install bright lights in your home. Keep items you use often on shelves within reach. Paint or place reflective tape on the edges of your stairs. Urinary Incontinence   Urinary incontinence (UI)  is when you lose control of your bladder. UI develops because your bladder cannot store or empty urine properly. The 3 most common types of UI are stress incontinence, urge incontinence, or both. Medicines:   May be given to help strengthen your bladder control. Report any side effects of medication to your healthcare provider. Do pelvic muscle exercises often:  Your pelvic muscles help you stop urinating. Squeeze these muscles tight for 5 seconds, then relax for 5 seconds. Gradually work up to squeezing for 10 seconds. Do 3 sets of 15 repetitions a day, or as directed. This will help strengthen your pelvic muscles and improve bladder control. Train your bladder:  Go to the bathroom at set times, such as every 2 hours, even if you do not feel the urge to go. You can also try to hold your urine when you feel the urge to go. For example, hold your urine for 5 minutes when you feel the urge to go.  As that becomes easier, hold your urine for 10 minutes. Self-care:   Keep a UI record. Write down how often you leak urine and how much you leak. Make a note of what you were doing when you leaked urine. Drink liquids as directed. You may need to limit the amount of liquid you drink to help control your urine leakage. Do not drink any liquid right before you go to bed. Limit or do not have drinks that contain caffeine or alcohol. Prevent constipation. Eat a variety of high-fiber foods. Good examples are high-fiber cereals, beans, vegetables, and whole-grain breads. Walking is the best way to trigger your intestines to have a bowel movement. Exercise regularly and maintain a healthy weight. Weight loss and exercise will decrease pressure on your bladder and help you control your leakage. Use a catheter as directed  to help empty your bladder. A catheter is a tiny, plastic tube that is put into your bladder to drain your urine. Go to behavior therapy as directed. Behavior therapy may be used to help you learn to control your urge to urinate. © Copyright [employer] 2018 Information is for End User's use only and may not be sold, redistributed or otherwise used for commercial purposes.  All illustrations and images included in CareNotes® are the copyrighted property of [employer]D.A[employer]M., Inc. or [address] [place], [place] [address]ols

## 2023-11-12 ENCOUNTER — TELEPHONE (OUTPATIENT)
Dept: LAB | Facility: HOSPITAL | Age: 78
End: 2023-11-12

## 2023-11-12 PROBLEM — S09.90XA CLOSED HEAD INJURY: Status: RESOLVED | Noted: 2020-07-12 | Resolved: 2023-11-12

## 2023-11-17 ENCOUNTER — APPOINTMENT (OUTPATIENT)
Dept: LAB | Facility: HOSPITAL | Age: 78
End: 2023-11-17
Payer: COMMERCIAL

## 2023-11-17 ENCOUNTER — TELEPHONE (OUTPATIENT)
Dept: FAMILY MEDICINE CLINIC | Facility: CLINIC | Age: 78
End: 2023-11-17

## 2023-11-17 ENCOUNTER — TELEPHONE (OUTPATIENT)
Age: 78
End: 2023-11-17

## 2023-11-17 DIAGNOSIS — D63.1 ANEMIA OF CHRONIC KIDNEY FAILURE, STAGE 4 (SEVERE): ICD-10-CM

## 2023-11-17 DIAGNOSIS — N18.4 ANEMIA OF CHRONIC KIDNEY FAILURE, STAGE 4 (SEVERE): ICD-10-CM

## 2023-11-17 DIAGNOSIS — E11.9 DIABETES MELLITUS TYPE 2, INSULIN DEPENDENT (HCC): ICD-10-CM

## 2023-11-17 DIAGNOSIS — Z79.4 LONG-TERM INSULIN USE (HCC): ICD-10-CM

## 2023-11-17 DIAGNOSIS — Z79.4 DIABETES MELLITUS TYPE 2, INSULIN DEPENDENT (HCC): ICD-10-CM

## 2023-11-17 DIAGNOSIS — I25.10 CORONARY ARTERY DISEASE INVOLVING NATIVE CORONARY ARTERY OF NATIVE HEART WITHOUT ANGINA PECTORIS: ICD-10-CM

## 2023-11-17 DIAGNOSIS — I50.9 CHRONIC CONGESTIVE HEART FAILURE, UNSPECIFIED HEART FAILURE TYPE (HCC): ICD-10-CM

## 2023-11-17 LAB
ANION GAP SERPL CALCULATED.3IONS-SCNC: 5 MMOL/L
BASOPHILS # BLD AUTO: 0.02 THOUSANDS/ÂΜL (ref 0–0.1)
BASOPHILS NFR BLD AUTO: 0 % (ref 0–1)
BNP SERPL-MCNC: 181 PG/ML (ref 0–100)
BUN SERPL-MCNC: 39 MG/DL (ref 5–25)
CALCIUM SERPL-MCNC: 9.3 MG/DL (ref 8.4–10.2)
CHLORIDE SERPL-SCNC: 101 MMOL/L (ref 96–108)
CHOLEST SERPL-MCNC: 99 MG/DL
CO2 SERPL-SCNC: 35 MMOL/L (ref 21–32)
CREAT SERPL-MCNC: 2.5 MG/DL (ref 0.6–1.3)
EOSINOPHIL # BLD AUTO: 0 THOUSAND/ÂΜL (ref 0–0.61)
EOSINOPHIL NFR BLD AUTO: 0 % (ref 0–6)
ERYTHROCYTE [DISTWIDTH] IN BLOOD BY AUTOMATED COUNT: 14.1 % (ref 11.6–15.1)
FERRITIN SERPL-MCNC: 286 NG/ML (ref 11–307)
GFR SERPL CREATININE-BSD FRML MDRD: 17 ML/MIN/1.73SQ M
GLUCOSE P FAST SERPL-MCNC: 149 MG/DL (ref 65–99)
HCT VFR BLD AUTO: 31.6 % (ref 34.8–46.1)
HDLC SERPL-MCNC: 43 MG/DL
HGB BLD-MCNC: 9.6 G/DL (ref 11.5–15.4)
IMM GRANULOCYTES # BLD AUTO: 0.07 THOUSAND/UL (ref 0–0.2)
IMM GRANULOCYTES NFR BLD AUTO: 2 % (ref 0–2)
IRON SATN MFR SERPL: 22 % (ref 15–50)
IRON SERPL-MCNC: 42 UG/DL (ref 50–212)
LDLC SERPL CALC-MCNC: 44 MG/DL (ref 0–100)
LYMPHOCYTES # BLD AUTO: 1.76 THOUSANDS/ÂΜL (ref 0.6–4.47)
LYMPHOCYTES NFR BLD AUTO: 37 % (ref 14–44)
MCH RBC QN AUTO: 28.6 PG (ref 26.8–34.3)
MCHC RBC AUTO-ENTMCNC: 30.4 G/DL (ref 31.4–37.4)
MCV RBC AUTO: 94 FL (ref 82–98)
MONOCYTES # BLD AUTO: 0.5 THOUSAND/ÂΜL (ref 0.17–1.22)
MONOCYTES NFR BLD AUTO: 11 % (ref 4–12)
NEUTROPHILS # BLD AUTO: 2.36 THOUSANDS/ÂΜL (ref 1.85–7.62)
NEUTS SEG NFR BLD AUTO: 50 % (ref 43–75)
NRBC BLD AUTO-RTO: 0 /100 WBCS
PLATELET # BLD AUTO: 154 THOUSANDS/UL (ref 149–390)
PMV BLD AUTO: 10.6 FL (ref 8.9–12.7)
POTASSIUM SERPL-SCNC: 3.9 MMOL/L (ref 3.5–5.3)
RBC # BLD AUTO: 3.36 MILLION/UL (ref 3.81–5.12)
SODIUM SERPL-SCNC: 141 MMOL/L (ref 135–147)
TIBC SERPL-MCNC: 190 UG/DL (ref 250–450)
TRIGL SERPL-MCNC: 60 MG/DL
TSH SERPL DL<=0.05 MIU/L-ACNC: 2.29 UIU/ML (ref 0.45–4.5)
UIBC SERPL-MCNC: 148 UG/DL (ref 155–355)
WBC # BLD AUTO: 4.71 THOUSAND/UL (ref 4.31–10.16)

## 2023-11-17 PROCEDURE — 80048 BASIC METABOLIC PNL TOTAL CA: CPT

## 2023-11-17 PROCEDURE — 82728 ASSAY OF FERRITIN: CPT

## 2023-11-17 PROCEDURE — 83550 IRON BINDING TEST: CPT

## 2023-11-17 PROCEDURE — 83036 HEMOGLOBIN GLYCOSYLATED A1C: CPT

## 2023-11-17 PROCEDURE — 84443 ASSAY THYROID STIM HORMONE: CPT

## 2023-11-17 PROCEDURE — 36415 COLL VENOUS BLD VENIPUNCTURE: CPT

## 2023-11-17 PROCEDURE — 80061 LIPID PANEL: CPT

## 2023-11-17 PROCEDURE — 83540 ASSAY OF IRON: CPT

## 2023-11-17 PROCEDURE — 85025 COMPLETE CBC W/AUTO DIFF WBC: CPT

## 2023-11-17 PROCEDURE — 83880 ASSAY OF NATRIURETIC PEPTIDE: CPT

## 2023-11-17 NOTE — TELEPHONE ENCOUNTER
----- Message from Booker Martinez MD sent at 11/17/2023  2:30 PM EST -----  Kidneys are about the same   Heart failure number is down   Anemia is stable in the 9's

## 2023-11-17 NOTE — TELEPHONE ENCOUNTER
Pts insurance called stating pt was supposed to talk to her PCP at last visit , 11/10/2023 about the incontinence issues she has been having. Her insurance only covers pads and barrier cream, but the gentleman I spoke with said she may very well needs depends , disposable pads for bed,etc.  Pt would needs this script sent to   [address] [place] [address]   Fax: [phone]    Person from insurance said to follow up with family for any questions .

## 2023-11-18 LAB
EST. AVERAGE GLUCOSE BLD GHB EST-MCNC: 180 MG/DL
HBA1C MFR BLD: 7.9 %

## 2023-11-21 ENCOUNTER — TELEPHONE (OUTPATIENT)
Dept: FAMILY MEDICINE CLINIC | Facility: CLINIC | Age: 78
End: 2023-11-21

## 2023-11-21 NOTE — TELEPHONE ENCOUNTER
----- Message from Bib Wasserman MD sent at 11/21/2023  4:26 AM EST -----  Increase the insulin to 12u as the a1c is a bit high   Take iron daily   Thyroid is fine

## 2023-11-27 DIAGNOSIS — E11.9 DIABETES MELLITUS TYPE 2, INSULIN DEPENDENT (HCC): ICD-10-CM

## 2023-11-27 DIAGNOSIS — Z79.4 DIABETES MELLITUS TYPE 2, INSULIN DEPENDENT (HCC): ICD-10-CM

## 2023-11-27 RX ORDER — INSULIN GLARGINE 100 [IU]/ML
12 INJECTION, SOLUTION SUBCUTANEOUS
Qty: 12 ML | Refills: 1 | Status: SHIPPED | OUTPATIENT
Start: 2023-11-27 | End: 2024-02-25

## 2023-12-27 ENCOUNTER — REMOTE DEVICE CLINIC VISIT (OUTPATIENT)
Dept: CARDIOLOGY CLINIC | Facility: CLINIC | Age: 78
End: 2023-12-27
Payer: COMMERCIAL

## 2023-12-27 DIAGNOSIS — Z95.0 PRESENCE OF PERMANENT CARDIAC PACEMAKER: Primary | ICD-10-CM

## 2023-12-27 PROCEDURE — 93294 REM INTERROG EVL PM/LDLS PM: CPT | Performed by: INTERNAL MEDICINE

## 2023-12-27 PROCEDURE — 93296 REM INTERROG EVL PM/IDS: CPT | Performed by: INTERNAL MEDICINE

## 2023-12-27 NOTE — PROGRESS NOTES
MDT DC PPM - ACTIVE SYSTEM IS MRI CONDITIONAL  CARELINK TRANSMISSION: BATTERY VOLTAGE ADEQUATE (9.4 YRS). AP 96.5%  1.5% ALL AVAILABLE LEAD PARAMETERS WITHIN NORMAL LIMITS. 447 AT/AF EPISODES W/ LONGEST AT 2 MINS 58 SECS. AT/AF BURDEN 1.1% SINCE 10/26/23. TAKES CARVEDILOL & ASA 81; NO A/C DUE TO HIGH SUSPICION FOR CEREBRAL AMYLOID ANGIOPATHY. NORMAL DEVICE FUNCTION. AM

## 2024-01-03 DIAGNOSIS — I25.10 CORONARY ARTERY DISEASE INVOLVING NATIVE CORONARY ARTERY OF NATIVE HEART WITHOUT ANGINA PECTORIS: ICD-10-CM

## 2024-01-03 RX ORDER — ATORVASTATIN CALCIUM 10 MG/1
10 TABLET, FILM COATED ORAL DAILY
Qty: 90 TABLET | Refills: 1 | Status: SHIPPED | OUTPATIENT
Start: 2024-01-03

## 2024-02-04 ENCOUNTER — RA CDI HCC (OUTPATIENT)
Dept: OTHER | Facility: HOSPITAL | Age: 79
End: 2024-02-04

## 2024-02-04 NOTE — PROGRESS NOTES
HCC coding opportunities          Chart Reviewed number of suggestions sent to Provider: 3     Patients Insurance   I49.5, E11.51 and I13.0  Medic[facility]

## 2024-02-09 DIAGNOSIS — Z79.4 DIABETES MELLITUS TYPE 2, INSULIN DEPENDENT (HCC): ICD-10-CM

## 2024-02-09 DIAGNOSIS — G31.83 LEWY BODY DEMENTIA (HCC): ICD-10-CM

## 2024-02-09 DIAGNOSIS — E11.9 DIABETES MELLITUS TYPE 2, INSULIN DEPENDENT (HCC): ICD-10-CM

## 2024-02-09 DIAGNOSIS — F02.80 LEWY BODY DEMENTIA (HCC): ICD-10-CM

## 2024-02-09 DIAGNOSIS — I25.10 CORONARY ARTERY DISEASE INVOLVING NATIVE CORONARY ARTERY OF NATIVE HEART WITHOUT ANGINA PECTORIS: ICD-10-CM

## 2024-02-09 DIAGNOSIS — E87.70 HYPERVOLEMIA, UNSPECIFIED HYPERVOLEMIA TYPE: ICD-10-CM

## 2024-02-09 DIAGNOSIS — R44.3 HALLUCINATIONS: ICD-10-CM

## 2024-02-09 RX ORDER — DONEPEZIL HYDROCHLORIDE 5 MG/1
5 TABLET, FILM COATED ORAL
Qty: 90 TABLET | Refills: 3 | Status: SHIPPED | OUTPATIENT
Start: 2024-02-09

## 2024-02-09 RX ORDER — INSULIN GLARGINE 100 [IU]/ML
12 INJECTION, SOLUTION SUBCUTANEOUS
Qty: 12 ML | Refills: 1 | Status: SHIPPED | OUTPATIENT
Start: 2024-02-09 | End: 2024-05-09

## 2024-02-09 RX ORDER — DIVALPROEX SODIUM 250 MG/1
250 TABLET, DELAYED RELEASE ORAL EVERY 12 HOURS SCHEDULED
Qty: 180 TABLET | Refills: 1 | Status: SHIPPED | OUTPATIENT
Start: 2024-02-09

## 2024-02-09 RX ORDER — CARVEDILOL 25 MG/1
25 TABLET ORAL EVERY 12 HOURS SCHEDULED
Qty: 180 TABLET | Refills: 1 | Status: SHIPPED | OUTPATIENT
Start: 2024-02-09

## 2024-02-09 RX ORDER — RANOLAZINE 500 MG/1
500 TABLET, EXTENDED RELEASE ORAL DAILY
Qty: 90 TABLET | Refills: 1 | Status: SHIPPED | OUTPATIENT
Start: 2024-02-09

## 2024-03-21 NOTE — ASSESSMENT & PLAN NOTE
Lab Results   Component Value Date    HGBA1C 7 1 (H) 02/18/2022   on lantus 18 q   BG   Evening 160-210     2 nights ago 260 Patient to Discharge Lounge via Staff Assistance  Home O2 at bedside  All Belongings Accounted for at time of Transfer

## 2024-03-27 ENCOUNTER — REMOTE DEVICE CLINIC VISIT (OUTPATIENT)
Dept: CARDIOLOGY CLINIC | Facility: CLINIC | Age: 79
End: 2024-03-27
Payer: COMMERCIAL

## 2024-03-27 DIAGNOSIS — Z95.0 CARDIAC PACEMAKER IN SITU: Primary | ICD-10-CM

## 2024-03-27 PROCEDURE — 93296 REM INTERROG EVL PM/IDS: CPT | Performed by: INTERNAL MEDICINE

## 2024-03-27 PROCEDURE — 93294 REM INTERROG EVL PM/LDLS PM: CPT | Performed by: INTERNAL MEDICINE

## 2024-03-27 NOTE — PROGRESS NOTES
Results for orders placed or performed in visit on 03/27/24   Cardiac EP device report    Narrative    MDT DC PPM - ACTIVE SYSTEM IS MRI CONDITIONAL  CARELINK TRANSMISSION: BATTERY VOLTAGE ADEQUATE (9 YRS). AP>99%, -0.2%. ALL AVAILABLE LEAD PARAMETERS WITHIN NORMAL LIMITS. 4 AT/AF EPISODES MAX DURATION 2.1 MINS. PT ON ASA 81MG & CARVEDILOL. NORMAL DEVICE FUNCTION. GV

## 2024-03-31 NOTE — PROGRESS NOTES
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    an appointment with Neurology on January 10th 1  Volume overload  -     bumetanide (BUMEX) 1 mg tablet; Take 1 tablet (1 mg total) by mouth 2 (two) times a day    2  Acute kidney injury superimposed on chronic kidney disease 3/4  -     bumetanide (BUMEX) 1 mg tablet; Take 1 tablet (1 mg total) by mouth 2 (two) times a day       Subjective:      Patient ID: Emil Nichols is a 68 y o  female  HPI     Here for follow-up on hospital visit  Acute kidney injury creatinine was 2 3 with a previous baseline of 1 8-2 5 Bumex 1 mg twice a day  Hypertension with a stable blood pressure on amlodipine which was decreased by Nephrology to 5 mg continue with the carvedilol and hydralazine  Diabetes A1c of 7 1 back on October 9th Lantus was 12 units at bedtime  Anemia of chronic disease due to kidney issues baseline 7-9 hemoglobin has been stable workup for multiple myeloma was negative  Ferritin was low on the last iron panel thought that Venofer would be helpful and to continue with a daily iron  Cerebral amyloid angiopathy seen on most recent brain MRI did stop the anticoagulation due to bleeding risk started on Depakote for hallucinations/Lewy body dementia  AFib on a beta-blocker on aspirin pacemaker in place  CAD status post stent to the RCA no current chest pain no angina continue with low-dose aspirin  Also on Ranexa 500 mg b i d       11/17  Patient's son Vicky Patricia called regarding his mom    He states that last month when she was in to see the doctor he told him that if she got any worse with her dementia to call him and he could prescribe something  Rich Sor states that at the visit the doctor told him that she sometimes go through the day with nothing happening and then other times she could have hallucinations and become violent  He said yesterday she became agitated and did not sleep all day  He said his wife went to check on her and she had fallen off the chair onto the floor  He says two days ago she wasn't like this  He asks if the doctor could prescribe something for her as he mentioned in the visit    TCM Call     Date and time call was made  11/17/2022  [address] [place] [address] reviewed  Records reviewed    [facility]  was in [facility] from 6/24-6/30 and transferred to [facility] until 7/18/2022    Date of Admission  11/11/22    Date of discharge  11/15/22    Diagnosis  Acute kidney injury superimposed on chronic kidney disease 3/4    Disposition  Home    Were the patients medications reviewed and updated  Yes    Current Symptoms  None      TCM Call     Post hospital issues  None    Should patient be enrolled in anticoag monitoring? No    Scheduled for follow up?   Yes    Patients specialists  Neurologist    Did you obtain your prescribed medications  Yes    Do you need help managing your prescriptions or medications  Yes    Why type of assitance do you need  son/daughter in law helps with care and medications    Is transportation to your appointment needed  Yes    Specify why  son brings her to appointments    I have advised the patient to call PCP with any new or worsening symptoms  Lukas Daly MA    Living Arrangements  Family members    Support System  Family    The type of support provided  None    Do you have social support  Yes, as much as I need    Are you recieving any outpatient services  No    Are you recieving home care services  Yes    Types of home care services  Home PT; Nurse visit    Are you using any community resources  No    Current waiver services  No    Have you fallen in the last 12 months  Yes    How many times  2    Interperter language line needed  No    Counseling  Family          The following portions of the patient's history were reviewed and updated as appropriate: allergies, current medications, past family history, past medical history, past social history, past surgical history and problem list     Review of Systems   Constitutional: Negative for fever and unexpected weight change  HENT: Negative for nosebleeds and trouble swallowing  Eyes: Negative for visual disturbance  Respiratory: Negative for chest tightness and shortness of breath  Cardiovascular: Negative for chest pain, palpitations and leg swelling  Gastrointestinal: Negative for abdominal pain, constipation, diarrhea and nausea  Endocrine: Negative for cold intolerance  Genitourinary: Negative for dysuria and urgency  Musculoskeletal: Positive for arthralgias and gait problem  Negative for joint swelling and myalgias  Skin: Negative for rash  Neurological: Positive for weakness  Negative for tremors, seizures and syncope  Hematological: Does not bruise/bleed easily  Psychiatric/Behavioral: Negative for hallucinations and suicidal ideas  Objective:      /78 (BP Location: Left arm, Patient Position: Sitting, Cuff Size: Large)   Pulse 77   Resp 16   Ht 5' 3" (1 6 m)   Wt 83 5 kg (184 lb)   SpO2 93%   BMI 32 59 kg/m²     No results displayed because visit has over 200 results  Physical Exam  Vitals and nursing note reviewed  Constitutional:       Appearance: She is well-developed  She is obese  Comments: wheelchair   HENT:      Head: Normocephalic and atraumatic  Eyes:      Comments: Dysconjugate   Cardiovascular:      Rate and Rhythm: Normal rate and regular rhythm  Pulses:           Dorsalis pedis pulses are 2+ on the right side and 2+ on the left side  Heart sounds: Normal heart sounds   No murmur heard  Pulmonary:      Effort: Pulmonary effort is normal       Breath sounds: Normal breath sounds  No wheezing or rales  Abdominal:      General: Bowel sounds are normal  There is no distension  Palpations: Abdomen is soft  Tenderness: There is no abdominal tenderness  Musculoskeletal:         General: No tenderness  Cervical back: Normal range of motion and neck supple  Feet:      Right foot:      Skin integrity: No ulcer, skin breakdown, erythema, warmth, callus or dry skin  Left foot:      Skin integrity: No ulcer, skin breakdown, erythema, warmth, callus or dry skin  Lymphadenopathy:      Cervical: No cervical adenopathy  Skin:     General: Skin is warm and dry  Capillary Refill: Capillary refill takes less than 2 seconds  Findings: No rash  Neurological:      Mental Status: She is alert and oriented to person, place, and time  Cranial Nerves: No cranial nerve deficit  Sensory: No sensory deficit  Motor: Weakness present  No abnormal muscle tone  Gait: Gait abnormal    Psychiatric:         Behavior: Behavior normal          Thought Content:  Thought content normal          Judgment: Judgment normal              Silvestre Fernández MD  [place] [address] sent by [facility] for "infected mass of left 3rd digit"; pt states had "wart in January used compound W got it out but hasn't healed right". noticed " blood blister 1 week ago and now I think the bone is showing" .

## 2024-04-08 ENCOUNTER — TELEPHONE (OUTPATIENT)
Age: 79
End: 2024-04-08

## 2024-04-08 NOTE — TELEPHONE ENCOUNTER
Patients son calling for lab work to be done prior to his mothers appointment in April.  Please order labs that Dr. Sanchez would like to review.  Patient uses [facility]    Please call patients son once labs are placed in chart

## 2024-04-09 DIAGNOSIS — E61.1 IRON DEFICIENCY: Primary | ICD-10-CM

## 2024-04-09 DIAGNOSIS — N18.4 BENIGN HYPERTENSION WITH CKD (CHRONIC KIDNEY DISEASE) STAGE IV (HCC): ICD-10-CM

## 2024-04-09 DIAGNOSIS — E85.4 CEREBRAL AMYLOID ANGIOPATHY  (HCC): ICD-10-CM

## 2024-04-09 DIAGNOSIS — E78.2 MIXED HYPERLIPIDEMIA: ICD-10-CM

## 2024-04-09 DIAGNOSIS — Z79.4 DIABETES MELLITUS TYPE 2, INSULIN DEPENDENT (HCC): ICD-10-CM

## 2024-04-09 DIAGNOSIS — E11.9 DIABETES MELLITUS TYPE 2, INSULIN DEPENDENT (HCC): ICD-10-CM

## 2024-04-09 DIAGNOSIS — Z79.899 OTHER LONG TERM (CURRENT) DRUG THERAPY: ICD-10-CM

## 2024-04-09 DIAGNOSIS — I48.92 ATRIAL FLUTTER, PAROXYSMAL (HCC): ICD-10-CM

## 2024-04-09 DIAGNOSIS — I12.9 BENIGN HYPERTENSION WITH CKD (CHRONIC KIDNEY DISEASE) STAGE IV (HCC): ICD-10-CM

## 2024-04-09 DIAGNOSIS — I68.0 CEREBRAL AMYLOID ANGIOPATHY  (HCC): ICD-10-CM

## 2024-04-11 ENCOUNTER — TELEPHONE (OUTPATIENT)
Dept: LAB | Facility: HOSPITAL | Age: 79
End: 2024-04-11

## 2024-04-16 ENCOUNTER — APPOINTMENT (OUTPATIENT)
Dept: LAB | Facility: CLINIC | Age: 79
End: 2024-04-16
Payer: COMMERCIAL

## 2024-04-16 DIAGNOSIS — Z79.4 ENCOUNTER FOR LONG-TERM (CURRENT) USE OF INSULIN (HCC): ICD-10-CM

## 2024-04-16 DIAGNOSIS — E13.69 OTHER SPECIFIED DIABETES MELLITUS WITH OTHER SPECIFIED COMPLICATION, UNSPECIFIED WHETHER LONG TERM INSULIN USE (HCC): ICD-10-CM

## 2024-04-16 LAB
ALBUMIN SERPL BCP-MCNC: 3.1 G/DL (ref 3.5–5)
ALP SERPL-CCNC: 37 U/L (ref 34–104)
ALT SERPL W P-5'-P-CCNC: 6 U/L (ref 7–52)
ANION GAP SERPL CALCULATED.3IONS-SCNC: 3 MMOL/L (ref 4–13)
AST SERPL W P-5'-P-CCNC: 10 U/L (ref 13–39)
BASOPHILS # BLD AUTO: 0.01 THOUSANDS/ÂΜL (ref 0–0.1)
BASOPHILS NFR BLD AUTO: 0 % (ref 0–1)
BILIRUB SERPL-MCNC: 0.39 MG/DL (ref 0.2–1)
BUN SERPL-MCNC: 38 MG/DL (ref 5–25)
CALCIUM ALBUM COR SERPL-MCNC: 10.4 MG/DL (ref 8.3–10.1)
CALCIUM SERPL-MCNC: 9.7 MG/DL (ref 8.4–10.2)
CHLORIDE SERPL-SCNC: 101 MMOL/L (ref 96–108)
CHOLEST SERPL-MCNC: 99 MG/DL
CO2 SERPL-SCNC: 36 MMOL/L (ref 21–32)
CREAT SERPL-MCNC: 2.34 MG/DL (ref 0.6–1.3)
EOSINOPHIL # BLD AUTO: 0 THOUSAND/ÂΜL (ref 0–0.61)
EOSINOPHIL NFR BLD AUTO: 0 % (ref 0–6)
ERYTHROCYTE [DISTWIDTH] IN BLOOD BY AUTOMATED COUNT: 13.9 % (ref 11.6–15.1)
EST. AVERAGE GLUCOSE BLD GHB EST-MCNC: 180 MG/DL
FERRITIN SERPL-MCNC: 292 NG/ML (ref 11–307)
GFR SERPL CREATININE-BSD FRML MDRD: 19 ML/MIN/1.73SQ M
GLUCOSE P FAST SERPL-MCNC: 130 MG/DL (ref 65–99)
HBA1C MFR BLD: 7.9 %
HCT VFR BLD AUTO: 32.7 % (ref 34.8–46.1)
HDLC SERPL-MCNC: 43 MG/DL
HGB BLD-MCNC: 10.4 G/DL (ref 11.5–15.4)
IMM GRANULOCYTES # BLD AUTO: 0.05 THOUSAND/UL (ref 0–0.2)
IMM GRANULOCYTES NFR BLD AUTO: 1 % (ref 0–2)
IRON SATN MFR SERPL: 39 % (ref 15–50)
IRON SERPL-MCNC: 83 UG/DL (ref 50–212)
LDLC SERPL CALC-MCNC: 43 MG/DL (ref 0–100)
LYMPHOCYTES # BLD AUTO: 2.24 THOUSANDS/ÂΜL (ref 0.6–4.47)
LYMPHOCYTES NFR BLD AUTO: 45 % (ref 14–44)
MAGNESIUM SERPL-MCNC: 2 MG/DL (ref 1.9–2.7)
MCH RBC QN AUTO: 30 PG (ref 26.8–34.3)
MCHC RBC AUTO-ENTMCNC: 31.8 G/DL (ref 31.4–37.4)
MCV RBC AUTO: 94 FL (ref 82–98)
MONOCYTES # BLD AUTO: 0.59 THOUSAND/ÂΜL (ref 0.17–1.22)
MONOCYTES NFR BLD AUTO: 12 % (ref 4–12)
NEUTROPHILS # BLD AUTO: 2.06 THOUSANDS/ÂΜL (ref 1.85–7.62)
NEUTS SEG NFR BLD AUTO: 42 % (ref 43–75)
NRBC BLD AUTO-RTO: 0 /100 WBCS
PLATELET # BLD AUTO: 164 THOUSANDS/UL (ref 149–390)
PMV BLD AUTO: 10.6 FL (ref 8.9–12.7)
POTASSIUM SERPL-SCNC: 3.9 MMOL/L (ref 3.5–5.3)
PROT SERPL-MCNC: 6 G/DL (ref 6.4–8.4)
RBC # BLD AUTO: 3.47 MILLION/UL (ref 3.81–5.12)
SODIUM SERPL-SCNC: 140 MMOL/L (ref 135–147)
TIBC SERPL-MCNC: 212 UG/DL (ref 250–450)
TRIGL SERPL-MCNC: 64 MG/DL
UIBC SERPL-MCNC: 129 UG/DL (ref 155–355)
WBC # BLD AUTO: 4.95 THOUSAND/UL (ref 4.31–10.16)

## 2024-04-17 ENCOUNTER — APPOINTMENT (OUTPATIENT)
Dept: LAB | Facility: CLINIC | Age: 79
End: 2024-04-17
Payer: COMMERCIAL

## 2024-04-17 LAB
BACTERIA UR QL AUTO: ABNORMAL /HPF
BILIRUB UR QL STRIP: NEGATIVE
CLARITY UR: ABNORMAL
COLOR UR: ABNORMAL
CREAT UR-MCNC: 30.7 MG/DL
GLUCOSE UR STRIP-MCNC: ABNORMAL MG/DL
HGB UR QL STRIP.AUTO: NEGATIVE
HYALINE CASTS #/AREA URNS LPF: ABNORMAL /LPF
KETONES UR STRIP-MCNC: NEGATIVE MG/DL
LEUKOCYTE ESTERASE UR QL STRIP: ABNORMAL
NITRITE UR QL STRIP: NEGATIVE
NON-SQ EPI CELLS URNS QL MICRO: ABNORMAL /HPF
PH UR STRIP.AUTO: 6 [PH]
PROT UR STRIP-MCNC: ABNORMAL MG/DL
PROT UR-MCNC: 17 MG/DL
PROT/CREAT UR: 0.55 MG/G{CREAT} (ref 0–0.1)
RBC #/AREA URNS AUTO: ABNORMAL /HPF
RENAL EPI CELLS #/AREA URNS HPF: PRESENT /[HPF]
SP GR UR STRIP.AUTO: 1.01 (ref 1–1.03)
UROBILINOGEN UR STRIP-ACNC: <2 MG/DL
WBC #/AREA URNS AUTO: ABNORMAL /HPF
WBC CLUMPS # UR AUTO: PRESENT /UL

## 2024-04-19 ENCOUNTER — OFFICE VISIT (OUTPATIENT)
Dept: FAMILY MEDICINE CLINIC | Facility: CLINIC | Age: 79
End: 2024-04-19
Payer: COMMERCIAL

## 2024-04-19 VITALS
SYSTOLIC BLOOD PRESSURE: 116 MMHG | HEIGHT: 63 IN | OXYGEN SATURATION: 96 % | RESPIRATION RATE: 16 BRPM | BODY MASS INDEX: 21.62 KG/M2 | DIASTOLIC BLOOD PRESSURE: 80 MMHG | WEIGHT: 122 LBS | HEART RATE: 60 BPM

## 2024-04-19 DIAGNOSIS — H92.02 LEFT EAR PAIN: ICD-10-CM

## 2024-04-19 DIAGNOSIS — I48.92 ATRIAL FLUTTER, PAROXYSMAL (HCC): ICD-10-CM

## 2024-04-19 DIAGNOSIS — F02.B0 MODERATE LEWY BODY DEMENTIA, UNSPECIFIED WHETHER BEHAVIORAL, PSYCHOTIC, OR MOOD DISTURBANCE OR ANXIETY (HCC): ICD-10-CM

## 2024-04-19 DIAGNOSIS — G31.83 MODERATE LEWY BODY DEMENTIA, UNSPECIFIED WHETHER BEHAVIORAL, PSYCHOTIC, OR MOOD DISTURBANCE OR ANXIETY (HCC): ICD-10-CM

## 2024-04-19 DIAGNOSIS — I12.9 BENIGN HYPERTENSION WITH CKD (CHRONIC KIDNEY DISEASE) STAGE IV (HCC): ICD-10-CM

## 2024-04-19 DIAGNOSIS — I50.9 CHRONIC CONGESTIVE HEART FAILURE, UNSPECIFIED HEART FAILURE TYPE (HCC): ICD-10-CM

## 2024-04-19 DIAGNOSIS — Z79.4 TYPE 2 DIABETES MELLITUS WITH STAGE 3A CHRONIC KIDNEY DISEASE, WITH LONG-TERM CURRENT USE OF INSULIN (HCC): ICD-10-CM

## 2024-04-19 DIAGNOSIS — I73.9 PAD (PERIPHERAL ARTERY DISEASE) (HCC): Primary | ICD-10-CM

## 2024-04-19 DIAGNOSIS — I68.0 CEREBRAL AMYLOID ANGIOPATHY  (HCC): ICD-10-CM

## 2024-04-19 DIAGNOSIS — N18.31 TYPE 2 DIABETES MELLITUS WITH STAGE 3A CHRONIC KIDNEY DISEASE, WITH LONG-TERM CURRENT USE OF INSULIN (HCC): ICD-10-CM

## 2024-04-19 DIAGNOSIS — E11.22 TYPE 2 DIABETES MELLITUS WITH STAGE 3A CHRONIC KIDNEY DISEASE, WITH LONG-TERM CURRENT USE OF INSULIN (HCC): ICD-10-CM

## 2024-04-19 DIAGNOSIS — N18.4 BENIGN HYPERTENSION WITH CKD (CHRONIC KIDNEY DISEASE) STAGE IV (HCC): ICD-10-CM

## 2024-04-19 DIAGNOSIS — K59.00 CONSTIPATION, UNSPECIFIED CONSTIPATION TYPE: ICD-10-CM

## 2024-04-19 DIAGNOSIS — E85.4 CEREBRAL AMYLOID ANGIOPATHY  (HCC): ICD-10-CM

## 2024-04-19 DIAGNOSIS — N30.00 ACUTE CYSTITIS WITHOUT HEMATURIA: ICD-10-CM

## 2024-04-19 PROCEDURE — G2211 COMPLEX E/M VISIT ADD ON: HCPCS | Performed by: FAMILY MEDICINE

## 2024-04-19 PROCEDURE — 99214 OFFICE O/P EST MOD 30 MIN: CPT | Performed by: FAMILY MEDICINE

## 2024-04-19 RX ORDER — CEPHALEXIN 250 MG/1
250 CAPSULE ORAL EVERY 12 HOURS SCHEDULED
Qty: 14 CAPSULE | Refills: 0 | Status: SHIPPED | OUTPATIENT
Start: 2024-04-19 | End: 2024-04-26

## 2024-04-19 RX ORDER — FLUTICASONE PROPIONATE 50 MCG
1 SPRAY, SUSPENSION (ML) NASAL 2 TIMES DAILY
Qty: 16 ML | Refills: 3 | Status: SHIPPED | OUTPATIENT
Start: 2024-04-19 | End: 2024-04-19

## 2024-04-19 RX ORDER — FLUTICASONE PROPIONATE 50 MCG
1 SPRAY, SUSPENSION (ML) NASAL 2 TIMES DAILY
Qty: 48 G | Refills: 1 | Status: SHIPPED | OUTPATIENT
Start: 2024-04-19

## 2024-04-19 NOTE — ASSESSMENT & PLAN NOTE
Lab Results   Component Value Date    HGBA1C 7.9 (H) 04/16/2024   Uncontrolled but with end stage dementia   Will not change much today

## 2024-04-19 NOTE — ASSESSMENT & PLAN NOTE
Lab Results   Component Value Date    EGFR 19 04/16/2024    EGFR 17 11/17/2023    EGFR 18 10/25/2023    CREATININE 2.34 (H) 04/16/2024    CREATININE 2.50 (H) 11/17/2023    CREATININE 2.41 (H) 10/25/2023   -stable/controlled, continue same medication. Will evaluate again next visit

## 2024-04-19 NOTE — ASSESSMENT & PLAN NOTE
Wt Readings from Last 3 Encounters:   04/19/24 55.3 kg (122 lb)   11/10/23 55.3 kg (122 lb)   10/25/23 62 kg (136 lb 11 oz)       -stable/controlled, continue same medication. Will evaluate again next visit

## 2024-04-20 LAB
BACTERIA UR CULT: ABNORMAL
BACTERIA UR CULT: ABNORMAL

## 2024-04-23 DIAGNOSIS — N18.31 TYPE 2 DIABETES MELLITUS WITH STAGE 3A CHRONIC KIDNEY DISEASE, WITH LONG-TERM CURRENT USE OF INSULIN (HCC): ICD-10-CM

## 2024-04-23 DIAGNOSIS — E11.22 TYPE 2 DIABETES MELLITUS WITH STAGE 3A CHRONIC KIDNEY DISEASE, WITH LONG-TERM CURRENT USE OF INSULIN (HCC): ICD-10-CM

## 2024-04-23 DIAGNOSIS — Z79.4 TYPE 2 DIABETES MELLITUS WITH STAGE 3A CHRONIC KIDNEY DISEASE, WITH LONG-TERM CURRENT USE OF INSULIN (HCC): ICD-10-CM

## 2024-04-23 DIAGNOSIS — E87.70 HYPERVOLEMIA, UNSPECIFIED HYPERVOLEMIA TYPE: ICD-10-CM

## 2024-04-24 RX ORDER — BUMETANIDE 1 MG/1
1 TABLET ORAL DAILY
Qty: 90 TABLET | Refills: 1 | Status: SHIPPED | OUTPATIENT
Start: 2024-04-24

## 2024-04-24 RX ORDER — FLURBIPROFEN SODIUM 0.3 MG/ML
SOLUTION/ DROPS OPHTHALMIC
Qty: 100 EACH | Refills: 1 | Status: SHIPPED | OUTPATIENT
Start: 2024-04-24

## 2024-05-12 DIAGNOSIS — I10 HYPERTENSION: ICD-10-CM

## 2024-05-13 RX ORDER — AMLODIPINE BESYLATE 2.5 MG/1
2.5 TABLET ORAL DAILY
Qty: 90 TABLET | Refills: 1 | Status: SHIPPED | OUTPATIENT
Start: 2024-05-13

## 2024-06-12 ENCOUNTER — TELEPHONE (OUTPATIENT)
Age: 79
End: 2024-06-12

## 2024-06-12 NOTE — TELEPHONE ENCOUNTER
Mercy from [facility] is calling to follow up on the fax sent from 6/5/24. She states the provider needs to addendum the office note from 4/19/24. The note needs to mention the patient having incontinence and needing diapers. Please follow up and fax to [phone], she would like to update the family.

## 2024-07-02 DIAGNOSIS — I25.10 CORONARY ARTERY DISEASE INVOLVING NATIVE CORONARY ARTERY OF NATIVE HEART WITHOUT ANGINA PECTORIS: ICD-10-CM

## 2024-07-02 RX ORDER — ATORVASTATIN CALCIUM 10 MG/1
10 TABLET, FILM COATED ORAL DAILY
Qty: 100 TABLET | Refills: 1 | Status: SHIPPED | OUTPATIENT
Start: 2024-07-02

## 2024-07-03 ENCOUNTER — TELEPHONE (OUTPATIENT)
Dept: FAMILY MEDICINE CLINIC | Facility: CLINIC | Age: 79
End: 2024-07-03

## 2024-07-05 NOTE — TELEPHONE ENCOUNTER
Call received from [employer] Oscar stating that the forms received did not include the reason for the incontinence supplies. Asking for an addendum documenting the need to be faxed over.

## 2024-07-11 NOTE — TELEPHONE ENCOUNTER
Date of Service: 01/18/2023    Vijay was seen today in a psychiatric followup visit.  Interval history was reviewed, and case was discussed with treatment staff.  Vijay was also seen by the primary care consultant today due to complaints of tingling, numbness in her fingers and was feeling as though this might be neuropathy or Raynaud's syndrome.  Dr. Finch believes it is merely response to cold weather.  She is encouraged to use gloves and other means to keep her hands warm.  This is part of Vijay's pattern of somatization and she and I did discuss how she usually needs to put time and distance between thoughts, sensations, reactive catastrophizing of medical illnesses.    On a positive note, her back pain is getting better and labs were essentially within normal limits.    Vijay also dealt well with being rejected from an [facility] interview last night.  She has another one set up for next Tuesday. Insurance authorization will carry her through the middle of next week for which she is also grateful. Again, reaffirmed healthy interpersonal skills and frustration tolerance with others, including staff and peers.    MENTAL STATUS EXAMINATION:  Mental status today is relatively bright.  No major neurovegetative symptoms of depression.  No SI, HI, or AVHs.  Thought content and processes are logical and goal directed with improving insight, but still fragile judgment and impulse control.  She does state that she continues to have difficulty with attention, concentration, and \"spacing out\" when in groups and reports very little effect from Strattera.  After discussion of risks, benefits and side effects will try adding Wellbutrin, although I am concerned about the volume and complexity of her medication regimen.  She would like a trial while she is in a relatively monitored environment.  Also discussed non-medication means of dealing with ADHD and reviewed holistic health endeavors.    DIAGNOSES:   Patients son called stating he just spoke with nelida francisco and they advised him they have not received the resent form with reasoning on it. Can you please resend form.      Stimulant dependence.  Bipolar disorder.  Posttraumatic stress disorder.  Attention deficit hyperactivity disorder.    PLAN:    Continue present medication regimen and residential level of care.      Dictated By: Prem Pace MD  Signing Provider: Prem Pace MD    LPL/vls ([id number])   DD: 01/18/2023 6:07:29 PM TD: 01/18/2023 7:25:18 PM

## 2024-07-11 NOTE — TELEPHONE ENCOUNTER
Patient's son called refill line, he states that they are not seeing the dx code on the paper and they need the office to call for clarification. [phone]

## 2024-07-12 ENCOUNTER — HOSPITAL ENCOUNTER (INPATIENT)
Facility: HOSPITAL | Age: 79
LOS: 4 days | Discharge: HOME | DRG: 690 | End: 2024-07-17
Attending: EMERGENCY MEDICINE | Admitting: INTERNAL MEDICINE
Payer: COMMERCIAL

## 2024-07-12 ENCOUNTER — APPOINTMENT (EMERGENCY)
Dept: CT IMAGING | Facility: HOSPITAL | Age: 79
DRG: 690 | End: 2024-07-12
Payer: COMMERCIAL

## 2024-07-12 DIAGNOSIS — K52.89 STERCORAL COLITIS: Primary | ICD-10-CM

## 2024-07-12 DIAGNOSIS — R53.1 WEAKNESS: ICD-10-CM

## 2024-07-12 DIAGNOSIS — E87.70 HYPERVOLEMIA, UNSPECIFIED HYPERVOLEMIA TYPE: ICD-10-CM

## 2024-07-12 DIAGNOSIS — N39.0 UTI (URINARY TRACT INFECTION): ICD-10-CM

## 2024-07-12 PROBLEM — R10.9 ABDOMINAL PAIN: Status: ACTIVE | Noted: 2024-07-12

## 2024-07-12 PROBLEM — R11.2 NAUSEA VOMITING AND DIARRHEA: Status: ACTIVE | Noted: 2024-07-12

## 2024-07-12 PROBLEM — R19.7 NAUSEA VOMITING AND DIARRHEA: Status: ACTIVE | Noted: 2024-07-12

## 2024-07-12 LAB
ALBUMIN SERPL BCG-MCNC: 3.2 G/DL (ref 3.5–5)
ALP SERPL-CCNC: 40 U/L (ref 34–104)
ALT SERPL W P-5'-P-CCNC: 9 U/L (ref 7–52)
ANION GAP SERPL CALCULATED.3IONS-SCNC: 9 MMOL/L (ref 4–13)
AST SERPL W P-5'-P-CCNC: 21 U/L (ref 13–39)
ATRIAL RATE: 61 BPM
BACTERIA UR QL AUTO: ABNORMAL /HPF
BASOPHILS # BLD AUTO: 0.02 THOUSANDS/ÂΜL (ref 0–0.1)
BASOPHILS NFR BLD AUTO: 0 % (ref 0–1)
BILIRUB SERPL-MCNC: 0.43 MG/DL (ref 0.2–1)
BILIRUB UR QL STRIP: NEGATIVE
BUN SERPL-MCNC: 42 MG/DL (ref 5–25)
CALCIUM ALBUM COR SERPL-MCNC: 9.8 MG/DL (ref 8.3–10.1)
CALCIUM SERPL-MCNC: 9.2 MG/DL (ref 8.4–10.2)
CHLORIDE SERPL-SCNC: 101 MMOL/L (ref 96–108)
CLARITY UR: CLEAR
CO2 SERPL-SCNC: 31 MMOL/L (ref 21–32)
COLOR UR: YELLOW
CREAT SERPL-MCNC: 2.65 MG/DL (ref 0.6–1.3)
EOSINOPHIL # BLD AUTO: 0.2 THOUSAND/ÂΜL (ref 0–0.61)
EOSINOPHIL NFR BLD AUTO: 3 % (ref 0–6)
ERYTHROCYTE [DISTWIDTH] IN BLOOD BY AUTOMATED COUNT: 12.7 % (ref 11.6–15.1)
GFR SERPL CREATININE-BSD FRML MDRD: 16 ML/MIN/1.73SQ M
GLUCOSE SERPL-MCNC: 189 MG/DL (ref 65–140)
GLUCOSE SERPL-MCNC: 215 MG/DL (ref 65–140)
GLUCOSE UR STRIP-MCNC: NEGATIVE MG/DL
HCT VFR BLD AUTO: 30.3 % (ref 34.8–46.1)
HGB BLD-MCNC: 9.5 G/DL (ref 11.5–15.4)
HGB UR QL STRIP.AUTO: NEGATIVE
IMM GRANULOCYTES # BLD AUTO: 0.07 THOUSAND/UL (ref 0–0.2)
IMM GRANULOCYTES NFR BLD AUTO: 1 % (ref 0–2)
KETONES UR STRIP-MCNC: NEGATIVE MG/DL
LEUKOCYTE ESTERASE UR QL STRIP: ABNORMAL
LIPASE SERPL-CCNC: 6 U/L (ref 11–82)
LYMPHOCYTES # BLD AUTO: 1.7 THOUSANDS/ÂΜL (ref 0.6–4.47)
LYMPHOCYTES NFR BLD AUTO: 25 % (ref 14–44)
MCH RBC QN AUTO: 29.4 PG (ref 26.8–34.3)
MCHC RBC AUTO-ENTMCNC: 31.4 G/DL (ref 31.4–37.4)
MCV RBC AUTO: 94 FL (ref 82–98)
MONOCYTES # BLD AUTO: 0.58 THOUSAND/ÂΜL (ref 0.17–1.22)
MONOCYTES NFR BLD AUTO: 9 % (ref 4–12)
NEUTROPHILS # BLD AUTO: 4.25 THOUSANDS/ÂΜL (ref 1.85–7.62)
NEUTS SEG NFR BLD AUTO: 62 % (ref 43–75)
NITRITE UR QL STRIP: POSITIVE
NON-SQ EPI CELLS URNS QL MICRO: ABNORMAL /HPF
NRBC BLD AUTO-RTO: 0 /100 WBCS
PH UR STRIP.AUTO: 6 [PH]
PLATELET # BLD AUTO: 179 THOUSANDS/UL (ref 149–390)
PMV BLD AUTO: 10.6 FL (ref 8.9–12.7)
POTASSIUM SERPL-SCNC: 4.3 MMOL/L (ref 3.5–5.3)
PR INTERVAL: 194 MS
PROT SERPL-MCNC: 6.5 G/DL (ref 6.4–8.4)
PROT UR STRIP-MCNC: NEGATIVE MG/DL
QRS AXIS: -32 DEGREES
QRSD INTERVAL: 84 MS
QT INTERVAL: 428 MS
QTC INTERVAL: 430 MS
RBC # BLD AUTO: 3.23 MILLION/UL (ref 3.81–5.12)
RBC #/AREA URNS AUTO: ABNORMAL /HPF
SODIUM SERPL-SCNC: 141 MMOL/L (ref 135–147)
SP GR UR STRIP.AUTO: 1.01 (ref 1–1.03)
T WAVE AXIS: 22 DEGREES
UROBILINOGEN UR QL STRIP.AUTO: 0.2 E.U./DL
VENTRICULAR RATE: 61 BPM
WBC # BLD AUTO: 6.82 THOUSAND/UL (ref 4.31–10.16)
WBC #/AREA URNS AUTO: ABNORMAL /HPF

## 2024-07-12 PROCEDURE — 36415 COLL VENOUS BLD VENIPUNCTURE: CPT | Performed by: EMERGENCY MEDICINE

## 2024-07-12 PROCEDURE — 99285 EMERGENCY DEPT VISIT HI MDM: CPT

## 2024-07-12 PROCEDURE — 99285 EMERGENCY DEPT VISIT HI MDM: CPT | Performed by: EMERGENCY MEDICINE

## 2024-07-12 PROCEDURE — 81001 URINALYSIS AUTO W/SCOPE: CPT | Performed by: EMERGENCY MEDICINE

## 2024-07-12 PROCEDURE — 93005 ELECTROCARDIOGRAM TRACING: CPT

## 2024-07-12 PROCEDURE — 85025 COMPLETE CBC W/AUTO DIFF WBC: CPT | Performed by: EMERGENCY MEDICINE

## 2024-07-12 PROCEDURE — 82948 REAGENT STRIP/BLOOD GLUCOSE: CPT

## 2024-07-12 PROCEDURE — 74176 CT ABD & PELVIS W/O CONTRAST: CPT

## 2024-07-12 PROCEDURE — 80053 COMPREHEN METABOLIC PANEL: CPT | Performed by: EMERGENCY MEDICINE

## 2024-07-12 PROCEDURE — 96374 THER/PROPH/DIAG INJ IV PUSH: CPT

## 2024-07-12 PROCEDURE — 83690 ASSAY OF LIPASE: CPT | Performed by: EMERGENCY MEDICINE

## 2024-07-12 PROCEDURE — 99223 1ST HOSP IP/OBS HIGH 75: CPT

## 2024-07-12 RX ORDER — INSULIN GLARGINE 100 [IU]/ML
10 INJECTION, SOLUTION SUBCUTANEOUS
Status: DISCONTINUED | OUTPATIENT
Start: 2024-07-12 | End: 2024-07-14

## 2024-07-12 RX ORDER — INSULIN LISPRO 100 [IU]/ML
1-5 INJECTION, SOLUTION INTRAVENOUS; SUBCUTANEOUS
Status: DISCONTINUED | OUTPATIENT
Start: 2024-07-12 | End: 2024-07-17 | Stop reason: HOSPADM

## 2024-07-12 RX ORDER — FLUTICASONE PROPIONATE 50 MCG
1 SPRAY, SUSPENSION (ML) NASAL 2 TIMES DAILY
Status: DISCONTINUED | OUTPATIENT
Start: 2024-07-12 | End: 2024-07-17 | Stop reason: HOSPADM

## 2024-07-12 RX ORDER — RANOLAZINE 500 MG/1
500 TABLET, EXTENDED RELEASE ORAL DAILY
Status: DISCONTINUED | OUTPATIENT
Start: 2024-07-13 | End: 2024-07-17 | Stop reason: HOSPADM

## 2024-07-12 RX ORDER — ONDANSETRON 2 MG/ML
4 INJECTION INTRAMUSCULAR; INTRAVENOUS ONCE
Status: COMPLETED | OUTPATIENT
Start: 2024-07-12 | End: 2024-07-12

## 2024-07-12 RX ORDER — DIVALPROEX SODIUM 250 MG/1
250 TABLET, DELAYED RELEASE ORAL EVERY 12 HOURS SCHEDULED
Status: DISCONTINUED | OUTPATIENT
Start: 2024-07-12 | End: 2024-07-17 | Stop reason: HOSPADM

## 2024-07-12 RX ORDER — CEFTRIAXONE 1 G/50ML
1000 INJECTION, SOLUTION INTRAVENOUS ONCE
Status: COMPLETED | OUTPATIENT
Start: 2024-07-12 | End: 2024-07-12

## 2024-07-12 RX ORDER — ASPIRIN 81 MG/1
81 TABLET, CHEWABLE ORAL DAILY
Status: DISCONTINUED | OUTPATIENT
Start: 2024-07-13 | End: 2024-07-17 | Stop reason: HOSPADM

## 2024-07-12 RX ORDER — CARVEDILOL 12.5 MG/1
25 TABLET ORAL EVERY 12 HOURS SCHEDULED
Status: DISCONTINUED | OUTPATIENT
Start: 2024-07-12 | End: 2024-07-17 | Stop reason: HOSPADM

## 2024-07-12 RX ORDER — INSULIN LISPRO 100 [IU]/ML
1-5 INJECTION, SOLUTION INTRAVENOUS; SUBCUTANEOUS
Status: DISCONTINUED | OUTPATIENT
Start: 2024-07-13 | End: 2024-07-17 | Stop reason: HOSPADM

## 2024-07-12 RX ORDER — ACETAMINOPHEN 325 MG/1
650 TABLET ORAL EVERY 6 HOURS PRN
Status: DISCONTINUED | OUTPATIENT
Start: 2024-07-12 | End: 2024-07-17 | Stop reason: HOSPADM

## 2024-07-12 RX ORDER — CALCIUM CARBONATE 500 MG/1
1000 TABLET, CHEWABLE ORAL DAILY PRN
Status: DISCONTINUED | OUTPATIENT
Start: 2024-07-12 | End: 2024-07-17 | Stop reason: HOSPADM

## 2024-07-12 RX ORDER — POLYETHYLENE GLYCOL 3350 17 G/17G
17 POWDER, FOR SOLUTION ORAL DAILY
Status: DISCONTINUED | OUTPATIENT
Start: 2024-07-13 | End: 2024-07-15

## 2024-07-12 RX ORDER — HEPARIN SODIUM 5000 [USP'U]/ML
5000 INJECTION, SOLUTION INTRAVENOUS; SUBCUTANEOUS EVERY 8 HOURS SCHEDULED
Status: DISCONTINUED | OUTPATIENT
Start: 2024-07-12 | End: 2024-07-17 | Stop reason: HOSPADM

## 2024-07-12 RX ORDER — BUMETANIDE 1 MG/1
1 TABLET ORAL DAILY
Status: DISCONTINUED | OUTPATIENT
Start: 2024-07-13 | End: 2024-07-16

## 2024-07-12 RX ORDER — ONDANSETRON 2 MG/ML
4 INJECTION INTRAMUSCULAR; INTRAVENOUS EVERY 6 HOURS PRN
Status: DISCONTINUED | OUTPATIENT
Start: 2024-07-12 | End: 2024-07-17 | Stop reason: HOSPADM

## 2024-07-12 RX ORDER — DONEPEZIL HYDROCHLORIDE 5 MG/1
5 TABLET, FILM COATED ORAL
Status: DISCONTINUED | OUTPATIENT
Start: 2024-07-12 | End: 2024-07-17 | Stop reason: HOSPADM

## 2024-07-12 RX ORDER — AMLODIPINE BESYLATE 2.5 MG/1
2.5 TABLET ORAL DAILY
Status: DISCONTINUED | OUTPATIENT
Start: 2024-07-13 | End: 2024-07-14

## 2024-07-12 RX ORDER — DOCUSATE SODIUM 100 MG/1
100 CAPSULE, LIQUID FILLED ORAL 2 TIMES DAILY
Status: DISCONTINUED | OUTPATIENT
Start: 2024-07-13 | End: 2024-07-16

## 2024-07-12 RX ORDER — ATORVASTATIN CALCIUM 10 MG/1
10 TABLET, FILM COATED ORAL DAILY
Status: DISCONTINUED | OUTPATIENT
Start: 2024-07-13 | End: 2024-07-17 | Stop reason: HOSPADM

## 2024-07-12 RX ADMIN — ONDANSETRON 4 MG: 2 INJECTION INTRAMUSCULAR; INTRAVENOUS at 17:29

## 2024-07-12 RX ADMIN — CEFTRIAXONE 1000 MG: 1 INJECTION, SOLUTION INTRAVENOUS at 20:15

## 2024-07-12 NOTE — ED PROVIDER NOTES
"History  Chief Complaint   Patient presents with    Weakness - Generalized     Brought in by EMS, pt states she feels weaker than normal, slid out of chair onto floor, denies hitting head, no LOC. Pt had diarrhea last night, 1 episode of vomiting today     78-year-old female comes in for evaluation of generalized weakness.  Family reports that she was placed on a stool softener by PCP for constipation.  She had been taking for 3 days when she had a large liquid bowel movement last evening.  Today she appeared weaker to family and then slid out of her chair.  Patient then had an episode of vomiting.  Patient does have baseline dementia but family feels she is more \"out of it\" than baseline.  Also they feel that patient has not been eating and drinking as well as she should be.      History provided by:  Medical records, patient and relative  History limited by:  Dementia   used: No    Fatigue  Severity:  Moderate  Onset quality:  Sudden  Duration:  1 day  Timing:  Constant  Progression:  Worsening  Chronicity:  New  Context: change in medication and dehydration    Associated symptoms: anorexia, diarrhea and vomiting    Associated symptoms: no fever    Risk factors: new medications        Prior to Admission Medications   Prescriptions Last Dose Informant Patient Reported? Taking?   B-D UF III MINI PEN NEEDLES 31G X 5 MM MISC 7/12/2024  No Yes   Sig: USE DAILY AS DIRECTED   Cholecalciferol (Vitamin D) 50 MCG (2000 UT) tablet 7/12/2024 Self No Yes   Sig: Take 1 tablet (2,000 Units total) by mouth daily   Lantus SoloStar 100 units/mL SOPN 7/12/2024  No Yes   Sig: Inject 0.12 mL (12 Units total) under the skin daily at bedtime   acetaminophen (TYLENOL) 325 mg tablet Past Week Self No Yes   Sig: Take 3 tablets (975 mg total) by mouth every 8 (eight) hours   amLODIPine (NORVASC) 2.5 mg tablet 7/12/2024  No Yes   Sig: TAKE 1 TABLET(2.5 MG) BY MOUTH DAILY   aspirin 81 mg chewable tablet 7/12/2024 Self Yes " Yes   Sig: Chew 81 mg daily   atorvastatin (LIPITOR) 10 mg tablet 2024  No Yes   Sig: TAKE 1 TABLET(10 MG) BY MOUTH DAILY   bumetanide (BUMEX) 1 mg tablet 2024  No Yes   Sig: Take 1 tablet (1 mg total) by mouth daily   calcium carbonate (OS-LULÚ) 600 MG tablet 2024 Self Yes Yes   Sig: Take 600 mg by mouth 2 (two) times a day with meals   carvedilol (COREG) 25 mg tablet 2024  No Yes   Sig: Take 1 tablet (25 mg total) by mouth every 12 (twelve) hours   divalproex sodium (DEPAKOTE) 250 mg DR tablet 2024  No Yes   Sig: Take 1 tablet (250 mg total) by mouth every 12 (twelve) hours   donepezil (ARICEPT) 5 mg tablet 2024  No Yes   Sig: Take 1 tablet (5 mg total) by mouth daily at bedtime   fluticasone (FLONASE) 50 mcg/act nasal spray Past Week  No Yes   Sig: SHAKE LIQUID AND USE 1 SPRAY IN EACH NOSTRIL TWICE DAILY   folic acid (FOLVITE) 400 mcg tablet 2024 Self Yes Yes   Sig: Take 400 mcg by mouth daily   ranolazine (RANEXA) 500 mg 12 hr tablet 2024  No Yes   Sig: Take 1 tablet (500 mg total) by mouth in the morning      Facility-Administered Medications: None       Past Medical History:   Diagnosis Date    Atrial fibrillation (HCC)     Chronic anemia     Chronic kidney disease     Colonoscopy refused 2019    pt declines    Dyslipidemia     Hypertension     Mammogram declined 2017    Proteinuria     Tachy-sarah beth syndrome (HCC) 2022       Past Surgical History:   Procedure Laterality Date    CARDIAC PACEMAKER PLACEMENT      CATARACT EXTRACTION       SECTION      CHOLECYSTECTOMY      CORONARY ANGIOPLASTY WITH STENT PLACEMENT      EYE SURGERY      MAMMO (HISTORICAL)      Pt states she will not ever have again-     [place] OPTX FEM SHFT FX W/INSJ IMED IMPLT W/WO SCREW Left 2022    Procedure: INSERTION NAIL IM FEMUR ANTEGRADE (TROCHANTERIC);  Surgeon: Mauricio Marie DO;  Location: [facility];  Service: Orthopedics    TUBAL LIGATION         Family History    Problem Relation Age of Onset    Diabetes Father     Hypertension Father     Heart disease Father     Cancer Brother         smoker    Diabetes Other     Heart attack Other     Hypertension Other     Asthma Other      I have reviewed and agree with the history as documented.    E-Cigarette/Vaping    E-Cigarette Use Never User      E-Cigarette/Vaping Substances    Nicotine No     THC No     CBD No     Flavoring No     Other No     Unknown No      Social History     Tobacco Use    Smoking status: Never    Smokeless tobacco: Never   Vaping Use    Vaping status: Never Used   Substance Use Topics    Alcohol use: Never    Drug use: Never       Review of Systems   Unable to perform ROS: Dementia   Constitutional:  Positive for fatigue. Negative for fever.   Gastrointestinal:  Positive for anorexia, diarrhea and vomiting.       Physical Exam  Physical Exam  Constitutional:       Appearance: She is well-developed.   HENT:      Head: Normocephalic and atraumatic.      Right Ear: External ear normal.      Left Ear: External ear normal.   Eyes:      Conjunctiva/sclera: Conjunctivae normal.      Pupils: Pupils are equal, round, and reactive to light.   Neck:      Thyroid: No thyroid mass.      Vascular: No carotid bruit or JVD.      Trachea: Trachea normal.   Cardiovascular:      Rate and Rhythm: Normal rate and regular rhythm.      Pulses: Normal pulses.      Heart sounds: Normal heart sounds, S1 normal and S2 normal. No murmur heard.     No friction rub. No gallop.   Pulmonary:      Effort: Pulmonary effort is normal. No respiratory distress.      Breath sounds: Normal breath sounds. No wheezing, rhonchi or rales.   Chest:      Chest wall: No tenderness.   Abdominal:      General: Bowel sounds are normal. There is no distension.      Palpations: Abdomen is soft.      Tenderness: There is no abdominal tenderness. There is no guarding or rebound.   Genitourinary:     Exam position: Supine.      Vagina: No bleeding.    Musculoskeletal:         General: Normal range of motion.      Cervical back: Normal range of motion and neck supple.   Lymphadenopathy:      Cervical: No cervical adenopathy.   Skin:     General: Skin is warm and dry.      Findings: No rash.      Comments: Patient has small sacral decubitus that family has been caring for at home   Neurological:      Mental Status: She is alert.      GCS: GCS eye subscore is 4. GCS verbal subscore is 4. GCS motor subscore is 6.      Cranial Nerves: No cranial nerve deficit.      Sensory: No sensory deficit.      Deep Tendon Reflexes: Reflexes are normal and symmetric.   Psychiatric:         Speech: Speech normal.         Behavior: Behavior normal. Behavior is cooperative.         Thought Content: Thought content normal.         Judgment: Judgment normal.         Vital Signs  ED Triage Vitals   Temperature Pulse Respirations Blood Pressure SpO2   07/12/24 2114 07/12/24 1700 07/12/24 1700 07/12/24 1700 07/12/24 1700   97.9 °F (36.6 °C) 75 16 136/69 100 %      Temp Source Heart Rate Source Patient Position - Orthostatic VS BP Location FiO2 (%)   07/12/24 1700 -- 07/12/24 2114 07/12/24 2114 --   Axillary  Lying Left arm       Pain Score       07/12/24 2100       No Pain           Vitals:    07/12/24 1745 07/12/24 1800 07/12/24 1830 07/12/24 2114   BP:    136/65   Pulse: 60 60 60 60   Patient Position - Orthostatic VS:    Lying         Visual Acuity      ED Medications  Medications   amLODIPine (NORVASC) tablet 2.5 mg (has no administration in time range)   aspirin chewable tablet 81 mg (has no administration in time range)   atorvastatin (LIPITOR) tablet 10 mg (has no administration in time range)   bumetanide (BUMEX) tablet 1 mg (has no administration in time range)   carvedilol (COREG) tablet 25 mg (25 mg Oral Given 7/13/24 0006)   divalproex sodium (DEPAKOTE) DR tablet 250 mg (250 mg Oral Given 7/13/24 0006)   donepezil (ARICEPT) tablet 5 mg (5 mg Oral Given 7/13/24 0006)    fluticasone (FLONASE) 50 mcg/act nasal spray 1 spray (1 spray Each Nare Not Given 7/13/24 0006)   ranolazine (RANEXA) 12 hr tablet 500 mg (has no administration in time range)   insulin glargine (LANTUS) subcutaneous injection 10 Units 0.1 mL (10 Units Subcutaneous Given 7/13/24 0005)   insulin lispro (HumALOG/ADMELOG) 100 units/mL subcutaneous injection 1-5 Units (has no administration in time range)   insulin lispro (HumALOG/ADMELOG) 100 units/mL subcutaneous injection 1-5 Units ( Subcutaneous Not Given 7/13/24 0006)   acetaminophen (TYLENOL) tablet 650 mg (has no administration in time range)   docusate sodium (COLACE) capsule 100 mg (has no administration in time range)   polyethylene glycol (MIRALAX) packet 17 g (has no administration in time range)   ondansetron (ZOFRAN) injection 4 mg (has no administration in time range)   calcium carbonate (TUMS) chewable tablet 1,000 mg (has no administration in time range)   heparin (porcine) subcutaneous injection 5,000 Units (5,000 Units Subcutaneous Given 7/13/24 0006)   ondansetron (ZOFRAN) injection 4 mg (4 mg Intravenous Given 7/12/24 1729)   cefTRIAXone (ROCEPHIN) IVPB (premix in dextrose) 1,000 mg 50 mL (1,000 mg Intravenous New Bag 7/12/24 2015)       Diagnostic Studies  Results Reviewed       Procedure Component Value Units Date/Time    Urine Microscopic [[id number]]  (Abnormal) Collected: 07/12/24 1947    Lab Status: Final result Specimen: Urine, Straight Cath Updated: 07/12/24 2001     RBC, UA None Seen /hpf      WBC, UA 10-20 /hpf      Epithelial Cells Occasional /hpf      Bacteria, UA Moderate /hpf     UA (URINE) with reflex to Scope [[id number]]  (Abnormal) Collected: 07/12/24 1947    Lab Status: Final result Specimen: Urine, Straight Cath Updated: 07/12/24 1952     Color, UA Yellow     Clarity, UA Clear     Specific Gravity, UA 1.010     pH, UA 6.0     Leukocytes, UA 1+     Nitrite, UA Positive     Protein, UA Negative mg/dl      Glucose, UA Negative mg/dl       Ketones, UA Negative mg/dl      Urobilinogen, UA 0.2 E.U./dl      Bilirubin, UA Negative     Occult Blood, UA Negative    Comprehensive metabolic panel [[id number]]  (Abnormal) Collected: 07/12/24 1728    Lab Status: Final result Specimen: Blood from Arm, Right Updated: 07/12/24 1754     Sodium 141 mmol/L      Potassium 4.3 mmol/L      Chloride 101 mmol/L      CO2 31 mmol/L      ANION GAP 9 mmol/L      BUN 42 mg/dL      Creatinine 2.65 mg/dL      Glucose 215 mg/dL      Calcium 9.2 mg/dL      Corrected Calcium 9.8 mg/dL      AST 21 U/L      ALT 9 U/L      Alkaline Phosphatase 40 U/L      Total Protein 6.5 g/dL      Albumin 3.2 g/dL      Total Bilirubin 0.43 mg/dL      eGFR 16 ml/min/1.73sq m     Narrative:      National Kid[employer] Disease Foundation guidelines for Chronic Kidney Disease (CKD):     Stage 1 with normal or high GFR (GFR > 90 mL/min/1.73 square meters)    Stage 2 Mild CKD (GFR = 60-89 mL/min/1.73 square meters)    Stage 3A Moderate CKD (GFR = 45-59 mL/min/1.73 square meters)    Stage 3B Moderate CKD (GFR = 30-44 mL/min/1.73 square meters)    Stage 4 Severe CKD (GFR = 15-29 mL/min/1.73 square meters)    Stage 5 End Stage CKD (GFR <15 mL/min/1.73 square meters)  Note: GFR calculation is accurate only with a steady state creatinine    Lipase [[id number]]  (Abnormal) Collected: 07/12/24 1728    Lab Status: Final result Specimen: Blood from Arm, Right Updated: 07/12/24 1754     Lipase 6 u/L     CBC and differential [[id number]]  (Abnormal) Collected: 07/12/24 1728    Lab Status: Final result Specimen: Blood from Arm, Right Updated: 07/12/24 1738     WBC 6.82 Thousand/uL      RBC 3.23 Million/uL      Hemoglobin 9.5 g/dL      Hematocrit 30.3 %      MCV 94 fL      MCH 29.4 pg      MCHC 31.4 g/dL      RDW 12.7 %      MPV 10.6 fL      Platelets 179 Thousands/uL      nRBC 0 /100 WBCs      Segmented % 62 %      Immature Grans % 1 %      Lymphocytes % 25 %      Monocytes % 9 %      Eosinophils Relative 3 %       Basophils Relative 0 %      Absolute Neutrophils 4.25 Thousands/µL      Absolute Immature Grans 0.07 Thousand/uL      Absolute Lymphocytes 1.70 Thousands/µL      Absolute Monocytes 0.58 Thousand/µL      Eosinophils Absolute 0.20 Thousand/µL      Basophils Absolute 0.02 Thousands/µL                    CT abdomen pelvis wo contrast   Final Result by Rojas Chavez MD (07/12 1923)      Distended rectum containing well-formed stool with rectal mural thickening and perirectal fat stranding, which can be seen with stercoral colitis. No pneumatosis or free air.            Workstation performed: [id number]                    Procedures  ECG 12 Lead Documentation Only    Date/Time: 7/12/2024 5:25 PM    Performed by: Anita Carr DO  Authorized by: Anita Carr DO    Previous ECG:     Previous ECG:  Compared to current    Similarity:  No change  Rate:     ECG rate:  61  Rhythm:     Rhythm: sinus rhythm             ED Course                                 SBIRT 22yo+      Flowsheet Row Most Recent Value   Initial Alcohol Screen: US AUDIT-C     1. How often do you have a drink containing alcohol? 0 Filed at: 07/12/2024 1712   2. How many drinks containing alcohol do you have on a typical day you are drinking?  0 Filed at: 07/12/2024 1712   3b. FEMALE Any Age, or MALE 65+: How often do you have 4 or more drinks on one occassion? 0 Filed at: 07/12/2024 1712   Audit-C Score 0 Filed at: 07/12/2024 1712   SARBJIT: How many times in the past year have you...    Used an illegal drug or used a prescription medication for non-medical reasons? Never Filed at: 07/12/2024 1712                      Medical Decision Making  Differential diagnosis includes but is not limited to constipation, overflow diarrhea, UTI, electrolyte abnormality, dehydration, anemia, hypoglycemia,    Problems Addressed:  Stercoral colitis: acute illness or injury  UTI (urinary tract infection): acute illness or injury  Weakness: acute illness  or injury    Amount and/or Complexity of Data Reviewed  External Data Reviewed: labs, ECG and notes.     Details: From previous ED visits as well has PCP visits.  Labs: ordered. Decision-making details documented in ED Course.  Radiology: ordered.    Risk  Prescription drug management.  Decision regarding hospitalization.  Risk Details: Discussed with internal medicine team they are agreeable to meeting the patient for aggressive bowel cleanout to help relieve the stercoral colitis and IV antibiotics for UTI.                 Disposition  Final diagnoses:   Stercoral colitis   Weakness   UTI (urinary tract infection)     Time reflects when diagnosis was documented in both MDM as applicable and the Disposition within this note       Time User Action Codes Description Comment    7/12/2024  7:54 PM Anita Carr Add [K52.89] Stercoral colitis     7/12/2024  7:54 PM Anita Carr Add [R53.1] Weakness     7/12/2024  8:11 PM Anita Carr Add [N39.0] UTI (urinary tract infection)           ED Disposition       ED Disposition   Admit    Condition   Stable    Date/Time   Fri Jul 12, 2024 1954    Comment                  Follow-up Information    None         Current Discharge Medication List        CONTINUE these medications which have NOT CHANGED    Details   acetaminophen (TYLENOL) 325 mg tablet Take 3 tablets (975 mg total) by mouth every 8 (eight) hours  Refills: 0    Associated Diagnoses: Closed left hip fracture (HCC)      amLODIPine (NORVASC) 2.5 mg tablet TAKE 1 TABLET(2.5 MG) BY MOUTH DAILY  Qty: 90 tablet, Refills: 1    Associated Diagnoses: Hypertension      aspirin 81 mg chewable tablet Chew 81 mg daily      atorvastatin (LIPITOR) 10 mg tablet TAKE 1 TABLET(10 MG) BY MOUTH DAILY  Qty: 100 tablet, Refills: 1    Associated Diagnoses: Coronary artery disease involving native coronary artery of native heart without angina pectoris      B-D UF III MINI PEN NEEDLES 31G X 5 MM MISC USE DAILY AS DIRECTED  Qty:  100 each, Refills: 1    Associated Diagnoses: Type 2 diabetes mellitus with stage 3a chronic kidney disease, with long-term current use of insulin (HCC)      bumetanide (BUMEX) 1 mg tablet Take 1 tablet (1 mg total) by mouth daily  Qty: 90 tablet, Refills: 1    Associated Diagnoses: Hypervolemia, unspecified hypervolemia type      calcium carbonate (OS-LULÚ) 600 MG tablet Take 600 mg by mouth 2 (two) times a day with meals      carvedilol (COREG) 25 mg tablet Take 1 tablet (25 mg total) by mouth every 12 (twelve) hours  Qty: 180 tablet, Refills: 1    Associated Diagnoses: Coronary artery disease involving native coronary artery of native heart without angina pectoris      Cholecalciferol (Vitamin D) 50 MCG (2000 UT) tablet Take 1 tablet (2,000 Units total) by mouth daily  Qty: 90 tablet, Refills: 1    Associated Diagnoses: Vitamin D deficiency      divalproex sodium (DEPAKOTE) 250 mg DR tablet Take 1 tablet (250 mg total) by mouth every 12 (twelve) hours  Qty: 180 tablet, Refills: 1    Associated Diagnoses: Hallucinations      donepezil (ARICEPT) 5 mg tablet Take 1 tablet (5 mg total) by mouth daily at bedtime  Qty: 90 tablet, Refills: 3    Comments: **Patient requests 90 days supply**  Associated Diagnoses: Lewy body dementia (HCC)      fluticasone (FLONASE) 50 mcg/act nasal spray SHAKE LIQUID AND USE 1 SPRAY IN EACH NOSTRIL TWICE DAILY  Qty: 48 g, Refills: 1    Comments: **Patient requests 90 days supply**  Associated Diagnoses: Left ear pain      folic acid (FOLVITE) 400 mcg tablet Take 400 mcg by mouth daily      Lantus SoloStar 100 units/mL SOPN Inject 0.12 mL (12 Units total) under the skin daily at bedtime  Qty: 12 mL, Refills: 1    Associated Diagnoses: Diabetes mellitus type 2, insulin dependent (HCC)      ranolazine (RANEXA) 500 mg 12 hr tablet Take 1 tablet (500 mg total) by mouth in the morning  Qty: 90 tablet, Refills: 1    Associated Diagnoses: Coronary artery disease involving native coronary artery of  native heart without angina pectoris             No discharge procedures on file.    PDMP Review         Value Time User    PDMP Reviewed  Yes 4/19/2024 12:40 PM Karthik Sanchez MD            ED Provider  Electronically Signed by             Anita Carr DO  07/13/24 0014

## 2024-07-13 LAB
GLUCOSE SERPL-MCNC: 144 MG/DL (ref 65–140)
GLUCOSE SERPL-MCNC: 146 MG/DL (ref 65–140)
GLUCOSE SERPL-MCNC: 154 MG/DL (ref 65–140)
GLUCOSE SERPL-MCNC: 82 MG/DL (ref 65–140)

## 2024-07-13 PROCEDURE — 82948 REAGENT STRIP/BLOOD GLUCOSE: CPT

## 2024-07-13 PROCEDURE — 99232 SBSQ HOSP IP/OBS MODERATE 35: CPT | Performed by: INTERNAL MEDICINE

## 2024-07-13 RX ORDER — CEFTRIAXONE 1 G/50ML
1000 INJECTION, SOLUTION INTRAVENOUS EVERY 24 HOURS
Status: COMPLETED | OUTPATIENT
Start: 2024-07-13 | End: 2024-07-14

## 2024-07-13 RX ADMIN — CARVEDILOL 25 MG: 12.5 TABLET, FILM COATED ORAL at 00:06

## 2024-07-13 RX ADMIN — RANOLAZINE 500 MG: 500 TABLET, EXTENDED RELEASE ORAL at 12:23

## 2024-07-13 RX ADMIN — INSULIN GLARGINE 10 UNITS: 100 INJECTION, SOLUTION SUBCUTANEOUS at 00:05

## 2024-07-13 RX ADMIN — AMLODIPINE BESYLATE 2.5 MG: 2.5 TABLET ORAL at 12:23

## 2024-07-13 RX ADMIN — BUMETANIDE 1 MG: 1 TABLET ORAL at 12:23

## 2024-07-13 RX ADMIN — DIVALPROEX SODIUM 250 MG: 250 TABLET, DELAYED RELEASE ORAL at 00:06

## 2024-07-13 RX ADMIN — INSULIN LISPRO 1 UNITS: 100 INJECTION, SOLUTION INTRAVENOUS; SUBCUTANEOUS at 21:36

## 2024-07-13 RX ADMIN — HEPARIN SODIUM 5000 UNITS: 5000 INJECTION INTRAVENOUS; SUBCUTANEOUS at 21:30

## 2024-07-13 RX ADMIN — DIVALPROEX SODIUM 250 MG: 250 TABLET, DELAYED RELEASE ORAL at 12:23

## 2024-07-13 RX ADMIN — ASPIRIN 81 MG 81 MG: 81 TABLET ORAL at 12:23

## 2024-07-13 RX ADMIN — HEPARIN SODIUM 5000 UNITS: 5000 INJECTION INTRAVENOUS; SUBCUTANEOUS at 17:22

## 2024-07-13 RX ADMIN — ATORVASTATIN CALCIUM 10 MG: 10 TABLET, FILM COATED ORAL at 12:23

## 2024-07-13 RX ADMIN — HEPARIN SODIUM 5000 UNITS: 5000 INJECTION INTRAVENOUS; SUBCUTANEOUS at 00:06

## 2024-07-13 RX ADMIN — POLYETHYLENE GLYCOL 3350 17 G: 17 POWDER, FOR SOLUTION ORAL at 12:23

## 2024-07-13 RX ADMIN — INSULIN GLARGINE 10 UNITS: 100 INJECTION, SOLUTION SUBCUTANEOUS at 21:28

## 2024-07-13 RX ADMIN — DONEPEZIL HYDROCHLORIDE 5 MG: 5 TABLET ORAL at 00:06

## 2024-07-13 RX ADMIN — CEFTRIAXONE 1000 MG: 1 INJECTION, SOLUTION INTRAVENOUS at 17:22

## 2024-07-13 RX ADMIN — DOCUSATE SODIUM 100 MG: 100 CAPSULE, LIQUID FILLED ORAL at 17:22

## 2024-07-13 RX ADMIN — DOCUSATE SODIUM 100 MG: 100 CAPSULE, LIQUID FILLED ORAL at 12:23

## 2024-07-13 RX ADMIN — CARVEDILOL 25 MG: 12.5 TABLET, FILM COATED ORAL at 12:23

## 2024-07-13 RX ADMIN — DIVALPROEX SODIUM 250 MG: 250 TABLET, DELAYED RELEASE ORAL at 21:30

## 2024-07-13 RX ADMIN — DONEPEZIL HYDROCHLORIDE 5 MG: 5 TABLET ORAL at 21:30

## 2024-07-13 RX ADMIN — HEPARIN SODIUM 5000 UNITS: 5000 INJECTION INTRAVENOUS; SUBCUTANEOUS at 05:28

## 2024-07-13 RX ADMIN — CARVEDILOL 25 MG: 12.5 TABLET, FILM COATED ORAL at 21:28

## 2024-07-13 NOTE — PLAN OF CARE
Problem: SAFETY ADULT  Goal: Patient will remain free of falls  Description: INTERVENTIONS:  - Educate patient/family on patient safety including physical limitations  - Instruct patient to call for assistance with activity   - Consult OT/PT to assist with strengthening/mobility   - Keep Call bell within reach  - Keep bed low and locked with side rails adjusted as appropriate  - Keep care items and personal belongings within reach  - Initiate and maintain comfort rounds  - Make Fall Risk Sign visible to staff  - Offer Toileting every 2 Hours, in advance of need  - Initiate/Maintain bed alarm  - Apply yellow socks and bracelet for high fall risk patients  - Consider moving patient to room near nurses station  Outcome: Progressing     Problem: Knowledge Deficit  Goal: Patient/family/caregiver demonstrates understanding of disease process, treatment plan, medications, and discharge instructions  Description: Complete learning assessment and assess knowledge base.  Interventions:  - Provide teaching at level of understanding  - Provide teaching via preferred learning methods  Outcome: Not Progressing

## 2024-07-13 NOTE — PROGRESS NOTES
Formerly [facility]  Progress Note  Name: Gilda Garay I  MRN: [id number]  Unit/Bed#: -01 I Date of Admission: 7/12/2024   Date of Service: 7/13/2024 I Hospital Day: 0    Assessment & Plan   UTI (urinary tract infection)  Assessment & Plan  U/A +nitrates,leukocytes, wbc  Patient denies urinary symptoms  Rocephin given in the ED, will continue for now  Cultures pending, adjust abx as needed    CKD (chronic kidney disease) stage 4, GFR 15-29 ml/min ([facility])  Assessment & Plan  Lab Results   Component Value Date    EGFR 16 07/12/2024    EGFR 19 04/16/2024    EGFR 17 11/17/2023    CREATININE 2.65 (H) 07/12/2024    CREATININE 2.34 (H) 04/16/2024    CREATININE 2.50 (H) 11/17/2023   At baseline  Daily BMP  Avoid nephrotoxic agents    Primary hypertension  Assessment & Plan  Blood pressure acceptable on admission  Continue home dose amlodipine, coreg  monitor    Type 2 diabetes mellitus with stage 3a chronic kidney disease, with long-term current use of insulin ([facility])  Assessment & Plan  Lab Results   Component Value Date    HGBA1C 7.9 (H) 04/16/2024       Recent Labs     07/12/24  2341 07/13/24  0645 07/13/24  1106   POCGLU 189* 146* 82       Blood Sugar Average: Last 72 hrs:  (P) 139  Blood sugar controlled at home on Lantus, continue while inpatient  SSI coverage goal 140-180, adjust as needed  Accuchecks AC/HS  Hypoglycemia protocol      Sick sinus syndrome (HCC)  Assessment & Plan  PPM in place    * Nausea vomiting and diarrhea  Assessment & Plan  Brought son due to weakness. Per patient she had an episode of nausea, vomiting, and diarrhea. She was recently treated for constipation by PCP. After episode she states she felt weak and slipped out of chair. Was found on the floor by son. She denies LOC, syncopal episode. At baseline patient ambulates with walker.  CT abdomen reveals Distended rectum containing well-formed stool with rectal mural thickening and perirectal fat stranding, which can be  seen with stercoral colitis. No pneumatosis or free air.   Clear liquid diet, advance as tolerate  Warm Water enema, Bowel regimen  PRN antiemetic   Consider GI consult, if symptoms do not improve                 VTE Pharmacologic Prophylaxis: VTE Score: 5 Moderate Risk (Score 3-4) - Pharmacological DVT Prophylaxis Ordered: heparin.    Mobility:   Basic Mobility Inpatient Raw Score: 6  JH-HLM Goal: 2: Bed activities/Dependent transfer  JH-HLM Achieved: 2: Bed activities/Dependent transfer  JH-HLM Goal achieved. Continue to encourage appropriate mobility.    Patient Centered Rounds: I performed bedside rounds with nursing staff today.   Discussions with Specialists or Other Care Team Provider:     Education and Discussions with Family / Patient: Patient declined call to .     Total Time Spent on Date of Encounter in care of patient:  mins. This time was spent on one or more of the following: performing physical exam; counseling and coordination of care; obtaining or reviewing history; documenting in the medical record; reviewing/ordering tests, medications or procedures; communicating with other healthcare professionals and discussing with patient's family/caregivers.    Current Length of Stay: 0 day(s)  Current Patient Status: Observation   Certification Statement: The patient will continue to require additional inpatient hospital stay due to abx  Discharge Plan: Anticipate discharge in 24-48 hrs to home with home services.    Code Status: Level 1 - Full Code    Subjective:   Patient denies any acute complaints    Objective:     Vitals:   Temp (24hrs), Av.1 °F (36.7 °C), Min:97.9 °F (36.6 °C), Max:98.2 °F (36.8 °C)    Temp:  [97.9 °F (36.6 °C)-98.2 °F (36.8 °C)] 98.2 °F (36.8 °C)  HR:  [60-75] 60  Resp:  [15-19] 15  BP: (132-140)/(65-75) 132/66  SpO2:  [100 %] 100 %  Body mass index is 21.6 kg/m².     Input and Output Summary (last 24 hours):     Intake/Output Summary (Last 24 hours) at 2024  1359  Last data filed at 7/13/2024 0501  Gross per 24 hour   Intake 120 ml   Output --   Net 120 ml       Physical Exam:   Physical Exam  Vitals and nursing note reviewed.   Constitutional:       General: She is not in acute distress.     Appearance: She is well-developed. She is not toxic-appearing or diaphoretic.   HENT:      Head: Normocephalic and atraumatic.   Eyes:      General: No scleral icterus.     Conjunctiva/sclera: Conjunctivae normal.   Cardiovascular:      Rate and Rhythm: Normal rate and regular rhythm.      Heart sounds: No murmur heard.     No friction rub. No gallop.   Pulmonary:      Effort: Pulmonary effort is normal. No respiratory distress.      Breath sounds: Normal breath sounds. No stridor. No wheezing, rhonchi or rales.   Chest:      Chest wall: No tenderness.   Abdominal:      General: There is no distension.      Palpations: Abdomen is soft. There is no mass.      Tenderness: There is no abdominal tenderness. There is no guarding or rebound.      Hernia: No hernia is present.   Musculoskeletal:         General: No swelling or tenderness.      Cervical back: Neck supple.   Skin:     General: Skin is warm and dry.      Capillary Refill: Capillary refill takes less than 2 seconds.   Neurological:      Mental Status: She is alert.   Psychiatric:         Mood and Affect: Mood normal.          Additional Data:     Labs:  Results from last 7 days   Lab Units 07/12/24  1728   WBC Thousand/uL 6.82   HEMOGLOBIN g/dL 9.5*   HEMATOCRIT % 30.3*   PLATELETS Thousands/uL 179   SEGS PCT % 62   LYMPHO PCT % 25   MONO PCT % 9   EOS PCT % 3     Results from last 7 days   Lab Units 07/12/24  1728   SODIUM mmol/L 141   POTASSIUM mmol/L 4.3   CHLORIDE mmol/L 101   CO2 mmol/L 31   BUN mg/dL 42*   CREATININE mg/dL 2.65*   ANION GAP mmol/L 9   CALCIUM mg/dL 9.2   ALBUMIN g/dL 3.2*   TOTAL BILIRUBIN mg/dL 0.43   ALK PHOS U/L 40   ALT U/L 9   AST U/L 21   GLUCOSE RANDOM mg/dL 215*         Results from last 7 days    Lab Units 07/13/24  1106 07/13/24  0645 07/12/24  2341   POC GLUCOSE mg/dl 82 146* 189*               Lines/Drains:  Invasive Devices       Peripheral Intravenous Line  Duration             Peripheral IV 07/12/24 Left Antecubital <1 day                          Imaging: No pertinent imaging reviewed.    Recent Cultures (last 7 days):         Last 24 Hours Medication List:   Current Facility-Administered Medications   Medication Dose Route Frequency Provider Last Rate    acetaminophen  650 mg Oral Q6H PRN Lis Lampasas-Jad, CRNP      amLODIPine  2.5 mg Oral Daily [facility]-Jad, CRNP      aspirin  81 mg Oral Daily [facility]-Jad, CRNP      atorvastatin  10 mg Oral Daily [facility], CRNP      bumetanide  1 mg Oral Daily [facility]-Rule, CRNP      calcium carbonate  1,000 mg Oral Daily [facility]      carvedilol  25 mg Oral Q12H [facility]      cefTRIAXone  1,000 mg Intravenous Q24H Clyde Ford DO      divalproex sodium  250 mg Oral Q12H RADHA [facility], CRNP      docusate sodium  100 mg Oral BID [facility], CRNP      donepezil  5 mg Oral HS [facility]-Jad, TENNILLE      fluticasone  1 spray Each Nare BID [facility], CRNP      heparin (porcine)  5,000 Units Subcutaneous Q8H [facility] Dilip-Jad, CRNP      insulin glargine  10 Units Subcutaneous HS [facility], CRNP      insulin lispro  1-5 Units Subcutaneous TID AC Lis Dilip-Jad, CRNP      insulin lispro  1-5 Units Subcutaneous HS [facility]-Jad, TENNILLE      ondansetron  4 mg Intravenous Q6H PRN Lis Cherry-Jad, JORGE LUISNP      polyethylene glycol  17 g Oral Daily [facility], CRNP      ranolazine  500 mg Oral Daily [facility] Rico, TENNILLE          Today, Patient Was Seen By: Clyde Ford DO    **Please Note: This note may have been constructed using a voice  recognition system.**

## 2024-07-13 NOTE — ASSESSMENT & PLAN NOTE
Brought son due to weakness. Per patient she had an episode of nausea, vomiting, and diarrhea. She was recently treated for constipation by PCP. After episode she states she felt weak and slipped out of chair. Was found on the floor by son. She denies LOC, syncopal episode. At baseline patient ambulates with walker.  CT abdomen reveals Distended rectum containing well-formed stool with rectal mural thickening and perirectal fat stranding, which can be seen with stercoral colitis. No pneumatosis or free air.   Clear liquid diet, advance as tolerate  Warm Water enema, Bowel regimen  PRN antiemetic   Consider GI consult, if symptoms do not improve

## 2024-07-13 NOTE — H&P
"[facility]  H&P  Name: Gilda Garay 78 y.o. female I MRN: [id number]  Unit/Bed#: -01 I Date of Admission: 7/12/2024   Date of Service: 7/12/2024 I Hospital Day: 0      Assessment & Plan   * Nausea vomiting and diarrhea  Assessment & Plan  Brought son due to weakness. Per patient she had an episode of nausea, vomiting, and diarrhea. She was recently treated for constipation by PCP. After episode she states she felt weak and slipped out of chair. Was found on the floor by son. She denies LOC, syncopal episode. At baseline patient ambulates with walker.  CT abdomen reveals Distended rectum containing well-formed stool with rectal mural thickening and perirectal fat stranding, which can be seen with stercoral colitis. No pneumatosis or free air.   Clear liquid diet, advance as tolerate  Warm Water enema, Bowel regimen  PRN antiemetic   Consider GI consult, if symptoms do not improve      UTI (urinary tract infection)  Assessment & Plan  U/A +nitrates,leukocytes, wbc  Patient denies urinary symptoms  Rocephin given in the ED, will continue for now  Cultures pending, adjust abx as needed    CKD (chronic kidney disease) stage 4, GFR 15-29 ml/min ([facility])  Assessment & Plan  Lab Results   Component Value Date    EGFR 16 07/12/2024    EGFR 19 04/16/2024    EGFR 17 11/17/2023    CREATININE 2.65 (H) 07/12/2024    CREATININE 2.34 (H) 04/16/2024    CREATININE 2.50 (H) 11/17/2023   At baseline  Daily BMP  Avoid nephrotoxic agents    Primary hypertension  Assessment & Plan  Blood pressure acceptable on admission  Continue home dose amlodipine, coreg  monitor    Type 2 diabetes mellitus with stage 3a chronic kidney disease, with long-term current use of insulin ([facility])  Assessment & Plan  Lab Results   Component Value Date    HGBA1C 7.9 (H) 04/16/2024       No results for input(s): \"POCGLU\" in the last 72 hours.    Blood Sugar Average: Last 72 hrs:    Blood sugar controlled at home on Lantus, continue " while inpatient  SSI coverage goal 140-180, adjust as needed  Accuchecks AC/HS  Hypoglycemia protocol      Sick sinus syndrome (HCC)  Assessment & Plan  PPM in place         VTE Pharmacologic Prophylaxis: VTE Score: 5 High Risk (Score >/= 5) - Pharmacological DVT Prophylaxis Ordered: heparin. Sequential Compression Devices Ordered.  Code Status: Level 1 - Full Code   Discussion with family:  family to be notified with am rounds.     Anticipated Length of Stay: Patient will be admitted on an observation basis with an anticipated length of stay of less than 2 midnights secondary to weakness.    Total Time Spent on Date of Encounter in care of patient: 78 mins. This time was spent on one or more of the following: performing physical exam; counseling and coordination of care; obtaining or reviewing history; documenting in the medical record; reviewing/ordering tests, medications or procedures; communicating with other healthcare professionals and discussing with patient's family/caregivers.    Chief Complaint:    Chief Complaint   Patient presents with    Weakness - Generalized     Brought in by EMS, pt states she feels weaker than normal, slid out of chair onto floor, denies hitting head, no LOC. Pt had diarrhea last night, 1 episode of vomiting today         History of Present Illness:  Gilda Garay is a 78 y.o. female with a PMH of Type II diabetes, CKD4, CADw/ stent and CHF who presents with nausea and vomiting.  Patient brought in by son due to feeling weak after an episode of nausea and vomiting overnight.  At that time patient states she slipped out of her chair and felt weak.  She denies syncopal episode, loss of consciousness or head strike.  Of note patient recently treated for constipation by PCP.  CT scan on arrival to ED concerning for stercoral colitis.  UA on admission concerning for UTI.  Patient afebrile not meeting sepsis criteria.  Will admit under observation for bowel regimen and further  evaluation.    Review of Systems:  Review of Systems   Gastrointestinal:  Positive for nausea. Negative for abdominal distention and abdominal pain.       Past Medical and Surgical History:   Past Medical History:   Diagnosis Date    Atrial fibrillation (HCC)     Chronic anemia     Chronic kidney disease     Colonoscopy refused 2019    pt declines    Dyslipidemia     Hypertension     Mammogram declined 2017    Proteinuria     Tachy-sarah beth syndrome (HCC) 2022       Past Surgical History:   Procedure Laterality Date    CARDIAC PACEMAKER PLACEMENT      CATARACT EXTRACTION       SECTION      CHOLECYSTECTOMY      CORONARY ANGIOPLASTY WITH STENT PLACEMENT      EYE SURGERY      MAMMO (HISTORICAL)      Pt states she will not ever have again-     TX OPTX FEM SHFT FX W/INSJ IMED IMPLT W/WO SCREW Left 2022    Procedure: INSERTION NAIL IM FEMUR ANTEGRADE (TROCHANTERIC);  Surgeon: Mauricio Marie DO;  Location: [facility];  Service: Orthopedics    TUBAL LIGATION         Meds/Allergies:  Prior to Admission medications    Medication Sig Start Date End Date Taking? Authorizing Provider   acetaminophen (TYLENOL) 325 mg tablet Take 3 tablets (975 mg total) by mouth every 8 (eight) hours 22  Yes Bernadette Maciel PA-C   amLODIPine (NORVASC) 2.5 mg tablet TAKE 1 TABLET(2.5 MG) BY MOUTH DAILY 24  Yes Raven Sherwood MD   aspirin 81 mg chewable tablet Chew 81 mg daily   Yes Historical Provider, MD   atorvastatin (LIPITOR) 10 mg tablet TAKE 1 TABLET(10 MG) BY MOUTH DAILY 24  Yes Basilio Pena MD   B-D UF III MINI PEN NEEDLES 31G X 5 MM MISC USE DAILY AS DIRECTED 24  Yes Karthik Sanchez MD   bumetanide (BUMEX) 1 mg tablet Take 1 tablet (1 mg total) by mouth daily 24  Yes Raven Sherwood MD   calcium carbonate (OS-LULÚ) 600 MG tablet Take 600 mg by mouth 2 (two) times a day with meals   Yes Historical Provider, MD   carvedilol (COREG) 25 mg tablet Take 1 tablet (25 mg total) by mouth  every 12 (twelve) hours 2/9/24  Yes Karthik Sanchez MD   Cholecalciferol (Vitamin D) 50 MCG (2000 UT) tablet Take 1 tablet (2,000 Units total) by mouth daily 11/10/20  Yes Karthik Sanchez MD   divalproex sodium (DEPAKOTE) 250 mg DR tablet Take 1 tablet (250 mg total) by mouth every 12 (twelve) hours 2/9/24  Yes Karthik Sanchez MD   donepezil (ARICEPT) 5 mg tablet Take 1 tablet (5 mg total) by mouth daily at bedtime 2/9/24  Yes Karthik Sanchez MD   fluticasone (FLONASE) 50 mcg/act nasal spray SHAKE LIQUID AND USE 1 SPRAY IN EACH NOSTRIL TWICE DAILY 4/19/24  Yes Karthik Sanchez MD   folic acid (FOLVITE) 400 mcg tablet Take 400 mcg by mouth daily   Yes Edinson Lynn MD   Lantus SoloStar 100 units/mL SOPN Inject 0.12 mL (12 Units total) under the skin daily at bedtime 2/9/24 7/12/24 Yes Karthik Sanchez MD   ranolazine (RANEXA) 500 mg 12 hr tablet Take 1 tablet (500 mg total) by mouth in the morning 2/9/24  Yes Karthik Sanchez MD   apixaban (Eliquis) 5 mg Take 1 tablet (5 mg total) by mouth 2 (two) times a day 10/9/22 10/14/22  Ava Bourgeois PA-C   bisacodyl (DULCOLAX) 10 mg suppository Insert 1 suppository (10 mg total) into the rectum daily as needed for constipation 6/30/22 8/1/22  Bernadette Maciel PA-C   dabigatran etexilate (PRADAXA) 75 mg capsule Take 1 capsule (75 mg total) by mouth 2 (two) times a day 10/9/22 10/14/22  Ava Bourgeois PA-C     I have reviewed home medications with patient personally.    Allergies: No Known Allergies    Social History:  Marital Status:    Occupation:   Patient Pre-hospital Living Situation: Home, With other family member: son  Patient Pre-hospital Level of Mobility: walks with walker  Patient Pre-hospital Diet Restrictions:   Substance Use History:   Social History     Substance and Sexual Activity   Alcohol Use Never     Social History     Tobacco Use   Smoking Status Never   Smokeless Tobacco Never     Social History     Substance and Sexual Activity   Drug Use Never  "      Family History:  Family History   Problem Relation Age of Onset    Diabetes Father     Hypertension Father     Heart disease Father     Cancer Brother         smoker    Diabetes Other     Heart attack Other     Hypertension Other     Asthma Other        Physical Exam:     Vitals:   Blood Pressure: 136/65 (07/12/24 2114)  Pulse: 60 (07/12/24 2114)  Temperature: 97.9 °F (36.6 °C) (07/12/24 2114)  Temp Source: Oral (07/12/24 2114)  Respirations: 16 (07/12/24 2114)  Height: 5' 3\" (160 cm) (07/12/24 2114)  Weight - Scale: 56.1 kg (123 lb 10.9 oz) (07/12/24 2114)  SpO2: 100 % (07/12/24 2114)    Physical Exam  Constitutional:       General: She is not in acute distress.     Appearance: Normal appearance.   Cardiovascular:      Rate and Rhythm: Normal rate and regular rhythm.      Pulses: Normal pulses.      Heart sounds: Normal heart sounds.   Pulmonary:      Effort: Pulmonary effort is normal.      Breath sounds: Normal breath sounds.   Abdominal:      General: There is no distension.      Palpations: Abdomen is soft.      Tenderness: There is no abdominal tenderness.   Skin:     General: Skin is warm.   Neurological:      Mental Status: She is alert and oriented to person, place, and time. Mental status is at baseline.   Psychiatric:         Mood and Affect: Mood normal.         Behavior: Behavior normal.          Additional Data:     Lab Results:  Results from last 7 days   Lab Units 07/12/24  1728   WBC Thousand/uL 6.82   HEMOGLOBIN g/dL 9.5*   HEMATOCRIT % 30.3*   PLATELETS Thousands/uL 179   SEGS PCT % 62   LYMPHO PCT % 25   MONO PCT % 9   EOS PCT % 3     Results from last 7 days   Lab Units 07/12/24  1728   SODIUM mmol/L 141   POTASSIUM mmol/L 4.3   CHLORIDE mmol/L 101   CO2 mmol/L 31   BUN mg/dL 42*   CREATININE mg/dL 2.65*   ANION GAP mmol/L 9   CALCIUM mg/dL 9.2   ALBUMIN g/dL 3.2*   TOTAL BILIRUBIN mg/dL 0.43   ALK PHOS U/L 40   ALT U/L 9   AST U/L 21   GLUCOSE RANDOM mg/dL 215*             Lab Results "   Component Value Date    HGBA1C 7.9 (H) 04/16/2024    HGBA1C 7.9 (H) 11/17/2023    HGBA1C 5.6 02/01/2023           Lines/Drains:  Invasive Devices       Peripheral Intravenous Line  Duration             Peripheral IV 07/12/24 Left Antecubital <1 day                        Imaging: Reviewed radiology reports from this admission including: abdominal/pelvic CT  CT abdomen pelvis wo contrast   Final Result by Rojas Chavez MD (07/12 1923)      Distended rectum containing well-formed stool with rectal mural thickening and perirectal fat stranding, which can be seen with stercoral colitis. No pneumatosis or free air.            Workstation performed: [id number]             EKG and Other Studies Reviewed on Admission:   EKG: Paced rhythm. HR 66.    ** Please Note: This note has been constructed using a voice recognition system. **

## 2024-07-13 NOTE — ASSESSMENT & PLAN NOTE
Lab Results   Component Value Date    HGBA1C 7.9 (H) 04/16/2024       Recent Labs     07/12/24  2341 07/13/24  0645 07/13/24  1106   POCGLU 189* 146* 82       Blood Sugar Average: Last 72 hrs:  (P) 139  Blood sugar controlled at home on Lantus, continue while inpatient  SSI coverage goal 140-180, adjust as needed  Accuchecks AC/HS  Hypoglycemia protocol

## 2024-07-13 NOTE — PLAN OF CARE
Problem: PAIN - ADULT  Goal: Verbalizes/displays adequate comfort level or baseline comfort level  Description: Interventions:  - Encourage patient to monitor pain and request assistance  - Assess pain using appropriate pain scale  - Administer analgesics based on type and severity of pain and evaluate response  - Implement non-pharmacological measures as appropriate and evaluate response  - Consider cultural and social influences on pain and pain management  - Notify physician/advanced practitioner if interventions unsuccessful or patient reports new pain  Outcome: Progressing     Problem: SAFETY ADULT  Goal: Patient will remain free of falls  Description: INTERVENTIONS:  - Educate patient/family on patient safety including physical limitations  - Instruct patient to call for assistance with activity   - Consult OT/PT to assist with strengthening/mobility   - Keep Call bell within reach  - Keep bed low and locked with side rails adjusted as appropriate  - Keep care items and personal belongings within reach  - Initiate and maintain comfort rounds  - Make Fall Risk Sign visible to staff  - Apply yellow socks and bracelet for high fall risk patients  - Consider moving patient to room near nurses station  Outcome: Progressing  Goal: Maintain or return to baseline ADL function  Description: INTERVENTIONS:  -  Assess patient's ability to carry out ADLs; assess patient's baseline for ADL function and identify physical deficits which impact ability to perform ADLs (bathing, care of mouth/teeth, toileting, grooming, dressing, etc.)  - Assess/evaluate cause of self-care deficits   - Assess range of motion  - Assess patient's mobility; develop plan if impaired  - Assess patient's need for assistive devices and provide as appropriate  - Encourage maximum independence but intervene and supervise when necessary  - Involve family in performance of ADLs  - Assess for home care needs following discharge   - Consider OT consult  to assist with ADL evaluation and planning for discharge  - Provide patient education as appropriate  Outcome: Progressing     Problem: Prexisting or High Potential for Compromised Skin Integrity  Goal: Skin integrity is maintained or improved  Description: INTERVENTIONS:  - Identify patients at risk for skin breakdown  - Assess and monitor skin integrity  - Assess and monitor nutrition and hydration status  - Monitor labs   - Assess for incontinence   - Turn and reposition patient  - Assist with mobility/ambulation  - Relieve pressure over bony prominences  - Avoid friction and shearing  - Provide appropriate hygiene as needed including keeping skin clean and dry  - Evaluate need for skin moisturizer/barrier cream  - Collaborate with interdisciplinary team   - Patient/family teaching  - Consider wound care consult   Outcome: Progressing

## 2024-07-13 NOTE — ASSESSMENT & PLAN NOTE
Lab Results   Component Value Date    EGFR 16 07/12/2024    EGFR 19 04/16/2024    EGFR 17 11/17/2023    CREATININE 2.65 (H) 07/12/2024    CREATININE 2.34 (H) 04/16/2024    CREATININE 2.50 (H) 11/17/2023   At baseline  Daily BMP  Avoid nephrotoxic agents

## 2024-07-13 NOTE — UTILIZATION REVIEW
"Initial Clinical Review    Observation 7/12/24 @ 2011 converted to inpatient admission 7/13/24 @ 1729 for continued care & tx for N&V, diarrhea.    Admission: Date/Time/Statement:   Admission Orders (From admission, onward)       Ordered        07/13/24 1729  INPATIENT ADMISSION  Once            07/12/24 2011  Place in Observation  Once                          Orders Placed This Encounter   Procedures    INPATIENT ADMISSION     Standing Status:   Standing     Number of Occurrences:   1     Order Specific Question:   Level of Care     Answer:   Med Surg [16]     Order Specific Question:   Estimated length of stay     Answer:   More than 2 Midnights     Order Specific Question:   Certification     Answer:   I certify that inpatient services are medically necessary for this patient for a duration of greater than two midnights. See H&P and MD Progress Notes for additional information about the patient's course of treatment.     ED Arrival Information       Expected   -    Arrival   7/12/2024 16:56    Acuity   Urgent              Means of arrival   Ambulance    Escorted by   -    Service   Hospitalist    Admission type   Emergency              Arrival complaint   generalized weakness             Chief Complaint   Patient presents with    Weakness - Generalized     Brought in by EMS, pt states she feels weaker than normal, slid out of chair onto floor, denies hitting head, no LOC. Pt had diarrhea last night, 1 episode of vomiting today       Initial Presentation:   78 yof to ER from home via EMS for generalized weakness. Family reports that she was placed on a stool softener by PCP for constipation.  She had been taking for 3 days when she had a large liquid bowel movement last evening.  Today she appeared weaker to family and then slid out of her chair.  Patient then had an episode of vomiting.  Patient does have baseline dementia but family feels she is more \"out of it\" than baseline.  Also they feel that patient has " not been eating and drinking as well as she should be. Presents as above. Patient has small sacral decubitus that family has been caring for at home . Admission CT: Distended rectum containing well-formed stool with rectal mural thickening and perirectal fat stranding, which can be seen with stercoral colitis. No pneumatosis or free air. Labs: Hgb 9.5, BUN 42, Cr 2.65, alb 3.2, u/a+nitrite, WBC, bacteria.  Placed in observation status for N&V, diarrhea     Observation to IP admission 7/13/24:  IVABT continued for UTI, cultures pending. Bowel regimen in progress for constipation. Remains on clear liquids, poor intake. Continue serial abd exams, monitor stool output, VS, follow labs, cultures, acccuchecks.     Date: 7/14/24  Day 3: Has surpassed a 2nd midnight with active treatments and services.  Dx: N&V, diarrhea. Blood sugar this morning 33, IV Dextrose given with improvement. Lantus dosing adjusted. Continue accuchecks. IVABT in progress for UTI. Monitor output, follow labs, accuchecks.         ED Triage Vitals   Temperature Pulse Respirations Blood Pressure SpO2 Pain Score   07/12/24 2114 07/12/24 1700 07/12/24 1700 07/12/24 1700 07/12/24 1700 07/12/24 2100   97.9 °F (36.6 °C) 75 16 136/69 100 % No Pain     Weight (last 2 days)       Date/Time Weight    07/14/24 0600 52.1 (114.8)    07/13/24 0600 55.3 (121.91)    07/12/24 2114 56.1 (123.68)            Vital Signs (last 3 days)       Date/Time Temp Pulse Resp BP MAP (mmHg) SpO2 O2 Device Patient Position - Orthostatic VS Cicero Coma Scale Score Pain    07/14/24 1125 -- 95 16 136/62 89 98 % None (Room air) Lying -- --    07/14/24 0937 -- 68 -- 102/52 -- -- -- Lying -- --    07/14/24 0753 97.2 °F (36.2 °C) 60 15 127/68 -- 100 % None (Room air) Lying -- --    07/14/24 0547 -- 61 16 135/66 94 -- -- Lying -- --    07/14/24 0525 -- 63 18 107/65 -- 100 % None (Room air) Lying -- No Pain    07/13/24 2128 98.4 °F (36.9 °C) 61 18 117/55 85 97 % None (Room air) Lying -- No  Pain    07/13/24 1910 -- -- -- -- -- -- -- -- 14 No Pain    07/13/24 1518 98.2 °F (36.8 °C) 60 16 117/55 81 98 % None (Room air) Lying -- --    07/13/24 1223 -- -- -- 132/66 -- -- -- -- -- --    07/13/24 1100 -- -- -- -- -- -- -- -- 14 No Pain    07/13/24 0638 98.2 °F (36.8 °C) 60 15 140/75 -- 100 % None (Room air) Lying -- --    07/12/24 2114 97.9 °F (36.6 °C) 60 16 136/65 99 100 % None (Room air) Lying -- --    07/12/24 2100 -- -- -- -- -- -- -- -- 14 No Pain    07/12/24 1830 -- 60 -- -- -- 100 % -- -- -- --    07/12/24 1800 -- 60 17 -- -- 100 % -- -- -- --    07/12/24 1745 -- 60 17 -- -- 100 % -- -- -- --    07/12/24 1741 -- -- -- -- -- -- -- -- 12 --    07/12/24 1730 -- 60 19 -- -- 100 % -- -- -- --    07/12/24 1700 -- 75 16 136/69 -- 100 % -- -- -- --              Pertinent Labs/Diagnostic Test Results:   Radiology:  CT abdomen pelvis wo contrast   Final Interpretation by Rojas Chavez MD (07/12 1923)      Distended rectum containing well-formed stool with rectal mural thickening and perirectal fat stranding, which can be seen with stercoral colitis. No pneumatosis or free air.            Workstation performed: [id number]           Cardiology:  7/112 EKG=  Previous ECG:     Previous ECG:  Compared to current     Similarity:  No change   Rate:     ECG rate:  61   Rhythm:     Rhythm: sinus rhythm       Results from last 7 days   Lab Units 07/14/24  0447 07/12/24  1728   WBC Thousand/uL 5.23 6.82   HEMOGLOBIN g/dL 9.9* 9.5*   HEMATOCRIT % 30.8* 30.3*   PLATELETS Thousands/uL 206 179   TOTAL NEUT ABS Thousands/µL 2.71 4.25       Results from last 7 days   Lab Units 07/14/24  0447 07/12/24  1728   SODIUM mmol/L 146 141   POTASSIUM mmol/L 3.2* 4.3   CHLORIDE mmol/L 104 101   CO2 mmol/L 35* 31   ANION GAP mmol/L 7 9   BUN mg/dL 32* 42*   CREATININE mg/dL 2.34* 2.65*   EGFR ml/min/1.73sq m 19 16   CALCIUM mg/dL 9.0 9.2     Results from last 7 days   Lab Units 07/12/24  1728   AST U/L 21   ALT U/L 9   ALK  PHOS U/L 40   TOTAL PROTEIN g/dL 6.5   ALBUMIN g/dL 3.2*   TOTAL BILIRUBIN mg/dL 0.43     Results from last 7 days   Lab Units 07/14/24  1113 07/14/24  0706 07/14/24  0536 07/14/24  0519 07/14/24  0502 07/13/24  2020 07/13/24  1651 07/13/24  1106 07/13/24  0645 07/12/24  2341   POC GLUCOSE mg/dl 296* 197* 218* 33* 31* 154* 144* 82 146* 189*     Results from last 7 days   Lab Units 07/14/24  0447 07/12/24  1728   GLUCOSE RANDOM mg/dL 31* 215*     Results from last 7 days   Lab Units 07/12/24  1728   LIPASE u/L 6*     Results from last 7 days   Lab Units 07/12/24  1947   CLARITY UA  Clear   COLOR UA  Yellow   SPEC GRAV UA  1.010   PH UA  6.0   GLUCOSE UA mg/dl Negative   KETONES UA mg/dl Negative   BLOOD UA  Negative   PROTEIN UA mg/dl Negative   NITRITE UA  Positive*   BILIRUBIN UA  Negative   UROBILINOGEN UA E.U./dl 0.2   LEUKOCYTES UA  1+*   WBC UA /hpf 10-20*   RBC UA /hpf None Seen   BACTERIA UA /hpf Moderate*   EPITHELIAL CELLS WET PREP /hpf Occasional     ED Treatment-Medication Administration from 07/12/2024 1656 to 07/12/2024 2108         Date/Time Order Dose Route Action     07/12/2024 1729 ondansetron (ZOFRAN) injection 4 mg 4 mg Intravenous Given     07/12/2024 2015 cefTRIAXone (ROCEPHIN) IVPB (premix in dextrose) 1,000 mg 50 mL 1,000 mg Intravenous New Bag            Past Medical History:   Diagnosis Date    Atrial fibrillation (HCC)     Chronic anemia     Chronic kidney disease     Colonoscopy refused 11/2019    pt declines    Dyslipidemia     Hypertension     Mammogram declined 09/2017    Proteinuria     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  female  Admission Orders:  Accuchecks  Scd/foot pumps  Consult GI    Scheduled Medications:  amLODIPine, 2.5 mg, Oral, Daily  aspirin, 81 mg, Oral, Daily  atorvastatin, 10 mg, Oral, Daily  bumetanide, 1 mg, Oral, Daily  carvedilol, 25 mg, Oral, Q12H RADHA  cefTRIAXone (ROCEPHIN) IVPB 1,000 mg 50 mL, Daily  divalproex sodium, 250 mg, Oral, Q12H RADHA  docusate sodium, 100 mg, Oral, BID  donepezil, 5 mg, Oral, HS  fluticasone, 1 spray, Each Nare, BID  heparin (porcine), 5,000 Units, Subcutaneous, Q8H RADHA  insulin glargine, 10 Units, Subcutaneous, HS  insulin lispro, 1-5 Units, Subcutaneous, TID AC  insulin lispro, 1-5 Units, Subcutaneous, HS  polyethylene glycol, 17 g, Oral, Daily  ranolazine, 500 mg, Oral, Daily    PRN Meds:  acetaminophen, 650 mg, Oral, Q6H PRN  calcium carbonate, 1,000 mg, Oral, Daily PRN  ondansetron, 4 mg, Intravenous, Q6H PRN    Network Utilization Review Department  ATTENTION: Please call with any questions or concerns to [phone] and carefully listen to the prompts so that you are directed to the right person. All voicemails are confidential.   For Discharge needs, contact Care Management DC Support Team at [phone] opt. 2  Send all requests for admission clinical reviews, approved or denied determinations and any other requests to dedicated fax number below belonging to the [place] where the patient is receiving treatment. List of dedicated fax numbers for the Facilities:  FACILITY NAME UR FAX NUMBER   ADMISSION DENIALS (Administrative/Medical Necessity) [phone]   DISCHARGE SUPPORT TEAM (NETWORK) [phone]   PARENT CHILD HEALTH (Maternity/NICU/Pediatrics) [phone]   [facility] [phone]   [facility] [phone]   [facility] [phone]   [facility] [phone]   [facility] [phone]   [facility] [phone]   [facility]  [facility] [phone]   [facility]ending sale to [facility] [phone]   [facility] [phone]   [facility] [phone]   [facility] [phone]   [facility] [phone]

## 2024-07-13 NOTE — ASSESSMENT & PLAN NOTE
U/A +nitrates,leukocytes, wbc  Patient denies urinary symptoms  Rocephin given in the ED, will continue for now  Cultures pending, adjust abx as needed

## 2024-07-13 NOTE — ASSESSMENT & PLAN NOTE
"Lab Results   Component Value Date    HGBA1C 7.9 (H) 04/16/2024       No results for input(s): \"POCGLU\" in the last 72 hours.    Blood Sugar Average: Last 72 hrs:    Blood sugar controlled at home on Lantus, continue while inpatient  SSI coverage goal 140-180, adjust as needed  Accuchecks AC/HS  Hypoglycemia protocol    "

## 2024-07-14 LAB
ANION GAP SERPL CALCULATED.3IONS-SCNC: 7 MMOL/L (ref 4–13)
BASOPHILS # BLD AUTO: 0.02 THOUSANDS/ÂΜL (ref 0–0.1)
BASOPHILS NFR BLD AUTO: 0 % (ref 0–1)
BUN SERPL-MCNC: 32 MG/DL (ref 5–25)
CALCIUM SERPL-MCNC: 9 MG/DL (ref 8.4–10.2)
CHLORIDE SERPL-SCNC: 104 MMOL/L (ref 96–108)
CO2 SERPL-SCNC: 35 MMOL/L (ref 21–32)
CREAT SERPL-MCNC: 2.34 MG/DL (ref 0.6–1.3)
EOSINOPHIL # BLD AUTO: 0 THOUSAND/ÂΜL (ref 0–0.61)
EOSINOPHIL NFR BLD AUTO: 0 % (ref 0–6)
ERYTHROCYTE [DISTWIDTH] IN BLOOD BY AUTOMATED COUNT: 12.6 % (ref 11.6–15.1)
GFR SERPL CREATININE-BSD FRML MDRD: 19 ML/MIN/1.73SQ M
GLUCOSE SERPL-MCNC: 156 MG/DL (ref 65–140)
GLUCOSE SERPL-MCNC: 197 MG/DL (ref 65–140)
GLUCOSE SERPL-MCNC: 218 MG/DL (ref 65–140)
GLUCOSE SERPL-MCNC: 224 MG/DL (ref 65–140)
GLUCOSE SERPL-MCNC: 296 MG/DL (ref 65–140)
GLUCOSE SERPL-MCNC: 31 MG/DL (ref 65–140)
GLUCOSE SERPL-MCNC: 31 MG/DL (ref 65–140)
GLUCOSE SERPL-MCNC: 33 MG/DL (ref 65–140)
HCT VFR BLD AUTO: 30.8 % (ref 34.8–46.1)
HGB BLD-MCNC: 9.9 G/DL (ref 11.5–15.4)
IMM GRANULOCYTES # BLD AUTO: 0.02 THOUSAND/UL (ref 0–0.2)
IMM GRANULOCYTES NFR BLD AUTO: 0 % (ref 0–2)
LYMPHOCYTES # BLD AUTO: 1.94 THOUSANDS/ÂΜL (ref 0.6–4.47)
LYMPHOCYTES NFR BLD AUTO: 37 % (ref 14–44)
MCH RBC QN AUTO: 29.8 PG (ref 26.8–34.3)
MCHC RBC AUTO-ENTMCNC: 32.1 G/DL (ref 31.4–37.4)
MCV RBC AUTO: 93 FL (ref 82–98)
MONOCYTES # BLD AUTO: 0.54 THOUSAND/ÂΜL (ref 0.17–1.22)
MONOCYTES NFR BLD AUTO: 10 % (ref 4–12)
NEUTROPHILS # BLD AUTO: 2.71 THOUSANDS/ÂΜL (ref 1.85–7.62)
NEUTS SEG NFR BLD AUTO: 53 % (ref 43–75)
NRBC BLD AUTO-RTO: 0 /100 WBCS
PLATELET # BLD AUTO: 206 THOUSANDS/UL (ref 149–390)
PMV BLD AUTO: 10.6 FL (ref 8.9–12.7)
POTASSIUM SERPL-SCNC: 3.2 MMOL/L (ref 3.5–5.3)
RBC # BLD AUTO: 3.32 MILLION/UL (ref 3.81–5.12)
SODIUM SERPL-SCNC: 146 MMOL/L (ref 135–147)
WBC # BLD AUTO: 5.23 THOUSAND/UL (ref 4.31–10.16)

## 2024-07-14 PROCEDURE — 82948 REAGENT STRIP/BLOOD GLUCOSE: CPT

## 2024-07-14 PROCEDURE — 85025 COMPLETE CBC W/AUTO DIFF WBC: CPT | Performed by: INTERNAL MEDICINE

## 2024-07-14 PROCEDURE — 99232 SBSQ HOSP IP/OBS MODERATE 35: CPT | Performed by: INTERNAL MEDICINE

## 2024-07-14 PROCEDURE — 80048 BASIC METABOLIC PNL TOTAL CA: CPT | Performed by: INTERNAL MEDICINE

## 2024-07-14 RX ORDER — INSULIN GLARGINE 100 [IU]/ML
5 INJECTION, SOLUTION SUBCUTANEOUS
Status: DISCONTINUED | OUTPATIENT
Start: 2024-07-14 | End: 2024-07-17 | Stop reason: HOSPADM

## 2024-07-14 RX ORDER — POTASSIUM CHLORIDE 20 MEQ/1
40 TABLET, EXTENDED RELEASE ORAL ONCE
Status: COMPLETED | OUTPATIENT
Start: 2024-07-14 | End: 2024-07-14

## 2024-07-14 RX ORDER — DEXTROSE MONOHYDRATE 25 G/50ML
INJECTION, SOLUTION INTRAVENOUS
Status: COMPLETED
Start: 2024-07-14 | End: 2024-07-14

## 2024-07-14 RX ADMIN — POTASSIUM CHLORIDE 40 MEQ: 1500 TABLET, EXTENDED RELEASE ORAL at 12:08

## 2024-07-14 RX ADMIN — DOCUSATE SODIUM 100 MG: 100 CAPSULE, LIQUID FILLED ORAL at 09:39

## 2024-07-14 RX ADMIN — POLYETHYLENE GLYCOL 3350 17 G: 17 POWDER, FOR SOLUTION ORAL at 09:39

## 2024-07-14 RX ADMIN — DIVALPROEX SODIUM 250 MG: 250 TABLET, DELAYED RELEASE ORAL at 09:40

## 2024-07-14 RX ADMIN — HEPARIN SODIUM 5000 UNITS: 5000 INJECTION INTRAVENOUS; SUBCUTANEOUS at 14:52

## 2024-07-14 RX ADMIN — CEFTRIAXONE 1000 MG: 1 INJECTION, SOLUTION INTRAVENOUS at 14:53

## 2024-07-14 RX ADMIN — HEPARIN SODIUM 5000 UNITS: 5000 INJECTION INTRAVENOUS; SUBCUTANEOUS at 06:18

## 2024-07-14 RX ADMIN — HEPARIN SODIUM 5000 UNITS: 5000 INJECTION INTRAVENOUS; SUBCUTANEOUS at 21:18

## 2024-07-14 RX ADMIN — DEXTROSE MONOHYDRATE 50 ML: 25 INJECTION, SOLUTION INTRAVENOUS at 05:20

## 2024-07-14 RX ADMIN — DOCUSATE SODIUM 100 MG: 100 CAPSULE, LIQUID FILLED ORAL at 17:36

## 2024-07-14 RX ADMIN — INSULIN LISPRO 1 UNITS: 100 INJECTION, SOLUTION INTRAVENOUS; SUBCUTANEOUS at 17:34

## 2024-07-14 RX ADMIN — DONEPEZIL HYDROCHLORIDE 5 MG: 5 TABLET ORAL at 21:18

## 2024-07-14 RX ADMIN — RANOLAZINE 500 MG: 500 TABLET, EXTENDED RELEASE ORAL at 09:39

## 2024-07-14 RX ADMIN — ASPIRIN 81 MG 81 MG: 81 TABLET ORAL at 09:39

## 2024-07-14 RX ADMIN — DIVALPROEX SODIUM 250 MG: 250 TABLET, DELAYED RELEASE ORAL at 21:18

## 2024-07-14 RX ADMIN — INSULIN LISPRO 2 UNITS: 100 INJECTION, SOLUTION INTRAVENOUS; SUBCUTANEOUS at 12:08

## 2024-07-14 RX ADMIN — INSULIN LISPRO 1 UNITS: 100 INJECTION, SOLUTION INTRAVENOUS; SUBCUTANEOUS at 09:50

## 2024-07-14 RX ADMIN — CARVEDILOL 25 MG: 12.5 TABLET, FILM COATED ORAL at 21:23

## 2024-07-14 RX ADMIN — CARVEDILOL 25 MG: 12.5 TABLET, FILM COATED ORAL at 09:37

## 2024-07-14 RX ADMIN — ATORVASTATIN CALCIUM 10 MG: 10 TABLET, FILM COATED ORAL at 09:39

## 2024-07-14 NOTE — UTILIZATION REVIEW
NOTIFICATION OF INPATIENT ADMISSION   AUTHORIZATION REQUEST   SERVICING FACILITY:   [place] [address]  Tax ID: [id number]  NPI: [id number] ATTENDING PROVIDER:  Attending Name and NPI#: Clyde Ford Do [[id number]]  Address: [address] [place] [address]  Phone:  [phone]     ADMISSION INFORMATION:  Place of Service: Inpatient [facility]  Place of Service Code: 21  Inpatient Admission Date/Time: 7/13/24  5:29 PM  Discharge Date/Time: No discharge date for patient encounter.  Admitting Diagnosis Code/Description:  UTI (urinary tract infection) [N39.0]  Weakness [R53.1]  Generalized weakness [R53.1]  Stercoral colitis [K52.89]     UTILIZATION REVIEW CONTACT:  Evie Banegas Utilization   Network Utilization Review Department  Phone: [phone]  Fax: [phone]  Email: [email or web address]  Contact for approvals/pending authorizations, clinical reviews, and discharge.     PHYSICIAN ADVISORY SERVICES:  Medical Necessity Denial & Rrfe-wt-Snku Review  Phone: [phone]  Fax: [phone]  Email: [email or web address]     DISCHARGE SUPPORT TEAM:  For Patients Discharge Needs & Updates  Phone: [phone] opt. 2 Fax: [phone]  Email: [email or web address]

## 2024-07-14 NOTE — PROGRESS NOTES
[facility]  Progress Note  Name: Gilda Garay I  MRN: [id number]  Unit/Bed#: -01 I Date of Admission: 7/12/2024   Date of Service: 7/14/2024 I Hospital Day: 1    Assessment & Plan   UTI (urinary tract infection)  Assessment & Plan  U/A +nitrates,leukocytes, wbc  Patient denies urinary symptoms  Rocephin given in the ED, will continue for now  Cultures pending, adjust abx as needed    CKD (chronic kidney disease) stage 4, GFR 15-29 ml/min ([facility])  Assessment & Plan  Lab Results   Component Value Date    EGFR 19 07/14/2024    EGFR 16 07/12/2024    EGFR 19 04/16/2024    CREATININE 2.34 (H) 07/14/2024    CREATININE 2.65 (H) 07/12/2024    CREATININE 2.34 (H) 04/16/2024   At baseline  Daily BMP  Avoid nephrotoxic agents  Kidney function      Primary hypertension  Assessment & Plan  Blood pressure acceptable on admission  Continue home , coreg  monitor    Type 2 diabetes mellitus with stage 3a chronic kidney disease, with long-term current use of insulin ([facility])  Assessment & Plan  Lab Results   Component Value Date    HGBA1C 7.9 (H) 04/16/2024       Recent Labs     07/14/24  0519 07/14/24  0536 07/14/24  0706 07/14/24  1113   POCGLU 33* 218* 197* 296*         Blood Sugar Average: Last 72 hrs:  (P) 149  Blood sugar controlled at home on Lantus, hold due to poor appetite and hypogylcemia  SSI coverage goal 140-180, adjust as needed  Accuchecks AC/HS  Hypoglycemia protocol      Sick sinus syndrome (HCC)  Assessment & Plan  PPM in place    * Nausea vomiting and diarrhea  Assessment & Plan  Brought son due to weakness. Per patient she had an episode of nausea, vomiting, and diarrhea. She was recently treated for constipation by PCP. After episode she states she felt weak and slipped out of chair. Was found on the floor by son. She denies LOC, syncopal episode. At baseline patient ambulates with walker.  CT abdomen reveals Distended rectum containing well-formed stool with rectal mural thickening  and perirectal fat stranding, which can be seen with stercoral colitis. No pneumatosis or free air.   Clear liquid diet, advance as tolerate  Warm Water enema, Bowel regimen  PRN antiemetic   Consider GI consult, if symptoms do not improve                 VTE Pharmacologic Prophylaxis: VTE Score: 5 Moderate Risk (Score 3-4) - Pharmacological DVT Prophylaxis Ordered: heparin.    Mobility:   Basic Mobility Inpatient Raw Score: 6  JH-HLM Goal: 2: Bed activities/Dependent transfer  JH-HLM Achieved: 2: Bed activities/Dependent transfer  JH-HLM Goal achieved. Continue to encourage appropriate mobility.    Patient Centered Rounds: I performed bedside rounds with nursing staff today.   Discussions with Specialists or Other Care Team Provider:     Education and Discussions with Family / Patient: Attempted to update  (son) via phone. Left voicemail.     Total Time Spent on Date of Encounter in care of patient:  mins. This time was spent on one or more of the following: performing physical exam; counseling and coordination of care; obtaining or reviewing history; documenting in the medical record; reviewing/ordering tests, medications or procedures; communicating with other healthcare professionals and discussing with patient's family/caregivers.    Current Length of Stay: 1 day(s)  Current Patient Status: Inpatient   Certification Statement: The patient will continue to require additional inpatient hospital stay due to iv abx  Discharge Plan: Anticipate discharge in 24-48 hrs to snf    Code Status: Level 1 - Full Code    Subjective:   Patient denies any acute complaints today apart from some nausea without any episodes of emesis today    Objective:     Vitals:   Temp (24hrs), Av.9 °F (36.6 °C), Min:97.2 °F (36.2 °C), Max:98.4 °F (36.9 °C)    Temp:  [97.2 °F (36.2 °C)-98.4 °F (36.9 °C)] 97.2 °F (36.2 °C)  HR:  [60-95] 95  Resp:  [15-18] 16  BP: (102-136)/(52-68) 136/62  SpO2:  [97 %-100 %] 98 %  Body mass  index is 20.34 kg/m².     Input and Output Summary (last 24 hours):     Intake/Output Summary (Last 24 hours) at 7/14/2024 1314  Last data filed at 7/14/2024 0601  Gross per 24 hour   Intake 350 ml   Output --   Net 350 ml       Physical Exam:   Physical Exam  Vitals and nursing note reviewed.   Constitutional:       General: She is not in acute distress.     Appearance: She is well-developed. She is not toxic-appearing or diaphoretic.   HENT:      Head: Normocephalic and atraumatic.   Eyes:      General: No scleral icterus.     Conjunctiva/sclera: Conjunctivae normal.   Cardiovascular:      Rate and Rhythm: Normal rate and regular rhythm.      Heart sounds: No murmur heard.     No friction rub. No gallop.   Pulmonary:      Effort: Pulmonary effort is normal. No respiratory distress.      Breath sounds: Normal breath sounds. No stridor. No wheezing, rhonchi or rales.   Chest:      Chest wall: No tenderness.   Abdominal:      General: There is no distension.      Palpations: Abdomen is soft. There is no mass.      Tenderness: There is no abdominal tenderness. There is no guarding or rebound.      Hernia: No hernia is present.   Musculoskeletal:         General: No swelling or tenderness.      Cervical back: Neck supple.   Skin:     General: Skin is warm and dry.      Capillary Refill: Capillary refill takes less than 2 seconds.   Neurological:      Mental Status: She is alert.   Psychiatric:         Mood and Affect: Mood normal.          Additional Data:     Labs:  Results from last 7 days   Lab Units 07/14/24  0447   WBC Thousand/uL 5.23   HEMOGLOBIN g/dL 9.9*   HEMATOCRIT % 30.8*   PLATELETS Thousands/uL 206   SEGS PCT % 53   LYMPHO PCT % 37   MONO PCT % 10   EOS PCT % 0     Results from last 7 days   Lab Units 07/14/24  0447 07/12/24  1728   SODIUM mmol/L 146 141   POTASSIUM mmol/L 3.2* 4.3   CHLORIDE mmol/L 104 101   CO2 mmol/L 35* 31   BUN mg/dL 32* 42*   CREATININE mg/dL 2.34* 2.65*   ANION GAP mmol/L 7 9    CALCIUM mg/dL 9.0 9.2   ALBUMIN g/dL  --  3.2*   TOTAL BILIRUBIN mg/dL  --  0.43   ALK PHOS U/L  --  40   ALT U/L  --  9   AST U/L  --  21   GLUCOSE RANDOM mg/dL 31* 215*         Results from last 7 days   Lab Units 07/14/24  1113 07/14/24  0706 07/14/24  0536 07/14/24  0519 07/14/24  0502 07/13/24  2020 07/13/24  1651 07/13/24  1106 07/13/24  0645 07/12/24  2341   POC GLUCOSE mg/dl 296* 197* 218* 33* 31* 154* 144* 82 146* 189*               Lines/Drains:  Invasive Devices       Peripheral Intravenous Line  Duration             Peripheral IV 07/14/24 Dorsal (posterior);Right Hand <1 day                          Imaging: No pertinent imaging reviewed.    Recent Cultures (last 7 days):         Last 24 Hours Medication List:   Current Facility-Administered Medications   Medication Dose Route Frequency Provider Last Rate    acetaminophen  650 mg Oral Q6H PRN Lis Toombs-Jad, CRNP      aspirin  81 mg Oral Daily Lis Toombs-Jad, CRNP      atorvastatin  10 mg Oral Daily Lis Dilip-Jad, CRNP      bumetanide  1 mg Oral Daily Lis Toombs-[place], CRNP      calcium carbonate  1,000 mg Oral Daily PRN Lis Toombs-Jad, CRNP      carvedilol  25 mg Oral Q12H [facility] Lis Dilip-Jad, CRNP      cefTRIAXone  1,000 mg Intravenous Q24H Clyde Banai, DO 1,000 mg (07/13/24 1722)    divalproex sodium  250 mg Oral Q12H RADHA Lis Dilip-Jad, CRNP      docusate sodium  100 mg Oral BID Lis Dilip-Jad, CRNP      donepezil  5 mg Oral HS Lis Dilip-Jad, CRNP      fluticasone  1 spray Each Nare BID Lis Toombs-Jad, CRNP      heparin (porcine)  5,000 Units Subcutaneous Q8H RADHA Lis Cherry-Jad, CRNP      insulin glargine  5 Units Subcutaneous HS Ching Tong, CRNP      insulin lispro  1-5 Units Subcutaneous TID AC TENNILLE Harley      insulin lispro  1-5 Units Subcutaneous HS TENNILLE Harley      ondansetron  4 mg  Intravenous Q6H PRN TENNILLE Harley      polyethylene glycol  17 g Oral Daily TENNILLE Harley      ranolazine  500 mg Oral Daily TENNILLE Harley          Today, Patient Was Seen By: Clyde Ford DO    **Please Note: This note may have been constructed using a voice recognition system.**

## 2024-07-14 NOTE — PLAN OF CARE
Problem: PAIN - ADULT  Goal: Verbalizes/displays adequate comfort level or baseline comfort level  Description: Interventions:  - Encourage patient to monitor pain and request assistance  - Assess pain using appropriate pain scale  - Administer analgesics based on type and severity of pain and evaluate response  - Implement non-pharmacological measures as appropriate and evaluate response  - Consider cultural and social influences on pain and pain management  - Notify physician/advanced practitioner if interventions unsuccessful or patient reports new pain  Outcome: Progressing     Problem: INFECTION - ADULT  Goal: Absence or prevention of progression during hospitalization  Description: INTERVENTIONS:  - Assess and monitor for signs and symptoms of infection  - Monitor lab/diagnostic results  - Monitor all insertion sites, i.e. indwelling lines, tubes, and drains  - Monitor endotracheal if appropriate and nasal secretions for changes in amount and color  - [place] appropriate cooling/warming therapies per order  - Administer medications as ordered  - Instruct and encourage patient and family to use good hand hygiene technique  - Identify and instruct in appropriate isolation precautions for identified infection/condition  Outcome: Progressing     Problem: SAFETY ADULT  Goal: Patient will remain free of falls  Description: INTERVENTIONS:  - Educate patient/family on patient safety including physical limitations  - Instruct patient to call for assistance with activity   - Consult OT/PT to assist with strengthening/mobility   - Keep Call bell within reach  - Keep bed low and locked with side rails adjusted as appropriate  - Keep care items and personal belongings within reach  - Initiate and maintain comfort rounds  - Make Fall Risk Sign visible to staff  - Offer Toileting every 2 Hours, in advance of need  - Initiate/Maintain bed alarm  - Obtain necessary fall risk management equipment: walker  - Apply yellow  socks and bracelet for high fall risk patients  - Consider moving patient to room near nurses station  Outcome: Progressing     Problem: DISCHARGE PLANNING  Goal: Discharge to home or other facility with appropriate resources  Description: INTERVENTIONS:  - Identify barriers to discharge w/patient and caregiver  - Arrange for needed discharge resources and transportation as appropriate  - Identify discharge learning needs (meds, wound care, etc.)  - Arrange for interpretive services to assist at discharge as needed  - Refer to Case Management Department for coordinating discharge planning if the patient needs post-hospital services based on physician/advanced practitioner order or complex needs related to functional status, cognitive ability, or social support system  Outcome: Progressing     Problem: Knowledge Deficit  Goal: Patient/family/caregiver demonstrates understanding of disease process, treatment plan, medications, and discharge instructions  Description: Complete learning assessment and assess knowledge base.  Interventions:  - Provide teaching at level of understanding  - Provide teaching via preferred learning methods  Outcome: Progressing     Problem: Prexisting or High Potential for Compromised Skin Integrity  Goal: Skin integrity is maintained or improved  Description: INTERVENTIONS:  - Identify patients at risk for skin breakdown  - Assess and monitor skin integrity  - Assess and monitor nutrition and hydration status  - Monitor labs   - Assess for incontinence   - Turn and reposition patient  - Assist with mobility/ambulation  - Relieve pressure over bony prominences  - Avoid friction and shearing  - Provide appropriate hygiene as needed including keeping skin clean and dry  - Evaluate need for skin moisturizer/barrier cream  - Collaborate with interdisciplinary team   - Patient/family teaching  - Consider wound care consult   Outcome: Progressing     Problem: GASTROINTESTINAL - ADULT  Goal:  Minimal or absence of nausea and/or vomiting  Description: INTERVENTIONS:  - Administer IV fluids if ordered to ensure adequate hydration  - Maintain NPO status until nausea and vomiting are resolved  - Nasogastric tube if ordered  - Administer ordered antiemetic medications as needed  - Provide nonpharmacologic comfort measures as appropriate  - Advance diet as tolerated, if ordered  - Consider nutrition services referral to assist patient with adequate nutrition and appropriate food choices  Outcome: Progressing  Goal: Maintains or returns to baseline bowel function  Description: INTERVENTIONS:  - Assess bowel function  - Encourage oral fluids to ensure adequate hydration  - Administer IV fluids if ordered to ensure adequate hydration  - Administer ordered medications as needed  - Encourage mobilization and activity  - Consider nutritional services referral to assist patient with adequate nutrition and appropriate food choices  Outcome: Progressing  Goal: Maintains adequate nutritional intake  Description: INTERVENTIONS:  - Monitor percentage of each meal consumed  - Identify factors contributing to decreased intake, treat as appropriate  - Assist with meals as needed  - Monitor I&O, weight, and lab values if indicated  - Obtain nutrition services referral as needed  Outcome: Progressing

## 2024-07-14 NOTE — QUICK NOTE
Notified by nursing patient's blood sugar of 33, asymptomatic. Follow hypoglycemic protocol.  Received IV dextrose x 1. Repeat blood sugar greater than 200 now.  Patient on clear liquid diet & low oral intake.  Did receive Lantus last night.  Reduce Lantus dose from 10 units to 5 units at HS.  Continue to monitor blood sugars & hypoglycemic protocol.  Adjust Lantus dose accordingly.

## 2024-07-14 NOTE — ASSESSMENT & PLAN NOTE
Lab Results   Component Value Date    HGBA1C 7.9 (H) 04/16/2024       Recent Labs     07/14/24  0519 07/14/24  0536 07/14/24  0706 07/14/24  1113   POCGLU 33* 218* 197* 296*         Blood Sugar Average: Last 72 hrs:  (P) 149  Blood sugar controlled at home on Lantus, hold due to poor appetite and hypogylcemia  SSI coverage goal 140-180, adjust as needed  Accuchecks AC/HS  Hypoglycemia protocol

## 2024-07-14 NOTE — ASSESSMENT & PLAN NOTE
Lab Results   Component Value Date    EGFR 19 07/14/2024    EGFR 16 07/12/2024    EGFR 19 04/16/2024    CREATININE 2.34 (H) 07/14/2024    CREATININE 2.65 (H) 07/12/2024    CREATININE 2.34 (H) 04/16/2024   At baseline  Daily BMP  Avoid nephrotoxic agents  Kidney function

## 2024-07-14 NOTE — PLAN OF CARE
Problem: PAIN - ADULT  Goal: Verbalizes/displays adequate comfort level or baseline comfort level  Description: Interventions:  - Encourage patient to monitor pain and request assistance  - Assess pain using appropriate pain scale  - Administer analgesics based on type and severity of pain and evaluate response  - Implement non-pharmacological measures as appropriate and evaluate response  - Consider cultural and social influences on pain and pain management  - Notify physician/advanced practitioner if interventions unsuccessful or patient reports new pain  Outcome: Progressing     Problem: SAFETY ADULT  Goal: Patient will remain free of falls  Description: INTERVENTIONS:  - Educate patient/family on patient safety including physical limitations  - Instruct patient to call for assistance with activity   - Consult OT/PT to assist with strengthening/mobility   - Keep Call bell within reach  - Keep bed low and locked with side rails adjusted as appropriate  - Keep care items and personal belongings within reach  - Initiate and maintain comfort rounds  - Make Fall Risk Sign visible to staff  - Apply yellow socks and bracelet for high fall risk patients  - Consider moving patient to room near nurses station  Outcome: Progressing     Problem: Prexisting or High Potential for Compromised Skin Integrity  Goal: Skin integrity is maintained or improved  Description: INTERVENTIONS:  - Identify patients at risk for skin breakdown  - Assess and monitor skin integrity  - Assess and monitor nutrition and hydration status  - Monitor labs   - Assess for incontinence   - Turn and reposition patient  - Assist with mobility/ambulation  - Relieve pressure over bony prominences  - Avoid friction and shearing  - Provide appropriate hygiene as needed including keeping skin clean and dry  - Evaluate need for skin moisturizer/barrier cream  - Collaborate with interdisciplinary team   - Patient/family teaching  - Consider wound care consult    Outcome: Progressing     Problem: GASTROINTESTINAL - ADULT  Goal: Maintains or returns to baseline bowel function  Description: INTERVENTIONS:  - Assess bowel function  - Encourage oral fluids to ensure adequate hydration  - Administer IV fluids if ordered to ensure adequate hydration  - Administer ordered medications as needed  - Encourage mobilization and activity  - Consider nutritional services referral to assist patient with adequate nutrition and appropriate food choices  Outcome: Progressing

## 2024-07-15 LAB
ANION GAP SERPL CALCULATED.3IONS-SCNC: 5 MMOL/L (ref 4–13)
BASOPHILS # BLD AUTO: 0.01 THOUSANDS/ÂΜL (ref 0–0.1)
BASOPHILS NFR BLD AUTO: 0 % (ref 0–1)
BUN SERPL-MCNC: 28 MG/DL (ref 5–25)
CALCIUM SERPL-MCNC: 8.5 MG/DL (ref 8.4–10.2)
CHLORIDE SERPL-SCNC: 103 MMOL/L (ref 96–108)
CO2 SERPL-SCNC: 34 MMOL/L (ref 21–32)
CREAT SERPL-MCNC: 2.08 MG/DL (ref 0.6–1.3)
EOSINOPHIL # BLD AUTO: 0 THOUSAND/ÂΜL (ref 0–0.61)
EOSINOPHIL NFR BLD AUTO: 0 % (ref 0–6)
ERYTHROCYTE [DISTWIDTH] IN BLOOD BY AUTOMATED COUNT: 12.7 % (ref 11.6–15.1)
GFR SERPL CREATININE-BSD FRML MDRD: 22 ML/MIN/1.73SQ M
GLUCOSE SERPL-MCNC: 133 MG/DL (ref 65–140)
GLUCOSE SERPL-MCNC: 142 MG/DL (ref 65–140)
GLUCOSE SERPL-MCNC: 165 MG/DL (ref 65–140)
GLUCOSE SERPL-MCNC: 174 MG/DL (ref 65–140)
GLUCOSE SERPL-MCNC: 175 MG/DL (ref 65–140)
GLUCOSE SERPL-MCNC: 217 MG/DL (ref 65–140)
HCT VFR BLD AUTO: 26.2 % (ref 34.8–46.1)
HGB BLD-MCNC: 8.3 G/DL (ref 11.5–15.4)
IMM GRANULOCYTES # BLD AUTO: 0.04 THOUSAND/UL (ref 0–0.2)
IMM GRANULOCYTES NFR BLD AUTO: 1 % (ref 0–2)
LYMPHOCYTES # BLD AUTO: 2.06 THOUSANDS/ÂΜL (ref 0.6–4.47)
LYMPHOCYTES NFR BLD AUTO: 37 % (ref 14–44)
MAGNESIUM SERPL-MCNC: 2 MG/DL (ref 1.9–2.7)
MCH RBC QN AUTO: 29.5 PG (ref 26.8–34.3)
MCHC RBC AUTO-ENTMCNC: 31.7 G/DL (ref 31.4–37.4)
MCV RBC AUTO: 93 FL (ref 82–98)
MONOCYTES # BLD AUTO: 0.64 THOUSAND/ÂΜL (ref 0.17–1.22)
MONOCYTES NFR BLD AUTO: 12 % (ref 4–12)
NEUTROPHILS # BLD AUTO: 2.76 THOUSANDS/ÂΜL (ref 1.85–7.62)
NEUTS SEG NFR BLD AUTO: 50 % (ref 43–75)
NRBC BLD AUTO-RTO: 0 /100 WBCS
PLATELET # BLD AUTO: 171 THOUSANDS/UL (ref 149–390)
PMV BLD AUTO: 10.5 FL (ref 8.9–12.7)
POTASSIUM SERPL-SCNC: 4.9 MMOL/L (ref 3.5–5.3)
RBC # BLD AUTO: 2.81 MILLION/UL (ref 3.81–5.12)
SODIUM SERPL-SCNC: 142 MMOL/L (ref 135–147)
WBC # BLD AUTO: 5.51 THOUSAND/UL (ref 4.31–10.16)

## 2024-07-15 PROCEDURE — 80048 BASIC METABOLIC PNL TOTAL CA: CPT | Performed by: PHYSICIAN ASSISTANT

## 2024-07-15 PROCEDURE — 85025 COMPLETE CBC W/AUTO DIFF WBC: CPT | Performed by: PHYSICIAN ASSISTANT

## 2024-07-15 PROCEDURE — 83735 ASSAY OF MAGNESIUM: CPT | Performed by: PHYSICIAN ASSISTANT

## 2024-07-15 PROCEDURE — 99232 SBSQ HOSP IP/OBS MODERATE 35: CPT | Performed by: PHYSICIAN ASSISTANT

## 2024-07-15 PROCEDURE — 82948 REAGENT STRIP/BLOOD GLUCOSE: CPT

## 2024-07-15 RX ORDER — SENNOSIDES 8.6 MG
2 TABLET ORAL
Status: DISCONTINUED | OUTPATIENT
Start: 2024-07-15 | End: 2024-07-16

## 2024-07-15 RX ORDER — POLYETHYLENE GLYCOL 3350 17 G/17G
17 POWDER, FOR SOLUTION ORAL 2 TIMES DAILY
Status: DISCONTINUED | OUTPATIENT
Start: 2024-07-15 | End: 2024-07-17 | Stop reason: HOSPADM

## 2024-07-15 RX ADMIN — ATORVASTATIN CALCIUM 10 MG: 10 TABLET, FILM COATED ORAL at 08:12

## 2024-07-15 RX ADMIN — HEPARIN SODIUM 5000 UNITS: 5000 INJECTION INTRAVENOUS; SUBCUTANEOUS at 05:32

## 2024-07-15 RX ADMIN — RANOLAZINE 500 MG: 500 TABLET, EXTENDED RELEASE ORAL at 08:12

## 2024-07-15 RX ADMIN — POLYETHYLENE GLYCOL 3350 17 G: 17 POWDER, FOR SOLUTION ORAL at 22:20

## 2024-07-15 RX ADMIN — POLYETHYLENE GLYCOL 3350 17 G: 17 POWDER, FOR SOLUTION ORAL at 08:12

## 2024-07-15 RX ADMIN — HEPARIN SODIUM 5000 UNITS: 5000 INJECTION INTRAVENOUS; SUBCUTANEOUS at 22:11

## 2024-07-15 RX ADMIN — INSULIN LISPRO 1 UNITS: 100 INJECTION, SOLUTION INTRAVENOUS; SUBCUTANEOUS at 08:12

## 2024-07-15 RX ADMIN — ASPIRIN 81 MG 81 MG: 81 TABLET ORAL at 08:11

## 2024-07-15 RX ADMIN — INSULIN LISPRO 1 UNITS: 100 INJECTION, SOLUTION INTRAVENOUS; SUBCUTANEOUS at 12:03

## 2024-07-15 RX ADMIN — DONEPEZIL HYDROCHLORIDE 5 MG: 5 TABLET ORAL at 22:16

## 2024-07-15 RX ADMIN — CARVEDILOL 25 MG: 12.5 TABLET, FILM COATED ORAL at 22:20

## 2024-07-15 RX ADMIN — BUMETANIDE 1 MG: 1 TABLET ORAL at 08:12

## 2024-07-15 RX ADMIN — INSULIN GLARGINE 5 UNITS: 100 INJECTION, SOLUTION SUBCUTANEOUS at 22:10

## 2024-07-15 RX ADMIN — DOCUSATE SODIUM 100 MG: 100 CAPSULE, LIQUID FILLED ORAL at 08:12

## 2024-07-15 RX ADMIN — DIVALPROEX SODIUM 250 MG: 250 TABLET, DELAYED RELEASE ORAL at 22:20

## 2024-07-15 RX ADMIN — DIVALPROEX SODIUM 250 MG: 250 TABLET, DELAYED RELEASE ORAL at 08:12

## 2024-07-15 RX ADMIN — HEPARIN SODIUM 5000 UNITS: 5000 INJECTION INTRAVENOUS; SUBCUTANEOUS at 14:39

## 2024-07-15 RX ADMIN — SENNOSIDES 17.2 MG: 8.6 TABLET, FILM COATED ORAL at 22:16

## 2024-07-15 RX ADMIN — DOCUSATE SODIUM 100 MG: 100 CAPSULE, LIQUID FILLED ORAL at 17:25

## 2024-07-15 RX ADMIN — INSULIN LISPRO 1 UNITS: 100 INJECTION, SOLUTION INTRAVENOUS; SUBCUTANEOUS at 22:16

## 2024-07-15 NOTE — ASSESSMENT & PLAN NOTE
Patient brought in by son with weakness, slipped out of chair and was found on the floor by son after episode of nausea/vomiting and diarrhea. Recently treated for constipation by PCP.  CT a/p: Distended rectum containing well-formed stool with rectal mural thickening and perirectal fat stranding, which can be seen with stercoral colitis. No pneumatosis or free air.   Bowel regimen with Colace, MiraLAX, trial of warm water enema  Advanced to regular diet, no further nausea/vomiting & remains constipated  Increase MiraLAX to BID, add senna 2 tablets qHS, give soapsuds enema today  May need GI consult if still with no BM, patient's lack of mobility likely contributing to constipation

## 2024-07-15 NOTE — ASSESSMENT & PLAN NOTE
Holding home Norvasc 2.5 mg daily, BP intermittently soft  Continue home Coreg 25 mg BID, Bumex 1 mg daily

## 2024-07-15 NOTE — ASSESSMENT & PLAN NOTE
Lab Results   Component Value Date    EGFR 19 07/14/2024    EGFR 16 07/12/2024    EGFR 19 04/16/2024    CREATININE 2.34 (H) 07/14/2024    CREATININE 2.65 (H) 07/12/2024    CREATININE 2.34 (H) 04/16/2024     Baseline creatinine appears to be around 2.3-2.6  Renal function currently stable at baseline  Monitor BMP peripherally while inpatient  Avoid nephrotoxins, renally dose as able

## 2024-07-15 NOTE — ASSESSMENT & PLAN NOTE
Lab Results   Component Value Date    HGBA1C 7.9 (H) 04/16/2024       Recent Labs     07/14/24  1549 07/14/24  2029 07/15/24  0018 07/15/24  0709   POCGLU 224* 156* 133 175*         Blood Sugar Average: Last 72 hrs:  (P) [id number]  Relatively well-controlled DM given advanced age  Home regimen: Lantus 12U qHS.  Hypoglycemic with BG 31, s/p IV dextrose (7/14)  Continue reduced Lantus 5U qHS given poor appetite  SSI coverage with Accu-Cheks ACHS  Hypoglycemia protocol, goal -180

## 2024-07-15 NOTE — ASSESSMENT & PLAN NOTE
UA with 1+ leukocytes, positive nitrite, 10-20 WBCs  Prior urine cx + E. Coli, generally pansensitive  Completed 3 days empiric IV Rocephin  Add urinary retention protocol given constipation

## 2024-07-15 NOTE — PLAN OF CARE
Problem: PAIN - ADULT  Goal: Verbalizes/displays adequate comfort level or baseline comfort level  Description: Interventions:  - Encourage patient to monitor pain and request assistance  - Assess pain using appropriate pain scale  - Administer analgesics based on type and severity of pain and evaluate response  - Implement non-pharmacological measures as appropriate and evaluate response  - Consider cultural and social influences on pain and pain management  - Notify physician/advanced practitioner if interventions unsuccessful or patient reports new pain  Outcome: Progressing     Problem: INFECTION - ADULT  Goal: Absence or prevention of progression during hospitalization  Description: INTERVENTIONS:  - Assess and monitor for signs and symptoms of infection  - Monitor lab/diagnostic results  - Monitor all insertion sites, i.e. indwelling lines, tubes, and drains  - Monitor endotracheal if appropriate and nasal secretions for changes in amount and color  - [place] appropriate cooling/warming therapies per order  - Administer medications as ordered  - Instruct and encourage patient and family to use good hand hygiene technique  - Identify and instruct in appropriate isolation precautions for identified infection/condition  Outcome: Progressing  Goal: Absence of fever/infection during neutropenic period  Description: INTERVENTIONS:  - Monitor WBC    Outcome: Progressing     Problem: SAFETY ADULT  Goal: Patient will remain free of falls  Description: INTERVENTIONS:  - Educate patient/family on patient safety including physical limitations  - Instruct patient to call for assistance with activity   - Consult OT/PT to assist with strengthening/mobility   - Keep Call bell within reach  - Keep bed low and locked with side rails adjusted as appropriate  - Keep care items and personal belongings within reach  - Initiate and maintain comfort rounds  - Make Fall Risk Sign visible to staff  - Offer Toileting every 2 Hours,  in advance of need  - Initiate/Maintain bed alarm  - Obtain necessary fall risk management equipment: floor cushion  - Apply yellow socks and bracelet for high fall risk patients  - Consider moving patient to room near nurses station  Outcome: Progressing     Problem: DISCHARGE PLANNING  Goal: Discharge to home or other facility with appropriate resources  Description: INTERVENTIONS:  - Identify barriers to discharge w/patient and caregiver  - Arrange for needed discharge resources and transportation as appropriate  - Identify discharge learning needs (meds, wound care, etc.)  - Arrange for interpretive services to assist at discharge as needed  - Refer to Case Management Department for coordinating discharge planning if the patient needs post-hospital services based on physician/advanced practitioner order or complex needs related to functional status, cognitive ability, or social support system  Outcome: Progressing     Problem: Knowledge Deficit  Goal: Patient/family/caregiver demonstrates understanding of disease process, treatment plan, medications, and discharge instructions  Description: Complete learning assessment and assess knowledge base.  Interventions:  - Provide teaching at level of understanding  - Provide teaching via preferred learning methods  Outcome: Progressing     Problem: Prexisting or High Potential for Compromised Skin Integrity  Goal: Skin integrity is maintained or improved  Description: INTERVENTIONS:  - Identify patients at risk for skin breakdown  - Assess and monitor skin integrity  - Assess and monitor nutrition and hydration status  - Monitor labs   - Assess for incontinence   - Turn and reposition patient  - Assist with mobility/ambulation  - Relieve pressure over bony prominences  - Avoid friction and shearing  - Provide appropriate hygiene as needed including keeping skin clean and dry  - Evaluate need for skin moisturizer/barrier cream  - Collaborate with interdisciplinary team    - Patient/family teaching  - Consider wound care consult   Outcome: Progressing     Problem: GASTROINTESTINAL - ADULT  Goal: Minimal or absence of nausea and/or vomiting  Description: INTERVENTIONS:  - Administer IV fluids if ordered to ensure adequate hydration  - Maintain NPO status until nausea and vomiting are resolved  - Nasogastric tube if ordered  - Administer ordered antiemetic medications as needed  - Provide nonpharmacologic comfort measures as appropriate  - Advance diet as tolerated, if ordered  - Consider nutrition services referral to assist patient with adequate nutrition and appropriate food choices  Outcome: Progressing  Goal: Maintains or returns to baseline bowel function  Description: INTERVENTIONS:  - Assess bowel function  - Encourage oral fluids to ensure adequate hydration  - Administer IV fluids if ordered to ensure adequate hydration  - Administer ordered medications as needed  - Encourage mobilization and activity  - Consider nutritional services referral to assist patient with adequate nutrition and appropriate food choices  Outcome: Progressing  Goal: Maintains adequate nutritional intake  Description: INTERVENTIONS:  - Monitor percentage of each meal consumed  - Identify factors contributing to decreased intake, treat as appropriate  - Assist with meals as needed  - Monitor I&O, weight, and lab values if indicated  - Obtain nutrition services referral as needed  Outcome: Progressing

## 2024-07-15 NOTE — PROGRESS NOTES
[facility]  Progress Note  Name: Gilda Garay I  MRN: [id number]  Unit/Bed#: -Gisela I Date of Admission: 7/12/2024   Date of Service: 7/15/2024 I Hospital Day: 2    Assessment & Plan   * Nausea vomiting and diarrhea  Assessment & Plan  Patient brought in by son with weakness, slipped out of chair and was found on the floor by son after episode of nausea/vomiting and diarrhea. Recently treated for constipation by PCP.  CT a/p: Distended rectum containing well-formed stool with rectal mural thickening and perirectal fat stranding, which can be seen with stercoral colitis. No pneumatosis or free air.   Bowel regimen with Colace, MiraLAX, trial of warm water enema  Advanced to regular diet, no further nausea/vomiting & remains constipated  Increase MiraLAX to BID, add senna 2 tablets qHS, give soapsuds enema today  May need GI consult if still with no BM, patient's lack of mobility likely contributing to constipation    UTI (urinary tract infection)  Assessment & Plan  UA with 1+ leukocytes, positive nitrite, 10-20 WBCs  Prior urine cx + E. Coli, generally pansensitive  Completed 3 days empiric IV Rocephin  Add urinary retention protocol given constipation    CKD (chronic kidney disease) stage 4, GFR 15-29 ml/min ([facility])  Assessment & Plan  Lab Results   Component Value Date    EGFR 19 07/14/2024    EGFR 16 07/12/2024    EGFR 19 04/16/2024    CREATININE 2.34 (H) 07/14/2024    CREATININE 2.65 (H) 07/12/2024    CREATININE 2.34 (H) 04/16/2024     Baseline creatinine appears to be around 2.3-2.6  Renal function currently stable at baseline  Monitor BMP peripherally while inpatient  Avoid nephrotoxins, renally dose as able    Primary hypertension  Assessment & Plan  Holding home Norvasc 2.5 mg daily, BP intermittently soft  Continue home Coreg 25 mg BID, Bumex 1 mg daily    Type 2 diabetes mellitus with stage 3a chronic kidney disease, with long-term current use of insulin ([facility])  Assessment &  Plan  Lab Results   Component Value Date    HGBA1C 7.9 (H) 04/16/2024       Recent Labs     07/14/24  1549 07/14/24  2029 07/15/24  0018 07/15/24  0709   POCGLU 224* 156* 133 175*         Blood Sugar Average: Last 72 hrs:  (P) [id number]  Relatively well-controlled DM given advanced age  Home regimen: Lantus 12U qHS.  Hypoglycemic with BG 31, s/p IV dextrose (7/14)  Continue reduced Lantus 5U qHS given poor appetite  SSI coverage with Accu-Cheks ACHS  Hypoglycemia protocol, goal -180    Sick sinus syndrome (HCC)  Assessment & Plan  History of sick sinus syndrome s/p PPM               VTE Pharmacologic Prophylaxis: VTE Score: 5 High Risk (Score >/= 5) - Pharmacological DVT Prophylaxis Ordered: heparin. Sequential Compression Devices Ordered.    Mobility:   Basic Mobility Inpatient Raw Score: 6  JH-HLM Goal: 2: Bed activities/Dependent transfer  JH-HLM Achieved: 2: Bed activities/Dependent transfer  JH-HLM Goal achieved. Continue to encourage appropriate mobility.    Patient Centered Rounds: I performed bedside rounds with nursing staff today.   Discussions with Specialists or Other Care Team Provider: Case management    Education and Discussions with Family / Patient: Updated  (son) via phone.    Total Time Spent on Date of Encounter in care of patient: 45 mins. This time was spent on one or more of the following: performing physical exam; counseling and coordination of care; obtaining or reviewing history; documenting in the medical record; reviewing/ordering tests, medications or procedures; communicating with other healthcare professionals and discussing with patient's family/caregivers.    Current Length of Stay: 2 day(s)  Current Patient Status: Inpatient   Certification Statement: The patient will continue to require additional inpatient hospital stay due to constipation/stercoral colitis  Discharge Plan: Anticipate discharge in 24-48 hrs to home.    Code Status: Level 1 - Full  Code    Subjective:   Patient is seen at bedside this a.m., denies any abdominal pain or nausea, no vomiting or BM per RN.  Patient agreeable with plan for repeat enema today.    Objective:     Vitals:   Temp (24hrs), Av.9 °F (36.6 °C), Min:97.5 °F (36.4 °C), Max:98.1 °F (36.7 °C)    Temp:  [97.5 °F (36.4 °C)-98.1 °F (36.7 °C)] 98.1 °F (36.7 °C)  HR:  [60-95] 60  Resp:  [16-18] 16  BP: (108-136)/(53-62) 108/54  SpO2:  [98 %-100 %] 99 %  Body mass index is 20.5 kg/m².     Input and Output Summary (last 24 hours):     Intake/Output Summary (Last 24 hours) at 7/15/2024 1123  Last data filed at 7/15/2024 0601  Gross per 24 hour   Intake 318 ml   Output 190 ml   Net 128 ml       Physical Exam:   Physical Exam  Constitutional:       General: She is not in acute distress.     Appearance: She is not ill-appearing, toxic-appearing or diaphoretic.   Cardiovascular:      Rate and Rhythm: Normal rate and regular rhythm.      Pulses: Normal pulses.      Heart sounds: Normal heart sounds.   Pulmonary:      Effort: Pulmonary effort is normal. No respiratory distress.      Breath sounds: Normal breath sounds.   Abdominal:      General: Bowel sounds are normal. There is no distension.      Palpations: Abdomen is soft.      Tenderness: There is no abdominal tenderness.   Musculoskeletal:         General: No swelling or tenderness.   Skin:     General: Skin is warm.   Neurological:      General: No focal deficit present.      Mental Status: She is alert.   Psychiatric:         Mood and Affect: Mood normal.         Behavior: Behavior normal.          Additional Data:     Labs:  Results from last 7 days   Lab Units 07/15/24  0752   WBC Thousand/uL 5.51   HEMOGLOBIN g/dL 8.3*   HEMATOCRIT % 26.2*   PLATELETS Thousands/uL 171   SEGS PCT % 50   LYMPHO PCT % 37   MONO PCT % 12   EOS PCT % 0     Results from last 7 days   Lab Units 07/15/24  0752 24  0447 24  1728   SODIUM mmol/L 142   < > 141   POTASSIUM mmol/L 4.9   < > 4.3    CHLORIDE mmol/L 103   < > 101   CO2 mmol/L 34*   < > 31   BUN mg/dL 28*   < > 42*   CREATININE mg/dL 2.08*   < > 2.65*   ANION GAP mmol/L 5   < > 9   CALCIUM mg/dL 8.5   < > 9.2   ALBUMIN g/dL  --   --  3.2*   TOTAL BILIRUBIN mg/dL  --   --  0.43   ALK PHOS U/L  --   --  40   ALT U/L  --   --  9   AST U/L  --   --  21   GLUCOSE RANDOM mg/dL 165*   < > 215*    < > = values in this interval not displayed.         Results from last 7 days   Lab Units 07/15/24  1105 07/15/24  0709 07/15/24  0018 07/14/24  2029 07/14/24  1549 07/14/24  1113 07/14/24  0706 07/14/24  0536 07/14/24  0519 07/14/24  0502 07/13/24  2020 07/13/24  1651   POC GLUCOSE mg/dl 174* 175* 133 156* 224* 296* 197* 218* 33* 31* 154* 144*               Lines/Drains:  Invasive Devices       Peripheral Intravenous Line  Duration             Peripheral IV 07/14/24 Dorsal (posterior);Right Hand 1 day                          Imaging: Reviewed radiology reports from this admission including: abdominal/pelvic CT    Recent Cultures (last 7 days):         Last 24 Hours Medication List:   Current Facility-Administered Medications   Medication Dose Route Frequency Provider Last Rate    acetaminophen  650 mg Oral Q6H PRN Lis Dilip-Jad, CRNP      aspirin  81 mg Oral Daily Lis [place], CRNP      atorvastatin  10 mg Oral Daily Lis [place], CRNP      bumetanide  1 mg Oral Daily Lis [place], CRNP      calcium carbonate  1,000 mg Oral Daily PRN Lis [place], CRNP      carvedilol  25 mg Oral Q12H RADHA Lis [place], CRNP      divalproex sodium  250 mg Oral Q12H RADHA Lis [place], CRNP      docusate sodium  100 mg Oral BID Lis [place], CRNP      donepezil  5 mg Oral HS Lis [place], CRNP      fluticasone  1 spray Each Nare BID TENNILLE Harley      heparin (porcine)  5,000 Units Subcutaneous Q8H [facility] TENNILLE Harley      insulin glargine   5 Units Subcutaneous HS TENNILLE Bermudez      insulin lispro  1-5 Units Subcutaneous TID AC TENNILLE Harley      insulin lispro  1-5 Units Subcutaneous HS TENNILLE Harley      ondansetron  4 mg Intravenous Q6H PRN TENNILLE Harley      polyethylene glycol  17 g Oral BID Ava Bourgeois PA-C      ranolazine  500 mg Oral Daily TENNILLE Harley      senna  2 tablet Oral HS Ava Bourgeois PA-C          Today, Patient Was Seen By: Ava Bourgeois PA-C    **Please Note: This note may have been constructed using a voice recognition system.**

## 2024-07-15 NOTE — PLAN OF CARE
Problem: PAIN - ADULT  Goal: Verbalizes/displays adequate comfort level or baseline comfort level  Description: Interventions:  - Encourage patient to monitor pain and request assistance  - Assess pain using appropriate pain scale  - Administer analgesics based on type and severity of pain and evaluate response  - Implement non-pharmacological measures as appropriate and evaluate response  - Consider cultural and social influences on pain and pain management  - Notify physician/advanced practitioner if interventions unsuccessful or patient reports new pain  Outcome: Progressing     Problem: INFECTION - ADULT  Goal: Absence or prevention of progression during hospitalization  Description: INTERVENTIONS:  - Assess and monitor for signs and symptoms of infection  - Monitor lab/diagnostic results  - Monitor all insertion sites, i.e. indwelling lines, tubes, and drains  - Monitor endotracheal if appropriate and nasal secretions for changes in amount and color  - [place] appropriate cooling/warming therapies per order  - Administer medications as ordered  - Instruct and encourage patient and family to use good hand hygiene technique  - Identify and instruct in appropriate isolation precautions for identified infection/condition  Outcome: Progressing     Problem: SAFETY ADULT  Goal: Patient will remain free of falls  Description: INTERVENTIONS:  - Educate patient/family on patient safety including physical limitations  - Instruct patient to call for assistance with activity   - Consult OT/PT to assist with strengthening/mobility   - Keep Call bell within reach  - Keep bed low and locked with side rails adjusted as appropriate  - Keep care items and personal belongings within reach  - Initiate and maintain comfort rounds  - Make Fall Risk Sign visible to staff  - Offer Toileting every 2 Hours, in advance of need  - Initiate/Maintain alarm  - Obtain necessary fall risk management equipment:   - Apply yellow socks and  bracelet for high fall risk patients  - Consider moving patient to room near nurses station  Outcome: Progressing  Goal: Maintain or return to baseline ADL function  Description: INTERVENTIONS:  -  Assess patient's ability to carry out ADLs; assess patient's baseline for ADL function and identify physical deficits which impact ability to perform ADLs (bathing, care of mouth/teeth, toileting, grooming, dressing, etc.)  - Assess/evaluate cause of self-care deficits   - Assess range of motion  - Assess patient's mobility; develop plan if impaired  - Assess patient's need for assistive devices and provide as appropriate  - Encourage maximum independence but intervene and supervise when necessary  - Involve family in performance of ADLs  - Assess for home care needs following discharge   - Consider OT consult to assist with ADL evaluation and planning for discharge  - Provide patient education as appropriate  Outcome: Progressing  Goal: Maintains/Returns to pre admission functional level  Description: INTERVENTIONS:  - Perform AM-PAC 6 Click Basic Mobility/ Daily Activity assessment daily.  - Set and communicate daily mobility goal to care team and patient/family/caregiver.   - Collaborate with rehabilitation services on mobility goals if consulted  - Perform Range of Motion 4 times a day.  - Reposition patient every 2 hours.  - Dangle patient 3 times a day  - Stand patient 3 times a day  - Ambulate patient 3 times a day  - Out of bed to chair 3 times a day   - Out of bed for meals 3 times a day  - Out of bed for toileting  - Record patient progress and toleration of activity level   Outcome: Progressing     Problem: DISCHARGE PLANNING  Goal: Discharge to home or other facility with appropriate resources  Description: INTERVENTIONS:  - Identify barriers to discharge w/patient and caregiver  - Arrange for needed discharge resources and transportation as appropriate  - Identify discharge learning needs (meds, wound care,  etc.)  - Arrange for interpretive services to assist at discharge as needed  - Refer to Case Management Department for coordinating discharge planning if the patient needs post-hospital services based on physician/advanced practitioner order or complex needs related to functional status, cognitive ability, or social support system  Outcome: Progressing     Problem: Knowledge Deficit  Goal: Patient/family/caregiver demonstrates understanding of disease process, treatment plan, medications, and discharge instructions  Description: Complete learning assessment and assess knowledge base.  Interventions:  - Provide teaching at level of understanding  - Provide teaching via preferred learning methods  Outcome: Progressing     Problem: Prexisting or High Potential for Compromised Skin Integrity  Goal: Skin integrity is maintained or improved  Description: INTERVENTIONS:  - Identify patients at risk for skin breakdown  - Assess and monitor skin integrity  - Assess and monitor nutrition and hydration status  - Monitor labs   - Assess for incontinence   - Turn and reposition patient  - Assist with mobility/ambulation  - Relieve pressure over bony prominences  - Avoid friction and shearing  - Provide appropriate hygiene as needed including keeping skin clean and dry  - Evaluate need for skin moisturizer/barrier cream  - Collaborate with interdisciplinary team   - Patient/family teaching  - Consider wound care consult   Outcome: Progressing     Problem: GASTROINTESTINAL - ADULT  Goal: Minimal or absence of nausea and/or vomiting  Description: INTERVENTIONS:  - Administer IV fluids if ordered to ensure adequate hydration  - Maintain NPO status until nausea and vomiting are resolved  - Nasogastric tube if ordered  - Administer ordered antiemetic medications as needed  - Provide nonpharmacologic comfort measures as appropriate  - Advance diet as tolerated, if ordered  - Consider nutrition services referral to assist patient with  adequate nutrition and appropriate food choices  Outcome: Progressing  Goal: Maintains or returns to baseline bowel function  Description: INTERVENTIONS:  - Assess bowel function  - Encourage oral fluids to ensure adequate hydration  - Administer IV fluids if ordered to ensure adequate hydration  - Administer ordered medications as needed  - Encourage mobilization and activity  - Consider nutritional services referral to assist patient with adequate nutrition and appropriate food choices  Outcome: Progressing  Goal: Maintains adequate nutritional intake  Description: INTERVENTIONS:  - Monitor percentage of each meal consumed  - Identify factors contributing to decreased intake, treat as appropriate  - Assist with meals as needed  - Monitor I&O, weight, and lab values if indicated  - Obtain nutrition services referral as needed  Outcome: Progressing

## 2024-07-16 ENCOUNTER — APPOINTMENT (INPATIENT)
Dept: RADIOLOGY | Facility: HOSPITAL | Age: 79
DRG: 690 | End: 2024-07-16
Payer: COMMERCIAL

## 2024-07-16 LAB
ATRIAL RATE: 61 BPM
GLUCOSE SERPL-MCNC: 105 MG/DL (ref 65–140)
GLUCOSE SERPL-MCNC: 146 MG/DL (ref 65–140)
GLUCOSE SERPL-MCNC: 148 MG/DL (ref 65–140)
GLUCOSE SERPL-MCNC: 150 MG/DL (ref 65–140)
PR INTERVAL: 194 MS
QRS AXIS: -32 DEGREES
QRSD INTERVAL: 84 MS
QT INTERVAL: 428 MS
QTC INTERVAL: 430 MS
T WAVE AXIS: 22 DEGREES
VENTRICULAR RATE: 61 BPM

## 2024-07-16 PROCEDURE — 74018 RADEX ABDOMEN 1 VIEW: CPT

## 2024-07-16 PROCEDURE — 93010 ELECTROCARDIOGRAM REPORT: CPT | Performed by: INTERNAL MEDICINE

## 2024-07-16 PROCEDURE — 82948 REAGENT STRIP/BLOOD GLUCOSE: CPT

## 2024-07-16 PROCEDURE — 99232 SBSQ HOSP IP/OBS MODERATE 35: CPT | Performed by: NURSE PRACTITIONER

## 2024-07-16 RX ORDER — SENNOSIDES 8.6 MG
2 TABLET ORAL 2 TIMES DAILY
Status: DISCONTINUED | OUTPATIENT
Start: 2024-07-16 | End: 2024-07-17 | Stop reason: HOSPADM

## 2024-07-16 RX ORDER — BUMETANIDE 1 MG/1
0.5 TABLET ORAL DAILY
Status: DISCONTINUED | OUTPATIENT
Start: 2024-07-17 | End: 2024-07-17 | Stop reason: HOSPADM

## 2024-07-16 RX ORDER — LACTULOSE 10 G/15ML
20 SOLUTION ORAL 2 TIMES DAILY
Status: COMPLETED | OUTPATIENT
Start: 2024-07-16 | End: 2024-07-16

## 2024-07-16 RX ORDER — AMLODIPINE BESYLATE 2.5 MG/1
2.5 TABLET ORAL DAILY
Status: DISCONTINUED | OUTPATIENT
Start: 2024-07-16 | End: 2024-07-17 | Stop reason: HOSPADM

## 2024-07-16 RX ADMIN — HEPARIN SODIUM 5000 UNITS: 5000 INJECTION INTRAVENOUS; SUBCUTANEOUS at 14:47

## 2024-07-16 RX ADMIN — DOCUSATE SODIUM 100 MG: 100 CAPSULE, LIQUID FILLED ORAL at 08:47

## 2024-07-16 RX ADMIN — INSULIN LISPRO 1 UNITS: 100 INJECTION, SOLUTION INTRAVENOUS; SUBCUTANEOUS at 17:07

## 2024-07-16 RX ADMIN — RANOLAZINE 500 MG: 500 TABLET, EXTENDED RELEASE ORAL at 08:47

## 2024-07-16 RX ADMIN — CARVEDILOL 25 MG: 12.5 TABLET, FILM COATED ORAL at 21:30

## 2024-07-16 RX ADMIN — DIVALPROEX SODIUM 250 MG: 250 TABLET, DELAYED RELEASE ORAL at 21:30

## 2024-07-16 RX ADMIN — ASPIRIN 81 MG 81 MG: 81 TABLET ORAL at 08:47

## 2024-07-16 RX ADMIN — SENNOSIDES 17.2 MG: 8.6 TABLET, FILM COATED ORAL at 17:06

## 2024-07-16 RX ADMIN — HEPARIN SODIUM 5000 UNITS: 5000 INJECTION INTRAVENOUS; SUBCUTANEOUS at 05:35

## 2024-07-16 RX ADMIN — LACTULOSE 20 G: 20 SOLUTION ORAL at 11:29

## 2024-07-16 RX ADMIN — LACTULOSE 20 G: 20 SOLUTION ORAL at 17:07

## 2024-07-16 RX ADMIN — CARVEDILOL 25 MG: 12.5 TABLET, FILM COATED ORAL at 08:47

## 2024-07-16 RX ADMIN — POLYETHYLENE GLYCOL 3350 17 G: 17 POWDER, FOR SOLUTION ORAL at 08:46

## 2024-07-16 RX ADMIN — ATORVASTATIN CALCIUM 10 MG: 10 TABLET, FILM COATED ORAL at 08:47

## 2024-07-16 RX ADMIN — SENNOSIDES 17.2 MG: 8.6 TABLET, FILM COATED ORAL at 11:29

## 2024-07-16 RX ADMIN — DIVALPROEX SODIUM 250 MG: 250 TABLET, DELAYED RELEASE ORAL at 08:47

## 2024-07-16 RX ADMIN — POLYETHYLENE GLYCOL 3350 17 G: 17 POWDER, FOR SOLUTION ORAL at 21:29

## 2024-07-16 RX ADMIN — FLUTICASONE PROPIONATE 1 SPRAY: 50 SPRAY, METERED NASAL at 08:49

## 2024-07-16 RX ADMIN — DONEPEZIL HYDROCHLORIDE 5 MG: 5 TABLET ORAL at 21:30

## 2024-07-16 RX ADMIN — HEPARIN SODIUM 5000 UNITS: 5000 INJECTION INTRAVENOUS; SUBCUTANEOUS at 21:30

## 2024-07-16 RX ADMIN — FLUTICASONE PROPIONATE 1 SPRAY: 50 SPRAY, METERED NASAL at 17:07

## 2024-07-16 RX ADMIN — BUMETANIDE 1 MG: 1 TABLET ORAL at 08:47

## 2024-07-16 NOTE — WOUND OSTOMY CARE
Consult Note - Wound   Gilda Garay 78 y.o. female MRN: [id number]  Unit/Bed#: -01 Encounter: [id number]        History and Present Illness:  Patient is a 77 yo female that was admitted to [facility] for treatment of N/V/D.  Patient has a PMH of type II diabetes, CKD4, CADw/ stent and CHF. Patient is a min/mod assist for turning and repositioning. Patient is incontinent of bowel and bladder. On assessment, patient is seen lying on regular mattress. Dietary nutritional supplements ordered for patient.    Wound Care was consulted for sacral wound     Assessment Findings:   B/L heels are dry intact and lolly with no skin loss or wounds present. Recommend preventative Hydraguard Cream and proper offloading/ repositioning.      POA Mid Sacrum Stage 3 Pressure Injury: irregular in shape area of full thickness skin loss. Wound bed is red in color and non-blanchable. So-wound is fragile and macerated with rolled edges. Scant to small serosanguineous drainage noted. Recommend calcium alginate and foam dressing to area.   Right Breast Wound: irregular in shape area of partial thickness skin loss located on distal nipple area. Wound bed is pink in color. So-wound is fragile. Scant serosanguineous drainage noted. Recommend a silicone bordered foam dressing to area.     No induration, fluctuance, odor, warmth/temperature differences, redness, or purulence noted to the above noted wounds and skin areas assessed. New dressings applied per orders listed below. Patient tolerated well- no s/s of non-verbal pain or discomfort observed during the encounter. Bedside nurse aware of plan of care. See flow sheets for more detailed assessment findings.      Orders listed below and wound care will continue to follow, call or Secure Chat Message with questions.     Skin Care Plan:  1-Cleanse sacro-buttocks with soap and water. Apply calcium alginate (melgisorb) to mid sacro-buttocks wound bed. Cover with a silicone bordered  foam dressing to area. Brian with T for Treatment and change every other day or PRN soilage/displacement.   2-Turn/reposition q2h or when medically stable for pressure re-distribution on skin .  3-Elevate heels to offload pressure.  4-Moisturize skin daily with skin nourishing cream  5-Ehob cushion in chair when out of bed.  6-Preventative Hydraguard to bilateral heels BID and PRN.   7-Right Breast: Cleanse with NSS and pat dry. Apply a silicone bordered foam dressing over wound bed. Brian with T for Treatment and change every other day or PRN soilage/displacement.       Wounds:  Pressure Ulcer 12/06/22 Sacrum (Active)   Pressure Injury Stage 3 07/16/24 1318   Wound Description Beefy red 07/16/24 1318   So-wound Assessment Fragile;Maceration 07/16/24 1318   Shape round 07/13/24 0031   Wound Length (cm) 2 cm 07/16/24 0805   Wound Width (cm) 2.5 cm 07/16/24 0805   Wound Depth (cm) 0.4 cm 07/16/24 0805   Wound Surface Area (cm^2) 5 cm^2 07/16/24 0805   Calculated Wound Volume (cm^3) 2 cm^3 07/16/24 0805   Change in Wound Size % -900 07/16/24 0805   Drainage Amount Scant 07/16/24 1318   Drainage Description Serosanguineous 07/16/24 1318   Treatment Cleansed;Irrigation with NSS 07/16/24 1318   Dressing Calcium Alginate;Foam, Silicone (eg. Allevyn, etc) 07/16/24 1318   Dressing Changed New dressing applied 07/16/24 1318   Wound packed? No 07/16/24 1318   Patient Tolerance Tolerated well 07/16/24 1318   Dressing Status Clean;Dry;Intact 07/16/24 1318   Wound Image   07/16/24 0805       Wound 07/13/24 Breast Lateral;Right;Lower (Active)   Wound Image   07/16/24 0805   Wound Description [place] 07/16/24 0805   So-wound Assessment Fragile 07/16/24 0805   Wound Length (cm) 1.5 cm 07/16/24 0805   Wound Width (cm) 1 cm 07/16/24 0805   Wound Depth (cm) 0.1 cm 07/16/24 0805   Wound Surface Area (cm^2) 1.5 cm^2 07/16/24 0805   Wound Volume (cm^3) 0.15 cm^3 07/16/24 0805   Calculated Wound Volume (cm^3) 0.15 cm^3 07/16/24 0805    Drainage Amount Scant 07/16/24 0805   Drainage Description Serosanguineous 07/16/24 0805   Non-staged Wound Description Partial thickness 07/16/24 0805   Treatments Cleansed;Irrigation with NSS;Site care 07/16/24 0805   Dressing Foam, Silicon (eg. Allevyn, etc) 07/16/24 0805   Dressing Changed Changed 07/16/24 0805   Patient Tolerance Tolerated well 07/16/24 0805   Dressing Status Dry;Intact;Clean 07/16/24 0805               Racheal Flores RN, BSN, CWOCN

## 2024-07-16 NOTE — DISCHARGE INSTR - OTHER ORDERS
Skin Care Plan:  1-Cleanse sacro-buttocks with soap and water. Apply calcium alginate (melgisorb) to mid sacro-buttocks wound bed. Cover with a silicone bordered foam dressing to area. Brian with T for Treatment and change every other day or PRN soilage/displacement.   2-Turn/reposition q2h or when medically stable for pressure re-distribution on skin .  3-Elevate heels to offload pressure.  4-Moisturize skin daily with skin nourishing cream  5-Ehob cushion in chair when out of bed.  6-Preventative Hydraguard to bilateral heels BID and PRN.   7-Right Breast: Cleanse with NSS and pat dry. Apply a silicone bordered foam dressing over wound bed. Brian with T for Treatment and change every other day or PRN soilage/displacement.

## 2024-07-16 NOTE — ASSESSMENT & PLAN NOTE
UA with 1+ leukocytes, positive nitrite, 10-20 WBCs  Prior urine cx + E. Coli, generally pansensitive  Completed 3 days empiric IV Rocephin  Urinary retention protocol given constipation

## 2024-07-16 NOTE — ASSESSMENT & PLAN NOTE
Lab Results   Component Value Date    EGFR 22 07/15/2024    EGFR 19 07/14/2024    EGFR 16 07/12/2024    CREATININE 2.08 (H) 07/15/2024    CREATININE 2.34 (H) 07/14/2024    CREATININE 2.65 (H) 07/12/2024     Baseline creatinine appears to be around 2.3  Renal function currently stable at baseline  Monitor BMP   Avoid nephrotoxins, renally dose as able

## 2024-07-16 NOTE — PROGRESS NOTES
[facility]  Progress Note  Name: Gilda Garay I  MRN: [id number]  Unit/Bed#: -01 I Date of Admission: 7/12/2024   Date of Service: 7/16/2024 I Hospital Day: 3    Assessment & Plan   * Nausea vomiting and diarrhea  Assessment & Plan  Patient brought in by son with weakness, slipped out of chair and was found on the floor by son after episode of nausea/vomiting and diarrhea. Recently treated for constipation by PCP.  CT a/p: Distended rectum containing well-formed stool with rectal mural thickening and perirectal fat stranding, which can be seen with stercoral colitis. No pneumatosis or free air.   Initiated bowel regimen  Advanced to regular diet, no further nausea/vomiting & remains constipated  Last BM 7/12  Obtain abdominal xray for further evaluation  Decrease Bumex to prevent dehydration  Increase bowel regimen to Senna BID and Miralax BID  Trial Lactulose x2 doses today  Encourage oral hydration     UTI (urinary tract infection)  Assessment & Plan  UA with 1+ leukocytes, positive nitrite, 10-20 WBCs  Prior urine cx + E. Coli, generally pansensitive  Completed 3 days empiric IV Rocephin  Urinary retention protocol given constipation    CKD (chronic kidney disease) stage 4, GFR 15-29 ml/min ([facility])  Assessment & Plan  Lab Results   Component Value Date    EGFR 22 07/15/2024    EGFR 19 07/14/2024    EGFR 16 07/12/2024    CREATININE 2.08 (H) 07/15/2024    CREATININE 2.34 (H) 07/14/2024    CREATININE 2.65 (H) 07/12/2024     Baseline creatinine appears to be around 2.3  Renal function currently stable at baseline  Monitor BMP   Avoid nephrotoxins, renally dose as able    Primary hypertension  Assessment & Plan  Continue home Coreg 25 mg BID  Decrease Bumex from 1 mg to 0.5 daily (no hst of CHF) as patient appears euvolemic with poor oral intake - given lack of BM, do not want to dehydrate her  Restart home Norvasc 2.5 mg daily   Recommend close follow-up with nephrology     Type 2  diabetes mellitus with stage 3a chronic kidney disease, with long-term current use of insulin (HCC)  Assessment & Plan  Lab Results   Component Value Date    HGBA1C 7.9 (H) 04/16/2024       Recent Labs     07/15/24  1552 07/15/24  2043 07/16/24  0711 07/16/24  1129   POCGLU 142* 217* 105 146*       Blood Sugar Average: Last 72 hrs:  (P) 154.2061077088200024  Relatively well-controlled DM given advanced age  Home regimen: Lantus 12U qHS.  Hypoglycemic with BG 31, s/p IV dextrose (7/14)  Continue reduced Lantus 5U qHS given poor appetite  SSI coverage with Accu-Cheks ACHS  Hypoglycemia protocol, goal -180    Sick sinus syndrome (HCC)  Assessment & Plan  History of sick sinus syndrome s/p PPM               VTE Pharmacologic Prophylaxis: VTE Score: 5 Moderate Risk (Score 3-4) - Pharmacological DVT Prophylaxis Ordered: heparin.    Mobility:   Basic Mobility Inpatient Raw Score: 6  JH-HLM Goal: 2: Bed activities/Dependent transfer  JH-HLM Achieved: 2: Bed activities/Dependent transfer  JH-HLM Goal achieved. Continue to encourage appropriate mobility.    Patient Centered Rounds: I performed bedside rounds with nursing staff today.   Discussions with Specialists or Other Care Team Provider: nursing, CM    Education and Discussions with Family / Patient: Updated  (son) via phone.    Total Time Spent on Date of Encounter in care of patient:  mins. This time was spent on one or more of the following: performing physical exam; counseling and coordination of care; obtaining or reviewing history; documenting in the medical record; reviewing/ordering tests, medications or procedures; communicating with other healthcare professionals and discussing with patient's family/caregivers.    Current Length of Stay: 3 day(s)  Current Patient Status: Inpatient   Certification Statement: The patient will continue to require additional inpatient hospital stay due to constipation  Discharge Plan: Anticipate discharge  tomorrow to home.    Code Status: Level 1 - Full Code    Subjective:   Patient seen and examined at bedside. She is eating lunch. Ate 50% of a ham and cheese sandwich with few sips of juice. She knows she is in the hospital and needs to have a BM. She denies CP, SOB, abdominal pain, N/V/D.     Objective:     Vitals:   Temp (24hrs), Av.6 °F (36.4 °C), Min:96.3 °F (35.7 °C), Max:98.3 °F (36.8 °C)    Temp:  [96.3 °F (35.7 °C)-98.3 °F (36.8 °C)] 96.3 °F (35.7 °C)  HR:  [60-64] 60  Resp:  [16] 16  BP: (100-149)/(53-66) 149/66  SpO2:  [98 %-100 %] 100 %  Body mass index is 20.02 kg/m².     Input and Output Summary (last 24 hours):   No intake or output data in the 24 hours ending 24 1217    Physical Exam:   Physical Exam  Vitals and nursing note reviewed.   Constitutional:       General: She is not in acute distress.     Appearance: She is ill-appearing (elderly, frail, appears deconditioned). She is not toxic-appearing or diaphoretic.      Comments: Pleasant female resting in bed on room air    HENT:      Head: Normocephalic.      Mouth/Throat:      Mouth: Mucous membranes are moist.   Eyes:      Conjunctiva/sclera: Conjunctivae normal.   Cardiovascular:      Rate and Rhythm: Normal rate.   Pulmonary:      Effort: Pulmonary effort is normal. No respiratory distress.      Breath sounds: Normal breath sounds. No wheezing, rhonchi or rales.   Abdominal:      General: Bowel sounds are normal. There is no distension.      Palpations: Abdomen is soft.      Tenderness: There is no abdominal tenderness. There is no guarding or rebound.   Musculoskeletal:         General: Normal range of motion.      Cervical back: Normal range of motion.      Right lower leg: No edema.      Left lower leg: No edema.   Skin:     General: Skin is warm and dry.      Capillary Refill: Capillary refill takes less than 2 seconds.   Neurological:      Mental Status: She is alert and oriented to person, place, and time. Mental status is at  baseline.   Psychiatric:         Mood and Affect: Mood normal.         Speech: Speech normal.         Behavior: Behavior is cooperative.          Additional Data:     Labs:  Results from last 7 days   Lab Units 07/15/24  0752   WBC Thousand/uL 5.51   HEMOGLOBIN g/dL 8.3*   HEMATOCRIT % 26.2*   PLATELETS Thousands/uL 171   SEGS PCT % 50   LYMPHO PCT % 37   MONO PCT % 12   EOS PCT % 0     Results from last 7 days   Lab Units 07/15/24  0752 07/14/24  0447 07/12/24  1728   SODIUM mmol/L 142   < > 141   POTASSIUM mmol/L 4.9   < > 4.3   CHLORIDE mmol/L 103   < > 101   CO2 mmol/L 34*   < > 31   BUN mg/dL 28*   < > 42*   CREATININE mg/dL 2.08*   < > 2.65*   ANION GAP mmol/L 5   < > 9   CALCIUM mg/dL 8.5   < > 9.2   ALBUMIN g/dL  --   --  3.2*   TOTAL BILIRUBIN mg/dL  --   --  0.43   ALK PHOS U/L  --   --  40   ALT U/L  --   --  9   AST U/L  --   --  21   GLUCOSE RANDOM mg/dL 165*   < > 215*    < > = values in this interval not displayed.         Results from last 7 days   Lab Units 07/16/24  1129 07/16/24  0711 07/15/24  2043 07/15/24  1552 07/15/24  1105 07/15/24  0709 07/15/24  0018 07/14/24  2029 07/14/24  1549 07/14/24  1113 07/14/24  0706 07/14/24  0536   POC GLUCOSE mg/dl 146* 105 217* 142* 174* 175* 133 156* 224* 296* 197* 218*               Lines/Drains:  Invasive Devices       Peripheral Intravenous Line  Duration             Peripheral IV 07/14/24 Dorsal (posterior);Right Hand 2 days                          Imaging: Reviewed radiology reports from this admission including: abdominal/pelvic CT    Recent Cultures (last 7 days):         Last 24 Hours Medication List:   Current Facility-Administered Medications   Medication Dose Route Frequency Provider Last Rate    acetaminophen  650 mg Oral Q6H PRN TENNILLE Harley      aspirin  81 mg Oral Daily TENNILLE Harley      atorvastatin  10 mg Oral Daily TENNILLE Harley      [START ON 7/17/2024] bumetanide  0.5 mg Oral Daily  Luanne Meadows, TENNILLE      calcium carbonate  1,000 mg Oral Daily PRN [facility], CRNP      carvedilol  25 mg Oral Q12H [facility], CRNP      divalproex sodium  250 mg Oral Q12H [facility], CRNP      donepezil  5 mg Oral HS [facility], CRVINH      fluticasone  1 spray Each Nare BID [facility], TENNILLE      heparin (porcine)  5,000 Units Subcutaneous Q8H [facility], CRNP      insulin glargine  5 Units Subcutaneous HS Ching Tong, TENNILLE      insulin lispro  1-5 Units Subcutaneous TID [facility], CR[facility]      insulin lispro  1-5 Units Subcutaneous HS [facility], CRNP      lactulose  20 g Oral BID Luanne Meadows, TENNILLE      ondansetron  4 mg Intravenous Q6H PRN Lislizabeth Gómez, TENNILLE      polyethylene glycol  17 g Oral BID Ava Bourgeois PA-C      ranolazine  500 mg Oral Daily [facility], TENNILLE      senna  2 tablet Oral BID TENNILLE Romero          Today, Patient Was Seen By: TENNILLE Romero    **Please Note: This note may have been constructed using a voice recognition system.**

## 2024-07-16 NOTE — ASSESSMENT & PLAN NOTE
Patient brought in by son with weakness, slipped out of chair and was found on the floor by son after episode of nausea/vomiting and diarrhea. Recently treated for constipation by PCP.  CT a/p: Distended rectum containing well-formed stool with rectal mural thickening and perirectal fat stranding, which can be seen with stercoral colitis. No pneumatosis or free air.   Initiated bowel regimen  Advanced to regular diet, no further nausea/vomiting & remains constipated  Last BM 7/12  Obtain abdominal xray for further evaluation  Decrease Bumex to prevent dehydration  Increase bowel regimen to Senna BID and Miralax BID  Trial Lactulose x2 doses today  Encourage oral hydration

## 2024-07-16 NOTE — ASSESSMENT & PLAN NOTE
Continue home Coreg 25 mg BID  Decrease Bumex from 1 mg to 0.5 daily (no hst of CHF) as patient appears euvolemic with poor oral intake - given lack of BM, do not want to dehydrate her  Restart home Norvasc 2.5 mg daily   Recommend close follow-up with nephrology

## 2024-07-16 NOTE — CASE MANAGEMENT
Case Management Assessment & Discharge Planning Note    Patient name Gilda Garay  Location /-01 MRN [id number]  : 1945 Date 2024       Current Admission Date: 2024  Current Admission Diagnosis:Nausea vomiting and diarrhea   Patient Active Problem List    Diagnosis Date Noted Date Diagnosed    Nausea vomiting and diarrhea 2024     UTI (urinary tract infection) 2024     PAD (peripheral artery disease) ([facility]) 2024     Sleep disturbance 05/10/2023     CKD (chronic kidney disease) stage 4, GFR 15-29 ml/min ([facility]) 2023     Benign hypertension with CKD (chronic kidney disease) stage IV ([facility]) 2023     Overweight (BMI 25.0-29.9) 2023     History of stroke 2023     Ambulatory dysfunction 01/10/2023     Moderate Lewy body dementia without behavioral disturbance, psychotic disturbance, mood disturbance, or anxiety ([facility]) 01/10/2023     Cerebral amyloid angiopathy  ([facility]) 2022     History of fracture of left hip 10/08/2022     Chronic congestive heart failure, unspecified heart failure type ([facility]) 2022     Atrial flutter, paroxysmal ([facility]) 2022     Full code status 2022     Cardiac pacemaker in situ 2022     H/O heart artery stent 2022     Closed left hip fracture ([facility]) 2022     Blind 2022     Primary hypertension 2022     CAD (coronary artery disease) 2022     Type 2 diabetes mellitus with stage 3a chronic kidney disease, with long-term current use of insulin ([facility])      Anemia of chronic kidney failure, stage 4 (severe)  ([facility]) 2022     Vertigo 2022     Sick sinus syndrome ([facility]) 2021     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[facility]) 2021     Multiple thyroid nodules 2020     Heterozygous MTHFR mutation Q4293K 2019       LOS (days): 3  Geometric Mean LOS (GMLOS) (days): 2.9  Days to GMLOS:0.2     OBJECTIVE:    Risk of Unplanned Readmission Score: 17.13       Current admission status: Inpatient  Referral Reason: Other    Preferred Pharmacy:   [id number] - [place] - [address]  [address] [address]  Phone: [phone] Fax: [phone]    Primary Care Provider: Karthik Sanchez MD    Primary Insurance: [employer] [facility] REP  [facility]    ASSESSMENT:  Active Health Care Proxies    There are no active Health Care Proxies on file.       Readmission Root Cause  30 Day Readmission: No    Patient Information  Admitted from:: Home  Mental Status: Alert, Confused  During Assessment patient was accompanied by: Not accompanied during assessment  Assessment information provided by:: Son  Primary Caregiver: Self  Support Systems: Son, Family members  County of Residence: [place]  What city do you live in?: [place]  Home entry access options. Select all that apply.: Stairs  Number of steps to enter home.: 1  Type of Current Residence: [address] [place] home  Upon entering residence, is there a bedroom on the main floor (no further steps)?: Yes  Upon entering residence, is there a bathroom on the main floor (no further steps)?: Yes  Living Arrangements: Lives w/ Son, Lives w/ Extended Family  Is patient a ?: No    Activities of Daily Living Prior to Admission  Functional Status: Total dependent  Completes ADLs independently?: No  Level of ADL dependence: Total Dependent  Ambulates independently?: No  Level of ambulatory dependence: Total Dependent  Does patient use assisted devices?: Yes  Assisted Devices (DME) used: Wheelchair, Walker, Shower Chair, Bedside Commode, Other (Comment) (railings on bed and harness for wc)  Does patient currently own DME?: Yes  What DME does the patient currently own?: Bedside Commode, Shower Chair, Walker, Wheelchair, Other (Comment) (railings on bed and harness for wc)  Does patient have a history of HHC?: Yes  Does patient currently have HHC?: No    Patient  Information Continued  Income Source: Pension/skilled nursing  Does patient have prescription coverage?: Yes  Does patient receive dialysis treatments?: No  Does patient have a history of substance abuse?: No  Does patient have a history of Mental Health Diagnosis?: No    Means of Transportation  Means of Transport to [facility]:: Family transport      Social Determinants of Health (SDOH)      Flowsheet Row Most Recent Value   Housing Stability    In the last 12 months, was there a time when you were not able to pay the mortgage or rent on time? N   In the past 12 months, how many times have you moved where you were living? 0   At any time in the past 12 months, were you homeless or living in a shelter (including now)? N   Transportation Needs    In the past 12 months, has lack of transportation kept you from medical appointments or from getting medications? no   In the past 12 months, has lack of transportation kept you from meetings, work, or from getting things needed for daily living? No   Food Insecurity    Within the past 12 months, you worried that your food would run out before you got the money to buy more. Never true   Within the past 12 months, the food you bought just didn't last and you didn't have money to get more. Never true   Utilities    In the past 12 months has the electric, gas, oil, or water company threatened to shut off services in your home? No            DISCHARGE DETAILS:    Discharge planning discussed with:: Patients son  Freedom of Choice: Yes  Comments - Freedom of Choice: CM spoke with patients son to introduce role and begin discharge planning. The choice is for the patient to return home with DIL as caregiver.  CM contacted family/caregiver?: Yes  Were Treatment Team discharge recommendations reviewed with patient/caregiver?: Yes  Did patient/caregiver verbalize understanding of patient care needs?: Yes  Were patient/caregiver advised of the risks associated with not following Treatment Team  discharge recommendations?: Yes    Contacts  Patient Contacts: simona De Los Santos  Relationship to Patient:: Family  Contact Method: Phone  Phone Number: [phone]  Reason/Outcome: Discharge Planning    Requested Home Health Care         Is the patient interested in HHC at discharge?: Yes  Home Health Discipline requested:: Occupational Therapy, Physical Therapy  Home Health Agency Name:: [facility]  HHA External Referral Reason (only applicable if external HHA name selected): Patient has established relationship with provider  Home Health Follow-Up Provider:: PCP  Home Health Services Needed:: Gait/ADL Training, Evaluate Functional Status and Safety, Strengthening/Theraputic Exercises to Improve Function  Homebound Criteria Met:: Requires the Assistance of Another Person for Safe Ambulation or to Leave the Home, Uses an Assist Device (i.e. cane, walker, etc)  Supporting Clincal Findings:: Bed Bound or Wheelchair Bound, Cognitive Deficit Requiring the Assistance of Others    DME Referral Provided  Referral made for DME?: No    Other Referral/Resources/Interventions Provided:  Interventions: HHC  Referral Comments: Referral for [facility] placed in [facility] at request of the patients son.    Would you like to participate in our [facility] service program?  : No - Declined    Treatment Team Recommendation: Home with Home Health Care  Discharge Destination Plan:: Home with Home Health Care  Transport at Discharge : Family

## 2024-07-16 NOTE — ASSESSMENT & PLAN NOTE
Lab Results   Component Value Date    HGBA1C 7.9 (H) 04/16/2024       Recent Labs     07/15/24  1552 07/15/24  2043 07/16/24  0711 07/16/24  1129   POCGLU 142* 217* 105 146*       Blood Sugar Average: Last 72 hrs:  (P) [id number]  Relatively well-controlled DM given advanced age  Home regimen: Lantus 12U qHS.  Hypoglycemic with BG 31, s/p IV dextrose (7/14)  Continue reduced Lantus 5U qHS given poor appetite  SSI coverage with Accu-Cheks ACHS  Hypoglycemia protocol, goal -180

## 2024-07-17 VITALS
OXYGEN SATURATION: 100 % | HEART RATE: 74 BPM | BODY MASS INDEX: 18.98 KG/M2 | SYSTOLIC BLOOD PRESSURE: 127 MMHG | TEMPERATURE: 97.8 F | DIASTOLIC BLOOD PRESSURE: 79 MMHG | HEIGHT: 63 IN | WEIGHT: 107.14 LBS | RESPIRATION RATE: 16 BRPM

## 2024-07-17 LAB
ANION GAP SERPL CALCULATED.3IONS-SCNC: 6 MMOL/L (ref 4–13)
BUN SERPL-MCNC: 25 MG/DL (ref 5–25)
CALCIUM SERPL-MCNC: 8.7 MG/DL (ref 8.4–10.2)
CHLORIDE SERPL-SCNC: 104 MMOL/L (ref 96–108)
CO2 SERPL-SCNC: 32 MMOL/L (ref 21–32)
CREAT SERPL-MCNC: 1.96 MG/DL (ref 0.6–1.3)
ERYTHROCYTE [DISTWIDTH] IN BLOOD BY AUTOMATED COUNT: 12.6 % (ref 11.6–15.1)
GFR SERPL CREATININE-BSD FRML MDRD: 23 ML/MIN/1.73SQ M
GLUCOSE SERPL-MCNC: 122 MG/DL (ref 65–140)
GLUCOSE SERPL-MCNC: 130 MG/DL (ref 65–140)
GLUCOSE SERPL-MCNC: 218 MG/DL (ref 65–140)
HCT VFR BLD AUTO: 27.4 % (ref 34.8–46.1)
HGB BLD-MCNC: 8.8 G/DL (ref 11.5–15.4)
MAGNESIUM SERPL-MCNC: 2.1 MG/DL (ref 1.9–2.7)
MCH RBC QN AUTO: 30.2 PG (ref 26.8–34.3)
MCHC RBC AUTO-ENTMCNC: 32.1 G/DL (ref 31.4–37.4)
MCV RBC AUTO: 94 FL (ref 82–98)
PLATELET # BLD AUTO: 168 THOUSANDS/UL (ref 149–390)
PMV BLD AUTO: 11.1 FL (ref 8.9–12.7)
POTASSIUM SERPL-SCNC: 3.9 MMOL/L (ref 3.5–5.3)
RBC # BLD AUTO: 2.91 MILLION/UL (ref 3.81–5.12)
SODIUM SERPL-SCNC: 142 MMOL/L (ref 135–147)
WBC # BLD AUTO: 5.21 THOUSAND/UL (ref 4.31–10.16)

## 2024-07-17 PROCEDURE — 83735 ASSAY OF MAGNESIUM: CPT | Performed by: NURSE PRACTITIONER

## 2024-07-17 PROCEDURE — 85027 COMPLETE CBC AUTOMATED: CPT | Performed by: NURSE PRACTITIONER

## 2024-07-17 PROCEDURE — 99239 HOSP IP/OBS DSCHRG MGMT >30: CPT | Performed by: FAMILY MEDICINE

## 2024-07-17 PROCEDURE — 80048 BASIC METABOLIC PNL TOTAL CA: CPT | Performed by: NURSE PRACTITIONER

## 2024-07-17 PROCEDURE — 82948 REAGENT STRIP/BLOOD GLUCOSE: CPT

## 2024-07-17 RX ORDER — SENNOSIDES 8.6 MG
17.2 TABLET ORAL 2 TIMES DAILY
Start: 2024-07-17

## 2024-07-17 RX ORDER — BUMETANIDE 1 MG/1
0.5 TABLET ORAL DAILY
Qty: 90 TABLET | Refills: 0 | Status: SHIPPED | OUTPATIENT
Start: 2024-07-17

## 2024-07-17 RX ORDER — POLYETHYLENE GLYCOL 3350 17 G/17G
17 POWDER, FOR SOLUTION ORAL 2 TIMES DAILY PRN
Start: 2024-07-17

## 2024-07-17 RX ADMIN — FLUTICASONE PROPIONATE 1 SPRAY: 50 SPRAY, METERED NASAL at 08:15

## 2024-07-17 RX ADMIN — POLYETHYLENE GLYCOL 3350 17 G: 17 POWDER, FOR SOLUTION ORAL at 08:15

## 2024-07-17 RX ADMIN — CARVEDILOL 25 MG: 12.5 TABLET, FILM COATED ORAL at 08:12

## 2024-07-17 RX ADMIN — AMLODIPINE BESYLATE 2.5 MG: 2.5 TABLET ORAL at 08:12

## 2024-07-17 RX ADMIN — HEPARIN SODIUM 5000 UNITS: 5000 INJECTION INTRAVENOUS; SUBCUTANEOUS at 05:40

## 2024-07-17 RX ADMIN — SENNOSIDES 17.2 MG: 8.6 TABLET, FILM COATED ORAL at 08:12

## 2024-07-17 RX ADMIN — DIVALPROEX SODIUM 250 MG: 250 TABLET, DELAYED RELEASE ORAL at 08:12

## 2024-07-17 RX ADMIN — ASPIRIN 81 MG 81 MG: 81 TABLET ORAL at 08:12

## 2024-07-17 RX ADMIN — ATORVASTATIN CALCIUM 10 MG: 10 TABLET, FILM COATED ORAL at 08:12

## 2024-07-17 RX ADMIN — BUMETANIDE 0.5 MG: 1 TABLET ORAL at 08:12

## 2024-07-17 RX ADMIN — RANOLAZINE 500 MG: 500 TABLET, EXTENDED RELEASE ORAL at 08:12

## 2024-07-17 NOTE — ASSESSMENT & PLAN NOTE
Asymptomatic  UA with 1+ leukocytes, positive nitrite, 10-20 WBCs  Prior urine cx + E. Coli, generally pansensitive  Completed 3 days empiric IV Rocephin  Urinary retention protocol given constipation

## 2024-07-17 NOTE — ASSESSMENT & PLAN NOTE
Lab Results   Component Value Date    EGFR 23 07/17/2024    EGFR 22 07/15/2024    EGFR 19 07/14/2024    CREATININE 1.96 (H) 07/17/2024    CREATININE 2.08 (H) 07/15/2024    CREATININE 2.34 (H) 07/14/2024     Stable, Baseline creatinine appears to be around 2.3  Monitor BMP   Avoid nephrotoxins, renally dose as able

## 2024-07-17 NOTE — ASSESSMENT & PLAN NOTE
Patient brought in by son with weakness, slipped out of chair and was found on the floor by son after episode of nausea/vomiting and diarrhea. Recently treated for constipation by PCP.  CT a/p: Distended rectum containing well-formed stool with rectal mural thickening and perirectal fat stranding, which can be seen with stercoral colitis. No pneumatosis or free air.   Initiated bowel regimen  Advanced to regular diet, no further nausea/vomiting & remains constipated  Last BM 7/12  KUB shows mild distal rectal stool  Decreased Bumex to prevent dehydration  Increase bowel regimen to Senna BID and Miralax BID  Trial Lactulose x2 doses today  Encourage oral hydration

## 2024-07-17 NOTE — DISCHARGE SUMMARY
"[facility]  Discharge- Gilda Garay 1945, 78 y.o. female MRN: [id number]  Unit/Bed#: [facility] Encounter: [id number]  Primary Care Provider: Karthik Sanchez MD   Date and time admitted to hospital: 7/12/2024  4:56 PM    * Nausea vomiting and diarrhea  Assessment & Plan  Patient brought in by son with weakness, slipped out of chair and was found on the floor by son after episode of nausea/vomiting and diarrhea. Recently treated for constipation by PCP.  CT a/p: Distended rectum containing well-formed stool with rectal mural thickening and perirectal fat stranding, which can be seen with stercoral colitis. No pneumatosis or free air.   Initiated bowel regimen  Advanced to regular diet, no further nausea/vomiting & remains constipated  Last BM 7/12  KUB shows mild distal rectal stool  Decreased Bumex to prevent dehydration  Increase bowel regimen to Senna BID and Miralax BID  Trial Lactulose x2 doses today  Encourage oral hydration     UTI (urinary tract infection)  Assessment & Plan  Asymptomatic  UA with 1+ leukocytes, positive nitrite, 10-20 WBCs  Prior urine cx + E. Coli, generally pansensitive  Completed 3 days empiric IV Rocephin  Urinary retention protocol given constipation    CKD (chronic kidney disease) stage 4, GFR 15-29 ml/min ([facility])  Assessment & Plan  Lab Results   Component Value Date    EGFR 23 07/17/2024    EGFR 22 07/15/2024    EGFR 19 07/14/2024    CREATININE 1.96 (H) 07/17/2024    CREATININE 2.08 (H) 07/15/2024    CREATININE 2.34 (H) 07/14/2024     Stable, Baseline creatinine appears to be around 2.3  Monitor BMP   Avoid nephrotoxins, renally dose as able    Primary hypertension  Assessment & Plan  /79 (BP Location: Left arm)   Pulse 74   Temp 97.8 °F (36.6 °C) (Tympanic)   Resp 16   Ht 5' 3\" (1.6 m)   Wt 48.6 kg (107 lb 2.3 oz) Comment: pt unable to stand  SpO2 100%   BMI 18.98 kg/m²   Stable pressures  Continue home Coreg 25 mg BID  Decrease Bumex from 1 " mg to 0.5 daily (no hst of CHF) as patient appears euvolemic with poor oral intake - given lack of BM, do not want to dehydrate her  Restart home Norvasc 2.5 mg daily   Recommend close follow-up with nephrology     Type 2 diabetes mellitus with stage 3a chronic kidney disease, with long-term current use of insulin (HCC)  Assessment & Plan  Lab Results   Component Value Date    HGBA1C 7.9 (H) 04/16/2024       Recent Labs     07/16/24  1129 07/16/24  1557 07/16/24  2140 07/17/24  0717   POCGLU 146* 150* 148* 130         Blood Sugar Average: Last 72 hrs:  (P) 157.7426102265946064  Stable. Relatively well-controlled DM given advanced age  Home regimen: Lantus 12U qHS.  Hypoglycemic with BG 31, s/p IV dextrose (7/14)  Continue reduced Lantus 5U qHS given poor appetite  SSI coverage with Accu-Cheks ACHS  Hypoglycemia protocol, goal -180    Sick sinus syndrome (HCC)  Assessment & Plan  History of sick sinus syndrome s/p PPM        Medical Problems       Resolved Problems  Date Reviewed: 7/16/2024   None       Discharging Physician / Practitioner: Deric Riojas MD  PCP: Karthik Sanchez MD  Admission Date:   Admission Orders (From admission, onward)       Ordered        07/13/24 1729  INPATIENT ADMISSION  Once            07/12/24 2011  Place in Observation  Once                          Discharge Date: 07/17/24    Consultations During Hospital Stay:  None    Procedures Performed:   none    Significant Findings / Test Results:   Stercoral colitis on imaging    Incidental Findings:   As above   I reviewed the above mentioned incidental findings with the patient and/or family and they expressed understanding.    Test Results Pending at Discharge (will require follow up):   none     Outpatient Tests Requested:  none    Complications:  none    Reason for Admission: generalized weakness    Hospital Course:   Gilda Garay is a 78 y.o. female patient who originally presented to the hospital on 7/12/2024 due to generalized  "weakness and that she slid out of her chair onto floor , without LOC or head strike. Had N/V/D which have now resolved. Pt was found to have stercoral colitis on ct imaging and was treated for colitis and constipation. No signs of fecal impaction on exam but imaging does show some distal rectal stool. Pt is asymptomatic now and wants to go home. Medicaly stable and clear for DC home with HH      Please see above list of diagnoses and related plan for additional information.     Condition at Discharge: stable    Discharge Day Visit / Exam:   Subjective:  \"I am feeling better\"  Vitals: Blood Pressure: 127/79 (07/17/24 0802)  Pulse: 74 (07/17/24 0802)  Temperature: 97.8 °F (36.6 °C) (07/17/24 0802)  Temp Source: Tympanic (07/17/24 0802)  Respirations: 16 (07/17/24 0802)  Height: 5' 3\" (160 cm) (07/12/24 2114)  Weight - Scale: 48.6 kg (107 lb 2.3 oz) (pt unable to stand) (07/17/24 0600)  SpO2: 100 % (07/17/24 0802)  Exam:   Physical Exam s1,2 (+) R  Lungs CTA  Abd nt nd bs (+) 4 quads  Alert to place and person  No new focal neuro  deficit  Follows commands and answers questions appropriately  No pedal edema  No jvd and neck supple  Nc at     Discussion with Family: Updated  (son) at bedside.    Discharge instructions/Information to patient and family:   See after visit summary for information provided to patient and family.      Provisions for Follow-Up Care:  See after visit summary for information related to follow-up care and any pertinent home health orders.      Mobility at time of Discharge:   Basic Mobility Inpatient Raw Score: 6  JH-HLM Goal: 2: Bed activities/Dependent transfer  JH-HLM Achieved: 2: Bed activities/Dependent transfer  HLM Goal achieved. Continue to encourage appropriate mobility.     Disposition:   Home with VNA Services (Reminder: Complete face to face encounter)    Planned Readmission: no     Discharge Statement:  I spent 74 minutes discharging the patient. This time was spent on " the day of discharge. I had direct contact with the patient on the day of discharge. Greater than 50% of the total time was spent examining patient, answering all patient questions, arranging and discussing plan of care with patient as well as directly providing post-discharge instructions.  Additional time then spent on discharge activities.    Discharge Medications:  See after visit summary for reconciled discharge medications provided to patient and/or family.      **Please Note: This note may have been constructed using a voice recognition system**

## 2024-07-17 NOTE — ASSESSMENT & PLAN NOTE
Lab Results   Component Value Date    HGBA1C 7.9 (H) 04/16/2024       Recent Labs     07/16/24  1129 07/16/24  1557 07/16/24  2140 07/17/24  0717   POCGLU 146* 150* 148* 130         Blood Sugar Average: Last 72 hrs:  (P) 157.5766215008129837  Stable. Relatively well-controlled DM given advanced age  Home regimen: Lantus 12U qHS.  Hypoglycemic with BG 31, s/p IV dextrose (7/14)  Continue reduced Lantus 5U qHS given poor appetite  SSI coverage with Accu-Cheks ACHS  Hypoglycemia protocol, goal -180

## 2024-07-17 NOTE — PLAN OF CARE
Problem: PAIN - ADULT  Goal: Verbalizes/displays adequate comfort level or baseline comfort level  Description: Interventions:  - Encourage patient to monitor pain and request assistance  - Assess pain using appropriate pain scale  - Administer analgesics based on type and severity of pain and evaluate response  - Implement non-pharmacological measures as appropriate and evaluate response  - Consider cultural and social influences on pain and pain management  - Notify physician/advanced practitioner if interventions unsuccessful or patient reports new pain  Outcome: Progressing     Problem: INFECTION - ADULT  Goal: Absence or prevention of progression during hospitalization  Description: INTERVENTIONS:  - Assess and monitor for signs and symptoms of infection  - Monitor lab/diagnostic results  - Monitor all insertion sites, i.e. indwelling lines, tubes, and drains  - Monitor endotracheal if appropriate and nasal secretions for changes in amount and color  - [place] appropriate cooling/warming therapies per order  - Administer medications as ordered  - Instruct and encourage patient and family to use good hand hygiene technique  - Identify and instruct in appropriate isolation precautions for identified infection/condition  Outcome: Progressing  Goal: Absence of fever/infection during neutropenic period  Description: INTERVENTIONS:  - Monitor WBC    Outcome: Progressing     Problem: SAFETY ADULT  Goal: Patient will remain free of falls  Description: INTERVENTIONS:  - Educate patient/family on patient safety including physical limitations  - Instruct patient to call for assistance with activity   - Consult OT/PT to assist with strengthening/mobility   - Keep Call bell within reach  - Keep bed low and locked with side rails adjusted as appropriate  - Keep care items and personal belongings within reach  - Initiate and maintain comfort rounds  - Make Fall Risk Sign visible to staff  - Offer Toileting every 2 Hours,  in advance of need  - Initiate/Maintain bed alarm  - Obtain necessary fall risk management equipment: floorcushion  - Apply yellow socks and bracelet for high fall risk patients  - Consider moving patient to room near nurses station  Outcome: Progressing     Problem: DISCHARGE PLANNING  Goal: Discharge to home or other facility with appropriate resources  Description: INTERVENTIONS:  - Identify barriers to discharge w/patient and caregiver  - Arrange for needed discharge resources and transportation as appropriate  - Identify discharge learning needs (meds, wound care, etc.)  - Arrange for interpretive services to assist at discharge as needed  - Refer to Case Management Department for coordinating discharge planning if the patient needs post-hospital services based on physician/advanced practitioner order or complex needs related to functional status, cognitive ability, or social support system  Outcome: Progressing     Problem: Prexisting or High Potential for Compromised Skin Integrity  Goal: Skin integrity is maintained or improved  Description: INTERVENTIONS:  - Identify patients at risk for skin breakdown  - Assess and monitor skin integrity  - Assess and monitor nutrition and hydration status  - Monitor labs   - Assess for incontinence   - Turn and reposition patient  - Assist with mobility/ambulation  - Relieve pressure over bony prominences  - Avoid friction and shearing  - Provide appropriate hygiene as needed including keeping skin clean and dry  - Evaluate need for skin moisturizer/barrier cream  - Collaborate with interdisciplinary team   - Patient/family teaching  - Consider wound care consult   Outcome: Progressing     Problem: GASTROINTESTINAL - ADULT  Goal: Minimal or absence of nausea and/or vomiting  Description: INTERVENTIONS:  - Administer IV fluids if ordered to ensure adequate hydration  - Maintain NPO status until nausea and vomiting are resolved  - Nasogastric tube if ordered  - Administer  ordered antiemetic medications as needed  - Provide nonpharmacologic comfort measures as appropriate  - Advance diet as tolerated, if ordered  - Consider nutrition services referral to assist patient with adequate nutrition and appropriate food choices  Outcome: Progressing  Goal: Maintains or returns to baseline bowel function  Description: INTERVENTIONS:  - Assess bowel function  - Encourage oral fluids to ensure adequate hydration  - Administer IV fluids if ordered to ensure adequate hydration  - Administer ordered medications as needed  - Encourage mobilization and activity  - Consider nutritional services referral to assist patient with adequate nutrition and appropriate food choices  Outcome: Progressing  Goal: Maintains adequate nutritional intake  Description: INTERVENTIONS:  - Monitor percentage of each meal consumed  - Identify factors contributing to decreased intake, treat as appropriate  - Assist with meals as needed  - Monitor I&O, weight, and lab values if indicated  - Obtain nutrition services referral as needed  Outcome: Progressing     Problem: Nutrition/Hydration-ADULT  Goal: Nutrient/Hydration intake appropriate for improving, restoring or maintaining nutritional needs  Description: Monitor and assess patient's nutrition/hydration status for malnutrition. Collaborate with interdisciplinary team and initiate plan and interventions as ordered.  Monitor patient's weight and dietary intake as ordered or per policy. Utilize nutrition screening tool and intervene as necessary. Determine patient's food preferences and provide high-protein, high-caloric foods as appropriate.     INTERVENTIONS:  - Monitor oral intake, urinary output, labs, and treatment plans  - Assess nutrition and hydration status and recommend course of action  - Evaluate amount of meals eaten  - Assist patient with eating if necessary   - Allow adequate time for meals  - Recommend/ encourage appropriate diets, oral nutritional  supplements, and vitamin/mineral supplements  - Order, calculate, and assess calorie counts as needed  - Recommend, monitor, and adjust tube feedings and TPN/PPN based on assessed needs  - Assess need for intravenous fluids  - Provide specific nutrition/hydration education as appropriate  - Include patient/family/caregiver in decisions related to nutrition  Outcome: Progressing

## 2024-07-17 NOTE — ASSESSMENT & PLAN NOTE
"/79 (BP Location: Left arm)   Pulse 74   Temp 97.8 °F (36.6 °C) (Tympanic)   Resp 16   Ht 5' 3\" (1.6 m)   Wt 48.6 kg (107 lb 2.3 oz) Comment: pt unable to stand  SpO2 100%   BMI 18.98 kg/m²   Stable pressures  Continue home Coreg 25 mg BID  Decrease Bumex from 1 mg to 0.5 daily (no hst of CHF) as patient appears euvolemic with poor oral intake - given lack of BM, do not want to dehydrate her  Restart home Norvasc 2.5 mg daily   Recommend close follow-up with nephrology   "

## 2024-07-17 NOTE — PLAN OF CARE
Problem: PAIN - ADULT  Goal: Verbalizes/displays adequate comfort level or baseline comfort level  Description: Interventions:  - Encourage patient to monitor pain and request assistance  - Assess pain using appropriate pain scale  - Administer analgesics based on type and severity of pain and evaluate response  - Implement non-pharmacological measures as appropriate and evaluate response  - Consider cultural and social influences on pain and pain management  - Notify physician/advanced practitioner if interventions unsuccessful or patient reports new pain  Outcome: Progressing     Problem: INFECTION - ADULT  Goal: Absence or prevention of progression during hospitalization  Description: INTERVENTIONS:  - Assess and monitor for signs and symptoms of infection  - Monitor lab/diagnostic results  - Monitor all insertion sites, i.e. indwelling lines, tubes, and drains  - Monitor endotracheal if appropriate and nasal secretions for changes in amount and color  - [place] appropriate cooling/warming therapies per order  - Administer medications as ordered  - Instruct and encourage patient and family to use good hand hygiene technique  - Identify and instruct in appropriate isolation precautions for identified infection/condition  Outcome: Progressing  Goal: Absence of fever/infection during neutropenic period  Description: INTERVENTIONS:  - Monitor WBC    Outcome: Progressing     Problem: SAFETY ADULT  Goal: Patient will remain free of falls  Description: INTERVENTIONS:  - Educate patient/family on patient safety including physical limitations  - Instruct patient to call for assistance with activity   - Consult OT/PT to assist with strengthening/mobility   - Keep Call bell within reach  - Keep bed low and locked with side rails adjusted as appropriate  - Keep care items and personal belongings within reach  - Initiate and maintain comfort rounds  - Make Fall Risk Sign visible to staff  - Offer Toileting every  Hours,  in advance of need  - Initiate/Maintain alarm  - Obtain necessary fall risk management equipment:   - Apply yellow socks and bracelet for high fall risk patients  - Consider moving patient to room near nurses station  Outcome: Progressing  Goal: Maintain or return to baseline ADL function  Description: INTERVENTIONS:  -  Assess patient's ability to carry out ADLs; assess patient's baseline for ADL function and identify physical deficits which impact ability to perform ADLs (bathing, care of mouth/teeth, toileting, grooming, dressing, etc.)  - Assess/evaluate cause of self-care deficits   - Assess range of motion  - Assess patient's mobility; develop plan if impaired  - Assess patient's need for assistive devices and provide as appropriate  - Encourage maximum independence but intervene and supervise when necessary  - Involve family in performance of ADLs  - Assess for home care needs following discharge   - Consider OT consult to assist with ADL evaluation and planning for discharge  - Provide patient education as appropriate  Outcome: Progressing  Goal: Maintains/Returns to pre admission functional level  Description: INTERVENTIONS:  - Perform AM-PAC 6 Click Basic Mobility/ Daily Activity assessment daily.  - Set and communicate daily mobility goal to care team and patient/family/caregiver.   - Collaborate with rehabilitation services on mobility goals if consulted  - Perform Range of Motion  times a day.  - Reposition patient every  hours.  - Dangle patient  times a day  - Stand patient  times a day  - Ambulate patient  times a day  - Out of bed to chair  times a day   - Out of bed for meals times a day  - Out of bed for toileting  - Record patient progress and toleration of activity level   Outcome: Progressing     Problem: DISCHARGE PLANNING  Goal: Discharge to home or other facility with appropriate resources  Description: INTERVENTIONS:  - Identify barriers to discharge w/patient and caregiver  - Arrange for  needed discharge resources and transportation as appropriate  - Identify discharge learning needs (meds, wound care, etc.)  - Arrange for interpretive services to assist at discharge as needed  - Refer to Case Management Department for coordinating discharge planning if the patient needs post-hospital services based on physician/advanced practitioner order or complex needs related to functional status, cognitive ability, or social support system  Outcome: Progressing     Problem: Prexisting or High Potential for Compromised Skin Integrity  Goal: Skin integrity is maintained or improved  Description: INTERVENTIONS:  - Identify patients at risk for skin breakdown  - Assess and monitor skin integrity  - Assess and monitor nutrition and hydration status  - Monitor labs   - Assess for incontinence   - Turn and reposition patient  - Assist with mobility/ambulation  - Relieve pressure over bony prominences  - Avoid friction and shearing  - Provide appropriate hygiene as needed including keeping skin clean and dry  - Evaluate need for skin moisturizer/barrier cream  - Collaborate with interdisciplinary team   - Patient/family teaching  - Consider wound care consult   Outcome: Progressing     Problem: GASTROINTESTINAL - ADULT  Goal: Minimal or absence of nausea and/or vomiting  Description: INTERVENTIONS:  - Administer IV fluids if ordered to ensure adequate hydration  - Maintain NPO status until nausea and vomiting are resolved  - Nasogastric tube if ordered  - Administer ordered antiemetic medications as needed  - Provide nonpharmacologic comfort measures as appropriate  - Advance diet as tolerated, if ordered  - Consider nutrition services referral to assist patient with adequate nutrition and appropriate food choices  Outcome: Progressing  Goal: Maintains or returns to baseline bowel function  Description: INTERVENTIONS:  - Assess bowel function  - Encourage oral fluids to ensure adequate hydration  - Administer IV  fluids if ordered to ensure adequate hydration  - Administer ordered medications as needed  - Encourage mobilization and activity  - Consider nutritional services referral to assist patient with adequate nutrition and appropriate food choices  Outcome: Progressing  Goal: Maintains adequate nutritional intake  Description: INTERVENTIONS:  - Monitor percentage of each meal consumed  - Identify factors contributing to decreased intake, treat as appropriate  - Assist with meals as needed  - Monitor I&O, weight, and lab values if indicated  - Obtain nutrition services referral as needed  Outcome: Progressing

## 2024-07-18 NOTE — UTILIZATION REVIEW
NOTIFICATION OF ADMISSION DISCHARGE   This is a Notification of Discharge from [facility]. Please be advised that this patient has been discharge from our facility. Below you will find the admission and discharge date and time including the patient’s disposition.   UTILIZATION REVIEW CONTACT:  Evie Banegas  Utilization   Network Utilization Review Department  Phone: [phone] x carefully listen to the prompts. All voicemails are confidential.  Email: [email or web address]     ADMISSION INFORMATION  PRESENTATION DATE: 7/12/2024  4:56 PM  OBERVATION ADMISSION DATE: 07/12/2024 2011  INPATIENT ADMISSION DATE: 7/13/24  5:29 PM   DISCHARGE DATE: 7/17/2024 12:20 PM   DISPOSITION:Home with Home Health Care    Network Utilization Review Department  ATTENTION: Please call with any questions or concerns to [phone] and carefully listen to the prompts so that you are directed to the right person. All voicemails are confidential.   For Discharge needs, contact Care Management DC Support Team at [phone] opt. 2  Send all requests for admission clinical reviews, approved or denied determinations and any other requests to dedicated fax number below belonging to the campus where the patient is receiving treatment. List of dedicated fax numbers for the Facilities:  FACILITY NAME UR FAX NUMBER   ADMISSION DENIALS (Administrative/Medical Necessity) [phone]   DISCHARGE SUPPORT TEAM ([facility]) [phone]   PARENT CHILD HEALTH (Maternity/NICU/Pediatrics) [phone]   [facility] [phone]   [facility] [phone]   [facility] [phone]   [facility] [phone]   [facility] [phone]   [facility] [phone]   [facility] [phone]   [facility]  [facility] [phone]   [facility] [phone]   [facility] [phone]   [facility] [phone]   [facility] [phone]

## 2024-07-19 ENCOUNTER — TRANSITIONAL CARE MANAGEMENT (OUTPATIENT)
Dept: FAMILY MEDICINE CLINIC | Facility: CLINIC | Age: 79
End: 2024-07-19

## 2024-08-11 PROBLEM — N39.0 UTI (URINARY TRACT INFECTION): Status: RESOLVED | Noted: 2024-07-12 | Resolved: 2024-08-11

## 2024-08-16 ENCOUNTER — RA CDI HCC (OUTPATIENT)
Dept: OTHER | Facility: HOSPITAL | Age: 79
End: 2024-08-16

## 2024-08-16 NOTE — PROGRESS NOTES
HCC coding opportunities          Chart Reviewed number of suggestions sent to Provider: 3     Patients Insurance   I49.5, E11.51 and I13.0  Medicare [facility]: [facility]

## 2024-08-22 ENCOUNTER — OFFICE VISIT (OUTPATIENT)
Dept: FAMILY MEDICINE CLINIC | Facility: CLINIC | Age: 79
End: 2024-08-22
Payer: COMMERCIAL

## 2024-08-22 VITALS
SYSTOLIC BLOOD PRESSURE: 116 MMHG | DIASTOLIC BLOOD PRESSURE: 62 MMHG | HEIGHT: 63 IN | WEIGHT: 107 LBS | HEART RATE: 64 BPM | BODY MASS INDEX: 18.96 KG/M2 | OXYGEN SATURATION: 99 %

## 2024-08-22 DIAGNOSIS — I25.10 CORONARY ARTERY DISEASE INVOLVING NATIVE CORONARY ARTERY OF NATIVE HEART WITHOUT ANGINA PECTORIS: ICD-10-CM

## 2024-08-22 DIAGNOSIS — G31.83 LEWY BODY DEMENTIA (HCC): ICD-10-CM

## 2024-08-22 DIAGNOSIS — R44.3 HALLUCINATIONS: ICD-10-CM

## 2024-08-22 DIAGNOSIS — E44.1 MILD PROTEIN-CALORIE MALNUTRITION (HCC): Primary | ICD-10-CM

## 2024-08-22 DIAGNOSIS — E11.9 DIABETES MELLITUS TYPE 2, INSULIN DEPENDENT (HCC): ICD-10-CM

## 2024-08-22 DIAGNOSIS — K52.89 STERCORAL COLITIS: ICD-10-CM

## 2024-08-22 DIAGNOSIS — F02.80 LEWY BODY DEMENTIA (HCC): ICD-10-CM

## 2024-08-22 DIAGNOSIS — Z79.4 DIABETES MELLITUS TYPE 2, INSULIN DEPENDENT (HCC): ICD-10-CM

## 2024-08-22 PROCEDURE — 99214 OFFICE O/P EST MOD 30 MIN: CPT | Performed by: FAMILY MEDICINE

## 2024-08-22 PROCEDURE — G2211 COMPLEX E/M VISIT ADD ON: HCPCS | Performed by: FAMILY MEDICINE

## 2024-08-22 RX ORDER — DONEPEZIL HYDROCHLORIDE 5 MG/1
5 TABLET, FILM COATED ORAL
Qty: 90 TABLET | Refills: 3 | Status: SHIPPED | OUTPATIENT
Start: 2024-08-22

## 2024-08-22 RX ORDER — RANOLAZINE 500 MG/1
500 TABLET, EXTENDED RELEASE ORAL DAILY
Qty: 90 TABLET | Refills: 1 | Status: SHIPPED | OUTPATIENT
Start: 2024-08-22

## 2024-08-22 RX ORDER — INSULIN GLARGINE 100 [IU]/ML
12 INJECTION, SOLUTION SUBCUTANEOUS
Qty: 12 ML | Refills: 1 | Status: SHIPPED | OUTPATIENT
Start: 2024-08-22 | End: 2024-11-20

## 2024-08-22 RX ORDER — LANOLIN ALCOHOL/MO/W.PET/CERES
400 CREAM (GRAM) TOPICAL DAILY
Qty: 90 TABLET | Refills: 1 | Status: SHIPPED | OUTPATIENT
Start: 2024-08-22

## 2024-08-22 RX ORDER — CARVEDILOL 25 MG/1
25 TABLET ORAL EVERY 12 HOURS SCHEDULED
Qty: 180 TABLET | Refills: 1 | Status: SHIPPED | OUTPATIENT
Start: 2024-08-22

## 2024-08-22 RX ORDER — DIVALPROEX SODIUM 250 MG/1
250 TABLET, DELAYED RELEASE ORAL EVERY 12 HOURS SCHEDULED
Qty: 180 TABLET | Refills: 1 | Status: SHIPPED | OUTPATIENT
Start: 2024-08-22

## 2024-08-22 RX ORDER — ATORVASTATIN CALCIUM 10 MG/1
10 TABLET, FILM COATED ORAL DAILY
Qty: 100 TABLET | Refills: 1 | Status: SHIPPED | OUTPATIENT
Start: 2024-08-22

## 2024-08-22 NOTE — PROGRESS NOTES
Ambulatory Visit  Name: Gilda Garay      : 1945      MRN: [id number]  Encounter Provider: Karthik Sanchez MD  Encounter Date: 2024   Encounter department: [facility]    Assessment & Plan  Coronary artery disease involving native coronary artery of native heart without angina pectoris    Orders:    ranolazine (RANEXA) 500 mg 12 hr tablet; Take 1 tablet (500 mg total) by mouth in the morning    atorvastatin (LIPITOR) 10 mg tablet; Take 1 tablet (10 mg total) by mouth daily    carvedilol (COREG) 25 mg tablet; Take 1 tablet (25 mg total) by mouth every 12 (twelve) hours    Hallucinations    Orders:    divalproex sodium (DEPAKOTE) 250 mg DR tablet; Take 1 tablet (250 mg total) by mouth every 12 (twelve) hours    Diabetes mellitus type 2, insulin dependent (HCC)    Lab Results   Component Value Date    HGBA1C 7.9 (H) 2024       Orders:    Lantus SoloStar 100 units/mL SOPN; Inject 0.12 mL (12 Units total) under the skin daily at bedtime    Stercoral colitis         Suspected Lewy body dementia (HCC)    Orders:    folic acid (FOLVITE) 400 mcg tablet; Take 1 tablet (400 mcg total) by mouth daily    donepezil (ARICEPT) 5 mg tablet; Take 1 tablet (5 mg total) by mouth daily at bedtime    Mild protein-calorie malnutrition (HCC)  Malnutrition Findings:                                 BMI Findings:           Body mass index is 18.95 kg/m².            Assessment & Plan  1. Chronic Anemia.  Her kidney function remains low yet stable, with the most recent reading at 1.9. The chronic anemia is likely due to bone marrow production issues and kidney-related factors, contributing to her fatigue and lack of energy. She was advised to maintain her current regimen and schedule appointments with her nephrologist every six months.     2. Urinary Tract Infection (UTI).  The UTI is likely due to constipation. She was advised to continue her current bowel regimen with MiraLAX to prevent recurrence.  She is drinking more fluids, approximately 40 ounces of water daily, which is beneficial.    3. Constipation.  She is currently taking MiraLAX either every day or every other day, depending on her bowel movements. This has resulted in more regular stools. She was advised to continue this regimen.    4. Poor Appetite.  She has a reduced appetite, which is managed by giving her protein shakes occasionally. She was advised to continue this practice and to include protein-rich foods like eggs in her diet.    5. Medication Management.  All her medications were refilled, except for amlodipine which was discontinued. The medications refilled include:  - Ranexa  - Depakote  - Lantus (insulin pen)  - Atorvastatin  - Carvedilol  - Bumex  - Folic acid  - Donepezil  She was also prescribed omeprazole to counteract potential stomach irritation from Motrin. She was instructed to take Motrin 800 mg three times daily.    Follow-up  The patient will follow up in 6 months.           History of Present Illness     History of Present Illness  The patient is a 79-year-old female who presents for evaluation of multiple medical concerns. She is accompanied by her son.    She was hospitalized on 12/12/2023 due to nausea, vomiting, diarrhea, urinary tract infection (UTI), kidney issues, blood pressure fluctuations, elevated blood sugar levels, and heart-related concerns. Her heart condition was stable during this period.    An x-ray revealed a significant amount of fecal material, but no signs of fecal impaction were detected. The UTI was pansensitive and likely caused by constipation. She has been taking MiraLAX daily or every other day, depending on her wife's assessment of her condition. This has resulted in more regular bowel movements. She has also increased her fluid intake, consuming about 40 ounces of water daily, in addition to coffee. Warmer room temperatures seem to stimulate her bowel movements.    Her appetite has decreased,  "but she does not experience any pain after eating. On days when she does not feel like eating, her wife provides her with a protein shake. They are making an effort to include more protein in her diet, such as eggs.    She has remained calm, with occasional moments of frustration, but no violent behavior. She has upcoming appointments with a cardiologist and a podiatrist. She has requested refills for all her medications. She is currently using the Lantus insulin pen.     Review of Systems   Constitutional:  Negative for fever and unexpected weight change.   HENT:  Negative for nosebleeds and trouble swallowing.    Eyes:  Positive for visual disturbance.   Respiratory:  Negative for chest tightness and shortness of breath.    Cardiovascular:  Negative for chest pain, palpitations and leg swelling.   Gastrointestinal:  Negative for abdominal pain, constipation, diarrhea and nausea.   Endocrine: Negative for cold intolerance.   Genitourinary:  Negative for dysuria and urgency.   Musculoskeletal:  Positive for arthralgias and gait problem. Negative for joint swelling and myalgias.   Skin:  Negative for rash.   Neurological:  Positive for weakness. Negative for tremors, seizures and syncope.   Hematological:  Does not bruise/bleed easily.   Psychiatric/Behavioral:  Negative for hallucinations and suicidal ideas.      Objective     /62   Pulse 64   Ht 5' 3\" (1.6 m)   Wt 48.5 kg (107 lb)   SpO2 99%   BMI 18.95 kg/m²     Physical Exam    Physical Exam  Vitals reviewed.   Constitutional:       General: She is not in acute distress.     Appearance: She is well-developed. She is not toxic-appearing.   HENT:      Head: Normocephalic and atraumatic.   Eyes:      General: No scleral icterus.     Extraocular Movements: Extraocular movements intact.      Conjunctiva/sclera: Conjunctivae normal.   Cardiovascular:      Rate and Rhythm: Normal rate and regular rhythm.      Heart sounds: Normal heart sounds. No murmur " heard.     No gallop.   Pulmonary:      Effort: Pulmonary effort is normal. No respiratory distress.      Breath sounds: Normal breath sounds. No wheezing or rales.   Abdominal:      General: Abdomen is flat. Bowel sounds are normal. There is no distension.      Palpations: Abdomen is soft.      Tenderness: There is no abdominal tenderness.   Musculoskeletal:         General: No swelling.      Cervical back: Neck supple.      Right lower leg: No edema (1+).      Left lower leg: No edema (1+).   Skin:     General: Skin is warm and dry.      Capillary Refill: Capillary refill takes less than 2 seconds.      Coloration: Skin is not jaundiced.   Neurological:      Mental Status: She is alert.   Psychiatric:         Mood and Affect: Mood normal.

## 2024-08-22 NOTE — ASSESSMENT & PLAN NOTE
Malnutrition Findings:                                 BMI Findings:           Body mass index is 18.95 kg/m².            Assessment & Plan  1. Chronic Anemia.  Her kidney function remains low yet stable, with the most recent reading at 1.9. The chronic anemia is likely due to bone marrow production issues and kidney-related factors, contributing to her fatigue and lack of energy. She was advised to maintain her current regimen and schedule appointments with her nephrologist every six months.     2. Urinary Tract Infection (UTI).  The UTI is likely due to constipation. She was advised to continue her current bowel regimen with MiraLAX to prevent recurrence. She is drinking more fluids, approximately 40 ounces of water daily, which is beneficial.    3. Constipation.  She is currently taking MiraLAX either every day or every other day, depending on her bowel movements. This has resulted in more regular stools. She was advised to continue this regimen.    4. Poor Appetite.  She has a reduced appetite, which is managed by giving her protein shakes occasionally. She was advised to continue this practice and to include protein-rich foods like eggs in her diet.    5. Medication Management.  All her medications were refilled, except for amlodipine which was discontinued. The medications refilled include:  - Ranexa  - Depakote  - Lantus (insulin pen)  - Atorvastatin  - Carvedilol  - Bumex  - Folic acid  - Donepezil  She was also prescribed omeprazole to counteract potential stomach irritation from Motrin. She was instructed to take Motrin 800 mg three times daily.    Follow-up  The patient will follow up in 6 months.

## 2024-08-22 NOTE — ASSESSMENT & PLAN NOTE
Orders:    ranolazine (RANEXA) 500 mg 12 hr tablet; Take 1 tablet (500 mg total) by mouth in the morning    atorvastatin (LIPITOR) 10 mg tablet; Take 1 tablet (10 mg total) by mouth daily    carvedilol (COREG) 25 mg tablet; Take 1 tablet (25 mg total) by mouth every 12 (twelve) hours

## 2024-09-04 NOTE — PROGRESS NOTES
Cardiology   Follow Up   Office Visit Note  Gilda Garay   79 y.o.   female   MRN: [id number]  [facility] [place]  [address]  [address]  [place] [address] [phone]    PCP: Karthik Sanchez MD  Cardiologist: Dr. Pena          Summary of Plan:  Continue the current plan  No change in medications; no new testing indicated  Follow-up with Dr. Pena: 1 year      Assessment/Plan  CAD with stable angina  On ASA, statin, beta blocker, Ranexa 500 mg q12h  HTN. BP  132/71  on carvedilol 25 mg q12h, Bumex  TBS, S/P PPM  Interrogation 3/27/24 : AP>99%, -0.2%.  4 AT/AF episodes, max duration 2.1 MINS.   HL. On atorvastatin 10 mg/d.  4/16/2024: Calculated LDL 43   Latest Reference Range & Units 11/12/22 05:01 03/24/23 08:56 11/17/23 11:59 04/16/24 10:15   Cholesterol See Comment mg/dL 84 94 99 99   Triglycerides See Comment mg/dL 33 68 60 64   HDL >=50 mg/dL 48 (L) 45 (L) 43 (L) 43 (L)   Non-HDL Cholesterol mg/dl 36      LDL Calculated 0 - 100 mg/dL 29 35 44 43   PAF, on aspirin.  Previously on anticoagulation this was discontinued during admission November 2022 due to high suspicion for cerebral amyloid angiopathy.  Can moitor her rhythm via pacer interrogations  Chronic HFpEF  Wt Readings from Last 3 Encounters:   08/22/24 48.5 kg (107 lb)   07/17/24 48.6 kg (107 lb 2.3 oz)   04/19/24 55.3 kg (122 lb)   --beta-blocker: Carvedilol 25 mg twice daily  --Diuretic: Bumex 0.5 mg daily  --SGLT2i:  --2 g sodium diet, 1800 cc fluid restriction. Daily weights.   Type 2 DM.  HgA1c 7.5.  CKD 3b-IV,  last Cr 1.96.  follows with nephrology  Cognitive decline/Lewy body dementia  History of ischemic CVAs, bilateral ronen.  On aspirin, statin  Cardiac testing  SPECT 7/2021. There is moderate size, moderate intensity, fixed perfusion defect in the inferior/inferolateral wall, suggestive of old scar formation in the right coronary artery distribution. There is no significant reperfusion suggestive  of myocardial ischemia.  There was mild hypokinesis of the inferolateral wall and no other significant regional wall motion abnormalities.  Left ventricular systolic function was well-preserved.  The calculated left ventricular ejection fraction was 73%.   TTE 10/10/22. EF 65%.No RWMA. PASP 37 mm Hg, mildly increased.                  STACY Garay is a 79 y.o.year old female with CAD, atrial fibrillation/flutter, TBS status post PPM HTN, HL, DM, chronic HFpEF.  She established care with Dr. Pena May 2023 previously [facility].     Wt 5/2023 was 145 lb.      Adm 7/12-7/17/24 [facility]  Generalized weakness.  She slid out of her chair onto the floor.  No head strike  Found to have stercoral colitis; treated for colitis and constipation      9/5/24  General Cardiology follow-up-here with her son Filiberto, with whom she resides  She presents in a wheelchair  Walks from her chair to the bathroom with assistance, otherwise uses a walker prn.  Her daughter in law is her caregiver.  Lives with family.  Does have dmee dementia  Reported an episode of intermittent, brief, left sided chest pain once.  No recurrence.  No symptoms of orthostasis  No chest tenderness, on exam  Pacemaker site is satisfactory no open areas  Last EKG 7/12/2024: Reviewed personally a paced rhythm 61 bpm inferior infarct.-Same as October 8, 2022  Exam unremarkable.  She does not appear to have right sided hemiparesis  She is lost a lot of weight.  Current weight 107 pounds.  She has poor appetite but she does get supplements her son reports  Blood pressure stable 132/71  Recent labs, including her last LDL reviewed.  All are stable.  I ordered no testing will continue the current plan encouraged to be as active as she can be      Review of Systems   Constitutional: Positive for weight loss. Negative for chills.   Cardiovascular:  Negative for chest pain, claudication, cyanosis, dyspnea on exertion, irregular heartbeat, leg swelling,  near-syncope, orthopnea, palpitations, paroxysmal nocturnal dyspnea and syncope.        See HPI   Respiratory:  Negative for cough and shortness of breath.    Gastrointestinal:  Negative for heartburn and nausea.   Neurological:  Negative for dizziness, focal weakness, headaches, light-headedness and weakness.   All other systems reviewed and are negative.            Assessment  Diagnoses and all orders for this visit:    Coronary artery disease of native artery of native heart with stable angina pectoris (HCC)    Chronic congestive heart failure, unspecified heart failure type ([facility])    Benign hypertension with CKD (chronic kidney disease) stage IV (HCC)    Atrial flutter, paroxysmal (HCC)    Cerebral amyloid angiopathy  (HCC)    PAD (peripheral artery disease) ([facility])    Primary hypertension    Sick sinus syndrome (HCC)    CKD (chronic kidney disease) stage 4, GFR 15-29 ml/min ([facility])    H/O heart artery stent    Cardiac pacemaker in situ    History of stroke        Past Medical History:   Diagnosis Date    Atrial fibrillation ([facility])     Chronic anemia     Chronic kidney disease     Colonoscopy refused 2019    pt declines    Dyslipidemia     Hypertension     Mammogram declined 2017    Proteinuria     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       Past Surgical History:   Procedure Laterality Date    CARDIAC PACEMAKER PLACEMENT      CATARACT EXTRACTION       SECTION      CHOLECYSTECTOMY      CORONARY ANGIOPLASTY WITH STENT PLACEMENT      EYE SURGERY      MAMMO (HISTORICAL)      Pt states she will not ever have again-     [place] OPTX FEM SHFT FX W/INSJ IMED IMPLT W/WO SCREW Left 2022    Procedure: INSERTION NAIL IM FEMUR ANTEGRADE (TROCHANTERIC);  Surgeon: Mauricio Marie DO;  Location: [facility];  Service: Orthopedics    TUBAL LIGATION             Allergies  No Known Allergies      Medications    Current Outpatient Medications:     acetaminophen (TYLENOL) 325 mg tablet, Take 3 tablets (975 mg total)  by mouth every 8 (eight) hours, Disp: , Rfl: 0    aspirin 81 mg chewable tablet, Chew 81 mg daily, Disp: , Rfl:     atorvastatin (LIPITOR) 10 mg tablet, Take 1 tablet (10 mg total) by mouth daily, Disp: 100 tablet, Rfl: 1    B-D UF III MINI PEN NEEDLES 31G X 5 MM MISC, USE DAILY AS DIRECTED, Disp: 100 each, Rfl: 1    bumetanide (BUMEX) 1 mg tablet, Take 0.5 tablets (0.5 mg total) by mouth daily, Disp: 90 tablet, Rfl: 0    calcium carbonate (OS-LULÚ) 600 MG tablet, Take 600 mg by mouth 2 (two) times a day with meals, Disp: , Rfl:     carvedilol (COREG) 25 mg tablet, Take 1 tablet (25 mg total) by mouth every 12 (twelve) hours, Disp: 180 tablet, Rfl: 1    Cholecalciferol (Vitamin D) 50 MCG (2000 UT) tablet, Take 1 tablet (2,000 Units total) by mouth daily, Disp: 90 tablet, Rfl: 1    divalproex sodium (DEPAKOTE) 250 mg DR tablet, Take 1 tablet (250 mg total) by mouth every 12 (twelve) hours, Disp: 180 tablet, Rfl: 1    donepezil (ARICEPT) 5 mg tablet, Take 1 tablet (5 mg total) by mouth daily at bedtime, Disp: 90 tablet, Rfl: 3    fluticasone (FLONASE) 50 mcg/act nasal spray, SHAKE LIQUID AND USE 1 SPRAY IN EACH NOSTRIL TWICE DAILY, Disp: 48 g, Rfl: 1    folic acid (FOLVITE) 400 mcg tablet, Take 1 tablet (400 mcg total) by mouth daily, Disp: 90 tablet, Rfl: 1    Lantus SoloStar 100 units/mL SOPN, Inject 0.12 mL (12 Units total) under the skin daily at bedtime, Disp: 12 mL, Rfl: 1    polyethylene glycol (MIRALAX) 17 g packet, Take 17 g by mouth 2 (two) times a day as needed (constipation), Disp: , Rfl:     ranolazine (RANEXA) 500 mg 12 hr tablet, Take 1 tablet (500 mg total) by mouth in the morning, Disp: 90 tablet, Rfl: 1    senna (SENOKOT) 8.6 mg, Take 2 tablets (17.2 mg total) by mouth 2 (two) times a day (Patient not taking: Reported on 9/5/2024), Disp: , Rfl:       Social History     Socioeconomic History    Marital status:      Spouse name: Not on file    Number of children: Not on file    Years of  education: Not on file    Highest education level: Not on file   Occupational History    Occupation: retired   Tobacco Use    Smoking status: Never    Smokeless tobacco: Never   Vaping Use    Vaping status: Never Used   Substance and Sexual Activity    Alcohol use: Never    Drug use: Never    Sexual activity: Not Currently   Other Topics Concern    Not on file   Social History Narrative    ** Merged History Encounter **         Most recent tobacco use screenin2019  Do you currently or have you served in the  [employer]: No  Were you activated, into active duty, as a member of the [employer] or as a Reservist: No  Marital status:   Exercise level: None  D    iet: Regular  General stress level: Low  Alcohol intake: None  Caffeine intake: Moderate  Chewing tobacco: none  Illicit drugs: none  Seat belts used routinely: Yes  Sunscreen used routinely: Yes  Smoke alarm in home: Yes  Advance directive: No  Salt Int    juan antonio: minimal  Has the Patient had a mammogram to screen for breast cancer within 24 months: No  2019 - declines mammogram.  Is the patient interested in a colorectal cancer screening: No  2019 - patient declines colonoscopy's     Social Determinants of Health     Financial Resource Strain: Low Risk  (11/10/2023)    Overall Financial Resource Strain (CARDIA)     Difficulty of Paying Living Expenses: Not very hard   Food Insecurity: No Food Insecurity (2024)    Hunger Vital Sign     Worried About Running Out of Food in the Last Year: Never true     Ran Out of Food in the Last Year: Never true   Transportation Needs: No Transportation Needs (2024)    PRAPARE - Transportation     Lack of Transportation (Medical): No     Lack of Transportation (Non-Medical): No   Physical Activity: Not on file   Stress: Not on file   Social Connections: Not on file   Intimate Partner Violence: Not on file   Housing Stability: Low Risk  (2024)    Housing Stability Vital Sign      "Unable to Pay for Housing in the Last Year: No     Number of Times Moved in the Last Year: 0     Homeless in the Last Year: No       Family History   Problem Relation Age of Onset    Diabetes Father     Hypertension Father     Heart disease Father     Cancer Brother         smoker    Diabetes Other     Heart attack Other     Hypertension Other     Asthma Other        Physical Exam  Vitals and nursing note reviewed.   Constitutional:       General: She is not in acute distress.     Appearance: She is not diaphoretic.   HENT:      Head: Normocephalic and atraumatic.   Eyes:      Conjunctiva/sclera: Conjunctivae normal.   Cardiovascular:      Rate and Rhythm: Normal rate and regular rhythm.      Pulses: Intact distal pulses.      Heart sounds: Normal heart sounds.   Pulmonary:      Effort: Pulmonary effort is normal.      Breath sounds: Normal breath sounds.   Abdominal:      General: Bowel sounds are normal.      Palpations: Abdomen is soft.   Musculoskeletal:         General: Normal range of motion.      Cervical back: Normal range of motion and neck supple.   Skin:     General: Skin is warm and dry.   Neurological:      Mental Status: She is alert. Mental status is at baseline.      Comments: Appears to have right hemiparesis.  Some dysarthria         Vitals: Blood pressure 132/71, pulse 65, height 5' 3\" (1.6 m), SpO2 99%.   Wt Readings from Last 3 Encounters:   08/22/24 48.5 kg (107 lb)   07/17/24 48.6 kg (107 lb 2.3 oz)   04/19/24 55.3 kg (122 lb)           Labs & Results:  Lab Results   Component Value Date    WBC 5.21 07/17/2024    HGB 8.8 (L) 07/17/2024    HCT 27.4 (L) 07/17/2024    MCV 94 07/17/2024     07/17/2024     BNP   Date Value Ref Range Status   11/17/2023 181 (H) 0 - 100 pg/mL Final   11/11/2022 859 (H) 0 - 100 pg/mL Final   08/01/2022 957 (H) 0 - 100 pg/mL Final     No components found for: \"CHEM\"  Troponin I   Date Value Ref Range Status   07/12/2020 <0.03 0.00 - 0.07 ng/mL Final     No " results found for this or any previous visit.    No results found for this or any previous visit.    No valid procedures specified.  No results found for this or any previous visit.                    This note was completed in part utilizing [employer] direct voice recognition software.   Grammatical errors, random word insertion, spelling mistakes, and incomplete sentences may be an occasional consequence of the system secondary to software limitations, ambient noise and hardware issues. At the time of dictation, efforts were made to edit, clarify and /or correct errors.  Please read the chart carefully and recognize, using context, where substitutions have occurred.  If you have any questions or concerns about the context, text or information contained within the body of this dictation, please contact myself, the provider, for further clarification

## 2024-09-05 ENCOUNTER — OFFICE VISIT (OUTPATIENT)
Dept: CARDIOLOGY CLINIC | Facility: CLINIC | Age: 79
End: 2024-09-05
Payer: COMMERCIAL

## 2024-09-05 VITALS
BODY MASS INDEX: 18.95 KG/M2 | DIASTOLIC BLOOD PRESSURE: 71 MMHG | SYSTOLIC BLOOD PRESSURE: 132 MMHG | HEIGHT: 63 IN | OXYGEN SATURATION: 99 % | HEART RATE: 65 BPM

## 2024-09-05 DIAGNOSIS — Z95.5 H/O HEART ARTERY STENT: ICD-10-CM

## 2024-09-05 DIAGNOSIS — Z86.73 HISTORY OF STROKE: ICD-10-CM

## 2024-09-05 DIAGNOSIS — N18.4 BENIGN HYPERTENSION WITH CKD (CHRONIC KIDNEY DISEASE) STAGE IV (HCC): ICD-10-CM

## 2024-09-05 DIAGNOSIS — Z95.0 CARDIAC PACEMAKER IN SITU: ICD-10-CM

## 2024-09-05 DIAGNOSIS — N18.4 CKD (CHRONIC KIDNEY DISEASE) STAGE 4, GFR 15-29 ML/MIN (HCC): ICD-10-CM

## 2024-09-05 DIAGNOSIS — I73.9 PAD (PERIPHERAL ARTERY DISEASE) (HCC): ICD-10-CM

## 2024-09-05 DIAGNOSIS — I68.0 CEREBRAL AMYLOID ANGIOPATHY  (HCC): ICD-10-CM

## 2024-09-05 DIAGNOSIS — I10 PRIMARY HYPERTENSION: ICD-10-CM

## 2024-09-05 DIAGNOSIS — I48.92 ATRIAL FLUTTER, PAROXYSMAL (HCC): ICD-10-CM

## 2024-09-05 DIAGNOSIS — I50.9 CHRONIC CONGESTIVE HEART FAILURE, UNSPECIFIED HEART FAILURE TYPE (HCC): ICD-10-CM

## 2024-09-05 DIAGNOSIS — I25.118 CORONARY ARTERY DISEASE OF NATIVE ARTERY OF NATIVE HEART WITH STABLE ANGINA PECTORIS (HCC): Primary | ICD-10-CM

## 2024-09-05 DIAGNOSIS — E85.4 CEREBRAL AMYLOID ANGIOPATHY  (HCC): ICD-10-CM

## 2024-09-05 DIAGNOSIS — I12.9 BENIGN HYPERTENSION WITH CKD (CHRONIC KIDNEY DISEASE) STAGE IV (HCC): ICD-10-CM

## 2024-09-05 DIAGNOSIS — I49.5 SICK SINUS SYNDROME (HCC): ICD-10-CM

## 2024-09-05 PROCEDURE — 99214 OFFICE O/P EST MOD 30 MIN: CPT | Performed by: NURSE PRACTITIONER

## 2024-09-05 NOTE — LETTER
September 5, 2024     Karthik Sanchez MD  [address]  [address]  [facility] [address]    Patient: Gilda Garay   YOB: 1945   Date of Visit: 9/5/2024       Dear Dr. Sanchez:    Thank you for referring Gilda Garay to me for evaluation. Below are my notes for this consultation.    If you have questions, please do not hesitate to call me. I look forward to following your patient along with you.         Sincerely,        TENNILLE Dumont        CC: MD Gisela Santiago CRNP  9/5/2024 10:43 AM  Sign when Signing Visit  Cardiology   Follow Up   Office Visit Note  Gilda Garay   79 y.o.   female   MRN: [id number]  [facility] [place]  [address]  [address]  [facility] [id number]  [phone]    PCP: Karthik Sanchez MD  Cardiologist: Dr. Pena          Summary of Plan:  Continue the current plan  No change in medications; no new testing indicated  Follow-up with Dr. Pena: 1 year      Assessment/Plan  CAD with stable angina  On ASA, statin, beta blocker, Ranexa 500 mg q12h  HTN. BP  132/71  on carvedilol 25 mg q12h, Bumex  TBS, S/P PPM  Interrogation 3/27/24 : AP>99%, -0.2%.  4 AT/AF episodes, max duration 2.1 MINS.   HL. On atorvastatin 10 mg/d.  4/16/2024: Calculated LDL 43   Latest Reference Range & Units 11/12/22 05:01 03/24/23 08:56 11/17/23 11:59 04/16/24 10:15   Cholesterol See Comment mg/dL 84 94 99 99   Triglycerides See Comment mg/dL 33 68 60 64   HDL >=50 mg/dL 48 (L) 45 (L) 43 (L) 43 (L)   Non-HDL Cholesterol mg/dl 36      LDL Calculated 0 - 100 mg/dL 29 35 44 43   PAF, on aspirin.  Previously on anticoagulation this was discontinued during admission November 2022 due to high suspicion for cerebral amyloid angiopathy.  Can moitor her rhythm via pacer interrogations  Chronic HFpEF  Wt Readings from Last 3 Encounters:   08/22/24 48.5 kg (107 lb)   07/17/24 48.6 kg (107 lb 2.3 oz)   04/19/24 55.3 kg (122 lb)   --beta-blocker:  Carvedilol 25 mg twice daily  --Diuretic: Bumex 0.5 mg daily  --SGLT2i:  --2 g sodium diet, 1800 cc fluid restriction. Daily weights.   Type 2 DM.  HgA1c 7.5.  CKD 3b-IV,  last Cr 1.96.  follows with nephrology  Cognitive decline/Lewy body dementia  History of ischemic CVAs, bilateral ronen.  On aspirin, statin  Cardiac testing  SPECT 7/2021. There is moderate size, moderate intensity, fixed perfusion defect in the inferior/inferolateral wall, suggestive of old scar formation in the right coronary artery distribution. There is no significant reperfusion suggestive of myocardial ischemia.  There was mild hypokinesis of the inferolateral wall and no other significant regional wall motion abnormalities.  Left ventricular systolic function was well-preserved.  The calculated left ventricular ejection fraction was 73%.   TTE 10/10/22. EF 65%.No RWMA. PASP 37 mm Hg, mildly increased.                  STACY Garay is a 79 y.o.year old female with CAD, atrial fibrillation/flutter, TBS status post PPM HTN, HL, DM, chronic HFpEF.  She established care with Dr. Pena May 2023 previously [facility].     Wt 5/2023 was 145 lb.      Adm 7/12-7/17/24 [facility]  Generalized weakness.  She slid out of her chair onto the floor.  No head strike  Found to have stercoral colitis; treated for colitis and constipation      9/5/24  General Cardiology follow-up-here with her son Filiberto, with whom she resides  She presents in a wheelchair  Walks from her chair to the bathroom with assistance, otherwise uses a walker prn.  Her daughter in law is her caregiver.  Lives with family.  Does have dmee dementia  Reported an episode of intermittent, brief, left sided chest pain once.  No recurrence.  No symptoms of orthostasis  No chest tenderness, on exam  Pacemaker site is satisfactory no open areas  Last EKG 7/12/2024: Reviewed personally a paced rhythm 61 bpm inferior infarct.-Same as October 8, 2022  Exam unremarkable.  She does not appear  to have right sided hemiparesis  She is lost a lot of weight.  Current weight 107 pounds.  She has poor appetite but she does get supplements her son reports  Blood pressure stable 132/71  Recent labs, including her last LDL reviewed.  All are stable.  I ordered no testing will continue the current plan encouraged to be as active as she can be      Review of Systems   Constitutional: Positive for weight loss. Negative for chills.   Cardiovascular:  Negative for chest pain, claudication, cyanosis, dyspnea on exertion, irregular heartbeat, leg swelling, near-syncope, orthopnea, palpitations, paroxysmal nocturnal dyspnea and syncope.        See HPI   Respiratory:  Negative for cough and shortness of breath.    Gastrointestinal:  Negative for heartburn and nausea.   Neurological:  Negative for dizziness, focal weakness, headaches, light-headedness and weakness.   All other systems reviewed and are negative.            Assessment  Diagnoses and all orders for this visit:    Coronary artery disease of native artery of native heart with stable angina pectoris (HCC)    Chronic congestive heart failure, unspecified heart failure type ([facility])    Benign hypertension with CKD (chronic kidney disease) stage IV ([facility])    Atrial flutter, paroxysmal (HCC)    Cerebral amyloid angiopathy  (HCC)    PAD (peripheral artery disease) ([facility])    Primary hypertension    Sick sinus syndrome (HCC)    CKD (chronic kidney disease) stage 4, GFR 15-29 ml/min ([facility])    H/O heart artery stent    Cardiac pacemaker in situ    History of stroke        Past Medical History:   Diagnosis Date   • Atrial fibrillation (HCC)    • Chronic anemia    • Chronic kidney disease    • Colonoscopy refused 11/2019    pt declines   • Dyslipidemia    • Hypertension    • Mammogram declined 09/2017   • Proteinuria    • Tachy-sarah beth syndrome (HCC) 6/24/2022       Past Surgical History:   Procedure Laterality Date   • CARDIAC PACEMAKER PLACEMENT     • CATARACT EXTRACTION     •   SECTION     • CHOLECYSTECTOMY     • CORONARY ANGIOPLASTY WITH STENT PLACEMENT     • EYE SURGERY     • MAMMO (HISTORICAL)  2015    Pt states she will not ever have again- 2017   • AK OPTX FEM SHFT FX W/INSJ IMED IMPLT W/WO SCREW Left 2022    Procedure: INSERTION NAIL IM FEMUR ANTEGRADE (TROCHANTERIC);  Surgeon: Mauricio Marie DO;  Location: [facility];  Service: Orthopedics   • TUBAL LIGATION             Allergies  No Known Allergies      Medications    Current Outpatient Medications:   •  acetaminophen (TYLENOL) 325 mg tablet, Take 3 tablets (975 mg total) by mouth every 8 (eight) hours, Disp: , Rfl: 0  •  aspirin 81 mg chewable tablet, Chew 81 mg daily, Disp: , Rfl:   •  atorvastatin (LIPITOR) 10 mg tablet, Take 1 tablet (10 mg total) by mouth daily, Disp: 100 tablet, Rfl: 1  •  B-D UF III MINI PEN NEEDLES 31G X 5 MM MISC, USE DAILY AS DIRECTED, Disp: 100 each, Rfl: 1  •  bumetanide (BUMEX) 1 mg tablet, Take 0.5 tablets (0.5 mg total) by mouth daily, Disp: 90 tablet, Rfl: 0  •  calcium carbonate (OS-LULÚ) 600 MG tablet, Take 600 mg by mouth 2 (two) times a day with meals, Disp: , Rfl:   •  carvedilol (COREG) 25 mg tablet, Take 1 tablet (25 mg total) by mouth every 12 (twelve) hours, Disp: 180 tablet, Rfl: 1  •  Cholecalciferol (Vitamin D) 50 MCG (2000 UT) tablet, Take 1 tablet (2,000 Units total) by mouth daily, Disp: 90 tablet, Rfl: 1  •  divalproex sodium (DEPAKOTE) 250 mg DR tablet, Take 1 tablet (250 mg total) by mouth every 12 (twelve) hours, Disp: 180 tablet, Rfl: 1  •  donepezil (ARICEPT) 5 mg tablet, Take 1 tablet (5 mg total) by mouth daily at bedtime, Disp: 90 tablet, Rfl: 3  •  fluticasone (FLONASE) 50 mcg/act nasal spray, SHAKE LIQUID AND USE 1 SPRAY IN EACH NOSTRIL TWICE DAILY, Disp: 48 g, Rfl: 1  •  folic acid (FOLVITE) 400 mcg tablet, Take 1 tablet (400 mcg total) by mouth daily, Disp: 90 tablet, Rfl: 1  •  Lantus SoloStar 100 units/mL SOPN, Inject 0.12 mL (12 Units total) under the  skin daily at bedtime, Disp: 12 mL, Rfl: 1  •  polyethylene glycol (MIRALAX) 17 g packet, Take 17 g by mouth 2 (two) times a day as needed (constipation), Disp: , Rfl:   •  ranolazine (RANEXA) 500 mg 12 hr tablet, Take 1 tablet (500 mg total) by mouth in the morning, Disp: 90 tablet, Rfl: 1  •  senna (SENOKOT) 8.6 mg, Take 2 tablets (17.2 mg total) by mouth 2 (two) times a day (Patient not taking: Reported on 2024), Disp: , Rfl:       Social History     Socioeconomic History   • Marital status:      Spouse name: Not on file   • Number of children: Not on file   • Years of education: Not on file   • Highest education level: Not on file   Occupational History   • Occupation: retired   Tobacco Use   • Smoking status: Never   • Smokeless tobacco: Never   Vaping Use   • Vaping status: Never Used   Substance and Sexual Activity   • Alcohol use: Never   • Drug use: Never   • Sexual activity: Not Currently   Other Topics Concern   • Not on file   Social History Narrative    ** Merged History Encounter **         Most recent tobacco use screenin2019  Do you currently or have you served in the [employer]: No  Were you activated, into active duty, as a member of the [employer] or as a Reservist: No  Marital status:   Exercise level: None  D    iet: Regular  General stress level: Low  Alcohol intake: None  Caffeine intake: Moderate  Chewing tobacco: none  Illicit drugs: none  Seat belts used routinely: Yes  Sunscreen used routinely: Yes  Smoke alarm in home: Yes  Advance directive: No  Salt Int    juan antonio: minimal  Has the Patient had a mammogram to screen for breast cancer within 24 months: No  2019 - declines mammogram.  Is the patient interested in a colorectal cancer screening: No  2019 - patient declines colonoscopy's     Social Determinants of Health     Financial Resource Strain: Low Risk  (11/10/2023)    Overall Financial Resource Strain (CARDIA)    • Difficulty of Paying Living  "Expenses: Not very hard   Food Insecurity: No Food Insecurity (7/16/2024)    Hunger Vital Sign    • Worried About Running Out of Food in the Last Year: Never true    • Ran Out of Food in the Last Year: Never true   Transportation Needs: No Transportation Needs (7/16/2024)    PRAPARE - Transportation    • Lack of Transportation (Medical): No    • Lack of Transportation (Non-Medical): No   Physical Activity: Not on file   Stress: Not on file   Social Connections: Not on file   Intimate Partner Violence: Not on file   Housing Stability: Low Risk  (7/16/2024)    Housing Stability Vital Sign    • Unable to Pay for Housing in the Last Year: No    • Number of Times Moved in the Last Year: 0    • Homeless in the Last Year: No       Family History   Problem Relation Age of Onset   • Diabetes Father    • Hypertension Father    • Heart disease Father    • Cancer Brother         smoker   • Diabetes Other    • Heart attack Other    • Hypertension Other    • Asthma Other        Physical Exam  Vitals and nursing note reviewed.   Constitutional:       General: She is not in acute distress.     Appearance: She is not diaphoretic.   HENT:      Head: Normocephalic and atraumatic.   Eyes:      Conjunctiva/sclera: Conjunctivae normal.   Cardiovascular:      Rate and Rhythm: Normal rate and regular rhythm.      Pulses: Intact distal pulses.      Heart sounds: Normal heart sounds.   Pulmonary:      Effort: Pulmonary effort is normal.      Breath sounds: Normal breath sounds.   Abdominal:      General: Bowel sounds are normal.      Palpations: Abdomen is soft.   Musculoskeletal:         General: Normal range of motion.      Cervical back: Normal range of motion and neck supple.   Skin:     General: Skin is warm and dry.   Neurological:      Mental Status: She is alert. Mental status is at baseline.      Comments: Appears to have right hemiparesis.  Some dysarthria         Vitals: Blood pressure 132/71, pulse 65, height 5' 3\" (1.6 m), SpO2 " "99%.   Wt Readings from Last 3 Encounters:   08/22/24 48.5 kg (107 lb)   07/17/24 48.6 kg (107 lb 2.3 oz)   04/19/24 55.3 kg (122 lb)           Labs & Results:  Lab Results   Component Value Date    WBC 5.21 07/17/2024    HGB 8.8 (L) 07/17/2024    HCT 27.4 (L) 07/17/2024    MCV 94 07/17/2024     07/17/2024     BNP   Date Value Ref Range Status   11/17/2023 181 (H) 0 - 100 pg/mL Final   11/11/2022 859 (H) 0 - 100 pg/mL Final   08/01/2022 957 (H) 0 - 100 pg/mL Final     No components found for: \"CHEM\"  Troponin I   Date Value Ref Range Status   07/12/2020 <0.03 0.00 - 0.07 ng/mL Final     No results found for this or any previous visit.    No results found for this or any previous visit.    No valid procedures specified.  No results found for this or any previous visit.                    This note was completed in part utilizing [employer] direct voice recognition software.   Grammatical errors, random word insertion, spelling mistakes, and incomplete sentences may be an occasional consequence of the system secondary to software limitations, ambient noise and hardware issues. At the time of dictation, efforts were made to edit, clarify and /or correct errors.  Please read the chart carefully and recognize, using context, where substitutions have occurred.  If you have any questions or concerns about the context, text or information contained within the body of this dictation, please contact myself, the provider, for further clarification      "

## 2024-09-26 ENCOUNTER — REMOTE DEVICE CLINIC VISIT (OUTPATIENT)
Dept: CARDIOLOGY CLINIC | Facility: CLINIC | Age: 79
End: 2024-09-26
Payer: COMMERCIAL

## 2024-09-26 DIAGNOSIS — Z95.0 CARDIAC PACEMAKER IN SITU: Primary | ICD-10-CM

## 2024-09-26 PROCEDURE — 93296 REM INTERROG EVL PM/IDS: CPT | Performed by: STUDENT IN AN ORGANIZED HEALTH CARE EDUCATION/TRAINING PROGRAM

## 2024-09-26 PROCEDURE — 93294 REM INTERROG EVL PM/LDLS PM: CPT | Performed by: STUDENT IN AN ORGANIZED HEALTH CARE EDUCATION/TRAINING PROGRAM

## 2024-09-26 NOTE — PROGRESS NOTES
"Results for orders placed or performed in visit on 09/26/24   Cardiac EP device report    Narrative    MDT DC PPM - ACTIVE SYSTEM IS MRI CONDITIONAL  CARELINK TRANSMISSION: BATTERY VOLTAGE ADEQUATE (8.8 YRS). AP-98%, -0.3%. ALL AVAILABLE LEAD PARAMETERS WITHIN NORMAL LIMITS. 69 AT/AF EPISODES SINCE 3/26/24 MAX DURATION 1.25 HRS. AT/AF BURDEN-0.1%. PT ON ASA 81MG & CARVEDILOL; ANTICOAGULATION D/C'D DUE TO \"HIGH SUSPICION FOR CEREBRAL AMYLOID ANGIOPATHY\" PER CW, JORGE LUISNP OFFICE NOTE. NORMAL DEVICE FUNCTION. GV        "

## 2024-10-25 NOTE — TELEPHONE ENCOUNTER
Patient called requesting refill for Carvedilol 25 mg. Patient made aware medication was refilled on 8/22/24 for 180 with 1 refills to [facility] pharmacy. Patient instructed to contact the pharmacy to obtain refills of medication. Patient verbalized understanding.

## 2024-11-22 DIAGNOSIS — N18.31 TYPE 2 DIABETES MELLITUS WITH STAGE 3A CHRONIC KIDNEY DISEASE, WITH LONG-TERM CURRENT USE OF INSULIN (HCC): ICD-10-CM

## 2024-11-22 DIAGNOSIS — E11.22 TYPE 2 DIABETES MELLITUS WITH STAGE 3A CHRONIC KIDNEY DISEASE, WITH LONG-TERM CURRENT USE OF INSULIN (HCC): ICD-10-CM

## 2024-11-22 DIAGNOSIS — Z79.4 TYPE 2 DIABETES MELLITUS WITH STAGE 3A CHRONIC KIDNEY DISEASE, WITH LONG-TERM CURRENT USE OF INSULIN (HCC): ICD-10-CM

## 2024-11-22 RX ORDER — FLURBIPROFEN SODIUM 0.3 MG/ML
SOLUTION/ DROPS OPHTHALMIC DAILY
Qty: 100 EACH | Refills: 1 | Status: SHIPPED | OUTPATIENT
Start: 2024-11-22

## 2025-01-02 DIAGNOSIS — R80.9 PROTEINURIA, UNSPECIFIED TYPE: ICD-10-CM

## 2025-01-02 DIAGNOSIS — D64.9 ANEMIA, UNSPECIFIED TYPE: ICD-10-CM

## 2025-01-02 DIAGNOSIS — E55.9 VITAMIN D DEFICIENCY: ICD-10-CM

## 2025-01-02 DIAGNOSIS — N18.4 STAGE 4 CHRONIC KIDNEY DISEASE (HCC): Primary | ICD-10-CM

## 2025-01-06 ENCOUNTER — RESULTS FOLLOW-UP (OUTPATIENT)
Dept: NON INVASIVE DIAGNOSTICS | Facility: HOSPITAL | Age: 80
End: 2025-01-06

## 2025-01-06 ENCOUNTER — REMOTE DEVICE CLINIC VISIT (OUTPATIENT)
Dept: CARDIOLOGY CLINIC | Facility: CLINIC | Age: 80
End: 2025-01-06
Payer: COMMERCIAL

## 2025-01-06 DIAGNOSIS — Z95.0 PRESENCE OF PERMANENT CARDIAC PACEMAKER: Primary | ICD-10-CM

## 2025-01-06 PROCEDURE — 93294 REM INTERROG EVL PM/LDLS PM: CPT | Performed by: STUDENT IN AN ORGANIZED HEALTH CARE EDUCATION/TRAINING PROGRAM

## 2025-01-06 PROCEDURE — 93296 REM INTERROG EVL PM/IDS: CPT | Performed by: STUDENT IN AN ORGANIZED HEALTH CARE EDUCATION/TRAINING PROGRAM

## 2025-01-06 NOTE — PROGRESS NOTES
Results for orders placed or performed in visit on 01/06/25   Cardiac EP device report    Narrative    MDT DC PPM - ACTIVE SYSTEM IS MRI CONDITIONAL  CARELINK TRANSMISSION: BATTERY VOLTAGE ADEQUATE. (8.6 YRS) AP 99%  <1%. ALL AVAILABLE LEAD PARAMETERS WITHIN NORMAL LIMITS. NO SIGNIFICANT HIGH RATE EPISODES. NORMAL DEVICE FUNCTION.---LEON

## 2025-01-16 ENCOUNTER — HOSPITAL ENCOUNTER (OUTPATIENT)
Dept: ULTRASOUND IMAGING | Facility: HOSPITAL | Age: 80
End: 2025-01-16
Payer: COMMERCIAL

## 2025-01-16 ENCOUNTER — APPOINTMENT (OUTPATIENT)
Dept: LAB | Facility: HOSPITAL | Age: 80
End: 2025-01-16
Attending: INTERNAL MEDICINE
Payer: COMMERCIAL

## 2025-01-16 DIAGNOSIS — E55.9 VITAMIN D DEFICIENCY: ICD-10-CM

## 2025-01-16 DIAGNOSIS — D64.9 ANEMIA, UNSPECIFIED TYPE: ICD-10-CM

## 2025-01-16 DIAGNOSIS — N18.4 CKD (CHRONIC KIDNEY DISEASE) STAGE 4, GFR 15-29 ML/MIN (HCC): ICD-10-CM

## 2025-01-16 DIAGNOSIS — R80.9 PROTEINURIA, UNSPECIFIED TYPE: ICD-10-CM

## 2025-01-16 DIAGNOSIS — N18.4 STAGE 4 CHRONIC KIDNEY DISEASE (HCC): ICD-10-CM

## 2025-01-16 DIAGNOSIS — N17.9 AKI (ACUTE KIDNEY INJURY) (HCC): ICD-10-CM

## 2025-01-16 DIAGNOSIS — D63.1 ANEMIA OF CHRONIC KIDNEY FAILURE, STAGE 4 (SEVERE)  (HCC): ICD-10-CM

## 2025-01-16 DIAGNOSIS — N18.4 ANEMIA OF CHRONIC KIDNEY FAILURE, STAGE 4 (SEVERE)  (HCC): ICD-10-CM

## 2025-01-16 LAB
25(OH)D3 SERPL-MCNC: >120 NG/ML (ref 30–100)
ALBUMIN SERPL BCG-MCNC: 2.8 G/DL (ref 3.5–5)
ALP SERPL-CCNC: 34 U/L (ref 34–104)
ALT SERPL W P-5'-P-CCNC: 5 U/L (ref 7–52)
ANION GAP SERPL CALCULATED.3IONS-SCNC: 1 MMOL/L (ref 4–13)
AST SERPL W P-5'-P-CCNC: 11 U/L (ref 13–39)
BASOPHILS # BLD AUTO: 0.03 THOUSANDS/ΜL (ref 0–0.1)
BASOPHILS NFR BLD AUTO: 1 % (ref 0–1)
BILIRUB SERPL-MCNC: 0.42 MG/DL (ref 0.2–1)
BUN SERPL-MCNC: 42 MG/DL (ref 5–25)
CALCIUM ALBUM COR SERPL-MCNC: 10.3 MG/DL (ref 8.3–10.1)
CALCIUM SERPL-MCNC: 9.3 MG/DL (ref 8.4–10.2)
CHLORIDE SERPL-SCNC: 102 MMOL/L (ref 96–108)
CO2 SERPL-SCNC: 37 MMOL/L (ref 21–32)
CREAT SERPL-MCNC: 2.02 MG/DL (ref 0.6–1.3)
EOSINOPHIL # BLD AUTO: 0.15 THOUSAND/ΜL (ref 0–0.61)
EOSINOPHIL NFR BLD AUTO: 3 % (ref 0–6)
ERYTHROCYTE [DISTWIDTH] IN BLOOD BY AUTOMATED COUNT: 13.2 % (ref 11.6–15.1)
FERRITIN SERPL-MCNC: 275 NG/ML (ref 11–307)
GFR SERPL CREATININE-BSD FRML MDRD: 22 ML/MIN/1.73SQ M
GLUCOSE SERPL-MCNC: 210 MG/DL (ref 65–140)
HCT VFR BLD AUTO: 30.6 % (ref 34.8–46.1)
HGB BLD-MCNC: 9.3 G/DL (ref 11.5–15.4)
IMM GRANULOCYTES # BLD AUTO: 0.03 THOUSAND/UL (ref 0–0.2)
IMM GRANULOCYTES NFR BLD AUTO: 1 % (ref 0–2)
LYMPHOCYTES # BLD AUTO: 1.87 THOUSANDS/ΜL (ref 0.6–4.47)
LYMPHOCYTES NFR BLD AUTO: 33 % (ref 14–44)
MAGNESIUM SERPL-MCNC: 2.1 MG/DL (ref 1.9–2.7)
MCH RBC QN AUTO: 28.9 PG (ref 26.8–34.3)
MCHC RBC AUTO-ENTMCNC: 30.4 G/DL (ref 31.4–37.4)
MCV RBC AUTO: 95 FL (ref 82–98)
MONOCYTES # BLD AUTO: 0.51 THOUSAND/ΜL (ref 0.17–1.22)
MONOCYTES NFR BLD AUTO: 9 % (ref 4–12)
NEUTROPHILS # BLD AUTO: 3.03 THOUSANDS/ΜL (ref 1.85–7.62)
NEUTS SEG NFR BLD AUTO: 53 % (ref 43–75)
NRBC BLD AUTO-RTO: 0 /100 WBCS
PHOSPHATE SERPL-MCNC: 3.3 MG/DL (ref 2.3–4.1)
PLATELET # BLD AUTO: 146 THOUSANDS/UL (ref 149–390)
PMV BLD AUTO: 11.5 FL (ref 8.9–12.7)
POTASSIUM SERPL-SCNC: 4.4 MMOL/L (ref 3.5–5.3)
PROT SERPL-MCNC: 5.8 G/DL (ref 6.4–8.4)
RBC # BLD AUTO: 3.22 MILLION/UL (ref 3.81–5.12)
SODIUM SERPL-SCNC: 140 MMOL/L (ref 135–147)
WBC # BLD AUTO: 5.62 THOUSAND/UL (ref 4.31–10.16)

## 2025-01-16 PROCEDURE — 83735 ASSAY OF MAGNESIUM: CPT

## 2025-01-16 PROCEDURE — 83550 IRON BINDING TEST: CPT

## 2025-01-16 PROCEDURE — 80053 COMPREHEN METABOLIC PANEL: CPT

## 2025-01-16 PROCEDURE — 84100 ASSAY OF PHOSPHORUS: CPT

## 2025-01-16 PROCEDURE — 82306 VITAMIN D 25 HYDROXY: CPT

## 2025-01-16 PROCEDURE — 85025 COMPLETE CBC W/AUTO DIFF WBC: CPT

## 2025-01-16 PROCEDURE — 76775 US EXAM ABDO BACK WALL LIM: CPT

## 2025-01-16 PROCEDURE — 36415 COLL VENOUS BLD VENIPUNCTURE: CPT

## 2025-01-16 PROCEDURE — 83540 ASSAY OF IRON: CPT

## 2025-01-16 PROCEDURE — 82728 ASSAY OF FERRITIN: CPT

## 2025-01-17 LAB
IRON SATN MFR SERPL: 37 % (ref 15–50)
IRON SERPL-MCNC: 55 UG/DL (ref 50–212)
TIBC SERPL-MCNC: 149.8 UG/DL (ref 250–450)
TRANSFERRIN SERPL-MCNC: 107 MG/DL (ref 203–362)
UIBC SERPL-MCNC: 95 UG/DL (ref 155–355)

## 2025-01-24 ENCOUNTER — APPOINTMENT (OUTPATIENT)
Dept: LAB | Facility: CLINIC | Age: 80
End: 2025-01-24
Payer: COMMERCIAL

## 2025-01-24 LAB
BACTERIA UR QL AUTO: ABNORMAL /HPF
BILIRUB UR QL STRIP: NEGATIVE
CLARITY UR: ABNORMAL
COLOR UR: ABNORMAL
CREAT UR-MCNC: 58.1 MG/DL
GLUCOSE UR STRIP-MCNC: NEGATIVE MG/DL
HGB UR QL STRIP.AUTO: ABNORMAL
KETONES UR STRIP-MCNC: NEGATIVE MG/DL
LEUKOCYTE ESTERASE UR QL STRIP: ABNORMAL
MUCOUS THREADS UR QL AUTO: ABNORMAL
NITRITE UR QL STRIP: NEGATIVE
NON-SQ EPI CELLS URNS QL MICRO: ABNORMAL /HPF
PH UR STRIP.AUTO: 8.5 [PH]
PROT UR STRIP-MCNC: ABNORMAL MG/DL
PROT UR-MCNC: 53.4 MG/DL
PROT/CREAT UR: 0.9 MG/G{CREAT} (ref 0–0.1)
RBC #/AREA URNS AUTO: ABNORMAL /HPF
SP GR UR STRIP.AUTO: 1.01 (ref 1–1.03)
TRI-PHOS CRY URNS QL MICRO: ABNORMAL /HPF
UROBILINOGEN UR STRIP-ACNC: <2 MG/DL
WBC #/AREA URNS AUTO: ABNORMAL /HPF

## 2025-01-24 PROCEDURE — 81001 URINALYSIS AUTO W/SCOPE: CPT

## 2025-01-24 PROCEDURE — 82570 ASSAY OF URINE CREATININE: CPT

## 2025-01-24 PROCEDURE — 84156 ASSAY OF PROTEIN URINE: CPT

## 2025-01-25 DIAGNOSIS — I25.10 CORONARY ARTERY DISEASE INVOLVING NATIVE CORONARY ARTERY OF NATIVE HEART WITHOUT ANGINA PECTORIS: ICD-10-CM

## 2025-01-25 DIAGNOSIS — E87.70 HYPERVOLEMIA, UNSPECIFIED HYPERVOLEMIA TYPE: ICD-10-CM

## 2025-01-25 RX ORDER — CARVEDILOL 25 MG/1
25 TABLET ORAL EVERY 12 HOURS SCHEDULED
Qty: 180 TABLET | Refills: 1 | Status: SHIPPED | OUTPATIENT
Start: 2025-01-25

## 2025-01-27 ENCOUNTER — OFFICE VISIT (OUTPATIENT)
Dept: NEPHROLOGY | Facility: CLINIC | Age: 80
End: 2025-01-27
Payer: COMMERCIAL

## 2025-01-27 VITALS — HEART RATE: 64 BPM | SYSTOLIC BLOOD PRESSURE: 130 MMHG | OXYGEN SATURATION: 100 % | DIASTOLIC BLOOD PRESSURE: 80 MMHG

## 2025-01-27 DIAGNOSIS — N18.4 TYPE 2 DIABETES MELLITUS WITH STAGE 4 CHRONIC KIDNEY DISEASE AND HYPERTENSION (HCC): ICD-10-CM

## 2025-01-27 DIAGNOSIS — I68.0 CEREBRAL AMYLOID ANGIOPATHY  (HCC): ICD-10-CM

## 2025-01-27 DIAGNOSIS — E83.9 CHRONIC KIDNEY DISEASE-MINERAL BONE DISORDER (CKD-MBD) WITH STAGE 4 CHRONIC KIDNEY DISEASE (HCC): ICD-10-CM

## 2025-01-27 DIAGNOSIS — N18.4 CHRONIC KIDNEY DISEASE-MINERAL BONE DISORDER (CKD-MBD) WITH STAGE 4 CHRONIC KIDNEY DISEASE (HCC): ICD-10-CM

## 2025-01-27 DIAGNOSIS — N18.4 BENIGN HYPERTENSION WITH CKD (CHRONIC KIDNEY DISEASE) STAGE IV (HCC): ICD-10-CM

## 2025-01-27 DIAGNOSIS — M89.9 CHRONIC KIDNEY DISEASE-MINERAL BONE DISORDER (CKD-MBD) WITH STAGE 4 CHRONIC KIDNEY DISEASE (HCC): ICD-10-CM

## 2025-01-27 DIAGNOSIS — E11.22 TYPE 2 DIABETES MELLITUS WITH STAGE 4 CHRONIC KIDNEY DISEASE AND HYPERTENSION (HCC): ICD-10-CM

## 2025-01-27 DIAGNOSIS — E85.4 CEREBRAL AMYLOID ANGIOPATHY  (HCC): ICD-10-CM

## 2025-01-27 DIAGNOSIS — I10 PRIMARY HYPERTENSION: ICD-10-CM

## 2025-01-27 DIAGNOSIS — I12.9 BENIGN HYPERTENSION WITH CKD (CHRONIC KIDNEY DISEASE) STAGE IV (HCC): ICD-10-CM

## 2025-01-27 DIAGNOSIS — R82.81 PYURIA: ICD-10-CM

## 2025-01-27 DIAGNOSIS — I12.9 TYPE 2 DIABETES MELLITUS WITH STAGE 4 CHRONIC KIDNEY DISEASE AND HYPERTENSION (HCC): ICD-10-CM

## 2025-01-27 DIAGNOSIS — I50.9 CHRONIC CONGESTIVE HEART FAILURE, UNSPECIFIED HEART FAILURE TYPE (HCC): Primary | ICD-10-CM

## 2025-01-27 PROCEDURE — 99214 OFFICE O/P EST MOD 30 MIN: CPT | Performed by: INTERNAL MEDICINE

## 2025-01-27 RX ORDER — BUMETANIDE 1 MG/1
0.5 TABLET ORAL DAILY
Qty: 90 TABLET | Refills: 0 | Status: SHIPPED | OUTPATIENT
Start: 2025-01-27

## 2025-01-27 NOTE — ASSESSMENT & PLAN NOTE
Lab Results   Component Value Date    HGBA1C 7.9 (H) 04/16/2024     Chronic kidney disease stage IV with nonnephrotic range proteinuria (G4A2)  -- Baseline creatinine now fluctuating between 1.8 to 2.5 mg/dL based on the volume status  -- Presumed to be secondary to multiple episodes of acute kidney injury, diabetic kidney disease and hypertension, left renal atrophy  -- Renal function stable and at baseline  --Urine protein creatinine ratio 0.9 g/g which is an elevation could be secondary to pyuria     Diabetes mellitus type 2  -- Currently on Lantus  --Not at target hemoglobin A1c 7.9     Hypertension  -- Blood pressure at target  --Euvolemic  --Continue Bumex 0.5 mg daily     Anemia of chronic disease  -- Was evaluated by hematology  -- No evidence of monoclonal gammopathy.  Elevated free light chain assay ratio likely elevated in the setting of kidney disease  -- Hemoglobin improved to 9.3  --Iron stores are adequate    Orders:    Albumin / creatinine urine ratio; Future    Comprehensive metabolic panel; Future    Hemoglobin A1C; Future    CBC w/o differential; Future    Albumin / creatinine urine ratio; Future    PTH, intact; Future    Vitamin D 25 hydroxy; Future    Phosphorus; Future

## 2025-01-27 NOTE — ASSESSMENT & PLAN NOTE
-- Pyuria with elevated 20-30 WBCs and innumerable bacteria  --She is currently asymptomatic with no fever no chills no urinary symptoms  -- If she develops any symptoms I asked him to call me we can prescribe prophylactic antibiotics     Memory decline  -- Suspected Lewy body dementia     History of CVA  -- Bilateral pontine ischemic strokes  -- Currently on aspirin and statin  -- Blood pressure control     Atrial flutter  -- Off anticoagulation due to cerebral amyloid angiopathy

## 2025-01-27 NOTE — ASSESSMENT & PLAN NOTE
-- Blood pressure at target and stable  --Examines euvolemic  --Continue Bumex 0.5 mg daily    Orders:    Albumin / creatinine urine ratio; Future    Comprehensive metabolic panel; Future    Hemoglobin A1C; Future    CBC w/o differential; Future    Albumin / creatinine urine ratio; Future    PTH, intact; Future    Vitamin D 25 hydroxy; Future    Phosphorus; Future

## 2025-01-27 NOTE — PROGRESS NOTES
Name: Gilda Garay      : 1945      MRN: [id number]  Encounter Provider: Raven Sherwood MD  Encounter Date: 2025   Encounter department: [facility] [place]  :  Assessment & Plan  Chronic congestive heart failure, unspecified heart failure type (HCC)  Wt Readings from Last 3 Encounters:   24 48.5 kg (107 lb)   24 48.6 kg (107 lb 2.3 oz)   24 55.3 kg (122 lb)   -- Continue Bumex 0.5 mg daily  -- Weights are stable  -- Examines euvolemic  -- Continue current management    Orders:    Albumin / creatinine urine ratio; Future    Comprehensive metabolic panel; Future    Hemoglobin A1C; Future    CBC w/o differential; Future    Albumin / creatinine urine ratio; Future    PTH, intact; Future    Vitamin D 25 hydroxy; Future    Phosphorus; Future    Cerebral amyloid angiopathy  (HCC)  -- Following with neurology  -- Her risk factors include hypertension diabetes hyperlipidemia coronary artery disease and atrial fibrillation  -- MRI revealed innumerable cerebral microbleeds with some white matter changes consistent with probable cerebral amyloid angiopathy  -- Continue follow-up with neurology  --Continue control of her hypertension diabetes    Orders:    Albumin / creatinine urine ratio; Future    Comprehensive metabolic panel; Future    Hemoglobin A1C; Future    CBC w/o differential; Future    Albumin / creatinine urine ratio; Future    PTH, intact; Future    Vitamin D 25 hydroxy; Future    Phosphorus; Future    Primary hypertension  -- Blood pressure at target and stable  --Examines euvolemic  --Continue Bumex 0.5 mg daily    Orders:    Albumin / creatinine urine ratio; Future    Comprehensive metabolic panel; Future    Hemoglobin A1C; Future    CBC w/o differential; Future    Albumin / creatinine urine ratio; Future    PTH, intact; Future    Vitamin D 25 hydroxy; Future    Phosphorus; Future    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 (formerly [facility])  Lab Results   Component Value Date    EGFR 22 01/16/2025    EGFR 23 07/17/2024    EGFR 22 07/15/2024    CREATININE 2.02 (H) 01/16/2025    CREATININE 1.96 (H) 07/17/2024    CREATININE 2.08 (H) 07/15/2024   -- Vitamin D 25-hydroxy level greater than 120, with a borderline elevated corrected calcium  -- Discontinue vitamin D 2000 units daily  -- Recheck vitamin D 25-hydroxy level in 6 months  -- Phosphorus is normal  -- Currently on Os-Rickie 600 mg twice a day    Orders:    Albumin / creatinine urine ratio; Future    Comprehensive metabolic panel; Future    Hemoglobin A1C; Future    CBC w/o differential; Future    Albumin / creatinine urine ratio; Future    PTH, intact; Future    Vitamin D 25 hydroxy; Future    Phosphorus; Future    Type 2 diabetes mellitus with stage 4 chronic kidney disease and hypertension (formerly [facility])    Lab Results   Component Value Date    HGBA1C 7.9 (H) 04/16/2024     Chronic kidney disease stage IV with nonnephrotic range proteinuria (G4A2)  -- Baseline creatinine now fluctuating between 1.8 to 2.5 mg/dL based on the volume status  -- Presumed to be secondary to multiple episodes of acute kidney injury, diabetic kidney disease and hypertension, left renal atrophy  -- Renal function stable and at baseline  --Urine protein creatinine ratio 0.9 g/g which is an elevation could be secondary to pyuria     Diabetes mellitus type 2  -- Currently on Lantus  --Not at target hemoglobin A1c 7.9     Hypertension  -- Blood pressure at target  --Euvolemic  --Continue Bumex 0.5 mg daily     Anemia of chronic disease  -- Was evaluated by hematology  -- No evidence of monoclonal gammopathy.  Elevated free light chain assay ratio likely elevated in the setting of kidney disease  -- Hemoglobin improved to 9.3  --Iron stores are adequate    Orders:    Albumin / creatinine urine ratio; Future    Comprehensive metabolic panel; Future    Hemoglobin A1C; Future    CBC w/o differential; Future    Albumin /  creatinine urine ratio; Future    PTH, intact; Future    Vitamin D 25 hydroxy; Future    Phosphorus; Future    Pyuria  -- Pyuria with elevated 20-30 WBCs and innumerable bacteria  --She is currently asymptomatic with no fever no chills no urinary symptoms  -- If she develops any symptoms I asked him to call me we can prescribe prophylactic antibiotics     Memory decline  -- Suspected Lewy body dementia     History of CVA  -- Bilateral pontine ischemic strokes  -- Currently on aspirin and statin  -- Blood pressure control     Atrial flutter  -- Off anticoagulation due to cerebral amyloid angiopathy      History of Present Illness   HPI  Gilda Garay is a 79 y.o. female who presents for follow up for CKD Stage IV. Was in hospital in July for nausea/diarrhea but feeling better now.    Labs from 1/24 & 1/16 reviewed, included UA, ACR, CMP, CBC, Vit D 25 Hydroxy level, Hemoglobin A1c. UA shows pyuria, with 20-30 WBC and innumerable bacteria, but asymptomatic. Creatinine stable at 2 mg/dL, ACR is up 0.9 g/g, and elevated salvador D level.     History obtained from: patient and son    Review of Systems   Constitutional:  Negative for activity change and fever.   Respiratory:  Negative for cough, chest tightness, shortness of breath and wheezing.    Cardiovascular:  Negative for chest pain and leg swelling.   Gastrointestinal:  Negative for abdominal pain, diarrhea, nausea and vomiting.   Endocrine: Negative for polyuria.   Genitourinary:  Negative for difficulty urinating, dysuria, flank pain, frequency and urgency.   Skin:  Negative for rash.   Neurological:  Negative for dizziness, syncope, light-headedness and headaches.     Current Outpatient Medications on File Prior to Visit   Medication Sig Dispense Refill    acetaminophen (TYLENOL) 325 mg tablet Take 3 tablets (975 mg total) by mouth every 8 (eight) hours  0    aspirin 81 mg chewable tablet Chew 81 mg daily      atorvastatin (LIPITOR) 10 mg tablet Take 1 tablet (10  mg total) by mouth daily 100 tablet 1    bumetanide (BUMEX) 1 mg tablet Take 0.5 tablets (0.5 mg total) by mouth daily 90 tablet 0    calcium carbonate (OS-LULÚ) 600 MG tablet Take 600 mg by mouth 2 (two) times a day with meals      carvedilol (COREG) 25 mg tablet Take 1 tablet (25 mg total) by mouth every 12 (twelve) hours 180 tablet 1    divalproex sodium (DEPAKOTE) 250 mg DR tablet Take 1 tablet (250 mg total) by mouth every 12 (twelve) hours 180 tablet 1    donepezil (ARICEPT) 5 mg tablet Take 1 tablet (5 mg total) by mouth daily at bedtime 90 tablet 3    fluticasone (FLONASE) 50 mcg/act nasal spray SHAKE LIQUID AND USE 1 SPRAY IN EACH NOSTRIL TWICE DAILY 48 g 1    folic acid (FOLVITE) 400 mcg tablet Take 1 tablet (400 mcg total) by mouth daily 90 tablet 1    Lantus SoloStar 100 units/mL SOPN Inject 0.12 mL (12 Units total) under the skin daily at bedtime 12 mL 1    polyethylene glycol (MIRALAX) 17 g packet Take 17 g by mouth 2 (two) times a day as needed (constipation)      ranolazine (RANEXA) 500 mg 12 hr tablet Take 1 tablet (500 mg total) by mouth in the morning 90 tablet 1    senna (SENOKOT) 8.6 mg Take 2 tablets (17.2 mg total) by mouth 2 (two) times a day      [DISCONTINUED] Cholecalciferol (Vitamin D) 50 MCG (2000 UT) tablet Take 1 tablet (2,000 Units total) by mouth daily 90 tablet 1    B-D UF III MINI PEN NEEDLES 31G X 5 MM MISC USE DAILY AS DIRECTED 100 each 1    [DISCONTINUED] apixaban (Eliquis) 5 mg Take 1 tablet (5 mg total) by mouth 2 (two) times a day 60 tablet 0    [DISCONTINUED] bisacodyl (DULCOLAX) 10 mg suppository Insert 1 suppository (10 mg total) into the rectum daily as needed for constipation 12 suppository 0    [DISCONTINUED] dabigatran etexilate (PRADAXA) 75 mg capsule Take 1 capsule (75 mg total) by mouth 2 (two) times a day 60 capsule 0     No current facility-administered medications on file prior to visit.         Objective   /80   Pulse 64   SpO2 100%      Physical  Exam  Vitals and nursing note reviewed. Exam conducted with a chaperone present.   Constitutional:       General: She is not in acute distress.     Appearance: She is well-developed. She is not diaphoretic.   HENT:      Head: Normocephalic and atraumatic.   Eyes:      General: No scleral icterus.     Pupils: Pupils are equal, round, and reactive to light.   Cardiovascular:      Rate and Rhythm: Normal rate and regular rhythm.      Heart sounds: Normal heart sounds. No murmur heard.     No friction rub. No gallop.   Pulmonary:      Effort: Pulmonary effort is normal. No respiratory distress.      Breath sounds: Normal breath sounds. No wheezing or rales.   Chest:      Chest wall: No tenderness.   Abdominal:      General: Bowel sounds are normal. There is no distension.      Palpations: Abdomen is soft.      Tenderness: There is no abdominal tenderness. There is no rebound.   Musculoskeletal:         General: Normal range of motion.      Cervical back: Normal range of motion and neck supple.   Skin:     Findings: No rash.   Neurological:      Mental Status: She is alert. Mental status is at baseline.         Administrative Statements   I have spent a total time of 30 minutes in caring for this patient on the day of the visit/encounter including Impressions, Counseling / Coordination of care, Documenting in the medical record, Reviewing / ordering tests, medicine, procedures  , and Obtaining or reviewing history  .

## 2025-01-27 NOTE — PATIENT INSTRUCTIONS
1.)  Low 2 g sodium diet    2.)  Monitor weights at home    3.)  Avoid NSAIDs (ibuprofen, Motrin, Advil, Aleve, naproxen)    4.)  Monitor blood pressure at home, call if blood pressure greater than 150/90 persistently    5.) I will plan to discuss all results including blood work, and/or imaging at our next visit, unless there is an urgent indication, in which case I will call you earlier. If you have any questions or concerns about your results, please feel free to call our office.    6.) Can stop the Vitamin D    7.) If symptoms of pain on urination, frequency, dark/smelly urine, fever call me

## 2025-01-27 NOTE — ASSESSMENT & PLAN NOTE
Wt Readings from Last 3 Encounters:   08/22/24 48.5 kg (107 lb)   07/17/24 48.6 kg (107 lb 2.3 oz)   04/19/24 55.3 kg (122 lb)   -- Continue Bumex 0.5 mg daily  -- Weights are stable  -- Examines euvolemic  -- Continue current management    Orders:    Albumin / creatinine urine ratio; Future    Comprehensive metabolic panel; Future    Hemoglobin A1C; Future    CBC w/o differential; Future    Albumin / creatinine urine ratio; Future    PTH, intact; Future    Vitamin D 25 hydroxy; Future    Phosphorus; Future

## 2025-01-27 NOTE — ASSESSMENT & PLAN NOTE
-- Following with neurology  -- Her risk factors include hypertension diabetes hyperlipidemia coronary artery disease and atrial fibrillation  -- MRI revealed innumerable cerebral microbleeds with some white matter changes consistent with probable cerebral amyloid angiopathy  -- Continue follow-up with neurology  --Continue control of her hypertension diabetes    Orders:    Albumin / creatinine urine ratio; Future    Comprehensive metabolic panel; Future    Hemoglobin A1C; Future    CBC w/o differential; Future    Albumin / creatinine urine ratio; Future    PTH, intact; Future    Vitamin D 25 hydroxy; Future    Phosphorus; Future

## 2025-01-27 NOTE — ASSESSMENT & PLAN NOTE
Lab Results   Component Value Date    EGFR 22 01/16/2025    EGFR 23 07/17/2024    EGFR 22 07/15/2024    CREATININE 2.02 (H) 01/16/2025    CREATININE 1.96 (H) 07/17/2024    CREATININE 2.08 (H) 07/15/2024   -- Vitamin D 25-hydroxy level greater than 120, with a borderline elevated corrected calcium  -- Discontinue vitamin D 2000 units daily  -- Recheck vitamin D 25-hydroxy level in 6 months  -- Phosphorus is normal  -- Currently on Os-Rickie 600 mg twice a day    Orders:    Albumin / creatinine urine ratio; Future    Comprehensive metabolic panel; Future    Hemoglobin A1C; Future    CBC w/o differential; Future    Albumin / creatinine urine ratio; Future    PTH, intact; Future    Vitamin D 25 hydroxy; Future    Phosphorus; Future

## 2025-02-10 ENCOUNTER — OFFICE VISIT (OUTPATIENT)
Dept: PODIATRY | Facility: CLINIC | Age: 80
End: 2025-02-10
Payer: COMMERCIAL

## 2025-02-10 VITALS — HEART RATE: 77 BPM | HEIGHT: 63 IN | BODY MASS INDEX: 18.95 KG/M2 | OXYGEN SATURATION: 100 %

## 2025-02-10 DIAGNOSIS — B35.1 ONYCHOMYCOSIS: ICD-10-CM

## 2025-02-10 DIAGNOSIS — Z79.4 TYPE 2 DIABETES MELLITUS WITH STAGE 3A CHRONIC KIDNEY DISEASE, WITH LONG-TERM CURRENT USE OF INSULIN (HCC): ICD-10-CM

## 2025-02-10 DIAGNOSIS — N18.31 TYPE 2 DIABETES MELLITUS WITH STAGE 3A CHRONIC KIDNEY DISEASE, WITH LONG-TERM CURRENT USE OF INSULIN (HCC): ICD-10-CM

## 2025-02-10 DIAGNOSIS — I73.9 PAD (PERIPHERAL ARTERY DISEASE) (HCC): Primary | ICD-10-CM

## 2025-02-10 DIAGNOSIS — E11.22 TYPE 2 DIABETES MELLITUS WITH STAGE 3A CHRONIC KIDNEY DISEASE, WITH LONG-TERM CURRENT USE OF INSULIN (HCC): ICD-10-CM

## 2025-02-10 PROCEDURE — 99213 OFFICE O/P EST LOW 20 MIN: CPT | Performed by: PODIATRIST

## 2025-02-10 PROCEDURE — 11721 DEBRIDE NAIL 6 OR MORE: CPT | Performed by: PODIATRIST

## 2025-02-10 NOTE — PROGRESS NOTES
Assessment/Plan:     The patient's clinical examination today significant for nonpalpable pedal pulses bilaterally.  There is mild +1 pitting edema of the bilateral extremities.  There are no open lesions.  There is a healed blister site to the dorsal aspect of the right second toe likely secondary to shoe gear irritation.  Pedal nail plates are thickened, brittle and discolored with subungual deysi consistent onychomycosis x 10.  The interdigital spaces are clear without maceration.    The pedal nail plates were sharply debrided with a sterile nail clipper x 10 without complication.  The nails then mechanically reduced in thickness and girth as a rotary bur without incident.  The blister site to the dorsal aspect of the right second toe is completely healed and reepithelialized.  There is no indication for any wound care.  She would benefit from better fitting shoes to prevent reinjury and ulceration of her right second digit.  A prescription was entered into her chart for diabetic shoes.  A referral was made to [facility].    Recommend follow-up every 2 to 3 months for continued at risk diabetic footcare.     Diagnoses and all orders for this visit:    PAD (peripheral artery disease) (Formerly [facility])  -     Diabetic Shoe    Type 2 diabetes mellitus with stage 3a chronic kidney disease, with long-term current use of insulin (Formerly [facility])  -     Diabetic Shoe    Onychomycosis  -     Diabetic Shoe          Subjective:     Patient ID: Gilda Garay is a 79 y.o. female.    The patient resents today for her initial consultation with [facility] for at risk diabetic footcare.  The patient suffers from dementia and her medical history was obtained by her son who is present with her in the exam room.  He does note that she has been diabetic for several decades.  He also notes that she does not do much walking secondary to her history of dementia.        Review of Systems   Unable to perform ROS: Dementia          Objective:     Physical Exam  Constitutional:       Appearance: Normal appearance.   HENT:      Head: Normocephalic and atraumatic.      Nose: Nose normal.   Cardiovascular:      Pulses:           Dorsalis pedis pulses are 0 on the right side and 0 on the left side.        Posterior tibial pulses are 0 on the right side and 0 on the left side.   Pulmonary:      Effort: Pulmonary effort is normal.   Feet:      Right foot:      Skin integrity: Dry skin present.      Toenail Condition: Right toenails are abnormally thick and long. Fungal disease present.     Left foot:      Skin integrity: Skin integrity normal.      Toenail Condition: Left toenails are abnormally thick and long. Fungal disease present.     Comments: The patient's clinical examination today significant for nonpalpable pedal pulses bilaterally.  There is mild +1 pitting edema of the bilateral extremities.  There are no open lesions.  There is a healed blister site to the dorsal aspect of the right second toe likely secondary to shoe gear irritation.  Pedal nail plates are thickened, brittle and discolored with subungual deysi consistent onychomycosis x 10.  The interdigital spaces are clear without maceration.  Skin:     General: Skin is warm.      Capillary Refill: Capillary refill takes 2 to 3 seconds.   Neurological:      General: No focal deficit present.      Mental Status: She is alert and oriented to person, place, and time.   Psychiatric:         Mood and Affect: Mood normal.         Behavior: Behavior normal.         Thought Content: Thought content normal.

## 2025-02-10 NOTE — PATIENT INSTRUCTIONS
[facility] ( for diabetic shoes)  [address] [place] [address], [address]  [place] [address] [phone]

## 2025-02-27 ENCOUNTER — OFFICE VISIT (OUTPATIENT)
Dept: FAMILY MEDICINE CLINIC | Facility: CLINIC | Age: 80
End: 2025-02-27
Payer: COMMERCIAL

## 2025-02-27 VITALS
HEIGHT: 63 IN | OXYGEN SATURATION: 97 % | BODY MASS INDEX: 18.95 KG/M2 | SYSTOLIC BLOOD PRESSURE: 118 MMHG | DIASTOLIC BLOOD PRESSURE: 70 MMHG | HEART RATE: 58 BPM

## 2025-02-27 DIAGNOSIS — I50.9 CHRONIC CONGESTIVE HEART FAILURE, UNSPECIFIED HEART FAILURE TYPE (HCC): ICD-10-CM

## 2025-02-27 DIAGNOSIS — I49.5 SICK SINUS SYNDROME (HCC): ICD-10-CM

## 2025-02-27 DIAGNOSIS — I73.9 PAD (PERIPHERAL ARTERY DISEASE) (HCC): ICD-10-CM

## 2025-02-27 DIAGNOSIS — I25.118 CORONARY ARTERY DISEASE OF NATIVE ARTERY OF NATIVE HEART WITH STABLE ANGINA PECTORIS (HCC): ICD-10-CM

## 2025-02-27 DIAGNOSIS — E11.22 TYPE 2 DIABETES MELLITUS WITH STAGE 4 CHRONIC KIDNEY DISEASE AND HYPERTENSION (HCC): Primary | ICD-10-CM

## 2025-02-27 DIAGNOSIS — N18.4 TYPE 2 DIABETES MELLITUS WITH STAGE 4 CHRONIC KIDNEY DISEASE AND HYPERTENSION (HCC): Primary | ICD-10-CM

## 2025-02-27 DIAGNOSIS — I68.0 CEREBRAL AMYLOID ANGIOPATHY  (HCC): ICD-10-CM

## 2025-02-27 DIAGNOSIS — I12.9 TYPE 2 DIABETES MELLITUS WITH STAGE 4 CHRONIC KIDNEY DISEASE AND HYPERTENSION (HCC): Primary | ICD-10-CM

## 2025-02-27 DIAGNOSIS — G31.83 MODERATE LEWY BODY DEMENTIA WITHOUT BEHAVIORAL DISTURBANCE, PSYCHOTIC DISTURBANCE, MOOD DISTURBANCE, OR ANXIETY (HCC): ICD-10-CM

## 2025-02-27 DIAGNOSIS — I10 PRIMARY HYPERTENSION: ICD-10-CM

## 2025-02-27 DIAGNOSIS — E85.4 CEREBRAL AMYLOID ANGIOPATHY  (HCC): ICD-10-CM

## 2025-02-27 DIAGNOSIS — F02.B0 MODERATE LEWY BODY DEMENTIA WITHOUT BEHAVIORAL DISTURBANCE, PSYCHOTIC DISTURBANCE, MOOD DISTURBANCE, OR ANXIETY (HCC): ICD-10-CM

## 2025-02-27 LAB — SL AMB POCT HEMOGLOBIN AIC: 7.5 (ref ?–6.5)

## 2025-02-27 PROCEDURE — 99214 OFFICE O/P EST MOD 30 MIN: CPT | Performed by: FAMILY MEDICINE

## 2025-02-27 PROCEDURE — 83036 HEMOGLOBIN GLYCOSYLATED A1C: CPT | Performed by: FAMILY MEDICINE

## 2025-02-27 PROCEDURE — G2211 COMPLEX E/M VISIT ADD ON: HCPCS | Performed by: FAMILY MEDICINE

## 2025-02-27 PROCEDURE — G0439 PPPS, SUBSEQ VISIT: HCPCS | Performed by: FAMILY MEDICINE

## 2025-02-27 NOTE — PATIENT INSTRUCTIONS
Medicare Preventive Visit Patient Instructions  Thank you for completing your Welcome to Medicare Visit or Medicare Annual Wellness Visit today. Your next wellness visit will be due in one year (2/28/2026).  The screening/preventive services that you may require over the next 5-10 years are detailed below. Some tests may not apply to you based off risk factors and/or age. Screening tests ordered at today's visit but not completed yet may show as past due. Also, please note that scanned in results may not display below.  Preventive Screenings:  Service Recommendations Previous Testing/Comments   Colorectal Cancer Screening  * Colonoscopy    * Fecal Occult Blood Test (FOBT)/Fecal Immunochemical Test (FIT)  * Fecal DNA/Cologuard Test  * Flexible Sigmoidoscopy Age: 45-75 years old   Colonoscopy: every 10 years (may be performed more frequently if at higher risk)  OR  FOBT/FIT: every 1 year  OR  Cologuard: every 3 years  OR  Sigmoidoscopy: every 5 years  Screening may be recommended earlier than age 45 if at higher risk for colorectal cancer. Also, an individualized decision between you and your healthcare provider will decide whether screening between the ages of 76-85 would be appropriate. Colonoscopy: Not on file  FOBT/FIT: 06/29/2022  Cologuard: Not on file  Sigmoidoscopy: Not on file          Breast Cancer Screening Age: 40+ years old  Frequency: every 1-2 years  Not required if history of left and right mastectomy Mammogram: Not on file        Cervical Cancer Screening Between the ages of 21-29, pap smear recommended once every 3 years.   Between the ages of 30-65, can perform pap smear with HPV co-testing every 5 years.   Recommendations may differ for women with a history of total hysterectomy, cervical cancer, or abnormal pap smears in past. Pap Smear: Not on file    Screening Not Indicated   Hepatitis C Screening Once for adults born between 1945 and 1965  More frequently in patients at high risk for Hepatitis  C Hep C Antibody: Not on file        Diabetes Screening 1-2 times per year if you're at risk for diabetes or have pre-diabetes Fasting glucose: 130 mg/dL (4/16/2024)  A1C: 7.9 % (4/16/2024)  Screening Not Indicated  History Diabetes   Cholesterol Screening Once every 5 years if you don't have a lipid disorder. May order more often based on risk factors. Lipid panel: 04/16/2024    Screening Not Indicated  History Lipid Disorder     Other Preventive Screenings Covered by Medicare:  Abdominal Aortic Aneurysm (AAA) Screening: covered once if your at risk. You're considered to be at risk if you have a family history of AAA.  Lung Cancer Screening: covers low dose CT scan once per year if you meet all of the following conditions: (1) Age 55-77; (2) No signs or symptoms of lung cancer; (3) Current smoker or have quit smoking within the last 15 years; (4) You have a tobacco smoking history of at least 20 pack years (packs per day multiplied by number of years you smoked); (5) You get a written order from a healthcare provider.  Glaucoma Screening: covered annually if you're considered high risk: (1) You have diabetes OR (2) Family history of glaucoma OR (3)  aged 50 and older OR (4)  American aged 65 and older  Osteoporosis Screening: covered every 2 years if you meet one of the following conditions: (1) You're estrogen deficient and at risk for osteoporosis based off medical history and other findings; (2) Have a vertebral abnormality; (3) On glucocorticoid therapy for more than 3 months; (4) Have primary hyperparathyroidism; (5) On osteoporosis medications and need to assess response to drug therapy.   Last bone density test (DXA Scan): 05/17/2023.  HIV Screening: covered annually if you're between the age of 15-65. Also covered annually if you are younger than 15 and older than 65 with risk factors for HIV infection. For pregnant patients, it is covered up to 3 times per  pregnancy.    Immunizations:  Immunization Recommendations   Influenza Vaccine Annual influenza vaccination during flu season is recommended for all persons aged >= 6 months who do not have contraindications   Pneumococcal Vaccine   * Pneumococcal conjugate vaccine = PCV13 (Prevnar 13), PCV15 (Vaxneuvance), PCV20 (Prevnar 20)  * Pneumococcal polysaccharide vaccine = PPSV23 (Pneumovax) Adults 19-63 yo with certain risk factors or if 65+ yo  If never received any pneumonia vaccine: recommend Prevnar 20 (PCV20)  Give PCV20 if previously received 1 dose of PCV13 or PPSV23   Hepatitis B Vaccine 3 dose series if at intermediate or high risk (ex: diabetes, end stage renal disease, liver disease)   Respiratory syncytial virus (RSV) Vaccine - COVERED BY MEDICARE PART D  * RSVPreF3 (Arexvy) CDC recommends that adults 60 years of age and older may receive a single dose of RSV vaccine using shared clinical decision-making (SCDM)   Tetanus (Td) Vaccine - COST NOT COVERED BY MEDICARE PART B Following completion of primary series, a booster dose should be given every 10 years to maintain immunity against tetanus. Td may also be given as tetanus wound prophylaxis.   Tdap Vaccine - COST NOT COVERED BY MEDICARE PART B Recommended at least once for all adults. For pregnant patients, recommended with each pregnancy.   Shingles Vaccine (Shingrix) - COST NOT COVERED BY MEDICARE PART B  2 shot series recommended in those 19 years and older who have or will have weakened immune systems or those 50 years and older     Health Maintenance Due:      Topic Date Due   • Hepatitis C Screening  Never done   • Breast Cancer Screening: Mammogram  08/27/2099 (Originally 7/26/1985)   • Colorectal Cancer Screening  Discontinued     Immunizations Due:  There are no preventive care reminders to display for this patient.  Advance Directives   What are advance directives?  Advance directives are legal documents that state your wishes and plans for medical  care. These plans are made ahead of time in case you lose your ability to make decisions for yourself. Advance directives can apply to any medical decision, such as the treatments you want, and if you want to donate organs.   What are the types of advance directives?  There are many types of advance directives, and each state has rules about how to use them. You may choose a combination of any of the following:  Living will:  This is a written record of the treatment you want. You can also choose which treatments you do not want, which to limit, and which to stop at a certain time. This includes surgery, medicine, IV fluid, and tube feedings.   Durable power of  for healthcare (DPAHC):  This is a written record that states who you want to make healthcare choices for you when you are unable to make them for yourself. This person, called a proxy, is usually a family member or a friend. You may choose more than 1 proxy.  Do not resuscitate (DNR) order:  A DNR order is used in case your heart stops beating or you stop breathing. It is a request not to have certain forms of treatment, such as CPR. A DNR order may be included in other types of advance directives.  Medical directive:  This covers the care that you want if you are in a coma, near death, or unable to make decisions for yourself. You can list the treatments you want for each condition. Treatment may include pain medicine, surgery, blood transfusions, dialysis, IV or tube feedings, and a ventilator (breathing machine).  Values history:  This document has questions about your views, beliefs, and how you feel and think about life. This information can help others choose the care that you would choose.  Why are advance directives important?  An advance directive helps you control your care. Although spoken wishes may be used, it is better to have your wishes written down. Spoken wishes can be misunderstood, or not followed. Treatments may be given even if  you do not want them. An advance directive may make it easier for your family to make difficult choices about your care.   Fall Prevention    Fall prevention  includes ways to make your home and other areas safer. It also includes ways you can move more carefully to prevent a fall. Health conditions that cause changes in your blood pressure, vision, or muscle strength and coordination may increase your risk for falls. Medicines may also increase your risk for falls if they make you dizzy, weak, or sleepy.   Fall prevention tips:   Stand or sit up slowly.    Use assistive devices as directed.    Wear shoes that fit well and have soles that .    Wear a personal alarm.    Stay active.    Manage your medical conditions.    Home Safety Tips:  Add items to prevent falls in the bathroom.    Keep paths clear.    Install bright lights in your home.    Keep items you use often on shelves within reach.    Paint or place reflective tape on the edges of your stairs.    Urinary Incontinence   Urinary incontinence (UI)  is when you lose control of your bladder. UI develops because your bladder cannot store or empty urine properly. The 3 most common types of UI are stress incontinence, urge incontinence, or both.  Medicines:   May be given to help strengthen your bladder control. Report any side effects of medication to your healthcare provider.  Do pelvic muscle exercises often:  Your pelvic muscles help you stop urinating. Squeeze these muscles tight for 5 seconds, then relax for 5 seconds. Gradually work up to squeezing for 10 seconds. Do 3 sets of 15 repetitions a day, or as directed. This will help strengthen your pelvic muscles and improve bladder control.  Train your bladder:  Go to the bathroom at set times, such as every 2 hours, even if you do not feel the urge to go. You can also try to hold your urine when you feel the urge to go. For example, hold your urine for 5 minutes when you feel the urge to go. As that  becomes easier, hold your urine for 10 minutes.   Self-care:   Keep a UI record.  Write down how often you leak urine and how much you leak. Make a note of what you were doing when you leaked urine.  Drink liquids as directed. You may need to limit the amount of liquid you drink to help control your urine leakage. Do not drink any liquid right before you go to bed. Limit or do not have drinks that contain caffeine or alcohol.   Prevent constipation.  Eat a variety of high-fiber foods. Good examples are high-fiber cereals, beans, vegetables, and whole-grain breads. Walking is the best way to trigger your intestines to have a bowel movement.  Exercise regularly and maintain a healthy weight.  Weight loss and exercise will decrease pressure on your bladder and help you control your leakage.   Use a catheter as directed  to help empty your bladder. A catheter is a tiny, plastic tube that is put into your bladder to drain your urine.   Go to behavior therapy as directed.  Behavior therapy may be used to help you learn to control your urge to urinate.     © Copyright [employer] 2018 Information is for End User's use only and may not be sold, redistributed or otherwise used for commercial purposes. All illustrations and images included in CareNotes® are the copyrighted property of [employer] or [employer]

## 2025-02-27 NOTE — ASSESSMENT & PLAN NOTE
Lab Results   Component Value Date    HGBA1C 7.5 (A) 02/27/2025       Orders:    POCT hemoglobin A1c

## 2025-02-27 NOTE — ASSESSMENT & PLAN NOTE
Lab Results   Component Value Date    HGBA1C 7.9 (H) 04/16/2024     POCT HgbA1c improved at 7.5%. Continue current insulin dose. Will recheck in 3 months.

## 2025-02-27 NOTE — PROGRESS NOTES
Name: Gilda Garay      : 1945      MRN: [id number]  Encounter Provider: Karthik Sanchez MD  Encounter Date: 2025   Encounter department: [facility]  :  Assessment & Plan  Type 2 diabetes mellitus with stage 4 chronic kidney disease and hypertension (HCC)    Lab Results   Component Value Date    HGBA1C 7.9 (H) 2024     POCT HgbA1c improved at 7.5%. Continue current insulin dose. Will recheck in 3 months.        Primary hypertension  BP reviewed and at goal. Continue current medications and will recheck at next visit.        Coronary artery disease of native artery of native heart with stable angina pectoris (HCC)  Stable on current medications. Follows with cardiology.        Chronic congestive heart failure, unspecified heart failure type (HCC)  Wt Readings from Last 3 Encounters:   24 48.5 kg (107 lb)   24 48.6 kg (107 lb 2.3 oz)   24 55.3 kg (122 lb)     Stable on current medications. Follows with cardiology.                PAD (peripheral artery disease) (HCC)  Stable, continue to monitor.        Cerebral amyloid angiopathy  (HCC)  Follows for neurology. Not currently anticoagulated due to fall risk.        Moderate Lewy body dementia without behavioral disturbance, psychotic disturbance, mood disturbance, or anxiety (HCC)  Stable, follows with neurology. Continue current medications.               History of Present Illness   Gilda Garay is a 79 y.o. female with a significant PMH of DM2, CKD, anemia of chronic disease, CAD, CHF, PAD, Lewy body dementia, and cerebral amyloid angiopathy who is presenting to the office with her son Filiberto for a follow up visit. Her son notes increased daytime sleepiness over the last 3 months on and off, and as a result she likely is not sleeping as well through the night. He notes the patient wakes up at 8:30-9am in the morning. He reports that yesterday the patient fell asleep during dinner with food in her hand.  "He reports she is eating a bit more and is taking in about 32oz of fluid each day. Her son notes hydration is difficult, but she drinks her coffee every morning and he gives her ensure on days when she does not eat as much. Her son states her blood sugars have been stable in the 180-200s, which is normal for her. He reports her dementia is stable with good and bad days and fluctuating memory - some days she cannot remember her own or his name, and other days she is able to recall specific information. Her son states she has not been short of breath or wheezing and has not indicated chest pain.       Review of Systems   Unable to perform ROS: Dementia       Objective   /70   Pulse 58   Ht 5' 3\" (1.6 m)   SpO2 97%   BMI 18.95 kg/m²      Physical Exam  Vitals reviewed.   Constitutional:       Appearance: Normal appearance.      Comments: Appears sleepy in wheelchair, but responsive to instructions.    Cardiovascular:      Rate and Rhythm: Normal rate and regular rhythm.      Pulses: Normal pulses.      Heart sounds: Normal heart sounds. No murmur heard.  Pulmonary:      Effort: Pulmonary effort is normal.      Breath sounds: Normal breath sounds. No wheezing.   Musculoskeletal:      Right lower leg: No edema.      Left lower leg: No edema.         "

## 2025-02-27 NOTE — ASSESSMENT & PLAN NOTE
Wt Readings from Last 3 Encounters:   08/22/24 48.5 kg (107 lb)   07/17/24 48.6 kg (107 lb 2.3 oz)   04/19/24 55.3 kg (122 lb)     Stable on current medications. Follows with cardiology.

## 2025-02-27 NOTE — ASSESSMENT & PLAN NOTE
Wt Readings from Last 3 Encounters:   08/22/24 48.5 kg (107 lb)   07/17/24 48.6 kg (107 lb 2.3 oz)   04/19/24 55.3 kg (122 lb)

## 2025-02-27 NOTE — PROGRESS NOTES
Name: Gilda Garay      : 1945      MRN: [id number]  Encounter Provider: Karthik Sanchez MD  Encounter Date: 2025   Encounter department: [facility]    Assessment & Plan  Type 2 diabetes mellitus with stage 4 chronic kidney disease and hypertension (HCC)    Lab Results   Component Value Date    HGBA1C 7.5 (A) 2025       Orders:    POCT hemoglobin A1c    Primary hypertension         Coronary artery disease of native artery of native heart with stable angina pectoris (HCC)         Chronic congestive heart failure, unspecified heart failure type (HCC)  Wt Readings from Last 3 Encounters:   24 48.5 kg (107 lb)   24 48.6 kg (107 lb 2.3 oz)   24 55.3 kg (122 lb)                    PAD (peripheral artery disease) (HCC)         Cerebral amyloid angiopathy  (HCC)         Moderate Lewy body dementia without behavioral disturbance, psychotic disturbance, mood disturbance, or anxiety (HCC)         Sick sinus syndrome (HCC)  -stable/controlled, continue same medication. Will evaluate again next visit               Preventive health issues were discussed with patient, and age appropriate screening tests were ordered as noted in patient's After Visit Summary. Personalized health advice and appropriate referrals for health education or preventive services given if needed, as noted in patient's After Visit Summary.    History of Present Illness     HPI   Patient Care Team:  Karthik Sanchez MD as PCP - General (Family Medicine)  Karthik Sanchez MD as PCP - PCP-[facility] ([facility])  Karthik Sanchez MD as PCP - PCP-[facility] ([facility])  MD Karthik Yu MD Swomya Bal, MD (Nephrology)  Karthik Sanchez MD    Review of Systems  Medical History Reviewed by provider this encounter:       Annual Wellness Visit Questionnaire   Gilda is here for her Subsequent Wellness visit.     Health Risk Assessment:   Patient rates overall health as good. Patient feels that  their physical health rating is same. Patient is very satisfied with their life. Eyesight was rated as same. Hearing was rated as same. Patient feels that their emotional and mental health rating is same. Patients states they are never, rarely angry. Patient states they are never, rarely unusually tired/fatigued. Pain experienced in the last 7 days has been none. Patient states that she has experienced no weight loss or gain in last 6 months.     Depression Screening:   PHQ-2 Score: 0      Fall Risk Screening:   In the past year, patient has experienced: history of falling in past year    Number of falls: 2 or more  Injured during fall?: Yes    Feels unsteady when standing or walking?: Yes    Worried about falling?: Yes      Urinary Incontinence Screening:   Patient has leaked urine accidently in the last six months.     Home Safety:  Patient has trouble with stairs inside or outside of their home. Patient has working smoke alarms and has working carbon monoxide detector. Home safety hazards include: none.     Nutrition:   Current diet is Regular.     Medications:   Patient is currently taking over-the-counter supplements. OTC medications include: see medication list. Patient is not able to manage medications.     Activities of Daily Living (ADLs)/Instrumental Activities of Daily Living (IADLs):   Walk and transfer into and out of bed and chair?: Yes  Dress and groom yourself?: Yes    Bathe or shower yourself?: No    Feed yourself? Yes  Do your laundry/housekeeping?: No  Manage your money, pay your bills and track your expenses?: No  Make your own meals?: No    Do your own shopping?: No    Previous Hospitalizations:   Any hospitalizations or ED visits within the last 12 months?: Yes    How many hospitalizations have you had in the last year?: 3-4    Advance Care Planning:   Living will: No    Durable POA for healthcare: No    Advanced directive: No      PREVENTIVE SCREENINGS      Cardiovascular Screening:     "General: Screening Not Indicated and History Lipid Disorder      Diabetes Screening:     General: Screening Not Indicated and History Diabetes      Cervical Cancer Screening:    General: Screening Not Indicated      Lung Cancer Screening:     General: Screening Not Indicated    Screening, Brief Intervention, and Referral to Treatment (SBIRT)     Screening  Typical number of drinks in a day: 0  Typical number of drinks in a week: 0  Interpretation: Low risk drinking behavior.    Single Item Drug Screening:  How often have you used an illegal drug (including marijuana) or a prescription medication for non-medical reasons in the past year? never    Single Item Drug Screen Score: 0  Interpretation: Negative screen for possible drug use disorder    Social Drivers of Health     Financial Resource Strain: Low Risk  (11/10/2023)    Overall Financial Resource Strain (CARDIA)     Difficulty of Paying Living Expenses: Not very hard   Food Insecurity: No Food Insecurity (2/27/2025)    Hunger Vital Sign     Worried About Running Out of Food in the Last Year: Never true     Ran Out of Food in the Last Year: Never true   Transportation Needs: No Transportation Needs (2/27/2025)    PRAPARE - Transportation     Lack of Transportation (Medical): No     Lack of Transportation (Non-Medical): No   Housing Stability: Low Risk  (2/27/2025)    Housing Stability Vital Sign     Unable to Pay for Housing in the Last Year: No     Number of Times Moved in the Last Year: 0     Homeless in the Last Year: No   Utilities: Not At Risk (2/27/2025)    [facility] Utilities     Threatened with loss of utilities: No     No results found.    Objective   /70   Pulse 58   Ht 5' 3\" (1.6 m)   SpO2 97%   BMI 18.95 kg/m²     Physical Exam    "

## 2025-04-01 DIAGNOSIS — I25.10 CORONARY ARTERY DISEASE INVOLVING NATIVE CORONARY ARTERY OF NATIVE HEART WITHOUT ANGINA PECTORIS: ICD-10-CM

## 2025-04-01 DIAGNOSIS — R44.3 HALLUCINATIONS: ICD-10-CM

## 2025-04-02 RX ORDER — DIVALPROEX SODIUM 250 MG/1
250 TABLET, DELAYED RELEASE ORAL 2 TIMES DAILY
Qty: 60 TABLET | Refills: 0 | Status: SHIPPED | OUTPATIENT
Start: 2025-04-02

## 2025-04-03 DIAGNOSIS — I25.10 CORONARY ARTERY DISEASE INVOLVING NATIVE CORONARY ARTERY OF NATIVE HEART WITHOUT ANGINA PECTORIS: Primary | ICD-10-CM

## 2025-04-03 DIAGNOSIS — N18.4 BENIGN HYPERTENSION WITH CKD (CHRONIC KIDNEY DISEASE) STAGE IV (HCC): ICD-10-CM

## 2025-04-03 DIAGNOSIS — I12.9 BENIGN HYPERTENSION WITH CKD (CHRONIC KIDNEY DISEASE) STAGE IV (HCC): ICD-10-CM

## 2025-04-03 RX ORDER — BLOOD PRESSURE TEST KIT-WRIST
KIT MISCELLANEOUS
Qty: 1 EACH | Refills: 0 | Status: SHIPPED | OUTPATIENT
Start: 2025-04-03

## 2025-04-03 RX ORDER — RANOLAZINE 500 MG/1
TABLET, EXTENDED RELEASE ORAL
Qty: 90 TABLET | Refills: 1 | Status: SHIPPED | OUTPATIENT
Start: 2025-04-03

## 2025-04-07 ENCOUNTER — REMOTE DEVICE CLINIC VISIT (OUTPATIENT)
Dept: CARDIOLOGY CLINIC | Facility: CLINIC | Age: 80
End: 2025-04-07
Payer: COMMERCIAL

## 2025-04-07 ENCOUNTER — TELEPHONE (OUTPATIENT)
Dept: CARDIOLOGY CLINIC | Facility: CLINIC | Age: 80
End: 2025-04-07

## 2025-04-07 ENCOUNTER — RESULTS FOLLOW-UP (OUTPATIENT)
Dept: NON INVASIVE DIAGNOSTICS | Facility: HOSPITAL | Age: 80
End: 2025-04-07

## 2025-04-07 DIAGNOSIS — I49.5 SSS (SICK SINUS SYNDROME) (HCC): Primary | ICD-10-CM

## 2025-04-07 DIAGNOSIS — I48.91 ATRIAL FIBRILLATION, UNSPECIFIED TYPE (HCC): ICD-10-CM

## 2025-04-07 PROCEDURE — 93296 REM INTERROG EVL PM/IDS: CPT | Performed by: STUDENT IN AN ORGANIZED HEALTH CARE EDUCATION/TRAINING PROGRAM

## 2025-04-07 PROCEDURE — 93294 REM INTERROG EVL PM/LDLS PM: CPT | Performed by: STUDENT IN AN ORGANIZED HEALTH CARE EDUCATION/TRAINING PROGRAM

## 2025-04-07 NOTE — PROGRESS NOTES
"Results for orders placed or performed in visit on 04/07/25   Cardiac EP device report    Narrative    MDT DC PPM - ACTIVE SYSTEM IS MRI CONDITIONAL  CARELINK TRANSMISSION: BATTERY VOLTAGE ADEQUATE (8.3 YRS). AP: 98%. : 0.1% (MVP-ON). ALL AVAILABLE LEAD PARAMETERS WITHIN NORMAL LIMITS. 2 AT/AF EPISODES W/ EGMS SHOWING AF, (BRIEF),  ANTICOAGULATION D/C'D DUE TO \"HIGH SUSPICION FOR CEREBRAL AMYLOID ANGIOPATHY\" PER CW, CRNP OFFICE NOTE. PT TAKES COREG, RANEXA, ASA 81MG. EF: 65% (ECHO 10/10/22). NORMAL DEVICE FUNCTION. CH        "

## 2025-04-07 NOTE — TELEPHONE ENCOUNTER
4/7/25 Per IB msg from : Pt is o/d for yearly in clinic device check.  Called son, Filiberto AYERS to schedule yearly. BMc

## 2025-04-23 ENCOUNTER — DOCUMENTATION (OUTPATIENT)
Dept: ADMINISTRATIVE | Facility: OTHER | Age: 80
End: 2025-04-23

## 2025-04-23 DIAGNOSIS — R44.3 HALLUCINATIONS: ICD-10-CM

## 2025-04-23 NOTE — PROGRESS NOTES
04/23/25 12:55 PM    Annual Wellness Visit outreach is not required, an AWV was completed at the PCP office.    Thank you.  Elisa Rocha MA  [facility]

## 2025-04-24 RX ORDER — DIVALPROEX SODIUM 250 MG/1
250 TABLET, DELAYED RELEASE ORAL 2 TIMES DAILY
Qty: 180 TABLET | Refills: 1 | Status: SHIPPED | OUTPATIENT
Start: 2025-04-24

## 2025-05-04 DIAGNOSIS — I25.10 CORONARY ARTERY DISEASE INVOLVING NATIVE CORONARY ARTERY OF NATIVE HEART WITHOUT ANGINA PECTORIS: ICD-10-CM

## 2025-05-04 DIAGNOSIS — Z79.4 TYPE 2 DIABETES MELLITUS WITH STAGE 3A CHRONIC KIDNEY DISEASE, WITH LONG-TERM CURRENT USE OF INSULIN (HCC): ICD-10-CM

## 2025-05-04 DIAGNOSIS — F02.80 LEWY BODY DEMENTIA (HCC): ICD-10-CM

## 2025-05-04 DIAGNOSIS — G31.83 LEWY BODY DEMENTIA (HCC): ICD-10-CM

## 2025-05-04 DIAGNOSIS — N18.31 TYPE 2 DIABETES MELLITUS WITH STAGE 3A CHRONIC KIDNEY DISEASE, WITH LONG-TERM CURRENT USE OF INSULIN (HCC): ICD-10-CM

## 2025-05-04 DIAGNOSIS — E11.22 TYPE 2 DIABETES MELLITUS WITH STAGE 3A CHRONIC KIDNEY DISEASE, WITH LONG-TERM CURRENT USE OF INSULIN (HCC): ICD-10-CM

## 2025-05-05 RX ORDER — FOLIC ACID 0.4 MG
400 TABLET ORAL DAILY
Qty: 90 TABLET | Refills: 1 | Status: SHIPPED | OUTPATIENT
Start: 2025-05-05

## 2025-05-05 RX ORDER — FLURBIPROFEN SODIUM 0.3 MG/ML
SOLUTION/ DROPS OPHTHALMIC DAILY
Qty: 100 EACH | Refills: 1 | Status: SHIPPED | OUTPATIENT
Start: 2025-05-05

## 2025-05-05 RX ORDER — CARVEDILOL 25 MG/1
25 TABLET ORAL 2 TIMES DAILY
Qty: 180 TABLET | Refills: 1 | Status: SHIPPED | OUTPATIENT
Start: 2025-05-05

## 2025-05-16 PROBLEM — N39.42 URINARY INCONTINENCE WITHOUT SENSORY AWARENESS: Status: ACTIVE | Noted: 2025-05-16

## 2025-05-28 NOTE — ASSESSMENT & PLAN NOTE
"    /78 (BP Location: Right arm, Patient Position: Sitting)   Pulse (!) 130   Temp (!) 100.6 °F (38.1 °C) (Oral)   Resp 18   Ht 6' 2" (1.88 m)   Wt 112.8 kg (248 lb 10.9 oz)   SpO2 99%   BMI 31.93 kg/m²       ===========              Humberto Whitt Laci Hughes MD;  Location: [facility];  Service: Endoscopy;  Laterality: N/A;    ESOPHAGOGASTRODUODENOSCOPY N/A " · Acute blood loss in the setting of chronic anemia  · History of chronic anemia hemoglobin is typically around 10  · Patient was 10 9 on admission  · Patient loss approximately 300 mL blood during her surgery  · Will transfuse 1 unit PRBCs  · Repeat hemoglobin at noon  · Continue to monitor for signs of symptomatic anemia though in the setting beta-blocker use patient will not likely be tachycardic  8/17/2022    Procedure: EGD (ESOPHAGOGASTRODUODENOSCOPY);  Surgeon: Esdras Hunt MD;  Location: [facility] [place] [facility];  Service: General;  Laterality: N/A;    EXAMINATION UNDER ANESTHESIA N/A 6/29/2018    Procedure: Exam under anesthesia;  Surgeon: MARIA LUISA Hensley MD;  Location: [facility] 2ND F[facility];  Service: Colon and Rectal;  Laterality: N/A;    EXAMINATION UNDER ANESTHESIA N/A 11/15/2018    Procedure: Exam under anesthesia;  Surgeon: MARIA LUISA Hensley MD;  Location: [facility] 2ND F[facility];  Service: Colon and Rectal;  Laterality: N/A;    FISTULOTOMY N/A 11/15/2018    Procedure: FISTULOTOMY;  Surgeon: MARIA LUISA Hensley MD;  Location: [facility] MARIA LUISA Hensley MD;  Location: [facility] 2ND F[facility];  Service: Colon and Rectal;  Laterality: N/A;    RECTAL FISTULOTOMY N/A 6/29/2018    Procedure: FISTULOTOMY, RECTUM;  Surgeon: MARIA LUISA Hensley MD;  Location: [facility] 

## 2025-06-13 DIAGNOSIS — E11.9 DIABETES MELLITUS TYPE 2, INSULIN DEPENDENT (HCC): ICD-10-CM

## 2025-06-13 DIAGNOSIS — Z79.4 TYPE 2 DIABETES MELLITUS WITH STAGE 3A CHRONIC KIDNEY DISEASE, WITH LONG-TERM CURRENT USE OF INSULIN (HCC): ICD-10-CM

## 2025-06-13 DIAGNOSIS — G31.83 LEWY BODY DEMENTIA (HCC): ICD-10-CM

## 2025-06-13 DIAGNOSIS — E87.70 HYPERVOLEMIA, UNSPECIFIED HYPERVOLEMIA TYPE: ICD-10-CM

## 2025-06-13 DIAGNOSIS — Z79.4 DIABETES MELLITUS TYPE 2, INSULIN DEPENDENT (HCC): ICD-10-CM

## 2025-06-13 DIAGNOSIS — F02.80 LEWY BODY DEMENTIA (HCC): ICD-10-CM

## 2025-06-13 DIAGNOSIS — I25.10 CORONARY ARTERY DISEASE INVOLVING NATIVE CORONARY ARTERY OF NATIVE HEART WITHOUT ANGINA PECTORIS: ICD-10-CM

## 2025-06-13 DIAGNOSIS — N18.31 TYPE 2 DIABETES MELLITUS WITH STAGE 3A CHRONIC KIDNEY DISEASE, WITH LONG-TERM CURRENT USE OF INSULIN (HCC): ICD-10-CM

## 2025-06-13 DIAGNOSIS — R44.3 HALLUCINATIONS: ICD-10-CM

## 2025-06-13 DIAGNOSIS — E11.22 TYPE 2 DIABETES MELLITUS WITH STAGE 3A CHRONIC KIDNEY DISEASE, WITH LONG-TERM CURRENT USE OF INSULIN (HCC): ICD-10-CM

## 2025-06-13 RX ORDER — FOLIC ACID 0.4 MG
400 TABLET ORAL DAILY
Qty: 90 TABLET | Refills: 0 | OUTPATIENT
Start: 2025-06-13

## 2025-06-13 RX ORDER — INSULIN GLARGINE 100 [IU]/ML
12 INJECTION, SOLUTION SUBCUTANEOUS
Qty: 12 ML | Refills: 1 | Status: SHIPPED | OUTPATIENT
Start: 2025-06-13 | End: 2025-09-11

## 2025-06-13 RX ORDER — DONEPEZIL HYDROCHLORIDE 5 MG/1
5 TABLET, FILM COATED ORAL
Qty: 90 TABLET | Refills: 0 | Status: SHIPPED | OUTPATIENT
Start: 2025-06-13

## 2025-06-13 RX ORDER — BUMETANIDE 1 MG/1
0.5 TABLET ORAL DAILY
Qty: 90 TABLET | Refills: 0 | Status: SHIPPED | OUTPATIENT
Start: 2025-06-13

## 2025-06-13 RX ORDER — ATORVASTATIN CALCIUM 10 MG/1
10 TABLET, FILM COATED ORAL DAILY
Qty: 30 TABLET | Refills: 0 | Status: SHIPPED | OUTPATIENT
Start: 2025-06-13

## 2025-06-13 RX ORDER — FLURBIPROFEN SODIUM 0.3 MG/ML
SOLUTION/ DROPS OPHTHALMIC DAILY
Qty: 100 EACH | Refills: 0 | OUTPATIENT
Start: 2025-06-13

## 2025-06-13 RX ORDER — CARVEDILOL 25 MG/1
25 TABLET ORAL 2 TIMES DAILY
Qty: 180 TABLET | Refills: 0 | OUTPATIENT
Start: 2025-06-13

## 2025-06-15 RX ORDER — DIVALPROEX SODIUM 250 MG/1
250 TABLET, DELAYED RELEASE ORAL 2 TIMES DAILY
Qty: 180 TABLET | Refills: 0 | Status: SHIPPED | OUTPATIENT
Start: 2025-06-15

## 2025-06-15 RX ORDER — RANOLAZINE 500 MG/1
500 TABLET, EXTENDED RELEASE ORAL 2 TIMES DAILY
Qty: 180 TABLET | Refills: 1 | Status: SHIPPED | OUTPATIENT
Start: 2025-06-15

## 2025-06-25 ENCOUNTER — RESULTS FOLLOW-UP (OUTPATIENT)
Dept: NEPHROLOGY | Facility: CLINIC | Age: 80
End: 2025-06-25

## 2025-06-25 ENCOUNTER — APPOINTMENT (OUTPATIENT)
Dept: LAB | Facility: CLINIC | Age: 80
End: 2025-06-25
Payer: COMMERCIAL

## 2025-06-25 DIAGNOSIS — E11.22 TYPE 2 DIABETES MELLITUS WITH STAGE 4 CHRONIC KIDNEY DISEASE AND HYPERTENSION (HCC): ICD-10-CM

## 2025-06-25 DIAGNOSIS — I12.9 TYPE 2 DIABETES MELLITUS WITH STAGE 4 CHRONIC KIDNEY DISEASE AND HYPERTENSION (HCC): ICD-10-CM

## 2025-06-25 DIAGNOSIS — E83.9 CHRONIC KIDNEY DISEASE-MINERAL BONE DISORDER (CKD-MBD) WITH STAGE 4 CHRONIC KIDNEY DISEASE (HCC): ICD-10-CM

## 2025-06-25 DIAGNOSIS — I68.0 CEREBRAL AMYLOID ANGIOPATHY  (HCC): ICD-10-CM

## 2025-06-25 DIAGNOSIS — I12.9 BENIGN HYPERTENSION WITH CKD (CHRONIC KIDNEY DISEASE) STAGE IV (HCC): ICD-10-CM

## 2025-06-25 DIAGNOSIS — N18.4 CHRONIC KIDNEY DISEASE-MINERAL BONE DISORDER (CKD-MBD) WITH STAGE 4 CHRONIC KIDNEY DISEASE (HCC): ICD-10-CM

## 2025-06-25 DIAGNOSIS — I10 PRIMARY HYPERTENSION: ICD-10-CM

## 2025-06-25 DIAGNOSIS — N18.4 TYPE 2 DIABETES MELLITUS WITH STAGE 4 CHRONIC KIDNEY DISEASE AND HYPERTENSION (HCC): ICD-10-CM

## 2025-06-25 DIAGNOSIS — E85.4 CEREBRAL AMYLOID ANGIOPATHY  (HCC): ICD-10-CM

## 2025-06-25 DIAGNOSIS — N18.4 BENIGN HYPERTENSION WITH CKD (CHRONIC KIDNEY DISEASE) STAGE IV (HCC): ICD-10-CM

## 2025-06-25 DIAGNOSIS — I50.9 CHRONIC CONGESTIVE HEART FAILURE, UNSPECIFIED HEART FAILURE TYPE (HCC): ICD-10-CM

## 2025-06-25 DIAGNOSIS — M89.9 CHRONIC KIDNEY DISEASE-MINERAL BONE DISORDER (CKD-MBD) WITH STAGE 4 CHRONIC KIDNEY DISEASE (HCC): ICD-10-CM

## 2025-06-25 LAB
25(OH)D3 SERPL-MCNC: >120 NG/ML (ref 30–100)
ALBUMIN SERPL BCG-MCNC: 3.1 G/DL (ref 3.5–5)
ALP SERPL-CCNC: 41 U/L (ref 34–104)
ALT SERPL W P-5'-P-CCNC: 13 U/L (ref 7–52)
ANION GAP SERPL CALCULATED.3IONS-SCNC: 5 MMOL/L (ref 4–13)
AST SERPL W P-5'-P-CCNC: 16 U/L (ref 13–39)
BILIRUB SERPL-MCNC: 0.43 MG/DL (ref 0.2–1)
BUN SERPL-MCNC: 43 MG/DL (ref 5–25)
CALCIUM ALBUM COR SERPL-MCNC: 10.4 MG/DL (ref 8.3–10.1)
CALCIUM SERPL-MCNC: 9.7 MG/DL (ref 8.4–10.2)
CHLORIDE SERPL-SCNC: 105 MMOL/L (ref 96–108)
CO2 SERPL-SCNC: 32 MMOL/L (ref 21–32)
CREAT SERPL-MCNC: 2.12 MG/DL (ref 0.6–1.3)
ERYTHROCYTE [DISTWIDTH] IN BLOOD BY AUTOMATED COUNT: 13.6 % (ref 11.6–15.1)
EST. AVERAGE GLUCOSE BLD GHB EST-MCNC: 180 MG/DL
GFR SERPL CREATININE-BSD FRML MDRD: 21 ML/MIN/1.73SQ M
GLUCOSE P FAST SERPL-MCNC: 87 MG/DL (ref 65–99)
HBA1C MFR BLD: 7.9 %
HCT VFR BLD AUTO: 29.5 % (ref 34.8–46.1)
HGB BLD-MCNC: 9.3 G/DL (ref 11.5–15.4)
MCH RBC QN AUTO: 30 PG (ref 26.8–34.3)
MCHC RBC AUTO-ENTMCNC: 31.5 G/DL (ref 31.4–37.4)
MCV RBC AUTO: 95 FL (ref 82–98)
PHOSPHATE SERPL-MCNC: 3.8 MG/DL (ref 2.3–4.1)
PLATELET # BLD AUTO: 173 THOUSANDS/UL (ref 149–390)
PMV BLD AUTO: 11.4 FL (ref 8.9–12.7)
POTASSIUM SERPL-SCNC: 4.4 MMOL/L (ref 3.5–5.3)
PROT SERPL-MCNC: 6 G/DL (ref 6.4–8.4)
PTH-INTACT SERPL-MCNC: 12.6 PG/ML (ref 12–88)
RBC # BLD AUTO: 3.1 MILLION/UL (ref 3.81–5.12)
SODIUM SERPL-SCNC: 142 MMOL/L (ref 135–147)
WBC # BLD AUTO: 4.7 THOUSAND/UL (ref 4.31–10.16)

## 2025-06-25 PROCEDURE — 83036 HEMOGLOBIN GLYCOSYLATED A1C: CPT

## 2025-06-25 PROCEDURE — 80053 COMPREHEN METABOLIC PANEL: CPT

## 2025-06-25 PROCEDURE — 83970 ASSAY OF PARATHORMONE: CPT

## 2025-06-25 PROCEDURE — 36415 COLL VENOUS BLD VENIPUNCTURE: CPT

## 2025-06-25 PROCEDURE — 85027 COMPLETE CBC AUTOMATED: CPT

## 2025-06-25 PROCEDURE — 82306 VITAMIN D 25 HYDROXY: CPT

## 2025-06-25 PROCEDURE — 84100 ASSAY OF PHOSPHORUS: CPT

## 2025-06-25 NOTE — TELEPHONE ENCOUNTER
Talked with patient's daughter in law.  Advised to stop Vitamin D supplement and O[place] as levels are high per Dr. Sherwood.    Attempted to call pt.  Mailbox full.  Will try again.-    ---- Message from Raven Sherwood MD sent at 6/25/2025  3:11 PM EDT -----  Her calcium and vitamin D level are high.    Can you make her stop any vitamin D supplements in the [place] please    She has a follow-up coming up soon I believe tomorrow  ----- Message -----  From: Lab, Background User  Sent: 6/25/2025  12:40 PM EDT  To: Raven Sherwood MD

## 2025-06-26 ENCOUNTER — OFFICE VISIT (OUTPATIENT)
Dept: FAMILY MEDICINE CLINIC | Facility: CLINIC | Age: 80
End: 2025-06-26
Payer: COMMERCIAL

## 2025-06-26 VITALS
SYSTOLIC BLOOD PRESSURE: 110 MMHG | BODY MASS INDEX: 18.95 KG/M2 | DIASTOLIC BLOOD PRESSURE: 60 MMHG | HEIGHT: 63 IN | HEART RATE: 60 BPM

## 2025-06-26 DIAGNOSIS — I48.92 ATRIAL FLUTTER, PAROXYSMAL (HCC): ICD-10-CM

## 2025-06-26 DIAGNOSIS — I50.9 CHRONIC CONGESTIVE HEART FAILURE, UNSPECIFIED HEART FAILURE TYPE (HCC): ICD-10-CM

## 2025-06-26 DIAGNOSIS — Z79.4 TYPE 2 DIABETES MELLITUS WITH STAGE 3A CHRONIC KIDNEY DISEASE, WITH LONG-TERM CURRENT USE OF INSULIN (HCC): ICD-10-CM

## 2025-06-26 DIAGNOSIS — E11.22 TYPE 2 DIABETES MELLITUS WITH STAGE 4 CHRONIC KIDNEY DISEASE AND HYPERTENSION (HCC): ICD-10-CM

## 2025-06-26 DIAGNOSIS — N39.42 URINARY INCONTINENCE WITHOUT SENSORY AWARENESS: ICD-10-CM

## 2025-06-26 DIAGNOSIS — E85.4 CEREBRAL AMYLOID ANGIOPATHY  (HCC): Primary | ICD-10-CM

## 2025-06-26 DIAGNOSIS — N18.4 TYPE 2 DIABETES MELLITUS WITH STAGE 4 CHRONIC KIDNEY DISEASE AND HYPERTENSION (HCC): ICD-10-CM

## 2025-06-26 DIAGNOSIS — I12.9 TYPE 2 DIABETES MELLITUS WITH STAGE 4 CHRONIC KIDNEY DISEASE AND HYPERTENSION (HCC): ICD-10-CM

## 2025-06-26 DIAGNOSIS — E44.1 MILD PROTEIN-CALORIE MALNUTRITION (HCC): ICD-10-CM

## 2025-06-26 DIAGNOSIS — N18.31 TYPE 2 DIABETES MELLITUS WITH STAGE 3A CHRONIC KIDNEY DISEASE, WITH LONG-TERM CURRENT USE OF INSULIN (HCC): ICD-10-CM

## 2025-06-26 DIAGNOSIS — E11.22 TYPE 2 DIABETES MELLITUS WITH STAGE 3A CHRONIC KIDNEY DISEASE, WITH LONG-TERM CURRENT USE OF INSULIN (HCC): ICD-10-CM

## 2025-06-26 DIAGNOSIS — I68.0 CEREBRAL AMYLOID ANGIOPATHY  (HCC): Primary | ICD-10-CM

## 2025-06-26 PROBLEM — E72.12 METHYLENETETRAHYDROFOLATE REDUCTASE DEFICIENCY (HCC): Status: ACTIVE | Noted: 2019-11-29

## 2025-06-26 PROCEDURE — 99214 OFFICE O/P EST MOD 30 MIN: CPT | Performed by: FAMILY MEDICINE

## 2025-06-26 NOTE — ASSESSMENT & PLAN NOTE
Wt Readings from Last 3 Encounters:   08/22/24 48.5 kg (107 lb)   07/17/24 48.6 kg (107 lb 2.3 oz)   04/19/24 55.3 kg (122 lb)       Given lower HR and lower BP consider droping carvbediol down a dose

## 2025-06-26 NOTE — PROGRESS NOTES
Name: Gilda Garay      : 1945      MRN: [id number]  Encounter Provider: Karthik Sanchez MD  Encounter Date: 2025   Encounter department: [facility]    :  Assessment & Plan  Chronic congestive heart failure, unspecified heart failure type (HCC)  Wt Readings from Last 3 Encounters:   24 48.5 kg (107 lb)   24 48.6 kg (107 lb 2.3 oz)   24 55.3 kg (122 lb)       Given lower HR and lower BP consider droping carvbediol down a dose            Cerebral amyloid angiopathy  (HCC)         Atrial flutter, paroxysmal (HCC)         Mild protein-calorie malnutrition (HCC)         Type 2 diabetes mellitus with stage 3a chronic kidney disease, with long-term current use of insulin (HCC)    Lab Results   Component Value Date    HGBA1C 7.9 (H) 2025            Type 2 diabetes mellitus with stage 4 chronic kidney disease and hypertension (HCC)    Lab Results   Component Value Date    HGBA1C 7.9 (H) 2025            Urinary incontinence without sensory awareness  These are medically necessary to maintain skin integrity, prevent infection, preserve the patients dignity and quality of life     The use of these undergarments is essential in preventing complications such as skin breakdown, pressure ulcers, and urinary tract infections, which are common in incontinent individuals if not properly managed            Assessment & Plan  1. Left-sided body pain.  - The patient's muscle strength is satisfactory, and her skin appears normal.  - The pain could be attributed to deconditioning due to her right-handed dominance.  - She was advised to engage in stretching exercises at home to alleviate the pain.  - The use of topical analgesics and pain patches was also suggested.    2. Dementia.  - Her weight loss could be a consequence of dementia, leading to pressure sores on her hip.  - She was encouraged to maintain hydration with sugar-free fluids such as Gatorade, Pedialyte, smart  water, or apple juice.  - The use of Desitin as a skin barrier was recommended.  - She was advised to change positions hourly to prevent further pressure sores.    3. Diabetes mellitus.  - Her hemoglobin A1c level has increased from 7.5 to 7.9.  - The dosage of Lantus will be increased by 2 units every week until her blood glucose levels decrease to approximately 130 or 140.  - If her blood glucose levels drop below 100, the dosage will be adjusted accordingly.  - Medication sent to pharmacy.    4. Elevated vitamin D levels.  - Her vitamin D levels remain elevated, which could potentially cause bone pain and cognitive issues.  - She was advised to discontinue the use of vitamin D supplements.  - The elevated calcium levels were noted, and discontinuation of calcium supplements was advised.  - Follow-up in 4 months.           History of Present Illness     History of Present Illness  The patient presents for evaluation of left-sided body pain, dementia, hip issue, diabetes mellitus, and elevated vitamin D levels.    She reports experiencing pain on the left side of her body, including the chest, shoulder, and arm. The pain is described as an ache that persists without relief. She is right-hand dominant.    She has been diagnosed with dementia, which has led to significant weight loss. This weight loss has resulted in skin irritation due to pressure on her hip while lying in bed. To alleviate this, a cushion patch was applied after cleaning the area, which has shown improvement. However, she sustained a leg injury from a bed rail, causing bleeding. The wound was left unbandaged for medical examination. Her caregiver has been attempting to increase her caloric intake to address the weight loss. Her diet includes diabetic protein shakes for lunch, coffee and crackers with butter for breakfast, and her preferred foods such as hamburgers and pizza for dinner. Despite these efforts, her weight loss continues. Her caregiver  "has also been monitoring her sleep patterns and ensuring adequate hydration. She is not currently under the care of an ophthalmologist.    Her blood glucose levels typically range between 190 and 250, with no recorded values below 100. When her blood glucose level reaches 140, she exhibits noticeable changes but eventually stabilizes. Her caregiver administers 6 units of Lantus but increases the dose to 8 or 10 units when her blood glucose level rises to around 280.    She has been taking vitamin D supplements continuously.    She has an upcoming appointment with a cardiologist on 07/08/2025.     Review of Systems   Constitutional:  Positive for fatigue.   Cardiovascular:  Negative for chest pain.   Endocrine: Negative for polydipsia, polyphagia and polyuria.     Objective   /60   Pulse 60   Ht 5' 3\" (1.6 m)   BMI 18.95 kg/m²     Physical Exam  - Cardiovascular: Regular rate and rhythm, no murmurs, rubs, or gallops  - Musculoskeletal: Equal strength in both arms, good muscle strength  - Skin: Skin appears normal  Physical Exam  Vitals reviewed.   Constitutional:       Appearance: Normal appearance.      Comments:  in wheelchair, answers my questions      Eyes:      Comments: Sun glasses on as she is blind      Cardiovascular:      Rate and Rhythm: Normal rate and regular rhythm.      Pulses: no weak pulses.           Dorsalis pedis pulses are 1+ on the right side and 1+ on the left side.      Heart sounds: Normal heart sounds. No murmur heard.  Pulmonary:      Effort: Pulmonary effort is normal.      Breath sounds: Normal breath sounds. No wheezing.     Musculoskeletal:      Right lower leg: No edema.      Left lower leg: No edema.   Feet:      Right foot:      Skin integrity: Callus and dry skin present. No ulcer, skin breakdown, erythema or warmth.      Left foot:      Skin integrity: Callus and dry skin present. No ulcer, skin breakdown, erythema or warmth.           Patient's shoes and socks " removed.    Right Foot/Ankle   Right Foot Inspection  Skin Exam: skin normal, skin intact, dry skin, callus and callus. No warmth, no erythema, no maceration, no abnormal color, no pre-ulcer and no ulcer.     Toe Exam: ROM and strength within normal limits.     Sensory   Monofilament testing: diminished    Vascular  Capillary refills: elevated  The right DP pulse is 1+.     Left Foot/Ankle  Left Foot Inspection  Skin Exam: skin normal, skin intact, dry skin and callus. No warmth, no erythema, no maceration, normal color, no pre-ulcer and no ulcer.     Toe Exam: ROM and strength within normal limits.     Sensory   Monofilament testing: diminished    Vascular  Capillary refills: elevated  The left DP pulse is 1+.     Assign Risk Category  No deformity present  Loss of protective sensation  No weak pulses  Risk: 1    Answers submitted by the patient for this visit:  Diabetes Questionnaire (Submitted on 6/21/2025)  Chief Complaint: Diabetes problem  Diabetes type: type 2  MedicAlert ID: [id number]  Disease duration: 40 Years  blurred vision: No  foot paresthesias: Yes  foot ulcerations: No  Symptom course: stable  heart disease: Yes  nephropathy: Yes  peripheral neuropathy: Yes  PVD: Yes  retinopathy: Yes  CAD risks: dyslipidemia, family history, hypertension, obesity, post-menopausal, sedentary lifestyle  Current treatments: diet, insulin injections, oral agent (monotherapy)  Treatment compliance: all of the time

## 2025-06-27 NOTE — ASSESSMENT & PLAN NOTE
These are medically necessary to maintain skin integrity, prevent infection, preserve the patients dignity and quality of life     The use of these undergarments is essential in preventing complications such as skin breakdown, pressure ulcers, and urinary tract infections, which are common in incontinent individuals if not properly managed

## 2025-07-08 ENCOUNTER — IN-CLINIC DEVICE VISIT (OUTPATIENT)
Dept: CARDIOLOGY CLINIC | Facility: CLINIC | Age: 80
End: 2025-07-08
Payer: COMMERCIAL

## 2025-07-08 DIAGNOSIS — I49.5 SSS (SICK SINUS SYNDROME) (HCC): Primary | ICD-10-CM

## 2025-07-08 PROCEDURE — 93280 PM DEVICE PROGR EVAL DUAL: CPT | Performed by: STUDENT IN AN ORGANIZED HEALTH CARE EDUCATION/TRAINING PROGRAM

## 2025-07-08 NOTE — PROGRESS NOTES
Results for orders placed or performed in visit on 07/08/25   Cardiac EP device report    Narrative    MDT DC PPM - ACTIVE SYSTEM IS MRI CONDITIONAL  DEVICE INTERROGATED IN THE [place] OFFICE: BATTERY VOLTAGE ADEQUATE (8 YR). AP 98.1%  0.5% (AAIR-DDDR 60 PPM). ALL LEAD PARAMETERS WITHIN NORMAL LIMITS/STABLE. SINCE 4/6/25: NO SIGNIFICANT HIGH RATE OR AT/AF EPISODES. AT/AF BURDEN 0.2%. PVC SINCE 29.6/HR : RUNS 0.3/HR. NO PROGRAMMING CHANGES MADE TO DEVICE PARAMETERS. PATIENT ON ASA 81 MG, CARVEDILOL. NO ANTICOAGULATION DUE TO HIGH SUSPICION FOR CEREVRAL AMYLOID ANGIOPATHY.  NORMAL DEVICE FUNCTION.  ES

## 2025-07-17 ENCOUNTER — OFFICE VISIT (OUTPATIENT)
Dept: NEPHROLOGY | Facility: CLINIC | Age: 80
End: 2025-07-17
Payer: COMMERCIAL

## 2025-07-17 VITALS — DIASTOLIC BLOOD PRESSURE: 62 MMHG | OXYGEN SATURATION: 100 % | SYSTOLIC BLOOD PRESSURE: 110 MMHG | HEART RATE: 75 BPM

## 2025-07-17 DIAGNOSIS — I68.0 CEREBRAL AMYLOID ANGIOPATHY  (HCC): ICD-10-CM

## 2025-07-17 DIAGNOSIS — E67.3 HIGH VITAMIN D LEVEL: ICD-10-CM

## 2025-07-17 DIAGNOSIS — I25.118 CORONARY ARTERY DISEASE WITH STABLE ANGINA PECTORIS, UNSPECIFIED VESSEL OR LESION TYPE, UNSPECIFIED WHETHER NATIVE OR TRANSPLANTED HEART (HCC): ICD-10-CM

## 2025-07-17 DIAGNOSIS — I50.9 CHRONIC CONGESTIVE HEART FAILURE, UNSPECIFIED HEART FAILURE TYPE (HCC): ICD-10-CM

## 2025-07-17 DIAGNOSIS — E11.22 TYPE 2 DIABETES MELLITUS WITH STAGE 4 CHRONIC KIDNEY DISEASE AND HYPERTENSION (HCC): Primary | ICD-10-CM

## 2025-07-17 DIAGNOSIS — M89.9 CHRONIC KIDNEY DISEASE-MINERAL BONE DISORDER (CKD-MBD) WITH STAGE 4 CHRONIC KIDNEY DISEASE (HCC): ICD-10-CM

## 2025-07-17 DIAGNOSIS — N18.4 TYPE 2 DIABETES MELLITUS WITH STAGE 4 CHRONIC KIDNEY DISEASE AND HYPERTENSION (HCC): Primary | ICD-10-CM

## 2025-07-17 DIAGNOSIS — N18.4 CHRONIC KIDNEY DISEASE-MINERAL BONE DISORDER (CKD-MBD) WITH STAGE 4 CHRONIC KIDNEY DISEASE (HCC): ICD-10-CM

## 2025-07-17 DIAGNOSIS — E83.9 CHRONIC KIDNEY DISEASE-MINERAL BONE DISORDER (CKD-MBD) WITH STAGE 4 CHRONIC KIDNEY DISEASE (HCC): ICD-10-CM

## 2025-07-17 DIAGNOSIS — E85.4 CEREBRAL AMYLOID ANGIOPATHY  (HCC): ICD-10-CM

## 2025-07-17 DIAGNOSIS — I12.9 TYPE 2 DIABETES MELLITUS WITH STAGE 4 CHRONIC KIDNEY DISEASE AND HYPERTENSION (HCC): Primary | ICD-10-CM

## 2025-07-17 PROBLEM — Z79.4 TYPE 2 DIABETES MELLITUS WITH STAGE 3A CHRONIC KIDNEY DISEASE, WITH LONG-TERM CURRENT USE OF INSULIN (HCC): Status: RESOLVED | Noted: 2025-02-10 | Resolved: 2025-07-17

## 2025-07-17 PROBLEM — N18.31 TYPE 2 DIABETES MELLITUS WITH STAGE 3A CHRONIC KIDNEY DISEASE, WITH LONG-TERM CURRENT USE OF INSULIN (HCC): Status: RESOLVED | Noted: 2025-02-10 | Resolved: 2025-07-17

## 2025-07-17 PROCEDURE — 99214 OFFICE O/P EST MOD 30 MIN: CPT | Performed by: INTERNAL MEDICINE

## 2025-07-17 NOTE — ASSESSMENT & PLAN NOTE
Lab Results   Component Value Date    HGBA1C 7.9 (H) 06/25/2025     Chronic kidney disease stage IV with nonnephrotic range proteinuria (G4A2)  -- Baseline creatinine now fluctuating between 1.8 to 2.5 mg/dL based on the volume status  -- Presumed to be secondary to multiple episodes of acute kidney injury, diabetic kidney disease and hypertension, left renal atrophy  -- Renal function stable and at baseline with a creatinine of 2.1 mg/dL.     Diabetes mellitus type 2  -- Currently on Lantus  -- Not at target hemoglobin A1c rising to 7.9     Hypertension  --Low 2 g sodium diet  -- Euvolemic and blood pressure well-controlled  --Nicole continue current management with nue Bumex 0.5 mg daily    Anemia of chronic disease  --Following with hematology  -- No evidence of monoclonal gammopathy.  Elevated free light chain assay ratio likely elevated in the setting of kidney disease  -- Hemoglobin stable at 9.3    Orders:    Vitamin D 25 hydroxy; Future    Vitamin D 1,25 Dihydroxy; Future    Calcium, ionized; Future    Albumin / creatinine urine ratio; Future    Comprehensive metabolic panel; Future

## 2025-07-17 NOTE — ASSESSMENT & PLAN NOTE
-- Hold vitamin D supplements and calcium  --Check repeat vitamin D panel in 3 months  --Check ionized calcium level  --Stay well-hydrated    Orders:    Vitamin D 25 hydroxy; Future    Vitamin D 1,25 Dihydroxy; Future    Calcium, ionized; Future    Albumin / creatinine urine ratio; Future    Comprehensive metabolic panel; Future

## 2025-07-17 NOTE — ASSESSMENT & PLAN NOTE
-- Currently asymptomatic no chest pain    Orders:    Vitamin D 25 hydroxy; Future    Vitamin D 1,25 Dihydroxy; Future    Calcium, ionized; Future    Albumin / creatinine urine ratio; Future    Comprehensive metabolic panel; Future

## 2025-07-17 NOTE — PATIENT INSTRUCTIONS
1.)  Low 2 g sodium diet    2.)  Monitor weights at home    3.)  Avoid NSAIDs (ibuprofen, Motrin, Advil, Aleve, naproxen)    4.)  Monitor blood pressure at home, call if blood pressure greater than 150/90 persistently    5.) I will plan to discuss all results including blood work, and/or imaging at our next visit, unless there is an urgent indication, in which case I will call you earlier. If you have any questions or concerns about your results, please feel free to call our office.    6.)  Continue to hold vitamin D and Os-Rickie, as her last vitamin D level was very high    7.  Kidney function remained stable stay well-hydrated)

## 2025-07-17 NOTE — ASSESSMENT & PLAN NOTE
-- Following with neurology  -- Her risk factors include hypertension diabetes hyperlipidemia coronary artery disease and atrial fibrillation  -- MRI revealed innumerable cerebral microbleeds with some white matter changes consistent with probable cerebral amyloid angiopathy  -- Continue follow-up with neurology  -- Continue good blood pressure and diabetic control.  Blood pressure under excellent management needs little bit better diabetic control.    Orders:    Vitamin D 25 hydroxy; Future    Vitamin D 1,25 Dihydroxy; Future    Calcium, ionized; Future    Albumin / creatinine urine ratio; Future    Comprehensive metabolic panel; Future

## 2025-07-17 NOTE — PROGRESS NOTES
Name: Gilda Garay      : 1945      MRN: [id number]  Encounter Provider: Raven Sherwood MD  Encounter Date: 2025   Encounter department: [facility] [place]  :  Assessment & Plan  Type 2 diabetes mellitus with stage 4 chronic kidney disease and hypertension (HCC)    Lab Results   Component Value Date    HGBA1C 7.9 (H) 2025     Chronic kidney disease stage IV with nonnephrotic range proteinuria (G4A2)  -- Baseline creatinine now fluctuating between 1.8 to 2.5 mg/dL based on the volume status  -- Presumed to be secondary to multiple episodes of acute kidney injury, diabetic kidney disease and hypertension, left renal atrophy  -- Renal function stable and at baseline with a creatinine of 2.1 mg/dL.     Diabetes mellitus type 2  -- Currently on Lantus  -- Not at target hemoglobin A1c rising to 7.9     Hypertension  --Low 2 g sodium diet  -- Euvolemic and blood pressure well-controlled  --Nicole continue current management with nue Bumex 0.5 mg daily    Anemia of chronic disease  --Following with hematology  -- No evidence of monoclonal gammopathy.  Elevated free light chain assay ratio likely elevated in the setting of kidney disease  -- Hemoglobin stable at 9.3    Orders:    Vitamin D 25 hydroxy; Future    Vitamin D 1,25 Dihydroxy; Future    Calcium, ionized; Future    Albumin / creatinine urine ratio; Future    Comprehensive metabolic panel; Future    Coronary artery disease with stable angina pectoris, unspecified vessel or lesion type, unspecified whether native or transplanted heart (HCC)  -- Currently asymptomatic no chest pain    Orders:    Vitamin D 25 hydroxy; Future    Vitamin D 1,25 Dihydroxy; Future    Calcium, ionized; Future    Albumin / creatinine urine ratio; Future    Comprehensive metabolic panel; Future    Cerebral amyloid angiopathy  (HCC)  -- Following with neurology  -- Her risk factors include hypertension diabetes hyperlipidemia coronary artery disease and  atrial fibrillation  -- MRI revealed innumerable cerebral microbleeds with some white matter changes consistent with probable cerebral amyloid angiopathy  -- Continue follow-up with neurology  -- Continue good blood pressure and diabetic control.  Blood pressure under excellent management needs little bit better diabetic control.    Orders:    Vitamin D 25 hydroxy; Future    Vitamin D 1,25 Dihydroxy; Future    Calcium, ionized; Future    Albumin / creatinine urine ratio; Future    Comprehensive metabolic panel; Future    Chronic congestive heart failure, unspecified heart failure type (HCC)  Wt Readings from Last 3 Encounters:   08/22/24 48.5 kg (107 lb)   07/17/24 48.6 kg (107 lb 2.3 oz)   04/19/24 55.3 kg (122 lb)   -- Examines euvolemic  -- Patient weight has been trending down.  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 (HCC)  Lab Results   Component Value Date    EGFR 21 06/25/2025    EGFR 22 01/16/2025    EGFR 23 07/17/2024    CREATININE 2.12 (H) 06/25/2025    CREATININE 2.02 (H) 01/16/2025    CREATININE 1.96 (H) 07/17/2024   -- PTH is 12, phosphorus is normal calcium slightly elevated 10.4  -- Vitamin D 25-hydroxy level greater than 120  -- Discontinue Os-Rickie at the end of June and off vitamin D  -- Check vitamin D 25-hydroxy level and vitamin D 1, 25 dihydroxy level at next visit    Orders:    Vitamin D 25 hydroxy; Future    Vitamin D 1,25 Dihydroxy; Future    Calcium, ionized; Future    Albumin / creatinine urine ratio; Future    Comprehensive metabolic panel; Future    High vitamin D level  -- Hold vitamin D supplements and calcium  --Check repeat vitamin D panel in 3  months  --Check ionized calcium level  --Stay well-hydrated    Orders:    Vitamin D 25 hydroxy; Future    Vitamin D 1,25 Dihydroxy; Future    Calcium, ionized; Future    Albumin / creatinine urine ratio; Future    Comprehensive metabolic panel; Future        Patient Instructions   1.)  Low 2 g sodium diet    2.)  Monitor weights at home    3.)  Avoid NSAIDs (ibuprofen, Motrin, Advil, Aleve, naproxen)    4.)  Monitor blood pressure at home, call if blood pressure greater than 150/90 persistently    5.) I will plan to discuss all results including blood work, and/or imaging at our next visit, unless there is an urgent indication, in which case I will call you earlier. If you have any questions or concerns about your results, please feel free to call our office.    6.)  Continue to hold vitamin D and Os-Rickie, as her last vitamin D level was very high    7.  Kidney function remained stable stay well-hydrated)       It was a pleasure evaluating your patient in the office today. Thank you for allowing our team to participate in the care of  Gilda Garay. Please do not hesitate to contact our team if further issues/questions shall arise in the interim.     History of Present Illness   HPI  Gilda Garay is a 79 y.o. female who presents for follow-up of chronic kidney disease stage IV hypertension diabetes.    Since her last visit no ER visits or hospitalizations.    Her last blood work was done at the end of June which I reviewed and I had them stop the Os-Rickie due to elevated vitamin D of greater than 120 and a calcium of 10.4.  Renal function stable creatinine 2.1 mg/dL.  Hemoglobin stable at 9.3.    History obtained from: patient and patient's child  Pertinent Medical History         Review of Systems   Constitutional:  Negative for activity change and fever.   Respiratory:  Negative for cough, chest tightness, shortness of breath and wheezing.    Cardiovascular:  Negative for chest pain and leg swelling.  "  Gastrointestinal:  Negative for abdominal pain, diarrhea, nausea and vomiting.   Endocrine: Negative for polyuria.   Genitourinary:  Negative for difficulty urinating, dysuria, flank pain, frequency and urgency.   Skin:  Negative for rash.   Neurological:  Negative for dizziness, syncope, light-headedness and headaches.     Medical History Reviewed by provider this encounter:  Problems     .  Medications Ordered Prior to Encounter[1]      Medications Ordered Prior to Encounter[2]  Objective   /62 (BP Location: Left arm, Patient Position: Sitting, Cuff Size: Adult)   Pulse 75   SpO2 100%      Physical Exam  Exam conducted with a chaperone present.   Constitutional:       General: She is not in acute distress.     Appearance: She is well-developed. She is not diaphoretic.   HENT:      Head: Normocephalic and atraumatic.     Eyes:      General: No scleral icterus.     Pupils: Pupils are equal, round, and reactive to light.       Cardiovascular:      Rate and Rhythm: Normal rate and regular rhythm.      Heart sounds: Normal heart sounds. No murmur heard.     No friction rub. No gallop.   Pulmonary:      Effort: Pulmonary effort is normal. No respiratory distress.      Breath sounds: Normal breath sounds. No wheezing or rales.   Chest:      Chest wall: No tenderness.   Abdominal:      General: Bowel sounds are normal. There is no distension.      Palpations: Abdomen is soft.      Tenderness: There is no abdominal tenderness. There is no rebound.     Musculoskeletal:         General: Normal range of motion.      Cervical back: Normal range of motion and neck supple.      Right lower leg: Edema present.      Left lower leg: Edema present.     Skin:     Findings: No rash.     Neurological:      Mental Status: She is alert and oriented to person, place, and time.           Laboratory Results:        Invalid input(s): \"ALBUMIN\"    Results for orders placed or performed in visit on 06/25/25   Comprehensive metabolic " panel   Result Value Ref Range    Sodium 142 135 - 147 mmol/L    Potassium 4.4 3.5 - 5.3 mmol/L    Chloride 105 96 - 108 mmol/L    CO2 32 21 - 32 mmol/L    ANION GAP 5 4 - 13 mmol/L    BUN 43 (H) 5 - 25 mg/dL    Creatinine 2.12 (H) 0.60 - 1.30 mg/dL    Glucose, Fasting 87 65 - 99 mg/dL    Calcium 9.7 8.4 - 10.2 mg/dL    Corrected Calcium 10.4 (H) 8.3 - 10.1 mg/dL    AST 16 13 - 39 U/L    ALT 13 7 - 52 U/L    Alkaline Phosphatase 41 34 - 104 U/L    Total Protein 6.0 (L) 6.4 - 8.4 g/dL    Albumin 3.1 (L) 3.5 - 5.0 g/dL    Total Bilirubin 0.43 0.20 - 1.00 mg/dL    eGFR 21 ml/min/1.73sq m   Hemoglobin A1C   Result Value Ref Range    Hemoglobin A1C 7.9 (H) Normal 4.0-5.6%; PreDiabetic 5.7-6.4%; Diabetic >=6.5%; Glycemic control for adults with diabetes <7.0% %     mg/dl   CBC w/o differential   Result Value Ref Range    WBC 4.70 4.31 - 10.16 Thousand/uL    RBC 3.10 (L) 3.81 - 5.12 Million/uL    Hemoglobin 9.3 (L) 11.5 - 15.4 g/dL    Hematocrit 29.5 (L) 34.8 - 46.1 %    MCV 95 82 - 98 fL    MCH 30.0 26.8 - 34.3 pg    MCHC 31.5 31.4 - 37.4 g/dL    RDW 13.6 11.6 - 15.1 %    Platelets 173 149 - 390 Thousands/uL    MPV 11.4 8.9 - 12.7 fL   PTH, intact   Result Value Ref Range    PTH 12.6 12.0 - 88.0 pg/mL   Vitamin D 25 hydroxy   Result Value Ref Range    Vit D, 25-Hydroxy >120.0 (H) 30.0 - 100.0 ng/mL   Phosphorus   Result Value Ref Range    Phosphorus 3.8 2.3 - 4.1 mg/dL     *Note: Due to a large number of results and/or encounters for the requested time period, some results have not been displayed. A complete set of results can be found in Results Review.       Administrative Statements   I have spent a total time of 20 minutes in caring for this patient on the day of the visit/encounter including Impressions, Counseling / Coordination of care, Documenting in the medical record, Reviewing/placing orders in the medical record (including tests, medications, and/or procedures), and Obtaining or reviewing history  .        [1]   Current Outpatient Medications on File Prior to Visit   Medication Sig Dispense Refill    acetaminophen (TYLENOL) 325 mg tablet Take 3 tablets (975 mg total) by mouth every 8 (eight) hours (Patient taking differently: Take 975 mg by mouth if needed)  0    aspirin 81 mg chewable tablet Chew 81 mg in the morning.      atorvastatin (LIPITOR) 10 mg tablet Take 1 tablet (10 mg total) by mouth daily 30 tablet 0    B-D UF III MINI PEN NEEDLES 31G X 5 MM MISC USE ONCE DAILY AS DIRECTED 100 each 1    Blood Pressure Monitoring (Omron 7 Series BP Monitor) DEVIN Uad once daily 1 each 0    bumetanide (BUMEX) 1 mg tablet Take 0.5 tablets (0.5 mg total) by mouth daily 90 tablet 0    carvedilol (COREG) 25 mg tablet TAKE 1 TABLET(25 MG) BY MOUTH EVERY 12 HOURS 180 tablet 1    divalproex sodium (DEPAKOTE) 250 mg DR tablet Take 1 tablet (250 mg total) by mouth in the morning and 1 tablet (250 mg total) before bedtime. 180 tablet 0    donepezil (ARICEPT) 5 mg tablet Take 1 tablet (5 mg total) by mouth daily at bedtime 90 tablet 0    fluticasone (FLONASE) 50 mcg/act nasal spray SHAKE LIQUID AND USE 1 SPRAY IN EACH NOSTRIL TWICE DAILY 48 g 1    folic acid (FOLVITE) 400 mcg tablet TAKE 1 TABLET BY MOUTH EVERY DAY 90 tablet 1    Lantus SoloStar 100 units/mL SOPN Inject 0.12 mL (12 Units total) under the skin daily at bedtime 12 mL 1    polyethylene glycol (MIRALAX) 17 g packet Take 17 g by mouth 2 (two) times a day as needed (constipation)      ranolazine (RANEXA) 500 mg 12 hr tablet Take 1 tablet (500 mg total) by mouth 2 (two) times a day 180 tablet 1    senna (SENOKOT) 8.6 mg Take 2 tablets (17.2 mg total) by mouth 2 (two) times a day (Patient taking differently: Take 17.2 mg by mouth in the morning and 17.2 mg in the evening. As needed.)      [DISCONTINUED] apixaban (Eliquis) 5 mg Take 1 tablet (5 mg total) by mouth 2 (two) times a day 60 tablet 0    [DISCONTINUED] bisacodyl (DULCOLAX) 10 mg suppository Insert 1 suppository  (10 mg total) into the rectum daily as needed for constipation 12 suppository 0    [DISCONTINUED] dabigatran etexilate (PRADAXA) 75 mg capsule Take 1 capsule (75 mg total) by mouth 2 (two) times a day 60 capsule 0     No current facility-administered medications on file prior to visit.   [2]   Current Outpatient Medications on File Prior to Visit   Medication Sig Dispense Refill    acetaminophen (TYLENOL) 325 mg tablet Take 3 tablets (975 mg total) by mouth every 8 (eight) hours (Patient taking differently: Take 975 mg by mouth if needed)  0    aspirin 81 mg chewable tablet Chew 81 mg in the morning.      atorvastatin (LIPITOR) 10 mg tablet Take 1 tablet (10 mg total) by mouth daily 30 tablet 0    B-D UF III MINI PEN NEEDLES 31G X 5 MM MISC USE ONCE DAILY AS DIRECTED 100 each 1    Blood Pressure Monitoring (Omron 7 Series BP Monitor) DEVIN Uad once daily 1 each 0    bumetanide (BUMEX) 1 mg tablet Take 0.5 tablets (0.5 mg total) by mouth daily 90 tablet 0    carvedilol (COREG) 25 mg tablet TAKE 1 TABLET(25 MG) BY MOUTH EVERY 12 HOURS 180 tablet 1    divalproex sodium (DEPAKOTE) 250 mg DR tablet Take 1 tablet (250 mg total) by mouth in the morning and 1 tablet (250 mg total) before bedtime. 180 tablet 0    donepezil (ARICEPT) 5 mg tablet Take 1 tablet (5 mg total) by mouth daily at bedtime 90 tablet 0    fluticasone (FLONASE) 50 mcg/act nasal spray SHAKE LIQUID AND USE 1 SPRAY IN EACH NOSTRIL TWICE DAILY 48 g 1    folic acid (FOLVITE) 400 mcg tablet TAKE 1 TABLET BY MOUTH EVERY DAY 90 tablet 1    Lantus SoloStar 100 units/mL SOPN Inject 0.12 mL (12 Units total) under the skin daily at bedtime 12 mL 1    polyethylene glycol (MIRALAX) 17 g packet Take 17 g by mouth 2 (two) times a day as needed (constipation)      ranolazine (RANEXA) 500 mg 12 hr tablet Take 1 tablet (500 mg total) by mouth 2 (two) times a day 180 tablet 1    senna (SENOKOT) 8.6 mg Take 2 tablets (17.2 mg total) by mouth 2 (two) times a day (Patient  taking differently: Take 17.2 mg by mouth in the morning and 17.2 mg in the evening. As needed.)      [DISCONTINUED] apixaban (Eliquis) 5 mg Take 1 tablet (5 mg total) by mouth 2 (two) times a day 60 tablet 0    [DISCONTINUED] bisacodyl (DULCOLAX) 10 mg suppository Insert 1 suppository (10 mg total) into the rectum daily as needed for constipation 12 suppository 0    [DISCONTINUED] dabigatran etexilate (PRADAXA) 75 mg capsule Take 1 capsule (75 mg total) by mouth 2 (two) times a day 60 capsule 0     No current facility-administered medications on file prior to visit.

## 2025-07-17 NOTE — ASSESSMENT & PLAN NOTE
Wt Readings from Last 3 Encounters:   08/22/24 48.5 kg (107 lb)   07/17/24 48.6 kg (107 lb 2.3 oz)   04/19/24 55.3 kg (122 lb)   -- Examines euvolemic  -- Patient weight has been trending down.  -- Low 2 g sodium diet  -- Renal function remained stable  -- Continue Bumex 0.5 mg daily            Orders:    Vitamin D 25 hydroxy; Future    Vitamin D 1,25 Dihydroxy; Future    Calcium, ionized; Future    Albumin / creatinine urine ratio; Future    Comprehensive metabolic panel; Future

## 2025-07-17 NOTE — ASSESSMENT & PLAN NOTE
Lab Results   Component Value Date    EGFR 21 06/25/2025    EGFR 22 01/16/2025    EGFR 23 07/17/2024    CREATININE 2.12 (H) 06/25/2025    CREATININE 2.02 (H) 01/16/2025    CREATININE 1.96 (H) 07/17/2024   -- PTH is 12, phosphorus is normal calcium slightly elevated 10.4  -- Vitamin D 25-hydroxy level greater than 120  -- Discontinue Os-Rickie at the end of June and off vitamin D  -- Check vitamin D 25-hydroxy level and vitamin D 1, 25 dihydroxy level at next visit    Orders:    Vitamin D 25 hydroxy; Future    Vitamin D 1,25 Dihydroxy; Future    Calcium, ionized; Future    Albumin / creatinine urine ratio; Future    Comprehensive metabolic panel; Future

## 2025-08-08 ENCOUNTER — TELEPHONE (OUTPATIENT)
Age: 80
End: 2025-08-08

## (undated) DEVICE — 3.2MM GUIDE WIRE 400MM

## (undated) DEVICE — 4.2MM THREE-FLUTED DRILL BIT QC/NEEDLE POINT/145MM

## (undated) DEVICE — TFNA FENESTRATED HELICAL BLADE 90MM - STERILE
Type: IMPLANTABLE DEVICE | Site: LEG | Status: NON-FUNCTIONAL
Brand: TFN-ADVANCE

## (undated) DEVICE — DRESSING MEPILEX AG BORDER 4 X 4 IN

## (undated) DEVICE — 2.5MM REAMING ROD WITH BALL TIP/950MM-STERILE

## (undated) DEVICE — COBAN 4 IN STERILE

## (undated) DEVICE — 6617 IOBAN II PATIENT ISOLATION DRAPE 5/BX,4BX/CS: Brand: STERI-DRAPE™ IOBAN™ 2

## (undated) DEVICE — STERILE BETHLEHEM ORIF HIP PK: Brand: CARDINAL HEALTH

## (undated) DEVICE — GAUZE SPONGES,16 PLY: Brand: CURITY

## (undated) DEVICE — INTENDED FOR TISSUE SEPARATION, AND OTHER PROCEDURES THAT REQUIRE A SHARP SURGICAL BLADE TO PUNCTURE OR CUT.: Brand: BARD-PARKER SAFETY BLADES SIZE 15, STERILE

## (undated) DEVICE — 10MM/130 DEG TI CANN TFNA 170MM - STERILE
Type: IMPLANTABLE DEVICE | Site: LEG | Status: NON-FUNCTIONAL
Brand: TFN-ADVANCE

## (undated) DEVICE — MEDI-VAC YANKAUER SUCTION HANDLE W/BULBOUS AND CONTROL VENT: Brand: CARDINAL HEALTH

## (undated) DEVICE — TUBING SUCTION 5MM X 12 FT

## (undated) DEVICE — PLUMEPEN PRO 10FT

## (undated) DEVICE — ACE WRAP 6 IN UNSTERILE

## (undated) DEVICE — CHLORAPREP HI-LITE 26ML ORANGE

## (undated) DEVICE — GLOVE SRG BIOGEL 8.5

## (undated) DEVICE — KERLIX BANDAGE ROLL: Brand: KERLIX

## (undated) DEVICE — ARTHROSCOPY FLOOR MAT

## (undated) DEVICE — PAD CAST 4 IN COTTON NON STERILE

## (undated) DEVICE — SUT VICRYL 0 CT-1 27 IN J260H

## (undated) DEVICE — SUT VICRYL 2-0 SH 27 IN UNDYED J417H

## (undated) DEVICE — GLOVE INDICATOR PI UNDERGLOVE SZ 8.5 BLUE

## (undated) DEVICE — 5.0MM TI LOCKING SCREW W/T25 STARDRIVE 48MM F/IM NAIL-STER: Type: IMPLANTABLE DEVICE | Site: LEG | Status: NON-FUNCTIONAL

## (undated) DEVICE — PAD GROUNDING ADULT